# Patient Record
Sex: MALE | Race: OTHER | NOT HISPANIC OR LATINO | URBAN - METROPOLITAN AREA
[De-identification: names, ages, dates, MRNs, and addresses within clinical notes are randomized per-mention and may not be internally consistent; named-entity substitution may affect disease eponyms.]

---

## 2019-10-17 ENCOUNTER — OUTPATIENT (OUTPATIENT)
Dept: OUTPATIENT SERVICES | Facility: HOSPITAL | Age: 44
LOS: 1 days | End: 2019-10-17
Payer: COMMERCIAL

## 2019-10-17 PROCEDURE — 73080 X-RAY EXAM OF ELBOW: CPT

## 2019-10-17 PROCEDURE — 73080 X-RAY EXAM OF ELBOW: CPT | Mod: 26,LT

## 2019-10-17 PROCEDURE — 73110 X-RAY EXAM OF WRIST: CPT | Mod: 26,LT

## 2019-10-17 PROCEDURE — 73110 X-RAY EXAM OF WRIST: CPT

## 2021-06-08 ENCOUNTER — OFFICE (OUTPATIENT)
Dept: URBAN - METROPOLITAN AREA CLINIC 76 | Facility: CLINIC | Age: 46
Setting detail: OPHTHALMOLOGY
End: 2021-06-08
Payer: COMMERCIAL

## 2021-06-08 DIAGNOSIS — H11.153: ICD-10-CM

## 2021-06-08 DIAGNOSIS — E11.9: ICD-10-CM

## 2021-06-08 DIAGNOSIS — H52.4: ICD-10-CM

## 2021-06-08 PROCEDURE — 99203 OFFICE O/P NEW LOW 30 MIN: CPT | Performed by: OPHTHALMOLOGY

## 2021-06-08 PROCEDURE — 92015 DETERMINE REFRACTIVE STATE: CPT | Performed by: OPHTHALMOLOGY

## 2021-06-08 ASSESSMENT — REFRACTION_AUTOREFRACTION
OD_AXIS: 30
OD_SPHERE: +0.25
OS_CYLINDER: -0.25
OS_AXIS: 132
OD_CYLINDER: -0.75
OS_SPHERE: 0.00

## 2021-06-08 ASSESSMENT — REFRACTION_MANIFEST
OD_VA1: 20/20
OS_ADD: +1.75
OS_VA1: 20/20
OD_SPHERE: PLANO
OD_ADD: +1.75
OS_SPHERE: PLANO

## 2021-06-08 ASSESSMENT — CONFRONTATIONAL VISUAL FIELD TEST (CVF)
OD_FINDINGS: FULL
OS_FINDINGS: FULL

## 2021-06-08 ASSESSMENT — KERATOMETRY
OD_K1POWER_DIOPTERS: 43.00
OS_AXISANGLE_DEGREES: 90
OS_K2POWER_DIOPTERS: 43.50
OD_AXISANGLE_DEGREES: 83
OS_K1POWER_DIOPTERS: 43.50
OD_K2POWER_DIOPTERS: 43.75

## 2021-06-08 ASSESSMENT — TONOMETRY
OD_IOP_MMHG: 16
OS_IOP_MMHG: 15

## 2021-06-08 ASSESSMENT — AXIALLENGTH_DERIVED
OS_AL: 23.6407
OD_AL: 23.6869

## 2021-06-08 ASSESSMENT — SPHEQUIV_DERIVED
OD_SPHEQUIV: -0.125
OS_SPHEQUIV: -0.125

## 2021-06-08 ASSESSMENT — VISUAL ACUITY
OS_BCVA: 20/20
OD_BCVA: 20/20

## 2021-10-08 ENCOUNTER — OFFICE (OUTPATIENT)
Dept: URBAN - METROPOLITAN AREA CLINIC 76 | Facility: CLINIC | Age: 46
Setting detail: OPHTHALMOLOGY
End: 2021-10-08
Payer: COMMERCIAL

## 2021-10-08 DIAGNOSIS — H11.153: ICD-10-CM

## 2021-10-08 DIAGNOSIS — E11.9: ICD-10-CM

## 2021-10-08 DIAGNOSIS — H16.8: ICD-10-CM

## 2021-10-08 PROCEDURE — 99213 OFFICE O/P EST LOW 20 MIN: CPT | Performed by: OPHTHALMOLOGY

## 2021-10-08 ASSESSMENT — CONFRONTATIONAL VISUAL FIELD TEST (CVF)
OS_FINDINGS: FULL
OD_FINDINGS: FULL

## 2021-10-11 ASSESSMENT — REFRACTION_AUTOREFRACTION
OD_SPHERE: +0.25
OS_SPHERE: +0.25
OS_CYLINDER: -0.50
OD_CYLINDER: -0.50
OD_AXIS: 17
OS_AXIS: 115

## 2021-10-11 ASSESSMENT — KERATOMETRY
OS_AXISANGLE_DEGREES: 90
OS_K1POWER_DIOPTERS: 43.75
OD_K2POWER_DIOPTERS: 44.00
OD_AXISANGLE_DEGREES: 89
OD_K1POWER_DIOPTERS: 43.00
OS_K2POWER_DIOPTERS: 43.75

## 2021-10-11 ASSESSMENT — REFRACTION_MANIFEST
OD_SPHERE: PLANO
OD_ADD: +1.75
OS_ADD: +1.75
OS_VA1: 20/20
OS_SPHERE: PLANO
OD_VA1: 20/20

## 2021-10-11 ASSESSMENT — AXIALLENGTH_DERIVED
OD_AL: 23.5919
OS_AL: 23.5004

## 2021-10-11 ASSESSMENT — SPHEQUIV_DERIVED
OD_SPHEQUIV: 0
OS_SPHEQUIV: 0

## 2021-10-11 ASSESSMENT — VISUAL ACUITY
OD_BCVA: 20/20
OS_BCVA: 20/20

## 2021-10-14 ENCOUNTER — RX ONLY (RX ONLY)
Age: 46
End: 2021-10-14

## 2021-10-14 ENCOUNTER — OFFICE (OUTPATIENT)
Dept: URBAN - METROPOLITAN AREA CLINIC 76 | Facility: CLINIC | Age: 46
Setting detail: OPHTHALMOLOGY
End: 2021-10-14
Payer: COMMERCIAL

## 2021-10-14 DIAGNOSIS — E11.9: ICD-10-CM

## 2021-10-14 DIAGNOSIS — H16.8: ICD-10-CM

## 2021-10-14 DIAGNOSIS — H11.153: ICD-10-CM

## 2021-10-14 PROBLEM — H16.221 DRY EYE SYNDROME K SICCA; RIGHT EYE: Status: ACTIVE | Noted: 2021-10-14

## 2021-10-14 PROCEDURE — 92012 INTRM OPH EXAM EST PATIENT: CPT | Performed by: OPHTHALMOLOGY

## 2021-10-14 ASSESSMENT — CONFRONTATIONAL VISUAL FIELD TEST (CVF)
OS_FINDINGS: FULL
OD_FINDINGS: FULL

## 2021-10-14 ASSESSMENT — REFRACTION_MANIFEST
OS_SPHERE: PLANO
OD_SPHERE: PLANO
OD_ADD: +1.75
OS_ADD: +1.75
OD_VA1: 20/20
OS_VA1: 20/20

## 2021-10-14 ASSESSMENT — REFRACTION_AUTOREFRACTION
OD_AXIS: 17
OS_AXIS: 115
OS_CYLINDER: -0.50
OS_SPHERE: +0.25
OD_SPHERE: +0.25
OD_CYLINDER: -0.50

## 2021-10-14 ASSESSMENT — KERATOMETRY
OD_AXISANGLE_DEGREES: 89
OS_K1POWER_DIOPTERS: 43.75
OD_K1POWER_DIOPTERS: 43.00
OS_AXISANGLE_DEGREES: 90
OS_K2POWER_DIOPTERS: 43.75
OD_K2POWER_DIOPTERS: 44.00

## 2021-10-14 ASSESSMENT — VISUAL ACUITY
OS_BCVA: 20/20
OD_BCVA: 20/25

## 2021-10-14 ASSESSMENT — TONOMETRY
OD_IOP_MMHG: 16
OS_IOP_MMHG: 15

## 2021-10-14 ASSESSMENT — SUPERFICIAL PUNCTATE KERATITIS (SPK): OD_SPK: T

## 2021-10-14 ASSESSMENT — SPHEQUIV_DERIVED
OS_SPHEQUIV: 0
OD_SPHEQUIV: 0

## 2021-10-14 ASSESSMENT — AXIALLENGTH_DERIVED
OD_AL: 23.5919
OS_AL: 23.5004

## 2021-11-26 ENCOUNTER — INPATIENT (INPATIENT)
Facility: HOSPITAL | Age: 46
LOS: 1 days | Discharge: HOME | End: 2021-11-28
Attending: HOSPITALIST | Admitting: HOSPITALIST
Payer: COMMERCIAL

## 2021-11-26 VITALS
SYSTOLIC BLOOD PRESSURE: 113 MMHG | RESPIRATION RATE: 16 BRPM | OXYGEN SATURATION: 100 % | HEART RATE: 98 BPM | TEMPERATURE: 101 F | DIASTOLIC BLOOD PRESSURE: 75 MMHG

## 2021-11-26 DIAGNOSIS — R50.9 FEVER, UNSPECIFIED: ICD-10-CM

## 2021-11-26 DIAGNOSIS — L27.0 GENERALIZED SKIN ERUPTION DUE TO DRUGS AND MEDICAMENTS TAKEN INTERNALLY: ICD-10-CM

## 2021-11-26 DIAGNOSIS — Z29.9 ENCOUNTER FOR PROPHYLACTIC MEASURES, UNSPECIFIED: ICD-10-CM

## 2021-11-26 DIAGNOSIS — K12.30 ORAL MUCOSITIS (ULCERATIVE), UNSPECIFIED: ICD-10-CM

## 2021-11-26 DIAGNOSIS — L30.9 DERMATITIS, UNSPECIFIED: ICD-10-CM

## 2021-11-26 DIAGNOSIS — R21 RASH AND OTHER NONSPECIFIC SKIN ERUPTION: ICD-10-CM

## 2021-11-26 LAB
ALBUMIN SERPL ELPH-MCNC: 4 G/DL — SIGNIFICANT CHANGE UP (ref 3.3–5)
ALP SERPL-CCNC: 81 U/L — SIGNIFICANT CHANGE UP (ref 40–120)
ALT FLD-CCNC: 30 U/L — SIGNIFICANT CHANGE UP (ref 4–41)
ANION GAP SERPL CALC-SCNC: 10 MMOL/L — SIGNIFICANT CHANGE UP (ref 7–14)
APTT BLD: 41.6 SEC — HIGH (ref 27–36.3)
AST SERPL-CCNC: 33 U/L — SIGNIFICANT CHANGE UP (ref 4–40)
B PERT DNA SPEC QL NAA+PROBE: SIGNIFICANT CHANGE UP
B PERT+PARAPERT DNA PNL SPEC NAA+PROBE: SIGNIFICANT CHANGE UP
BASE EXCESS BLDV CALC-SCNC: 1.5 MMOL/L — SIGNIFICANT CHANGE UP (ref -2–3)
BASOPHILS # BLD AUTO: 0 K/UL — SIGNIFICANT CHANGE UP (ref 0–0.2)
BASOPHILS NFR BLD AUTO: 0 % — SIGNIFICANT CHANGE UP (ref 0–2)
BILIRUB SERPL-MCNC: 0.4 MG/DL — SIGNIFICANT CHANGE UP (ref 0.2–1.2)
BLOOD GAS VENOUS COMPREHENSIVE RESULT: SIGNIFICANT CHANGE UP
BORDETELLA PARAPERTUSSIS (RAPRVP): SIGNIFICANT CHANGE UP
BUN SERPL-MCNC: 12 MG/DL — SIGNIFICANT CHANGE UP (ref 7–23)
C PNEUM DNA SPEC QL NAA+PROBE: SIGNIFICANT CHANGE UP
CALCIUM SERPL-MCNC: 9 MG/DL — SIGNIFICANT CHANGE UP (ref 8.4–10.5)
CHLORIDE BLDV-SCNC: 106 MMOL/L — SIGNIFICANT CHANGE UP (ref 96–108)
CHLORIDE SERPL-SCNC: 104 MMOL/L — SIGNIFICANT CHANGE UP (ref 98–107)
CO2 BLDV-SCNC: 29.8 MMOL/L — HIGH (ref 22–26)
CO2 SERPL-SCNC: 25 MMOL/L — SIGNIFICANT CHANGE UP (ref 22–31)
CREAT SERPL-MCNC: 0.92 MG/DL — SIGNIFICANT CHANGE UP (ref 0.5–1.3)
CRP SERPL-MCNC: <4 MG/L — SIGNIFICANT CHANGE UP
EOSINOPHIL # BLD AUTO: 0 K/UL — SIGNIFICANT CHANGE UP (ref 0–0.5)
EOSINOPHIL NFR BLD AUTO: 0 % — SIGNIFICANT CHANGE UP (ref 0–6)
ERYTHROCYTE [SEDIMENTATION RATE] IN BLOOD: 16 MM/HR — HIGH (ref 1–15)
FLUAV SUBTYP SPEC NAA+PROBE: SIGNIFICANT CHANGE UP
FLUBV RNA SPEC QL NAA+PROBE: SIGNIFICANT CHANGE UP
GAS PNL BLDV: 135 MMOL/L — LOW (ref 136–145)
GLUCOSE BLDV-MCNC: 94 MG/DL — SIGNIFICANT CHANGE UP (ref 70–99)
GLUCOSE SERPL-MCNC: 91 MG/DL — SIGNIFICANT CHANGE UP (ref 70–99)
HADV DNA SPEC QL NAA+PROBE: SIGNIFICANT CHANGE UP
HCO3 BLDV-SCNC: 28 MMOL/L — SIGNIFICANT CHANGE UP (ref 22–29)
HCOV 229E RNA SPEC QL NAA+PROBE: SIGNIFICANT CHANGE UP
HCOV HKU1 RNA SPEC QL NAA+PROBE: SIGNIFICANT CHANGE UP
HCOV NL63 RNA SPEC QL NAA+PROBE: SIGNIFICANT CHANGE UP
HCOV OC43 RNA SPEC QL NAA+PROBE: SIGNIFICANT CHANGE UP
HCT VFR BLD CALC: 45.6 % — SIGNIFICANT CHANGE UP (ref 39–50)
HCT VFR BLDA CALC: 47 % — SIGNIFICANT CHANGE UP (ref 39–51)
HGB BLD CALC-MCNC: 15.7 G/DL — SIGNIFICANT CHANGE UP (ref 13–17)
HGB BLD-MCNC: 15.4 G/DL — SIGNIFICANT CHANGE UP (ref 13–17)
HIV 1+2 AB+HIV1 P24 AG SERPL QL IA: SIGNIFICANT CHANGE UP
HMPV RNA SPEC QL NAA+PROBE: SIGNIFICANT CHANGE UP
HPIV1 RNA SPEC QL NAA+PROBE: SIGNIFICANT CHANGE UP
HPIV2 RNA SPEC QL NAA+PROBE: SIGNIFICANT CHANGE UP
HPIV3 RNA SPEC QL NAA+PROBE: SIGNIFICANT CHANGE UP
HPIV4 RNA SPEC QL NAA+PROBE: SIGNIFICANT CHANGE UP
IANC: 3.73 K/UL — SIGNIFICANT CHANGE UP (ref 1.5–8.5)
INR BLD: 1.08 RATIO — SIGNIFICANT CHANGE UP (ref 0.88–1.16)
LACTATE BLDV-MCNC: 1.4 MMOL/L — SIGNIFICANT CHANGE UP (ref 0.5–2)
LYMPHOCYTES # BLD AUTO: 0.9 K/UL — LOW (ref 1–3.3)
LYMPHOCYTES # BLD AUTO: 14.7 % — SIGNIFICANT CHANGE UP (ref 13–44)
M PNEUMO DNA SPEC QL NAA+PROBE: SIGNIFICANT CHANGE UP
MANUAL SMEAR VERIFICATION: SIGNIFICANT CHANGE UP
MCHC RBC-ENTMCNC: 29 PG — SIGNIFICANT CHANGE UP (ref 27–34)
MCHC RBC-ENTMCNC: 33.8 GM/DL — SIGNIFICANT CHANGE UP (ref 32–36)
MCV RBC AUTO: 85.9 FL — SIGNIFICANT CHANGE UP (ref 80–100)
MONOCYTES # BLD AUTO: 0.45 K/UL — SIGNIFICANT CHANGE UP (ref 0–0.9)
MONOCYTES NFR BLD AUTO: 7.3 % — SIGNIFICANT CHANGE UP (ref 2–14)
NEUTROPHILS # BLD AUTO: 4.33 K/UL — SIGNIFICANT CHANGE UP (ref 1.8–7.4)
NEUTROPHILS NFR BLD AUTO: 66.1 % — SIGNIFICANT CHANGE UP (ref 43–77)
NEUTS BAND # BLD: 4.6 % — SIGNIFICANT CHANGE UP (ref 0–6)
PCO2 BLDV: 51 MMHG — SIGNIFICANT CHANGE UP (ref 42–55)
PH BLDV: 7.35 — SIGNIFICANT CHANGE UP (ref 7.32–7.43)
PLAT MORPH BLD: ABNORMAL
PLATELET # BLD AUTO: 200 K/UL — SIGNIFICANT CHANGE UP (ref 150–400)
PLATELET COUNT - ESTIMATE: NORMAL — SIGNIFICANT CHANGE UP
PO2 BLDV: 22 MMHG — SIGNIFICANT CHANGE UP
POTASSIUM BLDV-SCNC: 4.6 MMOL/L — SIGNIFICANT CHANGE UP (ref 3.5–5.1)
POTASSIUM SERPL-MCNC: 4.5 MMOL/L — SIGNIFICANT CHANGE UP (ref 3.5–5.3)
POTASSIUM SERPL-SCNC: 4.5 MMOL/L — SIGNIFICANT CHANGE UP (ref 3.5–5.3)
PROT SERPL-MCNC: 8.4 G/DL — HIGH (ref 6–8.3)
PROTHROM AB SERPL-ACNC: 12.4 SEC — SIGNIFICANT CHANGE UP (ref 10.6–13.6)
RAPID RVP RESULT: SIGNIFICANT CHANGE UP
RBC # BLD: 5.31 M/UL — SIGNIFICANT CHANGE UP (ref 4.2–5.8)
RBC # FLD: 13.1 % — SIGNIFICANT CHANGE UP (ref 10.3–14.5)
RBC BLD AUTO: NORMAL — SIGNIFICANT CHANGE UP
RSV RNA SPEC QL NAA+PROBE: SIGNIFICANT CHANGE UP
RV+EV RNA SPEC QL NAA+PROBE: SIGNIFICANT CHANGE UP
SAO2 % BLDV: 34.1 % — SIGNIFICANT CHANGE UP
SARS-COV-2 RNA SPEC QL NAA+PROBE: SIGNIFICANT CHANGE UP
SMUDGE CELLS # BLD: PRESENT — SIGNIFICANT CHANGE UP
SODIUM SERPL-SCNC: 139 MMOL/L — SIGNIFICANT CHANGE UP (ref 135–145)
VARIANT LYMPHS # BLD: 7.3 % — HIGH (ref 0–6)
WBC # BLD: 6.12 K/UL — SIGNIFICANT CHANGE UP (ref 3.8–10.5)
WBC # FLD AUTO: 6.12 K/UL — SIGNIFICANT CHANGE UP (ref 3.8–10.5)

## 2021-11-26 PROCEDURE — 71045 X-RAY EXAM CHEST 1 VIEW: CPT | Mod: 26

## 2021-11-26 PROCEDURE — 99285 EMERGENCY DEPT VISIT HI MDM: CPT

## 2021-11-26 PROCEDURE — 99223 1ST HOSP IP/OBS HIGH 75: CPT | Mod: GC

## 2021-11-26 RX ORDER — SODIUM CHLORIDE 9 MG/ML
1000 INJECTION INTRAMUSCULAR; INTRAVENOUS; SUBCUTANEOUS ONCE
Refills: 0 | Status: COMPLETED | OUTPATIENT
Start: 2021-11-26 | End: 2021-11-26

## 2021-11-26 RX ORDER — ACETAMINOPHEN 500 MG
650 TABLET ORAL EVERY 6 HOURS
Refills: 0 | Status: DISCONTINUED | OUTPATIENT
Start: 2021-11-26 | End: 2021-11-28

## 2021-11-26 RX ORDER — IBUPROFEN 200 MG
600 TABLET ORAL ONCE
Refills: 0 | Status: COMPLETED | OUTPATIENT
Start: 2021-11-26 | End: 2021-11-26

## 2021-11-26 RX ORDER — LIDOCAINE 4 G/100G
5 CREAM TOPICAL EVERY 6 HOURS
Refills: 0 | Status: DISCONTINUED | OUTPATIENT
Start: 2021-11-26 | End: 2021-11-28

## 2021-11-26 RX ORDER — ACETAMINOPHEN 500 MG
650 TABLET ORAL ONCE
Refills: 0 | Status: COMPLETED | OUTPATIENT
Start: 2021-11-26 | End: 2021-11-26

## 2021-11-26 RX ORDER — SODIUM CHLORIDE 9 MG/ML
1000 INJECTION INTRAMUSCULAR; INTRAVENOUS; SUBCUTANEOUS
Refills: 0 | Status: DISCONTINUED | OUTPATIENT
Start: 2021-11-26 | End: 2021-11-28

## 2021-11-26 RX ADMIN — SODIUM CHLORIDE 1000 MILLILITER(S): 9 INJECTION INTRAMUSCULAR; INTRAVENOUS; SUBCUTANEOUS at 14:51

## 2021-11-26 RX ADMIN — SODIUM CHLORIDE 75 MILLILITER(S): 9 INJECTION INTRAMUSCULAR; INTRAVENOUS; SUBCUTANEOUS at 22:42

## 2021-11-26 RX ADMIN — Medication 600 MILLIGRAM(S): at 15:15

## 2021-11-26 RX ADMIN — Medication 650 MILLIGRAM(S): at 15:15

## 2021-11-26 RX ADMIN — Medication 650 MILLIGRAM(S): at 14:52

## 2021-11-26 RX ADMIN — Medication 600 MILLIGRAM(S): at 14:52

## 2021-11-26 NOTE — ED ADULT NURSE NOTE - CHIEF COMPLAINT QUOTE
pt English speaking c/o rash to neck, upper chest, face, mouth. Pt seen by PCP and had negative workup done, was started on prednisone without any relief in symptoms. Also endorsing periods of SOB at home and N/V. Appears in NAD. Denies any significant PMH

## 2021-11-26 NOTE — ED PROVIDER NOTE - PROGRESS NOTE DETAILS
Attending MD Joseph.  Pt endorsed to Dr. Edward from medicine. Derm paged to see pt inpt.  Pt is hemodynamically stable and does not meet sepsis criteria.  Will hold abxs at this time given equal consideration being given to inflammatory/rheumatologic/infectious etiologies.  Pt placed on contact obs 2/2 lack of known exact etiology.  RVP non-actionable, do no suspect respiratory illness.

## 2021-11-26 NOTE — ED PROVIDER NOTE - ATTENDING CONTRIBUTION TO CARE
Attending MD Joseph.  Agree with above.  ([facility]  appreciated: Julio #[id number]) Pt is a 47 yo male with no sig pmhx except for multiple previous allergies (eggs, beef, seafood etc) who presents to ED with complaint of 'allergies' that began 1 mo ago with rash to back that then spread to chest and ears and face and lips.  Rash is erythematous with coalescence of blanching macules over back with discrete satellite papules and several areas of eschar over chest.  PT saw an allergist who rxed prednisone 20 mg that he began on 10/20 and he has completed but denies improvement.  Now for ~1 wk he's noted dry mouth, some difficulty swallowing, has developed cough, SOB, fevers, x 3 days.  Pt endorses travel to [place] 1 mo ago and travel to [place] 2 mos ago.  No noted insect or tic bites.  No hx of unprotected sex with multiple partners.  No involvement of palmar surfaces.  Rash began centrally over back.  Involvement of lips does not involve inner oral mucosa.  Lips and ears with some dry scaling/desquamation over inferior lip.  Denies sores in mouth, none visible.  Denies sores/involvement of genitals.  No pain with defecation.  Planned screening labs/CXR and RVP.  Pt is low-grade febrile on arrival to ED. He does not take any medications regularly but has taken Tylenol for the fever which reduces temp transiently.

## 2021-11-26 NOTE — H&P ADULT - PROBLEM SELECTOR PLAN 3
DVT ppx: Ambulatory, IMPROVE 0  DIet: Regular, Halal  DIspo: Pending clinical course DVT ppx: Ambulatory, IMPROVE 0  DIet: Mech soft due to oral lesions, Halal  DIspo: Pending clinical course

## 2021-11-26 NOTE — H&P ADULT - NSHPPHYSICALEXAM_GEN_ALL_CORE
VITALS:   T(C): 36.8 (11-26-21 @ 19:15), Max: 38.1 (11-26-21 @ 12:30)  HR: 88 (11-26-21 @ 19:15) (88 - 98)  BP: 115/75 (11-26-21 @ 19:15) (113/75 - 118/78)  RR: 17 (11-26-21 @ 19:15) (16 - 17)  SpO2: 100% (11-26-21 @ 19:15) (100% - 100%)    GENERAL: NAD, lying in bed comfortably  HEAD:  Atraumatic, Normocephalic  EYES: EOMI, PERRLA, conjunctiva and sclera clear  ENT: Moist mucous membranes  NECK: Supple, No JVD  CHEST/LUNG: Clear to auscultation bilaterally; No rales, rhonchi, wheezing, or rubs. Unlabored respirations  HEART: Regular rate and rhythm; No murmurs, rubs, or gallops  ABDOMEN: BSx4; Soft, nontender, nondistended  EXTREMITIES:  2+ Peripheral Pulses, brisk capillary refill. No clubbing, cyanosis, or edema  NERVOUS SYSTEM:  A&Ox3, no focal deficits   SKIN: No rashes or lesions VITALS:   T(C): 36.8 (11-26-21 @ 19:15), Max: 38.1 (11-26-21 @ 12:30)  HR: 88 (11-26-21 @ 19:15) (88 - 98)  BP: 115/75 (11-26-21 @ 19:15) (113/75 - 118/78)  RR: 17 (11-26-21 @ 19:15) (16 - 17)  SpO2: 100% (11-26-21 @ 19:15) (100% - 100%)    GENERAL: NAD, lying in bed comfortably  HEAD:  Atraumatic, Erythematous maculopapular rash involving bilateral pinnae  EYES: EOMI, PERRLA, conjunctiva and sclera clear  ENT: coalesced ulcers on posterior oropharynx, desquamanation of lips  NECK: Supple, No JVD  CHEST/LUNG: Clear to auscultation bilaterally; No rales, rhonchi, wheezing, or rubs. Unlabored respirations  HEART: Regular rate and rhythm; No murmurs, rubs, or gallops  ABDOMEN: BSx4; Soft, nontender, nondistended  EXTREMITIES:  2+ Peripheral Pulses, brisk capillary refill. No clubbing, cyanosis, or edema  NERVOUS SYSTEM:  A&Ox3, no focal deficits   SKIN: Rash present over back in rosa tree distribution Vital Signs Last 24 Hrs  T(C): 36.8 (26 Nov 2021 23:08), Max: 38.1 (26 Nov 2021 12:30)  T(F): 98.3 (26 Nov 2021 23:08), Max: 100.5 (26 Nov 2021 12:30)  HR: 58 (26 Nov 2021 23:08) (58 - 98)  BP: 103/66 (26 Nov 2021 23:08) (103/66 - 118/78)  BP(mean): --  RR: 18 (26 Nov 2021 23:08) (16 - 18)  SpO2: 98% (26 Nov 2021 23:08) (98% - 100%)    GENERAL: NAD, lying in bed comfortably  HEAD:  Atraumatic, Erythematous maculopapular rash involving bilateral pinnae  EYES: EOMI, PERRLA, conjunctiva and sclera clear  ENT: coalesced ulcers on posterior oropharynx, desquamation of lips with minimal bleeding   NECK: Supple, No JVD  CHEST/LUNG: Clear to auscultation bilaterally; No rales, rhonchi, wheezing, or rubs. Unlabored respirations  HEART: Regular rate and rhythm; No murmurs, rubs, or gallops  ABDOMEN: BSx4; Soft, nontender, nondistended  EXTREMITIES:  2+ Peripheral Pulses, brisk capillary refill. No clubbing, cyanosis, or edema  NERVOUS SYSTEM:  A&Ox3, no focal deficits   SKIN: Rash present over back in rosa tree distribution

## 2021-11-26 NOTE — H&P ADULT - HISTORY OF PRESENT ILLNESS
45yo Setswana speaking M with no significant PMHx presents to hospital with 4 day history of rash. Reports rash started on head, progressed caudally. Intermittent fevers at home, relieved with tylenol. Reports some pain over ears with denuded skin. Denies pruritus.        ED Course:  VS Tm 100.5, HR 98, /78 SpO2 100% on RA  Given Tylenol x1, ibuprofen x1, 1L NS 45yo Greek speaking M with no significant PMHx presents to hospital with 4 day history of rash. Reports rash started on head, progressed caudally. Intermittent fevers at home, relieved with tylenol. Reports some pain over ears with denuded skin. Denies pruritus. Reports global body weakness without arthralgias. No recent sick contacts. Traveled to [facility] approximately 3 months ago and to [place] approximately 1 month ago. No new medications other than PRN tylenol. Denies dysuria, cough, dysuria, arthralgias, myalgias, abdominal pain, vision changes and diarrhea.    ED Course:  VS Tm 100.5, HR 98, /78 SpO2 100% on RA  Given Tylenol x1, ibuprofen x1, 1L NS 47yo German speaking Male with no significant PMHx presents to hospital with 4 day history of persisting and worsening whole-body  rash. Reports rash started on the head, progressed caudally. Has experienced associated intermittent fevers at home, which relieved with Tylenol Reports some pain over ears with denuded skin. Reports also oral lesions including upper and lower lips. Denies associated pruritus. Reports global body weakness without arthralgias. No recent sick contacts. Traveled to [facility] approximately 3 months ago and to [place] approximately 1 month ago. No new medications other than PRN tylenol. Denies dysuria, cough, dysuria, arthralgias, myalgias, abdominal pain, vision changes and diarrhea.    ED Course:  VS Tm 100.5, HR 98, /78 SpO2 100% on RA  Given Tylenol x1, ibuprofen x1, 1L NS 47yo Yakut speaking Male with no significant PMHx presents to hospital with 4 day history of persisting and worsening whole-body  rash. Reports rash started on the head, progressed caudally. Has experienced associated intermittent fevers at home, which relieved with Tylenol Reports some pain over ears with denuded skin. Reports also painful oral lesions including upper and lower lips and roof of the mouth. Denies associated pruritus. Reports global body weakness without arthralgias. No recent sick contacts. Traveled to [facility] approximately 3 months ago and to [place] approximately 1 month ago. No new medications other than PRN Tylenol. Denies dysuria, cough, dysuria, arthralgias, myalgias, abdominal pain, vision changes and diarrhea.    ED Course:  VS Tm 100.5, HR 98, /78 SpO2 100% on RA  Given Tylenol x1, ibuprofen x1, 1L NS

## 2021-11-26 NOTE — H&P ADULT - NSHPLABSRESULTS_GEN_ALL_CORE
15.4   6.12  )-----------( 200      ( 26 Nov 2021 15:18 )             45.6   11-26    139  |  104  |  12  ----------------------------<  91  4.5   |  25  |  0.92    Ca    9.0      26 Nov 2021 15:18    TPro  8.4<H>  /  Alb  4.0  /  TBili  0.4  /  DBili  x   /  AST  33  /  ALT  30  /  AlkPhos  81  11-26    < from: Xray Chest 1 View AP/PA (11.26.21 @ 17:12) >    INTERPRETATION:  Clear lungs          ******PRELIMINARY REPORT******    ******PRELIMINARY REPORT******          OSMAN LIM MD; Resident Radiology    < end of copied text > 15.4   6.12  )-----------( 200      ( 26 Nov 2021 15:18 )             45.6   11-26    139  |  104  |  12  ----------------------------<  91  4.5   |  25  |  0.92    Ca    9.0      26 Nov 2021 15:18    TPro  8.4<H>  /  Alb  4.0  /  TBili  0.4  /  DBili  x   /  AST  33  /  ALT  30  /  AlkPhos  81  11-26    < from: Xray Chest 1 View AP/PA (11.26.21 @ 17:12) >    INTERPRETATION:  Clear lungs    Sedimentation Rate, Erythrocyte: 16 mm/h      ******PRELIMINARY REPORT******    ******PRELIMINARY REPORT******          OSMAN LIM MD; Resident Radiology    < end of copied text > 15.4   6.12  )-----------( 200      ( 26 Nov 2021 15:18 )             45.6   11-26    139  |  104  |  12  ----------------------------<  91  4.5   |  25  |  0.92    Ca    9.0      26 Nov 2021 15:18    TPro  8.4<H>  /  Alb  4.0  /  TBili  0.4  /  DBili  x   /  AST  33  /  ALT  30  /  AlkPhos  81  11-26        Sedimentation Rate, Erythrocyte: 16 mm/h  CRP <4    HIV nonreactive    CXR: Clear lungs  EKG: Pending EKG, 11/26, sinus bradycardia, 59bpm, qtc 413, isolated Tw inv in III, no acute ST changes - personally interpreted     CXR: clear lungs, no pleural effusions - personally interpreted     Personally reviewed the labs below:    15.4   6.12  )-----------( 200      ( 26 Nov 2021 15:18 )             45.6   11-26    139  |  104  |  12  ----------------------------<  91  4.5   |  25  |  0.92    Ca    9.0      26 Nov 2021 15:18    TPro  8.4<H>  /  Alb  4.0  /  TBili  0.4  /  DBili  x   /  AST  33  /  ALT  30  /  AlkPhos  81  11-26        Sedimentation Rate, Erythrocyte: 16 mm/h  CRP <4    HIV nonreactive

## 2021-11-26 NOTE — H&P ADULT - NSHPSOCIALHISTORY_GEN_ALL_CORE
Lives in [place]. Recently traveled to [place] approximately 1 month ago and [place] 3 months ago. Covid vaccinated, flu vaccine approximately 1 month ago. No pets in the home, no sick contacts. Takes only tylenol PRN. Sexually active with 1 partner, wife. Sexually active last 15 days ago. Lives in [place].  Recently traveled to [place] approximately 1 month ago and [place] 3 months ago.  Covid vaccinated, flu vaccine approximately 1 month ago. No pets in the home, no sick contacts.  Takes only tylenol PRN.  Sexually active with 1 partner, wife. Sexually active last 15 days ago.

## 2021-11-26 NOTE — H&P ADULT - PROBLEM SELECTOR PLAN 1
Patient presenting with cranio-caudal rash x4 days with mucosal involvement. No recent travel, drug or sick contact exposure. Intermittent fevers. DDx includes infectious vs inflammatory vs neoplastic. May need tissue diagnosis.  -Supportive care  -HIV negative  -IV Fluids  -Triamcinolone ointment to pruritic areas, if diffuse pruritus can use oral antihistamines  -Check syphilis screen, inflammatory markers  -Daily CBC with differential  -Appreciate derm input, discussed with them overnight, will see in AM Patient presenting with cranio-caudal rash x4 days with mucosal involvement. No recent travel, drug or sick contact exposure. Intermittent fevers. DDx includes infectious vs inflammatory vs neoplastic. May need tissue diagnosis.  -Supportive care  -HIV negative  -IV Fluids  -Triamcinolone ointment to pruritic areas, if diffuse pruritus can use oral antihistamines  -Check syphilis screen, lyme serology, inflammatory markers, autoantibodies  -Daily CBC with differential  -Daily CMP  -Appreciate derm input, discussed with them overnight, will see in AM Patient presenting with cranio-caudal rash x4 days with extensive mucosal involvement. No recent travel, drug or sick contact exposure. Intermittent fevers. DDx includes infectious vs inflammatory vs neoplastic.  Will need tissue biopsy for diagnosis.  -Supportive care  -HIV negative  -IV Fluids  -Triamcinolone ointment to pruritic areas, if diffuse pruritus can use oral antihistamines  -Check syphilis screen, lyme serology, inflammatory markers, autoantibodies  -Daily CBC with differential  -Daily CMP  -Formal dermatologic c/s: appreciate input, discussed overnight, will be formally evaluated in AM

## 2021-11-26 NOTE — H&P ADULT - ATTENDING COMMENTS
47yo Portuguese speaking M with no significant PMHx a/w whole-body rash, mucositis and intermittent fevers with gen. weakness;     The above assessment and plan were supplemented and modified by attending where indicated; 45yo Lao speaking M with no significant PMHx a/w severe dermatitis of unclear etiology with associated mucositis, intermittent fevers and gen. weakness;     The above assessment and plan were supplemented and modified by attending where indicated;

## 2021-11-26 NOTE — H&P ADULT - PROBLEM SELECTOR PLAN 2
DVT ppx: Ambulatory, IMPROVE 0  DIet: Regular, Halal  DIspo: Pending clinical course Mucositis on exam. Present on posterior oropharynx.  -Viscous lidocaine  -IV hydration Mucositis on exam. Present on posterior oropharynx.  -Viscous lidocaine  -Cepacol PRN  -IV hydration  -Diet: Mech soft due to oral lesions

## 2021-11-26 NOTE — H&P ADULT - NSHPREVIEWOFSYSTEMS_GEN_ALL_CORE
REVIEW OF SYSTEMS:    CONSTITUTIONAL: + weakness  EYES/ENT: No visual changes;  No vertigo or throat pain   NECK: No pain or stiffness  RESPIRATORY: No cough, wheezing, hemoptysis; No shortness of breath  CARDIOVASCULAR: No chest pain or palpitations  GASTROINTESTINAL: No abdominal or epigastric pain. No nausea, vomiting, or hematemesis; No diarrhea or constipation. No melena or hematochezia.  GENITOURINARY: No dysuria, frequency or hematuria  NEUROLOGICAL: No numbness or weakness  SKIN: No itching, rashes REVIEW OF SYSTEMS:    CONSTITUTIONAL: + weakness, +intermittent fevers  EYES/ENT: No visual changes;  No vertigo or throat pain   NECK: No pain or stiffness  RESPIRATORY: No cough, wheezing, hemoptysis; No shortness of breath  CARDIOVASCULAR: No chest pain or palpitations  GASTROINTESTINAL: No abdominal or epigastric pain. No nausea, vomiting, or hematemesis; No diarrhea or constipation. No melena or hematochezia.  GENITOURINARY: No dysuria, frequency or hematuria  NEUROLOGICAL: No numbness or weakness  SKIN: + Skin rash REVIEW OF SYSTEMS:    CONSTITUTIONAL: + weakness, +intermittent fevers  EYES/ENT: No visual changes;  No vertigo or throat pain   NECK: No pain or stiffness  RESPIRATORY: No cough, wheezing, hemoptysis; No shortness of breath  CARDIOVASCULAR: No chest pain or palpitations  GASTROINTESTINAL: No abdominal or epigastric pain. No nausea, vomiting, or hematemesis; No diarrhea or constipation. No melena or hematochezia.  GENITOURINARY: No dysuria, frequency or hematuria  NEUROLOGICAL: No numbness or weakness  SKIN: + Skin rash, oral lesions     Additional elements of ROS below

## 2021-11-26 NOTE — H&P ADULT - ASSESSMENT
45yo Divehi speaking M with no significant PMHx presents to hospital with 4 day history of rash, intermittent fevers and weakness. 47yo Kyrgyz speaking M with no significant PMHx a/w whole-body rash, mucositis and intermittent fevers with gen. weakness;  47yo Chinese speaking M with no significant PMHx a/w severe dermatitis of unclear etiology with associated mucositis, intermittent fevers and gen. weakness;

## 2021-11-27 ENCOUNTER — RESULT REVIEW (OUTPATIENT)
Age: 46
End: 2021-11-27

## 2021-11-27 LAB
ALBUMIN SERPL ELPH-MCNC: 3.1 G/DL — LOW (ref 3.3–5)
ALP SERPL-CCNC: 68 U/L — SIGNIFICANT CHANGE UP (ref 40–120)
ALT FLD-CCNC: 30 U/L — SIGNIFICANT CHANGE UP (ref 4–41)
ANION GAP SERPL CALC-SCNC: 12 MMOL/L — SIGNIFICANT CHANGE UP (ref 7–14)
APTT BLD: 39.4 SEC — HIGH (ref 27–36.3)
AST SERPL-CCNC: 39 U/L — SIGNIFICANT CHANGE UP (ref 4–40)
B BURGDOR C6 AB SER-ACNC: NEGATIVE — SIGNIFICANT CHANGE UP
B BURGDOR IGG+IGM SER-ACNC: 0.36 INDEX — SIGNIFICANT CHANGE UP (ref 0.01–0.89)
BASOPHILS # BLD AUTO: 0.02 K/UL — SIGNIFICANT CHANGE UP (ref 0–0.2)
BASOPHILS NFR BLD AUTO: 0.4 % — SIGNIFICANT CHANGE UP (ref 0–2)
BILIRUB SERPL-MCNC: 0.4 MG/DL — SIGNIFICANT CHANGE UP (ref 0.2–1.2)
BUN SERPL-MCNC: 13 MG/DL — SIGNIFICANT CHANGE UP (ref 7–23)
CALCIUM SERPL-MCNC: 8.2 MG/DL — LOW (ref 8.4–10.5)
CHLORIDE SERPL-SCNC: 108 MMOL/L — HIGH (ref 98–107)
CO2 SERPL-SCNC: 19 MMOL/L — LOW (ref 22–31)
COVID-19 NUCLEOCAPSID GAM AB INTERP: NEGATIVE — SIGNIFICANT CHANGE UP
COVID-19 NUCLEOCAPSID TOTAL GAM ANTIBODY RESULT: 0.09 INDEX — SIGNIFICANT CHANGE UP
COVID-19 SPIKE DOMAIN AB INTERP: POSITIVE
COVID-19 SPIKE DOMAIN ANTIBODY RESULT: >250 U/ML — HIGH
CREAT SERPL-MCNC: 0.82 MG/DL — SIGNIFICANT CHANGE UP (ref 0.5–1.3)
EBV EA AB SER IA-ACNC: 23.4 U/ML — HIGH
EBV EA AB TITR SER IF: POSITIVE
EBV EA IGG SER-ACNC: POSITIVE
EBV NA IGG SER IA-ACNC: 420 U/ML — HIGH
EBV PATRN SPEC IB-IMP: SIGNIFICANT CHANGE UP
EBV VCA IGG AVIDITY SER QL IA: POSITIVE
EBV VCA IGM SER IA-ACNC: <10 U/ML — SIGNIFICANT CHANGE UP
EBV VCA IGM SER IA-ACNC: >750 U/ML — HIGH
EBV VCA IGM TITR FLD: NEGATIVE — SIGNIFICANT CHANGE UP
EOSINOPHIL # BLD AUTO: 0.06 K/UL — SIGNIFICANT CHANGE UP (ref 0–0.5)
EOSINOPHIL NFR BLD AUTO: 1.1 % — SIGNIFICANT CHANGE UP (ref 0–6)
FERRITIN SERPL-MCNC: 802 NG/ML — HIGH (ref 30–400)
GLUCOSE SERPL-MCNC: 76 MG/DL — SIGNIFICANT CHANGE UP (ref 70–99)
HCT VFR BLD CALC: 42.9 % — SIGNIFICANT CHANGE UP (ref 39–50)
HGB BLD-MCNC: 14 G/DL — SIGNIFICANT CHANGE UP (ref 13–17)
IANC: 3.17 K/UL — SIGNIFICANT CHANGE UP (ref 1.5–8.5)
IMM GRANULOCYTES NFR BLD AUTO: 0.4 % — SIGNIFICANT CHANGE UP (ref 0–1.5)
INR BLD: 1.02 RATIO — SIGNIFICANT CHANGE UP (ref 0.88–1.16)
LYMPHOCYTES # BLD AUTO: 1.51 K/UL — SIGNIFICANT CHANGE UP (ref 1–3.3)
LYMPHOCYTES # BLD AUTO: 28.8 % — SIGNIFICANT CHANGE UP (ref 13–44)
MAGNESIUM SERPL-MCNC: 2.1 MG/DL — SIGNIFICANT CHANGE UP (ref 1.6–2.6)
MCHC RBC-ENTMCNC: 29.1 PG — SIGNIFICANT CHANGE UP (ref 27–34)
MCHC RBC-ENTMCNC: 32.6 GM/DL — SIGNIFICANT CHANGE UP (ref 32–36)
MCV RBC AUTO: 89.2 FL — SIGNIFICANT CHANGE UP (ref 80–100)
MEV IGG SER-ACNC: >300 AU/ML — SIGNIFICANT CHANGE UP
MEV IGG+IGM SER-IMP: POSITIVE — SIGNIFICANT CHANGE UP
MONOCYTES # BLD AUTO: 0.47 K/UL — SIGNIFICANT CHANGE UP (ref 0–0.9)
MONOCYTES NFR BLD AUTO: 9 % — SIGNIFICANT CHANGE UP (ref 2–14)
MUV AB SER-ACNC: NEGATIVE — SIGNIFICANT CHANGE UP
MUV IGG FLD-ACNC: <5 AU/ML — SIGNIFICANT CHANGE UP
NEUTROPHILS # BLD AUTO: 3.17 K/UL — SIGNIFICANT CHANGE UP (ref 1.8–7.4)
NEUTROPHILS NFR BLD AUTO: 60.3 % — SIGNIFICANT CHANGE UP (ref 43–77)
NRBC # BLD: 0 /100 WBCS — SIGNIFICANT CHANGE UP
NRBC # FLD: 0 K/UL — SIGNIFICANT CHANGE UP
PHOSPHATE SERPL-MCNC: 3.2 MG/DL — SIGNIFICANT CHANGE UP (ref 2.5–4.5)
PLATELET # BLD AUTO: 147 K/UL — LOW (ref 150–400)
POTASSIUM SERPL-MCNC: 4.2 MMOL/L — SIGNIFICANT CHANGE UP (ref 3.5–5.3)
POTASSIUM SERPL-SCNC: 4.2 MMOL/L — SIGNIFICANT CHANGE UP (ref 3.5–5.3)
PROT SERPL-MCNC: 6.6 G/DL — SIGNIFICANT CHANGE UP (ref 6–8.3)
PROTHROM AB SERPL-ACNC: 11.7 SEC — SIGNIFICANT CHANGE UP (ref 10.6–13.6)
RBC # BLD: 4.81 M/UL — SIGNIFICANT CHANGE UP (ref 4.2–5.8)
RBC # FLD: 13.1 % — SIGNIFICANT CHANGE UP (ref 10.3–14.5)
RHEUMATOID FACT SERPL-ACNC: 63 IU/ML — HIGH (ref 0–13)
RUBV IGG SER-ACNC: 23 INDEX — SIGNIFICANT CHANGE UP
RUBV IGG SER-IMP: POSITIVE — SIGNIFICANT CHANGE UP
SARS-COV-2 IGG+IGM SERPL QL IA: 0.09 INDEX — SIGNIFICANT CHANGE UP
SARS-COV-2 IGG+IGM SERPL QL IA: >250 U/ML — HIGH
SARS-COV-2 IGG+IGM SERPL QL IA: NEGATIVE — SIGNIFICANT CHANGE UP
SARS-COV-2 IGG+IGM SERPL QL IA: POSITIVE
SODIUM SERPL-SCNC: 139 MMOL/L — SIGNIFICANT CHANGE UP (ref 135–145)
T PALLIDUM AB TITR SER: NEGATIVE — SIGNIFICANT CHANGE UP
WBC # BLD: 5.25 K/UL — SIGNIFICANT CHANGE UP (ref 3.8–10.5)
WBC # FLD AUTO: 5.25 K/UL — SIGNIFICANT CHANGE UP (ref 3.8–10.5)

## 2021-11-27 PROCEDURE — 99233 SBSQ HOSP IP/OBS HIGH 50: CPT | Mod: GC

## 2021-11-27 PROCEDURE — 88342 IMHCHEM/IMCYTCHM 1ST ANTB: CPT | Mod: 26

## 2021-11-27 PROCEDURE — 11104 PUNCH BX SKIN SINGLE LESION: CPT

## 2021-11-27 PROCEDURE — 88312 SPECIAL STAINS GROUP 1: CPT | Mod: 26

## 2021-11-27 PROCEDURE — 99222 1ST HOSP IP/OBS MODERATE 55: CPT | Mod: 25

## 2021-11-27 PROCEDURE — 88305 TISSUE EXAM BY PATHOLOGIST: CPT | Mod: 26

## 2021-11-27 RX ORDER — BENZOCAINE AND MENTHOL 5; 1 G/100ML; G/100ML
1 LIQUID ORAL EVERY 6 HOURS
Refills: 0 | Status: DISCONTINUED | OUTPATIENT
Start: 2021-11-27 | End: 2021-11-27

## 2021-11-27 RX ORDER — LANOLIN ALCOHOL/MO/W.PET/CERES
3 CREAM (GRAM) TOPICAL AT BEDTIME
Refills: 0 | Status: DISCONTINUED | OUTPATIENT
Start: 2021-11-27 | End: 2021-11-28

## 2021-11-27 RX ORDER — BENZOCAINE AND MENTHOL 5; 1 G/100ML; G/100ML
1 LIQUID ORAL EVERY 6 HOURS
Refills: 0 | Status: DISCONTINUED | OUTPATIENT
Start: 2021-11-27 | End: 2021-11-28

## 2021-11-27 RX ADMIN — BENZOCAINE AND MENTHOL 1 LOZENGE: 5; 1 LIQUID ORAL at 03:17

## 2021-11-27 RX ADMIN — LIDOCAINE 5 MILLILITER(S): 4 CREAM TOPICAL at 03:29

## 2021-11-27 RX ADMIN — LIDOCAINE 5 MILLILITER(S): 4 CREAM TOPICAL at 10:26

## 2021-11-27 RX ADMIN — LIDOCAINE 5 MILLILITER(S): 4 CREAM TOPICAL at 15:34

## 2021-11-27 RX ADMIN — LIDOCAINE 5 MILLILITER(S): 4 CREAM TOPICAL at 22:29

## 2021-11-27 RX ADMIN — Medication 1 APPLICATION(S): at 17:25

## 2021-11-27 RX ADMIN — Medication 1 APPLICATION(S): at 05:38

## 2021-11-27 NOTE — PROGRESS NOTE ADULT - PROBLEM SELECTOR PLAN 1
Patient presenting with cranio-caudal rash x4 days with extensive mucosal involvement. No recent travel, drug or sick contact exposure. Intermittent fevers. DDx includes infectious vs inflammatory vs neoplastic.  Will need tissue biopsy for diagnosis.  -Supportive care  -HIV negative  -IV Fluids  -Triamcinolone ointment to pruritic areas, if diffuse pruritus can use oral antihistamines  -Check syphilis screen, lyme serology, inflammatory markers, autoantibodies  -Daily CBC with differential  -Daily CMP  -Formal dermatologic c/s: appreciate input, discussed overnight, will be formally evaluated in AM Patient presenting with cranio-caudal rash x4 days with extensive mucosal involvement. No recent travel, drug or sick contact exposure. Intermittent fevers. DDx includes infectious vs inflammatory vs neoplastic. Another possible Ddx can be pt's recent seafood intolerance.  Will need tissue biopsy for diagnosis.  -Supportive care  -HIV negative  -IV Fluids  -Triamcinolone ointment to pruritic areas, if diffuse pruritus can use oral antihistamines  -Check syphilis screen, lyme serology, inflammatory markers, autoantibodies  -Daily CBC with differential  -Daily CMP  -Formal dermatologic c/s: appreciate input, discussed overnight, will be formally evaluated in AM Patient presenting with cranio-caudal rash x4 days with extensive mucosal involvement. No recent travel, drug or sick contact exposure. Intermittent fevers. DDx includes infectious vs inflammatory vs neoplastic. Another possible Ddx can be pt's recent seafood intolerance.  Will need tissue biopsy for diagnosis.  -Supportive care  -HIV negative  -IV Fluids  -Triamcinolone ointment to pruritic areas, if diffuse pruritus can use oral antihistamines  -Check syphilis screen, lyme serology, inflammatory markers, autoantibodies  -Daily CBC with differential  -Daily CMP  - ANCA and TB quantiferon  -Formal dermatologic c/s: appreciate input, discussed overnight, will be formally evaluated in AM

## 2021-11-27 NOTE — PROGRESS NOTE ADULT - PROBLEM SELECTOR PLAN 2
Mucositis on exam. Present on posterior oropharynx.  -Viscous lidocaine  -Cepacol PRN  -IV hydration  -Diet: Mech soft due to oral lesions Mucositis on exam. Present on posterior oropharynx.  -Viscous lidocaine  -Cepacol PRN  -IV hydration  -Diet: Mech soft due to oral lesions  - speech and swallow eval

## 2021-11-27 NOTE — PROGRESS NOTE ADULT - SUBJECTIVE AND OBJECTIVE BOX
Reginald Noriega, PGY-1  Internal Medicine  Pager: [phone]    PROGRESS NOTE:     Patient is a 46y old  Male who presents with a chief complaint of Whole-body rash, mouth sores (26 Nov 2021 19:10)      SUBJECTIVE / OVERNIGHT EVENTS:    ADDITIONAL REVIEW OF SYSTEMS:    MEDICATIONS  (STANDING):  lidocaine 2% Viscous 5 milliLiter(s) Swish and Spit every 6 hours  sodium chloride 0.9%. 1000 milliLiter(s) (75 mL/Hr) IV Continuous <Continuous>  triamcinolone 0.1% Ointment 1 Application(s) Topical every 12 hours    MEDICATIONS  (PRN):  acetaminophen     Tablet .. 650 milliGRAM(s) Oral every 6 hours PRN Temp greater or equal to 38C (100.4F), Mild Pain (1 - 3)  benzocaine 15 mG/menthol 3.6 mG Lozenge 1 Lozenge Oral every 6 hours PRN Mouth Sores      CAPILLARY BLOOD GLUCOSE        I&O's Summary    26 Nov 2021 07:01  -  27 Nov 2021 07:00  --------------------------------------------------------  IN: 715 mL / OUT: 2 mL / NET: 713 mL        PHYSICAL EXAM:  Vital Signs Last 24 Hrs  T(C): 36.7 (27 Nov 2021 05:17), Max: 38.1 (26 Nov 2021 12:30)  T(F): 98.1 (27 Nov 2021 05:17), Max: 100.5 (26 Nov 2021 12:30)  HR: 63 (27 Nov 2021 05:17) (58 - 98)  BP: 105/74 (27 Nov 2021 05:17) (103/66 - 133/78)  BP(mean): --  RR: 18 (27 Nov 2021 05:17) (16 - 18)  SpO2: 99% (27 Nov 2021 05:17) (98% - 100%)    CONSTITUTIONAL: NAD, well-developed  HEENT: normal conjunctiva, PEERLA  RESPIRATORY: Normal respiratory effort; lungs are clear to auscultation bilaterally  CARDIOVASCULAR: Regular rate and rhythm, normal S1 and S2, no murmur/rub/gallop;   ABDOMEN: No tenderness to palpation at all four quadrants, +BS  MUSCULOSKELETAL no clubbing or cyanosis of digits; no joint swelling or tenderness to palpation  EXTREMITIES No lower extremity edema; Peripheral pulses are 2+ bilaterally  SKIN: well-perfused, no dry skin    LABS:                        15.4   6.12  )-----------( 200      ( 26 Nov 2021 15:18 )             45.6     11-26    139  |  104  |  12  ----------------------------<  91  4.5   |  25  |  0.92    Ca    9.0      26 Nov 2021 15:18    TPro  8.4<H>  /  Alb  4.0  /  TBili  0.4  /  DBili  x   /  AST  33  /  ALT  30  /  AlkPhos  81  11-26    PT/INR - ( 26 Nov 2021 15:18 )   PT: 12.4 sec;   INR: 1.08 ratio         PTT - ( 26 Nov 2021 15:18 )  PTT:41.6 sec  CARDIAC MARKERS ( 26 Nov 2021 15:18 )  x     / x     / 94 U/L / x     / x                RADIOLOGY & ADDITIONAL TESTS:  Results Reviewed:   Imaging Personally Reviewed:  Electrocardiogram Personally Reviewed:    COORDINATION OF CARE:  Care Discussed with Consultants/Other Providers [Y/N]:  Prior or Outpatient Records Reviewed [Y/N]:   Reginald Noriega, PGY-1  Internal Medicine  Pager: [phone]    PROGRESS NOTE:     Turkmen speaking pt. : [phone]    Patient is a 46y old  Male who presents with a chief complaint of Whole-body rash, mouth sores (26 Nov 2021 19:10)    SUBJECTIVE / OVERNIGHT EVENTS: Pt reported having rashes for a month, initially starting on the back, then started spreading both at the back and then in the front of the chest. Denied itchiness, bleeding of the rash. One wk ago there are mouth ulcers and sores, but this does not affect pt swallowing, as he only has burning sensation in the mouth when he takes pepper. Off note, pt started having rashes when he takes seafood starting one month ago. He had no such problems previously.    He follows up with PCP: Dr. Alicja Silva, and Allergy Dr. Dyer. He saw Dr. Dyer for the rashes and received Allegra 180 and prednisone 20 for 15d. However, rashes did not resolve. Pt currently denied any other symptoms.    ADDITIONAL REVIEW OF SYSTEMS:    MEDICATIONS  (STANDING):  lidocaine 2% Viscous 5 milliLiter(s) Swish and Spit every 6 hours  sodium chloride 0.9%. 1000 milliLiter(s) (75 mL/Hr) IV Continuous <Continuous>  triamcinolone 0.1% Ointment 1 Application(s) Topical every 12 hours    MEDICATIONS  (PRN):  acetaminophen     Tablet .. 650 milliGRAM(s) Oral every 6 hours PRN Temp greater or equal to 38C (100.4F), Mild Pain (1 - 3)  benzocaine 15 mG/menthol 3.6 mG Lozenge 1 Lozenge Oral every 6 hours PRN Mouth Sores      CAPILLARY BLOOD GLUCOSE        I&O's Summary    26 Nov 2021 07:01  -  27 Nov 2021 07:00  --------------------------------------------------------  IN: 715 mL / OUT: 2 mL / NET: 713 mL        PHYSICAL EXAM:  Vital Signs Last 24 Hrs  T(C): 36.7 (27 Nov 2021 05:17), Max: 38.1 (26 Nov 2021 12:30)  T(F): 98.1 (27 Nov 2021 05:17), Max: 100.5 (26 Nov 2021 12:30)  HR: 63 (27 Nov 2021 05:17) (58 - 98)  BP: 105/74 (27 Nov 2021 05:17) (103/66 - 133/78)  BP(mean): --  RR: 18 (27 Nov 2021 05:17) (16 - 18)  SpO2: 99% (27 Nov 2021 05:17) (98% - 100%)    CONSTITUTIONAL: NAD, well-developed  HEENT: poor oral hygiene, rashes that seem to have signs of previous bleeding on lips, sores at hard pallet; one small but palpable lymph node at submandibular lymph node  RESPIRATORY: Normal respiratory effort; lungs are clear to auscultation bilaterally  CARDIOVASCULAR: Regular rate and rhythm, normal S1 and S2, no murmur/rub/gallop;   ABDOMEN: No tenderness to palpation at all four quadrants, +BS  MUSCULOSKELETAL no clubbing or cyanosis of digits; no joint swelling or tenderness to palpation  EXTREMITIES No lower extremity edema; Peripheral pulses are 2+ bilaterally  SKIN: non-blanching rashes at the back, at the chest, no signs of bleeding, no itchiness.    LABS:                        15.4   6.12  )-----------( 200      ( 26 Nov 2021 15:18 )             45.6     11-26    139  |  104  |  12  ----------------------------<  91  4.5   |  25  |  0.92    Ca    9.0      26 Nov 2021 15:18    TPro  8.4<H>  /  Alb  4.0  /  TBili  0.4  /  DBili  x   /  AST  33  /  ALT  30  /  AlkPhos  81  11-26    PT/INR - ( 26 Nov 2021 15:18 )   PT: 12.4 sec;   INR: 1.08 ratio         PTT - ( 26 Nov 2021 15:18 )  PTT:41.6 sec  CARDIAC MARKERS ( 26 Nov 2021 15:18 )  x     / x     / 94 U/L / x     / x                RADIOLOGY & ADDITIONAL TESTS:  Results Reviewed:   Imaging Personally Reviewed:  Electrocardiogram Personally Reviewed:    COORDINATION OF CARE:  Care Discussed with Consultants/Other Providers [Y/N]:  Prior or Outpatient Records Reviewed [Y/N]:

## 2021-11-27 NOTE — PROGRESS NOTE ADULT - PROBLEM SELECTOR PLAN 3
DVT ppx: Ambulatory, IMPROVE 0  DIet: Mech soft due to oral lesions, Halal  DIspo: Pending clinical course

## 2021-11-27 NOTE — PROGRESS NOTE ADULT - ASSESSMENT
45yo Italian speaking M with no significant PMHx a/w severe dermatitis of unclear etiology with associated mucositis, intermittent fevers and gen. weakness;  45yo English speaking M with no significant PMHx a/w severe dermatitis of unclear etiology with associated mucositis and an episode of fever in the ED. Currently dermatology has been consulted for his rashes at chest, back, and mouth. Per H&P pt might need biopsy to identify the etiology of this rash.

## 2021-11-27 NOTE — PROGRESS NOTE ADULT - ATTENDING COMMENTS
seen and examined, care d/w HS    No medical history, rash x 1m, low grade temp in ED, reports no pain or itching, no prior episodes, involves trunk and head/ears/mouth, spares limbs. OP given steroids.  Unclear cause of present rash. HIV neg.  No new meds.  - ESR 14, CRP and CK normal  - HIV neg  - MÓNICA, RF, RPR pending  - check quantgold, check MÓNICA  - needs derm eval and likely bx to clarify etiology of rash seen and examined, care d/w HS    No medical history, rash x 1m, low grade temp in ED, reports no pain or itching, no prior episodes, involves trunk and head/ears/mouth, spares limbs, reports started on back. OP given steroids.  Unclear cause of present rash. HIV neg.  No new meds.  - ESR 14, CRP and CK normal  - HIV neg  - MÓNICA, RF, RPR pending  - check quantgold, check ANCA  - needs derm eval and likely bx to clarify etiology of rash

## 2021-11-28 ENCOUNTER — TRANSCRIPTION ENCOUNTER (OUTPATIENT)
Age: 46
End: 2021-11-28

## 2021-11-28 VITALS
HEART RATE: 82 BPM | OXYGEN SATURATION: 100 % | DIASTOLIC BLOOD PRESSURE: 65 MMHG | SYSTOLIC BLOOD PRESSURE: 109 MMHG | TEMPERATURE: 100 F | RESPIRATION RATE: 17 BRPM

## 2021-11-28 LAB
ALBUMIN SERPL ELPH-MCNC: 3.2 G/DL — LOW (ref 3.3–5)
ALP SERPL-CCNC: 74 U/L — SIGNIFICANT CHANGE UP (ref 40–120)
ALT FLD-CCNC: 34 U/L — SIGNIFICANT CHANGE UP (ref 4–41)
ANION GAP SERPL CALC-SCNC: 8 MMOL/L — SIGNIFICANT CHANGE UP (ref 7–14)
AST SERPL-CCNC: 36 U/L — SIGNIFICANT CHANGE UP (ref 4–40)
BILIRUB SERPL-MCNC: 0.4 MG/DL — SIGNIFICANT CHANGE UP (ref 0.2–1.2)
BUN SERPL-MCNC: 9 MG/DL — SIGNIFICANT CHANGE UP (ref 7–23)
CALCIUM SERPL-MCNC: 8.6 MG/DL — SIGNIFICANT CHANGE UP (ref 8.4–10.5)
CHLORIDE SERPL-SCNC: 104 MMOL/L — SIGNIFICANT CHANGE UP (ref 98–107)
CO2 SERPL-SCNC: 22 MMOL/L — SIGNIFICANT CHANGE UP (ref 22–31)
CREAT SERPL-MCNC: 0.81 MG/DL — SIGNIFICANT CHANGE UP (ref 0.5–1.3)
GLUCOSE SERPL-MCNC: 81 MG/DL — SIGNIFICANT CHANGE UP (ref 70–99)
HCT VFR BLD CALC: 42 % — SIGNIFICANT CHANGE UP (ref 39–50)
HGB BLD-MCNC: 14 G/DL — SIGNIFICANT CHANGE UP (ref 13–17)
HSV+VZV DNA SPEC QL NAA+PROBE: SIGNIFICANT CHANGE UP
MAGNESIUM SERPL-MCNC: 2 MG/DL — SIGNIFICANT CHANGE UP (ref 1.6–2.6)
MCHC RBC-ENTMCNC: 28.6 PG — SIGNIFICANT CHANGE UP (ref 27–34)
MCHC RBC-ENTMCNC: 33.3 GM/DL — SIGNIFICANT CHANGE UP (ref 32–36)
MCV RBC AUTO: 85.7 FL — SIGNIFICANT CHANGE UP (ref 80–100)
NRBC # BLD: 0 /100 WBCS — SIGNIFICANT CHANGE UP
NRBC # FLD: 0 K/UL — SIGNIFICANT CHANGE UP
PHOSPHATE SERPL-MCNC: 3.2 MG/DL — SIGNIFICANT CHANGE UP (ref 2.5–4.5)
PLATELET # BLD AUTO: 156 K/UL — SIGNIFICANT CHANGE UP (ref 150–400)
POTASSIUM SERPL-MCNC: 4.2 MMOL/L — SIGNIFICANT CHANGE UP (ref 3.5–5.3)
POTASSIUM SERPL-SCNC: 4.2 MMOL/L — SIGNIFICANT CHANGE UP (ref 3.5–5.3)
PROT SERPL-MCNC: 7 G/DL — SIGNIFICANT CHANGE UP (ref 6–8.3)
RBC # BLD: 4.9 M/UL — SIGNIFICANT CHANGE UP (ref 4.2–5.8)
RBC # FLD: 13.1 % — SIGNIFICANT CHANGE UP (ref 10.3–14.5)
SODIUM SERPL-SCNC: 134 MMOL/L — LOW (ref 135–145)
SPECIMEN SOURCE: SIGNIFICANT CHANGE UP
WBC # BLD: 6.4 K/UL — SIGNIFICANT CHANGE UP (ref 3.8–10.5)
WBC # FLD AUTO: 6.4 K/UL — SIGNIFICANT CHANGE UP (ref 3.8–10.5)

## 2021-11-28 PROCEDURE — 99239 HOSP IP/OBS DSCHRG MGMT >30: CPT | Mod: GC

## 2021-11-28 RX ORDER — VALACYCLOVIR 500 MG/1
1 TABLET, FILM COATED ORAL
Qty: 21 | Refills: 0
Start: 2021-11-28 | End: 2021-12-04

## 2021-11-28 RX ORDER — VALACYCLOVIR 500 MG/1
1000 TABLET, FILM COATED ORAL THREE TIMES A DAY
Refills: 0 | Status: DISCONTINUED | OUTPATIENT
Start: 2021-11-28 | End: 2021-11-28

## 2021-11-28 RX ORDER — LIDOCAINE 4 G/100G
5 CREAM TOPICAL
Qty: 280 | Refills: 0
Start: 2021-11-28 | End: 2021-12-11

## 2021-11-28 RX ADMIN — LIDOCAINE 5 MILLILITER(S): 4 CREAM TOPICAL at 16:06

## 2021-11-28 RX ADMIN — Medication 650 MILLIGRAM(S): at 16:07

## 2021-11-28 RX ADMIN — LIDOCAINE 5 MILLILITER(S): 4 CREAM TOPICAL at 02:42

## 2021-11-28 RX ADMIN — Medication 3 MILLIGRAM(S): at 00:06

## 2021-11-28 RX ADMIN — VALACYCLOVIR 1000 MILLIGRAM(S): 500 TABLET, FILM COATED ORAL at 16:07

## 2021-11-28 RX ADMIN — BENZOCAINE AND MENTHOL 1 LOZENGE: 5; 1 LIQUID ORAL at 16:07

## 2021-11-28 RX ADMIN — Medication 1 APPLICATION(S): at 06:58

## 2021-11-28 RX ADMIN — LIDOCAINE 5 MILLILITER(S): 4 CREAM TOPICAL at 11:05

## 2021-11-28 NOTE — DISCHARGE NOTE NURSING/CASE MANAGEMENT/SOCIAL WORK - NSDCFUADDAPPT_GEN_ALL_CORE_FT
Please schedule an appointment with dermatology (skin doctors) within 1 week. The office number is [phone]. The address is [address] [place] [address], [place] [address].

## 2021-11-28 NOTE — CONSULT NOTE ADULT - SUBJECTIVE AND OBJECTIVE BOX
HPI: 45yo Upper sorbian speaking Male with no significant PMHx presents to hospital with 4 day history of persisting and worsening non-pruritic non-painful rash. Reports rash started on the head, progressed caudally. Has experienced associated intermittent fevers at home, which relieved with Tylenol Reports some pain over ears with denuded skin. Reports also painful oral lesions including upper and lower lips and roof of the mouth. Denies associated pruritus. Reports global body weakness without arthralgias. No new medications other than PRN Tylenol. Denies dysuria, cough, dysuria, arthralgias, myalgias, abdominal pain, vision changes and diarrhea. Recently traveled to [place] approximately 1 month ago and [facility] 3 months ago. Covid vaccinated, flu vaccine approximately 1 month ago. No pets in the home, no sick contacts. Denies IVDU. Takes only tylenol PRN.      ED Course:  VS Tm 100.5, HR 98, /78 SpO2 100% on RA  Given Tylenol x1, ibuprofen x1, 1L NS (26 Nov 2021 19:10)      PAST MEDICAL & SURGICAL HISTORY:  No pertinent past medical history    No significant past surgical history        REVIEW OF SYSTEMS:  CONSTITUTIONAL: +Fever, No weakness or chills  EYES/ENT: No visual changes;  No vertigo or throat pain   NECK: No pain or stiffness  RESPIRATORY: No cough, wheezing, hemoptysis; No shortness of breath  CARDIOVASCULAR: No chest pain or palpitations  GASTROINTESTINAL: No abdominal or epigastric pain. No nausea, vomiting, or hematemesis; No diarrhea or constipation. No melena or hematochezia.  GENITOURINARY: No dysuria, frequency or hematuria  NEUROLOGICAL: No numbness or weakness  SKIN: See HPI    MEDICATIONS  (STANDING):  lidocaine 2% Viscous 5 milliLiter(s) Swish and Spit every 6 hours  melatonin 3 milliGRAM(s) Oral at bedtime  sodium chloride 0.9%. 1000 milliLiter(s) (75 mL/Hr) IV Continuous <Continuous>  triamcinolone 0.1% Ointment 1 Application(s) Topical every 12 hours    MEDICATIONS  (PRN):  acetaminophen     Tablet .. 650 milliGRAM(s) Oral every 6 hours PRN Temp greater or equal to 38C (100.4F), Mild Pain (1 - 3)  benzocaine 15 mG/menthol 3.6 mG Lozenge 1 Lozenge Oral every 6 hours PRN Mouth Sores      Allergies    No Known Allergies    Intolerances        SOCIAL HISTORY:   Lives in [place].  Sexually active with 1 partner, wife. Sexually active last 15 days ago.      FAMILY HISTORY:  No pertinent family history in first degree relatives        Vital Signs Last 24 Hrs  T(C): 37.5 (27 Nov 2021 21:32), Max: 37.6 (27 Nov 2021 14:55)  T(F): 99.5 (27 Nov 2021 21:32), Max: 99.7 (27 Nov 2021 14:55)  HR: 70 (27 Nov 2021 21:32) (61 - 70)  BP: 104/59 (27 Nov 2021 21:32) (104/59 - 133/78)  BP(mean): --  RR: 18 (27 Nov 2021 21:32) (17 - 18)  SpO2: 98% (27 Nov 2021 21:32) (98% - 100%)    PHYSICAL EXAM:  Constitutional: lying comfortably in bed  Eyes: no conjunctival injection  ENMT: defer to primary  Neck: defer to primary  Breasts: defer to primary  Back: defer to primary  Respiratory: defer to primary  Cardiovascular: defer to primary  Gastrointestinal: defer to primary  Genitourinary: defer to primary  Rectal: defer to primary  Extremities: defer to primary  Vascular: defer to primary  Neurological: defer to primary  Skin:   -some greasy scale on scalp  -thin erythematous papules and plaques on back without overlying scale  -desquamation of ears bilaterally, ears are erythematous and appear swollen (per patient, his ears swell every summer)  -erythematous patches and papules with overlying hemorrhagic crust in the middle of the chest  -ulcers seen on the hard palate  -erosions and hemorrhagic crust on lower vermilion  Lymph Nodes: no LAD  Musculoskeletal: defer to primary  Psychiatric: ANO x 3    LABS:                        14.0   5.25  )-----------( 147      ( 27 Nov 2021 07:45 )             42.9     11-27    139  |  108<H>  |  13  ----------------------------<  76  4.2   |  19<L>  |  0.82    Ca    8.2<L>      27 Nov 2021 07:45  Phos  3.2     11-27  Mg     2.10     11-27    TPro  6.6  /  Alb  3.1<L>  /  TBili  0.4  /  DBili  x   /  AST  39  /  ALT  30  /  AlkPhos  68  11-27    PT/INR - ( 27 Nov 2021 07:45 )   PT: 11.7 sec;   INR: 1.02 ratio         PTT - ( 27 Nov 2021 07:45 )  PTT:39.4 sec

## 2021-11-28 NOTE — DISCHARGE NOTE NURSING/CASE MANAGEMENT/SOCIAL WORK - PATIENT PORTAL LINK FT
You can access the FollowMyHealth Patient Portal offered by [facility] by registering at the following website: [email or web address]. By joining [employer]’s FollowMyHealth portal, you will also be able to view your health information using other applications (apps) compatible with our system.

## 2021-11-28 NOTE — DISCHARGE NOTE PROVIDER - NSDCMRMEDTOKEN_GEN_ALL_CORE_FT
clobetasol 0.05% topical ointment: 1 application topically 2 times a day  lidocaine 2% mucous membrane solution: 5 milliliter(s) mucous membrane every 6 hours, As Needed   valACYclovir 1 g oral tablet: 1 tab(s) orally 3 times a day

## 2021-11-28 NOTE — PROGRESS NOTE ADULT - SUBJECTIVE AND OBJECTIVE BOX
PROGRESS NOTE:     CONTACT INFO:  Ollie Frey MD  Internal Medicine PGY2  Pager: [phone]    Patient is a 46y old  Male who presents with a chief complaint of Whole-body rash, mouth sores (28 Nov 2021 00:20)      SUBJECTIVE / OVERNIGHT EVENTS:  No acute events overnight. Derm recs ordered - valacyclovir TID for 7 days and clobestasol ointment.    MEDICATIONS  (STANDING):  clobetasol 0.05% Ointment 1 Application(s) Topical two times a day  lidocaine 2% Viscous 5 milliLiter(s) Swish and Spit every 6 hours  melatonin 3 milliGRAM(s) Oral at bedtime  sodium chloride 0.9%. 1000 milliLiter(s) (75 mL/Hr) IV Continuous <Continuous>  triamcinolone 0.1% Ointment 1 Application(s) Topical every 12 hours  valACYclovir 1000 milliGRAM(s) Oral three times a day    MEDICATIONS  (PRN):  acetaminophen     Tablet .. 650 milliGRAM(s) Oral every 6 hours PRN Temp greater or equal to 38C (100.4F), Mild Pain (1 - 3)  benzocaine 15 mG/menthol 3.6 mG Lozenge 1 Lozenge Oral every 6 hours PRN Mouth Sores      CAPILLARY BLOOD GLUCOSE        I&O's Summary    27 Nov 2021 07:01  -  28 Nov 2021 07:00  --------------------------------------------------------  IN: 1100 mL / OUT: 150 mL / NET: 950 mL        PHYSICAL EXAM:  Vital Signs Last 24 Hrs  T(C): 37 (28 Nov 2021 07:04), Max: 37.6 (27 Nov 2021 14:55)  T(F): 98.6 (28 Nov 2021 07:04), Max: 99.7 (27 Nov 2021 14:55)  HR: 74 (28 Nov 2021 07:04) (62 - 74)  BP: 101/67 (28 Nov 2021 07:04) (101/67 - 132/72)  BP(mean): --  RR: 18 (28 Nov 2021 07:04) (17 - 19)  SpO2: 98% (28 Nov 2021 07:04) (98% - 100%)    CONSTITUTIONAL: NAD, well-developed  HEENT: poor oral hygiene, rashes that seem to have signs of previous bleeding on lips, sores at hard pallet; one small but palpable lymph node at submandibular lymph node  RESPIRATORY: Normal respiratory effort; lungs are clear to auscultation bilaterally  CARDIOVASCULAR: Regular rate and rhythm, normal S1 and S2, no murmur/rub/gallop;   ABDOMEN: No tenderness to palpation at all four quadrants, +BS  MUSCULOSKELETAL no clubbing or cyanosis of digits; no joint swelling or tenderness to palpation  EXTREMITIES No lower extremity edema; Peripheral pulses are 2+ bilaterally  SKIN: non-blanching rashes at the back, at the chest, no signs of bleeding, no itchiness.    LABS:                        14.0   6.40  )-----------( 156      ( 28 Nov 2021 07:31 )             42.0     11-28    134<L>  |  104  |  9   ----------------------------<  81  4.2   |  22  |  0.81    Ca    8.6      28 Nov 2021 07:31  Phos  3.2     11-28  Mg     2.00     11-28    TPro  7.0  /  Alb  3.2<L>  /  TBili  0.4  /  DBili  x   /  AST  36  /  ALT  34  /  AlkPhos  74  11-28    PT/INR - ( 27 Nov 2021 07:45 )   PT: 11.7 sec;   INR: 1.02 ratio         PTT - ( 27 Nov 2021 07:45 )  PTT:39.4 sec  CARDIAC MARKERS ( 26 Nov 2021 15:18 )  x     / x     / 94 U/L / x     / x                RADIOLOGY & ADDITIONAL TESTS:  Results Reviewed:   Imaging Personally Reviewed:  Electrocardiogram Personally Reviewed:    COORDINATION OF CARE:  Care Discussed with Consultants/Other Providers [Y/N]:  Prior or Outpatient Records Reviewed [Y/N]:   PROGRESS NOTE:     CONTACT INFO:  Ollie Frey MD  Internal Medicine PGY2  Pager: [phone]    Patient is a 46y old  Male who presents with a chief complaint of Whole-body rash, mouth sores (28 Nov 2021 00:20)      SUBJECTIVE / OVERNIGHT EVENTS:  No acute events overnight. Derm recs ordered - valacyclovir TID for 7 days and clobestasol ointment. Feels well this AM, asking to go home.     MEDICATIONS  (STANDING):  clobetasol 0.05% Ointment 1 Application(s) Topical two times a day  lidocaine 2% Viscous 5 milliLiter(s) Swish and Spit every 6 hours  melatonin 3 milliGRAM(s) Oral at bedtime  sodium chloride 0.9%. 1000 milliLiter(s) (75 mL/Hr) IV Continuous <Continuous>  triamcinolone 0.1% Ointment 1 Application(s) Topical every 12 hours  valACYclovir 1000 milliGRAM(s) Oral three times a day    MEDICATIONS  (PRN):  acetaminophen     Tablet .. 650 milliGRAM(s) Oral every 6 hours PRN Temp greater or equal to 38C (100.4F), Mild Pain (1 - 3)  benzocaine 15 mG/menthol 3.6 mG Lozenge 1 Lozenge Oral every 6 hours PRN Mouth Sores      CAPILLARY BLOOD GLUCOSE        I&O's Summary    27 Nov 2021 07:01  -  28 Nov 2021 07:00  --------------------------------------------------------  IN: 1100 mL / OUT: 150 mL / NET: 950 mL        PHYSICAL EXAM:  Vital Signs Last 24 Hrs  T(C): 37 (28 Nov 2021 07:04), Max: 37.6 (27 Nov 2021 14:55)  T(F): 98.6 (28 Nov 2021 07:04), Max: 99.7 (27 Nov 2021 14:55)  HR: 74 (28 Nov 2021 07:04) (62 - 74)  BP: 101/67 (28 Nov 2021 07:04) (101/67 - 132/72)  BP(mean): --  RR: 18 (28 Nov 2021 07:04) (17 - 19)  SpO2: 98% (28 Nov 2021 07:04) (98% - 100%)    CONSTITUTIONAL: NAD, well-developed  HEENT: poor oral hygiene, rashes that seem to have signs of previous bleeding on lips, sores at hard pallet; one small but palpable lymph node at submandibular lymph node  RESPIRATORY: Normal respiratory effort; lungs are clear to auscultation bilaterally  CARDIOVASCULAR: Regular rate and rhythm, normal S1 and S2, no murmur/rub/gallop;   ABDOMEN: No tenderness to palpation at all four quadrants, +BS  MUSCULOSKELETAL no clubbing or cyanosis of digits; no joint swelling or tenderness to palpation  EXTREMITIES No lower extremity edema; Peripheral pulses are 2+ bilaterally  SKIN: non-blanching rashes at the back, at the chest, no signs of bleeding, no itchiness.    LABS:                        14.0   6.40  )-----------( 156      ( 28 Nov 2021 07:31 )             42.0     11-28    134<L>  |  104  |  9   ----------------------------<  81  4.2   |  22  |  0.81    Ca    8.6      28 Nov 2021 07:31  Phos  3.2     11-28  Mg     2.00     11-28    TPro  7.0  /  Alb  3.2<L>  /  TBili  0.4  /  DBili  x   /  AST  36  /  ALT  34  /  AlkPhos  74  11-28    PT/INR - ( 27 Nov 2021 07:45 )   PT: 11.7 sec;   INR: 1.02 ratio         PTT - ( 27 Nov 2021 07:45 )  PTT:39.4 sec  CARDIAC MARKERS ( 26 Nov 2021 15:18 )  x     / x     / 94 U/L / x     / x

## 2021-11-28 NOTE — SWALLOW BEDSIDE ASSESSMENT ADULT - SWALLOW EVAL: DIAGNOSIS
1. Functional oral stage given thin liquids and puree texture marked by adequate bolus containment, bolus manipulation, and anterior-posterior transport. Adequate oral clearance noted. 2. Functional pharyngeal stage marked by observed initiation of the pharyngeal swallow trigger and hyolaryngeal excursion judged via laryngeal palpation. No overt s/sx of aspiration observed for all trials

## 2021-11-28 NOTE — PROGRESS NOTE ADULT - ATTENDING COMMENTS
seen and examined, care d/w HS    No medical history, rash x 1m, low grade temp in ED, reports no pain or itching, no prior episodes, involves trunk and head/ears/mouth, spares limbs, reports started on back. OP given steroids.  Unclear cause of present rash. HIV neg.  No new meds.  - ESR 14, CRP and CK normal  - HIV neg, RPR NR  - RF elevated, denies any joint pains  - quant-gold, MÓNICA, ANCA pending  - HSV/VZV of lesion pcr neg, on valtrex per derm  - s/p bx of back lesion  - given no fevers, tolerating diet, d/w derm cleared for dc can f/u as OP with them as pt requesting dc home    dispo home  dispo time 34 min

## 2021-11-28 NOTE — PROGRESS NOTE ADULT - ASSESSMENT
47yo Luxembourgish speaking M with no significant PMHx a/w severe dermatitis of unclear etiology with associated mucositis and an episode of fever in the ED. Currently dermatology has been consulted for his rashes at chest, back, and mouth. Per H&P pt might need biopsy to identify the etiology of this rash. 47yo Setswana speaking M with no significant PMHx a/w severe dermatitis of unclear etiology with associated mucositis and an episode of fever in the ED. Currently dermatology has been consulted for his rashes at chest, back, and mouth, s/p H+E biopsy and HSV/VZV culture. Pending discharge.

## 2021-11-28 NOTE — SWALLOW BEDSIDE ASSESSMENT ADULT - COMMENTS
As per Internal Medicine Note on 11/28/21: "45yo Armenian speaking M with no significant PMHx a/w severe dermatitis of unclear etiology with associated mucositis and an episode of fever in the ED. Currently dermatology has been consulted for his rashes at chest, back, and mouth. Per H&P pt might need biopsy to identify the etiology of this rash."     Patient received upright in bed, awake and alert. Armenian  utilized throughout the session, ID # [id number]. Patient reports difficulty swallowing solid textures due to sores on lips and in oral cavity. Intraoral observation revealed sores covering buccal surfaces, soft/hard palate extending to anterior faucial pillars. Patient declined further trials.

## 2021-11-28 NOTE — CONSULT NOTE ADULT - ASSESSMENT
ASSESSMENT&PLAN:  #Mucositis – favor HSV infection; differential includes aphthous ulceration vs eczema herpeticum vs less likely mucositis including mycoplasma induced rash and mucositis  At this time, we recommend:  - HSV1/2 and VZV swab of the lip  - Empirically start PO Valacyclovir for herpes (1000 mg three times daily x 7d)    #Dermatitis – likely inflammatory process, dd includes: pustular PsO, irritant contact dermatitis  - Biopsy H&E of back  - Start clobetasol proprionate 0.01% ointment BID to pruritic AA for up to two weeks at time then take a week off. Alternate through these cycles.    #Seborrheic dermatitis  - Start ketoconazole 2% shampoo to scalp 2-3x/week. Leave on scalp for 5 minutes and then rinse off. Once flaking has stopped, can switch to maintenance therapy once a week.    Discussed with primary team.  Discussed with attending, Dr. Dougie Lewis MD MPH  Resident Physician, PGY2  [facility]  Pager: [phone]  Office: [phone] ASSESSMENT&PLAN:  #Mucositis – favor HSV infection; differential includes aphthous ulceration vs eczema herpeticum vs less likely mucositis including mycoplasma induced rash and mucositis  At this time, we recommend:  - HSV1/2 and VZV swab of the lip  - Empirically start PO Valacyclovir for herpes (1000 mg three times daily x 7d)    #Dermatitis – likely inflammatory process, dd includes: pustular PsO, irritant contact dermatitis  - Biopsy H&E of back  - Start clobetasol proprionate 0.01% ointment BID to pruritic AA for up to two weeks at time then take a week off. Alternate through these cycles.    #Seborrheic dermatitis  - Start ketoconazole 2% shampoo to scalp 2-3x/week. Leave on scalp for 5 minutes and then rinse off. Once flaking has stopped, can switch to maintenance therapy once a week.    Discussed with primary team.  Discussed and images reviewed with Dermatology attending, Dr. Laurent Lewis MD MPH  Resident Physician, PGY2  [facility]  Pager: [phone]  Office: [phone] ASSESSMENT&PLAN:  #Mucositis – favor HSV infection; differential includes aphthous ulceration vs eczema herpeticum vs less likely mucositis including mycoplasma induced rash and mucositis  At this time, we recommend:  - HSV1/2 and VZV swab of the lip  - Empirically start PO Valacyclovir for herpes (1000 mg three times daily x 7d)    #Seborrheic dermatitis  - Start ketoconazole 2% shampoo to scalp 2-3x/week. Leave on scalp for 5 minutes and then rinse off. Once flaking has stopped, can switch to maintenance therapy once a week.    #Dermatitis – likely inflammatory process, dd includes: pustular PsO, irritant contact dermatitis  - Biopsy H&E of back (procedure as below)  - Start clobetasol proprionate 0.01% ointment BID to pruritic AA for up to two weeks at time then take a week off. Alternate through these cycles.    #Neoplasm of unknown signficance (dd as above)  PROCEDURE: Punch 4mm Biopsy  DIAGNOSIS: nub  LOCATION: upper back  METHOD: Punch    DESCRIPTION OF PROCEDURE: After informed consent was obtained, including a discussion of the risk for bleeding, infection, incomplete removal, scarring, and recurrence/persistence, the lesional area was prepped with alcohol prior to infiltration with 1% lidocaine with epinephrine, 1:100,000 x 2 ml. After waiting several minutes for epinephrine effect and that adequate anesthesia was achieved, the lesion was biopsied with a 4 mm punch. Hemostasis was obtained. There were no complications. The biopsy was placed in formalin and sent for routine histopathological evaluation. 4-0 chromic sutures were used. The wound was then dressed with sterile petrolatum and a sterile dressing. Wound care instructions were provided verbally.  _______________________________________________________________    Time-Out   Procedure: Punch biopsy  Site: upper back  Preprocedure verification conducted prior to Time Out? Yes  Time Out completed   Procedure Timeout Participants Self  Preprocedure verification conducted prior to Time Out? Yes  Consents Obtained Yes  Correct Patient (Use 2 Identifiers) Yes  Correct Procedure Yes  Correct Level Yes  Correct Site Yes  Site Marked? Yes  Correct Patient Position: Yes  Correct Equipment/Implants Available Yes  Imaging Studies Available: N/A    Procedure Prep/Precautions:   Appropriate Hand Hygiene Used: Yes  Procedure site prep indicated? Yes  Chlorhexidine Skin Prep Used and Allowed to Dry : Yes  Full Barrier in Place and Maintained Throughout Procedure Yes      Discussed with primary team.  Discussed and images reviewed with Dermatology attending, Dr. Laurent Lewis MD MPH  Resident Physician, PGY2  [facility] Dermat[facility]  Pager: [phone]  Office: [phone]

## 2021-11-28 NOTE — DISCHARGE NOTE PROVIDER - NSDCFUADDAPPT_GEN_ALL_CORE_FT
Please schedule an appointment with dermatology (skin doctors) within 1 week. The office number is [phone]. The address is [address] [place] [address], [place] [id number].

## 2021-11-28 NOTE — DISCHARGE NOTE PROVIDER - NSDCCPCAREPLAN_GEN_ALL_CORE_FT
PRINCIPAL DISCHARGE DIAGNOSIS  Diagnosis: Dermatitis  Assessment and Plan of Treatment: You came to the hospital with a skin rash. It is unclear exactly what is causing the rash. A biopsy was taken and will be sent for analysis. A swab was taken of the lip to look for herpes. You are being discharged with Valtrex, an antivrial medication. Please take all of the medication as prescribed. You are also being prescribed an ointment called clobetasol, please apply to the itchy areas as prescribed for 2 weeks. Please follow-up with dermatology for further management and to follow-up the biopsy results.      SECONDARY DISCHARGE DIAGNOSES  Diagnosis: Rash  Assessment and Plan of Treatment:

## 2021-11-28 NOTE — PROGRESS NOTE ADULT - PROBLEM SELECTOR PLAN 3
DVT ppx: Ambulatory, IMPROVE 0  DIet: Mech soft due to oral lesions, Halal  DIspo: Pending clinical course DVT ppx: Ambulatory, IMPROVE 0  DIet: pureed, Halal  DIspo: home

## 2021-11-28 NOTE — DISCHARGE NOTE PROVIDER - HOSPITAL COURSE
5yo Occitan speaking Male with no significant PMHx presents to hospital with 4 day history of persisting and worsening whole-body  rash. Reports rash started on the head, progressed caudally. Has experienced associated intermittent fevers at home, which relieved with Tylenol Reports some pain over ears with denuded skin. Reports also painful oral lesions including upper and lower lips and roof of the mouth. Denies associated pruritus. Reports global body weakness without arthralgias. No recent sick contacts. Traveled to [facility] approximately 3 months ago and to [place] approximately 1 month ago. No new medications other than PRN Tylenol. Denies dysuria, cough, dysuria, arthralgias, myalgias, abdominal pain, vision changes and diarrhea.    Hospital Course: Deramtology consulted, unsure of etiology but favored HSV infection, skin biopsy with H+E stain done and pending. HSV 1/2 and VZV swab of lip is pending. He was started on Valtrex empirically for a  7 day course and clobetasol ointment. He will follow-up with dermatology for the results and further management. 7yo Belarusian speaking Male with no significant PMHx presents to hospital with 4 day history of persisting and worsening whole-body  rash. Reports rash started on the head, progressed caudally. Has experienced associated intermittent fevers at home, which relieved with Tylenol Reports some pain over ears with denuded skin. Reports also painful oral lesions including upper and lower lips and roof of the mouth. Denies associated pruritus. Reports global body weakness without arthralgias. No recent sick contacts. Traveled to [facility] approximately 3 months ago and to [place] approximately 1 month ago. No new medications other than PRN Tylenol. Denies dysuria, cough, dysuria, arthralgias, myalgias, abdominal pain, vision changes and diarrhea.    Hospital Course: Deramtology consulted, unsure of etiology but favored HSV infection, skin biopsy with H+E stain done and pending. HSV 1/2 and VZV swab of lip is neg. He was started on Valtrex empirically for a  7 day course and clobetasol ointment. He will follow-up with dermatology for the results and further management.

## 2021-11-28 NOTE — DISCHARGE NOTE PROVIDER - CARE PROVIDER_API CALL
Laurent Constantino)  Dermatology  [address] [place] [address]  Phone: [phone]  Fax: [phone]  Follow Up Time: 1 week

## 2021-11-28 NOTE — PROGRESS NOTE ADULT - PROBLEM SELECTOR PLAN 2
Mucositis on exam. Present on posterior oropharynx.  -Viscous lidocaine  -Cepacol PRN  -IV hydration  -Diet: Mech soft due to oral lesions  - speech and swallow eval Mucositis on exam. Present on posterior oropharynx.  -Viscous lidocaine for pain control, sent to vivo  -Cepacol PRN  -Diet: pureed diet per S+S recs until patient able to tolerate further on his own

## 2021-11-28 NOTE — PROGRESS NOTE ADULT - PROBLEM SELECTOR PLAN 1
Patient presenting with cranio-caudal rash x4 days with extensive mucosal involvement. No recent travel, drug or sick contact exposure. Intermittent fevers. DDx includes infectious vs inflammatory vs neoplastic. Another possible Ddx can be pt's recent seafood intolerance.  Will need tissue biopsy for diagnosis.  -Supportive care  -HIV negative  -IV Fluids  -Valacyclovir 1g TID for 7 days (11/28-) to empirically cover for HSV  -Clobetasol ointment to pruritic areas for 2 weeks at a time  -Check syphilis screen, lyme serology, inflammatory markers, autoantibodies  -Daily CBC with differential  -Daily CMP  - ANCA and TB quantiferon Patient presenting with cranio-caudal rash x4 days with extensive mucosal involvement. No recent travel, drug or sick contact exposure. Intermittent fevers. DDx includes infectious vs inflammatory vs neoplastic. Another possible Ddx can be pt's recent seafood intolerance.  - s/p H+E skin biopsy  -Supportive care  -HIV negative  -IV Fluids  -Valacyclovir 1g TID for 7 days (11/28-) to empirically cover for HSV  -Clobetasol ointment to pruritic areas for 2 weeks at a time  -Check syphilis screen, lyme serology, inflammatory markers, autoantibodies  - ANCA and TB quantiferon  -F/u derm outpatient within 1 week for results and further management

## 2021-11-29 LAB
CCP IGG SERPL-ACNC: 15 UNITS — SIGNIFICANT CHANGE UP
RF+CCP IGG SER-IMP: NEGATIVE — SIGNIFICANT CHANGE UP

## 2021-11-30 LAB
GAMMA INTERFERON BACKGROUND BLD IA-ACNC: 2.5 IU/ML — SIGNIFICANT CHANGE UP
M TB IFN-G BLD-IMP: NEGATIVE — SIGNIFICANT CHANGE UP
M TB IFN-G CD4+ BCKGRND COR BLD-ACNC: 0.35 IU/ML — SIGNIFICANT CHANGE UP
M TB IFN-G CD4+CD8+ BCKGRND COR BLD-ACNC: 0.3 IU/ML — SIGNIFICANT CHANGE UP
QUANT TB PLUS MITOGEN MINUS NIL: 4.43 IU/ML — SIGNIFICANT CHANGE UP

## 2021-12-01 LAB
AUTO DIFF PNL BLD: ABNORMAL
C-ANCA SER-ACNC: NEGATIVE — SIGNIFICANT CHANGE UP
P-ANCA SER-ACNC: NEGATIVE — SIGNIFICANT CHANGE UP

## 2021-12-02 LAB
ANA PAT FLD IF-IMP: ABNORMAL
ANA PATTERN 2: ABNORMAL
ANA TITR SER: ABNORMAL
ANTI NUCLEAR FACTOR TITER 2: ABNORMAL

## 2021-12-06 ENCOUNTER — NON-APPOINTMENT (OUTPATIENT)
Age: 46
End: 2021-12-06

## 2021-12-06 PROBLEM — Z00.00 ENCOUNTER FOR PREVENTIVE HEALTH EXAMINATION: Status: ACTIVE | Noted: 2021-12-06

## 2021-12-07 ENCOUNTER — LABORATORY RESULT (OUTPATIENT)
Age: 46
End: 2021-12-07

## 2021-12-07 ENCOUNTER — APPOINTMENT (OUTPATIENT)
Dept: DERMATOLOGY | Facility: CLINIC | Age: 46
End: 2021-12-07
Payer: COMMERCIAL

## 2021-12-07 VITALS — HEIGHT: 66 IN | BODY MASS INDEX: 22.18 KG/M2 | WEIGHT: 138 LBS

## 2021-12-07 PROCEDURE — 99215 OFFICE O/P EST HI 40 MIN: CPT

## 2021-12-09 ENCOUNTER — NON-APPOINTMENT (OUTPATIENT)
Age: 46
End: 2021-12-09

## 2021-12-21 ENCOUNTER — APPOINTMENT (OUTPATIENT)
Dept: DERMATOLOGY | Facility: CLINIC | Age: 46
End: 2021-12-21
Payer: COMMERCIAL

## 2021-12-21 PROCEDURE — 99214 OFFICE O/P EST MOD 30 MIN: CPT

## 2022-01-12 ENCOUNTER — NON-APPOINTMENT (OUTPATIENT)
Age: 47
End: 2022-01-12

## 2022-01-14 ENCOUNTER — NON-APPOINTMENT (OUTPATIENT)
Age: 47
End: 2022-01-14

## 2022-01-15 ENCOUNTER — NON-APPOINTMENT (OUTPATIENT)
Age: 47
End: 2022-01-15

## 2022-01-18 ENCOUNTER — APPOINTMENT (OUTPATIENT)
Dept: DERMATOLOGY | Facility: CLINIC | Age: 47
End: 2022-01-18
Payer: COMMERCIAL

## 2022-01-18 PROCEDURE — 99214 OFFICE O/P EST MOD 30 MIN: CPT

## 2022-01-25 ENCOUNTER — LABORATORY RESULT (OUTPATIENT)
Age: 47
End: 2022-01-25

## 2022-01-25 ENCOUNTER — APPOINTMENT (OUTPATIENT)
Dept: NEPHROLOGY | Facility: CLINIC | Age: 47
End: 2022-01-25
Payer: COMMERCIAL

## 2022-01-25 ENCOUNTER — NON-APPOINTMENT (OUTPATIENT)
Age: 47
End: 2022-01-25

## 2022-01-25 VITALS
HEART RATE: 87 BPM | DIASTOLIC BLOOD PRESSURE: 89 MMHG | SYSTOLIC BLOOD PRESSURE: 145 MMHG | BODY MASS INDEX: 23.92 KG/M2 | HEIGHT: 66 IN | TEMPERATURE: 97.5 F | OXYGEN SATURATION: 97 % | WEIGHT: 148.81 LBS

## 2022-01-25 PROCEDURE — 99204 OFFICE O/P NEW MOD 45 MIN: CPT

## 2022-01-28 NOTE — PHYSICAL EXAM
[General Appearance - Alert] : alert [General Appearance - In No Acute Distress] : in no acute distress [General Appearance - Well Nourished] : well nourished [General Appearance - Well Developed] : well developed [General Appearance - Well-Appearing] : healthy appearing [Sclera] : the sclera and conjunctiva were normal [PERRL With Normal Accommodation] : pupils were equal in size, round, and reactive to light [Extraocular Movements] : extraocular movements were intact [Outer Ear] : the ears and nose were normal in appearance [Hearing Threshold Finger Rub Not [place]] : hearing was normal [Examination Of The Oral Cavity] : the lips and gums were normal [Neck Appearance] : the appearance of the neck was normal [Neck Cervical Mass (___cm)] : no neck mass was observed [Jugular Venous Distention Increased] : there was no jugular-venous distention [Respiration, Rhythm And Depth] : normal respiratory rhythm and effort [Exaggerated Use Of Accessory Muscles For Inspiration] : no accessory muscle use [Auscultation Breath Sounds / Voice Sounds] : lungs were clear to auscultation bilaterally [Heart Rate And Rhythm] : heart rate was normal and rhythm regular [Heart Sounds] : normal S1 and S2 [Heart Sounds Gallop] : no gallops [Murmurs] : no murmurs [Heart Sounds Pericardial Friction Rub] : no pericardial rub [Arterial Pulses Carotid] : carotid pulses were normal with no bruits [Edema] : there was no peripheral edema [Bowel Sounds] : normal bowel sounds [Abdomen Soft] : soft [Abdomen Tenderness] : non-tender [Abdomen Mass (___ Cm)] : no abdominal mass palpated [No CVA Tenderness] : no ~M costovertebral angle tenderness [No Spinal Tenderness] : no spinal tenderness [Abnormal Walk] : normal gait [Nail Clubbing] : no clubbing  or cyanosis of the fingernails [Musculoskeletal - Swelling] : no joint swelling seen [Motor Tone] : muscle strength and tone were normal [Skin Color & Pigmentation] : normal skin color and pigmentation [Skin Turgor] : normal skin turgor [] : no rash [Skin Lesions] : no skin lesions [Cranial Nerves] : cranial nerves 2-12 were intact [Deep Tendon Reflexes (DTR)] : deep tendon reflexes were 2+ and symmetric [Sensation] : the sensory exam was normal to light touch and pinprick [Motor Exam] : the motor exam was normal [No Focal Deficits] : no focal deficits [Oriented To Time, Place, And Person] : oriented to person, place, and time [Impaired Insight] : insight and judgment were intact [Affect] : the affect was normal [Mood] : the mood was normal

## 2022-01-28 NOTE — ASSESSMENT
[FreeTextEntry1] : 48 yo male with newly diagnosed SLE with cutaneous lupus, with hematuria and proteinuria \par \par hematuria and 0.8 gms proteinuria with normal creatinine- schedule kidney biopsy. i have discussed the risks/benefits

## 2022-01-28 NOTE — HISTORY OF PRESENT ILLNESS
[FreeTextEntry1] : referred for hematuria and proteinuria \par \par 48 yo male who presented to dermatology with a rash and work up revealed MÓNICA titer 1:1280 (speckled) and MÓNICA titer of 1:640 (homogenous.) and diagnosed with SLE and acute cutaneous lupus. \par \par urinalysis showed hematuria and proteinuria \par \par currently on prednisone for cutaneous lupus \par \par ros \par gu- no changes in urination, no blood in urine, no foamy urine \par cvs- no chest pain, no shortness of breath \par all other systems reviewed in detail and were negative except as above \par \par \par

## 2022-02-04 ENCOUNTER — OUTPATIENT (OUTPATIENT)
Dept: OUTPATIENT SERVICES | Facility: HOSPITAL | Age: 47
LOS: 1 days | End: 2022-02-04
Payer: COMMERCIAL

## 2022-02-04 DIAGNOSIS — Z11.52 ENCOUNTER FOR SCREENING FOR COVID-19: ICD-10-CM

## 2022-02-04 PROCEDURE — U0003: CPT

## 2022-02-04 PROCEDURE — U0005: CPT

## 2022-02-04 PROCEDURE — C9803: CPT

## 2022-02-05 LAB — SARS-COV-2 RNA SPEC QL NAA+PROBE: SIGNIFICANT CHANGE UP

## 2022-02-07 ENCOUNTER — RESULT REVIEW (OUTPATIENT)
Age: 47
End: 2022-02-07

## 2022-02-07 ENCOUNTER — APPOINTMENT (OUTPATIENT)
Dept: ULTRASOUND IMAGING | Facility: HOSPITAL | Age: 47
End: 2022-02-07

## 2022-02-07 ENCOUNTER — OUTPATIENT (OUTPATIENT)
Dept: OUTPATIENT SERVICES | Facility: HOSPITAL | Age: 47
LOS: 1 days | End: 2022-02-07
Payer: COMMERCIAL

## 2022-02-07 DIAGNOSIS — M32.14 GLOMERULAR DISEASE IN SYSTEMIC LUPUS ERYTHEMATOSUS: ICD-10-CM

## 2022-02-07 PROCEDURE — 88350 IMFLUOR EA ADDL 1ANTB STN PX: CPT

## 2022-02-07 PROCEDURE — 88313 SPECIAL STAINS GROUP 2: CPT

## 2022-02-07 PROCEDURE — 88346 IMFLUOR 1ST 1ANTB STAIN PX: CPT | Mod: 26

## 2022-02-07 PROCEDURE — 50200 RENAL BIOPSY PERQ: CPT | Mod: LT

## 2022-02-07 PROCEDURE — 88348 ELECTRON MICROSCOPY DX: CPT | Mod: 26

## 2022-02-07 PROCEDURE — 88350 IMFLUOR EA ADDL 1ANTB STN PX: CPT | Mod: 26

## 2022-02-07 PROCEDURE — 88348 ELECTRON MICROSCOPY DX: CPT

## 2022-02-07 PROCEDURE — 88305 TISSUE EXAM BY PATHOLOGIST: CPT

## 2022-02-07 PROCEDURE — 76942 ECHO GUIDE FOR BIOPSY: CPT | Mod: 26

## 2022-02-07 PROCEDURE — 76942 ECHO GUIDE FOR BIOPSY: CPT

## 2022-02-07 PROCEDURE — 88346 IMFLUOR 1ST 1ANTB STAIN PX: CPT

## 2022-02-07 PROCEDURE — 88313 SPECIAL STAINS GROUP 2: CPT | Mod: 26

## 2022-02-07 PROCEDURE — 50200 RENAL BIOPSY PERQ: CPT

## 2022-02-07 PROCEDURE — 88305 TISSUE EXAM BY PATHOLOGIST: CPT | Mod: 26

## 2022-02-09 ENCOUNTER — NON-APPOINTMENT (OUTPATIENT)
Age: 47
End: 2022-02-09

## 2022-02-10 ENCOUNTER — LABORATORY RESULT (OUTPATIENT)
Age: 47
End: 2022-02-10

## 2022-02-10 ENCOUNTER — APPOINTMENT (OUTPATIENT)
Dept: RHEUMATOLOGY | Facility: CLINIC | Age: 47
End: 2022-02-10
Payer: COMMERCIAL

## 2022-02-10 VITALS
OXYGEN SATURATION: 97 % | BODY MASS INDEX: 23.46 KG/M2 | HEART RATE: 90 BPM | SYSTOLIC BLOOD PRESSURE: 125 MMHG | DIASTOLIC BLOOD PRESSURE: 84 MMHG | TEMPERATURE: 97.4 F | WEIGHT: 146 LBS | HEIGHT: 66 IN

## 2022-02-10 LAB — SURGICAL PATHOLOGY STUDY: SIGNIFICANT CHANGE UP

## 2022-02-10 PROCEDURE — 99205 OFFICE O/P NEW HI 60 MIN: CPT

## 2022-02-14 LAB
25(OH)D3 SERPL-MCNC: 31.4 NG/ML
ALBUMIN SERPL ELPH-MCNC: 3.9 G/DL
ALP BLD-CCNC: 77 U/L
ALT SERPL-CCNC: 15 U/L
ANION GAP SERPL CALC-SCNC: 12 MMOL/L
APPEARANCE: CLEAR
AST SERPL-CCNC: 19 U/L
B2 GLYCOPROT1 AB SER QL: POSITIVE
BACTERIA: NEGATIVE
BILIRUB SERPL-MCNC: 0.2 MG/DL
BILIRUBIN URINE: NEGATIVE
BLOOD URINE: ABNORMAL
BUN SERPL-MCNC: 14 MG/DL
C3 SERPL-MCNC: 107 MG/DL
C4 SERPL-MCNC: 14 MG/DL
CALCIUM SERPL-MCNC: 9.8 MG/DL
CENTROMERE IGG SER-ACNC: <0.2 CD:130001892
CHLORIDE SERPL-SCNC: 105 MMOL/L
CK SERPL-CCNC: 44 U/L
CO2 SERPL-SCNC: 24 MMOL/L
COLOR: NORMAL
CONFIRM: 28.3 SEC
CREAT SERPL-MCNC: 1.2 MG/DL
CREAT SPEC-SCNC: 86 MG/DL
CREAT/PROT UR: 1.3 RATIO
DRVVT IMM 1:2 NP PPP: NORMAL
DRVVT SCREEN TO CONFIRM RATIO: 1.01 RATIO
ENA SCL70 IGG SER IA-ACNC: <0.2 AL
G6PD SER-CCNC: 13.2 U/G HGB
GLUCOSE QUALITATIVE U: NEGATIVE
GLUCOSE SERPL-MCNC: 86 MG/DL
HBV CORE IGG+IGM SER QL: NONREACTIVE
HBV SURFACE AG SER QL: NONREACTIVE
HCV AB SER QL: NONREACTIVE
HCV S/CO RATIO: 0.16 S/CO
HYALINE CASTS: 0 /LPF
KETONES URINE: NEGATIVE
LEUKOCYTE ESTERASE URINE: NEGATIVE
MICROSCOPIC-UA: NORMAL
NITRITE URINE: NEGATIVE
PH URINE: 6
POTASSIUM SERPL-SCNC: 4.2 MMOL/L
PROT SERPL-MCNC: 7.5 G/DL
PROT UR-MCNC: 113 MG/DL
PROTEIN URINE: ABNORMAL
RED BLOOD CELLS URINE: 180 /HPF
SCREEN DRVVT: 38.2 SEC
SILICA CLOTTING TIME INTERPRETATION: ABNORMAL
SILICA CLOTTING TIME S/C: 1.18 RATIO
SODIUM SERPL-SCNC: 141 MMOL/L
SPECIFIC GRAVITY URINE: 1.01
SQUAMOUS EPITHELIAL CELLS: 2 /HPF
UROBILINOGEN URINE: NORMAL
WHITE BLOOD CELLS URINE: 5 /HPF

## 2022-02-14 RX ORDER — PREDNISONE 20 MG/1
20 TABLET ORAL
Qty: 42 | Refills: 0 | Status: COMPLETED | COMMUNITY
Start: 2021-12-21 | End: 2022-02-14

## 2022-02-14 RX ORDER — PREDNISONE 20 MG/1
20 TABLET ORAL
Qty: 42 | Refills: 0 | Status: COMPLETED | COMMUNITY
Start: 2021-12-07 | End: 2022-02-14

## 2022-02-15 LAB — CARDIOLIPIN AB SER IA-ACNC: NEGATIVE

## 2022-02-23 ENCOUNTER — NON-APPOINTMENT (OUTPATIENT)
Age: 47
End: 2022-02-23

## 2022-02-24 ENCOUNTER — APPOINTMENT (OUTPATIENT)
Dept: RHEUMATOLOGY | Facility: CLINIC | Age: 47
End: 2022-02-24

## 2022-03-08 ENCOUNTER — APPOINTMENT (OUTPATIENT)
Dept: NEPHROLOGY | Facility: CLINIC | Age: 47
End: 2022-03-08
Payer: COMMERCIAL

## 2022-03-08 VITALS
SYSTOLIC BLOOD PRESSURE: 126 MMHG | BODY MASS INDEX: 23.56 KG/M2 | TEMPERATURE: 98.1 F | OXYGEN SATURATION: 96 % | WEIGHT: 146.6 LBS | DIASTOLIC BLOOD PRESSURE: 80 MMHG | HEIGHT: 66 IN | HEART RATE: 92 BPM

## 2022-03-08 PROCEDURE — 99215 OFFICE O/P EST HI 40 MIN: CPT

## 2022-03-11 NOTE — PHYSICAL EXAM
[General Appearance - Alert] : alert [General Appearance - In No Acute Distress] : in no acute distress [General Appearance - Well Nourished] : well nourished [General Appearance - Well Developed] : well developed [General Appearance - Well-Appearing] : healthy appearing [Sclera] : the sclera and conjunctiva were normal [PERRL With Normal Accommodation] : pupils were equal in size, round, and reactive to light [Extraocular Movements] : extraocular movements were intact [Outer Ear] : the ears and nose were normal in appearance [Hearing Threshold Finger Rub Not [place]] : hearing was normal [Examination Of The Oral Cavity] : the lips and gums were normal [Neck Appearance] : the appearance of the neck was normal [Neck Cervical Mass (___cm)] : no neck mass was observed [Jugular Venous Distention Increased] : there was no jugular-venous distention [Exaggerated Use Of Accessory Muscles For Inspiration] : no accessory muscle use [Respiration, Rhythm And Depth] : normal respiratory rhythm and effort [Auscultation Breath Sounds / Voice Sounds] : lungs were clear to auscultation bilaterally [Heart Rate And Rhythm] : heart rate was normal and rhythm regular [Heart Sounds] : normal S1 and S2 [Heart Sounds Gallop] : no gallops [Murmurs] : no murmurs [Heart Sounds Pericardial Friction Rub] : no pericardial rub [Arterial Pulses Carotid] : carotid pulses were normal with no bruits [Edema] : there was no peripheral edema [Bowel Sounds] : normal bowel sounds [Abdomen Soft] : soft [Abdomen Tenderness] : non-tender [Abdomen Mass (___ Cm)] : no abdominal mass palpated [No CVA Tenderness] : no ~M costovertebral angle tenderness [No Spinal Tenderness] : no spinal tenderness [Abnormal Walk] : normal gait [Nail Clubbing] : no clubbing  or cyanosis of the fingernails [Musculoskeletal - Swelling] : no joint swelling seen [Motor Tone] : muscle strength and tone were normal [Skin Color & Pigmentation] : normal skin color and pigmentation [Skin Turgor] : normal skin turgor [] : no rash [Skin Lesions] : no skin lesions [Cranial Nerves] : cranial nerves 2-12 were intact [Deep Tendon Reflexes (DTR)] : deep tendon reflexes were 2+ and symmetric [Sensation] : the sensory exam was normal to light touch and pinprick [Motor Exam] : the motor exam was normal [No Focal Deficits] : no focal deficits [Oriented To Time, Place, And Person] : oriented to person, place, and time [Impaired Insight] : insight and judgment were intact [Affect] : the affect was normal [Mood] : the mood was normal

## 2022-03-11 NOTE — HISTORY OF PRESENT ILLNESS
[FreeTextEntry1] : referred for hematuria and proteinuria \par \par 48 yo male who presented to dermatology with a rash and work up revealed MÓNICA titer 1:1280 (speckled) and MÓNICA titer of 1:640 (homogenous.) and diagnosed with SLE and acute cutaneous lupus. 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 \par \par today he states he is only taking cellcept 500 mg 2 pills a day and not taking plaquenil\par \par he does not have any symptoms and generally feels active \par \par ros \par gu- no changes in urination, no blood in urine \par cvs- no chest pain, no shortness of breath \par all other systems reviewed in detail and were negative except as above \par \par \par \par

## 2022-03-11 NOTE — ASSESSMENT
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 \par check serologies/ urinalysis and urine protein/creat ratio.

## 2022-03-15 ENCOUNTER — APPOINTMENT (OUTPATIENT)
Dept: DERMATOLOGY | Facility: CLINIC | Age: 47
End: 2022-03-15
Payer: COMMERCIAL

## 2022-03-15 PROCEDURE — 99214 OFFICE O/P EST MOD 30 MIN: CPT

## 2022-03-31 ENCOUNTER — APPOINTMENT (OUTPATIENT)
Dept: RHEUMATOLOGY | Facility: CLINIC | Age: 47
End: 2022-03-31
Payer: COMMERCIAL

## 2022-03-31 VITALS
WEIGHT: 146 LBS | DIASTOLIC BLOOD PRESSURE: 82 MMHG | HEIGHT: 66 IN | SYSTOLIC BLOOD PRESSURE: 131 MMHG | HEART RATE: 88 BPM | BODY MASS INDEX: 23.46 KG/M2 | TEMPERATURE: 98.3 F | OXYGEN SATURATION: 97 %

## 2022-03-31 PROCEDURE — 99214 OFFICE O/P EST MOD 30 MIN: CPT

## 2022-04-19 ENCOUNTER — NON-APPOINTMENT (OUTPATIENT)
Age: 47
End: 2022-04-19

## 2022-04-20 LAB
ALBUMIN SERPL ELPH-MCNC: 3.8 G/DL
ALP BLD-CCNC: 68 U/L
ALT SERPL-CCNC: 13 U/L
ANION GAP SERPL CALC-SCNC: 12 MMOL/L
APPEARANCE: CLEAR
AST SERPL-CCNC: 15 U/L
BACTERIA: NEGATIVE
BASOPHILS # BLD AUTO: 0.04 K/UL
BASOPHILS NFR BLD AUTO: 0.4 %
BILIRUB SERPL-MCNC: 0.2 MG/DL
BILIRUBIN URINE: NEGATIVE
BLOOD URINE: ABNORMAL
BUN SERPL-MCNC: 13 MG/DL
C3 SERPL-MCNC: 80 MG/DL
C4 SERPL-MCNC: 13 MG/DL
CALCIUM SERPL-MCNC: 9.1 MG/DL
CHLORIDE SERPL-SCNC: 105 MMOL/L
CO2 SERPL-SCNC: 24 MMOL/L
COLOR: NORMAL
CREAT SERPL-MCNC: 1.04 MG/DL
CREAT SPEC-SCNC: 40 MG/DL
CREAT/PROT UR: 3.3 RATIO
DSDNA AB SER-ACNC: 185 IU/ML
EGFR: 89 ML/MIN/1.73M2
EOSINOPHIL # BLD AUTO: 0.13 K/UL
EOSINOPHIL NFR BLD AUTO: 1.3 %
GLUCOSE QUALITATIVE U: NEGATIVE
GLUCOSE SERPL-MCNC: 94 MG/DL
HCT VFR BLD CALC: 38 %
HGB BLD-MCNC: 12.2 G/DL
HYALINE CASTS: 4 /LPF
IMM GRANULOCYTES NFR BLD AUTO: 0.5 %
KETONES URINE: NEGATIVE
LEUKOCYTE ESTERASE URINE: NEGATIVE
LYMPHOCYTES # BLD AUTO: 2.49 K/UL
LYMPHOCYTES NFR BLD AUTO: 25.1 %
MAN DIFF?: NORMAL
MCHC RBC-ENTMCNC: 28.9 PG
MCHC RBC-ENTMCNC: 32.1 GM/DL
MCV RBC AUTO: 90 FL
MICROSCOPIC-UA: NORMAL
MONOCYTES # BLD AUTO: 0.88 K/UL
MONOCYTES NFR BLD AUTO: 8.9 %
NEUTROPHILS # BLD AUTO: 6.35 K/UL
NEUTROPHILS NFR BLD AUTO: 63.8 %
NITRITE URINE: NEGATIVE
PH URINE: 6.5
PLATELET # BLD AUTO: 273 K/UL
POTASSIUM SERPL-SCNC: 4.4 MMOL/L
PROT SERPL-MCNC: 6.9 G/DL
PROT UR-MCNC: 130 MG/DL
PROTEIN URINE: ABNORMAL
RBC # BLD: 4.22 M/UL
RBC # FLD: 13.2 %
RED BLOOD CELLS URINE: 60 /HPF
SODIUM SERPL-SCNC: 141 MMOL/L
SPECIFIC GRAVITY URINE: 1.01
SQUAMOUS EPITHELIAL CELLS: 1 /HPF
UROBILINOGEN URINE: NORMAL
WBC # FLD AUTO: 9.94 K/UL
WHITE BLOOD CELLS URINE: 7 /HPF

## 2022-04-22 ENCOUNTER — NON-APPOINTMENT (OUTPATIENT)
Age: 47
End: 2022-04-22

## 2022-04-22 ENCOUNTER — APPOINTMENT (OUTPATIENT)
Dept: RHEUMATOLOGY | Facility: CLINIC | Age: 47
End: 2022-04-22

## 2022-07-12 ENCOUNTER — APPOINTMENT (OUTPATIENT)
Dept: DERMATOLOGY | Facility: CLINIC | Age: 47
End: 2022-07-12

## 2022-07-12 PROCEDURE — 99214 OFFICE O/P EST MOD 30 MIN: CPT

## 2022-07-12 RX ORDER — HYDROCORTISONE 25 MG/G
2.5 CREAM TOPICAL
Qty: 1 | Refills: 1 | Status: ACTIVE | COMMUNITY
Start: 2022-07-12 | End: 1900-01-01

## 2022-09-06 NOTE — CONSULT NOTE ADULT - NSCONSULTADDITIONALINFOA_GEN_ALL_CORE
Detail Level: Detailed
General Sunscreen Counseling: I recommended a broad spectrum sunscreen with a SPF of 30 or higher.  I explained that SPF 30 sunscreens block approximately 97 percent of the sun's harmful rays.  Sunscreens should be applied at least 15 minutes prior to expected sun exposure and then every 2 hours after that as long as sun exposure continues. If swimming or exercising sunscreen should be reapplied every 45 minutes to an hour after getting wet or sweating.  One ounce, or the equivalent of a shot glass full of sunscreen, is adequate to protect the skin not covered by a bathing suit. I also recommended a lip balm with a sunscreen as well. Sun protective clothing can be used in lieu of sunscreen but must be worn the entire time you are exposed to the sun's rays.
This patient was virtually staffed and clinical imaged reviewed. I agree with the history, exam, assessment and plan as documented above in the consult resident's note.    Reason MD Dougie  Dermatology, [facility]

## 2022-10-11 ENCOUNTER — APPOINTMENT (OUTPATIENT)
Dept: DERMATOLOGY | Facility: CLINIC | Age: 47
End: 2022-10-11

## 2022-10-11 DIAGNOSIS — L30.9 DERMATITIS, UNSPECIFIED: ICD-10-CM

## 2022-10-11 PROCEDURE — 99214 OFFICE O/P EST MOD 30 MIN: CPT

## 2022-10-11 RX ORDER — TACROLIMUS 1 MG/G
0.1 OINTMENT TOPICAL TWICE DAILY
Qty: 1 | Refills: 3 | Status: DISCONTINUED | COMMUNITY
Start: 2022-10-11 | End: 2022-10-11

## 2022-10-12 LAB
ALBUMIN SERPL ELPH-MCNC: 2.5 G/DL
ALP BLD-CCNC: 80 U/L
ALT SERPL-CCNC: 9 U/L
ANION GAP SERPL CALC-SCNC: 10 MMOL/L
APPEARANCE: CLEAR
AST SERPL-CCNC: 15 U/L
BACTERIA: NEGATIVE
BASOPHILS # BLD AUTO: 0.01 K/UL
BASOPHILS NFR BLD AUTO: 0.1 %
BILIRUB SERPL-MCNC: 0.2 MG/DL
BILIRUBIN URINE: NEGATIVE
BLOOD URINE: ABNORMAL
BUN SERPL-MCNC: 34 MG/DL
C3 SERPL-MCNC: 43 MG/DL
C4 SERPL-MCNC: 10 MG/DL
CALCIUM SERPL-MCNC: 8.8 MG/DL
CHLORIDE SERPL-SCNC: 108 MMOL/L
CO2 SERPL-SCNC: 22 MMOL/L
COLOR: NORMAL
CREAT SERPL-MCNC: 1.78 MG/DL
DSDNA AB SER-ACNC: 834 IU/ML
EGFR: 47 ML/MIN/1.73M2
EOSINOPHIL # BLD AUTO: 0.04 K/UL
EOSINOPHIL NFR BLD AUTO: 0.4 %
GLUCOSE QUALITATIVE U: NEGATIVE
GLUCOSE SERPL-MCNC: 95 MG/DL
HCT VFR BLD CALC: 38.3 %
HGB BLD-MCNC: 12.4 G/DL
HYALINE CASTS: 7 /LPF
IMM GRANULOCYTES NFR BLD AUTO: 0.3 %
KETONES URINE: NEGATIVE
LEUKOCYTE ESTERASE URINE: NEGATIVE
LYMPHOCYTES # BLD AUTO: 1.15 K/UL
LYMPHOCYTES NFR BLD AUTO: 12.7 %
MAN DIFF?: NORMAL
MCHC RBC-ENTMCNC: 29.1 PG
MCHC RBC-ENTMCNC: 32.4 GM/DL
MCV RBC AUTO: 89.9 FL
MICROSCOPIC-UA: NORMAL
MONOCYTES # BLD AUTO: 0.55 K/UL
MONOCYTES NFR BLD AUTO: 6.1 %
NEUTROPHILS # BLD AUTO: 7.27 K/UL
NEUTROPHILS NFR BLD AUTO: 80.4 %
NITRITE URINE: NEGATIVE
PH URINE: 6
PLATELET # BLD AUTO: 284 K/UL
POTASSIUM SERPL-SCNC: 5.1 MMOL/L
PROT SERPL-MCNC: 5.9 G/DL
PROTEIN URINE: ABNORMAL
RBC # BLD: 4.26 M/UL
RBC # FLD: 13.2 %
RED BLOOD CELLS URINE: 20 /HPF
SODIUM SERPL-SCNC: 139 MMOL/L
SPECIFIC GRAVITY URINE: 1.01
SQUAMOUS EPITHELIAL CELLS: 1 /HPF
UROBILINOGEN URINE: NORMAL
WBC # FLD AUTO: 9.05 K/UL
WHITE BLOOD CELLS URINE: 22 /HPF

## 2022-10-13 ENCOUNTER — INPATIENT (INPATIENT)
Facility: HOSPITAL | Age: 47
LOS: 8 days | Discharge: HOME | End: 2022-10-22
Attending: INTERNAL MEDICINE | Admitting: INTERNAL MEDICINE

## 2022-10-13 ENCOUNTER — NON-APPOINTMENT (OUTPATIENT)
Age: 47
End: 2022-10-13

## 2022-10-13 VITALS
OXYGEN SATURATION: 100 % | HEART RATE: 83 BPM | SYSTOLIC BLOOD PRESSURE: 125 MMHG | DIASTOLIC BLOOD PRESSURE: 77 MMHG | HEIGHT: 66 IN | TEMPERATURE: 98 F | RESPIRATION RATE: 16 BRPM

## 2022-10-13 DIAGNOSIS — D63.8 ANEMIA IN OTHER CHRONIC DISEASES CLASSIFIED ELSEWHERE: ICD-10-CM

## 2022-10-13 DIAGNOSIS — Z29.9 ENCOUNTER FOR PROPHYLACTIC MEASURES, UNSPECIFIED: ICD-10-CM

## 2022-10-13 DIAGNOSIS — R82.90 UNSPECIFIED ABNORMAL FINDINGS IN URINE: ICD-10-CM

## 2022-10-13 DIAGNOSIS — E87.5 HYPERKALEMIA: ICD-10-CM

## 2022-10-13 DIAGNOSIS — N17.9 ACUTE KIDNEY FAILURE, UNSPECIFIED: ICD-10-CM

## 2022-10-13 DIAGNOSIS — M32.9 SYSTEMIC LUPUS ERYTHEMATOSUS, UNSPECIFIED: ICD-10-CM

## 2022-10-13 LAB
ALBUMIN SERPL ELPH-MCNC: 2.4 G/DL — LOW (ref 3.3–5)
ALBUMIN SERPL ELPH-MCNC: 2.5 G/DL — LOW (ref 3.3–5)
ALBUMIN SERPL ELPH-MCNC: 2.9 G/DL — LOW (ref 3.3–5)
ALP SERPL-CCNC: 74 U/L — SIGNIFICANT CHANGE UP (ref 40–120)
ALP SERPL-CCNC: 75 U/L — SIGNIFICANT CHANGE UP (ref 40–120)
ALP SERPL-CCNC: 83 U/L — SIGNIFICANT CHANGE UP (ref 40–120)
ALT FLD-CCNC: 13 U/L — SIGNIFICANT CHANGE UP (ref 4–41)
ALT FLD-CCNC: 14 U/L — SIGNIFICANT CHANGE UP (ref 4–41)
ALT FLD-CCNC: 14 U/L — SIGNIFICANT CHANGE UP (ref 4–41)
ANION GAP SERPL CALC-SCNC: 7 MMOL/L — SIGNIFICANT CHANGE UP (ref 7–14)
ANION GAP SERPL CALC-SCNC: 8 MMOL/L — SIGNIFICANT CHANGE UP (ref 7–14)
ANION GAP SERPL CALC-SCNC: 8 MMOL/L — SIGNIFICANT CHANGE UP (ref 7–14)
APPEARANCE UR: CLEAR — SIGNIFICANT CHANGE UP
AST SERPL-CCNC: 13 U/L — SIGNIFICANT CHANGE UP (ref 4–40)
AST SERPL-CCNC: 15 U/L — SIGNIFICANT CHANGE UP (ref 4–40)
AST SERPL-CCNC: 16 U/L — SIGNIFICANT CHANGE UP (ref 4–40)
BACTERIA # UR AUTO: ABNORMAL
BASOPHILS # BLD AUTO: 0.03 K/UL — SIGNIFICANT CHANGE UP (ref 0–0.2)
BASOPHILS NFR BLD AUTO: 0.3 % — SIGNIFICANT CHANGE UP (ref 0–2)
BILIRUB SERPL-MCNC: <0.2 MG/DL — SIGNIFICANT CHANGE UP (ref 0.2–1.2)
BILIRUB UR-MCNC: NEGATIVE — SIGNIFICANT CHANGE UP
BLOOD GAS VENOUS COMPREHENSIVE RESULT: SIGNIFICANT CHANGE UP
BUN SERPL-MCNC: 37 MG/DL — HIGH (ref 7–23)
BUN SERPL-MCNC: 38 MG/DL — HIGH (ref 7–23)
BUN SERPL-MCNC: 38 MG/DL — HIGH (ref 7–23)
CALCIUM SERPL-MCNC: 8.7 MG/DL — SIGNIFICANT CHANGE UP (ref 8.4–10.5)
CALCIUM SERPL-MCNC: 8.9 MG/DL — SIGNIFICANT CHANGE UP (ref 8.4–10.5)
CALCIUM SERPL-MCNC: 9.2 MG/DL — SIGNIFICANT CHANGE UP (ref 8.4–10.5)
CHLORIDE SERPL-SCNC: 109 MMOL/L — HIGH (ref 98–107)
CHLORIDE SERPL-SCNC: 109 MMOL/L — HIGH (ref 98–107)
CHLORIDE SERPL-SCNC: 111 MMOL/L — HIGH (ref 98–107)
CO2 SERPL-SCNC: 20 MMOL/L — LOW (ref 22–31)
CO2 SERPL-SCNC: 20 MMOL/L — LOW (ref 22–31)
CO2 SERPL-SCNC: 21 MMOL/L — LOW (ref 22–31)
COLOR SPEC: SIGNIFICANT CHANGE UP
CREAT ?TM UR-MCNC: 77 MG/DL — SIGNIFICANT CHANGE UP
CREAT SERPL-MCNC: 1.65 MG/DL — HIGH (ref 0.5–1.3)
CREAT SERPL-MCNC: 1.66 MG/DL — HIGH (ref 0.5–1.3)
CREAT SERPL-MCNC: 1.73 MG/DL — HIGH (ref 0.5–1.3)
DIFF PNL FLD: ABNORMAL
EGFR: 48 ML/MIN/1.73M2 — LOW
EGFR: 51 ML/MIN/1.73M2 — LOW
EGFR: 51 ML/MIN/1.73M2 — LOW
EOSINOPHIL # BLD AUTO: 0.03 K/UL — SIGNIFICANT CHANGE UP (ref 0–0.5)
EOSINOPHIL NFR BLD AUTO: 0.3 % — SIGNIFICANT CHANGE UP (ref 0–6)
EPI CELLS # UR: 4 /HPF — SIGNIFICANT CHANGE UP (ref 0–5)
FLUAV AG NPH QL: SIGNIFICANT CHANGE UP
FLUBV AG NPH QL: SIGNIFICANT CHANGE UP
GLUCOSE SERPL-MCNC: 105 MG/DL — HIGH (ref 70–99)
GLUCOSE SERPL-MCNC: 91 MG/DL — SIGNIFICANT CHANGE UP (ref 70–99)
GLUCOSE SERPL-MCNC: 97 MG/DL — SIGNIFICANT CHANGE UP (ref 70–99)
GLUCOSE UR QL: NEGATIVE — SIGNIFICANT CHANGE UP
HCT VFR BLD CALC: 37.8 % — LOW (ref 39–50)
HGB BLD-MCNC: 12.6 G/DL — LOW (ref 13–17)
HIV 1+2 AB+HIV1 P24 AG SERPL QL IA: SIGNIFICANT CHANGE UP
HYALINE CASTS # UR AUTO: 14 /LPF — HIGH (ref 0–7)
IANC: 10.35 K/UL — HIGH (ref 1.8–7.4)
IMM GRANULOCYTES NFR BLD AUTO: 0.4 % — SIGNIFICANT CHANGE UP (ref 0–0.9)
KETONES UR-MCNC: NEGATIVE — SIGNIFICANT CHANGE UP
LEUKOCYTE ESTERASE UR-ACNC: ABNORMAL
LYMPHOCYTES # BLD AUTO: 1.06 K/UL — SIGNIFICANT CHANGE UP (ref 1–3.3)
LYMPHOCYTES # BLD AUTO: 8.9 % — LOW (ref 13–44)
MCHC RBC-ENTMCNC: 29.2 PG — SIGNIFICANT CHANGE UP (ref 27–34)
MCHC RBC-ENTMCNC: 33.3 GM/DL — SIGNIFICANT CHANGE UP (ref 32–36)
MCV RBC AUTO: 87.5 FL — SIGNIFICANT CHANGE UP (ref 80–100)
MONOCYTES # BLD AUTO: 0.35 K/UL — SIGNIFICANT CHANGE UP (ref 0–0.9)
MONOCYTES NFR BLD AUTO: 2.9 % — SIGNIFICANT CHANGE UP (ref 2–14)
NEUTROPHILS # BLD AUTO: 10.35 K/UL — HIGH (ref 1.8–7.4)
NEUTROPHILS NFR BLD AUTO: 87.2 % — HIGH (ref 43–77)
NITRITE UR-MCNC: NEGATIVE — SIGNIFICANT CHANGE UP
NRBC # BLD: 0 /100 WBCS — SIGNIFICANT CHANGE UP (ref 0–0)
NRBC # FLD: 0 K/UL — SIGNIFICANT CHANGE UP (ref 0–0)
PH UR: 6 — SIGNIFICANT CHANGE UP (ref 5–8)
PLATELET # BLD AUTO: 323 K/UL — SIGNIFICANT CHANGE UP (ref 150–400)
POTASSIUM SERPL-MCNC: 5.5 MMOL/L — HIGH (ref 3.5–5.3)
POTASSIUM SERPL-MCNC: 5.6 MMOL/L — HIGH (ref 3.5–5.3)
POTASSIUM SERPL-MCNC: 5.9 MMOL/L — HIGH (ref 3.5–5.3)
POTASSIUM SERPL-SCNC: 5.5 MMOL/L — HIGH (ref 3.5–5.3)
POTASSIUM SERPL-SCNC: 5.6 MMOL/L — HIGH (ref 3.5–5.3)
POTASSIUM SERPL-SCNC: 5.9 MMOL/L — HIGH (ref 3.5–5.3)
PROT ?TM UR-MCNC: 225 MG/DL — SIGNIFICANT CHANGE UP
PROT SERPL-MCNC: 6.4 G/DL — SIGNIFICANT CHANGE UP (ref 6–8.3)
PROT SERPL-MCNC: 6.9 G/DL — SIGNIFICANT CHANGE UP (ref 6–8.3)
PROT SERPL-MCNC: 7.3 G/DL — SIGNIFICANT CHANGE UP (ref 6–8.3)
PROT UR-MCNC: ABNORMAL
PROT/CREAT UR-RTO: 2.9 RATIO — HIGH (ref 0–0.2)
RBC # BLD: 4.32 M/UL — SIGNIFICANT CHANGE UP (ref 4.2–5.8)
RBC # FLD: 13.2 % — SIGNIFICANT CHANGE UP (ref 10.3–14.5)
RBC CASTS # UR COMP ASSIST: 32 /HPF — HIGH (ref 0–4)
RSV RNA NPH QL NAA+NON-PROBE: SIGNIFICANT CHANGE UP
SARS-COV-2 RNA SPEC QL NAA+PROBE: SIGNIFICANT CHANGE UP
SODIUM SERPL-SCNC: 137 MMOL/L — SIGNIFICANT CHANGE UP (ref 135–145)
SODIUM SERPL-SCNC: 138 MMOL/L — SIGNIFICANT CHANGE UP (ref 135–145)
SODIUM SERPL-SCNC: 138 MMOL/L — SIGNIFICANT CHANGE UP (ref 135–145)
SODIUM UR-SCNC: 36 MMOL/L — SIGNIFICANT CHANGE UP
SP GR SPEC: 1.02 — SIGNIFICANT CHANGE UP (ref 1.01–1.05)
UROBILINOGEN FLD QL: SIGNIFICANT CHANGE UP
UUN UR-MCNC: 555 MG/DL — SIGNIFICANT CHANGE UP
WBC # BLD: 11.87 K/UL — HIGH (ref 3.8–10.5)
WBC # FLD AUTO: 11.87 K/UL — HIGH (ref 3.8–10.5)
WBC UR QL: 34 /HPF — HIGH (ref 0–5)

## 2022-10-13 PROCEDURE — 99223 1ST HOSP IP/OBS HIGH 75: CPT | Mod: GC

## 2022-10-13 PROCEDURE — 99285 EMERGENCY DEPT VISIT HI MDM: CPT

## 2022-10-13 PROCEDURE — 71046 X-RAY EXAM CHEST 2 VIEWS: CPT | Mod: 26

## 2022-10-13 RX ORDER — MYCOPHENOLATE MOFETIL 250 MG/1
1000 CAPSULE ORAL
Refills: 0 | Status: DISCONTINUED | OUTPATIENT
Start: 2022-10-13 | End: 2022-10-18

## 2022-10-13 RX ORDER — SODIUM ZIRCONIUM CYCLOSILICATE 10 G/10G
5 POWDER, FOR SUSPENSION ORAL ONCE
Refills: 0 | Status: COMPLETED | OUTPATIENT
Start: 2022-10-13 | End: 2022-10-13

## 2022-10-13 RX ORDER — CALCIUM GLUCONATE 100 MG/ML
1 VIAL (ML) INTRAVENOUS ONCE
Refills: 0 | Status: COMPLETED | OUTPATIENT
Start: 2022-10-13 | End: 2022-10-13

## 2022-10-13 RX ORDER — HYDROXYCHLOROQUINE SULFATE 200 MG
200 TABLET ORAL
Refills: 0 | Status: DISCONTINUED | OUTPATIENT
Start: 2022-10-13 | End: 2022-10-22

## 2022-10-13 RX ORDER — DEXTROSE 50 % IN WATER 50 %
50 SYRINGE (ML) INTRAVENOUS ONCE
Refills: 0 | Status: COMPLETED | OUTPATIENT
Start: 2022-10-13 | End: 2022-10-13

## 2022-10-13 RX ORDER — ENOXAPARIN SODIUM 100 MG/ML
40 INJECTION SUBCUTANEOUS EVERY 24 HOURS
Refills: 0 | Status: DISCONTINUED | OUTPATIENT
Start: 2022-10-13 | End: 2022-10-16

## 2022-10-13 RX ORDER — INSULIN HUMAN 100 [IU]/ML
5 INJECTION, SOLUTION SUBCUTANEOUS ONCE
Refills: 0 | Status: COMPLETED | OUTPATIENT
Start: 2022-10-13 | End: 2022-10-13

## 2022-10-13 RX ADMIN — Medication 60 MILLIGRAM(S): at 15:08

## 2022-10-13 RX ADMIN — Medication 50 MILLILITER(S): at 17:13

## 2022-10-13 RX ADMIN — SODIUM ZIRCONIUM CYCLOSILICATE 5 GRAM(S): 10 POWDER, FOR SUSPENSION ORAL at 17:12

## 2022-10-13 RX ADMIN — INSULIN HUMAN 5 UNIT(S): 100 INJECTION, SOLUTION SUBCUTANEOUS at 17:13

## 2022-10-13 RX ADMIN — ENOXAPARIN SODIUM 40 MILLIGRAM(S): 100 INJECTION SUBCUTANEOUS at 22:50

## 2022-10-13 RX ADMIN — Medication 200 MILLIGRAM(S): at 22:51

## 2022-10-13 RX ADMIN — MYCOPHENOLATE MOFETIL 1000 MILLIGRAM(S): 250 CAPSULE ORAL at 22:55

## 2022-10-13 RX ADMIN — Medication 100 GRAM(S): at 17:44

## 2022-10-13 RX ADMIN — Medication 1 TABLET(S): at 22:51

## 2022-10-13 RX ADMIN — SODIUM ZIRCONIUM CYCLOSILICATE 5 GRAM(S): 10 POWDER, FOR SUSPENSION ORAL at 22:51

## 2022-10-13 NOTE — H&P ADULT - ASSESSMENT
47M, Setswana speaking, with SLE (dx Feb 2022, with Class 3 lupus nephritis and mucocutaneous symptoms) sent to ED by outpatient dermatology for fever and worsening rash, c/f SLE flare, found to have new LIZETH. 46 yo M, Maltese speaking, with SLE (dx Feb 2022, with Class 3 lupus nephritis and mucocutaneous symptoms) sent to ED by outpatient dermatology for fever and worsening rash, c/f SLE flare, found to have new LIZETH.

## 2022-10-13 NOTE — H&P ADULT - PROBLEM SELECTOR PLAN 1
Suspect pt experiencing SLE flare given has been feeling unwell x days and with cutaneous symptoms  Reports he has been taking Plaquenil 200mg bid, MMF 1g bid, prednisone 10mg qd  - c/w home Plaquenil and MMF, increase prednisone to 60mg qd  - check FLORIAN, SSA/SSB, CK per rheum recs Suspect pt experiencing SLE flare given has been feeling unwell x days and with cutaneous symptoms  C3 and C4 both low, dsDNA elevated  Reports he has been taking Plaquenil 200mg bid, MMF 1g bid, prednisone 10mg qd  - c/w home Plaquenil and MMF, increase prednisone to 60mg qd  - f/u ESR, CRP  - check FLORIAN, SSA/SSB, CK per rheum recs Suspect SLE flare given pt has been feeling unwell x days and with new cutaneous symptoms  C3 and C4 both low, dsDNA elevated  Reports he has been taking Plaquenil 200mg bid, MMF 1g bid, Prednisone 10mg qd, though compliance questionable, as pt was lost to follow up for months this year  - Rheumatology consult obtained, appreciate recs  - c/w home Plaquenil and MMF, increase Prednisone to 60mg qd as per Rheum  - f/u ESR and CRP  - check FLORIAN, SSA/SSB, and CK per Rheum recs

## 2022-10-13 NOTE — H&P ADULT - NSHPREVIEWOFSYSTEMS_GEN_ALL_CORE
REVIEW OF SYSTEMS:    CONSTITUTIONAL: No weakness, fevers or chills  EYES: No visual changes; no sclera icterics, no pain or drainage  ENT:  No vertigo or throat pain   NECK: No pain or stiffness  RESPIRATORY: No cough, wheezing, hemoptysis; No shortness of breath  CARDIOVASCULAR: No chest pain or palpitations  GASTROINTESTINAL: No abdominal or epigastric pain. No nausea, vomiting, or hematemesis; No diarrhea or constipation. No melena or hematochezia.  GENITOURINARY: No dysuria, frequency or hematuria  NEUROLOGICAL: No numbness or weakness  SKIN: No itching, rashes  Psych: No anxiety or depression REVIEW OF SYSTEMS:    CONSTITUTIONAL: +weakness, low grade fever  EYES: No visual changes; no sclera icterics, no pain or drainage  ENT:  No vertigo or throat pain   NECK: No pain or stiffness  RESPIRATORY: No cough, wheezing, hemoptysis; No shortness of breath  CARDIOVASCULAR: No chest pain or palpitations  GASTROINTESTINAL: No abdominal or epigastric pain. No nausea, vomiting, or hematemesis; No diarrhea or constipation. No melena or hematochezia.  GENITOURINARY: No dysuria, frequency or hematuria  NEUROLOGICAL: No numbness or weakness  SKIN: No itching, +rash on postauricular neck and back  Psych: No anxiety or depression Constitutional: +Generalized weakness and fevers (now resolved). No chills or weight loss.  Eyes: No visual changes, double vision, or eye pain  Ears, Nose, Mouth, Throat: No runny nose, sinus pain, ear pain, tinnitus, sore throat, dysphagia, or odynophagia  Cardiovascular: No chest pain, palpitations, or LE edema  Respiratory: No cough, wheezing, hemoptysis, or shortness of breath  Gastrointestinal: +Nausea and decreased appetite (now resolved). No abdominal pain, vomiting, diarrhea/constipation, hematemesis, melena, or BRBPR.  Genitourinary: No dysuria, frequency, urgency, or hematuria  Musculoskeletal: No joint pain, swelling, or decreased ROM  Skin: +Rash on malar cheeks, postauricular neck, and back  Neurologic: +Frontal headache (now resolved). No syncope, seizures, paresthesias, numbness, or limb weakness.  Psychiatric: No depression, anxiety, difficulty concentrating, anhedonia, or lack of energy  Endocrine: No heat/cold intolerance, mood swings, sweats, polydipsia, or polyuria  Hematologic/lymphatic: No purpura, petechia, or prolonged or excessive bleeding after dental extraction / injury  Allergic/Immunologic: No anaphylaxis or allergic response to materials, foods, animals    Positives and pertinent negatives noted and all other systems negative.

## 2022-10-13 NOTE — H&P ADULT - HISTORY OF PRESENT ILLNESS
47M with SLE (dx Feb 2022, with Class 3 lupus nephritis and mucocutaneous symptoms) sent to ED by outpatient dermatology for fever and worsening rash, c/f SLE flare. Saw Dermatology on 10/11, had scattered erythematous violaceous macules over back. Erythematous patches on the malar cheeks and postauricular neck. Was given prednisone 40mg qD x 2 days and topical steroid. Pt was lost to follow up with rheumatology. Had kidney bx with nephrology revealing class III lupus nephritis. Unclear whether has been taking medications for SLE and lupus nephritis, plan was to start and uptitrate MMF. Per son, pt developed rash 1 wk ago with low grade fever. Pt hasn't had any joint pain, SOB, CP, hematuria, hemoptysis, melena; occasionally has muscle pain in legs and shoulders.    ED course  VS - afebrile, HDS  Labs - WBC 12 (87% PMN), K 5.9 => 5.5, Cr 1.7 (baseline 1.0), VBG pH 7.26, UA few manda, small LE, neg nitrite, RBCs, WBCs, flu/covid neg  Imaging - CXR clear lungs       47M, Vietnamese speaking, with SLE (dx Feb 2022, with Class 3 lupus nephritis and mucocutaneous symptoms) sent to ED by outpatient dermatology for fever and worsening rash, c/f SLE flare. Pt reports on 10/9 he saw his physician because he developed a rash, low grade fever, frontal headache, nausea, decreased appetite, and weakness. The rash was located on malar cheeks, post auricular neck, and back His dermatologist prescribed 40mg prednisone x2d and he reports significant improvement in rash. Reports symptoms improved dramatically with prednisone, and currently without any complaints. Pt hasn't had any joint pain, SOB, CP, hematuria, frequency, urgency, melena.    ED course  VS - afebrile, HDS  Labs - WBC 12 (87% PMN), K 5.9 => 5.5, Cr 1.7 (baseline 1.0), VBG pH 7.26, UA few manda, small LE, neg nitrite, RBCs, WBCs, flu/covid neg  Imaging - CXR clear lungs  Interventions - 1g calcium gluconate, 5U insulin/50ml D50, 5g lokelma, 60mg prednisone 48 yo M, Thai speaking, with SLE (dx Feb 2022, with Class 3 lupus nephritis and mucocutaneous symptoms) sent to ED by outpatient dermatology for fever and worsening rash, c/f SLE flare. Pt reports on 10/9 he saw his physician because he developed a rash, low grade fever, frontal headache, nausea, decreased appetite, and weakness. The rash was located on malar cheeks, post auricular neck, and back His dermatologist prescribed 40mg prednisone x2d and he reports significant improvement in rash. Reports symptoms improved dramatically with prednisone, and currently without any complaints. Pt hasn't had any joint pain, SOB, CP, hematuria, frequency, urgency, melena.    ED course  VS - afebrile, HDS  Labs - WBC 12 (87% PMN), K 5.9 => 5.5, Cr 1.7 (baseline 1.0), VBG pH 7.26, UA few manda, small LE, neg nitrite, RBCs, WBCs, flu/covid neg  Imaging - CXR clear lungs  Interventions - 1g calcium gluconate, 5U insulin/50ml D50, 5g lokelma, 60mg prednisone 48 yo M, Nepali speaking, with SLE (dx Feb 2022, with Class 3 lupus nephritis and mucocutaneous symptoms) sent to ED by outpatient dermatology for fever and worsening rash, c/f SLE flare. Pt reports on Tuesday, 10/11, he saw his dermatologist because he developed a rash, low-grade fever, frontal headache, nausea, decreased appetite, and weakness. The rash was located on malar cheeks, postauricular neck, and back, nonpruritic. His dermatologist prescribed Prednisone 40 mg daily x2d and he reports significant improvement in rash. Reports symptoms improved dramatically with Prednisone, and currently without any complaints. Pt hasn't had any joint pain, SOB, CP, abdominal pain, diarrhea, constipation, melena, BRBPR, hematuria, frequency, urgency, or syncope.  ED course  VS - afebrile, HDS  Labs - WBC 12 (87% PMN), K 5.9 => 5.5, Cr 1.7 (baseline 1.0), VBG pH 7.26, UA few manda, small LE, neg nitrite, RBCs, WBCs, flu/covid neg  Imaging - CXR clear lungs  Interventions - 1g calcium gluconate, 5U insulin/50ml D50, 5g lokelma, 60 mg prednisone

## 2022-10-13 NOTE — CONSULT NOTE ADULT - ASSESSMENT
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 Pt received prednisone 40mg qD x2, and prednisone 60mg x1 in the ED. Rash appears to have improved,     # SLE with Class 3 lupus nephritis and mucocutaneous symptoms   - home medications: Cellcept 500mg BID and HCQ 200mg BID    - per chart review, lupus serologies from 10/12/22 still showed high lupus activity, dsDNA 834, and low C3, C4 complement  - presented to Dermatology for low grade fever and rash x 1 week    - Pt received prednisone 40mg qD x2, and prednisone 60mg x1 in the ED. Rash appears to have improved  - recommend continue prednisone 60mg qD for now     # LIZETH    - baseline Cr 0.8, currently 1.6-1.7 with hyperkalemia, concern for worsening lupus nephritis  - check urine P/Cr, FLORIAN, SSA/SSB, CK   - workup for LIZETH including       Plan to be discussed with attending    Adi Nina, PGY-5  Rheumatology Fellow   Pager: [phone], also available on TEAMS   please enter a full 10 digit number for call back   During weekends, please page on call fellow         47yM with SLE (dx Feb 2022, with Class 3 lupus nephritis and mucocutaneous symptoms) sent to ED by outpt dermatology for fever and worsening rash, concern for SLE flare. Pt received prednisone 40mg qD x2, and prednisone 60mg x1 in the ED. Rash appears to have improved,     # SLE with Class 3 lupus nephritis and mucocutaneous symptoms   - home medications: Cellcept 500mg BID and HCQ 200mg BID    - per chart review, lupus serologies from 10/12/22 still showed high lupus activity, dsDNA 834, and low C3, C4 complement  - presented to Dermatology for low grade fever and rash x 1 week    - Pt received prednisone 40mg qD x2, and prednisone 60mg x1 in the ED. Rash appears to have improved  - recommend continue prednisone 60mg qD for now     # LIZETH    - baseline Cr 0.8, currently 1.6-1.7 with hyperkalemia, concern for worsening lupus nephritis  - pt reports compliance with Cellcept 500mg BID, but dosage was never titrated up to 3g daily   - check urine P/Cr, FLORIAN, SSA/SSB, CK   - recommend renal US to r/o obstructive etiology      Plan to be discussed with attending    Adi Nina, PGY-5  Rheumatology Fellow   Pager: [phone], also available on TEAMS   please enter a full 10 digit number for call back   During weekends, please page on call fellow     47yM with SLE (dx Feb 2022, with Class 3 lupus nephritis and mucocutaneous symptoms) sent to ED by outpt dermatology for fever and worsening rash, concern for SLE flare. Pt received prednisone 40mg qD x2, and prednisone 60mg x1 in the ED. Rash appears to have improved,     # SLE with Class 3 lupus nephritis and mucocutaneous symptoms   - home medications: Cellcept 500mg BID and HCQ 200mg BID,  but questionable compliance   - per chart review, lupus serologies from 10/12/22 still showed high lupus activity, dsDNA 834, and low C3, C4 complement  - presented to Dermatology for low grade fever and rash x 1 week    - Pt received prednisone 40mg qD x2, and prednisone 60mg x1 in the ED. Rash appears to have improved  - recommend continue prednisone 60mg qD for now     # LIZETH    - baseline Cr 0.8, currently 1.6-1.7 with hyperkalemia, concern for worsening lupus nephritis  - was prescribed Cellcept 500mg BID, but dosage was never titrated up to 3g daily, also last prescription was march 2022    - check urine P/Cr, FLORIAN, SSA/SSB, CK   - recommend renal US to r/o obstructive etiology      Plan to be discussed with attending    Adi Nina, PGY-5  Rheumatology Fellow   Pager: [phone], also available on TEAMS   please enter a full 10 digit number for call back   During weekends, please page on call fellow     47yM with SLE (dx Feb 2022, with Class 3 lupus nephritis and mucocutaneous symptoms) sent to ED by outpt dermatology for fever and worsening rash, concern for SLE flare. Pt received prednisone 40mg qD x2, and prednisone 60mg x1 in the ED. Rash appears to have improved,     # SLE with Class 3 lupus nephritis and mucocutaneous symptoms   - home medications: Cellcept 1000mg BID and HCQ 200mg BID,  but questionable compliance   - per chart review, lupus serologies from 10/12/22 still showed high lupus activity, dsDNA 834, and low C3, C4 complement  - presented to Dermatology for low grade fever and rash x 1 week    - Pt received prednisone 40mg qD x2, and prednisone 60mg x1 in the ED. Rash appears to have improved  - recommend continue prednisone 60mg qD for now   - continue home medications     # LIZETH    - baseline Cr 0.8, currently 1.6-1.7 with hyperkalemia, concern for worsening lupus nephritis  - was prescribed Cellcept 500mg BID, but dosage was never titrated up to 3g daily, also last prescription was march 2022    - check urine P/Cr, FLORIAN, SSA/SSB, CK   - recommend renal US to r/o obstructive etiology      Plan to be discussed with attending    Adi Nina, PGY-5  Rheumatology Fellow   Pager: [phone], also available on TEAMS   please enter a full 10 digit number for call back   During weekends, please page on call fellow     47yM with SLE (dx Feb 2022, with Class 3 lupus nephritis and mucocutaneous symptoms) sent to ED by outpt dermatology for fever and worsening rash, concern for SLE flare. Pt received prednisone 40mg qD x2, and prednisone 60mg x1 in the ED. Rash appears to have improved,     # SLE with Class 3 lupus nephritis and mucocutaneous symptoms   - home medications: Cellcept 1000mg BID and HCQ 200mg BID,  but questionable compliance   - per chart review, lupus serologies from 10/12/22 still showed high lupus activity, dsDNA 834, and low C3, C4 complement  - presented to Dermatology for low grade fever and rash x 1 week    - Pt received prednisone 40mg qD x2, and prednisone 60mg x1 in the ED. Rash appears to have improved  - recommend continue prednisone 60mg qD for now   - continue home medications     # LIZEHT    - baseline Cr 0.8, currently 1.6-1.7 with hyperkalemia, concern for worsening lupus nephritis  - was prescribed Cellcept 2g daily, but dosage was never titrated up to 3g daily, also last prescription was march 2022    - check urine P/Cr, FLORIAN, SSA/SSB, CK   - recommend renal US to r/o obstructive etiology      Plan to be discussed with attending    Adi Nina, PGY-5  Rheumatology Fellow   Pager: [phone], also available on TEAMS   please enter a full 10 digit number for call back   During weekends, please page on call fellow     47yM with SLE (dx Feb 2022, with Class 3 lupus nephritis and mucocutaneous symptoms) sent to ED by outpt dermatology for fever and worsening rash, concern for SLE flare. Pt received prednisone 40mg qD x2, and prednisone 60mg x1 in the ED. Rash appears to have improved,     # SLE with Class 3 lupus nephritis and mucocutaneous symptoms   - home medications: Cellcept 1000mg BID and HCQ 200mg BID,  but questionable compliance   - per chart review, lupus serologies from 10/12/22 still showed high lupus activity, dsDNA 834, and low C3, C4 complement  - presented to Dermatology for low grade fever and rash x 1 week    - Pt received prednisone 40mg qD x2, and prednisone 60mg x1 in the ED. Rash appears to have improved  - recommend continue prednisone 60mg qD for now   - continue home medications   - repeat Urine P/Cr 2.9 (was 0.8 in Feb 2022), never been pulsed    # LIZETH    - baseline Cr 0.8, currently 1.6-1.7 with hyperkalemia, concern for worsening lupus nephritis  - was prescribed Cellcept 2g daily, but dosage was never titrated up to 3g daily, also last prescription was march 2022    - check urine P/Cr, FLORIAN, SSA/SSB, CK   - recommend renal US to r/o obstructive etiology      ****INCOMPLETE NOTE     Can Maico, PGY-5  Rheumatology Fellow   Pager: [phone], also available on TEAMS   please enter a full 10 digit number for call back   During weekends, please page on call fellow     47yM with SLE (dx Feb 2022, with Class 3 lupus nephritis and mucocutaneous symptoms) sent to ED by outpt dermatology for fever and worsening rash, concern for SLE flare. Pt received prednisone 40mg qD x2, and prednisone 60mg x1 in the ED. Rash appears to have improved,     # SLE with Class 3 lupus nephritis and mucocutaneous symptoms   - home medications: Cellcept 1000mg BID and HCQ 200mg BID,  but questionable compliance, also last prescription was march 2022  - per chart review, lupus serologies from 10/12/22 still showed high lupus activity, dsDNA 834, and low C3, C4 complement  - presented to Dermatology for low grade fever and rash x 1 week    - Pt received prednisone 40mg qD x2, and prednisone 60mg x1 in the ED. Rash appears to have improved  - repeat Urine P/Cr 2.9 (was 0.8 in Feb 2022), never been pulsed  - for induction therapy, would favor pulse steroid Solu-medrol 1g x 3 days, and titrating up MMF to 3g   - hep panel negative in Feb 2022    - check quant gold      # LIZETH    - baseline Cr 0.8, currently 1.6-1.7 with hyperkalemia, concern for worsening lupus nephritis   - recommend renal US to r/o obstructive etiology      ****INCOMPLETE NOTE     Can Maico, PGY-5  Rheumatology Fellow   Pager: [phone], also available on TEAMS   please enter a full 10 digit number for call back   During weekends, please page on call fellow     47yM with SLE (dx Feb 2022, with Class 3 lupus nephritis and mucocutaneous symptoms) sent to ED by outpt dermatology for fever and worsening rash, concern for SLE flare. Pt received prednisone 40mg qD x2, and prednisone 60mg x1 in the ED. Rash appears to have improved.     Feb 2022 serology:    MÓNICA 1:1280 (speckled) and MÓNICA titer 2 1:640 (homogenous.)   Myomarker panel 3: weakly positive U2 RNP, weakly positive Anti-Pm/Scl, strongly positive  U1 RNP, strongly positive SSA/Ro 52, CK wnl   C3/C4 LOW, DsDNA > 1000  Sm, RNP, SSA/SSB> 8  UA: Large blood  Prt/Crea 0.8  APS labs negative      # SLE with Class 3 lupus nephritis and mucocutaneous symptoms, now presenting with LIZETH    - home medications: Cellcept 1000mg BID and HCQ 200mg BID,  but questionable compliance, last prescription was march 2022  - per chart review, lupus serologies from 10/12/22 still showed high lupus activity, dsDNA 834, and low C3, C4 complement  - presented to Dermatology for low grade fever and rash x 1 week    - Pt received prednisone 40mg qD x2, and prednisone 60mg x1 in the ED. Rash appears to have improved    # LIZETH   - baseline Cr 0.8, currently 1.6-1.7 with hyperkalemia, concern for worsening lupus nephritis  - repeat Urine P/Cr 2.9 (was 0.8 in Feb 2022), never been pulsed  - renal US negative for hydronephrosis      Plan:    - will start pulse steroid Solu-medrol 500mg x 3 days (10/14- 10/16) for induction therapy, pt can then transition to 60mg   - pt tolerated Cellcept 2g daily well, will plan to titrate up 3g daily by next week    - hep panel negative in Feb 2022, check quant gold    - start Bactrim for PCP prophylaxis, protonix for GI prophylaxis, vitamin D for bone health   - currently not favoring repeat kidney bx    - discussed with pt in detail that he needs routine f/u visit in clinic      Plan discussed with attending Dr. Lauri Nina, PGY-5  Rheumatology Fellow   Pager: [phone], also available on TEAMS   please enter a full 10 digit number for call back   During weekends, please page on call fellow     47yM with SLE (dx Feb 2022, with Class 3 lupus nephritis and mucocutaneous symptoms) sent to ED by outpt dermatology for fever and worsening rash, concern for SLE flare. Pt received prednisone 40mg qD x2, and prednisone 60mg x1 in the ED. Rash appears to have improved.     Feb 2022 serology:    MÓNICA 1:1280 (speckled) and MÓNICA titer 2 1:640 (homogenous.)   Myomarker panel 3: weakly positive U2 RNP, weakly positive Anti-Pm/Scl, strongly positive  U1 RNP, strongly positive SSA/Ro 52, CK wnl   C3/C4 LOW, DsDNA > 1000  Sm, RNP, SSA/SSB> 8  UA: Large blood  Prt/Crea 0.8  APS labs negative      # SLE with Class 3 lupus nephritis and mucocutaneous symptoms, now presenting with LIZETH    - home medications: Cellcept 1000mg BID and HCQ 200mg BID,  but questionable compliance, last prescription was march 2022  - per chart review, lupus serologies from 10/12/22 still showed high lupus activity, dsDNA 834, and low C3, C4 complement  - presented to Dermatology for low grade fever and rash x 1 week    - Pt received prednisone 40mg qD x2, and prednisone 60mg x1 in the ED. Rash appears to have improved    # LIZETH   - baseline Cr 0.8, currently 1.6-1.7 with hyperkalemia, concern for worsening lupus nephritis  - repeat Urine P/Cr 2.9 (was 0.8 in Feb 2022), never been pulsed  - renal US negative for hydronephrosis      Plan:    - will start pulse steroid Solu-medrol 500mg x 3 days (10/14- 10/16) for induction therapy, pt can then transition to 60mg   - pt tolerated Cellcept 2g daily well, will plan to titrate up 3g daily by next week    - hep panel negative in Feb 2022, check quant gold    - start Bactrim for PCP prophylaxis, protonix for GI prophylaxis, vitamin D for bone health   - discussed with pt in detail that he needs routine f/u visit in clinic    - discussed with Nephrology Dr. Helm, would favor kidney bx now to further assess chronicity and activity index      Plan discussed with attending Dr. Lauri Nina, PGY-5  Rheumatology Fellow   Pager: [phone], also available on TEAMS   please enter a full 10 digit number for call back   During weekends, please page on call fellow

## 2022-10-13 NOTE — CONSULT NOTE ADULT - SUBJECTIVE AND OBJECTIVE BOX
LIZ HOLLIDAYPK  [id number]    HISTORY OF PRESENT ILLNESS:    Allscript review:         PAST MEDICAL & SURGICAL HISTORY:  Lupus nephritis      No significant past surgical history          Review of Systems:  Gen:  No fevers/chills, weight loss  HEENT: No blurry vision, no difficulty swallowing, no oral or nasal ulcers  CVS: No chest pain/palpitations  Resp: No SOB/wheezing  GI: No N/V/C/D/abdominal pain  MSK:  Skin: No new rashes  Neuro: No headaches    MEDICATIONS  (STANDING):    MEDICATIONS  (PRN):      Allergies    No Known Allergies    Intolerances        PERTINENT MEDICATION HISTORY:    SOCIAL HISTORY:  OCCUPATION:  TRAVEL HISTORY:    FAMILY HISTORY:  No pertinent family history in first degree relatives        Vital Signs Last 24 Hrs  T(C): 36.9 (13 Oct 2022 11:26), Max: 36.9 (13 Oct 2022 11:26)  T(F): 98.5 (13 Oct 2022 11:26), Max: 98.5 (13 Oct 2022 11:26)  HR: 83 (13 Oct 2022 11:26) (83 - 83)  BP: 125/77 (13 Oct 2022 11:26) (125/77 - 125/77)  BP(mean): --  RR: 16 (13 Oct 2022 11:26) (16 - 16)  SpO2: 100% (13 Oct 2022 11:26) (100% - 100%)    Parameters below as of 13 Oct 2022 11:26  Patient On (Oxygen Delivery Method): room air        Physical Exam:  General: No apparent distress  HEENT: EOMI, MMM  CVS: +S1/S2, RRR, no murmurs/rubs/gallops  Resp: CTA b/l. No crackles/wheezing  GI: Soft, NT/ND +BS  MSK:  Neuro: AAOx3  Skin: no visible rashes    LABS:   outpt labs from 10/12: dsDNA, low C3, C4                  12.6   11.87 )-----------( 323      ( 13 Oct 2022 13:52 )             37.8     10-13    138  |  109<H>  |  38<H>  ----------------------------<  105<H>  5.9<H>   |  21<L>  |  1.73<H>    Ca    8.9      13 Oct 2022 15:14    TPro  7.3  /  Alb  2.9<L>  /  TBili  <0.2  /  DBili  x   /  AST  16  /  ALT  14  /  AlkPhos  83  10-13    Urinalysis Basic - ( 13 Oct 2022 14:29 )  Color: Light Yellow / Appearance: Clear / S.016 / pH: x  Gluc: x / Ketone: Negative  / Bili: Negative / Urobili: <2 mg/dL   Blood: x / Protein: 300 mg/dL / Nitrite: Negative   Leuk Esterase: Small / RBC: 32 /HPF / WBC 34 /HPF   Sq Epi: x / Non Sq Epi: 4 /HPF / Bacteria: Few      RADIOLOGY & ADDITIONAL STUDIES:      Pathology from 2022:  Kidney, needle core biopsy   Lupus nephritis, focal proliferative type, ISN/RPS class III   - Segmental endocapillary hypercellularity in about 12% glomeruli   - A single small cellular crescent is noted (about 3% of glomeruli)   - Mild acute interstitial nephritis   - NIH activity score 4/24   Summary of chronic changes:   - No global glomerulosclerosis   - Tubular atrophy and interstitialfibrosis (less than 5% of cortex)   - No significant arterial and arteriolar sclerosis   - NIH chronicity score 0/12    LIZ HOUSER  [id number]    HISTORY OF PRESENT ILLNESS:  47yM with SLE (dx 2022, with Class 3 lupus nephritis and cutaneous symptoms) sent to ED by outpt dermatology for fever and worsening rash, concern for SLE flare.    Rheum history   pt was seen by Dr. Nilton malcolm 1x in 2022, then lost to follow up   Was evaluated by nephrology, underwent renal biopsy in 2022: Focal proliferative class III, segmental endocapillary hypercellularity in 12%, a single small cellular crescents, mild acute interstitial nephritis, NIH activity score 4/24, no chronicity     2022 serology:    MÓNICA 1:1280 (speckled) and MÓNICA titer 2 1:640 (homogenous.)   Myomarker panel 3: Anti-Pm/Scl , U2 and U1 RNP, anti-SSA  C3/C4 LOW, DsDNA > 1000  Sm, RNP, SSA/SSB> 8  UA: Large blood  Prt/Crea 0.8    Oct 12, 2022  dsDNA 834. C3 C4 low     pt was Started on hydroxychloroquine 200 mg twice a day, on prednisone 60mg qD  with plan to taper down prednisone  was also started on Cellcept 500mg BID, with plan to titrate up to 3g daily    PAST MEDICAL & SURGICAL HISTORY:  Lupus nephritis      No significant past surgical history    Review of Systems:  Gen:  No fevers/chills, weight loss  HEENT: No blurry vision, no difficulty swallowing, no oral or nasal ulcers  CVS: No chest pain/palpitations  Resp: No SOB/wheezing  GI: No N/V/C/D/abdominal pain  MSK:  Skin: No new rashes  Neuro: No headaches    MEDICATIONS  (STANDING):    MEDICATIONS  (PRN):  Allergies  No Known Allergies  Intolerances        PERTINENT MEDICATION HISTORY:    SOCIAL HISTORY:  OCCUPATION:  TRAVEL HISTORY:    FAMILY HISTORY:  No pertinent family history in first degree relatives        Vital Signs Last 24 Hrs  T(C): 36.9 (13 Oct 2022 11:26), Max: 36.9 (13 Oct 2022 11:26)  T(F): 98.5 (13 Oct 2022 11:26), Max: 98.5 (13 Oct 2022 11:26)  HR: 83 (13 Oct 2022 11:26) (83 - 83)  BP: 125/77 (13 Oct 2022 11:26) (125/77 - 125/77)  BP(mean): --  RR: 16 (13 Oct 2022 11:26) (16 - 16)  SpO2: 100% (13 Oct 2022 11:26) (100% - 100%)    Parameters below as of 13 Oct 2022 11:26  Patient On (Oxygen Delivery Method): room air        Physical Exam:  General: No apparent distress  HEENT: EOMI, MMM  CVS: +S1/S2, RRR, no murmurs/rubs/gallops  Resp: CTA b/l. No crackles/wheezing  GI: Soft, NT/ND +BS  MSK:  Neuro: AAOx3  Skin: no visible rashes    LABS:   outpt labs from 10/12: dsDNA, low C3, C4                  12.6   11.87 )-----------( 323      ( 13 Oct 2022 13:52 )             37.8     10    138  |  109<H>  |  38<H>  ----------------------------<  105<H>  5.9<H>   |  21<L>  |  1.73<H>    Ca    8.9      13 Oct 2022 15:14    TPro  7.3  /  Alb  2.9<L>  /  TBili  <0.2  /  DBili  x   /  AST  16  /  ALT  14  /  AlkPhos  83  10-13    Urinalysis Basic - ( 13 Oct 2022 14:29 )  Color: Light Yellow / Appearance: Clear / S.016 / pH: x  Gluc: x / Ketone: Negative  / Bili: Negative / Urobili: <2 mg/dL   Blood: x / Protein: 300 mg/dL / Nitrite: Negative   Leuk Esterase: Small / RBC: 32 /HPF / WBC 34 /HPF   Sq Epi: x / Non Sq Epi: 4 /HPF / Bacteria: Few      RADIOLOGY & ADDITIONAL STUDIES:  Pathology from 2022:  Kidney, needle core biopsy   Lupus nephritis, focal proliferative type, ISN/RPS class III   - Segmental endocapillary hypercellularity in about 12% glomeruli   - A single small cellular crescent is noted (about 3% of glomeruli)   - Mild acute interstitial nephritis   - NIH activity score    Summary of chronic changes:   - No global glomerulosclerosis   - Tubular atrophy and interstitialfibrosis (less than 5% of cortex)   - No significant arterial and arteriolar sclerosis   - NIH chronicity score 012    LIZ HOUSER  [id number]    HISTORY OF PRESENT ILLNESS:  47yM with SLE (dx 2022, with Class 3 lupus nephritis and mucocutaneous symptoms) sent to ED by outpt dermatology for fever and worsening rash, concern for SLE flare. Pt saw Dermatology on 10/11, per Allscript review, had scattered erythematous violaceous macules over back. Erythematous patches on the malar cheeks and postauricular neck. was given prednisone 40mg qD x 2 days and topical steroid.      Rheum history   pt was seen by Dr. Nilton malcolm 1x in 2022, then lost to follow up   Was evaluated by nephrology, underwent renal biopsy in 2022: Focal proliferative class III, segmental endocapillary hypercellularity in 12%, a single small cellular crescents, mild acute interstitial nephritis, NIH activity score 4/24, no chronicity     2022 serology:    MÓNICA 1:1280 (speckled) and MÓNICA titer 2 1:640 (homogenous.)   Myomarker panel 3: Anti-Pm/Scl , U2 and U1 RNP, anti-SSA  C3/C4 LOW, DsDNA > 1000  Sm, RNP, SSA/SSB> 8  UA: Large blood  Prt/Crea 0.8    Oct 12, 2022  dsDNA 834. C3 C4 low     pt was Started on hydroxychloroquine 200 mg twice a day, on prednisone 60mg qD  with plan to taper down prednisone  was also started on Cellcept 500mg BID, with plan to titrate up to 3g daily    collateral information obtained from the son   pt works overnight, unable to make doctor's appointment during the days since he sleeps through the day.   pt's PCP has been refilling MMF 500mg BID and HCQ 200mg BID and prednisone 10mg qD   pt had no rash in the summer, but developed rash 1 week ago with low grade fever   pt denies joint pain, SOB, CP, hematuria, hemoptysis, melena   muscle pain occasionally in his legs and shoulders   pt's son reports medication compliance, does not believe pt's is confused      PAST MEDICAL & SURGICAL HISTORY:  Lupus nephritis  No significant past surgical history    Review of Systems:  Gen:  + fever at home, no weight loss   HEENT: No blurry vision, no difficulty swallowing, no oral or nasal ulcers  CVS: No chest pain/palpitations  Resp: No SOB/wheezing  GI: No N/V/C/D/abdominal pain  MSK: see HPI   Skin: see HPI   Neuro: No headaches    MEDICATIONS  (STANDING):    MEDICATIONS  (PRN):  Allergies  No Known Allergies  Intolerances        PERTINENT MEDICATION HISTORY:    SOCIAL HISTORY: denies toxic habit     FAMILY HISTORY:  No pertinent family history in first degree relatives    Vital Signs Last 24 Hrs  T(C): 36.9 (13 Oct 2022 11:26), Max: 36.9 (13 Oct 2022 11:26)  T(F): 98.5 (13 Oct 2022 11:26), Max: 98.5 (13 Oct 2022 11:26)  HR: 83 (13 Oct 2022 11:26) (83 - 83)  BP: 125/77 (13 Oct 2022 11:26) (125/77 - 125/77)  BP(mean): --  RR: 16 (13 Oct 2022 11:26) (16 - 16)  SpO2: 100% (13 Oct 2022 11:26) (100% - 100%)    Parameters below as of 13 Oct 2022 11:26  Patient On (Oxygen Delivery Method): room air    Physical Exam:  General: No apparent distress  HEENT: EOMI, MMM  CVS: +S1/S2, RRR, no murmurs/rubs/gallops  Resp: CTA b/l. No crackles/wheezing  GI: Soft, NT/ND +BS  MSK: no synovitis, joints NTP    Neuro: AAOx3  Skin: faint erythematous patches on the upper back, and b/l temporal area on the face      LABS:   outpt labs from 10/12: dsDNA, low C3, C4                  12.6   11.87 )-----------( 323      ( 13 Oct 2022 13:52 )             37.8     10-13    138  |  109<H>  |  38<H>  ----------------------------<  105<H>  5.9<H>   |  21<L>  |  1.73<H>    Ca    8.9      13 Oct 2022 15:14    TPro  7.3  /  Alb  2.9<L>  /  TBili  <0.2  /  DBili  x   /  AST  16  /  ALT  14  /  AlkPhos  83  10-13    Urinalysis Basic - ( 13 Oct 2022 14:29 )  Color: Light Yellow / Appearance: Clear / S.016 / pH: x  Gluc: x / Ketone: Negative  / Bili: Negative / Urobili: <2 mg/dL   Blood: x / Protein: 300 mg/dL / Nitrite: Negative   Leuk Esterase: Small / RBC: 32 /HPF / WBC 34 /HPF   Sq Epi: x / Non Sq Epi: 4 /HPF / Bacteria: Few      RADIOLOGY & ADDITIONAL STUDIES:  Pathology from 2022:  Kidney, needle core biopsy   Lupus nephritis, focal proliferative type, ISN/RPS class III   - Segmental endocapillary hypercellularity in about 12% glomeruli   - A single small cellular crescent is noted (about 3% of glomeruli)   - Mild acute interstitial nephritis   - NIH activity score 4/24   Summary of chronic changes:   - No global glomerulosclerosis   - Tubular atrophy and interstitialfibrosis (less than 5% of cortex)   - No significant arterial and arteriolar sclerosis   - NIH chronicity score 0/12    LIZ HOUSER  [id number]    HISTORY OF PRESENT ILLNESS:  47yM with SLE (dx 2022, with Class 3 lupus nephritis and mucocutaneous symptoms) sent to ED by outpt dermatology for fever and worsening rash, concern for SLE flare. Pt saw Dermatology on 10/11, per Allscript review, had scattered erythematous violaceous macules over back. Erythematous patches on the malar cheeks and postauricular neck. was given prednisone 40mg qD x 2 days and topical steroid.      Rheum history   pt was seen by Dr. Nilton malcolm 1x in 2022, then lost to follow up   Was evaluated by nephrology, underwent renal biopsy in 2022: Focal proliferative class III, segmental endocapillary hypercellularity in 12%, a single small cellular crescents, mild acute interstitial nephritis, NIH activity score 4/24, no chronicity     2022 serology:    MÓNICA 1:1280 (speckled) and MÓNICA titer 2 1:640 (homogenous.)   Myomarker panel 3:   weakly positive U2 RNP   weakly positive Anti-Pm/Scl  strongly positive  U1 RNP   strongly positive SSA/Ro 52   C3/C4 LOW, DsDNA > 1000  Sm, RNP, SSA/SSB> 8  UA: Large blood  Prt/Crea 0.8    Oct 12, 2022  dsDNA 834. C3 C4 low     pt was Started on hydroxychloroquine 200 mg twice a day, on prednisone 60mg qD  with plan to taper down prednisone  was also started on Cellcept 500mg BID, with plan to titrate up to 3g daily    collateral information obtained from the son   pt works overnight, unable to make doctor's appointment during the days since he sleeps through the day.   pt's PCP has been refilling MMF 500mg BID and HCQ 200mg BID and prednisone 10mg qD   pt had no rash in the summer, but developed rash 1 week ago with low grade fever   pt denies joint pain, SOB, CP, hematuria, hemoptysis, melena   muscle pain occasionally in his legs and shoulders   pt's son reports medication compliance, does not believe pt's is confused      PAST MEDICAL & SURGICAL HISTORY:  Lupus nephritis  No significant past surgical history    Review of Systems:  Gen:  + fever at home, no weight loss   HEENT: No blurry vision, no difficulty swallowing, no oral or nasal ulcers  CVS: No chest pain/palpitations  Resp: No SOB/wheezing  GI: No N/V/C/D/abdominal pain  MSK: see HPI   Skin: see HPI   Neuro: No headaches    MEDICATIONS  (STANDING):    MEDICATIONS  (PRN):  Allergies  No Known Allergies  Intolerances        PERTINENT MEDICATION HISTORY:    SOCIAL HISTORY: denies toxic habit     FAMILY HISTORY:  No pertinent family history in first degree relatives    Vital Signs Last 24 Hrs  T(C): 36.9 (13 Oct 2022 11:26), Max: 36.9 (13 Oct 2022 11:26)  T(F): 98.5 (13 Oct 2022 11:26), Max: 98.5 (13 Oct 2022 11:26)  HR: 83 (13 Oct 2022 11:26) (83 - 83)  BP: 125/77 (13 Oct 2022 11:26) (125/77 - 125/77)  BP(mean): --  RR: 16 (13 Oct 2022 11:26) (16 - 16)  SpO2: 100% (13 Oct 2022 11:26) (100% - 100%)    Parameters below as of 13 Oct 2022 11:26  Patient On (Oxygen Delivery Method): room air    Physical Exam:  General: No apparent distress  HEENT: EOMI, MMM  CVS: +S1/S2, RRR, no murmurs/rubs/gallops  Resp: CTA b/l. No crackles/wheezing  GI: Soft, NT/ND +BS  MSK: no synovitis, joints NTP    Neuro: AAOx3  Skin: faint erythematous patches on the upper back, and b/l temporal area on the face      LABS:   outpt labs from 10/12: dsDNA, low C3, C4                  12.6   11.87 )-----------( 323      ( 13 Oct 2022 13:52 )             37.8     10-13    138  |  109<H>  |  38<H>  ----------------------------<  105<H>  5.9<H>   |  21<L>  |  1.73<H>    Ca    8.9      13 Oct 2022 15:14    TPro  7.3  /  Alb  2.9<L>  /  TBili  <0.2  /  DBili  x   /  AST  16  /  ALT  14  /  AlkPhos  83  10-13    Urinalysis Basic - ( 13 Oct 2022 14:29 )  Color: Light Yellow / Appearance: Clear / S.016 / pH: x  Gluc: x / Ketone: Negative  / Bili: Negative / Urobili: <2 mg/dL   Blood: x / Protein: 300 mg/dL / Nitrite: Negative   Leuk Esterase: Small / RBC: 32 /HPF / WBC 34 /HPF   Sq Epi: x / Non Sq Epi: 4 /HPF / Bacteria: Few      RADIOLOGY & ADDITIONAL STUDIES:  Pathology from 2022:  Kidney, needle core biopsy   Lupus nephritis, focal proliferative type, ISN/RPS class III   - Segmental endocapillary hypercellularity in about 12% glomeruli   - A single small cellular crescent is noted (about 3% of glomeruli)   - Mild acute interstitial nephritis   - NIH activity score 424   Summary of chronic changes:   - No global glomerulosclerosis   - Tubular atrophy and interstitialfibrosis (less than 5% of cortex)   - No significant arterial and arteriolar sclerosis   - NIH chronicity score 0/12    LIZ HOUSER  [id number]    HISTORY OF PRESENT ILLNESS:  47yM with SLE (dx 2022, with Class 3 lupus nephritis and mucocutaneous symptoms) sent to ED by outpt dermatology for fever and worsening rash, concern for SLE flare. Pt saw Dermatology on 10/11, per Allscript review, had scattered erythematous violaceous macules over back. Erythematous patches on the malar cheeks and postauricular neck. was given prednisone 40mg qD x 2 days and topical steroid.      Rheum history   pt was seen by Dr. Nilton malcolm 1x in 2022, then lost to follow up   Was evaluated by nephrology, underwent renal biopsy in 2022: Focal proliferative class III, segmental endocapillary hypercellularity in 12%, a single small cellular crescents, mild acute interstitial nephritis, NIH activity score 4/24, no chronicity     2022 serology:    MÓNICA 1:1280 (speckled) and MÓNICA titer 2 1:640 (homogenous.)   Myomarker panel 3:   weakly positive U2 RNP   weakly positive Anti-Pm/Scl  strongly positive  U1 RNP   strongly positive SSA/Ro 52   C3/C4 LOW, DsDNA > 1000  Sm, RNP, SSA/SSB> 8  UA: Large blood  Prt/Crea 0.8    Oct 12, 2022  dsDNA 834. C3 C4 low     pt was Started on hydroxychloroquine 200 mg twice a day, on prednisone 60mg qD  with plan to taper down prednisone  was also started on Cellcept 500mg BID, with plan to titrate up to 3g daily    collateral information obtained from the son   pt works overnight, unable to make doctor's appointment during the days since he sleeps through the day.   per pt, nephrology has been refilling MMF 500mg BID and HCQ 200mg BID. However, per chart review, last prescription was from 2022   pt had no rash in the summer, but developed rash 1 week ago with low grade fever   pt denies joint pain, SOB, CP, hematuria, hemoptysis, melena   muscle pain occasionally in his legs and shoulders     PAST MEDICAL & SURGICAL HISTORY:  Lupus nephritis  No significant past surgical history    Review of Systems:  Gen:  + fever at home, no weight loss   HEENT: No blurry vision, no difficulty swallowing, no oral or nasal ulcers  CVS: No chest pain/palpitations  Resp: No SOB/wheezing  GI: No N/V/C/D/abdominal pain  MSK: see HPI   Skin: see HPI   Neuro: No headaches    MEDICATIONS  (STANDING):    MEDICATIONS  (PRN):  Allergies  No Known Allergies  Intolerances        PERTINENT MEDICATION HISTORY:    SOCIAL HISTORY: denies toxic habit     FAMILY HISTORY:  No pertinent family history in first degree relatives    Vital Signs Last 24 Hrs  T(C): 36.9 (13 Oct 2022 11:26), Max: 36.9 (13 Oct 2022 11:26)  T(F): 98.5 (13 Oct 2022 11:26), Max: 98.5 (13 Oct 2022 11:26)  HR: 83 (13 Oct 2022 11:26) (83 - 83)  BP: 125/77 (13 Oct 2022 11:26) (125/77 - 125/77)  BP(mean): --  RR: 16 (13 Oct 2022 11:26) (16 - 16)  SpO2: 100% (13 Oct 2022 11:26) (100% - 100%)    Parameters below as of 13 Oct 2022 11:26  Patient On (Oxygen Delivery Method): room air    Physical Exam:  General: No apparent distress  HEENT: EOMI, MMM  CVS: +S1/S2, RRR, no murmurs/rubs/gallops  Resp: CTA b/l. No crackles/wheezing  GI: Soft, NT/ND +BS  MSK: no synovitis, joints NTP    Neuro: AAOx3  Skin: faint erythematous patches on the upper back, and b/l temporal area on the face      LABS:   outpt labs from 10/12: dsDNA, low C3, C4                  12.6   11.87 )-----------( 323      ( 13 Oct 2022 13:52 )             37.8     10-13    138  |  109<H>  |  38<H>  ----------------------------<  105<H>  5.9<H>   |  21<L>  |  1.73<H>    Ca    8.9      13 Oct 2022 15:14    TPro  7.3  /  Alb  2.9<L>  /  TBili  <0.2  /  DBili  x   /  AST  16  /  ALT  14  /  AlkPhos  83  10-13    Urinalysis Basic - ( 13 Oct 2022 14:29 )  Color: Light Yellow / Appearance: Clear / S.016 / pH: x  Gluc: x / Ketone: Negative  / Bili: Negative / Urobili: <2 mg/dL   Blood: x / Protein: 300 mg/dL / Nitrite: Negative   Leuk Esterase: Small / RBC: 32 /HPF / WBC 34 /HPF   Sq Epi: x / Non Sq Epi: 4 /HPF / Bacteria: Few      RADIOLOGY & ADDITIONAL STUDIES:  Pathology from 2022:  Kidney, needle core biopsy   Lupus nephritis, focal proliferative type, ISN/RPS class III   - Segmental endocapillary hypercellularity in about 12% glomeruli   - A single small cellular crescent is noted (about 3% of glomeruli)   - Mild acute interstitial nephritis   - NIH activity score 4/24   Summary of chronic changes:   - No global glomerulosclerosis   - Tubular atrophy and interstitialfibrosis (less than 5% of cortex)   - No significant arterial and arteriolar sclerosis   - NIH chronicity score 0/12    LIZ HOUSER  [id number]    HISTORY OF PRESENT ILLNESS:  47yM with SLE (dx 2022, with Class 3 lupus nephritis and mucocutaneous symptoms) sent to ED by outpt dermatology for fever and worsening rash, concern for SLE flare. Pt saw Dermatology on 10/11, per Allscript review, had scattered erythematous violaceous macules over back. Erythematous patches on the malar cheeks and postauricular neck. was given prednisone 40mg qD x 2 days and topical steroid.      Rheum history   pt was seen by Dr. Nilton malcolm 1x in 2022, then lost to follow up   Was evaluated by nephrology, underwent renal biopsy in 2022: Focal proliferative class III, segmental endocapillary hypercellularity in 12%, a single small cellular crescents, mild acute interstitial nephritis, NIH activity score 4/24, no chronicity     2022 serology:    MÓNICA 1:1280 (speckled) and MÓNICA titer 2 1:640 (homogenous.)   Myomarker panel 3:   weakly positive U2 RNP   weakly positive Anti-Pm/Scl  strongly positive  U1 RNP   strongly positive SSA/Ro 52   C3/C4 LOW, DsDNA > 1000  Sm, RNP, SSA/SSB> 8  UA: Large blood  Prt/Crea 0.8    Oct 12, 2022  dsDNA 834. C3 C4 low     pt was Started on hydroxychloroquine 200 mg twice a day, on prednisone 60mg qD  with plan to taper down prednisone  was also started on Cellcept 1000mg BID, with plan to titrate up to 3g daily    collateral information obtained from the son   pt works overnight, unable to make doctor's appointment during the days since he sleeps through the day.   per pt, nephrology has been refilling MMF 500mg BID and HCQ 200mg BID. However, per chart review, last prescription was from 2022   pt had no rash in the summer, but developed rash 1 week ago with low grade fever   pt denies joint pain, SOB, CP, hematuria, hemoptysis, melena   muscle pain occasionally in his legs and shoulders   no UTI symptoms     PAST MEDICAL & SURGICAL HISTORY:  Lupus nephritis  No significant past surgical history    Review of Systems:  Gen:  + fever at home, no weight loss   HEENT: No blurry vision, no difficulty swallowing, no oral or nasal ulcers  CVS: No chest pain/palpitations  Resp: No SOB/wheezing  GI: No N/V/C/D/abdominal pain  MSK: see HPI   Skin: see HPI   Neuro: No headaches    MEDICATIONS  (STANDING):    MEDICATIONS  (PRN):  Allergies  No Known Allergies  Intolerances        PERTINENT MEDICATION HISTORY:    SOCIAL HISTORY: denies toxic habit     FAMILY HISTORY:  No pertinent family history in first degree relatives    Vital Signs Last 24 Hrs  T(C): 36.9 (13 Oct 2022 11:26), Max: 36.9 (13 Oct 2022 11:26)  T(F): 98.5 (13 Oct 2022 11:26), Max: 98.5 (13 Oct 2022 11:26)  HR: 83 (13 Oct 2022 11:26) (83 - 83)  BP: 125/77 (13 Oct 2022 11:26) (125/77 - 125/77)  BP(mean): --  RR: 16 (13 Oct 2022 11:26) (16 - 16)  SpO2: 100% (13 Oct 2022 11:) (100% - 100%)    Parameters below as of 13 Oct 2022 11:26  Patient On (Oxygen Delivery Method): room air    Physical Exam:  General: No apparent distress  HEENT: EOMI, MMM  CVS: +S1/S2, RRR, no murmurs/rubs/gallops  Resp: CTA b/l. No crackles/wheezing  GI: Soft, NT/ND +BS  MSK: no synovitis, joints NTP    Neuro: AAOx3  Skin: faint erythematous patches on the upper back, and b/l temporal area on the face      LABS:   outpt labs from 10/12: dsDNA, low C3, C4                  12.6   11.87 )-----------( 323      ( 13 Oct 2022 13:52 )             37.8     10-13    138  |  109<H>  |  38<H>  ----------------------------<  105<H>  5.9<H>   |  21<L>  |  1.73<H>    Ca    8.9      13 Oct 2022 15:14    TPro  7.3  /  Alb  2.9<L>  /  TBili  <0.2  /  DBili  x   /  AST  16  /  ALT  14  /  AlkPhos  83  10-13    Urinalysis Basic - ( 13 Oct 2022 14:29 )  Color: Light Yellow / Appearance: Clear / S.016 / pH: x  Gluc: x / Ketone: Negative  / Bili: Negative / Urobili: <2 mg/dL   Blood: x / Protein: 300 mg/dL / Nitrite: Negative   Leuk Esterase: Small / RBC: 32 /HPF / WBC 34 /HPF   Sq Epi: x / Non Sq Epi: 4 /HPF / Bacteria: Few      RADIOLOGY & ADDITIONAL STUDIES:  Pathology from 2022:  Kidney, needle core biopsy   Lupus nephritis, focal proliferative type, ISN/RPS class III   - Segmental endocapillary hypercellularity in about 12% glomeruli   - A single small cellular crescent is noted (about 3% of glomeruli)   - Mild acute interstitial nephritis   - NIH activity score 4/24   Summary of chronic changes:   - No global glomerulosclerosis   - Tubular atrophy and interstitialfibrosis (less than 5% of cortex)   - No significant arterial and arteriolar sclerosis   - NIH chronicity score 0/12    LIZ HOUSER  [id number]    [facility]  ID [id number]    HISTORY OF PRESENT ILLNESS  47yM with SLE (dx 2022, with Class 3 lupus nephritis and mucocutaneous symptoms) sent to ED by outpt dermatology for fever and worsening rash, concern for SLE flare. Pt saw Dermatology on 10/11, per Allscript review, had scattered erythematous violaceous macules over back. Erythematous patches on the malar cheeks and postauricular neck. was given prednisone 40mg qD x 2 days and topical steroid.      Rheum history   pt was seen by Dr. Nilton malcolm 1x in 2022, then lost to follow up   Was evaluated by nephrology, underwent renal biopsy in 2022: Focal proliferative class III, segmental endocapillary hypercellularity in 12%, a single small cellular crescents, mild acute interstitial nephritis, NIH activity score 4/24, no chronicity     2022 serology:    MÓNICA 1:1280 (speckled) and MÓNICA titer 2 1:640 (homogenous.)   Myomarker panel 3: weakly positive U2 RNP, weakly positive Anti-Pm/Scl, strongly positive  U1 RNP, strongly positive SSA/Ro 52, CK wnl   C3/C4 LOW, DsDNA > 1000  Sm, RNP, SSA/SSB> 8  UA: Large blood  Prt/Crea 0.8  APS labs negative      Oct 12, 2022  dsDNA 834. C3 C4 low     pt was Started on hydroxychloroquine 200 mg twice a day, on prednisone 60mg qD  with plan to taper down prednisone  was also started on Cellcept 1000mg BID, with plan to titrate up to 3g daily    collateral information obtained from the son   pt works overnight, unable to make doctor's appointment during the days since he sleeps through the day.   per pt, nephrology has been refilling MMF 500mg BID and HCQ 200mg BID. However, per chart review, last prescription was from 2022   pt had no rash in the summer, but developed rash 1 week ago with low grade fever   pt denies joint pain, SOB, CP, hematuria, hemoptysis, melena   muscle pain occasionally in his legs and shoulders   no UTI symptoms     PAST MEDICAL & SURGICAL HISTORY:  Lupus nephritis  No significant past surgical history    Review of Systems:  Gen:  + fever at home, no weight loss   HEENT: No blurry vision, no difficulty swallowing, no oral or nasal ulcers  CVS: No chest pain/palpitations  Resp: No SOB/wheezing  GI: No N/V/C/D/abdominal pain  MSK: see HPI   Skin: see HPI   Neuro: No headaches    MEDICATIONS  (STANDING):    MEDICATIONS  (PRN):  Allergies  No Known Allergies  Intolerances    PERTINENT MEDICATION HISTORY:  SOCIAL HISTORY: denies toxic habit     FAMILY HISTORY:  No pertinent family history in first degree relatives    Vital Signs Last 24 Hrs  T(C): 36.9 (13 Oct 2022 11:26), Max: 36.9 (13 Oct 2022 11:26)  T(F): 98.5 (13 Oct 2022 11:26), Max: 98.5 (13 Oct 2022 11:26)  HR: 83 (13 Oct 2022 11:26) (83 - 83)  BP: 125/77 (13 Oct 2022 11:26) (125/77 - 125/77)  BP(mean): --  RR: 16 (13 Oct 2022 11:26) (16 - 16)  SpO2: 100% (13 Oct 2022 11:26) (100% - 100%)    Parameters below as of 13 Oct 2022 11:26  Patient On (Oxygen Delivery Method): room air    Physical Exam:  General: No apparent distress  HEENT: EOMI, MMM  CVS: +S1/S2, RRR, no murmurs/rubs/gallops  Resp: CTA b/l. No crackles/wheezing  GI: Soft, NT/ND +BS  MSK: no synovitis, joints NTP    Neuro: AAOx3  Skin: faint erythematous patches on the upper back, and b/l temporal area on the face      LABS:   outpt labs from 10/12: dsDNA, low C3, C4                  12.6   11.87 )-----------( 323      ( 13 Oct 2022 13:52 )             37.8     10-13    138  |  109<H>  |  38<H>  ----------------------------<  105<H>  5.9<H>   |  21<L>  |  1.73<H>    Ca    8.9      13 Oct 2022 15:14    TPro  7.3  /  Alb  2.9<L>  /  TBili  <0.2  /  DBili  x   /  AST  16  /  ALT  14  /  AlkPhos  83  10-13    Urinalysis Basic - ( 13 Oct 2022 14:29 )  Color: Light Yellow / Appearance: Clear / S.016 / pH: x  Gluc: x / Ketone: Negative  / Bili: Negative / Urobili: <2 mg/dL   Blood: x / Protein: 300 mg/dL / Nitrite: Negative   Leuk Esterase: Small / RBC: 32 /HPF / WBC 34 /HPF   Sq Epi: x / Non Sq Epi: 4 /HPF / Bacteria: Few      RADIOLOGY & ADDITIONAL STUDIES:  Pathology from 2022:  Kidney, needle core biopsy   Lupus nephritis, focal proliferative type, ISN/RPS class III   - Segmental endocapillary hypercellularity in about 12% glomeruli   - A single small cellular crescent is noted (about 3% of glomeruli)   - Mild acute interstitial nephritis   - NIH activity score 4/24   Summary of chronic changes:   - No global glomerulosclerosis   - Tubular atrophy and interstitialfibrosis (less than 5% of cortex)   - No significant arterial and arteriolar sclerosis   - NIH chronicity score 0/12

## 2022-10-13 NOTE — H&P ADULT - PROBLEM SELECTOR PLAN 3
Likely 2/2 LIZETH  s/p insulin/D50, calcium gluconate, and lokelma in ED w/improvement  EKG is NSR, no peaked T waves  - redose lokelma as needed, avoid loop diuretic given LIZETH  - if K doesn't trend down, consider transfer to telemetry Hyperkalemic to serum K 5.9 (not hemolyzed) on admission. EKG personally reviewed and with no concerning acute changes.  Likely 2/2 LIZETH  S/p insulin/D50, calcium gluconate, and lokelma in ED w/improvement  - monitor serum K  - check serial EKG if hyperkalemia persists  - temporizing measures and re-dose lokelma as needed  - if serum K doesn't trend down, transfer to telemetry

## 2022-10-13 NOTE — ED ADULT TRIAGE NOTE - CHIEF COMPLAINT QUOTE
c/o rash to face and back, denies pain/itchiness. Denies tongue swelling, no drooling. hx of lupus on prednisone

## 2022-10-13 NOTE — H&P ADULT - NSICDXFAMHXNEG_GEN_ALL
denies family history Denies any family history of rheumatologic conditions/cancer/diabetes/heart disease/hypertension/stroke

## 2022-10-13 NOTE — H&P ADULT - PROBLEM SELECTOR PLAN 2
Prior bx revealed class III lupus nephritis  sCr 1.0 in March, now 1.7-1.8  Most likely 2/2 progression of lupus nephritis, with perhaps some contribution of poor PO intake i/s/o not feeling well the past few days  - will order renal US to r/o obstruction  - f/u urine lytes  - c/w SLE meds as above Prior bx revealed class III lupus nephritis  sCr 1.0 in March, now 1.7-1.8  Most likely 2/2 progression of lupus nephritis, with perhaps some contribution of poor PO intake i/s/o not feeling well the past few days  - will order renal US to r/o obstruction  - f/u urine lytes  - c/w SLE meds as above  - renal c/s in AM, as may require bx Prior bx revealed class 3 lupus nephritis  SCr 1.0 in March, now 1.7-1.8  Likely 2/2 progression of lupus nephritis, with perhaps some contribution of poor PO intake i/s/o not feeling well the past few days  - will order US kidney/bladder to r/o obstruction  - f/u UA and urine lytes  - check spot urine protein to Cr ratio  - c/w SLE meds as above  - Renal consult to be called in AM, as may require repeat bx to evaluate for progression of lupus nephritis

## 2022-10-13 NOTE — H&P ADULT - ATTENDING COMMENTS
48 yo M, Mongolian speaking, with SLE (dx Feb 2022, with Class 3 lupus nephritis and mucocutaneous symptoms) sent to ED by outpatient dermatology for low-grade fevers and new rash, c/f SLE flare, found to have LIZETH. Rheumatology following, appreciate recs. Will c/w Prednisone 60 mg daily, also Plaquenil and MMF at home doses. Renal consult to be called given LIZETH c/b hyperkalemia (now resolved) and concern for progression of lupus nephritis. Urine studies pending. Pt with positive UA, will treat for presumed UTI given low-grade fevers, mild leukocytosis, and immunocompromised/immunosuppressed status.

## 2022-10-13 NOTE — ED ADULT NURSE NOTE - OBJECTIVE STATEMENT
Pt is a 47 year old Male, A&Ox4, ambulatory with a hx of Lupus. Pt presents to the ED with c/o bilateral facial and mouth rash x 1 week. Pt endorses rash, nausea, and fever feels similar to his symptoms a year ago when he was first diagnosed with Lupus. Pt reports being compliant with his medications for lupus. Respirations are even and unlabored, chest rise equal bilaterally. Pt denies chest pain, SOB, cough, chills or any other symptoms. No other pertinent medical hx. Translation iPad used at bedside, [email or web address] [id number]. Pt primarily English speaking. Pt appears comfortable, VS as noted see flowsheet. 20g IVL placed in RAC. Labs drawn and sent. Safety implemented, will continue to monitor.

## 2022-10-13 NOTE — ED PROVIDER NOTE - CLINICAL SUMMARY MEDICAL DECISION MAKING FREE TEXT BOX
pt with h/o lupus nephritis, h/o rash, subjective fevers, possible lupus flare, consider infection- check labs, will call Derm and Rheumatology

## 2022-10-13 NOTE — H&P ADULT - PROBLEM SELECTOR PLAN 7
DVT ppx: Lovenox SQ, as pt with elevated risk of VTE given SLE  Diet: Regular halal  Dispo: home  Code status: FULL

## 2022-10-13 NOTE — H&P ADULT - PROBLEM SELECTOR PLAN 6
DVT ppx: Lovenox  Diet: regular halal  Dispo: home  Code status: FULL Hgb mildly decreased to 12.6, suspect AoCD i/s/o lupus and now possible CKD from lupus nephritis. No S/S of active bleed.  - monitor H/H  - check iron studies, folate, vitamin B12

## 2022-10-13 NOTE — H&P ADULT - PROBLEM SELECTOR PLAN 4
Hb mildly decreased to 12.6, suspect AoCD i/s/o lupus and now possible CKD from lupus nephritis  - monitor  - iron studies to AM labs Few bacteria and leuk esterase on UA, along with blood and WBCs  Would treat as has had low grade fevers and is immunocompromised  - start Bactrim DS bid x7d Few bacteria and leuk esterase on UA, along with blood and WBCs  Would treat as has had low grade fevers and is immunocompromised  - start Bactrim DS bid x7d  - f/u UCx Mild, with WBC 11.87. Most likely in setting of SLE flare, though can also be in setting of UTI.  - trend CBC daily  - treat for presumed UTI as below

## 2022-10-13 NOTE — H&P ADULT - NSHPOUTPATIENTPROVIDERS_GEN_ALL_CORE
PCP - Dr. Alicja Silva  Rheum - Dr. Mirella Le   Nephro - Dr. Hayde Shultz  Derm - Dr. Antonia Yanez

## 2022-10-13 NOTE — H&P ADULT - NSHPLABSRESULTS_GEN_ALL_CORE
LABS:                        12.6   11.87 )-----------( 323      ( 13 Oct 2022 13:52 )             37.8     10-    138  |  111<H>  |  37<H>  ----------------------------<  91  5.5<H>   |  20<L>  |  1.65<H>    Ca    9.2      13 Oct 2022 18:13    TPro  6.4  /  Alb  2.4<L>  /  TBili  <0.2  /  DBili  x   /  AST  13  /  ALT  13  /  AlkPhos  74  10-13          Urinalysis Basic - ( 13 Oct 2022 14:29 )    Color: Light Yellow / Appearance: Clear / S.016 / pH: x  Gluc: x / Ketone: Negative  / Bili: Negative / Urobili: <2 mg/dL   Blood: x / Protein: 300 mg/dL / Nitrite: Negative   Leuk Esterase: Small / RBC: 32 /HPF / WBC 34 /HPF   Sq Epi: x / Non Sq Epi: 4 /HPF / Bacteria: Few EKG personally reviewed.  No acute changes consistent with hyperkalemia.     Labs personally reviewed.                        12.6   11.87 )-----------( 323      ( 13 Oct 2022 13:52 )             37.8     10    138  |  111<H>  |  37<H>  ----------------------------<  91  5.5<H>   |  20<L>  |  1.65<H>    Ca    9.2      13 Oct 2022 18:13    TPro  6.4  /  Alb  2.4<L>  /  TBili  <0.2  /  DBili  x   /  AST  13  /  ALT  13  /  AlkPhos  74  10    Urinalysis Basic - ( 13 Oct 2022 14:29 )  Color: Light Yellow / Appearance: Clear / S.016 / pH: x  Gluc: x / Ketone: Negative  / Bili: Negative / Urobili: <2 mg/dL   Blood: x / Protein: 300 mg/dL / Nitrite: Negative   Leuk Esterase: Small / RBC: 32 /HPF / WBC 34 /HPF   Sq Epi: x / Non Sq Epi: 4 /HPF / Bacteria: Few    Imaging personally reviewed.  ACC: [id number] EXAM:  XR CHEST PA LAT 2V                        PROCEDURE DATE:  10/13/2022    INTERPRETATION:  CLINICAL INFORMATION: Subjective fevers.  TECHNIQUE: PA and lateral radiographs of the chest.  COMPARISON: Chest x-ray 2021.    FINDINGS:  LUNGS: The lungs are clear.  PLEURA: No pleural effusion or pneumothorax.  HEART AND MEDIASTINUM: Heart size is within normal limits.  SKELETON: Unremarkable skeletal structures.    IMPRESSION:  Clear lungs.

## 2022-10-13 NOTE — H&P ADULT - NSHPPHYSICALEXAM_GEN_ALL_CORE
LOS:     VITALS:   T(C): 36.9 (10-13-22 @ 11:26), Max: 36.9 (10-13-22 @ 11:26)  HR: 83 (10-13-22 @ 11:26) (83 - 83)  BP: 125/77 (10-13-22 @ 11:26) (125/77 - 125/77)  RR: 16 (10-13-22 @ 11:26) (16 - 16)  SpO2: 100% (10-13-22 @ 11:26) (100% - 100%)    GENERAL: NAD, lying in bed comfortably  HEAD:  Atraumatic, Normocephalic  EYES: EOMI, PERRLA, conjunctiva and sclera clear  ENT: Moist mucous membranes  NECK: Supple, No JVD  CHEST/LUNG: Clear to auscultation bilaterally; No rales, rhonchi, wheezing, or rubs. Unlabored respirations  HEART: Regular rate and rhythm; No murmurs, rubs, or gallops  ABDOMEN: BSx4; Soft, nontender, nondistended  EXTREMITIES:  2+ Peripheral Pulses, brisk capillary refill. No clubbing, cyanosis, or edema  NERVOUS SYSTEM:  A&Ox3, no focal deficits   SKIN: No rashes or lesions LOS:     VITALS:   T(C): 36.9 (10-13-22 @ 11:26), Max: 36.9 (10-13-22 @ 11:26)  HR: 83 (10-13-22 @ 11:26) (83 - 83)  BP: 125/77 (10-13-22 @ 11:26) (125/77 - 125/77)  RR: 16 (10-13-22 @ 11:26) (16 - 16)  SpO2: 100% (10-13-22 @ 11:26) (100% - 100%)    GENERAL: NAD, lying in bed comfortably  HEAD:  Atraumatic, Normocephalic  EYES: EOMI, PERRLA, conjunctiva and sclera clear  ENT: MMM  NECK: Supple, No JVD  CHEST/LUNG: Clear to auscultation bilaterally; No rales, rhonchi, wheezing, or rubs. Unlabored respirations  HEART: Regular rate and rhythm; No murmurs, rubs, or gallops  ABDOMEN: BSx4; Soft, nontender, nondistended  EXTREMITIES:  2+ Peripheral Pulses, brisk capillary refill. No clubbing, cyanosis, or edema  NERVOUS SYSTEM:  A&Ox3, no focal deficits   SKIN: +small macules on post auricular neck, and back VITAL SIGNS:   T(C): 36.9 (10-13-22 @ 11:26), Max: 36.9 (10-13-22 @ 11:26)  HR: 83 (10-13-22 @ 11:26) (83 - 83)  BP: 125/77 (10-13-22 @ 11:26) (125/77 - 125/77)  RR: 16 (10-13-22 @ 11:26) (16 - 16)  SpO2: 100% (10-13-22 @ 11:26) (100% - 100%)    Vital Signs Last 24 Hrs  T(C): 36.4 (14 Oct 2022 05:10), Max: 36.9 (13 Oct 2022 11:26)  T(F): 97.5 (14 Oct 2022 05:10), Max: 98.5 (13 Oct 2022 11:26)  HR: 68 (14 Oct 2022 05:10) (62 - 83)  BP: 131/79 (14 Oct 2022 05:10) (125/77 - 137/91)  BP(mean): --  RR: 17 (14 Oct 2022 05:10) (16 - 17)  SpO2: 99% (14 Oct 2022 05:10) (99% - 100%)    PHYSICAL EXAM:  General: Awake and alert.  No acute distress.  Head: Normocephalic, atraumatic.    Eyes: PERRL.  EOMI.  No scleral icterus.  No conjunctival pallor.  Mouth: Moist MM.  No oropharyngeal exudates.    Neck: Supple.  Full range of motion.  No JVD.  No LAD.  No thyromegaly.  Trachea midline.    Heart: RRR.  Normal S1 and S2.  No murmurs, rubs, or gallops.  No LE edema b/l.   Lungs: Nonlabored breathing.  Good inspiratory effort.  CTAB.  No wheezes, crackles, or rhonchi.    Abdomen: BS+, soft, nontender with no rebound or guarding, nondistended.  No hepatomegaly.   Skin: Warm and dry.  No visible rash on cheeks.  Nonblanching erythematous macules and patches on neck and back.   Extremities: No cyanosis.  2+ peripheral pulses b/l.  Musculoskeletal: No joint deformities.  No spinal or paraspinal tenderness.  Neuro: A&Ox3.  CN II-XII intact.  5/5 motor strength in UE and LE b/l.  Tactile sensation intact in UE and LE b/l.  No focal deficits.

## 2022-10-13 NOTE — ED PROVIDER NOTE - OBJECTIVE STATEMENT
Pt c/o rash via ipad video , also with subjective fever but saying not greater than 100.  Pt supposed to be on prednisone- unclear if taking it, is followed by dermatology and rheumatology- per rheum non adherent to followup.  Pt had similar rash in past, and reportedly told unclear what it was. No cough, n/v/d, belly pain. Rash to upper chest, sides of face and upper back neck- no itching.

## 2022-10-13 NOTE — ED PROVIDER NOTE - SKIN RASH DESCRIPTION
chest upper with maculopapular slightly raised, blanchable, to side of face very faint, and barely visible back of neck

## 2022-10-13 NOTE — ED PROVIDER NOTE - PROGRESS NOTE DETAILS
CHRIS:  Patient signed out to me by Dr. Tim at 15:00 after disposition to cdu for lupus flare.  I was informed by CDU KIARA oliver CHRIS:  Patient signed out to me by Dr. Tim at 15:00 after disposition to cdu for lupus flare.  I was informed by CDU KIARA Ojeda that repeat bmp specimen showed non-hemolyzed potassium of 5.9.  EKG performed and no concerning changes c/w hyperkalemia.  Rheum saw patient in ED, had no immediate recommendations.  Patient administered insulin, lokelma, calcium gluconate.  Will admit to medicine for further care. CHRIS:  Patient signed out to me by Dr. Tim at 15:00 after disposition to cdu for lupus flare.  I was informed by CDU KIARA Ojeda that repeat bmp specimen showed non-hemolyzed potassium of 5.9.  EKG performed and no concerning changes c/w hyperkalemia.  Rheum saw patient in ED, had no immediate recommendations.  Patient administered insulin, lokelma, calcium gluconate.  Will admit to medicine for further care.  Rheumatology and dermatology aware of pt and will see

## 2022-10-13 NOTE — H&P ADULT - NSHPSOCIALHISTORY_GEN_ALL_CORE
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 Doesn't drink alcohol or smoke. 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 Doesn't drink alcohol or smoke cigarettes. Denies any history of illicit drug use.

## 2022-10-13 NOTE — H&P ADULT - PROBLEM SELECTOR PLAN 5
DVT ppx: Lovenox  Diet: regular halal  Dispo: home  Code status: FULL Hb mildly decreased to 12.6, suspect AoCD i/s/o lupus and now possible CKD from lupus nephritis  - monitor  - iron studies to AM labs UA with small leuk esterase, 34 WBC, few bacteria  Would treat for presumed UTI, as pt has had low-grade fevers, has mild leukocytosis, and is immunocompromised and immunosuppressed  - start IV ceftriaxone 1 g q24hrs x3 days  - f/u UCx

## 2022-10-14 ENCOUNTER — APPOINTMENT (OUTPATIENT)
Dept: RHEUMATOLOGY | Facility: CLINIC | Age: 47
End: 2022-10-14

## 2022-10-14 ENCOUNTER — TRANSCRIPTION ENCOUNTER (OUTPATIENT)
Age: 47
End: 2022-10-14

## 2022-10-14 DIAGNOSIS — N19 UNSPECIFIED KIDNEY FAILURE: ICD-10-CM

## 2022-10-14 DIAGNOSIS — M32.14 GLOMERULAR DISEASE IN SYSTEMIC LUPUS ERYTHEMATOSUS: ICD-10-CM

## 2022-10-14 DIAGNOSIS — N17.9 ACUTE KIDNEY FAILURE, UNSPECIFIED: ICD-10-CM

## 2022-10-14 DIAGNOSIS — D72.829 ELEVATED WHITE BLOOD CELL COUNT, UNSPECIFIED: ICD-10-CM

## 2022-10-14 LAB
A1C WITH ESTIMATED AVERAGE GLUCOSE RESULT: 5.7 % — HIGH (ref 4–5.6)
ANION GAP SERPL CALC-SCNC: 7 MMOL/L — SIGNIFICANT CHANGE UP (ref 7–14)
ANION GAP SERPL CALC-SCNC: 9 MMOL/L — SIGNIFICANT CHANGE UP (ref 7–14)
APTT BLD: 35.3 SEC — SIGNIFICANT CHANGE UP (ref 27–36.3)
BASOPHILS # BLD AUTO: 0.01 K/UL — SIGNIFICANT CHANGE UP (ref 0–0.2)
BASOPHILS NFR BLD AUTO: 0.1 % — SIGNIFICANT CHANGE UP (ref 0–2)
BUN SERPL-MCNC: 38 MG/DL — HIGH (ref 7–23)
BUN SERPL-MCNC: 38 MG/DL — HIGH (ref 7–23)
CALCIUM SERPL-MCNC: 8.9 MG/DL — SIGNIFICANT CHANGE UP (ref 8.4–10.5)
CALCIUM SERPL-MCNC: 9.3 MG/DL — SIGNIFICANT CHANGE UP (ref 8.4–10.5)
CHLORIDE SERPL-SCNC: 107 MMOL/L — SIGNIFICANT CHANGE UP (ref 98–107)
CHLORIDE SERPL-SCNC: 108 MMOL/L — HIGH (ref 98–107)
CK SERPL-CCNC: 22 U/L — LOW (ref 30–200)
CO2 SERPL-SCNC: 20 MMOL/L — LOW (ref 22–31)
CO2 SERPL-SCNC: 23 MMOL/L — SIGNIFICANT CHANGE UP (ref 22–31)
CREAT ?TM UR-MCNC: 98 MG/DL — SIGNIFICANT CHANGE UP
CREAT SERPL-MCNC: 1.63 MG/DL — HIGH (ref 0.5–1.3)
CREAT SERPL-MCNC: 1.67 MG/DL — HIGH (ref 0.5–1.3)
CRP SERPL-MCNC: 4.1 MG/L — SIGNIFICANT CHANGE UP
CULTURE RESULTS: SIGNIFICANT CHANGE UP
EGFR: 50 ML/MIN/1.73M2 — LOW
EGFR: 52 ML/MIN/1.73M2 — LOW
EOSINOPHIL # BLD AUTO: 0 K/UL — SIGNIFICANT CHANGE UP (ref 0–0.5)
EOSINOPHIL NFR BLD AUTO: 0 % — SIGNIFICANT CHANGE UP (ref 0–6)
ERYTHROCYTE [SEDIMENTATION RATE] IN BLOOD: 66 MM/HR — HIGH (ref 1–15)
ESTIMATED AVERAGE GLUCOSE: 117 — SIGNIFICANT CHANGE UP
GLUCOSE SERPL-MCNC: 78 MG/DL — SIGNIFICANT CHANGE UP (ref 70–99)
GLUCOSE SERPL-MCNC: 82 MG/DL — SIGNIFICANT CHANGE UP (ref 70–99)
HCT VFR BLD CALC: 35.5 % — LOW (ref 39–50)
HGB BLD-MCNC: 11.9 G/DL — LOW (ref 13–17)
IANC: 8.69 K/UL — HIGH (ref 1.8–7.4)
IMM GRANULOCYTES NFR BLD AUTO: 0.5 % — SIGNIFICANT CHANGE UP (ref 0–0.9)
INR BLD: 0.91 RATIO — SIGNIFICANT CHANGE UP (ref 0.88–1.16)
IRON SATN MFR SERPL: 55 % — HIGH (ref 14–50)
IRON SATN MFR SERPL: 99 UG/DL — SIGNIFICANT CHANGE UP (ref 45–165)
LYMPHOCYTES # BLD AUTO: 1.25 K/UL — SIGNIFICANT CHANGE UP (ref 1–3.3)
LYMPHOCYTES # BLD AUTO: 11.7 % — LOW (ref 13–44)
MAGNESIUM SERPL-MCNC: 2.1 MG/DL — SIGNIFICANT CHANGE UP (ref 1.6–2.6)
MAGNESIUM SERPL-MCNC: 2.2 MG/DL — SIGNIFICANT CHANGE UP (ref 1.6–2.6)
MCHC RBC-ENTMCNC: 29 PG — SIGNIFICANT CHANGE UP (ref 27–34)
MCHC RBC-ENTMCNC: 33.5 GM/DL — SIGNIFICANT CHANGE UP (ref 32–36)
MCV RBC AUTO: 86.4 FL — SIGNIFICANT CHANGE UP (ref 80–100)
MONOCYTES # BLD AUTO: 0.69 K/UL — SIGNIFICANT CHANGE UP (ref 0–0.9)
MONOCYTES NFR BLD AUTO: 6.5 % — SIGNIFICANT CHANGE UP (ref 2–14)
NEUTROPHILS # BLD AUTO: 8.69 K/UL — HIGH (ref 1.8–7.4)
NEUTROPHILS NFR BLD AUTO: 81.2 % — HIGH (ref 43–77)
NRBC # BLD: 0 /100 WBCS — SIGNIFICANT CHANGE UP (ref 0–0)
NRBC # FLD: 0 K/UL — SIGNIFICANT CHANGE UP (ref 0–0)
PHOSPHATE SERPL-MCNC: 4.6 MG/DL — HIGH (ref 2.5–4.5)
PHOSPHATE SERPL-MCNC: 4.7 MG/DL — HIGH (ref 2.5–4.5)
PLATELET # BLD AUTO: 251 K/UL — SIGNIFICANT CHANGE UP (ref 150–400)
POTASSIUM SERPL-MCNC: 4.1 MMOL/L — SIGNIFICANT CHANGE UP (ref 3.5–5.3)
POTASSIUM SERPL-MCNC: 4.9 MMOL/L — SIGNIFICANT CHANGE UP (ref 3.5–5.3)
POTASSIUM SERPL-SCNC: 4.1 MMOL/L — SIGNIFICANT CHANGE UP (ref 3.5–5.3)
POTASSIUM SERPL-SCNC: 4.9 MMOL/L — SIGNIFICANT CHANGE UP (ref 3.5–5.3)
PROT ?TM UR-MCNC: 207 MG/DL — SIGNIFICANT CHANGE UP
PROT/CREAT UR-RTO: SIGNIFICANT CHANGE UP RATIO (ref 0–0.2)
PROTHROM AB SERPL-ACNC: 10.5 SEC — SIGNIFICANT CHANGE UP (ref 10.5–13.4)
RBC # BLD: 4.11 M/UL — LOW (ref 4.2–5.8)
RBC # FLD: 13 % — SIGNIFICANT CHANGE UP (ref 10.3–14.5)
SODIUM SERPL-SCNC: 135 MMOL/L — SIGNIFICANT CHANGE UP (ref 135–145)
SODIUM SERPL-SCNC: 139 MMOL/L — SIGNIFICANT CHANGE UP (ref 135–145)
SPECIMEN SOURCE: SIGNIFICANT CHANGE UP
TIBC SERPL-MCNC: 180 UG/DL — LOW (ref 220–430)
UIBC SERPL-MCNC: 81 UG/DL — LOW (ref 110–370)
WBC # BLD: 10.69 K/UL — HIGH (ref 3.8–10.5)
WBC # FLD AUTO: 10.69 K/UL — HIGH (ref 3.8–10.5)

## 2022-10-14 PROCEDURE — 99222 1ST HOSP IP/OBS MODERATE 55: CPT

## 2022-10-14 PROCEDURE — 76770 US EXAM ABDO BACK WALL COMP: CPT | Mod: 26

## 2022-10-14 PROCEDURE — 99233 SBSQ HOSP IP/OBS HIGH 50: CPT | Mod: GC

## 2022-10-14 RX ORDER — CEFTRIAXONE 500 MG/1
INJECTION, POWDER, FOR SOLUTION INTRAMUSCULAR; INTRAVENOUS
Refills: 0 | Status: DISCONTINUED | OUTPATIENT
Start: 2022-10-14 | End: 2022-10-14

## 2022-10-14 RX ORDER — CEFTRIAXONE 500 MG/1
1000 INJECTION, POWDER, FOR SOLUTION INTRAMUSCULAR; INTRAVENOUS ONCE
Refills: 0 | Status: COMPLETED | OUTPATIENT
Start: 2022-10-14 | End: 2022-10-14

## 2022-10-14 RX ORDER — CHOLECALCIFEROL (VITAMIN D3) 125 MCG
1000 CAPSULE ORAL DAILY
Refills: 0 | Status: DISCONTINUED | OUTPATIENT
Start: 2022-10-14 | End: 2022-10-22

## 2022-10-14 RX ORDER — PANTOPRAZOLE SODIUM 20 MG/1
40 TABLET, DELAYED RELEASE ORAL
Refills: 0 | Status: DISCONTINUED | OUTPATIENT
Start: 2022-10-14 | End: 2022-10-22

## 2022-10-14 RX ADMIN — Medication 108 MILLIGRAM(S): at 18:44

## 2022-10-14 RX ADMIN — MYCOPHENOLATE MOFETIL 1000 MILLIGRAM(S): 250 CAPSULE ORAL at 21:55

## 2022-10-14 RX ADMIN — Medication 1000 UNIT(S): at 18:44

## 2022-10-14 RX ADMIN — CEFTRIAXONE 100 MILLIGRAM(S): 500 INJECTION, POWDER, FOR SOLUTION INTRAMUSCULAR; INTRAVENOUS at 09:24

## 2022-10-14 RX ADMIN — ENOXAPARIN SODIUM 40 MILLIGRAM(S): 100 INJECTION SUBCUTANEOUS at 21:56

## 2022-10-14 RX ADMIN — Medication 200 MILLIGRAM(S): at 21:56

## 2022-10-14 RX ADMIN — MYCOPHENOLATE MOFETIL 1000 MILLIGRAM(S): 250 CAPSULE ORAL at 09:24

## 2022-10-14 RX ADMIN — Medication 1 APPLICATION(S): at 05:37

## 2022-10-14 RX ADMIN — Medication 200 MILLIGRAM(S): at 09:24

## 2022-10-14 RX ADMIN — Medication 1 APPLICATION(S): at 18:45

## 2022-10-14 NOTE — PROGRESS NOTE ADULT - PROBLEM SELECTOR PLAN 2
Prior bx revealed class 3 lupus nephritis  SCr 1.0 in March, now 1.7-1.8  Likely 2/2 progression of lupus nephritis, with perhaps some contribution of poor PO intake i/s/o not feeling well the past few days  -  kidney/bladder negative for hydro  - urine lytes: FeNa 0/6%: prerenal  - UPC 2.9  - c/w SLE meds as above  - Renal consult

## 2022-10-14 NOTE — CONSULT NOTE ADULT - PROBLEM SELECTOR RECOMMENDATION 9
Pt with LIZETH in setting of class 3 LN. Pt was diagnosed with SLE in Feb 2022 during his previous admission at [facility]. Pt underwent kidney biopsy during that admission on 2/17/22 which showed focal proliferative type, class 3 LN. A single small cellular crescent, mild acute interstitial nephritis, NIH activity score 4/24 with no chronicity was also noted on kidney biopsy. Pt initiated on treatment of SLE by Rheumatology. Pt was seen for initial evaluation of LN by OP nephrologist Dr. Shultz. Pt was found to be non compliant with his medication at that time. Upon review of labs, Scr was 1.04 on 3/31/22 done on OP labs. UA done on 3/3/22 showed hematuria and proteinuria. Last spot urine spot TP/Cr ratio on 3/31/22 elevated at 3.3. . Scr on admission (10/13/22) elevated at 1.66. Urine spot TP/Cr elevated at 2.9 on 10/13/22. US kidneys done on 10/14/22 showed no hydronephrosis. Albumin low at 2.4 today. DsDNA titre were significantly elevated at 834 and complement C3 and C4 levels were low on labs done on 10/11/22. Rheumatology consult note from 10/13/22. Pt on SLE treatment as per Rheumatology. Last SCr elevated/stable at 1.67 on labs done today. Avoid nephrotoxins. Dose meds as per egfr. Pt. with kidney failure and proteinuria in setting of class 3 LN. Pt was diagnosed with SLE and LN during previous hospital stay at [place] in Feb 2022. Kidney biopsy performed on 2/17/22 which showed focal proliferative type, class 3 LN. Pt. initiated on immunosuppressives for SLE/LN by rheumatology team. Scr was 1.04 on 3/31/22 done on OP labs. UA done on 3/31/22 showed hematuria and proteinuria. Spot urine spot TP/CR ratio on 3/31/22 elevated at 3.3. Pt. with history of noncompliance to his medications/immunosuppressive therapy. Scr on current admission (10/13/22) elevated at 1.66. Scr elevated/stable at 1.67 on labs done today. Spot urine elevated/stable at 2.9 on 10/13/22. US kidneys done on 10/14/22 showed no hydronephrosis. DsDNA titer were significantly elevated at 834 and complement C3 and C4 levels were low on labs done on 10/11/22. Rheumatology consult note from 10/13/22 reviewed. Recommend IR consult for kidney biopsy. Immunosuppressive therapy for SLE/LN as per rheumatology team. Pt. advised to be compliant to his medications. Monitor labs and urine output. Avoid any potential nephrotoxins. Dose medications as per eGFR.

## 2022-10-14 NOTE — PROGRESS NOTE ADULT - PROBLEM SELECTOR PLAN 1
Suspect SLE flare given pt has been feeling unwell x days and with new cutaneous symptoms  C3 and C4 both low, dsDNA elevated  Reports he has been taking Plaquenil 200mg bid, MMF 1g bid, Prednisone 10mg qd, though compliance questionable, as pt was lost to follow up for months this year  - Rheumatology consult obtained, appreciate recs  - c/w home Plaquenil and MMF, increase Prednisone to 60mg qd as per Rheum  - f/u ESR and CRP  - check FLORIAN, SSA/SSB, and CK per Rheum recs

## 2022-10-14 NOTE — DISCHARGE NOTE PROVIDER - NSDCCPCAREPLAN_GEN_ALL_CORE_FT
PRINCIPAL DISCHARGE DIAGNOSIS  Diagnosis: Exacerbation of systemic lupus  Assessment and Plan of Treatment: You came into the hospital because you were having fevers and had a rash likely due to a flare of your underlying lupus. While you were in the hospital you were seen by our rheumatology and nephrology teams and were put on high dose steroids to treat your lupus flare. When you leave the hospital, please follow up with rheumatology, nephrology and your PCP for further management. If you experience worsening rash, fevers or decreased urine output at home please return to the ED for treatment.      SECONDARY DISCHARGE DIAGNOSES  Diagnosis: Acute kidney injury (nontraumatic)  Assessment and Plan of Treatment: While you were in the hospital we found that your kidney function was abnormal, likely due to your underlying lupus. You should follow up with our nephrology team upon discharge for further management.     PRINCIPAL DISCHARGE DIAGNOSIS  Diagnosis: Exacerbation of systemic lupus  Assessment and Plan of Treatment: You came into the hospital because you were having fevers and had a rash likely due to a flare of your underlying lupus. While you were in the hospital you were seen by our rheumatology and nephrology teams and were put on high dose steroids to treat your lupus flare. You were also continued on your home lupus medications. You were started on a new medication called obinuntuzumab. When you leave the hospital, please follow up with rheumatology, nephrology and your PCP for further management. If you experience worsening rash, fevers or decreased urine output at home please return to the ED for treatment.      SECONDARY DISCHARGE DIAGNOSES  Diagnosis: Acute kidney injury (nontraumatic)  Assessment and Plan of Treatment: While you were in the hospital we found that your kidney function was abnormal, likely due to your underlying lupus. You had a kidney biposy which showed worsening of your lupus nephritis. You should follow up with our nephrology team upon discharge for further management. You should return to the ED if you have any changes in urination frequency, pain with urination, fevers, or confusion.     PRINCIPAL DISCHARGE DIAGNOSIS  Diagnosis: Exacerbation of systemic lupus  Assessment and Plan of Treatment: You came into the hospital because you were having fevers and had a rash likely due to a flare of your underlying lupus. While you were in the hospital you were seen by our rheumatology and nephrology teams and were put on high dose steroids to treat your lupus flare. You were also continued on your home lupus medications. You were started on a new medication called obinuntuzumab. When you leave the hospital, please follow up with rheumatology, nephrology and your PCP for further management. If you experience worsening rash, fevers or decreased urine output at home please return to the ED for treatment.      SECONDARY DISCHARGE DIAGNOSES  Diagnosis: Acute kidney injury (nontraumatic)  Assessment and Plan of Treatment: While you were in the hospital we found that your kidney function was abnormal, likely due to your underlying lupus. You had a kidney biposy which showed worsening of your lupus nephritis. You were given an antibody infusion and you will need to follow up with your rheumatology doctor closely for further treatments.   You should follow up with our nephrology team upon discharge for further management. You should return to the ED if you have any changes in urination frequency, pain with urination, fevers, or confusion.

## 2022-10-14 NOTE — CONSULT NOTE ADULT - ATTENDING COMMENTS
Pt. with kidney failure and proteinuria in setting of lupus nephritis. Assessment and plan for kidney failure and lupus nephritis as outlined above. Rheumatology consult note seen. Recommend IR consult for kidney biopsy. Immunosuppressive therapy for SLE and LN as per rheumatology team. Monitor labs and urine output. Avoid any potential nephrotoxins. Dose medications as per eGFR.
Patient seen and examined at bedside. Worsening LN in the setting of non-compliance and loss to follow up. SoluMedrol 500 mg IV x 3 days. Restart CellCept. Consider additive therapy to address non-compliance

## 2022-10-14 NOTE — PROGRESS NOTE ADULT - PROBLEM SELECTOR PLAN 3
Hyperkalemic to serum K 5.9 (not hemolyzed) on admission. No EKG changes.  Likely 2/2 LIZETH  S/p insulin/D50, calcium gluconate, and lokelma in ED w/improvement  - K improved to 4.9 this AM  - temporizing measures and re-dose lokelma as needed  - repeat BMP this PM

## 2022-10-14 NOTE — CONSULT NOTE ADULT - SUBJECTIVE AND OBJECTIVE BOX
[facility] DIVISION OF KIDNEY DISEASES AND HYPERTENSION -- [phone]  -- INITIAL CONSULT NOTE  --------------------------------------------------------------------------------  HPI:  47-year-old male with PMH of SLE and LN class 3 referred to ER from dermatologist for worsening rash and fever. ER labs showed Scr mildly elevated Scr of 1.66 on admission (10/13/22). Pt currently admitted for cutaneous SLE flare. Nephrology consulted for LIZETH.    Pt seen and examined today. Pt initially presented with hematuria, non-nephrotic range (1.5) proteinuria and elevated MÓNICA titers in feb 2022. Pt underwent kidney biopsy in 2/7/22 which showed focal proliferative type, class 3 LN. A single small cellular crescent, mild acute interstitial nephritis, NIH activity score 4/24 with no chronicity was also noted on kidney biopsy. Pt was seen for initial evaluation of LN by OP nephrologist Dr. Shultz in March 2022. Upon review of labs, Scr was 1.04 on 3/31/22 done on OP labs. UA done on 3/31/22 showed hematuria and proteinuria. Last spot urine spot TP/Cr ratio elevated at 3.3. Pt on SLE treatment as per Rheumatology. Pt also has hx of noncompliance of medications. Scr on admission (10/13/22) elevated at 1.66. Serum K was mildly elevated at 5.7. Pt received medical management for hyperkalemia. UA on 10/13 with proteinuria and hematuria. Urine spot TP/Cr elevated at 2.9 on 10/13/22. US kidneys done on 10/14/22 showed no hydronephrosis. Albumin low at 2.4 today. DsDNA titre were significantly elevated at 834 and complement C3 and C4 levels were low on labs done on 10/11/22. Rheumatology was consulted and pt was initiated on high dose steroids.    Pt reports feeling better. Pt denies fever, chills, nausea, vomiting, CP and diarrhea during our rounds today. Pt says he had WATSON, nausea, mild fever on presentation which has now resolved. Scr elevated/stable at 1.63 today.     PAST HISTORY  --------------------------------------------------------------------------------  PAST MEDICAL & SURGICAL HISTORY:  Lupus nephritis      Sx Hx: Kidney Bx 02/200    FAMILY HISTORY:  No pertinent family history in first degree relatives    PAST SOCIAL HISTORY:    ALLERGIES & MEDICATIONS  --------------------------------------------------------------------------------  Allergies    No Known Allergies  Intolerances    Standing Inpatient Medications  enoxaparin Injectable 40 milliGRAM(s) SubCutaneous every 24 hours  hydroxychloroquine 200 milliGRAM(s) Oral two times a day  mycophenolate mofetil 1000 milliGRAM(s) Oral two times a day  predniSONE   Tablet 60 milliGRAM(s) Oral daily  triamcinolone 0.1% Cream 1 Application(s) Topical every 12 hours    PRN Inpatient Medications      REVIEW OF SYSTEMS  --------------------------------------------------------------------------------  Gen: No fevers/chills, weakness+  Head/Eyes/Ears: HA (on admission)  Respiratory: No dyspnea, cough  CV: No chest pain  GI: No abdominal pain, diarrhea  : see HPI  MSK: No edema  Skin: rash+  Heme: No easy bruising or bleeding    All other systems were reviewed and are negative, except as noted.    VITALS/PHYSICAL EXAM  --------------------------------------------------------------------------------  T(C): 36.3 (10-14-22 @ 13:04), Max: 36.7 (10-13-22 @ 20:47)  HR: 71 (10-14-22 @ 13:04) (62 - 71)  BP: 114/68 (10-14-22 @ 13:04) (114/68 - 137/91)  RR: 18 (10-14-22 @ 13:04) (16 - 18)  SpO2: 100% (10-14-22 @ 13:04) (99% - 100%)  Wt(kg): --  Height (cm): 165.1 (10-13-22 @ 21:13)  Weight (kg): 63.7 (10-13-22 @ 21:13)  BMI (kg/m2): 23.4 (10-13-22 @ 21:13)  BSA (m2): 1.7 (10-13-22 @ 21:13)      Physical Exam:  	Gen: young male, sitting, NAD  	HEENT: Anicteric  	Pulm: CTA B/L  	CV: S1S2+  	Abd: Soft, +BS   	Ext: No LE edema B/L  	Neuro: Awake       	Skin: Warm and dry, erythematous rash on face and on upper chest      LABS/STUDIES  --------------------------------------------------------------------------------              11.9   10.69 >-----------<  251      [10-14-22 @ 05:31]              35.5     139  |  107  |  38  ----------------------------<  78      [10-14-22 @ 12:25]  4.1   |  23  |  1.67        Ca     9.3     [10-14-22 @ 12:25]      Mg     2.20     [10-14-22 @ 12:25]      Phos  4.6     [10-14-22 @ 12:25    Creatinine Trend:  SCr 1.67 [10-14 @ 12:25]  SCr 1.63 [10-14 @ 05:31]  SCr 1.65 [10-13 @ 18:13]  SCr 1.73 [10-13 @ 15:14]  SCr 1.66 [10-13 @ 13:52]    Urinalysis - [10-13-22 @ 14:29]      Color Light Yellow / Appearance Clear / SG 1.016 / pH 6.0      Gluc Negative / Ketone Negative  / Bili Negative / Urobili <2 mg/dL       Blood Large / Protein 300 mg/dL / Leuk Est Small / Nitrite Negative      RBC 32 / WBC 34 / Hyaline 14 / Gran  / Sq Epi  / Non Sq Epi 4 / Bacteria Few    Urine Creatinine 98      [10-14-22 @ 09:08]  Urine Protein 207      [10-14-22 @ 09:08]  Urine Sodium 36      [10-13-22 @ 21:50]  Urine Urea Nitrogen 555.0      [10-13-22 @ 21:50]    HIV Nonreact      [10-13-22 @ 15:14]    MÓNICA: titer 1:640, pattern Homogeneous      [11-27-21 @ 14:24]  Rheumatoid Factor 63      [11-27-21 @ 16:43]  ANCA: cANCA Negative, pANCA Negative, atypical ANCA Indeterminate Method interference due to MÓNICA Fluorescence      [11-28-21 @ 11:28]  Syphilis Screen (Treponema Pallidum Ab) Negative      [11-27-21 @ 14:24]   [facility] DIVISION OF KIDNEY DISEASES AND HYPERTENSION -- [phone]  -- INITIAL CONSULT NOTE  --------------------------------------------------------------------------------  HPI:  47-year-old male with PMH of SLE and LN class 3 referred to ER from dermatologist for worsening rash and fever. ER labs showed Scr mildly elevated Scr of 1.66 on admission (10/13/22). Pt currently admitted for cutaneous SLE flare. Nephrology consulted for LIZETH.    Pt. seen and examined today. Pt. initially presented with hematuria, non-nephrotic range proteinuria and elevated MÓNICA titers in Feb 2022. Pt underwent kidney biopsy in 2/7/22 which showed focal proliferative type, class 3 LN. A single small cellular crescent, mild acute interstitial nephritis, NIH activity score 4/24 with no chronicity was also noted on kidney biopsy. Pt. was seen for initial evaluation of LN by nephrologist Dr. Shultz as OP in March 2022. Upon review of labs, Scr was 1.04 on 3/31/22 done on OP labs. UA done on 3/31/22 showed hematuria and proteinuria. Spot urine TP/CR ratio was elevated at 3.3 on 3/31/22. Pt. on SLE treatment as per Rheumatology. Pt. also has history of noncompliance to his medications/immunosuppressive therapy. Scr on admission (10/13/22) elevated at 1.66. Serum potassium was mildly elevated at 5.7. Pt received medical management for hyperkalemia. UA on 10/13 with proteinuria and hematuria. Urine spot TP/CR elevated at 2.9 on 10/13/22. US kidneys done on 10/14/22 showed no hydronephrosis. Serum albumin low at 2.4 today. DsDNA titre were significantly elevated at 834 and complement C3 and C4 levels were low on labs done on 10/11/22. Rheumatology was consulted and pt. was initiated on high dose steroids.    Pt. reports feeling better. Pt. denies fever, chills, nausea, vomiting, CP and diarrhea during our rounds today. Pt says he had WATSON, nausea, mild fever on presentation which has now resolved. Scr elevated/stable at 1.63 today.     PAST HISTORY  --------------------------------------------------------------------------------  PAST MEDICAL & SURGICAL HISTORY:  Lupus nephritis    Sx Hx: Kidney Bx 02/22    FAMILY HISTORY:  No pertinent family history in first degree relatives    PAST SOCIAL HISTORY:    ALLERGIES & MEDICATIONS  --------------------------------------------------------------------------------  Allergies    No Known Allergies  Intolerances    Standing Inpatient Medications  enoxaparin Injectable 40 milliGRAM(s) SubCutaneous every 24 hours  hydroxychloroquine 200 milliGRAM(s) Oral two times a day  mycophenolate mofetil 1000 milliGRAM(s) Oral two times a day  predniSONE   Tablet 60 milliGRAM(s) Oral daily  triamcinolone 0.1% Cream 1 Application(s) Topical every 12 hours    PRN Inpatient Medications    REVIEW OF SYSTEMS  --------------------------------------------------------------------------------  Gen: No fevers/chills, weakness+  Head/Eyes/Ears: HA (on admission)  Respiratory: No dyspnea, cough  CV: No chest pain  GI: No abdominal pain, diarrhea  : see HPI  MSK: No edema  Skin: Facial and Upper chest mid region rash+  Heme: No easy bruising or bleeding    All other systems were reviewed and are negative, except as noted.    VITALS/PHYSICAL EXAM  --------------------------------------------------------------------------------  T(C): 36.3 (10-14-22 @ 13:04), Max: 36.7 (10-13-22 @ 20:47)  HR: 71 (10-14-22 @ 13:04) (62 - 71)  BP: 114/68 (10-14-22 @ 13:04) (114/68 - 137/91)  RR: 18 (10-14-22 @ 13:04) (16 - 18)  SpO2: 100% (10-14-22 @ 13:04) (99% - 100%)  Wt(kg): --  Height (cm): 165.1 (10-13-22 @ 21:13)  Weight (kg): 63.7 (10-13-22 @ 21:13)  BMI (kg/m2): 23.4 (10-13-22 @ 21:13)  BSA (m2): 1.7 (10-13-22 @ 21:13)    Physical Exam:  	Gen: young male, sitting, NAD  	HEENT: Anicteric  	Pulm: CTA B/L  	CV: S1S2+  	Abd: Soft, +BS   	Ext: No LE edema B/L  	Neuro: Awake, alert       	Skin: Erythematous rash on upper face/cheek  and upper mid chest regions    LABS/STUDIES  --------------------------------------------------------------------------------              11.9   10.69 >-----------<  251      [10-14-22 @ 05:31]              35.5     139  |  107  |  38  ----------------------------<  78      [10-14-22 @ 12:25]  4.1   |  23  |  1.67        Ca     9.3     [10-14-22 @ 12:25]      Mg     2.20     [10-14-22 @ 12:25]      Phos  4.6     [10-14-22 @ 12:25    Creatinine Trend:  SCr 1.67 [10-14 @ 12:25]  SCr 1.63 [10-14 @ 05:31]  SCr 1.65 [10-13 @ 18:13]  SCr 1.73 [10-13 @ 15:14]  SCr 1.66 [10-13 @ 13:52]    Urinalysis - [10-13-22 @ 14:29]      Color Light Yellow / Appearance Clear / SG 1.016 / pH 6.0      Gluc Negative / Ketone Negative  / Bili Negative / Urobili <2 mg/dL       Blood Large / Protein 300 mg/dL / Leuk Est Small / Nitrite Negative      RBC 32 / WBC 34 / Hyaline 14 / Gran  / Sq Epi  / Non Sq Epi 4 / Bacteria Few    Urine Creatinine 98      [10-14-22 @ 09:08]  Urine Protein 207      [10-14-22 @ 09:08]  Urine Sodium 36      [10-13-22 @ 21:50]  Urine Urea Nitrogen 555.0      [10-13-22 @ 21:50]    HIV Nonreact      [10-13-22 @ 15:14]

## 2022-10-14 NOTE — PROGRESS NOTE ADULT - ASSESSMENT
46 yo M, Upper sorbian speaking, with SLE (dx Feb 2022, with Class 3 lupus nephritis and mucocutaneous symptoms) sent to ED by outpatient dermatology for fever and worsening rash, c/f SLE flare, found to have new LIZETH.

## 2022-10-14 NOTE — DISCHARGE NOTE PROVIDER - ATTENDING DISCHARGE PHYSICAL EXAMINATION:
VITAL SIGNS:  T(C): 36.2 (10-22-22 @ 06:48), Max: 36.3 (10-21-22 @ 22:00)  T(F): 97.2 (10-22-22 @ 06:48), Max: 97.4 (10-21-22 @ 22:00)  HR: 61 (10-22-22 @ 06:48) (61 - 67)  BP: 147/84 (10-22-22 @ 06:48) (123/70 - 147/84)  BP(mean): --  RR: 18 (10-22-22 @ 06:48) (17 - 18)  SpO2: 100% (10-22-22 @ 06:48) (100% - 100%)  Wt(kg): --    PHYSICAL EXAM:  Constitutional: WDWN resting comfortably in bed; NAD  Head: NC/AT  Neck: supple; no JVD  Respiratory: CTA B/L; no W/R/R  Cardiac: +S1/S2; RRR; no M/R/G  Gastrointestinal: soft, NT/ND; no rebound or guarding; +BSx4  Extremities: WWP, no clubbing or cyanosis; no peripheral edema  Musculoskeletal: NROM x4; no joint swelling, tenderness or erythema  Vascular: 2+ radial, DP/PT pulses B/L  Neurologic: AAOx3; CNII-XII grossly intact; no focal deficits

## 2022-10-14 NOTE — DISCHARGE NOTE PROVIDER - NSDCFUADDAPPT_GEN_ALL_CORE_FT
Please follow up with your rheumatologist within 1 week of discharge. Please follow up with your rheumatologist, nephrologist, and PCP within 1 week of discharge.

## 2022-10-14 NOTE — PROGRESS NOTE ADULT - SUBJECTIVE AND OBJECTIVE BOX
PROGRESS NOTE:     Patient is a 47y old  Male who presents with a chief complaint of SLE flare (14 Oct 2022 08:10)      SUBJECTIVE / OVERNIGHT EVENTS: NAEO. Pt appears well this morning. denies abdominal pain, n/v, headache, SOB, CP. Eating well, urinating normally. denies palpitations. Notes that rash is resolved.     ADDITIONAL REVIEW OF SYSTEMS: 10 point ROS negative except per HPI    MEDICATIONS  (STANDING):  cefTRIAXone   IVPB      enoxaparin Injectable 40 milliGRAM(s) SubCutaneous every 24 hours  hydroxychloroquine 200 milliGRAM(s) Oral two times a day  mycophenolate mofetil 1000 milliGRAM(s) Oral two times a day  predniSONE   Tablet 60 milliGRAM(s) Oral daily  triamcinolone 0.1% Cream 1 Application(s) Topical every 12 hours    MEDICATIONS  (PRN):      CAPILLARY BLOOD GLUCOSE      POCT Blood Glucose.: 109 mg/dL (13 Oct 2022 17:06)    I&O's Summary      PHYSICAL EXAM:  Vital Signs Last 24 Hrs  T(C): 36.4 (14 Oct 2022 05:10), Max: 36.7 (13 Oct 2022 20:47)  T(F): 97.5 (14 Oct 2022 05:10), Max: 98 (13 Oct 2022 20:47)  HR: 68 (14 Oct 2022 05:10) (62 - 68)  BP: 131/79 (14 Oct 2022 05:10) (126/83 - 137/91)  BP(mean): --  RR: 17 (14 Oct 2022 05:10) (16 - 17)  SpO2: 99% (14 Oct 2022 05:10) (99% - 100%)    Parameters below as of 14 Oct 2022 05:10  Patient On (Oxygen Delivery Method): room air        CONSTITUTIONAL: NAD, well-developed, A&Ox3 to person, place, time.  RESPIRATORY: Normal respiratory effort; lungs are clear to auscultation bilaterally  CARDIOVASCULAR: Regular rate and rhythm, normal S1 and S2, no murmur/rub/gallop; No lower extremity edema; Peripheral pulses are 2+ bilaterally  ABDOMEN: Nontender to palpation, no rebound/guarding;  NEURO: CN 2-12 grossly intact, moves all limbs spontaneously  SKIN: slight erythematous rash noted on superior chest, no malar rash      LABS:                        11.9   10.69 )-----------( 251      ( 14 Oct 2022 05:31 )             35.5     10-14    135  |  108<H>  |  38<H>  ----------------------------<  82  4.9   |  20<L>  |  1.63<H>    Ca    8.9      14 Oct 2022 05:31  Phos  4.7     10-14  Mg     2.10     10-14    TPro  6.4  /  Alb  2.4<L>  /  TBili  <0.2  /  DBili  x   /  AST  13  /  ALT  13  /  AlkPhos  74  10-13    PT/INR - ( 14 Oct 2022 05:31 )   PT: 10.5 sec;   INR: 0.91 ratio         PTT - ( 14 Oct 2022 05:31 )  PTT:35.3 sec  CARDIAC MARKERS ( 14 Oct 2022 05:31 )  x     / x     / 22 U/L / x     / x          Urinalysis Basic - ( 13 Oct 2022 14:29 )    Color: Light Yellow / Appearance: Clear / S.016 / pH: x  Gluc: x / Ketone: Negative  / Bili: Negative / Urobili: <2 mg/dL   Blood: x / Protein: 300 mg/dL / Nitrite: Negative   Leuk Esterase: Small / RBC: 32 /HPF / WBC 34 /HPF   Sq Epi: x / Non Sq Epi: 4 /HPF / Bacteria: Few

## 2022-10-14 NOTE — DISCHARGE NOTE PROVIDER - CARE PROVIDERS DIRECT ADDRESSES
[email or web address]

## 2022-10-14 NOTE — DISCHARGE NOTE PROVIDER - NSDCMRMEDTOKEN_GEN_ALL_CORE_FT
mycophenolate mofetil: 1000 milligram(s) orally 2 times a day  Plaquenil 200 mg oral tablet: 1 tab(s) orally 2 times a day  predniSONE 10 mg oral tablet: 1 tab(s) orally once a day  triamcinolone 0.1% topical cream: Apply topically to affected area 3 times a day   Bactrim  mg-160 mg oral tablet: 1 tab(s) orally once a day   mycophenolate mofetil 500 mg oral tablet: 3 tab(s) orally 2 times a day   Plaquenil 200 mg oral tablet: 1 tab(s) orally 2 times a day  predniSONE 20 mg oral tablet: 3 tab(s) orally once a day  triamcinolone 0.1% topical cream: Apply topically to affected area 3 times a day  Vitamin D3 25 mcg (1000 intl units) oral tablet: 1 tab(s) orally once a day    Bactrim  mg-160 mg oral tablet: 1 tab(s) orally once a day   CellCept 500 mg oral tablet: 2 tab(s) orally 2 times a day  Plaquenil 200 mg oral tablet: 1 tab(s) orally 2 times a day  predniSONE 20 mg oral tablet: 3 tab(s) orally once a day  triamcinolone 0.1% topical cream: Apply topically to affected area 3 times a day  Vitamin D3 25 mcg (1000 intl units) oral tablet: 1 tab(s) orally once a day

## 2022-10-14 NOTE — DISCHARGE NOTE PROVIDER - CARE PROVIDER_API CALL
Manuel Helm)  Nephrology  [address] [place] [address], [address] [place] [address]  Phone: [phone]  Fax: [phone]  Follow Up Time:     Elena Carreon; MPH)  Internal Medicine; Rheumatology  [address] [place] [address], [address]  [place] [address]  Phone: [phone]  Fax: [phone]  Follow Up Time: 1 week

## 2022-10-14 NOTE — PROGRESS NOTE ADULT - PROBLEM SELECTOR PLAN 6
Hgb mildly decreased to 12.6, suspect AoCD i/s/o lupus and now possible CKD from lupus nephritis. No S/S of active bleed.  - monitor H/H  - check iron studies, folate, vitamin B12

## 2022-10-14 NOTE — PROGRESS NOTE ADULT - PROBLEM SELECTOR PLAN 5
UA with small leuk esterase, 34 WBC, few bacteria  Would treat for presumed UTI, as pt has had low-grade fevers, has mild leukocytosis, and is immunocompromised and immunosuppressed  - start IV ceftriaxone 1 g q24hrs x3 days  - f/u UCx

## 2022-10-14 NOTE — PROGRESS NOTE ADULT - PROBLEM SELECTOR PLAN 4
Mild, with WBC 11.87. Most likely in setting of SLE flare, though can also be in setting of UTI.  - trend CBC daily  - treat for presumed UTI as below

## 2022-10-14 NOTE — DISCHARGE NOTE PROVIDER - HOSPITAL COURSE
48 yo M, Luxembourgish speaking, with SLE (dx Feb 2022, with Class 3 lupus nephritis and mucocutaneous symptoms) sent to ED by outpatient dermatology for fever and worsening rash, c/f SLE flare.     #SLE exacerbation    #Hyperkalemia    #LIZETH on Lupus nephritis 48 yo M, Mohawk speaking, with SLE (dx Feb 2022, with Class 3 lupus nephritis and mucocutaneous symptoms) sent to ED by outpatient dermatology for fever and worsening rash, c/f SLE flare.     Patient admitted to internal medicine for further management. For his lupus flare, patient was seen by rheumatology and was started on steroids. Patient likely was not taking his lupus medications as prescribed. Patient was also found to have an LIZETH likely secondary to progression of lupus nephritis. Nephrology saw him and recommended _____. On admission patient also had hyperkalemia which resolved after lokelma.     Patient now stable for discharge home with close follow up. 48 yo M, Maltese speaking, with SLE (dx Feb 2022, with Class 3 lupus nephritis and mucocutaneous symptoms) sent to ED by outpatient dermatology for fever and worsening rash, c/f SLE flare.     Patient admitted to internal medicine for further management. For his lupus flare, patient was seen by rheumatology and was started on steroids. Patient likely was not taking his lupus medications as prescribed. Patient was also found to have an LIZETH likely secondary to progression of lupus nephritis. Nephrology saw him and recommended a kidney biopsy. On admission patient also had hyperkalemia which resolved after lokelma.     Patient now stable for discharge home with close follow up. 48 yo M, Estonian speaking, with SLE (dx Feb 2022, with Class 3 lupus nephritis and mucocutaneous symptoms) sent to ED by outpatient dermatology for fever and worsening rash, c/f SLE flare.     Patient admitted to internal medicine for further management. For his lupus flare, patient was seen by rheumatology and was started on pulse steroids. Patient likely was not taking his lupus medications as prescribed. Pt received pulse steroids x 3days and then transitioned to prednisone 60mg. He was also continued on his home doses of plaquenil and MMF. Rheum recommended _______ for steroid taper.  Patient was also found to have an LIZETH likely secondary to progression of lupus nephritis. Nephrology saw him and recommended a kidney biopsy. Kidney biopsy was done by IR on 10/17/2022. On admission patient also had hyperkalemia to 5.9 which resolved after lokelma.     Patient now stable for discharge home with close follow up. 48 yo M, Faroese speaking, with SLE (dx Feb 2022, with Class 3 lupus nephritis and mucocutaneous symptoms) sent to ED by outpatient dermatology for fever and worsening rash, c/f SLE flare.     Patient admitted to internal medicine for further management. For his lupus flare, patient was seen by rheumatology and was started on pulse steroids. Patient likely was not taking his lupus medications as prescribed. Pt received pulse steroids x 3days and then transitioned to prednisone 60mg. He was also continued on his home doses of plaquenil and MMF. Rheum recommended _______ for steroid taper.  Patient was also found to have an LIZETH likely secondary to progression of lupus nephritis. Nephrology saw him and recommended a kidney biopsy. Kidney biopsy was done by IR on 10/17/2022. On admission patient also had hyperkalemia to 5.9 which resolved after lokelma. Potassium level at discharge was ______.    Patient now stable for discharge home with close follow up. 48 yo M, Setswana speaking, with SLE (dx Feb 2022, with Class 3 lupus nephritis and mucocutaneous symptoms) sent to ED by outpatient dermatology for fever and worsening rash, c/f SLE flare.     Patient admitted to internal medicine for further management. For his lupus flare, patient was seen by rheumatology and was started on pulse steroids. Patient likely was not taking his lupus medications as prescribed. Pt received pulse steroids x 3days and then transitioned to prednisone 60mg. He was also continued on his home doses of plaquenil and MMF. Rheum recommended _______ for steroid taper.  Patient was also found to have an LIZETH likely secondary to progression of lupus nephritis. Nephrology saw him and recommended a kidney biopsy. Kidney biopsy was done by IR on 10/17/2022. On admission patient also had hyperkalemia to 5.9 which resolved after lokelma.     Patient now stable for discharge home with close follow up. 48 yo M, Danish speaking, with SLE (dx Feb 2022, with Class 3 lupus nephritis and mucocutaneous symptoms) sent to ED by outpatient dermatology for fever and worsening rash, c/f SLE flare.     Patient admitted to internal medicine for further management. For his lupus flare, patient was seen by rheumatology and was started on pulse steroids. Patient likely was not taking his lupus medications as prescribed. On admission patient also had hyperkalemia to 5.9 which resolved after lokelma.  Pt received pulse steroids x 3days and then transitioned to prednisone 60mg. He was also continued on his home doses of plaquenil and MMF. Pt started on Bactrim for PCP prophylaxis. Rheum recommended _______ for steroid taper.  Patient was also found to have an LIZETH likely secondary to progression of lupus nephritis. Nephrology saw him and recommended a kidney biopsy. Kidney biopsy was done by IR on 10/17/2022. 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 Rheumatology discussed treatment options with patient and son and family decided to pursue treatment with ___________.       Patient now stable for discharge home with close rheumatology follow up. 46 yo M, Tamazight speaking, with SLE (dx Feb 2022, with Class 3 lupus nephritis and mucocutaneous symptoms) sent to ED by outpatient dermatology for fever and worsening rash, c/f SLE flare.     Patient admitted to internal medicine for further management. For his lupus flare, patient was seen by rheumatology and was started on pulse steroids. Patient likely was not taking his lupus medications as prescribed. On admission patient also had hyperkalemia to 5.9 which resolved after lokelma.  Pt received pulse steroids x 3days and then transitioned to prednisone 60mg. He was also continued on his home doses of plaquenil and MMF. Pt started on Bactrim for PCP prophylaxis. Rheum recommended _______ for steroid taper.  Patient was also found to have an LIZETH likely secondary to progression of lupus nephritis. Nephrology saw him and recommended a kidney biopsy. Kidney biopsy was done by IR on 10/17/2022. 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 Rheumatology discussed treatment options with patient and son and family decided to pursue treatment with ___________.       Patient now stable for discharge home with close rheumatology follow up. 46 yo M, Swedish speaking, with SLE (dx Feb 2022, with Class 3 lupus nephritis and mucocutaneous symptoms) sent to ED by outpatient dermatology for fever and worsening rash, c/f SLE flare.     Patient admitted to internal medicine for further management. For his lupus flare, patient was seen by rheumatology and was started on pulse steroids. Patient likely was not taking his lupus medications as prescribed. On admission patient also had hyperkalemia to 5.9 which resolved after lokelma.  Pt received pulse steroids x 3days (10/14 - 10/16) and then transitioned to prednisone 60mg. He was also continued on his home doses of plaquenil and MMF. Pt started on Bactrim for PCP prophylaxis. Rheum recommended _______ for steroid taper.  Patient was also found to have an LIZETH likely secondary to progression of lupus nephritis. Nephrology saw him and recommended a kidney biopsy. Kidney biopsy was done by IR on 10/17/2022. 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 Rheumatology discussed treatment options with patient and son and family decided to pursue treatment with obinutuzumab. Pt received infusion of obinutuzumab on 10/21 with premeds: solumedrol 100mg, acetaminophen, and zyrtec.      Patient now stable for discharge home with close rheumatology follow up. 46 yo M, English speaking, with SLE (dx Feb 2022, with Class 3 lupus nephritis and mucocutaneous symptoms) sent to ED by outpatient dermatology for fever and worsening rash, c/f SLE flare.     Patient admitted to internal medicine for further management. For his lupus flare, patient was seen by rheumatology and was started on pulse steroids. Patient likely was not taking his lupus medications as prescribed. On admission patient also had hyperkalemia to 5.9 which resolved after lokelma.  Pt received pulse steroids x 3days (10/14 - 10/16) and then transitioned to prednisone 60mg. He was also continued on his home doses of plaquenil and MMF. Pt started on Bactrim for PCP prophylaxis. Rheum recommended continuing with 60mg prednisone daily.  Patient was also found to have an LIZETH likely secondary to progression of lupus nephritis. Nephrology saw him and recommended a kidney biopsy. Kidney biopsy was done by IR on 10/17/2022. 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 Rheumatology discussed treatment options with patient and son and family decided to pursue treatment with obinutuzumab. Pt received infusion of obinutuzumab on 10/21 with premeds: solumedrol 100mg, acetaminophen, and zyrtec.      Patient now stable for discharge home with close rheumatology follow up.

## 2022-10-14 NOTE — DISCHARGE NOTE PROVIDER - NSDCCPTREATMENT_GEN_ALL_CORE_FT
PRINCIPAL PROCEDURE  Procedure: Kidney biopsy  Findings and Treatment: You underwent a biopsy of your kidney in which they used a needle to take a small sample of tissue.       PRINCIPAL PROCEDURE  Procedure: Kidney biopsy  Findings and Treatment: You underwent a biopsy of your kidney in which they used a needle to take a small sample of tissue. the biopsy showed the following:  Lupus nephritis, ISN/RPS class IV, with a diffuse proliferative and   crescentic pattern of glomerular injury   - Active (cellular and fibrocellular) crescents are present in 12 of   20 glomeruli (60% of viable glomeruli)

## 2022-10-15 LAB
ANION GAP SERPL CALC-SCNC: 11 MMOL/L — SIGNIFICANT CHANGE UP (ref 7–14)
ANION GAP SERPL CALC-SCNC: 9 MMOL/L — SIGNIFICANT CHANGE UP (ref 7–14)
ANTI-RIBONUCLEAR PROTEIN: 1.8 AI — HIGH
BASOPHILS # BLD AUTO: 0.01 K/UL — SIGNIFICANT CHANGE UP (ref 0–0.2)
BASOPHILS NFR BLD AUTO: 0.1 % — SIGNIFICANT CHANGE UP (ref 0–2)
BUN SERPL-MCNC: 39 MG/DL — HIGH (ref 7–23)
BUN SERPL-MCNC: 39 MG/DL — HIGH (ref 7–23)
CALCIUM SERPL-MCNC: 8.6 MG/DL — SIGNIFICANT CHANGE UP (ref 8.4–10.5)
CALCIUM SERPL-MCNC: 9 MG/DL — SIGNIFICANT CHANGE UP (ref 8.4–10.5)
CHLORIDE SERPL-SCNC: 108 MMOL/L — HIGH (ref 98–107)
CHLORIDE SERPL-SCNC: 109 MMOL/L — HIGH (ref 98–107)
CO2 SERPL-SCNC: 15 MMOL/L — LOW (ref 22–31)
CO2 SERPL-SCNC: 21 MMOL/L — LOW (ref 22–31)
CREAT SERPL-MCNC: 1.72 MG/DL — HIGH (ref 0.5–1.3)
CREAT SERPL-MCNC: 1.75 MG/DL — HIGH (ref 0.5–1.3)
DSDNA AB FLD-ACNC: 4.4 AI — HIGH
EGFR: 48 ML/MIN/1.73M2 — LOW
EGFR: 49 ML/MIN/1.73M2 — LOW
ENA SM AB FLD QL: 0.3 AI — SIGNIFICANT CHANGE UP
ENA SS-A AB FLD IA-ACNC: >8 AI — HIGH
EOSINOPHIL # BLD AUTO: 0 K/UL — SIGNIFICANT CHANGE UP (ref 0–0.5)
EOSINOPHIL NFR BLD AUTO: 0 % — SIGNIFICANT CHANGE UP (ref 0–6)
GLUCOSE SERPL-MCNC: 123 MG/DL — HIGH (ref 70–99)
GLUCOSE SERPL-MCNC: 163 MG/DL — HIGH (ref 70–99)
HAV IGM SER-ACNC: SIGNIFICANT CHANGE UP
HBV CORE IGM SER-ACNC: SIGNIFICANT CHANGE UP
HBV SURFACE AG SER-ACNC: SIGNIFICANT CHANGE UP
HCT VFR BLD CALC: 38.2 % — LOW (ref 39–50)
HCV AB S/CO SERPL IA: 0.14 S/CO — SIGNIFICANT CHANGE UP (ref 0–0.99)
HCV AB SERPL-IMP: SIGNIFICANT CHANGE UP
HGB BLD-MCNC: 12.6 G/DL — LOW (ref 13–17)
IANC: 8.17 K/UL — HIGH (ref 1.8–7.4)
IMM GRANULOCYTES NFR BLD AUTO: 0.3 % — SIGNIFICANT CHANGE UP (ref 0–0.9)
LYMPHOCYTES # BLD AUTO: 1.26 K/UL — SIGNIFICANT CHANGE UP (ref 1–3.3)
LYMPHOCYTES # BLD AUTO: 13.2 % — SIGNIFICANT CHANGE UP (ref 13–44)
MAGNESIUM SERPL-MCNC: 2 MG/DL — SIGNIFICANT CHANGE UP (ref 1.6–2.6)
MAGNESIUM SERPL-MCNC: 2.2 MG/DL — SIGNIFICANT CHANGE UP (ref 1.6–2.6)
MCHC RBC-ENTMCNC: 29 PG — SIGNIFICANT CHANGE UP (ref 27–34)
MCHC RBC-ENTMCNC: 33 GM/DL — SIGNIFICANT CHANGE UP (ref 32–36)
MCV RBC AUTO: 87.8 FL — SIGNIFICANT CHANGE UP (ref 80–100)
MONOCYTES # BLD AUTO: 0.1 K/UL — SIGNIFICANT CHANGE UP (ref 0–0.9)
MONOCYTES NFR BLD AUTO: 1 % — LOW (ref 2–14)
NEUTROPHILS # BLD AUTO: 8.17 K/UL — HIGH (ref 1.8–7.4)
NEUTROPHILS NFR BLD AUTO: 85.4 % — HIGH (ref 43–77)
NRBC # BLD: 0 /100 WBCS — SIGNIFICANT CHANGE UP (ref 0–0)
NRBC # FLD: 0 K/UL — SIGNIFICANT CHANGE UP (ref 0–0)
PHOSPHATE SERPL-MCNC: 4.1 MG/DL — SIGNIFICANT CHANGE UP (ref 2.5–4.5)
PHOSPHATE SERPL-MCNC: 4.3 MG/DL — SIGNIFICANT CHANGE UP (ref 2.5–4.5)
PLATELET # BLD AUTO: 268 K/UL — SIGNIFICANT CHANGE UP (ref 150–400)
POTASSIUM SERPL-MCNC: 5 MMOL/L — SIGNIFICANT CHANGE UP (ref 3.5–5.3)
POTASSIUM SERPL-MCNC: 5.4 MMOL/L — HIGH (ref 3.5–5.3)
POTASSIUM SERPL-SCNC: 5 MMOL/L — SIGNIFICANT CHANGE UP (ref 3.5–5.3)
POTASSIUM SERPL-SCNC: 5.4 MMOL/L — HIGH (ref 3.5–5.3)
RBC # BLD: 4.35 M/UL — SIGNIFICANT CHANGE UP (ref 4.2–5.8)
RBC # FLD: 13 % — SIGNIFICANT CHANGE UP (ref 10.3–14.5)
SODIUM SERPL-SCNC: 135 MMOL/L — SIGNIFICANT CHANGE UP (ref 135–145)
SODIUM SERPL-SCNC: 138 MMOL/L — SIGNIFICANT CHANGE UP (ref 135–145)
WBC # BLD: 9.57 K/UL — SIGNIFICANT CHANGE UP (ref 3.8–10.5)
WBC # FLD AUTO: 9.57 K/UL — SIGNIFICANT CHANGE UP (ref 3.8–10.5)

## 2022-10-15 PROCEDURE — 99232 SBSQ HOSP IP/OBS MODERATE 35: CPT | Mod: GC

## 2022-10-15 PROCEDURE — 93010 ELECTROCARDIOGRAM REPORT: CPT

## 2022-10-15 RX ORDER — SODIUM ZIRCONIUM CYCLOSILICATE 10 G/10G
5 POWDER, FOR SUSPENSION ORAL ONCE
Refills: 0 | Status: COMPLETED | OUTPATIENT
Start: 2022-10-15 | End: 2022-10-15

## 2022-10-15 RX ADMIN — SODIUM ZIRCONIUM CYCLOSILICATE 5 GRAM(S): 10 POWDER, FOR SUSPENSION ORAL at 16:00

## 2022-10-15 RX ADMIN — Medication 108 MILLIGRAM(S): at 05:39

## 2022-10-15 RX ADMIN — MYCOPHENOLATE MOFETIL 1000 MILLIGRAM(S): 250 CAPSULE ORAL at 21:57

## 2022-10-15 RX ADMIN — Medication 1 APPLICATION(S): at 17:10

## 2022-10-15 RX ADMIN — Medication 200 MILLIGRAM(S): at 21:56

## 2022-10-15 RX ADMIN — Medication 200 MILLIGRAM(S): at 10:18

## 2022-10-15 RX ADMIN — Medication 1000 UNIT(S): at 10:17

## 2022-10-15 RX ADMIN — PANTOPRAZOLE SODIUM 40 MILLIGRAM(S): 20 TABLET, DELAYED RELEASE ORAL at 06:31

## 2022-10-15 RX ADMIN — ENOXAPARIN SODIUM 40 MILLIGRAM(S): 100 INJECTION SUBCUTANEOUS at 21:57

## 2022-10-15 RX ADMIN — MYCOPHENOLATE MOFETIL 1000 MILLIGRAM(S): 250 CAPSULE ORAL at 10:17

## 2022-10-15 RX ADMIN — Medication 1 APPLICATION(S): at 05:39

## 2022-10-15 NOTE — PROGRESS NOTE ADULT - SUBJECTIVE AND OBJECTIVE BOX
PROGRESS NOTE:     Patient is a 47y old  Male who presents with a chief complaint of SLE flare (14 Oct 2022 15:50)      SUBJECTIVE / OVERNIGHT EVENTS:    ADDITIONAL REVIEW OF SYSTEMS:    MEDICATIONS  (STANDING):  cholecalciferol 1000 Unit(s) Oral daily  enoxaparin Injectable 40 milliGRAM(s) SubCutaneous every 24 hours  hydroxychloroquine 200 milliGRAM(s) Oral two times a day  methylPREDNISolone sodium succinate IVPB 500 milliGRAM(s) IV Intermittent daily  mycophenolate mofetil 1000 milliGRAM(s) Oral two times a day  pantoprazole    Tablet 40 milliGRAM(s) Oral before breakfast  triamcinolone 0.1% Cream 1 Application(s) Topical every 12 hours  trimethoprim  160 mG/sulfamethoxazole 800 mG 1 Tablet(s) Oral <User Schedule>    MEDICATIONS  (PRN):      CAPILLARY BLOOD GLUCOSE        I&O's Summary      PHYSICAL EXAM:  Vital Signs Last 24 Hrs  T(C): 36.4 (15 Oct 2022 05:55), Max: 36.5 (14 Oct 2022 21:54)  T(F): 97.5 (15 Oct 2022 05:55), Max: 97.7 (14 Oct 2022 21:54)  HR: 70 (15 Oct 2022 05:55) (70 - 73)  BP: 140/80 (15 Oct 2022 05:55) (114/68 - 140/80)  BP(mean): --  RR: 18 (15 Oct 2022 05:55) (18 - 18)  SpO2: 100% (15 Oct 2022 05:55) (100% - 100%)    Parameters below as of 15 Oct 2022 05:55  Patient On (Oxygen Delivery Method): room air        CONSTITUTIONAL: NAD, well-developed  RESPIRATORY: Normal respiratory effort; lungs are clear to auscultation bilaterally  CARDIOVASCULAR: Regular rate and rhythm, normal S1 and S2, no murmur/rub/gallop; No lower extremity edema; Peripheral pulses are 2+ bilaterally  ABDOMEN: Nontender to palpation, normoactive bowel sounds, no rebound/guarding  MUSCULOSKELETAL: no clubbing or cyanosis of digits; no joint swelling or tenderness to palpation  PSYCH: A+O to person, place, and time; affect appropriate    LABS:                        11.9   10.69 )-----------( 251      ( 14 Oct 2022 05:31 )             35.5     10-14    139  |  107  |  38<H>  ----------------------------<  78  4.1   |  23  |  1.67<H>    Ca    9.3      14 Oct 2022 12:25  Phos  4.6     10-14  Mg     2.20     10-14    TPro  6.4  /  Alb  2.4<L>  /  TBili  <0.2  /  DBili  x   /  AST  13  /  ALT  13  /  AlkPhos  74  10-13    PT/INR - ( 14 Oct 2022 05:31 )   PT: 10.5 sec;   INR: 0.91 ratio         PTT - ( 14 Oct 2022 05:31 )  PTT:35.3 sec  CARDIAC MARKERS ( 14 Oct 2022 05:31 )  x     / x     / 22 U/L / x     / x          Urinalysis Basic - ( 13 Oct 2022 14:29 )    Color: Light Yellow / Appearance: Clear / S.016 / pH: x  Gluc: x / Ketone: Negative  / Bili: Negative / Urobili: <2 mg/dL   Blood: x / Protein: 300 mg/dL / Nitrite: Negative   Leuk Esterase: Small / RBC: 32 /HPF / WBC 34 /HPF   Sq Epi: x / Non Sq Epi: 4 /HPF / Bacteria: Few        Culture - Blood (collected 13 Oct 2022 15:25)  Source: .Blood Blood-Peripheral  Preliminary Report (14 Oct 2022 19:01):    No growth to date.    Culture - Urine (collected 13 Oct 2022 15:10)  Source: Clean Catch Clean Catch (Midstream)  Final Report (14 Oct 2022 16:03):    <10,000 CFU/mL Normal Urogenital Felecia    Culture - Blood (collected 13 Oct 2022 15:10)  Source: .Blood Blood-Peripheral  Preliminary Report (14 Oct 2022 19:01):    No growth to date.        RADIOLOGY & ADDITIONAL TESTS:  Results Reviewed:   Imaging Personally Reviewed:  Electrocardiogram Personally Reviewed:    COORDINATION OF CARE:  Care Discussed with Consultants/Other Providers [Y/N]:  Prior or Outpatient Records Reviewed [Y/N]:   PROGRESS NOTE:     Patient is a 47y old  Male who presents with a chief complaint of SLE flare (14 Oct 2022 15:50)      SUBJECTIVE / OVERNIGHT EVENTS: No acute events overnight. This AM patient with hyperkalemia, lokelma given. EKG and repeat BMP pending. Patient seen and examined at bedside this AM. Patient feels well with no complaints.     ADDITIONAL REVIEW OF SYSTEMS: Negative     MEDICATIONS  (STANDING):  cholecalciferol 1000 Unit(s) Oral daily  enoxaparin Injectable 40 milliGRAM(s) SubCutaneous every 24 hours  hydroxychloroquine 200 milliGRAM(s) Oral two times a day  methylPREDNISolone sodium succinate IVPB 500 milliGRAM(s) IV Intermittent daily  mycophenolate mofetil 1000 milliGRAM(s) Oral two times a day  pantoprazole    Tablet 40 milliGRAM(s) Oral before breakfast  triamcinolone 0.1% Cream 1 Application(s) Topical every 12 hours  trimethoprim  160 mG/sulfamethoxazole 800 mG 1 Tablet(s) Oral <User Schedule>    MEDICATIONS  (PRN):      CAPILLARY BLOOD GLUCOSE        I&O's Summary      PHYSICAL EXAM:  Vital Signs Last 24 Hrs  T(C): 36.4 (15 Oct 2022 05:55), Max: 36.5 (14 Oct 2022 21:54)  T(F): 97.5 (15 Oct 2022 05:55), Max: 97.7 (14 Oct 2022 21:54)  HR: 70 (15 Oct 2022 05:55) (70 - 73)  BP: 140/80 (15 Oct 2022 05:55) (114/68 - 140/80)  BP(mean): --  RR: 18 (15 Oct 2022 05:55) (18 - 18)  SpO2: 100% (15 Oct 2022 05:55) (100% - 100%)    Parameters below as of 15 Oct 2022 05:55  Patient On (Oxygen Delivery Method): room air    CONSTITUTIONAL: NAD, well-developed, A&Ox3 to person, place, time.  RESPIRATORY: Normal respiratory effort; lungs are clear to auscultation bilaterally  CARDIOVASCULAR: Regular rate and rhythm, normal S1 and S2, no murmur/rub/gallop; No lower extremity edema; Peripheral pulses are 2+ bilaterally  ABDOMEN: Nontender to palpation, no rebound/guarding;  NEURO: CN 2-12 grossly intact, moves all limbs spontaneously  SKIN: slight erythematous rash noted on superior chest, no malar rash    LABS:                        11.9   10.69 )-----------( 251      ( 14 Oct 2022 05:31 )             35.5     10-14    139  |  107  |  38<H>  ----------------------------<  78  4.1   |  23  |  1.67<H>    Ca    9.3      14 Oct 2022 12:25  Phos  4.6     10-14  Mg     2.20     10-14    TPro  6.4  /  Alb  2.4<L>  /  TBili  <0.2  /  DBili  x   /  AST  13  /  ALT  13  /  AlkPhos  74  10-13    PT/INR - ( 14 Oct 2022 05:31 )   PT: 10.5 sec;   INR: 0.91 ratio         PTT - ( 14 Oct 2022 05:31 )  PTT:35.3 sec  CARDIAC MARKERS ( 14 Oct 2022 05:31 )  x     / x     / 22 U/L / x     / x          Urinalysis Basic - ( 13 Oct 2022 14:29 )    Color: Light Yellow / Appearance: Clear / S.016 / pH: x  Gluc: x / Ketone: Negative  / Bili: Negative / Urobili: <2 mg/dL   Blood: x / Protein: 300 mg/dL / Nitrite: Negative   Leuk Esterase: Small / RBC: 32 /HPF / WBC 34 /HPF   Sq Epi: x / Non Sq Epi: 4 /HPF / Bacteria: Few        Culture - Blood (collected 13 Oct 2022 15:25)  Source: .Blood Blood-Peripheral  Preliminary Report (14 Oct 2022 19:01):    No growth to date.    Culture - Urine (collected 13 Oct 2022 15:10)  Source: Clean Catch Clean Catch (Midstream)  Final Report (14 Oct 2022 16:03):    <10,000 CFU/mL Normal Urogenital Felecia    Culture - Blood (collected 13 Oct 2022 15:10)  Source: .Blood Blood-Peripheral  Preliminary Report (14 Oct 2022 19:01):    No growth to date.        RADIOLOGY & ADDITIONAL TESTS:  Results Reviewed:   Imaging Personally Reviewed:  Electrocardiogram Personally Reviewed:    COORDINATION OF CARE:  Care Discussed with Consultants/Other Providers [Y/N]:  Prior or Outpatient Records Reviewed [Y/N]:

## 2022-10-15 NOTE — PROGRESS NOTE ADULT - PROBLEM SELECTOR PLAN 3
Hyperkalemic to serum K 5.9 (not hemolyzed) on admission. No EKG changes.  Likely 2/2 LIZETH  S/p insulin/D50, calcium gluconate, and lokelma in ED w/improvement  - K improved to 4.9 this AM  - temporizing measures and re-dose lokelma as needed  - repeat BMP this PM Hyperkalemic to serum K 5.9 (not hemolyzed) on admission. No EKG changes.  Likely 2/2 LIZETH  S/p insulin/D50, calcium gluconate, and lokelma in ED w/improvement  - hyperkalemic again this AM, lokelma given   - repeat EKG and BMP pending

## 2022-10-15 NOTE — PROGRESS NOTE ADULT - PROBLEM SELECTOR PLAN 4
Mild, with WBC 11.87. Most likely in setting of SLE flare, though can also be in setting of UTI.  - trend CBC daily  - treat for presumed UTI as below Mild, with WBC 11.87. Most likely in setting of SLE flare, though can also be in setting of UTI.  - trend CBC daily  - now resolved

## 2022-10-15 NOTE — PROGRESS NOTE ADULT - PROBLEM SELECTOR PLAN 2
Prior bx revealed class 3 lupus nephritis  SCr 1.0 in March, now 1.7-1.8  Likely 2/2 progression of lupus nephritis, with perhaps some contribution of poor PO intake i/s/o not feeling well the past few days  -  kidney/bladder negative for hydro  - urine lytes: FeNa 0/6%: prerenal  - UPC 2.9  - c/w SLE meds as above  - Renal consult Prior bx revealed class 3 lupus nephritis  SCr 1.0 in March, now 1.75  Likely 2/2 progression of lupus nephritis, with perhaps some contribution of poor PO intake i/s/o not feeling well the past few days  -  kidney/bladder negative for hydro  - urine lytes: FeNa 0/6%: prerenal  - UPC 2.9  - c/w SLE meds as above  - Renal recommending kidney biopsy, IR consulted awaiting scheduling

## 2022-10-15 NOTE — PROGRESS NOTE ADULT - PROBLEM SELECTOR PLAN 1
Suspect SLE flare given pt has been feeling unwell x days and with new cutaneous symptoms  C3 and C4 both low, dsDNA elevated  Reports he has been taking Plaquenil 200mg bid, MMF 1g bid, Prednisone 10mg qd, though compliance questionable, as pt was lost to follow up for months this year  - Rheumatology consult obtained, appreciate recs  - c/w home Plaquenil and MMF, increase Prednisone to 60mg qd as per Rheum  - f/u ESR and CRP  - check FLORIAN, SSA/SSB, and CK per Rheum recs Suspect SLE flare given pt has been feeling unwell x days and with new cutaneous symptoms  C3 and C4 both low, dsDNA elevated  Reports he has been taking Plaquenil 200mg bid, MMF 1g bid, Prednisone 10mg qd, though compliance questionable, as pt was lost to follow up for months this year  - Rheumatology consult obtained, now on pulse dose steroids   - c/w home Plaquenil and MMF

## 2022-10-15 NOTE — PROGRESS NOTE ADULT - PROBLEM SELECTOR PLAN 6
Hgb mildly decreased to 12.6, suspect AoCD i/s/o lupus and now possible CKD from lupus nephritis. No S/S of active bleed.  - monitor H/H  - check iron studies, folate, vitamin B12 Hgb mildly decreased to 12.6, suspect AoCD i/s/o lupus and now possible CKD from lupus nephritis. No S/S of active bleed.  - monitor H/H

## 2022-10-15 NOTE — PROGRESS NOTE ADULT - PROBLEM SELECTOR PLAN 5
UA with small leuk esterase, 34 WBC, few bacteria  Would treat for presumed UTI, as pt has had low-grade fevers, has mild leukocytosis, and is immunocompromised and immunosuppressed  - start IV ceftriaxone 1 g q24hrs x3 days  - f/u UCx UA with small leuk esterase, 34 WBC, few bacteria  - holding off treatment for now as patient asymptomatic

## 2022-10-15 NOTE — PROGRESS NOTE ADULT - ASSESSMENT
48 yo M, Latvian speaking, with SLE (dx Feb 2022, with Class 3 lupus nephritis and mucocutaneous symptoms) sent to ED by outpatient dermatology for fever and worsening rash, c/f SLE flare, found to have new LIZETH.

## 2022-10-16 LAB
ANION GAP SERPL CALC-SCNC: 7 MMOL/L — SIGNIFICANT CHANGE UP (ref 7–14)
ANISOCYTOSIS BLD QL: SIGNIFICANT CHANGE UP
BASOPHILS # BLD AUTO: 0 K/UL — SIGNIFICANT CHANGE UP (ref 0–0.2)
BASOPHILS NFR BLD AUTO: 0 % — SIGNIFICANT CHANGE UP (ref 0–2)
BUN SERPL-MCNC: 42 MG/DL — HIGH (ref 7–23)
CALCIUM SERPL-MCNC: 9 MG/DL — SIGNIFICANT CHANGE UP (ref 8.4–10.5)
CHLORIDE SERPL-SCNC: 108 MMOL/L — HIGH (ref 98–107)
CO2 SERPL-SCNC: 19 MMOL/L — LOW (ref 22–31)
CREAT SERPL-MCNC: 1.5 MG/DL — HIGH (ref 0.5–1.3)
DACRYOCYTES BLD QL SMEAR: SLIGHT — SIGNIFICANT CHANGE UP
EGFR: 57 ML/MIN/1.73M2 — LOW
EOSINOPHIL # BLD AUTO: 0 K/UL — SIGNIFICANT CHANGE UP (ref 0–0.5)
EOSINOPHIL NFR BLD AUTO: 0 % — SIGNIFICANT CHANGE UP (ref 0–6)
FERRITIN SERPL-MCNC: 829 NG/ML — HIGH (ref 30–400)
GIANT PLATELETS BLD QL SMEAR: PRESENT — SIGNIFICANT CHANGE UP
GLUCOSE SERPL-MCNC: 93 MG/DL — SIGNIFICANT CHANGE UP (ref 70–99)
HCT VFR BLD CALC: 38.6 % — LOW (ref 39–50)
HGB BLD-MCNC: 12.5 G/DL — LOW (ref 13–17)
IANC: 18.04 K/UL — HIGH (ref 1.8–7.4)
LYMPHOCYTES # BLD AUTO: 1.06 K/UL — SIGNIFICANT CHANGE UP (ref 1–3.3)
LYMPHOCYTES # BLD AUTO: 5.2 % — LOW (ref 13–44)
MACROCYTES BLD QL: SIGNIFICANT CHANGE UP
MAGNESIUM SERPL-MCNC: 2.1 MG/DL — SIGNIFICANT CHANGE UP (ref 1.6–2.6)
MANUAL SMEAR VERIFICATION: SIGNIFICANT CHANGE UP
MCHC RBC-ENTMCNC: 28.1 PG — SIGNIFICANT CHANGE UP (ref 27–34)
MCHC RBC-ENTMCNC: 32.4 GM/DL — SIGNIFICANT CHANGE UP (ref 32–36)
MCV RBC AUTO: 86.7 FL — SIGNIFICANT CHANGE UP (ref 80–100)
MONOCYTES # BLD AUTO: 0.72 K/UL — SIGNIFICANT CHANGE UP (ref 0–0.9)
MONOCYTES NFR BLD AUTO: 3.5 % — SIGNIFICANT CHANGE UP (ref 2–14)
NEUTROPHILS # BLD AUTO: 18.69 K/UL — HIGH (ref 1.8–7.4)
NEUTROPHILS NFR BLD AUTO: 91.3 % — HIGH (ref 43–77)
PHOSPHATE SERPL-MCNC: 4.2 MG/DL — SIGNIFICANT CHANGE UP (ref 2.5–4.5)
PLAT MORPH BLD: ABNORMAL
PLATELET # BLD AUTO: 261 K/UL — SIGNIFICANT CHANGE UP (ref 150–400)
PLATELET COUNT - ESTIMATE: NORMAL — SIGNIFICANT CHANGE UP
POTASSIUM SERPL-MCNC: 4.6 MMOL/L — SIGNIFICANT CHANGE UP (ref 3.5–5.3)
POTASSIUM SERPL-SCNC: 4.6 MMOL/L — SIGNIFICANT CHANGE UP (ref 3.5–5.3)
RBC # BLD: 4.45 M/UL — SIGNIFICANT CHANGE UP (ref 4.2–5.8)
RBC # FLD: 13.1 % — SIGNIFICANT CHANGE UP (ref 10.3–14.5)
RBC BLD AUTO: ABNORMAL
SARS-COV-2 RNA SPEC QL NAA+PROBE: SIGNIFICANT CHANGE UP
SODIUM SERPL-SCNC: 134 MMOL/L — LOW (ref 135–145)
WBC # BLD: 20.47 K/UL — HIGH (ref 3.8–10.5)
WBC # FLD AUTO: 20.47 K/UL — HIGH (ref 3.8–10.5)

## 2022-10-16 PROCEDURE — 99232 SBSQ HOSP IP/OBS MODERATE 35: CPT | Mod: GC

## 2022-10-16 RX ADMIN — Medication 200 MILLIGRAM(S): at 10:45

## 2022-10-16 RX ADMIN — Medication 200 MILLIGRAM(S): at 21:30

## 2022-10-16 RX ADMIN — Medication 108 MILLIGRAM(S): at 05:44

## 2022-10-16 RX ADMIN — MYCOPHENOLATE MOFETIL 1000 MILLIGRAM(S): 250 CAPSULE ORAL at 21:29

## 2022-10-16 RX ADMIN — Medication 1 APPLICATION(S): at 18:16

## 2022-10-16 RX ADMIN — Medication 1000 UNIT(S): at 10:45

## 2022-10-16 RX ADMIN — MYCOPHENOLATE MOFETIL 1000 MILLIGRAM(S): 250 CAPSULE ORAL at 10:45

## 2022-10-16 RX ADMIN — Medication 1 APPLICATION(S): at 05:45

## 2022-10-16 RX ADMIN — PANTOPRAZOLE SODIUM 40 MILLIGRAM(S): 20 TABLET, DELAYED RELEASE ORAL at 06:15

## 2022-10-16 NOTE — PROGRESS NOTE ADULT - PROBLEM SELECTOR PLAN 6
Hgb mildly decreased to 12.6, suspect AoCD i/s/o lupus and now possible CKD from lupus nephritis. No S/S of active bleed.  - monitor H/H

## 2022-10-16 NOTE — PROGRESS NOTE ADULT - ASSESSMENT
48 yo M, Syriac speaking, with SLE (dx Feb 2022, with Class 3 lupus nephritis and mucocutaneous symptoms) sent to ED by outpatient dermatology for fever and worsening rash, c/f SLE flare, found to have new LIZETH.

## 2022-10-16 NOTE — PROGRESS NOTE ADULT - PROBLEM SELECTOR PLAN 5
UA with small leuk esterase, 34 WBC, few bacteria  - holding off treatment for now as patient asymptomatic

## 2022-10-16 NOTE — PROGRESS NOTE ADULT - SUBJECTIVE AND OBJECTIVE BOX
***************************************************************  Lucien Medley, PGY1  Internal Medicine   TEAMS Preferred  [facility] Pager [phone]  [facility] Pager [phone]  ***************************************************************    LIZ HOUSER  47y  MRN: [id number]  10-13-22 (3d)    Patient is a 47y old  Male who presents with a chief complaint of SLE flare (15 Oct 2022 07:42)      SUBJECTIVE / OVERNIGHT EVENTS:   No acute overnight events. Pt seen and examined at bedside. Denies fevers, chills, CP, SOB, Abdominal pain, N/V, Constipation, Diarrhea. Last BM     12 Point ROS negative with the exception of the above    MEDICATIONS  (STANDING):  cholecalciferol 1000 Unit(s) Oral daily  enoxaparin Injectable 40 milliGRAM(s) SubCutaneous every 24 hours  hydroxychloroquine 200 milliGRAM(s) Oral two times a day  methylPREDNISolone sodium succinate IVPB 500 milliGRAM(s) IV Intermittent daily  mycophenolate mofetil 1000 milliGRAM(s) Oral two times a day  pantoprazole    Tablet 40 milliGRAM(s) Oral before breakfast  triamcinolone 0.1% Cream 1 Application(s) Topical every 12 hours  trimethoprim  160 mG/sulfamethoxazole 800 mG 1 Tablet(s) Oral <User Schedule>    MEDICATIONS  (PRN):      OBJECTIVE:  Vital Signs Last 24 Hrs  T(C): 36.6 (16 Oct 2022 05:31), Max: 36.7 (15 Oct 2022 17:05)  T(F): 97.9 (16 Oct 2022 05:31), Max: 98.1 (15 Oct 2022 17:05)  HR: 65 (16 Oct 2022 05:31) (62 - 78)  BP: 121/73 (16 Oct 2022 05:31) (108/61 - 121/73)  BP(mean): --  RR: 17 (16 Oct 2022 05:31) (17 - 18)  SpO2: 100% (16 Oct 2022 05:31) (100% - 100%)    Parameters below as of 16 Oct 2022 05:31  Patient On (Oxygen Delivery Method): room air        I&O's Summary    15 Oct 2022 07:01  -  16 Oct 2022 06:40  --------------------------------------------------------  IN: 1300 mL / OUT: 0 mL / NET: 1300 mL        PHYSICAL EXAM:  GENERAL: Laying comfortably, NAD  HEENT: NCAT, PERRLA, EOMI, no scleral icterus, no LAD  NECK: No JVD  LUNG: CTABL; No wheezes, crackles, or rhonchi  HEART: RRR; normal S1/S2; No murmurs, rubs, or gallops  ABDOMEN: +BS, soft, nontender, nondistended, no HSM; No rebound, guarding, or rigidity  EXTREMITIES:  No LE edema b/l, 2+ Peripheral Pulses, No clubbing or cyanosis  NEUROLOGY: AOx3, non-focal, strength 5/5 in all extremities, sensation intact  PSYCH: calm and cooperative  SKIN: No rashes or lesions    LABS:                        12.6   9.57  )-----------( 268      ( 15 Oct 2022 06:20 )             38.2     Auto Eosinophil # 0.00  / Auto Eosinophil % 0.0   / Auto Neutrophil # 8.17  / Auto Neutrophil % 85.4  / BANDS % x        10-15    135  |  109<H>  |  39<H>  ----------------------------<  163<H>  5.0   |  15<L>  |  1.72<H>  10-15    138  |  108<H>  |  39<H>  ----------------------------<  123<H>  5.4<H>   |  21<L>  |  1.75<H>  10-14    139  |  107  |  38<H>  ----------------------------<  78  4.1   |  23  |  1.67<H>    Ca    8.6      15 Oct 2022 10:29  Ca    9.0      15 Oct 2022 06:20  Ca    9.3      14 Oct 2022 12:25  Phos  4.3     10-15  Mg     2.00     10-15    TPro  6.4  /  Alb  2.4<L>  /  TBili  <0.2  /  DBili  x   /  AST  13  /  ALT  13  /  AlkPhos  74  10-13  TPro  7.3  /  Alb  2.9<L>  /  TBili  <0.2  /  DBili  x   /  AST  16  /  ALT  14  /  AlkPhos  83  10-13  TPro  6.9  /  Alb  2.5<L>  /  TBili  <0.2  /  DBili  x   /  AST  15  /  ALT  14  /  AlkPhos  75  10-13                CAPILLARY BLOOD GLUCOSE            RADIOLOGY & ADDITIONAL TESTS:     ***************************************************************  Lucien Medley, PGY1  Internal Medicine   TEAMS Preferred  [facility] Pager [phone]  [facility] Pager [phone]  ***************************************************************    LIZ HOUSER  47y  MRN: [id number]  10-13-22 (3d)    Patient is a 47y old  Male who presents with a chief complaint of SLE flare (15 Oct 2022 07:42)      SUBJECTIVE / OVERNIGHT EVENTS:   Anca  - [id number], Bret  No acute overnight events. Pt seen and examined at bedside. Denies fevers, chills, CP, SOB, Abdominal pain, N/V, Constipation, Diarrhea. Feels well overall. Continues to report a non-pruritic, non-painful rash on his chest and cheeks.     12 Point ROS negative with the exception of the above    MEDICATIONS  (STANDING):  cholecalciferol 1000 Unit(s) Oral daily  enoxaparin Injectable 40 milliGRAM(s) SubCutaneous every 24 hours  hydroxychloroquine 200 milliGRAM(s) Oral two times a day  methylPREDNISolone sodium succinate IVPB 500 milliGRAM(s) IV Intermittent daily  mycophenolate mofetil 1000 milliGRAM(s) Oral two times a day  pantoprazole    Tablet 40 milliGRAM(s) Oral before breakfast  triamcinolone 0.1% Cream 1 Application(s) Topical every 12 hours  trimethoprim  160 mG/sulfamethoxazole 800 mG 1 Tablet(s) Oral <User Schedule>    MEDICATIONS  (PRN):      OBJECTIVE:  Vital Signs Last 24 Hrs  T(C): 36.6 (16 Oct 2022 05:31), Max: 36.7 (15 Oct 2022 17:05)  T(F): 97.9 (16 Oct 2022 05:31), Max: 98.1 (15 Oct 2022 17:05)  HR: 65 (16 Oct 2022 05:31) (62 - 78)  BP: 121/73 (16 Oct 2022 05:31) (108/61 - 121/73)  BP(mean): --  RR: 17 (16 Oct 2022 05:31) (17 - 18)  SpO2: 100% (16 Oct 2022 05:31) (100% - 100%)    Parameters below as of 16 Oct 2022 05:31  Patient On (Oxygen Delivery Method): room air        I&O's Summary    15 Oct 2022 07:01  -  16 Oct 2022 06:40  --------------------------------------------------------  IN: 1300 mL / OUT: 0 mL / NET: 1300 mL        PHYSICAL EXAM:  GENERAL: Laying comfortably, NAD  HEENT: NCAT, PERRLA, EOMI, no scleral icterus, no LAD  LUNG: CTABL; No wheezes, crackles, or rhonchi  HEART: RRR; normal S1/S2; No murmurs, rubs, or gallops  ABDOMEN: +BS, soft, nontender, nondistended  EXTREMITIES:  No LE edema b/l, 2+ Peripheral Pulses, No clubbing or cyanosis  NEUROLOGY: AOx3, non-focal, moves all limbs spontaneously, sensation intact  PSYCH: calm and cooperative  SKIN: +rash on bilateral cheeks and mid chest    LABS:                          12.5   20.47 )-----------( 261      ( 16 Oct 2022 08:00 )             38.6                       12.6   9.57  )-----------( 268      ( 15 Oct 2022 06:20 )             38.2     Auto Eosinophil # 0.00  / Auto Eosinophil % 0.0   / Auto Neutrophil # 8.17  / Auto Neutrophil % 85.4  / BANDS % x      10-16    134<L>  |  108<H>  |  42<H>  ----------------------------<  93  4.6   |  19<L>  |  1.50<H>    Ca    9.0      16 Oct 2022 08:00  Phos  4.2     10-16  Mg     2.10     10-16      10-15    135  |  109<H>  |  39<H>  ----------------------------<  163<H>  5.0   |  15<L>  |  1.72<H>  10-15    138  |  108<H>  |  39<H>  ----------------------------<  123<H>  5.4<H>   |  21<L>  |  1.75<H>  10-14    139  |  107  |  38<H>  ----------------------------<  78  4.1   |  23  |  1.67<H>    Ca    8.6      15 Oct 2022 10:29  Ca    9.0      15 Oct 2022 06:20  Ca    9.3      14 Oct 2022 12:25  Phos  4.3     10-15  Mg     2.00     10-15    TPro  6.4  /  Alb  2.4<L>  /  TBili  <0.2  /  DBili  x   /  AST  13  /  ALT  13  /  AlkPhos  74  10-13  TPro  7.3  /  Alb  2.9<L>  /  TBili  <0.2  /  DBili  x   /  AST  16  /  ALT  14  /  AlkPhos  83  10-13  TPro  6.9  /  Alb  2.5<L>  /  TBili  <0.2  /  DBili  x   /  AST  15  /  ALT  14  /  AlkPhos  75  10-13

## 2022-10-16 NOTE — PROGRESS NOTE ADULT - PROBLEM SELECTOR PLAN 1
Suspect SLE flare given pt has been feeling unwell x days and with new cutaneous symptoms  C3 and C4 both low, dsDNA elevated  Reports he has been taking Plaquenil 200mg bid, MMF 1g bid, Prednisone 10mg qd, though compliance questionable, as pt was lost to follow up for months this year  - Rheumatology consult obtained, now on pulse dose steroids   - c/w home Plaquenil and MMF Suspect SLE flare given pt has been feeling unwell x days and with new cutaneous symptoms  C3 and C4 both low, dsDNA elevated  Reports he has been taking Plaquenil 200mg bid, MMF 1g bid, Prednisone 10mg qd, though compliance questionable, as pt was lost to follow up for months this year  - Rheumatology consult obtained, now on pulse dose steroids   - c/w home Plaquenil MMF

## 2022-10-16 NOTE — CONSULT NOTE ADULT - SUBJECTIVE AND OBJECTIVE BOX
Vascular & Interventional Radiology Brief Consult Note    Evaluate for Procedure: Renal biopsyu    HPI: 47y Male with renal failure and proteinuria in the setting of lupus nephritis. Nephrology requesting renal biopsy.    Allergies:   Medications (Abx/Cardiac/Anticoagulation/Blood Products)    enoxaparin Injectable: 40 milliGRAM(s) SubCutaneous (10-15 @ 21:57)  hydroxychloroquine: 200 milliGRAM(s) Oral (10-15 @ 21:56)    Data:    T(C): 36.6  HR: 65  BP: 121/73  RR: 17  SpO2: 100%    -WBC 20.47 / HgB 12.5 / Hct 38.6 / Plt 261  -Na 134 / Cl 108 / BUN 42 / Glucose 93  -K 4.6 / CO2 19 / Cr 1.50  -ALT -- / Alk Phos -- / T.Bili --  -INR0.91    Imaging: Reviewed    Assessment: 47y Male with renal failure and proteinuria in the setting of lupus nephritis. Nephrology requesting renal biopsy.    Plan:  - Can tentatively perform renal biopsy on Monday 10/17 provided patient no medical or scheduling conflicts arise.  - Please place order for IR Procedure, approving attending Dr. Mendoza  - NPO past midnight  - hold therapeutic and prophylactic anticoagulants  - maintain active type and screen x 2  - maintain active covid screening  - Please draw AM labs - CBC/INR/BMP    Valentin Linares MD  Interventional Radiology PGY6    -EMERGENT: [facility] IR Pager: [phone].  [facility] IR Pager: [phone]  -Nonemergent consults:  [facility] order "Consult- Interventional Radiology"  -Available on [employer] TEAMS for questions

## 2022-10-16 NOTE — PROGRESS NOTE ADULT - PROBLEM SELECTOR PLAN 4
Mild, with WBC 11.87. Most likely in setting of SLE flare, though can also be in setting of UTI.  - trend CBC daily  - now resolved

## 2022-10-16 NOTE — PROGRESS NOTE ADULT - PROBLEM SELECTOR PLAN 2
Prior bx revealed class 3 lupus nephritis  SCr 1.0 in March, now 1.75  Likely 2/2 progression of lupus nephritis, with perhaps some contribution of poor PO intake i/s/o not feeling well the past few days  -  kidney/bladder negative for hydro  - urine lytes: FeNa 0/6%: prerenal  - UPC 2.9  - c/w SLE meds as above  - Renal recommending kidney biopsy, IR consulted awaiting scheduling Prior bx revealed class 3 lupus nephritis  SCr 1.0 in March, now 1.75  Likely 2/2 progression of lupus nephritis, with perhaps some contribution of poor PO intake i/s/o not feeling well the past few days  - US kidney/bladder negative for hydro  - urine lytes: FeNa 0/6%: prerenal  - UPC 2.9  - c/w SLE meds as above  - Renal recommending kidney biopsy  - plan for possible IR renal biopsy 10/17 - NPO after MN, am labs, T&S, holding lovenox

## 2022-10-16 NOTE — PROGRESS NOTE ADULT - PROBLEM SELECTOR PLAN 7
DVT ppx: Lovenox SQ, as pt with elevated risk of VTE given SLE  Diet: Regular halal  Dispo: home  Code status: FULL DVT ppx: lovenox held tonight in anticipation of renal biopsy tomorrow, will restart after biopsy given elevated risk iso SLE  Diet: Regular halal  Dispo: home  Code status: FULL

## 2022-10-16 NOTE — PROGRESS NOTE ADULT - PROBLEM SELECTOR PLAN 3
Hyperkalemic to serum K 5.9 (not hemolyzed) on admission. No EKG changes.  Likely 2/2 LIZETH  S/p insulin/D50, calcium gluconate, and lokelma in ED w/improvement  - hyperkalemic again this AM, lokelma given   - repeat EKG and BMP pending Hyperkalemic to serum K 5.9 (not hemolyzed) on admission. No EKG changes.  Likely 2/2 LIZETH  S/p insulin/D50, calcium gluconate, and lokelma in ED w/improvement  - hyperkalemic again 10/15, lokelma given   - K+ 4.6 on 10/16  - trend BMP

## 2022-10-17 ENCOUNTER — RESULT REVIEW (OUTPATIENT)
Age: 47
End: 2022-10-17

## 2022-10-17 LAB
ANION GAP SERPL CALC-SCNC: 7 MMOL/L — SIGNIFICANT CHANGE UP (ref 7–14)
APTT BLD: 27.9 SEC — SIGNIFICANT CHANGE UP (ref 27–36.3)
BASOPHILS # BLD AUTO: 0.03 K/UL — SIGNIFICANT CHANGE UP (ref 0–0.2)
BASOPHILS NFR BLD AUTO: 0.2 % — SIGNIFICANT CHANGE UP (ref 0–2)
BLD GP AB SCN SERPL QL: NEGATIVE — SIGNIFICANT CHANGE UP
BUN SERPL-MCNC: 42 MG/DL — HIGH (ref 7–23)
CALCIUM SERPL-MCNC: 9.3 MG/DL — SIGNIFICANT CHANGE UP (ref 8.4–10.5)
CHLORIDE SERPL-SCNC: 108 MMOL/L — HIGH (ref 98–107)
CO2 SERPL-SCNC: 21 MMOL/L — LOW (ref 22–31)
CREAT SERPL-MCNC: 1.42 MG/DL — HIGH (ref 0.5–1.3)
EGFR: 61 ML/MIN/1.73M2 — SIGNIFICANT CHANGE UP
EOSINOPHIL # BLD AUTO: 0 K/UL — SIGNIFICANT CHANGE UP (ref 0–0.5)
EOSINOPHIL NFR BLD AUTO: 0 % — SIGNIFICANT CHANGE UP (ref 0–6)
GAMMA INTERFERON BACKGROUND BLD IA-ACNC: 0.02 IU/ML — SIGNIFICANT CHANGE UP
GLUCOSE SERPL-MCNC: 91 MG/DL — SIGNIFICANT CHANGE UP (ref 70–99)
GRAM STN FLD: SIGNIFICANT CHANGE UP
HCT VFR BLD CALC: 39.2 % — SIGNIFICANT CHANGE UP (ref 39–50)
HGB BLD-MCNC: 12.9 G/DL — LOW (ref 13–17)
IANC: 15.54 K/UL — HIGH (ref 1.8–7.4)
IMM GRANULOCYTES NFR BLD AUTO: 0.8 % — SIGNIFICANT CHANGE UP (ref 0–0.9)
INR BLD: <0.9 RATIO — SIGNIFICANT CHANGE UP (ref 0.88–1.16)
LYMPHOCYTES # BLD AUTO: 1.55 K/UL — SIGNIFICANT CHANGE UP (ref 1–3.3)
LYMPHOCYTES # BLD AUTO: 8.3 % — LOW (ref 13–44)
M TB IFN-G BLD-IMP: ABNORMAL
M TB IFN-G CD4+ BCKGRND COR BLD-ACNC: 0 IU/ML — SIGNIFICANT CHANGE UP
M TB IFN-G CD4+CD8+ BCKGRND COR BLD-ACNC: 0 IU/ML — SIGNIFICANT CHANGE UP
MAGNESIUM SERPL-MCNC: 1.9 MG/DL — SIGNIFICANT CHANGE UP (ref 1.6–2.6)
MCHC RBC-ENTMCNC: 28.3 PG — SIGNIFICANT CHANGE UP (ref 27–34)
MCHC RBC-ENTMCNC: 32.9 GM/DL — SIGNIFICANT CHANGE UP (ref 32–36)
MCV RBC AUTO: 86 FL — SIGNIFICANT CHANGE UP (ref 80–100)
MONOCYTES # BLD AUTO: 1.35 K/UL — HIGH (ref 0–0.9)
MONOCYTES NFR BLD AUTO: 7.3 % — SIGNIFICANT CHANGE UP (ref 2–14)
NEUTROPHILS # BLD AUTO: 15.54 K/UL — HIGH (ref 1.8–7.4)
NEUTROPHILS NFR BLD AUTO: 83.4 % — HIGH (ref 43–77)
NRBC # BLD: 0 /100 WBCS — SIGNIFICANT CHANGE UP (ref 0–0)
NRBC # FLD: 0 K/UL — SIGNIFICANT CHANGE UP (ref 0–0)
PHOSPHATE SERPL-MCNC: 3.6 MG/DL — SIGNIFICANT CHANGE UP (ref 2.5–4.5)
PLATELET # BLD AUTO: 324 K/UL — SIGNIFICANT CHANGE UP (ref 150–400)
POTASSIUM SERPL-MCNC: 4.7 MMOL/L — SIGNIFICANT CHANGE UP (ref 3.5–5.3)
POTASSIUM SERPL-SCNC: 4.7 MMOL/L — SIGNIFICANT CHANGE UP (ref 3.5–5.3)
PROTHROM AB SERPL-ACNC: 10.2 SEC — LOW (ref 10.5–13.4)
QUANT TB PLUS MITOGEN MINUS NIL: 0.19 IU/ML — SIGNIFICANT CHANGE UP
RBC # BLD: 4.56 M/UL — SIGNIFICANT CHANGE UP (ref 4.2–5.8)
RBC # FLD: 12.9 % — SIGNIFICANT CHANGE UP (ref 10.3–14.5)
RH IG SCN BLD-IMP: POSITIVE — SIGNIFICANT CHANGE UP
SODIUM SERPL-SCNC: 136 MMOL/L — SIGNIFICANT CHANGE UP (ref 135–145)
WBC # BLD: 18.61 K/UL — HIGH (ref 3.8–10.5)
WBC # FLD AUTO: 18.61 K/UL — HIGH (ref 3.8–10.5)

## 2022-10-17 PROCEDURE — 88350 IMFLUOR EA ADDL 1ANTB STN PX: CPT | Mod: 26

## 2022-10-17 PROCEDURE — 77012 CT SCAN FOR NEEDLE BIOPSY: CPT | Mod: 26

## 2022-10-17 PROCEDURE — 99232 SBSQ HOSP IP/OBS MODERATE 35: CPT | Mod: GC

## 2022-10-17 PROCEDURE — 88348 ELECTRON MICROSCOPY DX: CPT | Mod: 26

## 2022-10-17 PROCEDURE — 88305 TISSUE EXAM BY PATHOLOGIST: CPT | Mod: 26

## 2022-10-17 PROCEDURE — 88346 IMFLUOR 1ST 1ANTB STAIN PX: CPT | Mod: 26

## 2022-10-17 PROCEDURE — 50200 RENAL BIOPSY PERQ: CPT | Mod: LT

## 2022-10-17 PROCEDURE — 88313 SPECIAL STAINS GROUP 2: CPT | Mod: 26

## 2022-10-17 PROCEDURE — 99233 SBSQ HOSP IP/OBS HIGH 50: CPT | Mod: GC

## 2022-10-17 RX ADMIN — Medication 108 MILLIGRAM(S): at 06:00

## 2022-10-17 RX ADMIN — Medication 60 MILLIGRAM(S): at 06:00

## 2022-10-17 RX ADMIN — Medication 1000 UNIT(S): at 12:32

## 2022-10-17 RX ADMIN — MYCOPHENOLATE MOFETIL 1000 MILLIGRAM(S): 250 CAPSULE ORAL at 10:18

## 2022-10-17 RX ADMIN — MYCOPHENOLATE MOFETIL 1000 MILLIGRAM(S): 250 CAPSULE ORAL at 22:01

## 2022-10-17 RX ADMIN — Medication 200 MILLIGRAM(S): at 22:02

## 2022-10-17 RX ADMIN — PANTOPRAZOLE SODIUM 40 MILLIGRAM(S): 20 TABLET, DELAYED RELEASE ORAL at 06:00

## 2022-10-17 RX ADMIN — Medication 1 APPLICATION(S): at 06:01

## 2022-10-17 RX ADMIN — Medication 1 TABLET(S): at 07:33

## 2022-10-17 RX ADMIN — Medication 1 APPLICATION(S): at 17:12

## 2022-10-17 RX ADMIN — Medication 200 MILLIGRAM(S): at 10:19

## 2022-10-17 NOTE — PROGRESS NOTE ADULT - PROBLEM SELECTOR PLAN 1
Suspect SLE flare given pt has been feeling unwell x days and with new cutaneous symptoms  C3 and C4 both low, dsDNA elevated  Reports he has been taking Plaquenil 200mg bid, MMF 1g bid, Prednisone 10mg qd, though compliance questionable, as pt was lost to follow up for months this year  - Rheumatology consult obtained, pulse steroids (10/14 - 10/16)   - transition to Prednisone 60mg today   - f/u rheum recs for steroid taper   - on bactrim for pcp prophylaxis   - c/w home Plaquenil and cellcept

## 2022-10-17 NOTE — PRE PROCEDURE NOTE - PRE PROCEDURE EVALUATION
PRE-INTERVENTIONAL RADIOLOGY PROCEDURE NOTE  Patient Age: 47y    Patient Gender: Male    Procedure:  renal bx    Diagnosis/Indication: Lupus nephritis    Interventional Radiology Attending Physician: Dr. Mendoza    Ordering Attending Physician: Dr. Finley    Pertinent Medical History: SLE, LIZETH    Pertinent labs:                      12.9   18.61 )-----------( 324      ( 17 Oct 2022 05:22 )             39.2       10-17    136  |  108<H>  |  42<H>  ----------------------------<  91  4.7   |  21<L>  |  1.42<H>    Ca    9.3      17 Oct 2022 05:22  Phos  3.6     10-17  Mg     1.90     10-17    PT/INR - ( 17 Oct 2022 05:22 )   PT: 10.2 sec;   INR: <0.90 ratio     PTT - ( 17 Oct 2022 05:22 )  PTT:27.9 sec    Patient and Family Aware ? Yes

## 2022-10-17 NOTE — PROGRESS NOTE ADULT - PROBLEM SELECTOR PLAN 3
Hyperkalemic to serum K 5.9 (not hemolyzed) on admission. No EKG changes.  Likely 2/2 LIZETH  S/p insulin/D50, calcium gluconate, and lokelma in ED w/improvement  - hyperkalemic again 10/15, lokelma given   - K+ 4.6 on 10/16, K+ stable 4.7 on 10/17  - trend BMP

## 2022-10-17 NOTE — PROGRESS NOTE ADULT - SUBJECTIVE AND OBJECTIVE BOX
LIZ MIK  [id number]    INTERVAL HPI/OVERNIGHT EVENTS:        PMHx/PSHx/FamHx/SocHx reviewed and no significant changes    REVIEW OF SYSTEMS   - reviewed with patient and  negative other than as above or previously documented.     MEDICATIONS  (STANDING):  cholecalciferol 1000 Unit(s) Oral daily  hydroxychloroquine 200 milliGRAM(s) Oral two times a day  mycophenolate mofetil 1000 milliGRAM(s) Oral two times a day  pantoprazole    Tablet 40 milliGRAM(s) Oral before breakfast  predniSONE   Tablet 60 milliGRAM(s) Oral daily  triamcinolone 0.1% Cream 1 Application(s) Topical every 12 hours  trimethoprim  160 mG/sulfamethoxazole 800 mG 1 Tablet(s) Oral <User Schedule>    MEDICATIONS  (PRN):      Allergies    No Known Allergies    Intolerances          Vital Signs Last 24 Hrs  T(C): 36.8 (17 Oct 2022 12:29), Max: 36.9 (16 Oct 2022 13:00)  T(F): 98.2 (17 Oct 2022 12:29), Max: 98.5 (16 Oct 2022 13:00)  HR: 69 (17 Oct 2022 12:29) (60 - 69)  BP: 113/78 (17 Oct 2022 12:29) (113/78 - 145/84)  BP(mean): --  RR: 18 (17 Oct 2022 12:29) (17 - 18)  SpO2: 99% (17 Oct 2022 12:29) (99% - 100%)    Parameters below as of 17 Oct 2022 12:29  Patient On (Oxygen Delivery Method): room air        Physical Exam:  General: NAD  HEENT: EOMI, MMM  Cardio: +S1/S2, RRR  Resp: CTA b/l  GI: +BS, soft, NT/ND  MSK:  Neuro: AAOx3  Psych: wnl    LABS:                        12.9   18.61 )-----------( 324      ( 17 Oct 2022 05:22 )             39.2     10-17    136  |  108<H>  |  42<H>  ----------------------------<  91  4.7   |  21<L>  |  1.42<H>    Ca    9.3      17 Oct 2022 05:22  Phos  3.6     10-17  Mg     1.90     10-17      PT/INR - ( 17 Oct 2022 05:22 )   PT: 10.2 sec;   INR: <0.90 ratio         PTT - ( 17 Oct 2022 05:22 )  PTT:27.9 sec        RADIOLOGY & ADDITIONAL TESTS:       LIZ HOUSER  [id number]    [facility]  ID: [id number]    INTERVAL HPI/OVERNIGHT EVENTS: pt was seen s/p renal bx, cutaneous rash on face and upper back now resolved. NO joint complains. Denies fever, chills, SOB       PMHx/PSHx/FamHx/SocHx reviewed and no significant changes    REVIEW OF SYSTEMS   - reviewed with patient and  negative other than as above or previously documented.     MEDICATIONS  (STANDING):  cholecalciferol 1000 Unit(s) Oral daily  hydroxychloroquine 200 milliGRAM(s) Oral two times a day  mycophenolate mofetil 1000 milliGRAM(s) Oral two times a day  pantoprazole    Tablet 40 milliGRAM(s) Oral before breakfast  predniSONE   Tablet 60 milliGRAM(s) Oral daily  triamcinolone 0.1% Cream 1 Application(s) Topical every 12 hours  trimethoprim  160 mG/sulfamethoxazole 800 mG 1 Tablet(s) Oral <User Schedule>    MEDICATIONS  (PRN):  Allergies  No Known Allergies  Intolerances    Vital Signs Last 24 Hrs  T(C): 36.8 (17 Oct 2022 12:29), Max: 36.9 (16 Oct 2022 13:00)  T(F): 98.2 (17 Oct 2022 12:29), Max: 98.5 (16 Oct 2022 13:00)  HR: 69 (17 Oct 2022 12:29) (60 - 69)  BP: 113/78 (17 Oct 2022 12:29) (113/78 - 145/84)  BP(mean): --  RR: 18 (17 Oct 2022 12:29) (17 - 18)  SpO2: 99% (17 Oct 2022 12:29) (99% - 100%)    Parameters below as of 17 Oct 2022 12:29  Patient On (Oxygen Delivery Method): room air    Physical Exam:  General: NAD  HEENT: EOMI, MMM  Cardio: +S1/S2, RRR  Resp: CTA b/l  GI: +BS, soft, NT/ND  MSK: no synovitis, joints NTP    Neuro: AAOx3  Ext: no pitting edema    Skin: faint erythematous rash around temporal artery area     LABS:                        12.9   18.61 )-----------( 324      ( 17 Oct 2022 05:22 )             39.2     10-17    136  |  108<H>  |  42<H>  ----------------------------<  91  4.7   |  21<L>  |  1.42<H>    Ca    9.3      17 Oct 2022 05:22  Phos  3.6     10-17  Mg     1.90     10-17      PT/INR - ( 17 Oct 2022 05:22 )   PT: 10.2 sec;   INR: <0.90 ratio    PTT - ( 17 Oct 2022 05:22 )  PTT:27.9 sec    RADIOLOGY & ADDITIONAL TESTS:

## 2022-10-17 NOTE — PROGRESS NOTE ADULT - ASSESSMENT
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 Pt received prednisone 40mg qD x2, and prednisone 60mg x1 in the ED. Rash appears to have improved.     Feb 2022 serology:    MÓNICA 1:1280 (speckled) and MÓNICA titer 2 1:640 (homogenous.)   Myomarker panel 3: weakly positive U2 RNP, weakly positive Anti-Pm/Scl, strongly positive  U1 RNP, strongly positive SSA/Ro 52, CK wnl   C3/C4 LOW, DsDNA > 1000  Sm, RNP, SSA/SSB> 8  UA: Large blood  Prt/Crea 0.8  APS labs negative      # SLE with Class 3 lupus nephritis and mucocutaneous symptoms, now presenting with LIZETH    - home medications: Cellcept 1000mg BID and HCQ 200mg BID,  but questionable compliance, last prescription was march 2022  - per chart review, lupus serologies from 10/12/22 still showed high lupus activity, dsDNA 834, and low C3, C4 complement  - presented to Dermatology for low grade fever and rash x 1 week    - Pt received prednisone 40mg qD x2, and prednisone 60mg x1 in the ED. Rash appears to have improved    # LIZETH   - baseline Cr 0.8, currently 1.6-1.7 with hyperkalemia, concern for worsening lupus nephritis  - repeat Urine P/Cr 2.9 (was 0.8 in Feb 2022), never been pulsed  - renal US negative for hydronephrosis    - s/p renal bx on 10/17      Plan:    - S/p Solu-medrol 500mg x 3 days (10/14- 10/16), continue prednisone 60mg (10/17- )    - pt tolerated Cellcept 2g daily well, plan to increase Cellcept 3g daily starting tomorrow   - hep panel negative in Feb 2022  - quant- gold indeterminate (however checked after outpt prednisone 40mg was started), CXR clear   - start Bactrim for PCP prophylaxis, protonix for GI prophylaxis, vitamin D for bone health   - discussed with pt in detail that he needs routine f/u visit in clinic    - will get need to obtain a prelim renal bx before pt is discharged, if bx significantly worse, consider Cytoxan vs Obinutuzumab     Plan discussed with attending Dr. Tani Nina, PGY-5  Rheumatology Fellow   Pager: [phone], also available on TEAMS   please enter a full 10 digit number for call back   During weekends, please page on call fellow

## 2022-10-17 NOTE — PROGRESS NOTE ADULT - ASSESSMENT
48 yo M, Turkish speaking, with SLE (dx Feb 2022, with Class 3 lupus nephritis and mucocutaneous symptoms) sent to ED by outpatient dermatology for fever and worsening rash, c/f SLE flare, found to have new LIZETH. LIZETH currently improving on steroids, pending IR kidney biopsy.

## 2022-10-17 NOTE — PROGRESS NOTE ADULT - PROBLEM SELECTOR PLAN 2
Prior bx revealed class 3 lupus nephritis  SCr 1.0 in March, now 1.42  Likely 2/2 progression of lupus nephritis  - US kidney/bladder negative for hydro  - urine lytes: FeNa 0/6%: prerenal  - UPC 2.9  - c/w SLE meds as above  - Renal recommending kidney biopsy  - plan for  IR renal biopsy 10/17 - holding lovenox, restart after biopsy Prior bx revealed class 3 lupus nephritis  SCr 1.0 in March, now 1.42  Likely 2/2 progression of lupus nephritis  - US kidney/bladder negative for hydro  - urine lytes: FeNa 0/6%: prerenal  - UPC 2.9  - c/w SLE meds as above  - Renal recommending kidney biopsy, f/u biopsy and renal recs  - plan for IR renal biopsy 10/17 - holding lovenox, restart after biopsy

## 2022-10-17 NOTE — PROGRESS NOTE ADULT - SUBJECTIVE AND OBJECTIVE BOX
PROGRESS NOTE:     Patient is a 47y old  Male who presents with a chief complaint of SLE flare (16 Oct 2022 09:39)      SUBJECTIVE / OVERNIGHT EVENTS:    ADDITIONAL REVIEW OF SYSTEMS: 10 point ROS negative except per HPI    MEDICATIONS  (STANDING):  cholecalciferol 1000 Unit(s) Oral daily  hydroxychloroquine 200 milliGRAM(s) Oral two times a day  mycophenolate mofetil 1000 milliGRAM(s) Oral two times a day  pantoprazole    Tablet 40 milliGRAM(s) Oral before breakfast  predniSONE   Tablet 60 milliGRAM(s) Oral daily  triamcinolone 0.1% Cream 1 Application(s) Topical every 12 hours  trimethoprim  160 mG/sulfamethoxazole 800 mG 1 Tablet(s) Oral <User Schedule>    MEDICATIONS  (PRN):      CAPILLARY BLOOD GLUCOSE        I&O's Summary      PHYSICAL EXAM:  Vital Signs Last 24 Hrs  T(C): 36.8 (17 Oct 2022 05:01), Max: 36.9 (16 Oct 2022 13:00)  T(F): 98.2 (17 Oct 2022 05:01), Max: 98.5 (16 Oct 2022 13:00)  HR: 60 (17 Oct 2022 05:01) (60 - 67)  BP: 117/66 (17 Oct 2022 05:01) (117/66 - 145/84)  BP(mean): --  RR: 17 (17 Oct 2022 05:01) (17 - 18)  SpO2: 100% (17 Oct 2022 05:01) (100% - 100%)    Parameters below as of 17 Oct 2022 05:01  Patient On (Oxygen Delivery Method): room air        CONSTITUTIONAL: NAD, well-developed, A&Ox3 to person, place, time.  RESPIRATORY: Normal respiratory effort; lungs are clear to auscultation bilaterally  CARDIOVASCULAR: Regular rate and rhythm, normal S1 and S2, no murmur/rub/gallop; No lower extremity edema; Peripheral pulses are 2+ bilaterally  ABDOMEN: Nontender to palpation, no rebound/guarding; No hepatosplenomegaly  MUSCLOSKELETAL: no clubbing or cyanosis of digits; no joint swelling or tenderness to palpation  NEURO: CN 2-12 grossly intact, moves all limbs spontaneously      LABS:                        12.9   18.61 )-----------( 324      ( 17 Oct 2022 05:22 )             39.2     10-17    136  |  108<H>  |  42<H>  ----------------------------<  91  4.7   |  21<L>  |  1.42<H>    Ca    9.3      17 Oct 2022 05:22  Phos  3.6     10-17  Mg     1.90     10-17      PT/INR - ( 17 Oct 2022 05:22 )   PT: 10.2 sec;   INR: <0.90 ratio         PTT - ( 17 Oct 2022 05:22 )  PTT:27.9 sec            RADIOLOGY & ADDITIONAL TESTS:  Results Reviewed:   Imaging Personally Reviewed:  Electrocardiogram Personally Reviewed:    COORDINATION OF CARE:  Care Discussed with Consultants/Other Providers [Y/N]:  Prior or Outpatient Records Reviewed [Y/N]:   PROGRESS NOTE:     Patient is a 47y old  Male who presents with a chief complaint of SLE flare (16 Oct 2022 09:39)    SUBJECTIVE / OVERNIGHT EVENTS: Pt NPO overnight for IR biopsy today. Pt feels well this morning with no complaints. Denies SOB, CP. denies n/v. He is eating well, last BM yesterday. He is urinating normally with no pain.     ADDITIONAL REVIEW OF SYSTEMS: 10 point ROS negative except per HPI    MEDICATIONS  (STANDING):  cholecalciferol 1000 Unit(s) Oral daily  hydroxychloroquine 200 milliGRAM(s) Oral two times a day  mycophenolate mofetil 1000 milliGRAM(s) Oral two times a day  pantoprazole    Tablet 40 milliGRAM(s) Oral before breakfast  predniSONE   Tablet 60 milliGRAM(s) Oral daily  triamcinolone 0.1% Cream 1 Application(s) Topical every 12 hours  trimethoprim  160 mG/sulfamethoxazole 800 mG 1 Tablet(s) Oral <User Schedule>      PHYSICAL EXAM:  Vital Signs Last 24 Hrs  T(C): 36.8 (17 Oct 2022 05:01), Max: 36.9 (16 Oct 2022 13:00)  T(F): 98.2 (17 Oct 2022 05:01), Max: 98.5 (16 Oct 2022 13:00)  HR: 60 (17 Oct 2022 05:01) (60 - 67)  BP: 117/66 (17 Oct 2022 05:01) (117/66 - 145/84)  BP(mean): --  RR: 17 (17 Oct 2022 05:01) (17 - 18)  SpO2: 100% (17 Oct 2022 05:01) (100% - 100%)    Parameters below as of 17 Oct 2022 05:01  Patient On (Oxygen Delivery Method): room air    PHYSICAL EXAM:  CONSTITUTIONAL: NAD, well-developed, A&Ox3 to person, place, time.  RESPIRATORY: Normal respiratory effort; lungs are clear to auscultation bilaterally  CARDIOVASCULAR: Regular rate and rhythm, normal S1 and S2, no murmur/rub/gallop; No lower extremity edema;   ABDOMEN: Nontender to palpation, no rebound/guarding;   NEURO: CN 2-12 grossly intact, moves all limbs spontaneously  SKIN: erythematous rash on chest      LABS:                        12.9   18.61 )-----------( 324      ( 17 Oct 2022 05:22 )             39.2     10-17    136  |  108<H>  |  42<H>  ----------------------------<  91  4.7   |  21<L>  |  1.42<H>    Ca    9.3      17 Oct 2022 05:22  Phos  3.6     10-17  Mg     1.90     10-17      PT/INR - ( 17 Oct 2022 05:22 )   PT: 10.2 sec;   INR: <0.90 ratio         PTT - ( 17 Oct 2022 05:22 )  PTT:27.9 sec

## 2022-10-17 NOTE — PROGRESS NOTE ADULT - PROBLEM SELECTOR PLAN 7
DVT ppx: lovenox held tonight in anticipation of renal biopsy tomorrow, will restart after biopsy given elevated risk iso SLE  Diet: Regular halal  Dispo: home  Code status: FULL

## 2022-10-18 LAB
-  CORYNEBACTERIUM SPECIES: SIGNIFICANT CHANGE UP
ANION GAP SERPL CALC-SCNC: 10 MMOL/L — SIGNIFICANT CHANGE UP (ref 7–14)
BASOPHILS # BLD AUTO: 0.02 K/UL — SIGNIFICANT CHANGE UP (ref 0–0.2)
BASOPHILS NFR BLD AUTO: 0.1 % — SIGNIFICANT CHANGE UP (ref 0–2)
BUN SERPL-MCNC: 54 MG/DL — HIGH (ref 7–23)
CALCIUM SERPL-MCNC: 9 MG/DL — SIGNIFICANT CHANGE UP (ref 8.4–10.5)
CHLORIDE SERPL-SCNC: 103 MMOL/L — SIGNIFICANT CHANGE UP (ref 98–107)
CO2 SERPL-SCNC: 18 MMOL/L — LOW (ref 22–31)
CREAT SERPL-MCNC: 1.73 MG/DL — HIGH (ref 0.5–1.3)
CULTURE RESULTS: SIGNIFICANT CHANGE UP
EGFR: 48 ML/MIN/1.73M2 — LOW
EOSINOPHIL # BLD AUTO: 0 K/UL — SIGNIFICANT CHANGE UP (ref 0–0.5)
EOSINOPHIL NFR BLD AUTO: 0 % — SIGNIFICANT CHANGE UP (ref 0–6)
GLUCOSE SERPL-MCNC: 113 MG/DL — HIGH (ref 70–99)
HCT VFR BLD CALC: 37 % — LOW (ref 39–50)
HGB BLD-MCNC: 12.4 G/DL — LOW (ref 13–17)
IANC: 12.22 K/UL — HIGH (ref 1.8–7.4)
IMM GRANULOCYTES NFR BLD AUTO: 1.1 % — HIGH (ref 0–0.9)
LYMPHOCYTES # BLD AUTO: 1.27 K/UL — SIGNIFICANT CHANGE UP (ref 1–3.3)
LYMPHOCYTES # BLD AUTO: 8.6 % — LOW (ref 13–44)
MAGNESIUM SERPL-MCNC: 2.1 MG/DL — SIGNIFICANT CHANGE UP (ref 1.6–2.6)
MCHC RBC-ENTMCNC: 28.8 PG — SIGNIFICANT CHANGE UP (ref 27–34)
MCHC RBC-ENTMCNC: 33.5 GM/DL — SIGNIFICANT CHANGE UP (ref 32–36)
MCV RBC AUTO: 86 FL — SIGNIFICANT CHANGE UP (ref 80–100)
METHOD TYPE: SIGNIFICANT CHANGE UP
MONOCYTES # BLD AUTO: 1.09 K/UL — HIGH (ref 0–0.9)
MONOCYTES NFR BLD AUTO: 7.4 % — SIGNIFICANT CHANGE UP (ref 2–14)
NEUTROPHILS # BLD AUTO: 12.22 K/UL — HIGH (ref 1.8–7.4)
NEUTROPHILS NFR BLD AUTO: 82.8 % — HIGH (ref 43–77)
NRBC # BLD: 0 /100 WBCS — SIGNIFICANT CHANGE UP (ref 0–0)
NRBC # FLD: 0 K/UL — SIGNIFICANT CHANGE UP (ref 0–0)
PHOSPHATE SERPL-MCNC: 3.7 MG/DL — SIGNIFICANT CHANGE UP (ref 2.5–4.5)
PLATELET # BLD AUTO: 301 K/UL — SIGNIFICANT CHANGE UP (ref 150–400)
POTASSIUM SERPL-MCNC: 4.6 MMOL/L — SIGNIFICANT CHANGE UP (ref 3.5–5.3)
POTASSIUM SERPL-SCNC: 4.6 MMOL/L — SIGNIFICANT CHANGE UP (ref 3.5–5.3)
RBC # BLD: 4.3 M/UL — SIGNIFICANT CHANGE UP (ref 4.2–5.8)
RBC # FLD: 13.1 % — SIGNIFICANT CHANGE UP (ref 10.3–14.5)
SODIUM SERPL-SCNC: 131 MMOL/L — LOW (ref 135–145)
SPECIMEN SOURCE: SIGNIFICANT CHANGE UP
WBC # BLD: 14.76 K/UL — HIGH (ref 3.8–10.5)
WBC # FLD AUTO: 14.76 K/UL — HIGH (ref 3.8–10.5)

## 2022-10-18 PROCEDURE — 99232 SBSQ HOSP IP/OBS MODERATE 35: CPT | Mod: GC

## 2022-10-18 PROCEDURE — 99233 SBSQ HOSP IP/OBS HIGH 50: CPT | Mod: GC

## 2022-10-18 RX ORDER — HEPARIN SODIUM 5000 [USP'U]/ML
5000 INJECTION INTRAVENOUS; SUBCUTANEOUS EVERY 8 HOURS
Refills: 0 | Status: DISCONTINUED | OUTPATIENT
Start: 2022-10-18 | End: 2022-10-22

## 2022-10-18 RX ORDER — MYCOPHENOLATE MOFETIL 250 MG/1
1000 CAPSULE ORAL THREE TIMES A DAY
Refills: 0 | Status: DISCONTINUED | OUTPATIENT
Start: 2022-10-18 | End: 2022-10-18

## 2022-10-18 RX ORDER — ENOXAPARIN SODIUM 100 MG/ML
40 INJECTION SUBCUTANEOUS EVERY 24 HOURS
Refills: 0 | Status: DISCONTINUED | OUTPATIENT
Start: 2022-10-18 | End: 2022-10-18

## 2022-10-18 RX ORDER — MYCOPHENOLATE MOFETIL 250 MG/1
1500 CAPSULE ORAL
Refills: 0 | Status: DISCONTINUED | OUTPATIENT
Start: 2022-10-18 | End: 2022-10-21

## 2022-10-18 RX ADMIN — Medication 200 MILLIGRAM(S): at 22:09

## 2022-10-18 RX ADMIN — HEPARIN SODIUM 5000 UNIT(S): 5000 INJECTION INTRAVENOUS; SUBCUTANEOUS at 22:09

## 2022-10-18 RX ADMIN — Medication 1 APPLICATION(S): at 18:04

## 2022-10-18 RX ADMIN — PANTOPRAZOLE SODIUM 40 MILLIGRAM(S): 20 TABLET, DELAYED RELEASE ORAL at 06:14

## 2022-10-18 RX ADMIN — Medication 1 APPLICATION(S): at 06:14

## 2022-10-18 RX ADMIN — Medication 1000 UNIT(S): at 10:58

## 2022-10-18 RX ADMIN — Medication 200 MILLIGRAM(S): at 10:59

## 2022-10-18 RX ADMIN — MYCOPHENOLATE MOFETIL 1500 MILLIGRAM(S): 250 CAPSULE ORAL at 18:04

## 2022-10-18 RX ADMIN — Medication 60 MILLIGRAM(S): at 06:14

## 2022-10-18 NOTE — PROGRESS NOTE ADULT - PROBLEM SELECTOR PLAN 7
DVT ppx: lovenox held tonight in anticipation of renal biopsy tomorrow, will restart after biopsy given elevated risk iso SLE  Diet: Regular halal  Dispo: home  Code status: FULL DVT ppx: lovenox   Diet: Regular halal  Dispo: home  Code status: FULL

## 2022-10-18 NOTE — PROGRESS NOTE ADULT - PROBLEM SELECTOR PLAN 1
Pt. with kidney failure and proteinuria in setting of class 3 LN. Pt was diagnosed with SLE and LN during previous hospital stay at [facility] in Feb 2022. Kidney biopsy performed on 2/17/22 which showed focal proliferative type, class 3 LN. Pt. initiated on immunosuppressives for SLE/LN by rheumatology team. Scr was 1.04 on 3/31/22 done on OP labs. UA done on 3/31/22 showed hematuria and proteinuria. Spot urine spot TP/CR ratio on 3/31/22 elevated at 3.3. Pt. with history of noncompliance to his medications/immunosuppressive therapy. Scr on current admission (10/13/22) elevated at 1.66. Spot urine elevated/stable at 2.9 on 10/13/22. US kidneys done on 10/14/22 showed no hydronephrosis. DsDNA titer were significantly elevated at 834 and complement C3 and C4 levels were low on labs done on 10/11/22. Pt underwent kidney biopsy on 10/17/22, awaiting results. Scr increased to 1.73 on labs done today. Rheumatology note from 10/17/22 reviewed. Immunosuppressive therapy for SLE/LN as per rheumatology team. Pt with mild LE swelling on exam today. Fluid restriction. Pt. advised to be compliant to his medications. Monitor labs and urine output. Avoid any potential nephrotoxins. Dose medications as per eGFR. Pt. with kidney failure and proteinuria in setting of class 3 LN. Pt was diagnosed with SLE and LN during previous hospital stay at [facility] in Feb 2022. Kidney biopsy performed on 2/17/22 which showed focal proliferative type, class 3 LN. Pt. initiated on immunosuppressives for SLE/LN by rheumatology team. Scr was 1.04 on 3/31/22 done on OP labs. UA done on 3/31/22 showed hematuria and proteinuria. Spot urine spot TP/CR ratio on 3/31/22 elevated at 3.3. Pt. with history of noncompliance to his medications/immunosuppressive therapy. Scr on current admission (10/13/22) elevated at 1.66. Spot urine elevated/stable at 2.9 on 10/13/22. US kidneys done on 10/14/22 showed no hydronephrosis. DsDNA titer were significantly elevated at 834 and complement C3 and C4 levels were low on labs done on 10/11/22. Pt underwent kidney biopsy on 10/17/22, awaiting results. Scr increased to 1.73 on labs done today. Rheumatology note from 10/17/22 reviewed. Immunosuppressive therapy for SLE/LN as per rheumatology team. Pt with mild LE swelling on exam today. Low salt diet. Fluid restriction. Pt. advised to be compliant to his medications. Monitor labs and urine output. Avoid any potential nephrotoxins. Dose medications as per eGFR.

## 2022-10-18 NOTE — PROGRESS NOTE ADULT - SUBJECTIVE AND OBJECTIVE BOX
PROGRESS NOTE:     Patient is a 47y old  Male who presents with a chief complaint of SLE flare (17 Oct 2022 12:59)      SUBJECTIVE / OVERNIGHT EVENTS:    ADDITIONAL REVIEW OF SYSTEMS: 10 point ROS negative except per HPI    MEDICATIONS  (STANDING):  cholecalciferol 1000 Unit(s) Oral daily  hydroxychloroquine 200 milliGRAM(s) Oral two times a day  mycophenolate mofetil 1000 milliGRAM(s) Oral two times a day  pantoprazole    Tablet 40 milliGRAM(s) Oral before breakfast  predniSONE   Tablet 60 milliGRAM(s) Oral daily  triamcinolone 0.1% Cream 1 Application(s) Topical every 12 hours  trimethoprim  160 mG/sulfamethoxazole 800 mG 1 Tablet(s) Oral <User Schedule>    MEDICATIONS  (PRN):      CAPILLARY BLOOD GLUCOSE        I&O's Summary      PHYSICAL EXAM:  Vital Signs Last 24 Hrs  T(C): 36.9 (18 Oct 2022 05:06), Max: 36.9 (18 Oct 2022 05:06)  T(F): 98.4 (18 Oct 2022 05:06), Max: 98.4 (18 Oct 2022 05:06)  HR: 63 (18 Oct 2022 05:06) (63 - 71)  BP: 120/67 (18 Oct 2022 05:06) (113/78 - 134/80)  BP(mean): --  RR: 18 (18 Oct 2022 05:06) (18 - 18)  SpO2: 100% (18 Oct 2022 05:06) (99% - 100%)    Parameters below as of 18 Oct 2022 05:06  Patient On (Oxygen Delivery Method): room air        CONSTITUTIONAL: NAD, well-developed, A&Ox3 to person, place, time.  RESPIRATORY: Normal respiratory effort; lungs are clear to auscultation bilaterally  CARDIOVASCULAR: Regular rate and rhythm, normal S1 and S2, no murmur/rub/gallop; No lower extremity edema; Peripheral pulses are 2+ bilaterally  ABDOMEN: Nontender to palpation, no rebound/guarding; No hepatosplenomegaly  MUSCLOSKELETAL: no clubbing or cyanosis of digits; no joint swelling or tenderness to palpation  NEURO: CN 2-12 grossly intact, moves all limbs spontaneously      LABS:                        12.9   18.61 )-----------( 324      ( 17 Oct 2022 05:22 )             39.2     10-17    136  |  108<H>  |  42<H>  ----------------------------<  91  4.7   |  21<L>  |  1.42<H>    Ca    9.3      17 Oct 2022 05:22  Phos  3.6     10-17  Mg     1.90     10-17      PT/INR - ( 17 Oct 2022 05:22 )   PT: 10.2 sec;   INR: <0.90 ratio         PTT - ( 17 Oct 2022 05:22 )  PTT:27.9 sec            RADIOLOGY & ADDITIONAL TESTS:  Results Reviewed:   Imaging Personally Reviewed:  Electrocardiogram Personally Reviewed:    COORDINATION OF CARE:  Care Discussed with Consultants/Other Providers [Y/N]:  Prior or Outpatient Records Reviewed [Y/N]:   PROGRESS NOTE:     Patient is a 47y old  Male who presents with a chief complaint of SLE flare (17 Oct 2022 12:59)      SUBJECTIVE / OVERNIGHT EVENTS: Overnight, blood culture resulted with corynebacteria. This morning patient feels well. Denies CP/SOB. Denies abdominal pain, n/v. Denies back pain. Biopsy site is clean and dry with no signs of hematoma.     ADDITIONAL REVIEW OF SYSTEMS: 10 point ROS negative except per HPI    MEDICATIONS  (STANDING):  cholecalciferol 1000 Unit(s) Oral daily  hydroxychloroquine 200 milliGRAM(s) Oral two times a day  mycophenolate mofetil 1000 milliGRAM(s) Oral two times a day  pantoprazole    Tablet 40 milliGRAM(s) Oral before breakfast  predniSONE   Tablet 60 milliGRAM(s) Oral daily  triamcinolone 0.1% Cream 1 Application(s) Topical every 12 hours  trimethoprim  160 mG/sulfamethoxazole 800 mG 1 Tablet(s) Oral <User Schedule>        I&O's Summary      PHYSICAL EXAM:  Vital Signs Last 24 Hrs  T(C): 36.9 (18 Oct 2022 05:06), Max: 36.9 (18 Oct 2022 05:06)  T(F): 98.4 (18 Oct 2022 05:06), Max: 98.4 (18 Oct 2022 05:06)  HR: 63 (18 Oct 2022 05:06) (63 - 71)  BP: 120/67 (18 Oct 2022 05:06) (113/78 - 134/80)  BP(mean): --  RR: 18 (18 Oct 2022 05:06) (18 - 18)  SpO2: 100% (18 Oct 2022 05:06) (99% - 100%)    Parameters below as of 18 Oct 2022 05:06  Patient On (Oxygen Delivery Method): room air        CONSTITUTIONAL: NAD, well-developed, A&Ox3 to person, place, time.  RESPIRATORY: Normal respiratory effort; lungs are clear to auscultation bilaterally  CARDIOVASCULAR: Regular rate and rhythm, normal S1 and S2, no murmur/rub/gallop; No lower extremity edema; Peripheral pulses are 2+ bilaterally  ABDOMEN: Nontender to palpation, no rebound/guarding; No hepatosplenomegaly  MUSCLOSKELETAL: no clubbing or cyanosis of digits; no joint swelling or tenderness to palpation  NEURO: CN 2-12 grossly intact, moves all limbs spontaneously      LABS:                        12.9   18.61 )-----------( 324      ( 17 Oct 2022 05:22 )             39.2     10-17    136  |  108<H>  |  42<H>  ----------------------------<  91  4.7   |  21<L>  |  1.42<H>    Ca    9.3      17 Oct 2022 05:22  Phos  3.6     10-17  Mg     1.90     10-17      PT/INR - ( 17 Oct 2022 05:22 )   PT: 10.2 sec;   INR: <0.90 ratio         PTT - ( 17 Oct 2022 05:22 )  PTT:27.9 sec            RADIOLOGY & ADDITIONAL TESTS:  Results Reviewed:   Imaging Personally Reviewed:  Electrocardiogram Personally Reviewed:    COORDINATION OF CARE:  Care Discussed with Consultants/Other Providers [Y/N]:  Prior or Outpatient Records Reviewed [Y/N]:   PROGRESS NOTE:     Patient is a 47y old  Male who presents with a chief complaint of SLE flare (17 Oct 2022 12:59)      SUBJECTIVE / OVERNIGHT EVENTS: Overnight, blood culture resulted with corynebacteria. This morning patient feels well. Denies CP/SOB. Denies abdominal pain, n/v. Denies back pain. Biopsy site is clean and dry with no signs of hematoma.     ADDITIONAL REVIEW OF SYSTEMS: 10 point ROS negative except per HPI    MEDICATIONS  (STANDING):  cholecalciferol 1000 Unit(s) Oral daily  hydroxychloroquine 200 milliGRAM(s) Oral two times a day  mycophenolate mofetil 1000 milliGRAM(s) Oral two times a day  pantoprazole    Tablet 40 milliGRAM(s) Oral before breakfast  predniSONE   Tablet 60 milliGRAM(s) Oral daily  triamcinolone 0.1% Cream 1 Application(s) Topical every 12 hours  trimethoprim  160 mG/sulfamethoxazole 800 mG 1 Tablet(s) Oral <User Schedule>    PHYSICAL EXAM:  Vital Signs Last 24 Hrs  T(C): 36.9 (18 Oct 2022 05:06), Max: 36.9 (18 Oct 2022 05:06)  T(F): 98.4 (18 Oct 2022 05:06), Max: 98.4 (18 Oct 2022 05:06)  HR: 63 (18 Oct 2022 05:06) (63 - 71)  BP: 120/67 (18 Oct 2022 05:06) (113/78 - 134/80)  BP(mean): --  RR: 18 (18 Oct 2022 05:06) (18 - 18)  SpO2: 100% (18 Oct 2022 05:06) (99% - 100%)    Parameters below as of 18 Oct 2022 05:06  Patient On (Oxygen Delivery Method): room air      PHYSICAL EXAM:  CONSTITUTIONAL: NAD, well-developed, A&Ox3 to person, place, time.  RESPIRATORY: Normal respiratory effort; lungs are clear to auscultation bilaterally  CARDIOVASCULAR: Regular rate and rhythm, normal S1 and S2, no murmur/rub/gallop; No lower extremity edema;   ABDOMEN: Nontender to palpation, no rebound/guarding;   NEURO: CN 2-12 grossly intact, moves all limbs spontaneously  SKIN: erythematous rash on chest    LABS:                        12.9   18.61 )-----------( 324      ( 17 Oct 2022 05:22 )             39.2     10-17    136  |  108<H>  |  42<H>  ----------------------------<  91  4.7   |  21<L>  |  1.42<H>    Ca    9.3      17 Oct 2022 05:22  Phos  3.6     10-17  Mg     1.90     10-17      PT/INR - ( 17 Oct 2022 05:22 )   PT: 10.2 sec;   INR: <0.90 ratio         PTT - ( 17 Oct 2022 05:22 )  PTT:27.9 sec           PROGRESS NOTE:     Patient is a 47y old  Male who presents with a chief complaint of SLE flare (17 Oct 2022 12:59)      SUBJECTIVE / OVERNIGHT EVENTS: Overnight, blood culture resulted with corynebacteria, most likely contaminant. This morning patient feels well. Denies CP/SOB. Denies abdominal pain, n/v. Denies back pain. Biopsy site is clean and dry with no signs of hematoma.     ADDITIONAL REVIEW OF SYSTEMS: 10 point ROS negative except per HPI    MEDICATIONS  (STANDING):  cholecalciferol 1000 Unit(s) Oral daily  hydroxychloroquine 200 milliGRAM(s) Oral two times a day  mycophenolate mofetil 1000 milliGRAM(s) Oral two times a day  pantoprazole    Tablet 40 milliGRAM(s) Oral before breakfast  predniSONE   Tablet 60 milliGRAM(s) Oral daily  triamcinolone 0.1% Cream 1 Application(s) Topical every 12 hours  trimethoprim  160 mG/sulfamethoxazole 800 mG 1 Tablet(s) Oral <User Schedule>    PHYSICAL EXAM:  Vital Signs Last 24 Hrs  T(C): 36.9 (18 Oct 2022 05:06), Max: 36.9 (18 Oct 2022 05:06)  T(F): 98.4 (18 Oct 2022 05:06), Max: 98.4 (18 Oct 2022 05:06)  HR: 63 (18 Oct 2022 05:06) (63 - 71)  BP: 120/67 (18 Oct 2022 05:06) (113/78 - 134/80)  BP(mean): --  RR: 18 (18 Oct 2022 05:06) (18 - 18)  SpO2: 100% (18 Oct 2022 05:06) (99% - 100%)    Parameters below as of 18 Oct 2022 05:06  Patient On (Oxygen Delivery Method): room air      PHYSICAL EXAM:  CONSTITUTIONAL: NAD, well-developed, A&Ox3 to person, place, time.  RESPIRATORY: Normal respiratory effort; lungs are clear to auscultation bilaterally  CARDIOVASCULAR: Regular rate and rhythm, normal S1 and S2, no murmur/rub/gallop; No lower extremity edema;   ABDOMEN: Nontender to palpation, no rebound/guarding;   NEURO: CN 2-12 grossly intact, moves all limbs spontaneously  SKIN: erythematous rash on chest    LABS:                        12.9   18.61 )-----------( 324      ( 17 Oct 2022 05:22 )             39.2     10-17    136  |  108<H>  |  42<H>  ----------------------------<  91  4.7   |  21<L>  |  1.42<H>    Ca    9.3      17 Oct 2022 05:22  Phos  3.6     10-17  Mg     1.90     10-17      PT/INR - ( 17 Oct 2022 05:22 )   PT: 10.2 sec;   INR: <0.90 ratio         PTT - ( 17 Oct 2022 05:22 )  PTT:27.9 sec

## 2022-10-18 NOTE — PROGRESS NOTE ADULT - SUBJECTIVE AND OBJECTIVE BOX
[facility] DIVISION OF KIDNEY DISEASES AND HYPERTENSION   FOLLOW UP NOTE    --------------------------------------------------------------------------------  HPI:  47-year-old male with PMH of SLE and LN class 3 referred to ER from dermatologist for worsening rash and fever. ER labs showed Scr mildly elevated Scr of 1.66 on admission (10/13/22). Pt currently admitted for cutaneous SLE flare. Pt being seen for LIZETH.    Pt. initially presented with hematuria, non-nephrotic range proteinuria and elevated MÓNICA titers in Feb 2022. Pt underwent kidney biopsy in 2/7/22 which showed focal proliferative type, class 3 LN. A single small cellular crescent, mild acute interstitial nephritis, NIH activity score 4/24 with no chronicity was also noted on kidney biopsy. Pt. was seen for initial evaluation of LN by nephrologist Dr. Shultz as OP in March 2022. Upon review of labs, Scr was 1.04 on 3/31/22 done on OP labs. UA done on 3/31/22 showed hematuria and proteinuria. Spot urine TP/CR ratio was elevated at 3.3 on 3/31/22. Pt. on SLE treatment as per Rheumatology. Pt. also has history of noncompliance to his medications/immunosuppressive therapy. Scr on admission (10/13/22) elevated at 1.66. Serum potassium was mildly elevated at 5.7. Pt received medical management for hyperkalemia. UA on 10/13 with proteinuria and hematuria. Urine spot TP/CR elevated at 2.9 on 10/13/22. US kidneys done on 10/14/22 showed no hydronephrosis. Serum albumin low at 2.4 on 10/13/22 . DsDNA titre were significantly elevated at 834 and complement C3 and C4 levels were low on labs done on 10/11/22. Rheumatology was consulted and pt. received high dose steroids for 3 days (10/14-10/16) and started on immunosuppressive treatment. Pt underwent kidney biopsy on 10/17/22.    Pt. seen and examined today. Pt. reports feeling better. Pt. denies fever, chills, nausea, vomiting, CP and diarrhea during our rounds today. Scr increased to 1.73 today.       PAST HISTORY  --------------------------------------------------------------------------------  No significant changes to PMH, PSH, FHx, SHx, unless otherwise noted    ALLERGIES & MEDICATIONS  --------------------------------------------------------------------------------  Allergies  No Known Allergies    Intolerances    Standing Inpatient Medications  cholecalciferol 1000 Unit(s) Oral daily  enoxaparin Injectable 40 milliGRAM(s) SubCutaneous every 24 hours  hydroxychloroquine 200 milliGRAM(s) Oral two times a day  mycophenolate mofetil 1000 milliGRAM(s) Oral three times a day  pantoprazole    Tablet 40 milliGRAM(s) Oral before breakfast  predniSONE   Tablet 60 milliGRAM(s) Oral daily  triamcinolone 0.1% Cream 1 Application(s) Topical every 12 hours  trimethoprim  160 mG/sulfamethoxazole 800 mG 1 Tablet(s) Oral <User Schedule>    PRN Inpatient Medications    REVIEW OF SYSTEMS  --------------------------------------------------------------------------------  Gen: No fevers/chills,   Head/Eyes/Ears: HA (on admission)  Respiratory: No dyspnea, cough  CV: No chest pain  GI: No abdominal pain, diarrhea  : see HPI  MSK: mild LE edema  Skin: Facial and Upper chest mid region rash+ (improved)  Heme: No easy bruising or bleeding    All other systems were reviewed and are negative, except as noted.    VITALS/PHYSICAL EXAM  --------------------------------------------------------------------------------  T(C): 36.9 (10-18-22 @ 05:06), Max: 36.9 (10-18-22 @ 05:06)  HR: 63 (10-18-22 @ 05:06) (63 - 71)  BP: 120/67 (10-18-22 @ 05:06) (113/78 - 134/80)  RR: 18 (10-18-22 @ 05:06) (18 - 18)  SpO2: 100% (10-18-22 @ 05:06) (99% - 100%)  Wt(kg): --    Physical Exam:  	Gen: young male, sitting, NAD  	HEENT: Anicteric  	Pulm: CTA B/L  	CV: S1S2+  	Abd: Soft, +BS, left lower flank dressing seen (bx site)   	Ext: mild B/L LE edema B/L  	Neuro: Awake, alert       	Skin: Erythematous rash on upper face/cheek  and upper mid chest regions (resolving)    LABS/STUDIES  --------------------------------------------------------------------------------              12.4   14.76 >-----------<  301      [10-18-22 @ 05:45]              37.0     131  |  103  |  54  ----------------------------<  113      [10-18-22 @ 05:45]  4.6   |  18  |  1.73        Ca     9.0     [10-18-22 @ 05:45]      Mg     2.10     [10-18-22 @ 05:45]      Phos  3.7     [10-18-22 @ 05:45]    Creatinine Trend:  SCr 1.73 [10-18 @ 05:45]  SCr 1.42 [10-17 @ 05:22]  SCr 1.50 [10-16 @ 08:00]  SCr 1.72 [10-15 @ 10:29]  SCr 1.75 [10-15 @ 06:20]    Urinalysis - [10-13-22 @ 14:29]      Color Light Yellow / Appearance Clear / SG 1.016 / pH 6.0      Gluc Negative / Ketone Negative  / Bili Negative / Urobili <2 mg/dL       Blood Large / Protein 300 mg/dL / Leuk Est Small / Nitrite Negative      RBC 32 / WBC 34 / Hyaline 14 / Gran  / Sq Epi  / Non Sq Epi 4 / Bacteria Few    Urine Creatinine 98      [10-14-22 @ 09:08]  Urine Protein 207      [10-14-22 @ 09:08]  Urine Sodium 36      [10-13-22 @ 21:50]  Urine Urea Nitrogen 555.0      [10-13-22 @ 21:50]    HBsAg Nonreact      [10-15-22 @ 06:20]  HCV 0.14, Nonreact      [10-15-22 @ 06:20]  HIV Nonreact      [10-13-22 @ 15:14] [facility] DIVISION OF KIDNEY DISEASES AND HYPERTENSION   FOLLOW UP NOTE    --------------------------------------------------------------------------------  HPI:  47-year-old male with PMH of SLE and LN class 3 referred to ER from dermatologist for worsening rash and fever. ER labs showed Scr mildly elevated Scr of 1.66 on admission (10/13/22). Pt currently admitted for cutaneous SLE flare. Pt being seen for LIZETH.    Pt. initially presented with hematuria, non-nephrotic range proteinuria and elevated MÓNICA titers in Feb 2022. Pt underwent kidney biopsy in 2/7/22 which showed focal proliferative type, class 3 LN. A single small cellular crescent, mild acute interstitial nephritis, NIH activity score 4/24 with no chronicity was also noted on kidney biopsy. Pt. was seen for initial evaluation of LN by nephrologist Dr. Shultz as OP in March 2022. Upon review of labs, Scr was 1.04 on 3/31/22 done on OP labs. UA done on 3/31/22 showed hematuria and proteinuria. Spot urine TP/CR ratio was elevated at 3.3 on 3/31/22. Pt. on SLE treatment as per Rheumatology. Pt. also has history of noncompliance to his medications/immunosuppressive therapy. Scr on admission (10/13/22) elevated at 1.66. Serum potassium was mildly elevated at 5.7. Pt received medical management for hyperkalemia. UA on 10/13 with proteinuria and hematuria. Urine spot TP/CR elevated at 2.9 on 10/13/22. US kidneys done on 10/14/22 showed no hydronephrosis. Serum albumin low at 2.4 on 10/13/22 . DsDNA titer were significantly elevated at 834 and complement C3 and C4 levels were low on labs done on 10/11/22. Rheumatology was consulted and pt. received high dose steroids for 3 days (10/14-10/16) and started on immunosuppressive treatment. Pt underwent kidney biopsy on 10/17/22.    Pt. seen and examined today. Pt. reports feeling better. Pt. denies fever, chills, nausea, vomiting, CP and diarrhea during our rounds today. Scr increased to 1.73 today.     PAST HISTORY  --------------------------------------------------------------------------------  No significant changes to PMH, PSH, FHx, SHx, unless otherwise noted    ALLERGIES & MEDICATIONS  --------------------------------------------------------------------------------  Allergies  No Known Allergies    Intolerances    Standing Inpatient Medications  cholecalciferol 1000 Unit(s) Oral daily  enoxaparin Injectable 40 milliGRAM(s) SubCutaneous every 24 hours  hydroxychloroquine 200 milliGRAM(s) Oral two times a day  mycophenolate mofetil 1000 milliGRAM(s) Oral three times a day  pantoprazole    Tablet 40 milliGRAM(s) Oral before breakfast  predniSONE   Tablet 60 milliGRAM(s) Oral daily  triamcinolone 0.1% Cream 1 Application(s) Topical every 12 hours  trimethoprim  160 mG/sulfamethoxazole 800 mG 1 Tablet(s) Oral <User Schedule>    PRN Inpatient Medications    REVIEW OF SYSTEMS  --------------------------------------------------------------------------------  Gen: No fevers/chills,   Head/Eyes/Ears: HA (on admission)  Respiratory: No dyspnea, cough  CV: No chest pain  GI: No abdominal pain, diarrhea  : see HPI  MSK: mild LE edema  Skin: Improved facial and upper chest mid region rash+  Heme: No easy bruising or bleeding    All other systems were reviewed and are negative, except as noted.    VITALS/PHYSICAL EXAM  --------------------------------------------------------------------------------  T(C): 36.9 (10-18-22 @ 05:06), Max: 36.9 (10-18-22 @ 05:06)  HR: 63 (10-18-22 @ 05:06) (63 - 71)  BP: 120/67 (10-18-22 @ 05:06) (113/78 - 134/80)  RR: 18 (10-18-22 @ 05:06) (18 - 18)  SpO2: 100% (10-18-22 @ 05:06) (99% - 100%)  Wt(kg): --    Physical Exam:  	Gen: young male, sitting, NAD  	HEENT: Anicteric  	Pulm: CTA B/L  	CV: S1S2+  	Abd: Soft, +BS, left lower flank dressing seen   	Ext: mild B/L LE pitting edema+  	Neuro: Awake, alert       	Skin: Erythematous rash on upper face/cheek  and upper mid chest regions (resolving)    LABS/STUDIES  --------------------------------------------------------------------------------              12.4   14.76 >-----------<  301      [10-18-22 @ 05:45]              37.0     131  |  103  |  54  ----------------------------<  113      [10-18-22 @ 05:45]  4.6   |  18  |  1.73        Ca     9.0     [10-18-22 @ 05:45]      Mg     2.10     [10-18-22 @ 05:45]      Phos  3.7     [10-18-22 @ 05:45]    Creatinine Trend:  SCr 1.73 [10-18 @ 05:45]  SCr 1.42 [10-17 @ 05:22]  SCr 1.50 [10-16 @ 08:00]  SCr 1.72 [10-15 @ 10:29]  SCr 1.75 [10-15 @ 06:20]    Urinalysis - [10-13-22 @ 14:29]      Color Light Yellow / Appearance Clear / SG 1.016 / pH 6.0      Gluc Negative / Ketone Negative  / Bili Negative / Urobili <2 mg/dL       Blood Large / Protein 300 mg/dL / Leuk Est Small / Nitrite Negative      RBC 32 / WBC 34 / Hyaline 14 / Gran  / Sq Epi  / Non Sq Epi 4 / Bacteria Few    Urine Creatinine 98      [10-14-22 @ 09:08]  Urine Protein 207      [10-14-22 @ 09:08]  Urine Sodium 36      [10-13-22 @ 21:50]  Urine Urea Nitrogen 555.0      [10-13-22 @ 21:50]    HBsAg Nonreact      [10-15-22 @ 06:20]  HCV 0.14, Nonreact      [10-15-22 @ 06:20]  HIV Nonreact      [10-13-22 @ 15:14]

## 2022-10-18 NOTE — PROGRESS NOTE ADULT - PROBLEM SELECTOR PLAN 4
Mild, with WBC 18.61. most likely in setting of steroid use  - trend CBC daily Mild, with WBC 14.76. most likely in setting of steroid use  - trend CBC daily

## 2022-10-18 NOTE — PROGRESS NOTE ADULT - PROBLEM SELECTOR PLAN 3
Hyperkalemic to serum K 5.9 (not hemolyzed) on admission. No EKG changes.  Likely 2/2 LIZETH  S/p insulin/D50, calcium gluconate, and lokelma in ED w/improvement  - hyperkalemic again 10/15, lokelma given   - K+ 4.6 on 10/16, K+ stable 4.7 on 10/17  - trend BMP Hyperkalemic to serum K 5.9 (not hemolyzed) on admission. No EKG changes.  Likely 2/2 LIZETH  S/p insulin/D50, calcium gluconate, and lokelma in ED w/improvement  - hyperkalemic again 10/15, lokelma given, K+ 4.6 on 10/16,   - K+ stable 10/18  - trend BMP

## 2022-10-18 NOTE — PROGRESS NOTE ADULT - PROBLEM SELECTOR PLAN 1
Suspect SLE flare given pt has been feeling unwell x days and with new cutaneous symptoms  C3 and C4 both low, dsDNA elevated  Reports he has been taking Plaquenil 200mg bid, MMF 1g bid, Prednisone 10mg qd, though compliance questionable, as pt was lost to follow up for months this year  - Rheumatology consult obtained, pulse steroids (10/14 - 10/16)   - Prednisone 60mg  - f/u rheum recs for steroid taper   - on bactrim for pcp prophylaxis   - c/w home Plaquenil   - increase cellcept to 3g today

## 2022-10-18 NOTE — PROGRESS NOTE ADULT - PROBLEM SELECTOR PLAN 2
Prior bx revealed class 3 lupus nephritis  SCr 1.0 in March, now 1.42  Likely 2/2 progression of lupus nephritis  - US kidney/bladder negative for hydro  - urine lytes: FeNa 0/6%: prerenal  - UPC 2.9  - c/w SLE meds as above  - f/u biopsy and renal recs Prior bx revealed class 3 lupus nephritis  SCr 1.0 in March, now 1.7  Likely 2/2 progression of lupus nephritis  - US kidney/bladder negative for hydro  - urine lytes: FeNa 0/6%: prerenal  - UPC 2.9  - c/w SLE meds as above  - f/u biopsy and renal recs

## 2022-10-18 NOTE — PROGRESS NOTE ADULT - SUBJECTIVE AND OBJECTIVE BOX
LIZ [id number]  [id number]    INTERVAL HPI/OVERNIGHT EVENTS:        PMHx/PSHx/FamHx/SocHx reviewed and no significant changes    REVIEW OF SYSTEMS   - reviewed with patient and  negative other than as above or previously documented.     MEDICATIONS  (STANDING):  cholecalciferol 1000 Unit(s) Oral daily  enoxaparin Injectable 40 milliGRAM(s) SubCutaneous every 24 hours  hydroxychloroquine 200 milliGRAM(s) Oral two times a day  mycophenolate mofetil 1000 milliGRAM(s) Oral three times a day  pantoprazole    Tablet 40 milliGRAM(s) Oral before breakfast  predniSONE   Tablet 60 milliGRAM(s) Oral daily  triamcinolone 0.1% Cream 1 Application(s) Topical every 12 hours  trimethoprim  160 mG/sulfamethoxazole 800 mG 1 Tablet(s) Oral <User Schedule>    MEDICATIONS  (PRN):      Allergies    No Known Allergies    Intolerances          Vital Signs Last 24 Hrs  T(C): 36.9 (18 Oct 2022 05:06), Max: 36.9 (18 Oct 2022 05:06)  T(F): 98.4 (18 Oct 2022 05:06), Max: 98.4 (18 Oct 2022 05:06)  HR: 63 (18 Oct 2022 05:06) (63 - 71)  BP: 120/67 (18 Oct 2022 05:06) (113/78 - 134/80)  BP(mean): --  RR: 18 (18 Oct 2022 05:06) (18 - 18)  SpO2: 100% (18 Oct 2022 05:06) (100% - 100%)    Parameters below as of 18 Oct 2022 05:06  Patient On (Oxygen Delivery Method): room air        Physical Exam:  General: NAD  HEENT: EOMI, MMM  Cardio: +S1/S2, RRR  Resp: CTA b/l  GI: +BS, soft, NT/ND  MSK:  Neuro: AAOx3  Psych: wnl    LABS:                        12.4   14.76 )-----------( 301      ( 18 Oct 2022 05:45 )             37.0     10-18    131<L>  |  103  |  54<H>  ----------------------------<  113<H>  4.6   |  18<L>  |  1.73<H>    Ca    9.0      18 Oct 2022 05:45  Phos  3.7     10-18  Mg     2.10     10-18      PT/INR - ( 17 Oct 2022 05:22 )   PT: 10.2 sec;   INR: <0.90 ratio         PTT - ( 17 Oct 2022 05:22 )  PTT:27.9 sec        RADIOLOGY & ADDITIONAL TESTS:

## 2022-10-18 NOTE — PROGRESS NOTE ADULT - PROBLEM SELECTOR PLAN 6
Hgb mildly decreased to 12.6, suspect AoCD i/s/o lupus and now possible CKD from lupus nephritis. No S/S of active bleed.  - monitor H/H Hgb mildly decreased to 12.4, suspect AoCD i/s/o lupus and now possible CKD from lupus nephritis. No S/S of active bleed.  - monitor H/H

## 2022-10-18 NOTE — PROGRESS NOTE ADULT - ASSESSMENT
46 yo M, Georgian speaking, with SLE (dx Feb 2022, with Class 3 lupus nephritis and mucocutaneous symptoms) sent to ED by outpatient dermatology for fever and worsening rash, c/f SLE flare, found to have new LIZETH. LIZETH currently improving on steroids, pending results IR kidney biopsy.

## 2022-10-18 NOTE — PROGRESS NOTE ADULT - ASSESSMENT
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 Pt received prednisone 40mg qD x2, and prednisone 60mg x1 in the ED. Rash appears to have improved.     Feb 2022 serology:    MÓNICA 1:1280 (speckled) and MÓNICA titer 2 1:640 (homogenous.)   Myomarker panel 3: weakly positive U2 RNP, weakly positive Anti-Pm/Scl, strongly positive  U1 RNP, strongly positive SSA/Ro 52, CK wnl   C3/C4 LOW, DsDNA > 1000  Sm, RNP, SSA/SSB> 8  UA: Large blood  Prt/Crea 0.8  APS labs negative      # SLE with Class 3 lupus nephritis and mucocutaneous symptoms, now presenting with LIZETH    - home medications: Cellcept 1000mg BID and HCQ 200mg BID,  but questionable compliance, last prescription was march 2022  - per chart review, lupus serologies from 10/12/22 still showed high lupus activity, dsDNA 834, and low C3, C4 complement  - presented to Dermatology for low grade fever and rash x 1 week    - Pt received prednisone 40mg qD x2, and prednisone 60mg x1 in the ED. Rash appears to have improved    # LIZETH   - baseline Cr 0.8, currently 1.6-1.7 with hyperkalemia, concern for worsening lupus nephritis  - repeat Urine P/Cr 2.9 (was 0.8 in Feb 2022), never been pulsed  - renal US negative for hydronephrosis    - s/p renal bx on 10/17      Plan:    - S/p Solu-medrol 500mg x 3 days (10/14- 10/16), continue prednisone 60mg (10/17- )    - pt tolerated Cellcept 2g daily well, plan to increase Cellcept 3g daily starting tomorrow   - hep panel negative in Feb 2022  - quant- gold indeterminate (however checked after outpt prednisone 40mg was started), CXR clear   - start Bactrim for PCP prophylaxis, protonix for GI prophylaxis, vitamin D for bone health   - discussed with pt in detail that he needs routine f/u visit in clinic    - prelim kidney bx showed lupus nephritis class 4 with crescentic and diffuse proliferative patterns of injury  -        Plan discussed with attending Dr. Tani Nina, PGY-5  Rheumatology Fellow   Pager: [phone], also available on TEAMS   please enter a full 10 digit number for call back   During weekends, please page on call fellow

## 2022-10-19 LAB
ANION GAP SERPL CALC-SCNC: 9 MMOL/L — SIGNIFICANT CHANGE UP (ref 7–14)
BASOPHILS # BLD AUTO: 0.02 K/UL — SIGNIFICANT CHANGE UP (ref 0–0.2)
BASOPHILS NFR BLD AUTO: 0.2 % — SIGNIFICANT CHANGE UP (ref 0–2)
BUN SERPL-MCNC: 49 MG/DL — HIGH (ref 7–23)
CALCIUM SERPL-MCNC: 8.6 MG/DL — SIGNIFICANT CHANGE UP (ref 8.4–10.5)
CHLORIDE SERPL-SCNC: 111 MMOL/L — HIGH (ref 98–107)
CO2 SERPL-SCNC: 18 MMOL/L — LOW (ref 22–31)
CREAT SERPL-MCNC: 1.39 MG/DL — HIGH (ref 0.5–1.3)
EGFR: 63 ML/MIN/1.73M2 — SIGNIFICANT CHANGE UP
EOSINOPHIL # BLD AUTO: 0.02 K/UL — SIGNIFICANT CHANGE UP (ref 0–0.5)
EOSINOPHIL NFR BLD AUTO: 0.2 % — SIGNIFICANT CHANGE UP (ref 0–6)
GLUCOSE SERPL-MCNC: 84 MG/DL — SIGNIFICANT CHANGE UP (ref 70–99)
HCT VFR BLD CALC: 35.4 % — LOW (ref 39–50)
HGB BLD-MCNC: 11.8 G/DL — LOW (ref 13–17)
IANC: 8.64 K/UL — HIGH (ref 1.8–7.4)
IMM GRANULOCYTES NFR BLD AUTO: 1 % — HIGH (ref 0–0.9)
LYMPHOCYTES # BLD AUTO: 2.64 K/UL — SIGNIFICANT CHANGE UP (ref 1–3.3)
LYMPHOCYTES # BLD AUTO: 20.1 % — SIGNIFICANT CHANGE UP (ref 13–44)
MAGNESIUM SERPL-MCNC: 1.9 MG/DL — SIGNIFICANT CHANGE UP (ref 1.6–2.6)
MCHC RBC-ENTMCNC: 28.2 PG — SIGNIFICANT CHANGE UP (ref 27–34)
MCHC RBC-ENTMCNC: 33.3 GM/DL — SIGNIFICANT CHANGE UP (ref 32–36)
MCV RBC AUTO: 84.7 FL — SIGNIFICANT CHANGE UP (ref 80–100)
MONOCYTES # BLD AUTO: 1.68 K/UL — HIGH (ref 0–0.9)
MONOCYTES NFR BLD AUTO: 12.8 % — SIGNIFICANT CHANGE UP (ref 2–14)
NEUTROPHILS # BLD AUTO: 8.64 K/UL — HIGH (ref 1.8–7.4)
NEUTROPHILS NFR BLD AUTO: 65.7 % — SIGNIFICANT CHANGE UP (ref 43–77)
NRBC # BLD: 0 /100 WBCS — SIGNIFICANT CHANGE UP (ref 0–0)
NRBC # FLD: 0 K/UL — SIGNIFICANT CHANGE UP (ref 0–0)
PHOSPHATE SERPL-MCNC: 3.3 MG/DL — SIGNIFICANT CHANGE UP (ref 2.5–4.5)
PLATELET # BLD AUTO: 291 K/UL — SIGNIFICANT CHANGE UP (ref 150–400)
POTASSIUM SERPL-MCNC: 4.6 MMOL/L — SIGNIFICANT CHANGE UP (ref 3.5–5.3)
POTASSIUM SERPL-SCNC: 4.6 MMOL/L — SIGNIFICANT CHANGE UP (ref 3.5–5.3)
RBC # BLD: 4.18 M/UL — LOW (ref 4.2–5.8)
RBC # FLD: 13 % — SIGNIFICANT CHANGE UP (ref 10.3–14.5)
SODIUM SERPL-SCNC: 138 MMOL/L — SIGNIFICANT CHANGE UP (ref 135–145)
WBC # BLD: 13.13 K/UL — HIGH (ref 3.8–10.5)
WBC # FLD AUTO: 13.13 K/UL — HIGH (ref 3.8–10.5)

## 2022-10-19 PROCEDURE — 99232 SBSQ HOSP IP/OBS MODERATE 35: CPT | Mod: GC

## 2022-10-19 RX ORDER — CHOLECALCIFEROL (VITAMIN D3) 125 MCG
1 CAPSULE ORAL
Qty: 30 | Refills: 0
Start: 2022-10-19 | End: 2022-11-17

## 2022-10-19 RX ORDER — CHOLECALCIFEROL (VITAMIN D3) 125 MCG
1000 CAPSULE ORAL
Qty: 30 | Refills: 0
Start: 2022-10-19

## 2022-10-19 RX ORDER — MYCOPHENOLATE MOFETIL 250 MG/1
1000 CAPSULE ORAL
Qty: 0 | Refills: 0 | DISCHARGE

## 2022-10-19 RX ORDER — MYCOPHENOLATE MOFETIL 250 MG/1
3 CAPSULE ORAL
Qty: 180 | Refills: 0
Start: 2022-10-19 | End: 2022-11-17

## 2022-10-19 RX ADMIN — MYCOPHENOLATE MOFETIL 1500 MILLIGRAM(S): 250 CAPSULE ORAL at 17:04

## 2022-10-19 RX ADMIN — Medication 200 MILLIGRAM(S): at 09:17

## 2022-10-19 RX ADMIN — HEPARIN SODIUM 5000 UNIT(S): 5000 INJECTION INTRAVENOUS; SUBCUTANEOUS at 22:23

## 2022-10-19 RX ADMIN — Medication 1 TABLET(S): at 08:17

## 2022-10-19 RX ADMIN — PANTOPRAZOLE SODIUM 40 MILLIGRAM(S): 20 TABLET, DELAYED RELEASE ORAL at 06:12

## 2022-10-19 RX ADMIN — MYCOPHENOLATE MOFETIL 1500 MILLIGRAM(S): 250 CAPSULE ORAL at 06:12

## 2022-10-19 RX ADMIN — Medication 200 MILLIGRAM(S): at 22:23

## 2022-10-19 RX ADMIN — HEPARIN SODIUM 5000 UNIT(S): 5000 INJECTION INTRAVENOUS; SUBCUTANEOUS at 06:11

## 2022-10-19 RX ADMIN — Medication 60 MILLIGRAM(S): at 06:12

## 2022-10-19 RX ADMIN — Medication 1000 UNIT(S): at 11:17

## 2022-10-19 RX ADMIN — HEPARIN SODIUM 5000 UNIT(S): 5000 INJECTION INTRAVENOUS; SUBCUTANEOUS at 13:18

## 2022-10-19 RX ADMIN — Medication 1 APPLICATION(S): at 06:13

## 2022-10-19 RX ADMIN — Medication 1 APPLICATION(S): at 17:04

## 2022-10-19 NOTE — PROGRESS NOTE ADULT - SUBJECTIVE AND OBJECTIVE BOX
[facility] DIVISION OF KIDNEY DISEASES AND HYPERTENSION   FOLLOW UP NOTE    --------------------------------------------------------------------------------  HPI:  47-year-old male with PMH of SLE and LN class 3 referred to ER from dermatologist for worsening rash and fever. ER labs showed Scr mildly elevated Scr of 1.66 on admission (10/13/22). Pt currently admitted for cutaneous SLE flare. Pt being seen for LIZETH.    Pt. initially presented with hematuria, non-nephrotic range proteinuria and elevated MÓNICA titers in Feb 2022. Pt underwent kidney biopsy in 2/7/22 which showed focal proliferative type, class 3 LN. A single small cellular crescent, mild acute interstitial nephritis, NIH activity score 4/24 with no chronicity was also noted on kidney biopsy. Pt. was seen for initial evaluation of LN by nephrologist Dr. Shultz as OP in March 2022. Upon review of labs, Scr was 1.04 on 3/31/22 done on OP labs. UA done on 3/31/22 showed hematuria and proteinuria. Spot urine TP/CR ratio was elevated at 3.3 on 3/31/22. Pt. on SLE treatment as per Rheumatology. Pt. also has history of noncompliance to his medications/immunosuppressive therapy. Scr on admission (10/13/22) elevated at 1.66. Serum potassium was mildly elevated at 5.7. Pt received medical management for hyperkalemia. UA on 10/13 with proteinuria and hematuria. Urine spot TP/CR elevated at 2.9 on 10/13/22. US kidneys done on 10/14/22 showed no hydronephrosis. Serum albumin low at 2.4 on 10/13/22 . DsDNA titer were significantly elevated at 834 and complement C3 and C4 levels were low on labs done on 10/11/22. Rheumatology was consulted and pt. received high dose steroids for 3 days (10/14-10/16) and started on immunosuppressive treatment. Pt underwent kidney biopsy on 10/17/22 which showed class IV LN with crescentic and diffuse proliferative patterns of injury. Active crescents with mild AIN and focal ATI was also noted o biopsy.     Pt. seen and examined today. Pt. reports feeling better. Pt. denies fever, chills, nausea, vomiting, CP and diarrhea during our rounds today. Scr decreased to 1.39 today.     PAST HISTORY  --------------------------------------------------------------------------------  No significant changes to PMH, PSH, FHx, SHx, unless otherwise noted    ALLERGIES & MEDICATIONS  --------------------------------------------------------------------------------  Allergies  No Known Allergies    Intolerances    Standing Inpatient Medications  cholecalciferol 1000 Unit(s) Oral daily  heparin   Injectable 5000 Unit(s) SubCutaneous every 8 hours  hydroxychloroquine 200 milliGRAM(s) Oral two times a day  mycophenolate mofetil 1500 milliGRAM(s) Oral two times a day  pantoprazole    Tablet 40 milliGRAM(s) Oral before breakfast  predniSONE   Tablet 60 milliGRAM(s) Oral daily  triamcinolone 0.1% Cream 1 Application(s) Topical every 12 hours  trimethoprim  160 mG/sulfamethoxazole 800 mG 1 Tablet(s) Oral <User Schedule>    PRN Inpatient Medications    REVIEW OF SYSTEMS  --------------------------------------------------------------------------------  Gen: No fevers/chills,   Head/Eyes/Ears: HA (on admission)  Respiratory: No dyspnea, cough  CV: No chest pain  GI: No abdominal pain, diarrhea  : see HPI  MSK: mild LE edema  Skin: Improved facial and upper chest mid region rash+  Heme: No easy bruising or bleeding    All other systems were reviewed and are negative, except as noted.    VITALS/PHYSICAL EXAM  --------------------------------------------------------------------------------  T(C): 36.7 (10-19-22 @ 14:22), Max: 36.7 (10-19-22 @ 14:22)  HR: 70 (10-19-22 @ 14:22) (62 - 70)  BP: 105/62 (10-19-22 @ 14:22) (105/62 - 131/83)  RR: 17 (10-19-22 @ 14:22) (17 - 18)  SpO2: 99% (10-19-22 @ 14:22) (99% - 100%)  Wt(kg): --      Physical Exam:  	Gen: young male, sitting, NAD  	HEENT: Anicteric  	Pulm: CTA B/L  	CV: S1S2+  	Abd: Soft, +BS,  	Ext: mild B/L LE pitting edema+  	Neuro: Awake, alert       	Skin: Erythematous rash on upper face/cheek  and upper mid chest regions (resolving)    LABS/STUDIES  --------------------------------------------------------------------------------              11.8   13.13 >-----------<  291      [10-19-22 @ 06:00]              35.4     138  |  111  |  49  ----------------------------<  84      [10-19-22 @ 06:00]  4.6   |  18  |  1.39        Ca     8.6     [10-19-22 @ 06:00]      Mg     1.90     [10-19-22 @ 06:00]      Phos  3.3     [10-19-22 @ 06:00]    Creatinine Trend:  SCr 1.39 [10-19 @ 06:00]  SCr 1.73 [10-18 @ 05:45]  SCr 1.42 [10-17 @ 05:22]  SCr 1.50 [10-16 @ 08:00]  SCr 1.72 [10-15 @ 10:29]    Urinalysis - [10-13-22 @ 14:29]      Color Light Yellow / Appearance Clear / SG 1.016 / pH 6.0      Gluc Negative / Ketone Negative  / Bili Negative / Urobili <2 mg/dL       Blood Large / Protein 300 mg/dL / Leuk Est Small / Nitrite Negative      RBC 32 / WBC 34 / Hyaline 14 / Gran  / Sq Epi  / Non Sq Epi 4 / Bacteria Few    Urine Creatinine 98      [10-14-22 @ 09:08]  Urine Protein 207      [10-14-22 @ 09:08]  Urine Sodium 36      [10-13-22 @ 21:50]  Urine Urea Nitrogen 555.0      [10-13-22 @ 21:50]    HBsAg Nonreact      [10-15-22 @ 06:20]  HCV 0.14, Nonreact      [10-15-22 @ 06:20] [facility] DIVISION OF KIDNEY DISEASES AND HYPERTENSION   FOLLOW UP NOTE    --------------------------------------------------------------------------------  HPI:  47-year-old male with PMH of SLE and LN class 3 referred to ER from dermatologist for worsening rash and fever. ER labs showed Scr mildly elevated Scr of 1.66 on admission (10/13/22). Pt currently admitted for cutaneous SLE flare. Pt being seen for LIZETH.    Pt. initially presented with hematuria, non-nephrotic range proteinuria and elevated MÓNICA titers in Feb 2022. Pt underwent kidney biopsy in 2/7/22 which showed focal proliferative type, class 3 LN. A single small cellular crescent, mild acute interstitial nephritis, NIH activity score 4/24 with no chronicity was also noted on kidney biopsy. Pt. was seen for initial evaluation of LN by nephrologist Dr. Shultz as OP in March 2022. Upon review of labs, Scr was 1.04 on 3/31/22 done on OP labs. UA done on 3/31/22 showed hematuria and proteinuria. Spot urine TP/CR ratio was elevated at 3.3 on 3/31/22. Pt. on SLE treatment as per Rheumatology. Pt. also has history of noncompliance to his medications/immunosuppressive therapy. Scr on admission (10/13/22) elevated at 1.66. Serum potassium was mildly elevated at 5.7. Pt received medical management for hyperkalemia. UA on 10/13 with proteinuria and hematuria. Urine spot TP/CR elevated at 2.9 on 10/13/22. US kidneys done on 10/14/22 showed no hydronephrosis. Serum albumin low at 2.4 on 10/13/22 . DsDNA titer were significantly elevated at 834 and complement C3 and C4 levels were low on labs done on 10/11/22. Rheumatology was consulted and pt. received high dose steroids for 3 days (10/14-10/16) and started on immunosuppressive treatment. Pt underwent kidney biopsy on 10/17/22 which showed class IV LN with crescentic and diffuse proliferative patterns of injury. Active crescents with mild AIN and focal ATI was also noted on biopsy.     Pt. seen and examined today. Pt. reports feeling better. Pt. denies fever, chills, nausea, vomiting, CP and diarrhea during our rounds today. Scr decreased to 1.39 today.     PAST HISTORY  --------------------------------------------------------------------------------  No significant changes to PMH, PSH, FHx, SHx, unless otherwise noted    ALLERGIES & MEDICATIONS  --------------------------------------------------------------------------------  Allergies  No Known Allergies    Intolerances    Standing Inpatient Medications  cholecalciferol 1000 Unit(s) Oral daily  heparin   Injectable 5000 Unit(s) SubCutaneous every 8 hours  hydroxychloroquine 200 milliGRAM(s) Oral two times a day  mycophenolate mofetil 1500 milliGRAM(s) Oral two times a day  pantoprazole    Tablet 40 milliGRAM(s) Oral before breakfast  predniSONE   Tablet 60 milliGRAM(s) Oral daily  triamcinolone 0.1% Cream 1 Application(s) Topical every 12 hours  trimethoprim  160 mG/sulfamethoxazole 800 mG 1 Tablet(s) Oral <User Schedule>    PRN Inpatient Medications    REVIEW OF SYSTEMS  --------------------------------------------------------------------------------  Gen: No fevers/chills,   Head/Eyes/Ears: HA (on admission)  Respiratory: No dyspnea, cough  CV: No chest pain  GI: No abdominal pain, diarrhea  : see HPI  MSK: mild LE edema  Skin: Improved facial and upper chest mid region rash+  Heme: No easy bruising or bleeding    All other systems were reviewed and are negative, except as noted.    VITALS/PHYSICAL EXAM  --------------------------------------------------------------------------------  T(C): 36.7 (10-19-22 @ 14:22), Max: 36.7 (10-19-22 @ 14:22)  HR: 70 (10-19-22 @ 14:22) (62 - 70)  BP: 105/62 (10-19-22 @ 14:22) (105/62 - 131/83)  RR: 17 (10-19-22 @ 14:22) (17 - 18)  SpO2: 99% (10-19-22 @ 14:22) (99% - 100%)  Wt(kg): --    Physical Exam:  	Gen: sitting in bed, NAD  	HEENT: Anicteric  	Pulm: CTA B/L  	CV: S1S2+  	Abd: Soft, +BS,  	Ext: mild B/L LE pitting edema+  	Neuro: Awake, alert       	Skin: Rash (resolving) on upper face/cheek  and upper mid chest region    LABS/STUDIES  --------------------------------------------------------------------------------              11.8   13.13 >-----------<  291      [10-19-22 @ 06:00]              35.4     138  |  111  |  49  ----------------------------<  84      [10-19-22 @ 06:00]  4.6   |  18  |  1.39        Ca     8.6     [10-19-22 @ 06:00]      Mg     1.90     [10-19-22 @ 06:00]      Phos  3.3     [10-19-22 @ 06:00]    Creatinine Trend:  SCr 1.39 [10-19 @ 06:00]  SCr 1.73 [10-18 @ 05:45]  SCr 1.42 [10-17 @ 05:22]  SCr 1.50 [10-16 @ 08:00]  SCr 1.72 [10-15 @ 10:29]    Urinalysis - [10-13-22 @ 14:29]      Color Light Yellow / Appearance Clear / SG 1.016 / pH 6.0      Gluc Negative / Ketone Negative  / Bili Negative / Urobili <2 mg/dL       Blood Large / Protein 300 mg/dL / Leuk Est Small / Nitrite Negative      RBC 32 / WBC 34 / Hyaline 14 / Gran  / Sq Epi  / Non Sq Epi 4 / Bacteria Few    Urine Creatinine 98      [10-14-22 @ 09:08]  Urine Protein 207      [10-14-22 @ 09:08]  Urine Sodium 36      [10-13-22 @ 21:50]  Urine Urea Nitrogen 555.0      [10-13-22 @ 21:50]    HBsAg Nonreact      [10-15-22 @ 06:20]  HCV 0.14, Nonreact      [10-15-22 @ 06:20]

## 2022-10-19 NOTE — PROGRESS NOTE ADULT - PROBLEM SELECTOR PLAN 1
Suspect SLE flare given pt has been feeling unwell x days and with new cutaneous symptoms  C3 and C4 both low, dsDNA elevated  Reports he has been taking Plaquenil 200mg bid, MMF 1g bid, Prednisone 10mg qd, though compliance questionable, as pt was lost to follow up for months this year  - Rheumatology consult obtained, pulse steroids (10/14 - 10/16)   - Prednisone 60mg  - f/u rheum recs for steroid taper   - on bactrim for pcp prophylaxis   - c/w home Plaquenil   - c/w cellcept 3g

## 2022-10-19 NOTE — PROGRESS NOTE ADULT - ASSESSMENT
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 Pt received prednisone 40mg qD x2, and prednisone 60mg x1 in the ED. Rash appears to have improved.     Feb 2022 serology:    MÓNICA 1:1280 (speckled) and MÓNICA titer 2 1:640 (homogenous.)   Myomarker panel 3: weakly positive U2 RNP, weakly positive Anti-Pm/Scl, strongly positive  U1 RNP, strongly positive SSA/Ro 52, CK wnl   C3/C4 LOW, DsDNA > 1000  Sm, RNP, SSA/SSB> 8  UA: Large blood  Prt/Crea 0.8  APS labs negative      # SLE with Class 3 LN and mucocutaneous symptoms in Feb 2022, now presenting with Class 4 LN     - home medications: Cellcept 1000mg BID and HCQ 200mg BID,  but questionable compliance, last prescription was march 2022  - lpus serologies from 10/12/22 still showed high lupus activity, dsDNA 834, and low C3, C4 complement  - presented to Dermatology for low grade fever and rash x 1 week    - Pt received prednisone 40mg qD x2, and prednisone 60mg x1 in the ED. Rash appears to have improved  - baseline Cr 0.8, currently 1.6-1.7 with hyperkalemia, concern for worsening lupus nephritis  - repeat Urine P/Cr 2.9 (was 0.8 in Feb 2022)   - renal US negative for hydronephrosis    - s/p renal bx on 10/17      Plan:    - S/p Solu-medrol 500mg x 3 days (10/14- 10/16), continue prednisone 60mg (10/17- )    - continue Cellcept 3g daily, Bactrim, Protonix and vitamin D   - questionable compliance vs Cellcept failure   - hep panel negative in Feb 2022  - quant- gold indeterminate (however checked after outpt prednisone 40mg was started), CXR clear   - discussed with pt and pt's son Varinder over the phone - pt needs routine f/u visit in clinic, and treatment options such as Obinutuzumab vs Cytoxan  - plan for meeting tomorrow with the son at 11AM to further discuss  - pt and family preference is Obinutuzumab (felt pt is unable to come to infusion center q2 weeks for 3 months)         Plan discussed with attending Dr. Tani Nina, PGY-5  Rheumatology Fellow   Pager: [phone], also available on TEAMS   please enter a full 10 digit number for call back   During weekends, please page on call fellow

## 2022-10-19 NOTE — PROGRESS NOTE ADULT - PROBLEM SELECTOR PLAN 2
Prior bx revealed class 3 lupus nephritis  SCr 1.0 in March, now 1.7  Likely 2/2 progression of lupus nephritis  - US kidney/bladder negative for hydro  - urine lytes: FeNa 0/6%: prerenal  - UPC 2.9  - c/w SLE meds as above  - f/u biopsy and renal recs

## 2022-10-19 NOTE — PROGRESS NOTE ADULT - ASSESSMENT
48 yo M, German speaking, with SLE (dx Feb 2022, with Class 3 lupus nephritis and mucocutaneous symptoms) sent to ED by outpatient dermatology for fever and worsening rash, c/f SLE flare, found to have new LIZETH. LIZETH currently improving on steroids s/p IR bx of R kidney on 10/17 pending prelim bx read 48 yo M, Khmer speaking, with SLE (dx Feb 2022, with Class 3 lupus nephritis and mucocutaneous symptoms) sent to ED by outpatient dermatology for fever and worsening rash, c/f SLE flare, found to have new LIZETH. LIZETH currently improving on steroids s/p IR bx of L kidney on 10/17 pending prelim bx read

## 2022-10-19 NOTE — PROGRESS NOTE ADULT - PROBLEM SELECTOR PLAN 5
Hgb mildly decreased to 12.4, suspect AoCD i/s/o lupus and now possible CKD from lupus nephritis. No S/S of active bleed.  - monitor H/H

## 2022-10-19 NOTE — PROGRESS NOTE ADULT - PROBLEM SELECTOR PLAN 1
Pt. with kidney failure and proteinuria in setting of class 3 LN. Pt was diagnosed with SLE and LN during previous hospital stay at [facility] in Feb 2022. Kidney biopsy performed on 2/17/22 which showed focal proliferative type, class 3 LN. Pt. initiated on immunosuppressives for SLE/LN by rheumatology team. Scr was 1.04 on 3/31/22 done on OP labs. UA done on 3/31/22 showed hematuria and proteinuria. Spot urine spot TP/CR ratio on 3/31/22 elevated at 3.3. Pt. with history of noncompliance to his medications/immunosuppressive therapy. Scr on current admission (10/13/22) elevated at 1.66. Spot urine elevated/stable at 2.9 on 10/13/22. US kidneys done on 10/14/22 showed no hydronephrosis. DsDNA titer were significantly elevated at 834 and complement C3 and C4 levels were low on labs done on 10/11/22. Pt underwent kidney biopsy on 10/17/22, which showed class IV LN with crescentic and diffuse proliferative patterns of injury. Active crescents with mild AIN and focal ATI was also noted on biopsy, awaiting final results. Rheumatology note from 10/17/22 reviewed. Scr decreased to 1.739 on labs done today.  Immunosuppressive therapy for SLE/LN as per rheumatology team. Low salt diet. Fluid restriction. Pt. advised to be compliant to his medications. Monitor labs and urine output. Avoid any potential nephrotoxins. Dose medications as per eGFR. Pt. with LIZETH and proteinuria secondary to crescentic/Class IV lupus nephritis. Pt was diagnosed with SLE and LN during previous hospital stay at [facility] in Feb 2022. Kidney biopsy performed on 2/17/22 which showed focal proliferative type, class 3 LN. Pt. initiated on immunosuppressives for SLE/LN by rheumatology team. Scr was 1.04 on 3/31/22 done on OP labs. UA done on 3/31/22 showed hematuria and proteinuria. Spot urine spot TP/CR ratio on 3/31/22 elevated at 3.3. Pt. with history of noncompliance to his medications/immunosuppressive therapy. Scr on current admission (10/13/22) elevated at 1.66. Spot urine elevated/stable at 2.9 on 10/13/22. US kidneys done on 10/14/22 showed no hydronephrosis. DsDNA titer were significantly elevated at 834 and complement C3 and C4 levels were low on labs done on 10/11/22. Pt underwent kidney biopsy on 10/17/22. Pt. found to have crescentic/class IV diffuse proliferative LN on kidney biopsy (prelim results). Rheumatology note from 10/17/22 reviewed. Scr decreased to 1.39 on labs done today. Immunosuppressive therapy for SLE/LN as per rheumatology team. Low salt diet. Fluid restriction. Pt. advised to be compliant to his medications. Monitor labs and urine output. Avoid any potential nephrotoxins. Dose medications as per eGFR.

## 2022-10-19 NOTE — PROGRESS NOTE ADULT - SUBJECTIVE AND OBJECTIVE BOX
Terell Carson, PGY2  Pager [phone] [facility] or [phone] [facility]    Patient is a 47y old  Male who presents with a chief complaint of SLE flare (18 Oct 2022 11:32)      SUBJECTIVE/INTERVAL EVENTS: Patient seen and examined at bedside.  Patient was seen and examined at bedside this morning. Denies any nausea/vomiting/diarrhea, headache, shortness of breath, abdominal pain or chest pain/palpitations. Patient responding appropriately to questions and able to make needs known. Vital signs/imaging/telemetry events reviewed.      MEDICATIONS  (STANDING):  cholecalciferol 1000 Unit(s) Oral daily  heparin   Injectable 5000 Unit(s) SubCutaneous every 8 hours  hydroxychloroquine 200 milliGRAM(s) Oral two times a day  mycophenolate mofetil 1500 milliGRAM(s) Oral two times a day  pantoprazole    Tablet 40 milliGRAM(s) Oral before breakfast  predniSONE   Tablet 60 milliGRAM(s) Oral daily  triamcinolone 0.1% Cream 1 Application(s) Topical every 12 hours  trimethoprim  160 mG/sulfamethoxazole 800 mG 1 Tablet(s) Oral <User Schedule>    MEDICATIONS  (PRN):      VITAL SIGNS:  T(F): 97.6 (10-18-22 @ 22:30), Max: 97.9 (10-18-22 @ 14:30)  HR: 62 (10-18-22 @ 22:30) (62 - 66)  BP: 131/83 (10-18-22 @ 22:30) (105/66 - 131/83)  RR: 18 (10-18-22 @ 22:30) (18 - 18)  SpO2: 100% (10-18-22 @ 22:30) (99% - 100%)    I&O's Summary    Daily     Daily     PHYSICAL EXAM:  CONSTITUTIONAL: NAD, well-developed, A&Ox3 to person, place, time.  RESPIRATORY: Normal respiratory effort; lungs are clear to auscultation bilaterally  CARDIOVASCULAR: Regular rate and rhythm, normal S1 and S2, no murmur/rub/gallop; No lower extremity edema;   ABDOMEN: Nontender to palpation, no rebound/guarding;   NEURO: CN 2-12 grossly intact, moves all limbs spontaneously  SKIN: erythematous rash on chest  LABS:                        11.8   13.13 )-----------( 291      ( 19 Oct 2022 06:00 )             35.4     Hgb Trend: 11.8<--, 12.4<--, 12.9<--, 12.5<--, 12.6<--  10-18    131<L>  |  103  |  54<H>  ----------------------------<  113<H>  4.6   |  18<L>  |  1.73<H>    Ca    9.0      18 Oct 2022 05:45  Phos  3.7     10-18  Mg     2.10     10-18      Creatinine Trend: 1.73<--, 1.42<--, 1.50<--, 1.72<--, 1.75<--, 1.67<--              CAPILLARY BLOOD GLUCOSE          RADIOLOGY & ADDITIONAL TESTS: Reviewed    Imaging Personally Reviewed:    Consultant(s) Notes Reviewed:      Care Discussed with Consultants/Other Providers:   Terell Carson, PGY2  Pager [phone] [facility] or [phone] [facility]    Patient is a 47y old  Male who presents with a chief complaint of SLE flare (18 Oct 2022 11:32)      SUBJECTIVE/INTERVAL EVENTS: Patient seen and examined at bedside.  Patient was seen and examined at bedside this morning. Denies any nausea/vomiting/diarrhea, headache, shortness of breath, abdominal pain or chest pain/palpitations. Patient responding appropriately to questions and able to make needs known. Vital signs/imaging/telemetry events reviewed.    No pain on the L side of back.      MEDICATIONS  (STANDING):  cholecalciferol 1000 Unit(s) Oral daily  heparin   Injectable 5000 Unit(s) SubCutaneous every 8 hours  hydroxychloroquine 200 milliGRAM(s) Oral two times a day  mycophenolate mofetil 1500 milliGRAM(s) Oral two times a day  pantoprazole    Tablet 40 milliGRAM(s) Oral before breakfast  predniSONE   Tablet 60 milliGRAM(s) Oral daily  triamcinolone 0.1% Cream 1 Application(s) Topical every 12 hours  trimethoprim  160 mG/sulfamethoxazole 800 mG 1 Tablet(s) Oral <User Schedule>    MEDICATIONS  (PRN):      VITAL SIGNS:  T(F): 97.6 (10-18-22 @ 22:30), Max: 97.9 (10-18-22 @ 14:30)  HR: 62 (10-18-22 @ 22:30) (62 - 66)  BP: 131/83 (10-18-22 @ 22:30) (105/66 - 131/83)  RR: 18 (10-18-22 @ 22:30) (18 - 18)  SpO2: 100% (10-18-22 @ 22:30) (99% - 100%)    I&O's Summary    Daily     Daily     PHYSICAL EXAM:  CONSTITUTIONAL: NAD, well-developed, A&Ox3 to person, place, time.  RESPIRATORY: Normal respiratory effort; lungs are clear to auscultation bilaterally  CARDIOVASCULAR: Regular rate and rhythm, normal S1 and S2, no murmur/rub/gallop; No lower extremity edema;   ABDOMEN: Nontender to palpation, no rebound/guarding; Left back bx site no erythema, pain  NEURO: CN 2-12 grossly intact, moves all limbs spontaneously  SKIN: erythematous rash on chest  LABS:                        11.8   13.13 )-----------( 291      ( 19 Oct 2022 06:00 )             35.4     Hgb Trend: 11.8<--, 12.4<--, 12.9<--, 12.5<--, 12.6<--  10-18    131<L>  |  103  |  54<H>  ----------------------------<  113<H>  4.6   |  18<L>  |  1.73<H>    Ca    9.0      18 Oct 2022 05:45  Phos  3.7     10-18  Mg     2.10     10-18      Creatinine Trend: 1.73<--, 1.42<--, 1.50<--, 1.72<--, 1.75<--, 1.67<--              CAPILLARY BLOOD GLUCOSE          RADIOLOGY & ADDITIONAL TESTS: Reviewed    Imaging Personally Reviewed:    Consultant(s) Notes Reviewed:      Care Discussed with Consultants/Other Providers:

## 2022-10-19 NOTE — PROGRESS NOTE ADULT - ASSESSMENT
Pt. with kidney failure and proteinuria in setting of lupus nephritis. Pt. with LIZETH secondary to crescentic/Class IV lupus nephritis.

## 2022-10-19 NOTE — PROGRESS NOTE ADULT - SUBJECTIVE AND OBJECTIVE BOX
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        PMHx/PSHx/FamHx/SocHx reviewed and no significant changes    REVIEW OF SYSTEMS   - reviewed with patient and  negative other than as above or previously documented.     MEDICATIONS  (STANDING):  cholecalciferol 1000 Unit(s) Oral daily  heparin   Injectable 5000 Unit(s) SubCutaneous every 8 hours  hydroxychloroquine 200 milliGRAM(s) Oral two times a day  mycophenolate mofetil 1500 milliGRAM(s) Oral two times a day  pantoprazole    Tablet 40 milliGRAM(s) Oral before breakfast  predniSONE   Tablet 60 milliGRAM(s) Oral daily  triamcinolone 0.1% Cream 1 Application(s) Topical every 12 hours  trimethoprim  160 mG/sulfamethoxazole 800 mG 1 Tablet(s) Oral <User Schedule>    MEDICATIONS  (PRN):      Allergies    No Known Allergies    Intolerances          Vital Signs Last 24 Hrs  T(C): 36.4 (19 Oct 2022 06:00), Max: 36.6 (18 Oct 2022 14:30)  T(F): 97.6 (19 Oct 2022 06:00), Max: 97.9 (18 Oct 2022 14:30)  HR: 70 (19 Oct 2022 06:00) (62 - 70)  BP: 128/82 (19 Oct 2022 06:00) (105/66 - 131/83)  BP(mean): --  RR: 18 (19 Oct 2022 06:00) (18 - 18)  SpO2: 100% (19 Oct 2022 06:00) (99% - 100%)    Parameters below as of 19 Oct 2022 06:00  Patient On (Oxygen Delivery Method): room air        Physical Exam:  General: NAD  HEENT: EOMI, MMM  Cardio: +S1/S2, RRR  Resp: CTA b/l  GI: +BS, soft, NT/ND  MSK:  Neuro: AAOx3  Psych: wnl    LABS:                        11.8   13.13 )-----------( 291      ( 19 Oct 2022 06:00 )             35.4     10-19    138  |  111<H>  |  49<H>  ----------------------------<  84  4.6   |  18<L>  |  1.39<H>    Ca    8.6      19 Oct 2022 06:00  Phos  3.3     10-19  Mg     1.90     10-19              RADIOLOGY & ADDITIONAL TESTS:       LIZ HOUSER  [id number]    INTERVAL HPI/OVERNIGHT EVENTS: 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        PMHx/PSHx/FamHx/SocHx reviewed and no significant changes    REVIEW OF SYSTEMS   - reviewed with patient and  negative other than as above or previously documented.     MEDICATIONS  (STANDING):  cholecalciferol 1000 Unit(s) Oral daily  heparin   Injectable 5000 Unit(s) SubCutaneous every 8 hours  hydroxychloroquine 200 milliGRAM(s) Oral two times a day  mycophenolate mofetil 1500 milliGRAM(s) Oral two times a day  pantoprazole    Tablet 40 milliGRAM(s) Oral before breakfast  predniSONE   Tablet 60 milliGRAM(s) Oral daily  triamcinolone 0.1% Cream 1 Application(s) Topical every 12 hours  trimethoprim  160 mG/sulfamethoxazole 800 mG 1 Tablet(s) Oral <User Schedule>    MEDICATIONS  (PRN):  Allergies  No Known Allergies  Intolerances      Vital Signs Last 24 Hrs  T(C): 36.4 (19 Oct 2022 06:00), Max: 36.6 (18 Oct 2022 14:30)  T(F): 97.6 (19 Oct 2022 06:00), Max: 97.9 (18 Oct 2022 14:30)  HR: 70 (19 Oct 2022 06:00) (62 - 70)  BP: 128/82 (19 Oct 2022 06:00) (105/66 - 131/83)  BP(mean): --  RR: 18 (19 Oct 2022 06:00) (18 - 18)  SpO2: 100% (19 Oct 2022 06:00) (99% - 100%)    Parameters below as of 19 Oct 2022 06:00  Patient On (Oxygen Delivery Method): room air      Physical Exam:  General: NAD  HEENT: EOMI, MMM  Cardio: +S1/S2, RRR  Resp: CTA b/l  GI: +BS, soft, NT/ND  MSK: no synovitis, joints NTP    Neuro: AAOx3  Ext: no pitting edema    Skin: faint erythematous rash around temporal artery area     LABS:                        11.8   13.13 )-----------( 291      ( 19 Oct 2022 06:00 )             35.4     10-19    138  |  111<H>  |  49<H>  ----------------------------<  84  4.6   |  18<L>  |  1.39<H>    Ca    8.6      19 Oct 2022 06:00  Phos  3.3     10-19  Mg     1.90     10-19      RADIOLOGY & ADDITIONAL TESTS:

## 2022-10-20 LAB
ANION GAP SERPL CALC-SCNC: 8 MMOL/L — SIGNIFICANT CHANGE UP (ref 7–14)
BASOPHILS # BLD AUTO: 0.02 K/UL — SIGNIFICANT CHANGE UP (ref 0–0.2)
BASOPHILS NFR BLD AUTO: 0.2 % — SIGNIFICANT CHANGE UP (ref 0–2)
BUN SERPL-MCNC: 46 MG/DL — HIGH (ref 7–23)
CALCIUM SERPL-MCNC: 8.8 MG/DL — SIGNIFICANT CHANGE UP (ref 8.4–10.5)
CHLORIDE SERPL-SCNC: 105 MMOL/L — SIGNIFICANT CHANGE UP (ref 98–107)
CO2 SERPL-SCNC: 21 MMOL/L — LOW (ref 22–31)
CREAT SERPL-MCNC: 1.48 MG/DL — HIGH (ref 0.5–1.3)
CULTURE RESULTS: SIGNIFICANT CHANGE UP
EGFR: 58 ML/MIN/1.73M2 — LOW
EOSINOPHIL # BLD AUTO: 0 K/UL — SIGNIFICANT CHANGE UP (ref 0–0.5)
EOSINOPHIL NFR BLD AUTO: 0 % — SIGNIFICANT CHANGE UP (ref 0–6)
GLUCOSE SERPL-MCNC: 123 MG/DL — HIGH (ref 70–99)
HCT VFR BLD CALC: 36.7 % — LOW (ref 39–50)
HGB BLD-MCNC: 12.3 G/DL — LOW (ref 13–17)
IANC: 11.04 K/UL — HIGH (ref 1.8–7.4)
IMM GRANULOCYTES NFR BLD AUTO: 1.4 % — HIGH (ref 0–0.9)
LYMPHOCYTES # BLD AUTO: 0.98 K/UL — LOW (ref 1–3.3)
LYMPHOCYTES # BLD AUTO: 7.8 % — LOW (ref 13–44)
MAGNESIUM SERPL-MCNC: 1.9 MG/DL — SIGNIFICANT CHANGE UP (ref 1.6–2.6)
MCHC RBC-ENTMCNC: 28.8 PG — SIGNIFICANT CHANGE UP (ref 27–34)
MCHC RBC-ENTMCNC: 33.5 GM/DL — SIGNIFICANT CHANGE UP (ref 32–36)
MCV RBC AUTO: 85.9 FL — SIGNIFICANT CHANGE UP (ref 80–100)
MONOCYTES # BLD AUTO: 0.3 K/UL — SIGNIFICANT CHANGE UP (ref 0–0.9)
MONOCYTES NFR BLD AUTO: 2.4 % — SIGNIFICANT CHANGE UP (ref 2–14)
NEUTROPHILS # BLD AUTO: 11.04 K/UL — HIGH (ref 1.8–7.4)
NEUTROPHILS NFR BLD AUTO: 88.2 % — HIGH (ref 43–77)
NRBC # BLD: 0 /100 WBCS — SIGNIFICANT CHANGE UP (ref 0–0)
NRBC # FLD: 0 K/UL — SIGNIFICANT CHANGE UP (ref 0–0)
ORGANISM # SPEC MICROSCOPIC CNT: SIGNIFICANT CHANGE UP
ORGANISM # SPEC MICROSCOPIC CNT: SIGNIFICANT CHANGE UP
PHOSPHATE SERPL-MCNC: 2.4 MG/DL — LOW (ref 2.5–4.5)
PLATELET # BLD AUTO: 307 K/UL — SIGNIFICANT CHANGE UP (ref 150–400)
POTASSIUM SERPL-MCNC: 4.7 MMOL/L — SIGNIFICANT CHANGE UP (ref 3.5–5.3)
POTASSIUM SERPL-SCNC: 4.7 MMOL/L — SIGNIFICANT CHANGE UP (ref 3.5–5.3)
RBC # BLD: 4.27 M/UL — SIGNIFICANT CHANGE UP (ref 4.2–5.8)
RBC # FLD: 13 % — SIGNIFICANT CHANGE UP (ref 10.3–14.5)
SODIUM SERPL-SCNC: 134 MMOL/L — LOW (ref 135–145)
SPECIMEN SOURCE: SIGNIFICANT CHANGE UP
SURGICAL PATHOLOGY STUDY: SIGNIFICANT CHANGE UP
WBC # BLD: 12.52 K/UL — HIGH (ref 3.8–10.5)
WBC # FLD AUTO: 12.52 K/UL — HIGH (ref 3.8–10.5)

## 2022-10-20 PROCEDURE — 99232 SBSQ HOSP IP/OBS MODERATE 35: CPT | Mod: GC

## 2022-10-20 RX ORDER — SODIUM,POTASSIUM PHOSPHATES 278-250MG
1 POWDER IN PACKET (EA) ORAL ONCE
Refills: 0 | Status: COMPLETED | OUTPATIENT
Start: 2022-10-20 | End: 2022-10-20

## 2022-10-20 RX ADMIN — Medication 200 MILLIGRAM(S): at 21:32

## 2022-10-20 RX ADMIN — Medication 60 MILLIGRAM(S): at 05:47

## 2022-10-20 RX ADMIN — HEPARIN SODIUM 5000 UNIT(S): 5000 INJECTION INTRAVENOUS; SUBCUTANEOUS at 21:34

## 2022-10-20 RX ADMIN — Medication 1000 UNIT(S): at 11:09

## 2022-10-20 RX ADMIN — MYCOPHENOLATE MOFETIL 1500 MILLIGRAM(S): 250 CAPSULE ORAL at 05:47

## 2022-10-20 RX ADMIN — PANTOPRAZOLE SODIUM 40 MILLIGRAM(S): 20 TABLET, DELAYED RELEASE ORAL at 07:36

## 2022-10-20 RX ADMIN — MYCOPHENOLATE MOFETIL 1500 MILLIGRAM(S): 250 CAPSULE ORAL at 16:27

## 2022-10-20 RX ADMIN — Medication 200 MILLIGRAM(S): at 11:09

## 2022-10-20 RX ADMIN — Medication 1 APPLICATION(S): at 16:27

## 2022-10-20 RX ADMIN — HEPARIN SODIUM 5000 UNIT(S): 5000 INJECTION INTRAVENOUS; SUBCUTANEOUS at 05:47

## 2022-10-20 RX ADMIN — Medication 1 PACKET(S): at 11:09

## 2022-10-20 RX ADMIN — Medication 1 APPLICATION(S): at 05:48

## 2022-10-20 RX ADMIN — HEPARIN SODIUM 5000 UNIT(S): 5000 INJECTION INTRAVENOUS; SUBCUTANEOUS at 14:32

## 2022-10-20 NOTE — DIETITIAN INITIAL EVALUATION ADULT - ADD RECOMMEND
Recommend continue regular Halal diet, which remains appropriate at this time. RDN remains available to discuss alternative menu items PRN. Obtain weekly weights.

## 2022-10-20 NOTE — PROGRESS NOTE ADULT - ASSESSMENT
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 Pt received prednisone 40mg qD x2, and prednisone 60mg x1 in the ED. Rash appears to have improved.     Feb 2022 serology:    MÓNICA 1:1280 (speckled) and MÓNICA titer 2 1:640 (homogenous.)   Myomarker panel 3: weakly positive U2 RNP, weakly positive Anti-Pm/Scl, strongly positive  U1 RNP, strongly positive SSA/Ro 52, CK wnl   C3/C4 LOW, DsDNA > 1000  Sm, RNP, SSA/SSB> 8  UA: Large blood  Prt/Crea 0.8  APS labs negative      # SLE with Class 3 LN and mucocutaneous symptoms in Feb 2022, now presenting with Class 4 LN     - home medications: Cellcept 1000mg BID and HCQ 200mg BID,  but questionable compliance, last prescription was march 2022  - lpus serologies from 10/12/22 still showed high lupus activity, dsDNA 834, and low C3, C4 complement  - presented to Dermatology for low grade fever and rash x 1 week    - Pt received prednisone 40mg qD x2, and prednisone 60mg x1 in the ED. Rash appears to have improved  - baseline Cr 0.8, currently 1.6-1.7 with hyperkalemia, concern for worsening lupus nephritis  - repeat Urine P/Cr 2.9 (was 0.8 in Feb 2022)   - renal US negative for hydronephrosis    - s/p renal bx on 10/17      Plan:    - S/p Solu-medrol 500mg x 3 days (10/14- 10/16), continue prednisone 60mg (10/17- )    - continue Cellcept 3g daily, Bactrim, Protonix and vitamin D   - questionable compliance vs Cellcept failure   - hep panel negative in Feb 2022  - quant- gold indeterminate (however checked after outpt prednisone 40mg was started), CXR clear. Quant gold negative Feb 2022   - had family meeting this morning, with pt's wife, son (Varinder), and nephew (Catiaisaias who is a RN), ultimately decided to add Obinutuzumab to his current regimen   - plan for first dose to be given tomorrow 10/21. Medication and pre-meds are ordered. Consent is placed in the chart   - Given Obinutuzumab is a strong B-cell depleting agent with known lower immune response to vaccine,  also consented pt for Evusheld (s/p pfizer x 2, last booster Jan 2022)     Plan discussed with attending Dr. Tani Nina, PGY-5  Rheumatology Fellow   Pager: [phone], also available on TEAMS   please enter a full 10 digit number for call back   During weekends, please page on call fellow

## 2022-10-20 NOTE — DIETITIAN INITIAL EVALUATION ADULT - OTHER INFO
Per chart, pt is 47 year old Albanian speaking male PMH SLE (dx 2/2022, with Class 3 lupus nephritis and mucocutaneous symptoms) presenting for fever, worsening rash found to have LIZETH. S/p IR bx of left kidney (10/17), prelim bx read crescentic LN, class IV. Rheumatology and Nephrology on board.    Attempted to visit pt twice, sleeping on both occasions; comprehensive chart review completed. NKFA. Pt lives at home with family. Noted with decreased appetite x2 days PTA in the setting of acute illness with nausea, fatigue. Pt continues on Halal diet, noted with good PO intake per flowsheet documentation. No noted GI distress, no bowel regimen ordered at this time. Pt continues on steroids for lupus exacerbation.

## 2022-10-20 NOTE — PROGRESS NOTE ADULT - SUBJECTIVE AND OBJECTIVE BOX
LIZ HOUSER  [id number]    INTERVAL HPI/OVERNIGHT EVENTS:        PMHx/PSHx/FamHx/SocHx reviewed and no significant changes    REVIEW OF SYSTEMS   - reviewed with patient and  negative other than as above or previously documented.     MEDICATIONS  (STANDING):  cholecalciferol 1000 Unit(s) Oral daily  heparin   Injectable 5000 Unit(s) SubCutaneous every 8 hours  hydroxychloroquine 200 milliGRAM(s) Oral two times a day  mycophenolate mofetil 1500 milliGRAM(s) Oral two times a day  pantoprazole    Tablet 40 milliGRAM(s) Oral before breakfast  predniSONE   Tablet 60 milliGRAM(s) Oral daily  triamcinolone 0.1% Cream 1 Application(s) Topical every 12 hours  trimethoprim  160 mG/sulfamethoxazole 800 mG 1 Tablet(s) Oral <User Schedule>    MEDICATIONS  (PRN):      Allergies    No Known Allergies    Intolerances          Vital Signs Last 24 Hrs  T(C): 36.2 (20 Oct 2022 11:05), Max: 36.7 (19 Oct 2022 14:22)  T(F): 97.1 (20 Oct 2022 11:05), Max: 98.1 (19 Oct 2022 14:22)  HR: 86 (20 Oct 2022 11:05) (65 - 88)  BP: 129/75 (20 Oct 2022 11:05) (105/62 - 131/80)  BP(mean): --  RR: 18 (20 Oct 2022 11:05) (17 - 18)  SpO2: 100% (20 Oct 2022 11:05) (99% - 100%)    Parameters below as of 20 Oct 2022 11:05  Patient On (Oxygen Delivery Method): room air        Physical Exam:  General: NAD  HEENT: EOMI, MMM  Cardio: +S1/S2, RRR  Resp: CTA b/l  GI: +BS, soft, NT/ND  MSK:  Neuro: AAOx3  Psych: wnl    LABS:                        12.3   12.52 )-----------( 307      ( 20 Oct 2022 10:00 )             36.7     10-20    134<L>  |  105  |  46<H>  ----------------------------<  123<H>  4.7   |  21<L>  |  1.48<H>    Ca    8.8      20 Oct 2022 10:00  Phos  2.4     10-20  Mg     1.90     10-20              RADIOLOGY & ADDITIONAL TESTS:       LIZ MIK  [id number]    INTERVAL HPI/ OVERNIGHT EVENTS: pt feel well, rash continues to improve     PMHx/PSHx/FamHx/SocHx reviewed and no significant changes    REVIEW OF SYSTEMS   - reviewed with patient and  negative other than as above or previously documented.     MEDICATIONS  (STANDING):  cholecalciferol 1000 Unit(s) Oral daily  heparin   Injectable 5000 Unit(s) SubCutaneous every 8 hours  hydroxychloroquine 200 milliGRAM(s) Oral two times a day  mycophenolate mofetil 1500 milliGRAM(s) Oral two times a day  pantoprazole    Tablet 40 milliGRAM(s) Oral before breakfast  predniSONE   Tablet 60 milliGRAM(s) Oral daily  triamcinolone 0.1% Cream 1 Application(s) Topical every 12 hours  trimethoprim  160 mG/sulfamethoxazole 800 mG 1 Tablet(s) Oral <User Schedule>    MEDICATIONS  (PRN):      Allergies  No Known Allergies  Intolerances      Vital Signs Last 24 Hrs  T(C): 36.2 (20 Oct 2022 11:05), Max: 36.7 (19 Oct 2022 14:22)  T(F): 97.1 (20 Oct 2022 11:05), Max: 98.1 (19 Oct 2022 14:22)  HR: 86 (20 Oct 2022 11:05) (65 - 88)  BP: 129/75 (20 Oct 2022 11:05) (105/62 - 131/80)  BP(mean): --  RR: 18 (20 Oct 2022 11:05) (17 - 18)  SpO2: 100% (20 Oct 2022 11:05) (99% - 100%)    Parameters below as of 20 Oct 2022 11:05  Patient On (Oxygen Delivery Method): room air    Physical Exam:  General: NAD  HEENT: EOMI, MMM  Cardio: +S1/S2, RRR  Resp: CTA b/l  GI: +BS, soft, NT/ND  MSK: no synovitis, joints NTP    Neuro: AAOx3  Ext: no pitting edema    Skin: faint erythematous rash around temporal artery area       LABS:                        12.3   12.52 )-----------( 307      ( 20 Oct 2022 10:00 )             36.7     10-20    134<L>  |  105  |  46<H>  ----------------------------<  123<H>  4.7   |  21<L>  |  1.48<H>    Ca    8.8      20 Oct 2022 10:00  Phos  2.4     10-20  Mg     1.90     10-20      RADIOLOGY & ADDITIONAL TESTS:       JUANYFIONAJASBIR HOUSER  [id number]    INTERVAL HPI/ OVERNIGHT EVENTS: pt feel well, rash continues to improve   spoke with patient with his son on the phone to assist with translation  Patient offered by deferred formal      PMHx/PSHx/FamHx/SocHx reviewed and no significant changes    REVIEW OF SYSTEMS   - reviewed with patient and  negative other than as above or previously documented.     MEDICATIONS  (STANDING):  cholecalciferol 1000 Unit(s) Oral daily  heparin   Injectable 5000 Unit(s) SubCutaneous every 8 hours  hydroxychloroquine 200 milliGRAM(s) Oral two times a day  mycophenolate mofetil 1500 milliGRAM(s) Oral two times a day  pantoprazole    Tablet 40 milliGRAM(s) Oral before breakfast  predniSONE   Tablet 60 milliGRAM(s) Oral daily  triamcinolone 0.1% Cream 1 Application(s) Topical every 12 hours  trimethoprim  160 mG/sulfamethoxazole 800 mG 1 Tablet(s) Oral <User Schedule>    MEDICATIONS  (PRN):      Allergies  No Known Allergies  Intolerances      Vital Signs Last 24 Hrs  T(C): 36.2 (20 Oct 2022 11:05), Max: 36.7 (19 Oct 2022 14:22)  T(F): 97.1 (20 Oct 2022 11:05), Max: 98.1 (19 Oct 2022 14:22)  HR: 86 (20 Oct 2022 11:05) (65 - 88)  BP: 129/75 (20 Oct 2022 11:05) (105/62 - 131/80)  RR: 18 (20 Oct 2022 11:05) (17 - 18)  SpO2: 100% (20 Oct 2022 11:05) (99% - 100%)    Parameters below as of 20 Oct 2022 11:05  Patient On (Oxygen Delivery Method): room air    Physical Exam:  General: NAD  HEENT: EOMI, MMM  Cardio: +S1/S2, RRR  Resp: CTA b/l  GI: +BS, soft, NT/ND  MSK: no synovitis, joints NTP    Neuro: AAOx3  Ext: no pitting edema    Skin: faint erythematous rash around temporal artery area       LABS:                        12.3   12.52 )-----------( 307      ( 20 Oct 2022 10:00 )             36.7     10-20    134<L>  |  105  |  46<H>  ----------------------------<  123<H>  4.7   |  21<L>  |  1.48<H>    Ca    8.8      20 Oct 2022 10:00  Phos  2.4     10-20  Mg     1.90     10-20      RADIOLOGY & ADDITIONAL TESTS:

## 2022-10-20 NOTE — PROGRESS NOTE ADULT - SUBJECTIVE AND OBJECTIVE BOX
PROGRESS NOTE:     Patient is a 47y old  Male who presents with a chief complaint of SLE flare (19 Oct 2022 15:34)      SUBJECTIVE / OVERNIGHT EVENTS:    ADDITIONAL REVIEW OF SYSTEMS: 10 point ROS negative except per HPI    MEDICATIONS  (STANDING):  cholecalciferol 1000 Unit(s) Oral daily  heparin   Injectable 5000 Unit(s) SubCutaneous every 8 hours  hydroxychloroquine 200 milliGRAM(s) Oral two times a day  mycophenolate mofetil 1500 milliGRAM(s) Oral two times a day  pantoprazole    Tablet 40 milliGRAM(s) Oral before breakfast  predniSONE   Tablet 60 milliGRAM(s) Oral daily  triamcinolone 0.1% Cream 1 Application(s) Topical every 12 hours  trimethoprim  160 mG/sulfamethoxazole 800 mG 1 Tablet(s) Oral <User Schedule>    MEDICATIONS  (PRN):      CAPILLARY BLOOD GLUCOSE        I&O's Summary      PHYSICAL EXAM:  Vital Signs Last 24 Hrs  T(C): 36.7 (20 Oct 2022 05:30), Max: 36.7 (19 Oct 2022 14:22)  T(F): 98.1 (20 Oct 2022 05:30), Max: 98.1 (19 Oct 2022 14:22)  HR: 65 (20 Oct 2022 05:30) (65 - 88)  BP: 131/80 (20 Oct 2022 05:30) (105/62 - 131/80)  BP(mean): --  RR: 18 (20 Oct 2022 05:30) (17 - 18)  SpO2: 100% (20 Oct 2022 05:30) (99% - 100%)    Parameters below as of 20 Oct 2022 05:30  Patient On (Oxygen Delivery Method): room air        CONSTITUTIONAL: NAD, well-developed, A&Ox3 to person, place, time.  RESPIRATORY: Normal respiratory effort; lungs are clear to auscultation bilaterally  CARDIOVASCULAR: Regular rate and rhythm, normal S1 and S2, no murmur/rub/gallop; No lower extremity edema; Peripheral pulses are 2+ bilaterally  ABDOMEN: Nontender to palpation, no rebound/guarding; No hepatosplenomegaly  MUSCLOSKELETAL: no clubbing or cyanosis of digits; no joint swelling or tenderness to palpation  NEURO: CN 2-12 grossly intact, moves all limbs spontaneously      LABS:                        11.8   13.13 )-----------( 291      ( 19 Oct 2022 06:00 )             35.4     10-19    138  |  111<H>  |  49<H>  ----------------------------<  84  4.6   |  18<L>  |  1.39<H>    Ca    8.6      19 Oct 2022 06:00  Phos  3.3     10-19  Mg     1.90     10-19       PROGRESS NOTE:     Patient is a 47y old  Male who presents with a chief complaint of SLE flare (19 Oct 2022 15:34)      SUBJECTIVE / OVERNIGHT EVENTS: NAEO. Pt reports feeling well this morning. Denies CP, SOB, n/v, abd pain, diarrhea, dysuria. Pt is eating well, sleeping well, and having normal BMs. Denies rash, denies L back pain.     ADDITIONAL REVIEW OF SYSTEMS: 10 point ROS negative except per HPI    MEDICATIONS  (STANDING):  cholecalciferol 1000 Unit(s) Oral daily  heparin   Injectable 5000 Unit(s) SubCutaneous every 8 hours  hydroxychloroquine 200 milliGRAM(s) Oral two times a day  mycophenolate mofetil 1500 milliGRAM(s) Oral two times a day  pantoprazole    Tablet 40 milliGRAM(s) Oral before breakfast  predniSONE   Tablet 60 milliGRAM(s) Oral daily  triamcinolone 0.1% Cream 1 Application(s) Topical every 12 hours  trimethoprim  160 mG/sulfamethoxazole 800 mG 1 Tablet(s) Oral <User Schedule>        PHYSICAL EXAM:  Vital Signs Last 24 Hrs  T(C): 36.7 (20 Oct 2022 05:30), Max: 36.7 (19 Oct 2022 14:22)  T(F): 98.1 (20 Oct 2022 05:30), Max: 98.1 (19 Oct 2022 14:22)  HR: 65 (20 Oct 2022 05:30) (65 - 88)  BP: 131/80 (20 Oct 2022 05:30) (105/62 - 131/80)  BP(mean): --  RR: 18 (20 Oct 2022 05:30) (17 - 18)  SpO2: 100% (20 Oct 2022 05:30) (99% - 100%)    Parameters below as of 20 Oct 2022 05:30  Patient On (Oxygen Delivery Method): room air        CONSTITUTIONAL: NAD, well-developed, A&Ox3 to person, place, time.  RESPIRATORY: Normal respiratory effort; lungs are clear to auscultation bilaterally  CARDIOVASCULAR: Regular rate and rhythm, normal S1 and S2, no murmur/rub/gallop; No lower extremity edema;  ABDOMEN: Nontender to palpation, no rebound/guarding; No hepatosplenomegaly  NEURO: CN 2-12 grossly intact, moves all limbs spontaneously      LABS:                        11.8   13.13 )-----------( 291      ( 19 Oct 2022 06:00 )             35.4     10-19    138  |  111<H>  |  49<H>  ----------------------------<  84  4.6   |  18<L>  |  1.39<H>    Ca    8.6      19 Oct 2022 06:00  Phos  3.3     10-19  Mg     1.90     10-19

## 2022-10-20 NOTE — DIETITIAN INITIAL EVALUATION ADULT - OTHER CALCULATIONS
Weight history, per HIE: (10/13 dosing) 140.4 lbs, (3/31) 146 lbs, (1/25) 148 lbs.   Ideal Body Weight: 136 lbs / 61.8 kg +/-10%

## 2022-10-20 NOTE — PROGRESS NOTE ADULT - ASSESSMENT
46 yo M, Vietnamese speaking, with SLE (dx Feb 2022, with Class 3 lupus nephritis and mucocutaneous symptoms) sent to ED by outpatient dermatology for fever and worsening rash, c/f SLE flare, found to have new LIZETH. LIZETH currently improving on steroids s/p IR bx of L kidney on 10/17 pending prelim bx read 46 yo M, Bulgarian speaking, with SLE (dx Feb 2022, with Class 3 lupus nephritis and mucocutaneous symptoms) sent to ED by outpatient dermatology for fever and worsening rash, c/f SLE flare, found to have new LIZETH. LIZETH currently improving on steroids s/p IR bx of L kidney on 10/17, prelim bx read crescentic LN, class IV

## 2022-10-21 ENCOUNTER — NON-APPOINTMENT (OUTPATIENT)
Age: 47
End: 2022-10-21

## 2022-10-21 LAB
ANION GAP SERPL CALC-SCNC: 8 MMOL/L — SIGNIFICANT CHANGE UP (ref 7–14)
BASOPHILS # BLD AUTO: 0.05 K/UL — SIGNIFICANT CHANGE UP (ref 0–0.2)
BASOPHILS NFR BLD AUTO: 0.4 % — SIGNIFICANT CHANGE UP (ref 0–2)
BUN SERPL-MCNC: 51 MG/DL — HIGH (ref 7–23)
CALCIUM SERPL-MCNC: 8.6 MG/DL — SIGNIFICANT CHANGE UP (ref 8.4–10.5)
CHLORIDE SERPL-SCNC: 111 MMOL/L — HIGH (ref 98–107)
CO2 SERPL-SCNC: 19 MMOL/L — LOW (ref 22–31)
CREAT SERPL-MCNC: 1.48 MG/DL — HIGH (ref 0.5–1.3)
EGFR: 58 ML/MIN/1.73M2 — LOW
EOSINOPHIL # BLD AUTO: 0.08 K/UL — SIGNIFICANT CHANGE UP (ref 0–0.5)
EOSINOPHIL NFR BLD AUTO: 0.7 % — SIGNIFICANT CHANGE UP (ref 0–6)
GLUCOSE SERPL-MCNC: 115 MG/DL — HIGH (ref 70–99)
HCT VFR BLD CALC: 34.5 % — LOW (ref 39–50)
HGB BLD-MCNC: 11.6 G/DL — LOW (ref 13–17)
IANC: 7.26 K/UL — SIGNIFICANT CHANGE UP (ref 1.8–7.4)
IMM GRANULOCYTES NFR BLD AUTO: 2 % — HIGH (ref 0–0.9)
LYMPHOCYTES # BLD AUTO: 2.87 K/UL — SIGNIFICANT CHANGE UP (ref 1–3.3)
LYMPHOCYTES # BLD AUTO: 24.3 % — SIGNIFICANT CHANGE UP (ref 13–44)
MAGNESIUM SERPL-MCNC: 1.9 MG/DL — SIGNIFICANT CHANGE UP (ref 1.6–2.6)
MCHC RBC-ENTMCNC: 29.2 PG — SIGNIFICANT CHANGE UP (ref 27–34)
MCHC RBC-ENTMCNC: 33.6 GM/DL — SIGNIFICANT CHANGE UP (ref 32–36)
MCV RBC AUTO: 86.9 FL — SIGNIFICANT CHANGE UP (ref 80–100)
MONOCYTES # BLD AUTO: 1.33 K/UL — HIGH (ref 0–0.9)
MONOCYTES NFR BLD AUTO: 11.2 % — SIGNIFICANT CHANGE UP (ref 2–14)
NEUTROPHILS # BLD AUTO: 7.26 K/UL — SIGNIFICANT CHANGE UP (ref 1.8–7.4)
NEUTROPHILS NFR BLD AUTO: 61.4 % — SIGNIFICANT CHANGE UP (ref 43–77)
NRBC # BLD: 0 /100 WBCS — SIGNIFICANT CHANGE UP (ref 0–0)
NRBC # FLD: 0 K/UL — SIGNIFICANT CHANGE UP (ref 0–0)
PHOSPHATE SERPL-MCNC: 3.2 MG/DL — SIGNIFICANT CHANGE UP (ref 2.5–4.5)
PLATELET # BLD AUTO: 316 K/UL — SIGNIFICANT CHANGE UP (ref 150–400)
POTASSIUM SERPL-MCNC: 4.5 MMOL/L — SIGNIFICANT CHANGE UP (ref 3.5–5.3)
POTASSIUM SERPL-SCNC: 4.5 MMOL/L — SIGNIFICANT CHANGE UP (ref 3.5–5.3)
RBC # BLD: 3.97 M/UL — LOW (ref 4.2–5.8)
RBC # FLD: 13.2 % — SIGNIFICANT CHANGE UP (ref 10.3–14.5)
SODIUM SERPL-SCNC: 138 MMOL/L — SIGNIFICANT CHANGE UP (ref 135–145)
WBC # BLD: 11.83 K/UL — HIGH (ref 3.8–10.5)
WBC # FLD AUTO: 11.83 K/UL — HIGH (ref 3.8–10.5)

## 2022-10-21 PROCEDURE — 99232 SBSQ HOSP IP/OBS MODERATE 35: CPT | Mod: GC

## 2022-10-21 RX ORDER — DIPHENHYDRAMINE HCL 50 MG
50 CAPSULE ORAL ONCE
Refills: 0 | Status: DISCONTINUED | OUTPATIENT
Start: 2022-10-21 | End: 2022-10-22

## 2022-10-21 RX ORDER — MYCOPHENOLATE MOFETIL 250 MG/1
1000 CAPSULE ORAL
Refills: 0 | Status: DISCONTINUED | OUTPATIENT
Start: 2022-10-21 | End: 2022-10-22

## 2022-10-21 RX ORDER — ACETAMINOPHEN 500 MG
650 TABLET ORAL ONCE
Refills: 0 | Status: COMPLETED | OUTPATIENT
Start: 2022-10-21 | End: 2022-10-21

## 2022-10-21 RX ORDER — FAMOTIDINE 10 MG/ML
20 INJECTION INTRAVENOUS DAILY
Refills: 0 | Status: DISCONTINUED | OUTPATIENT
Start: 2022-10-21 | End: 2022-10-22

## 2022-10-21 RX ORDER — OBINUTUZUMAB 1000 MG/40ML
1000 INJECTION, SOLUTION, CONCENTRATE INTRAVENOUS ONCE
Refills: 0 | Status: DISCONTINUED | OUTPATIENT
Start: 2022-10-21 | End: 2022-10-21

## 2022-10-21 RX ORDER — MYCOPHENOLATE MOFETIL 250 MG/1
2 CAPSULE ORAL
Qty: 0 | Refills: 0 | DISCHARGE
Start: 2022-10-21

## 2022-10-21 RX ORDER — CETIRIZINE HYDROCHLORIDE 10 MG/1
10 TABLET ORAL ONCE
Refills: 0 | Status: COMPLETED | OUTPATIENT
Start: 2022-10-21 | End: 2022-10-21

## 2022-10-21 RX ORDER — OBINUTUZUMAB 1000 MG/40ML
1000 INJECTION, SOLUTION, CONCENTRATE INTRAVENOUS ONCE
Refills: 0 | Status: COMPLETED | OUTPATIENT
Start: 2022-10-21 | End: 2022-10-21

## 2022-10-21 RX ADMIN — MYCOPHENOLATE MOFETIL 1500 MILLIGRAM(S): 250 CAPSULE ORAL at 06:28

## 2022-10-21 RX ADMIN — OBINUTUZUMAB 1000 MILLIGRAM(S): 1000 INJECTION, SOLUTION, CONCENTRATE INTRAVENOUS at 13:12

## 2022-10-21 RX ADMIN — Medication 200 MILLIGRAM(S): at 21:22

## 2022-10-21 RX ADMIN — HEPARIN SODIUM 5000 UNIT(S): 5000 INJECTION INTRAVENOUS; SUBCUTANEOUS at 13:10

## 2022-10-21 RX ADMIN — Medication 1000 UNIT(S): at 13:10

## 2022-10-21 RX ADMIN — HEPARIN SODIUM 5000 UNIT(S): 5000 INJECTION INTRAVENOUS; SUBCUTANEOUS at 21:22

## 2022-10-21 RX ADMIN — CETIRIZINE HYDROCHLORIDE 10 MILLIGRAM(S): 10 TABLET ORAL at 11:39

## 2022-10-21 RX ADMIN — HEPARIN SODIUM 5000 UNIT(S): 5000 INJECTION INTRAVENOUS; SUBCUTANEOUS at 06:27

## 2022-10-21 RX ADMIN — Medication 650 MILLIGRAM(S): at 11:39

## 2022-10-21 RX ADMIN — MYCOPHENOLATE MOFETIL 1000 MILLIGRAM(S): 250 CAPSULE ORAL at 17:43

## 2022-10-21 RX ADMIN — Medication 100 MILLIGRAM(S): at 11:40

## 2022-10-21 RX ADMIN — Medication 1 APPLICATION(S): at 06:30

## 2022-10-21 RX ADMIN — Medication 60 MILLIGRAM(S): at 06:27

## 2022-10-21 RX ADMIN — Medication 200 MILLIGRAM(S): at 13:10

## 2022-10-21 RX ADMIN — PANTOPRAZOLE SODIUM 40 MILLIGRAM(S): 20 TABLET, DELAYED RELEASE ORAL at 06:28

## 2022-10-21 RX ADMIN — Medication 1 APPLICATION(S): at 17:44

## 2022-10-21 RX ADMIN — Medication 1 TABLET(S): at 07:07

## 2022-10-21 NOTE — PROGRESS NOTE ADULT - PROBLEM SELECTOR PLAN 3
Mild, with WBC 14.76. most likely in setting of steroid use  - trend CBC daily Mild. most likely in setting of steroid use  - trend CBC daily

## 2022-10-21 NOTE — PROGRESS NOTE ADULT - ASSESSMENT
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    Feb 2022 serology:    MÓNICA 1:1280 (speckled) and MÓNICA titer 2 1:640 (homogenous.)   Myomarker panel 3: weakly positive U2 RNP, weakly positive Anti-Pm/Scl, strongly positive  U1 RNP, strongly positive SSA/Ro 52, CK wnl   C3/C4 LOW, DsDNA > 1000  Sm, RNP, SSA/SSB> 8  UA: Large blood  Prt/Crea 0.8  APS labs negative      # SLE with Class 3 LN and mucocutaneous symptoms in Feb 2022, now presenting with Class 4 LN     - home medications: Cellcept 1000mg BID and HCQ 200mg BID,  but questionable compliance, last prescription was march 2022  - lpus serologies from 10/12/22 still showed high lupus activity, dsDNA 834, and low C3, C4 complement  - presented to Dermatology for low grade fever and rash x 1 week    - Pt received prednisone 40mg qD x2, and prednisone 60mg x1 in the ED. Rash appears to have improved  - baseline Cr 0.8, currently 1.6-1.7 with hyperkalemia, concern for worsening lupus nephritis  - repeat Urine P/Cr 2.9 (was 0.8 in Feb 2022)   - renal US negative for hydronephrosis    - s/p renal bx on 10/17  with worsening disease     Plan:    - S/p Solu-medrol 500mg x 3 days (10/14- 10/16), continue prednisone 60mg (10/17- current)    - Gayzva started today ([facility] protocol) and next dose in 2 weeks as outpatient   - lower cellcept from 1500mg bid to 1000 mg BID now that getting concomitant Gayzva  - continue  Bactrim, Protonix and vitamin D  - questionable compliance vs Cellcept failure   - hep panel negative in Feb 2022  - quant- gold indeterminate (however checked after outpt prednisone 40mg was started), CXR clear. Quant gold negative Feb 2022   - Moshe pending   - upon d/c will follow up with Dr. Nilton Freire MD  [facility], Division of Rheumatology   [phone]

## 2022-10-21 NOTE — PROGRESS NOTE ADULT - PROBLEM SELECTOR PLAN 5
Hgb mildly decreased to 12.4, suspect AoCD i/s/o lupus and now possible CKD from lupus nephritis. No S/S of active bleed.  - monitor H/H Hgb mildly decreased, suspect AoCD i/s/o lupus and now possible CKD from lupus nephritis. No S/S of active bleed.  - monitor H/H

## 2022-10-21 NOTE — PROGRESS NOTE ADULT - PROBLEM SELECTOR PLAN 6
DVT ppx: hep subq  Diet: Regular halal  Dispo: home  Code status: FULL DVT ppx: hep subq  Diet: Regular halal  Dispo: home pending immunotherapy tolerability   Code status: FULL

## 2022-10-21 NOTE — PROGRESS NOTE ADULT - ATTENDING COMMENTS
agree with above   gazyva tommorrow     Discussed with patient that he will need to follow up with rheumatology closely as an outpatient upon discharge both for office visits and infusion    Simi Freire MD  [phone]
agree with above  follow up blood cultures  follow up biopsy result.     Simi Freire MD
Simi Freire MD  [phone]
Pt. with kidney failure and proteinuria in setting of lupus nephritis. Assessment and plan for kidney failure and lupus nephritis as outlined above. Pt. underwent kidney biopsy by IR on 10/17/22. Rheumatology note from 10/17/22 reviewed. Immunosuppressive therapy for SLE and LN as per rheumatology team. Monitor labs and urine output. Avoid any potential nephrotoxins. Dose medications as per eGFR.
Pt. with LIZETH secondary to crescentic/Class IV lupus nephritis. Assessment and plan for LIZETH/lupus nephritis as outlined above. Rheumatology note from 10/17/22 reviewed. Immunosuppressive therapy for SLE and LN as per rheumatology team. Monitor labs and urine output. Avoid any potential nephrotoxins. Dose medications as per eGFR.
48 yo M, Macedonian speaking, with SLE (dx Feb 2022, with Class 3 lupus nephritis and mucocutaneous symptoms) sent to ED by outpatient dermatology for fever and worsening rash, c/f SLE flare. Also found to have new LIZETH likely d/t Lupus nephritis     #LIZETH  suspect Lupus nephritis . cr stable  -s/p kidney biopsy by IR 10/17. f/u final path  -Awaiting Rheum rec for additional treatement,  Cytoxan vs Obinutuzumab     #SLE flare  -s/p pulse steroid x 3 days. c/w prednisone with bactrim for pcp ppx, Cellcept (dose increased) and Plaquenil as per rheum    #positive blood cx  -blood cx 10/13 grew corynebacterium, likely contaminant. repeat cx pending  -hold abx for now
48 yo M, Nepali speaking, with SLE (dx Feb 2022, with Class 3 lupus nephritis and mucocutaneous symptoms) sent to ED by outpatient dermatology for fever and worsening rash, c/f SLE flare. Also found to have new LIZETH likely d/t Lupus nephritis     #LIZETH  suspect Lupus nephritis . cr stable  -s/p kidney biopsy by IR 10/17. f/u path  -Awaiting Rheum rec for additional treatement,  Cytoxan vs Obinutuzumab     #SLE flare  -s/p pulse steroid x 3 days. c/w prednisone with bactrim for pcp ppx, Cellcept (dose increased) and Plaquenil as per rheum    #positive blood cx  -blood cx 10/13 grew corynebacterium, likely contaminant. repeat cx pending  -hold abx for now
46 yo M, Telugu speaking, with SLE (dx Feb 2022, with Class 3 lupus nephritis and mucocutaneous symptoms) sent to ED by outpatient dermatology for low-grade fevers and new rash, c/f SLE flare, found to have LIZETH. Rheumatology following, appreciate recs. Will c/w Prednisone 60 mg daily, also Plaquenil and MMF at home doses. Renal consult to be called given LIZETH c/b hyperkalemia (now resolved) and concern for progression of lupus nephritis. Urine studies pending. Pt with positive UA, will treat for presumed UTI given low-grade fevers, mild leukocytosis, and immunocompromised/immunosuppressed status. f/u UCx
46 yo M, Kyrgyz speaking, with SLE (dx Feb 2022, with Class 3 lupus nephritis and mucocutaneous symptoms) sent to ED by outpatient dermatology for fever and worsening rash, c/f SLE flare. Also found to have new LIZETH likely d/t Lupus nephritis     #LIZETH  suspect Lupus nephritis . cr stable  -plan for kidney biopsy by IR today  -f/u nephrology     #SLE flare  -s/p pulse steroid x 3 days. c/w prednisone with bactrim for pcp ppx, Cellcept and Plaquenil as per rheum
48 yo M, Kyrgyz speaking, with SLE (dx Feb 2022, with Class 3 lupus nephritis and mucocutaneous symptoms) sent to ED by outpatient dermatology for fever and worsening rash, c/f SLE flare, found to have new LIZETH, LES nephritis , started on steroids with slight improvement , now pending Kidney biopsy.  Monitor labs   Correct electrolytes PRN   F/w nephro recs
46 yo M, Frisian speaking, with SLE (dx Feb 2022, with Class 3 lupus nephritis and mucocutaneous symptoms) sent to ED by outpatient dermatology for fever and worsening rash, c/f SLE flare, found to have new LIZETH.  plan for kidney biopsy   Started on steroids , suspicion Lupus nephritis   Today , LIZETH and Hyperkalemia , correct electrolytes , avoid nephrotoxics  nephrology follow up
46 yo M, Kinyarwanda speaking, with SLE (dx Feb 2022, with Class 3 lupus nephritis and mucocutaneous symptoms) sent to ED by outpatient dermatology for fever and worsening rash, c/f SLE flare. Also found to have new LIZETH likely d/t Lupus nephritis     #LIZETH  suspect Lupus nephritis . cr stable  -s/p kidney biopsy by IR 10/17. f/u path  -as per Rheum, if bx significantly worse, will consider Cytoxan vs Obinutuzumab     #SLE flare  -s/p pulse steroid x 3 days. c/w prednisone with bactrim for pcp ppx, Cellcept (dose increased) and Plaquenil as per rheum    #positive blood cx  -blood cx 10/13 grew corynebacterium, likely contaminant. will repeat blood cx  -hold abx for now
48 yo M, Slovenian speaking, with SLE (dx Feb 2022, with Class 3 lupus nephritis and mucocutaneous symptoms) sent to ED by outpatient dermatology for fever and worsening rash, c/f SLE flare. Also found to have new LIZETH likely d/t Lupus nephritis     #LIZETH  2/2 Lupus nephritis . cr stable  -s/p kidney biopsy by IR 10/17. path c/w progressive lupus nephritis  -started Obinutuzumab as per rheum, first dose today    #SLE flare  -s/p pulse steroid x 3 days. c/w prednisone with bactrim for pcp ppx, Cellcept (dose increased) and Plaquenil as per rheum    #positive blood cx  -blood cx 10/13 grew corynebacterium, likely contaminant. repeat cx NGTD  -hold abx for now    anticipate dc home tomorrow if remains stable

## 2022-10-21 NOTE — PROGRESS NOTE ADULT - PROBLEM SELECTOR PLAN 1
Suspect SLE flare given pt has been feeling unwell x days and with new cutaneous symptoms  C3 and C4 both low, dsDNA elevated  Reports he has been taking Plaquenil 200mg bid, MMF 1g bid, Prednisone 10mg qd, though compliance questionable, as pt was lost to follow up for months this year  - Rheumatology consult obtained, pulse steroids (10/14 - 10/16)   - Prednisone 60mg  - f/u rheum recs for steroid taper   - on bactrim for pcp prophylaxis   - c/w home Plaquenil   - c/w cellcept 3g Suspect SLE flare given pt has been feeling unwell x days and with new cutaneous symptoms  C3 and C4 both low, dsDNA elevated  Reports he has been taking Plaquenil 200mg bid, MMF 1g bid, Prednisone 10mg qd, though compliance questionable, as pt was lost to follow up for months this year  - Rheumatology consult obtained, pulse steroids (10/14 - 10/16)   - c/w Prednisone 60mg  - f/u rheum recs for steroid taper   - on bactrim for pcp prophylaxis   - c/w home Plaquenil   - c/w cellcept 3g Suspect SLE flare given pt has been feeling unwell x days and with new cutaneous symptoms  C3 and C4 both low, dsDNA elevated  Reports he has been taking Plaquenil 200mg bid, MMF 1g bid, Prednisone 10mg qd, though compliance questionable, as pt was lost to follow up for months this year  - Rheumatology consult obtained, pulse steroids (10/14 - 10/16)   - c/w Prednisone 60mg  - f/u rheum recs for steroid taper   - on bactrim for pcp prophylaxis   - c/w home Plaquenil   - c/w cellcept 3g  - to start obinutuzumab today - consent in chart  - considering start Evusheld - preexposure prophylaxis for COVID - however not currently carried in pharmacy

## 2022-10-21 NOTE — PROGRESS NOTE ADULT - SUBJECTIVE AND OBJECTIVE BOX
LIZ HOUSER  [id number]  :  Shania # [id number]  INTERVAL HPI/ OVERNIGHT EVENTS: pt feel well, rash continues to improve   spoke with patient via video tranlator    Feels good and no complaints.   currently getting gazyva and no questions.     PMHx/PSHx/FamHx/SocHx reviewed and no significant changes  Chart reviewed and interim events noted.     REVIEW OF SYSTEMS   - reviewed with patient and  negative other than as above or previously documented.     MEDICATIONS  (STANDING):  cholecalciferol 1000 Unit(s) Oral daily  heparin   Injectable 5000 Unit(s) SubCutaneous every 8 hours  hydroxychloroquine 200 milliGRAM(s) Oral two times a day  mycophenolate mofetil 1500 milliGRAM(s) Oral two times a day  pantoprazole    Tablet 40 milliGRAM(s) Oral before breakfast  predniSONE   Tablet 60 milliGRAM(s) Oral daily  triamcinolone 0.1% Cream 1 Application(s) Topical every 12 hours  trimethoprim  160 mG/sulfamethoxazole 800 mG 1 Tablet(s) OralTIW    Allergies  No Known Allergies  Intolerances    Vital Signs Last 24 Hrs  T(C): 36.6 (21 Oct 2022 13:15), Max: 37 (20 Oct 2022 16:15)  T(F): 97.9 (21 Oct 2022 13:15), Max: 98.6 (20 Oct 2022 16:15)  HR: 72 (21 Oct 2022 13:15) (72 - 80)  BP: 108/64 (21 Oct 2022 13:15) (100/67 - 135/66)  RR: 16 (21 Oct 2022 13:15) (16 - 18)  SpO2: 100% (21 Oct 2022 13:15) (100% - 100%)    Parameters below as of 21 Oct 2022 13:15  Patient On (Oxygen Delivery Method): room air        Physical Exam:  General: NAD  HEENT: EOMI, MMM  Vasc:  No LE edema  MSK: no synovitis, joints NTP    Neuro: AAOx3  Ext: no pitting edema    Skin: faint erythematous rash around temporal artery area and scarring on anterior chest       LABS:                                         11.6   11.83 )-----------( 316      ( 21 Oct 2022 06:50 )             34.5     10-21    138  |  111<H>  |  51<H>  ----------------------------<  115<H>  4.5   |  19<L>  |  1.48<H>    Ca    8.6      21 Oct 2022 06:50  Phos  3.2     10-21  Mg     1.90     10-21      `Surgical Pathology Report:   ACCESSION No: [id number]   Patient: LIZ HOUSER   Accession: [id number]   Collected Date/Time: 10/17/2022 14:28 EDT   Received Date/Time: 10/17/2022 16:48 EDT   Surgical Pathology Report - Auth (Verified)   Specimen(s) Submitted   Left kidney core biopsy   Final Diagnosis   Kidney, needle core biopsy   Lupus nephritis, ISN/RPS class IV, with a diffuse proliferative and   crescentic pattern of glomerular injury   - Active (cellular and fibrocellular) crescents are present in 12 of   20 glomeruli (60% of   viable glomeruli)   - There are confluent subendothelial and mesangial, and isolated   subepithelial deposits   in the glomeruli with immunoreactivity for IgG (4+), IgA (2+), IgM   (trace), C3 (2+),   C1q (3+), kappa LC (3+) and lambda LC (3+)   - Activity index: 15/24   - Chronicity index: 3/12   Acute interstitial nephritis, mild, and acute tubular injury with focal   tubular necrosis   - One calcium oxalate crystal profile presents in the lumen of an   atrophied tubule,   hyperoxaluric state should be ruled out   Summary of chronic changes:   - No global glomerulosclerosis   - Focal healed crescent (5% of glomeruli)   - Focal tubular atrophy and interstitial fibrosis (15-20% of the   cortex)   - Mild arterial and arteriolar sclerosis               RADIOLOGY & ADDITIONAL TESTS:    < from: Xray Chest 1 View AP/PA (11.26.21 @ 17:12) >    EXAM:  XR CHEST AP OR PA 1V        PROCEDURE DATE:  Nov 26 2021         INTERPRETATION:  EXAMINATION: XR CHEST    CLINICAL INDICATION: Fever    TECHNIQUE: Single frontal, portable view of the chest was obtained.    COMPARISON: None    FINDINGS:  The heart is normal in size.  The lungs are clear.  There is no pneumothorax or pleural effusion.    IMPRESSION:  Clear lungs.    < end of copied text >

## 2022-10-21 NOTE — PROGRESS NOTE ADULT - ASSESSMENT
46 yo M, Azeri speaking, with SLE (dx Feb 2022, with Class 3 lupus nephritis and mucocutaneous symptoms) sent to ED by outpatient dermatology for fever and worsening rash, c/f SLE flare, found to have new LIZETH. LIZETH currently improving on steroids s/p IR bx of L kidney on 10/17, prelim bx read crescentic LN, class IV 48 yo M, Latvian speaking, with SLE (dx Feb 2022, with Class 3 lupus nephritis and mucocutaneous symptoms) sent to ED by outpatient dermatology for fever and worsening rash, c/f SLE flare, found to have new LIZETH. LIZETH currently improving on steroids s/p IR bx of L kidney on 10/17, pathology showing crescentic LN, class IV.  48 yo M, Amharic speaking, with SLE (dx Feb 2022, with Class 3 lupus nephritis and mucocutaneous symptoms) sent to ED by outpatient dermatology for fever and worsening rash, c/f SLE flare, found to have new LIZETH. LIZETH currently improving on steroids s/p IR bx of L kidney on 10/17, pathology showing crescentic LN, class IV. To start obinutuzimab today.

## 2022-10-21 NOTE — PHARMACOTHERAPY INTERVENTION NOTE - COMMENTS
Spoke to Nephrology Fellow to confirm Obinutuzimab indication Lupus Nephritis and to adjust Obinutuzumab titration rate.

## 2022-10-21 NOTE — PROGRESS NOTE ADULT - SUBJECTIVE AND OBJECTIVE BOX
PROGRESS NOTE:     Patient is a 47y old  Male who presents with a chief complaint of SLE flare (20 Oct 2022 11:55)      SUBJECTIVE / OVERNIGHT EVENTS:    ADDITIONAL REVIEW OF SYSTEMS: 10 point ROS negative except per HPI    MEDICATIONS  (STANDING):  cholecalciferol 1000 Unit(s) Oral daily  heparin   Injectable 5000 Unit(s) SubCutaneous every 8 hours  hydroxychloroquine 200 milliGRAM(s) Oral two times a day  mycophenolate mofetil 1500 milliGRAM(s) Oral two times a day  pantoprazole    Tablet 40 milliGRAM(s) Oral before breakfast  predniSONE   Tablet 60 milliGRAM(s) Oral daily  triamcinolone 0.1% Cream 1 Application(s) Topical every 12 hours  trimethoprim  160 mG/sulfamethoxazole 800 mG 1 Tablet(s) Oral <User Schedule>    MEDICATIONS  (PRN):      CAPILLARY BLOOD GLUCOSE        I&O's Summary    20 Oct 2022 07:01  -  21 Oct 2022 07:00  --------------------------------------------------------  IN: 1300 mL / OUT: 0 mL / NET: 1300 mL        PHYSICAL EXAM:  Vital Signs Last 24 Hrs  T(C): 36.8 (21 Oct 2022 06:25), Max: 37 (20 Oct 2022 16:15)  T(F): 98.3 (21 Oct 2022 06:25), Max: 98.6 (20 Oct 2022 16:15)  HR: 80 (21 Oct 2022 06:25) (78 - 86)  BP: 100/67 (21 Oct 2022 06:25) (100/67 - 135/66)  BP(mean): --  RR: 16 (21 Oct 2022 06:25) (16 - 18)  SpO2: 100% (21 Oct 2022 06:25) (100% - 100%)    Parameters below as of 21 Oct 2022 06:25  Patient On (Oxygen Delivery Method): room air        CONSTITUTIONAL: NAD, well-developed, A&Ox3 to person, place, time.  RESPIRATORY: Normal respiratory effort; lungs are clear to auscultation bilaterally  CARDIOVASCULAR: Regular rate and rhythm, normal S1 and S2, no murmur/rub/gallop; No lower extremity edema; Peripheral pulses are 2+ bilaterally  ABDOMEN: Nontender to palpation, no rebound/guarding; No hepatosplenomegaly  MUSCLOSKELETAL: no clubbing or cyanosis of digits; no joint swelling or tenderness to palpation  NEURO: CN 2-12 grossly intact, moves all limbs spontaneously      LABS:                        12.3   12.52 )-----------( 307      ( 20 Oct 2022 10:00 )             36.7     10-20    134<L>  |  105  |  46<H>  ----------------------------<  123<H>  4.7   |  21<L>  |  1.48<H>    Ca    8.8      20 Oct 2022 10:00  Phos  2.4     10-20  Mg     1.90     10-20                Culture - Blood (collected 19 Oct 2022 14:55)  Source: .Blood Blood-Peripheral  Preliminary Report (20 Oct 2022 20:01):    No growth to date.    Culture - Blood (collected 19 Oct 2022 07:25)  Source: .Blood Blood-Peripheral  Preliminary Report (20 Oct 2022 13:02):    No growth to date.        RADIOLOGY & ADDITIONAL TESTS:  Results Reviewed:   Imaging Personally Reviewed:  Electrocardiogram Personally Reviewed:    COORDINATION OF CARE:  Care Discussed with Consultants/Other Providers [Y/N]:  Prior or Outpatient Records Reviewed [Y/N]:   PROGRESS NOTE:     Patient is a 47y old  Male who presents with a chief complaint of SLE flare (20 Oct 2022 11:55)      SUBJECTIVE / OVERNIGHT EVENTS: NAEO. Pt feels well this morning. Denies SOB, CP. Denies abdominal pain, nausea, vomiting. Eating well, urinating normally, and stooling normally. No complaints at this time.     ADDITIONAL REVIEW OF SYSTEMS: 10 point ROS negative except per HPI    MEDICATIONS  (STANDING):  cholecalciferol 1000 Unit(s) Oral daily  heparin   Injectable 5000 Unit(s) SubCutaneous every 8 hours  hydroxychloroquine 200 milliGRAM(s) Oral two times a day  mycophenolate mofetil 1500 milliGRAM(s) Oral two times a day  pantoprazole    Tablet 40 milliGRAM(s) Oral before breakfast  predniSONE   Tablet 60 milliGRAM(s) Oral daily  triamcinolone 0.1% Cream 1 Application(s) Topical every 12 hours  trimethoprim  160 mG/sulfamethoxazole 800 mG 1 Tablet(s) Oral <User Schedule>    MEDICATIONS  (PRN):      CAPILLARY BLOOD GLUCOSE        I&O's Summary    20 Oct 2022 07:01  -  21 Oct 2022 07:00  --------------------------------------------------------  IN: 1300 mL / OUT: 0 mL / NET: 1300 mL        PHYSICAL EXAM:  Vital Signs Last 24 Hrs  T(C): 36.8 (21 Oct 2022 06:25), Max: 37 (20 Oct 2022 16:15)  T(F): 98.3 (21 Oct 2022 06:25), Max: 98.6 (20 Oct 2022 16:15)  HR: 80 (21 Oct 2022 06:25) (78 - 86)  BP: 100/67 (21 Oct 2022 06:25) (100/67 - 135/66)  BP(mean): --  RR: 16 (21 Oct 2022 06:25) (16 - 18)  SpO2: 100% (21 Oct 2022 06:25) (100% - 100%)    Parameters below as of 21 Oct 2022 06:25  Patient On (Oxygen Delivery Method): room air      CONSTITUTIONAL: NAD, well-developed, A&Ox3 to person, place, time.  RESPIRATORY: Normal respiratory effort; lungs are clear to auscultation bilaterally  CARDIOVASCULAR: Regular rate and rhythm, normal S1 and S2, no murmur/rub/gallop; No lower extremity edema; Peripheral pulses are 2+ bilaterally  ABDOMEN: Nontender to palpation, no rebound/guarding;  SKIN: erythematous patch on anterior chest   NEURO: CN 2-12 grossly intact, moves all limbs spontaneously      LABS:                        12.3   12.52 )-----------( 307      ( 20 Oct 2022 10:00 )             36.7     10-20    134<L>  |  105  |  46<H>  ----------------------------<  123<H>  4.7   |  21<L>  |  1.48<H>    Ca    8.8      20 Oct 2022 10:00  Phos  2.4     10-20  Mg     1.90     10-20        Culture - Blood (collected 19 Oct 2022 14:55)  Source: .Blood Blood-Peripheral  Preliminary Report (20 Oct 2022 20:01):    No growth to date.    Culture - Blood (collected 19 Oct 2022 07:25)  Source: .Blood Blood-Peripheral  Preliminary Report (20 Oct 2022 13:02):    No growth to date.    SURPICAL PATHOLOGY: KIDNEY BIOPSY 10/17  Final Diagnosis   Kidney, needle core biopsy   Lupus nephritis, ISN/RPS class IV, with a diffuse proliferative and crescentic pattern of glomerular injury   - Active (cellular and fibrocellular) crescents are present in 12 of 20 glomeruli (60% of viable glomeruli)   - There are confluent subendothelial and mesangial, and isolated   subepithelial deposits in the glomeruli with immunoreactivity for IgG (4+), IgA (2+), IgM (trace), C3 (2+), C1q (3+), kappa LC (3+) and lambda LC (3+)   - Activity index: 15/24   - Chronicity index: 3/12   Acute interstitial nephritis, mild, and acute tubular injury with focal tubular necrosis   - One calcium oxalate crystal profile presents in the lumen of an atrophied tubule, hyperoxaluric state should be ruled out

## 2022-10-21 NOTE — PROGRESS NOTE ADULT - ATTENDING SUPERVISION STATEMENT
Fellow
,
Resident
Loss

## 2022-10-21 NOTE — PROGRESS NOTE ADULT - PROBLEM SELECTOR PLAN 2
Prior bx revealed class 3 lupus nephritis  SCr 1.0 in March, now 1.7  Likely 2/2 progression of lupus nephritis  - US kidney/bladder negative for hydro  - urine lytes: FeNa 0/6%: prerenal  - UPC 2.9  - c/w SLE meds as above  - f/u biopsy and renal recs Prior bx revealed class 3 lupus nephritis, current biopsy 10/17 showed class IV LN  - Cr currently   - c/w SLE meds as above  - f/u biopsy and renal recs Prior bx revealed class 3 lupus nephritis, current biopsy 10/17 showed class IV LN  - Cr currently 1.48  - c/w SLE meds as above  - f/u biopsy and renal recs

## 2022-10-22 ENCOUNTER — TRANSCRIPTION ENCOUNTER (OUTPATIENT)
Age: 47
End: 2022-10-22

## 2022-10-22 VITALS
TEMPERATURE: 97 F | DIASTOLIC BLOOD PRESSURE: 84 MMHG | HEART RATE: 61 BPM | OXYGEN SATURATION: 100 % | SYSTOLIC BLOOD PRESSURE: 147 MMHG | RESPIRATION RATE: 18 BRPM

## 2022-10-22 LAB
ANION GAP SERPL CALC-SCNC: 8 MMOL/L — SIGNIFICANT CHANGE UP (ref 7–14)
BASOPHILS # BLD AUTO: 0.03 K/UL — SIGNIFICANT CHANGE UP (ref 0–0.2)
BASOPHILS NFR BLD AUTO: 0.2 % — SIGNIFICANT CHANGE UP (ref 0–2)
BUN SERPL-MCNC: 47 MG/DL — HIGH (ref 7–23)
CALCIUM SERPL-MCNC: 8.6 MG/DL — SIGNIFICANT CHANGE UP (ref 8.4–10.5)
CHLORIDE SERPL-SCNC: 108 MMOL/L — HIGH (ref 98–107)
CO2 SERPL-SCNC: 19 MMOL/L — LOW (ref 22–31)
CREAT SERPL-MCNC: 1.55 MG/DL — HIGH (ref 0.5–1.3)
EGFR: 55 ML/MIN/1.73M2 — LOW
EOSINOPHIL # BLD AUTO: 0 K/UL — SIGNIFICANT CHANGE UP (ref 0–0.5)
EOSINOPHIL NFR BLD AUTO: 0 % — SIGNIFICANT CHANGE UP (ref 0–6)
GLUCOSE SERPL-MCNC: 105 MG/DL — HIGH (ref 70–99)
HCT VFR BLD CALC: 32 % — LOW (ref 39–50)
HGB BLD-MCNC: 10.7 G/DL — LOW (ref 13–17)
IANC: 13.24 K/UL — HIGH (ref 1.8–7.4)
IMM GRANULOCYTES NFR BLD AUTO: 0.9 % — SIGNIFICANT CHANGE UP (ref 0–0.9)
LYMPHOCYTES # BLD AUTO: 0.98 K/UL — LOW (ref 1–3.3)
LYMPHOCYTES # BLD AUTO: 6.1 % — LOW (ref 13–44)
MAGNESIUM SERPL-MCNC: 1.8 MG/DL — SIGNIFICANT CHANGE UP (ref 1.6–2.6)
MCHC RBC-ENTMCNC: 28.6 PG — SIGNIFICANT CHANGE UP (ref 27–34)
MCHC RBC-ENTMCNC: 33.4 GM/DL — SIGNIFICANT CHANGE UP (ref 32–36)
MCV RBC AUTO: 85.6 FL — SIGNIFICANT CHANGE UP (ref 80–100)
MONOCYTES # BLD AUTO: 1.66 K/UL — HIGH (ref 0–0.9)
MONOCYTES NFR BLD AUTO: 10.3 % — SIGNIFICANT CHANGE UP (ref 2–14)
NEUTROPHILS # BLD AUTO: 13.24 K/UL — HIGH (ref 1.8–7.4)
NEUTROPHILS NFR BLD AUTO: 82.5 % — HIGH (ref 43–77)
NRBC # BLD: 0 /100 WBCS — SIGNIFICANT CHANGE UP (ref 0–0)
NRBC # FLD: 0 K/UL — SIGNIFICANT CHANGE UP (ref 0–0)
PHOSPHATE SERPL-MCNC: 3.5 MG/DL — SIGNIFICANT CHANGE UP (ref 2.5–4.5)
PLATELET # BLD AUTO: 286 K/UL — SIGNIFICANT CHANGE UP (ref 150–400)
POTASSIUM SERPL-MCNC: 4.7 MMOL/L — SIGNIFICANT CHANGE UP (ref 3.5–5.3)
POTASSIUM SERPL-SCNC: 4.7 MMOL/L — SIGNIFICANT CHANGE UP (ref 3.5–5.3)
RBC # BLD: 3.74 M/UL — LOW (ref 4.2–5.8)
RBC # FLD: 13.2 % — SIGNIFICANT CHANGE UP (ref 10.3–14.5)
SODIUM SERPL-SCNC: 135 MMOL/L — SIGNIFICANT CHANGE UP (ref 135–145)
WBC # BLD: 16.05 K/UL — HIGH (ref 3.8–10.5)
WBC # FLD AUTO: 16.05 K/UL — HIGH (ref 3.8–10.5)

## 2022-10-22 PROCEDURE — 99239 HOSP IP/OBS DSCHRG MGMT >30: CPT | Mod: GC

## 2022-10-22 RX ORDER — HYDROXYCHLOROQUINE SULFATE 200 MG
1 TABLET ORAL
Qty: 0 | Refills: 0 | DISCHARGE

## 2022-10-22 RX ORDER — HYDROXYCHLOROQUINE SULFATE 200 MG
1 TABLET ORAL
Qty: 0 | Refills: 0 | DISCHARGE
Start: 2022-10-22 | End: 2022-11-04

## 2022-10-22 RX ORDER — HYDROXYCHLOROQUINE SULFATE 200 MG
1 TABLET ORAL
Qty: 28 | Refills: 0
Start: 2022-10-22 | End: 2022-11-04

## 2022-10-22 RX ORDER — HYDROXYCHLOROQUINE SULFATE 200 MG
3 TABLET ORAL
Qty: 0 | Refills: 0 | DISCHARGE
Start: 2022-10-22 | End: 2022-11-04

## 2022-10-22 RX ADMIN — HEPARIN SODIUM 5000 UNIT(S): 5000 INJECTION INTRAVENOUS; SUBCUTANEOUS at 06:51

## 2022-10-22 RX ADMIN — PANTOPRAZOLE SODIUM 40 MILLIGRAM(S): 20 TABLET, DELAYED RELEASE ORAL at 06:51

## 2022-10-22 RX ADMIN — Medication 1 APPLICATION(S): at 06:52

## 2022-10-22 RX ADMIN — Medication 60 MILLIGRAM(S): at 06:50

## 2022-10-22 RX ADMIN — MYCOPHENOLATE MOFETIL 1000 MILLIGRAM(S): 250 CAPSULE ORAL at 06:51

## 2022-10-22 RX ADMIN — Medication 1000 UNIT(S): at 11:04

## 2022-10-22 RX ADMIN — Medication 200 MILLIGRAM(S): at 11:04

## 2022-10-22 NOTE — PROGRESS NOTE ADULT - PROVIDER SPECIALTY LIST ADULT
Internal Medicine
Internal Medicine
Rheumatology
Rheumatology
Internal Medicine
Rheumatology
Internal Medicine
Nephrology
Nephrology
Internal Medicine

## 2022-10-22 NOTE — PROGRESS NOTE ADULT - PROBLEM SELECTOR PROBLEM 5
Abnormal urinalysis
Anemia of chronic disease
Abnormal urinalysis
Anemia of chronic disease
Anemia of chronic disease
Abnormal urinalysis
Anemia of chronic disease
Abnormal urinalysis
Abnormal urinalysis

## 2022-10-22 NOTE — PROGRESS NOTE ADULT - SUBJECTIVE AND OBJECTIVE BOX
Terell Carson, PGY2  Pager [phone] [facility] or [phone] [facility]    Patient is a 47y old  Male who presents with a chief complaint of SLE flare (21 Oct 2022 13:43)      SUBJECTIVE/INTERVAL EVENTS: Patient seen and examined at bedside.  Patient was seen and examined at bedside this morning. Denies any nausea/vomiting/diarrhea, headache, shortness of breath, abdominal pain or chest pain/palpitations. Patient responding appropriately to questions and able to make needs known. Vital signs/imaging/telemetry events reviewed.    Feeling well today, stated after infusion patient was very hungry but otherwise experienced no other symptoms.    MEDICATIONS  (STANDING):  cholecalciferol 1000 Unit(s) Oral daily  heparin   Injectable 5000 Unit(s) SubCutaneous every 8 hours  hydroxychloroquine 200 milliGRAM(s) Oral two times a day  mycophenolate mofetil 1000 milliGRAM(s) Oral two times a day  pantoprazole    Tablet 40 milliGRAM(s) Oral before breakfast  predniSONE   Tablet 60 milliGRAM(s) Oral daily  triamcinolone 0.1% Cream 1 Application(s) Topical every 12 hours  trimethoprim  160 mG/sulfamethoxazole 800 mG 1 Tablet(s) Oral <User Schedule>    MEDICATIONS  (PRN):  diphenhydrAMINE Injectable 50 milliGRAM(s) IV Push once PRN REACTION  famotidine    Tablet 20 milliGRAM(s) Oral daily PRN REACTION      VITAL SIGNS:  T(F): 97.2 (10-22-22 @ 06:48), Max: 98 (10-21-22 @ 13:45)  HR: 61 (10-22-22 @ 06:48) (61 - 83)  BP: 147/84 (10-22-22 @ 06:48) (107/63 - 147/84)  RR: 18 (10-22-22 @ 06:48) (16 - 18)  SpO2: 100% (10-22-22 @ 06:48) (100% - 100%)    I&O's Summary    Daily     Daily     PHYSICAL EXAM:  CONSTITUTIONAL: NAD, well-developed, A&Ox3 to person, place, time.  RESPIRATORY: Normal respiratory effort; lungs are clear to auscultation bilaterally  CARDIOVASCULAR: Regular rate and rhythm, normal S1 and S2, no murmur/rub/gallop; No lower extremity edema; Peripheral pulses are 2+ bilaterally  ABDOMEN: Nontender to palpation, no rebound/guarding;  SKIN: erythematous patch on anterior chest   NEURO: CN 2-12 grossly intact, moves all limbs spontaneously    LABS:                        10.7   16.05 )-----------( 286      ( 22 Oct 2022 06:09 )             32.0     Hgb Trend: 10.7<--, 11.6<--, 12.3<--, 11.8<--, 12.4<--  10-22    135  |  108<H>  |  47<H>  ----------------------------<  105<H>  4.7   |  19<L>  |  1.55<H>    Ca    8.6      22 Oct 2022 06:09  Phos  3.5     10-22  Mg     1.80     10-22      Creatinine Trend: 1.55<--, 1.48<--, 1.48<--, 1.39<--, 1.73<--, 1.42<--              CAPILLARY BLOOD GLUCOSE          RADIOLOGY & ADDITIONAL TESTS: Reviewed    Imaging Personally Reviewed:    Consultant(s) Notes Reviewed:      Care Discussed with Consultants/Other Providers:

## 2022-10-22 NOTE — PROGRESS NOTE ADULT - REASON FOR ADMISSION
SLE flare

## 2022-10-22 NOTE — DISCHARGE NOTE NURSING/CASE MANAGEMENT/SOCIAL WORK - PATIENT PORTAL LINK FT
You can access the FollowMyHealth Patient Portal offered by [facility] by registering at the following website: [email or web address]. By joining [employer]’s FollowMyHealth portal, you will also be able to view your health information using other applications (apps) compatible with our system.

## 2022-10-22 NOTE — PROGRESS NOTE ADULT - PROBLEM SELECTOR PLAN 1
Prior bx revealed class 3 lupus nephritis, current biopsy 10/17 showed class IV LN  - Cr currently 1.48  - c/w SLE meds as above  - f/u biopsy and renal recs  - New bx Lupus nephritis, ISN/RPS class IV, with a diffuse proliferative and   crescentic pattern of glomerular injury   - s/p obinutuzumab 10/21 and tolerated well

## 2022-10-22 NOTE — PROGRESS NOTE ADULT - PROBLEM SELECTOR PROBLEM 3
Leukocytosis
Acute hyperkalemia
Acute hyperkalemia
Leukocytosis
Acute hyperkalemia

## 2022-10-22 NOTE — PROGRESS NOTE ADULT - PROBLEM SELECTOR PLAN 3
Mild. most likely in setting of steroid use  - trend CBC daily  - 1x bcx growing corynebacterium but repeat bcx 2x neg suggesting contaminant

## 2022-10-22 NOTE — PROGRESS NOTE ADULT - PROBLEM SELECTOR PLAN 5
Hgb mildly decreased, suspect AoCD i/s/o lupus and now possible CKD from lupus nephritis. No S/S of active bleed.  - monitor H/H

## 2022-10-22 NOTE — PROGRESS NOTE ADULT - PROBLEM SELECTOR PROBLEM 6
Need for prophylactic measure
Anemia of chronic disease
Need for prophylactic measure
Need for prophylactic measure
Anemia of chronic disease
Need for prophylactic measure
Anemia of chronic disease

## 2022-10-22 NOTE — PROGRESS NOTE ADULT - PROBLEM SELECTOR PROBLEM 4
Abnormal urinalysis
Abnormal urinalysis
Leukocytosis
Abnormal urinalysis
Abnormal urinalysis
Leukocytosis
Leukocytosis

## 2022-10-22 NOTE — DISCHARGE NOTE NURSING/CASE MANAGEMENT/SOCIAL WORK - NSDCPEFALRISK_GEN_ALL_CORE
For information on Fall & Injury Prevention, visit: [email or web address] OR  [email or web address] OR  https://www.cdc.gov/steadi/patient.html

## 2022-10-22 NOTE — PROGRESS NOTE ADULT - ASSESSMENT
48 yo M, Polish speaking, with SLE (dx Feb 2022, with Class 3 lupus nephritis and mucocutaneous symptoms) sent to ED by outpatient dermatology for fever and worsening rash, c/f SLE flare, found to have new LIZETH. LIZETH currently improving on steroids s/p IR bx of L kidney on 10/17, pathology showing crescentic LN, class IV. S/p obinutuzimab on 10/22.

## 2022-10-22 NOTE — PROGRESS NOTE ADULT - PROBLEM SELECTOR PLAN 2
Suspect SLE flare given pt has been feeling unwell x days and with new cutaneous symptoms  C3 and C4 both low, dsDNA elevated  Reports he has been taking Plaquenil 200mg bid, MMF 1g bid, Prednisone 10mg qd, though compliance questionable, as pt was lost to follow up for months this year  - Rheumatology consult obtained, pulse steroids (10/14 - 10/16)   - c/w Prednisone 60mg  - f/u rheum recs for steroid taper   - on bactrim for pcp prophylaxis   - c/w home Plaquenil   - c/w cellcept 2g  - considering start Evusheld - preexposure prophylaxis for COVID - however not currently carried in pharmacy; patient will perform outpatient

## 2022-10-22 NOTE — PROGRESS NOTE ADULT - PROBLEM SELECTOR PLAN 6
DVT ppx: hep subq  Diet: Regular halal  Dispo: home pending immunotherapy tolerability   Code status: FULL

## 2022-10-24 PROBLEM — M32.14 GLOMERULAR DISEASE IN SYSTEMIC LUPUS ERYTHEMATOSUS: Chronic | Status: ACTIVE | Noted: 2022-10-13

## 2022-10-24 LAB
CULTURE RESULTS: SIGNIFICANT CHANGE UP
CULTURE RESULTS: SIGNIFICANT CHANGE UP
SPECIMEN SOURCE: SIGNIFICANT CHANGE UP
SPECIMEN SOURCE: SIGNIFICANT CHANGE UP

## 2022-10-31 ENCOUNTER — INPATIENT (INPATIENT)
Facility: HOSPITAL | Age: 47
LOS: 0 days | Discharge: HOME | End: 2022-11-01
Attending: STUDENT IN AN ORGANIZED HEALTH CARE EDUCATION/TRAINING PROGRAM | Admitting: STUDENT IN AN ORGANIZED HEALTH CARE EDUCATION/TRAINING PROGRAM

## 2022-10-31 VITALS
SYSTOLIC BLOOD PRESSURE: 137 MMHG | TEMPERATURE: 98 F | OXYGEN SATURATION: 100 % | HEIGHT: 65 IN | DIASTOLIC BLOOD PRESSURE: 84 MMHG | HEART RATE: 81 BPM | RESPIRATION RATE: 16 BRPM

## 2022-10-31 DIAGNOSIS — Z29.9 ENCOUNTER FOR PROPHYLACTIC MEASURES, UNSPECIFIED: ICD-10-CM

## 2022-10-31 DIAGNOSIS — R60.9 EDEMA, UNSPECIFIED: ICD-10-CM

## 2022-10-31 DIAGNOSIS — M32.14 GLOMERULAR DISEASE IN SYSTEMIC LUPUS ERYTHEMATOSUS: ICD-10-CM

## 2022-10-31 DIAGNOSIS — M32.9 SYSTEMIC LUPUS ERYTHEMATOSUS, UNSPECIFIED: ICD-10-CM

## 2022-10-31 DIAGNOSIS — D63.8 ANEMIA IN OTHER CHRONIC DISEASES CLASSIFIED ELSEWHERE: ICD-10-CM

## 2022-10-31 DIAGNOSIS — R60.0 LOCALIZED EDEMA: ICD-10-CM

## 2022-10-31 DIAGNOSIS — D72.829 ELEVATED WHITE BLOOD CELL COUNT, UNSPECIFIED: ICD-10-CM

## 2022-10-31 LAB
ALBUMIN SERPL ELPH-MCNC: 2.7 G/DL — LOW (ref 3.3–5)
ALP SERPL-CCNC: 48 U/L — SIGNIFICANT CHANGE UP (ref 40–120)
ALT FLD-CCNC: 15 U/L — SIGNIFICANT CHANGE UP (ref 4–41)
ANION GAP SERPL CALC-SCNC: 7 MMOL/L — SIGNIFICANT CHANGE UP (ref 7–14)
APPEARANCE UR: ABNORMAL
AST SERPL-CCNC: 15 U/L — SIGNIFICANT CHANGE UP (ref 4–40)
BACTERIA # UR AUTO: NEGATIVE — SIGNIFICANT CHANGE UP
BASOPHILS # BLD AUTO: 0.01 K/UL — SIGNIFICANT CHANGE UP (ref 0–0.2)
BASOPHILS NFR BLD AUTO: 0.1 % — SIGNIFICANT CHANGE UP (ref 0–2)
BILIRUB SERPL-MCNC: 0.2 MG/DL — SIGNIFICANT CHANGE UP (ref 0.2–1.2)
BILIRUB UR-MCNC: NEGATIVE — SIGNIFICANT CHANGE UP
BUN SERPL-MCNC: 39 MG/DL — HIGH (ref 7–23)
C3 SERPL-MCNC: 67 MG/DL — LOW (ref 90–180)
C4 SERPL-MCNC: 16 MG/DL — SIGNIFICANT CHANGE UP (ref 10–40)
CALCIUM SERPL-MCNC: 8.8 MG/DL — SIGNIFICANT CHANGE UP (ref 8.4–10.5)
CHLORIDE SERPL-SCNC: 108 MMOL/L — HIGH (ref 98–107)
CO2 SERPL-SCNC: 21 MMOL/L — LOW (ref 22–31)
COLOR SPEC: SIGNIFICANT CHANGE UP
CREAT SERPL-MCNC: 1.53 MG/DL — HIGH (ref 0.5–1.3)
DIFF PNL FLD: ABNORMAL
EGFR: 56 ML/MIN/1.73M2 — LOW
EOSINOPHIL # BLD AUTO: 0.07 K/UL — SIGNIFICANT CHANGE UP (ref 0–0.5)
EOSINOPHIL NFR BLD AUTO: 0.4 % — SIGNIFICANT CHANGE UP (ref 0–6)
EPI CELLS # UR: 3 /HPF — SIGNIFICANT CHANGE UP (ref 0–5)
GLUCOSE SERPL-MCNC: 83 MG/DL — SIGNIFICANT CHANGE UP (ref 70–99)
GLUCOSE UR QL: NEGATIVE — SIGNIFICANT CHANGE UP
HCT VFR BLD CALC: 32 % — LOW (ref 39–50)
HGB BLD-MCNC: 10.5 G/DL — LOW (ref 13–17)
HYALINE CASTS # UR AUTO: 13 /LPF — HIGH (ref 0–7)
IANC: 14.87 K/UL — HIGH (ref 1.8–7.4)
IMM GRANULOCYTES NFR BLD AUTO: 0.7 % — SIGNIFICANT CHANGE UP (ref 0–0.9)
KETONES UR-MCNC: NEGATIVE — SIGNIFICANT CHANGE UP
LEUKOCYTE ESTERASE UR-ACNC: NEGATIVE — SIGNIFICANT CHANGE UP
LYMPHOCYTES # BLD AUTO: 0.89 K/UL — LOW (ref 1–3.3)
LYMPHOCYTES # BLD AUTO: 5.3 % — LOW (ref 13–44)
MAGNESIUM SERPL-MCNC: 1.8 MG/DL — SIGNIFICANT CHANGE UP (ref 1.6–2.6)
MCHC RBC-ENTMCNC: 28.6 PG — SIGNIFICANT CHANGE UP (ref 27–34)
MCHC RBC-ENTMCNC: 32.8 GM/DL — SIGNIFICANT CHANGE UP (ref 32–36)
MCV RBC AUTO: 87.2 FL — SIGNIFICANT CHANGE UP (ref 80–100)
MONOCYTES # BLD AUTO: 0.72 K/UL — SIGNIFICANT CHANGE UP (ref 0–0.9)
MONOCYTES NFR BLD AUTO: 4.3 % — SIGNIFICANT CHANGE UP (ref 2–14)
NEUTROPHILS # BLD AUTO: 14.87 K/UL — HIGH (ref 1.8–7.4)
NEUTROPHILS NFR BLD AUTO: 89.2 % — HIGH (ref 43–77)
NITRITE UR-MCNC: NEGATIVE — SIGNIFICANT CHANGE UP
NRBC # BLD: 0 /100 WBCS — SIGNIFICANT CHANGE UP (ref 0–0)
NRBC # FLD: 0 K/UL — SIGNIFICANT CHANGE UP (ref 0–0)
PH UR: 6.5 — SIGNIFICANT CHANGE UP (ref 5–8)
PLATELET # BLD AUTO: 295 K/UL — SIGNIFICANT CHANGE UP (ref 150–400)
POTASSIUM SERPL-MCNC: 4.7 MMOL/L — SIGNIFICANT CHANGE UP (ref 3.5–5.3)
POTASSIUM SERPL-SCNC: 4.7 MMOL/L — SIGNIFICANT CHANGE UP (ref 3.5–5.3)
PROT SERPL-MCNC: 5.3 G/DL — LOW (ref 6–8.3)
PROT UR-MCNC: ABNORMAL
RBC # BLD: 3.67 M/UL — LOW (ref 4.2–5.8)
RBC # FLD: 14 % — SIGNIFICANT CHANGE UP (ref 10.3–14.5)
RBC CASTS # UR COMP ASSIST: 116 /HPF — HIGH (ref 0–4)
SARS-COV-2 RNA SPEC QL NAA+PROBE: SIGNIFICANT CHANGE UP
SODIUM SERPL-SCNC: 136 MMOL/L — SIGNIFICANT CHANGE UP (ref 135–145)
SP GR SPEC: 1.01 — SIGNIFICANT CHANGE UP (ref 1.01–1.05)
UROBILINOGEN FLD QL: SIGNIFICANT CHANGE UP
WBC # BLD: 16.68 K/UL — HIGH (ref 3.8–10.5)
WBC # FLD AUTO: 16.68 K/UL — HIGH (ref 3.8–10.5)
WBC UR QL: 25 /HPF — HIGH (ref 0–5)

## 2022-10-31 PROCEDURE — 99222 1ST HOSP IP/OBS MODERATE 55: CPT

## 2022-10-31 PROCEDURE — 71045 X-RAY EXAM CHEST 1 VIEW: CPT | Mod: 26

## 2022-10-31 PROCEDURE — 99285 EMERGENCY DEPT VISIT HI MDM: CPT

## 2022-10-31 PROCEDURE — 93970 EXTREMITY STUDY: CPT | Mod: 26

## 2022-10-31 PROCEDURE — 99223 1ST HOSP IP/OBS HIGH 75: CPT

## 2022-10-31 PROCEDURE — 93306 TTE W/DOPPLER COMPLETE: CPT | Mod: 26

## 2022-10-31 RX ORDER — HYDROXYCHLOROQUINE SULFATE 200 MG
200 TABLET ORAL
Refills: 0 | Status: DISCONTINUED | OUTPATIENT
Start: 2022-10-31 | End: 2022-11-01

## 2022-10-31 RX ORDER — HEPARIN SODIUM 5000 [USP'U]/ML
5000 INJECTION INTRAVENOUS; SUBCUTANEOUS EVERY 8 HOURS
Refills: 0 | Status: DISCONTINUED | OUTPATIENT
Start: 2022-10-31 | End: 2022-11-01

## 2022-10-31 RX ORDER — MYCOPHENOLATE MOFETIL 250 MG/1
1000 CAPSULE ORAL
Refills: 0 | Status: DISCONTINUED | OUTPATIENT
Start: 2022-10-31 | End: 2022-11-01

## 2022-10-31 RX ORDER — MYCOPHENOLATE MOFETIL 250 MG/1
500 CAPSULE ORAL
Refills: 0 | Status: DISCONTINUED | OUTPATIENT
Start: 2022-10-31 | End: 2022-10-31

## 2022-10-31 RX ORDER — HYDROXYCHLOROQUINE SULFATE 200 MG
200 TABLET ORAL
Refills: 0 | Status: DISCONTINUED | OUTPATIENT
Start: 2022-10-31 | End: 2022-10-31

## 2022-10-31 RX ORDER — CHOLECALCIFEROL (VITAMIN D3) 125 MCG
1000 CAPSULE ORAL DAILY
Refills: 0 | Status: DISCONTINUED | OUTPATIENT
Start: 2022-10-31 | End: 2022-11-01

## 2022-10-31 RX ADMIN — MYCOPHENOLATE MOFETIL 1000 MILLIGRAM(S): 250 CAPSULE ORAL at 20:01

## 2022-10-31 RX ADMIN — Medication 200 MILLIGRAM(S): at 19:03

## 2022-10-31 RX ADMIN — HEPARIN SODIUM 5000 UNIT(S): 5000 INJECTION INTRAVENOUS; SUBCUTANEOUS at 22:55

## 2022-10-31 NOTE — H&P ADULT - PROBLEM SELECTOR PLAN 4
- WBC 16.68 on admission  - most likely iso steroid use  - trend daily CBC  - Ucx, bcx? - WBC 16.68 on admission  - most likely iso steroid use  - trend daily CBC - WBC 16.68 on admission  - most likely iso steroid use  - UA, BCx  - UA neg  - trend daily CBC

## 2022-10-31 NOTE — H&P ADULT - PROBLEM SELECTOR PLAN 5
- most likely d/t mildly decreased, suspect AoCD iso lupus and CKD  - No signs of active bleeding currently  - Cont monitor Hgb

## 2022-10-31 NOTE — ED PROVIDER NOTE - CLINICAL SUMMARY MEDICAL DECISION MAKING FREE TEXT BOX
47 Yr male PMHx SLE with lupus nephritis h/o non-compliance to meds recently admitted to [facility] with SLE flare (new onset LIZETH and Muco-cutaneous symptoms), during hospital stay patient was treated with steroids, Obinutuzumab, Kindey Bx revealed cresentric GN. Patient now presents with BL LE swelling and facial puffiness since last night. Denies fever, cough, SOB, chest pain, abdominal pain, urinary complaints. VS wnl. PE no oral/skin lesions. Chest CTA. BL below knee pitting edema. Will evaluate labs for SLE flare, LIZETH, shall discuss treatment modification/early F/U with Primary rheumatologist.

## 2022-10-31 NOTE — PATIENT PROFILE ADULT - FALL HARM RISK - UNIVERSAL INTERVENTIONS
Bed in lowest position, wheels locked, appropriate side rails in place/Call bell, personal items and telephone in reach/Instruct patient to call for assistance before getting out of bed or chair/Non-slip footwear when patient is out of bed/[place] to call system/Physically safe environment - no spills, clutter or unnecessary equipment/Purposeful Proactive Rounding/Room/bathroom lighting operational, light cord in reach

## 2022-10-31 NOTE — CONSULT NOTE ADULT - SUBJECTIVE AND OBJECTIVE BOX
LIZ HOUSER  [id number]    HPI: 48 yo M PMH Dive[place] speaking, SLE (dx 2022, with Class 3 LN - now Class 4), SLE flare w/ LIZETH and mucocutaneous symptoms (10/2022, started on obinutuzumab 10/21/2022) presenting w/ b/l leg and facial swelling. : [id number]. Pt says he experienced facial and b/l leg swelling yesterday. Has been compliant w/ home medications. Says he does not have any symptoms of his previous SLE flare. Says he thinks it may be the prednisone causing his swelling.     ROS: Denies fever, chills, cough, chest pain, sob, alopecia, nasal/oral ulcers, sicca symptoms, rash, joint pain, Raynaud's, frothy urine    SLE history:    pt was seen by Dr. Nilton malcolm 1x in 2022, then lost to follow up   Was evaluated by nephrology, underwent renal biopsy in 2022: Focal proliferative class III, segmental endocapillary hypercellularity in 12%, a single small cellular crescents, mild acute interstitial nephritis, NIH activity score 4/24, no chronicity     2022 serology:    MÓNICA 1:1280 (speckled) and MÓNICA titer 2 1:640 (homogenous.)   Myomarker panel 3: weakly positive U2 RNP, weakly positive Anti-Pm/Scl, strongly positive  U1 RNP, strongly positive SSA/Ro 52, CK wnl   C3/C4 LOW, DsDNA > 1000  Sm, RNP, SSA/SSB> 8  UA: Large blood  Prt/Crea 0.8  APS labs negative      -pt was Started on hydroxychloroquine 200 mg twice a day, on prednisone 60mg qD  with plan to taper down prednisone  was also started on Cellcept 1000mg BID, with plan to titrate up to 3g daily. However w/ questionable compliance as was not picking up prescriptions and not following up w/ rheum    10/2022 presented w/ SLE flare w/ fever and rash for 1 week  - lupus serologies from 10/12/22 still showed high lupus activity, dsDNA 834, and low C3, C4 complement  - Pt received prednisone 40mg qD x2, and prednisone 60mg x1 in the ED. Rash appears to have improved  - baseline Cr 0.8, currently 1.6-1.7 with hyperkalemia, concern for worsening lupus nephritis  - repeat Urine P/Cr 2.9 (was 0.8 in 2022)   - renal US negative for hydronephrosis    - s/p renal bx on 10/17  with worsening disease - Class 4 LN - compliance vs Cellcept failure  - S/p Solu-medrol 500mg x 3 days (10/14- 10/16), continue prednisone 60mg (10/17- current)    - Gayzva started - first dose 10/21/22 ([facility] protocol) - next dose was due in 2 weeks as outpatient   - cellcept lowered from 1500mg bid to 1000 mg BID now that getting concomitant Gayzva  - hep panel negative in 2022  - Quant gold negative 2022          MEDICATIONS  (STANDING):  cholecalciferol 1000 Unit(s) Oral daily  heparin   Injectable 5000 Unit(s) SubCutaneous every 8 hours  hydroxychloroquine 200 milliGRAM(s) Oral two times a day  mycophenolate mofetil 1000 milliGRAM(s) Oral two times a day  predniSONE   Tablet 50 milliGRAM(s) Oral daily  triamcinolone 0.1% Cream 1 Application(s) Topical three times a day  trimethoprim  160 mG/sulfamethoxazole 800 mG 1 Tablet(s) Oral daily    MEDICATIONS  (PRN):      Allergies    No Known Allergies    Intolerances      PERTINENT MEDICATION HISTORY:    MEDICATIONS: cellcept 1000 mg BID, prednisone 60mg qd, Plaquenil 200mg BID      SOCIAL HISTORY: Lives w/ sons and wife, no tobacco or alcohol use.       Vital Signs Last 24 Hrs  T(C): 36.7 (31 Oct 2022 15:30), Max: 36.9 (31 Oct 2022 07:48)  T(F): 98.1 (31 Oct 2022 15:30), Max: 98.4 (31 Oct 2022 07:48)  HR: 75 (31 Oct 2022 15:30) (75 - 92)  BP: 139/91 (31 Oct 2022 15:30) (137/84 - 151/101)  BP(mean): --  RR: 18 (31 Oct 2022 15:30) (16 - 19)  SpO2: 100% (31 Oct 2022 15:30) (99% - 100%)    Parameters below as of 31 Oct 2022 15:30  Patient On (Oxygen Delivery Method): room air        Physical Exam:  General: Breathing comfortably on room air, no distress, puffy face  HEENT: EOMI, MMM, no oral ulcers  CVS: +S1/S2, RRR  Resp: CTA b/l. No crackles/wheezing  GI: Soft, NT/ND +BS  MSK: 5/5 muscle strength in arms, thighs, legs. No synovitis  Skin: no visible rashes. 1+pitting leg edema b/l, non-tender to palpation    LABS:                        10.5   16.68 )-----------( 295      ( 31 Oct 2022 09:45 )             32.0     10-31    136  |  108<H>  |  39<H>  ----------------------------<  83  4.7   |  21<L>  |  1.53<H>    Ca    8.8      31 Oct 2022 09:45  Mg     1.80     10-31    TPro  5.3<L>  /  Alb  2.7<L>  /  TBili  0.2  /  DBili  x   /  AST  15  /  ALT  15  /  AlkPhos  48  10-31      Urinalysis Basic - ( 31 Oct 2022 13:00 )    Color: Light Yellow / Appearance: Slightly Turbid / S.015 / pH: x  Gluc: x / Ketone: Negative  / Bili: Negative / Urobili: <2 mg/dL   Blood: x / Protein: 300 mg/dL / Nitrite: Negative   Leuk Esterase: Negative / RBC: 116 /HPF / WBC 25 /HPF   Sq Epi: x / Non Sq Epi: 3 /HPF / Bacteria: Negative        RADIOLOGY & ADDITIONAL STUDIES: Reviewed    < from: US Duplex Venous Lower Ext Complete, Bilateral (10.31.22 @ 12:44) >    ACC: [id number] EXAM:  US DPLX LWR EXT VEINS COMPL BI                          PROCEDURE DATE:  10/31/2022          INTERPRETATION:  CLINICAL INFORMATION: Lower extremity swelling.    COMPARISON: None available.    TECHNIQUE: Duplex sonography of theBILATERAL LOWER extremity veins with   color and spectral Doppler, with and without compression.    FINDINGS:    RIGHT:  Normal compressibility of the RIGHT common femoral, femoral and popliteal   veins.  Doppler examination shows normal spontaneous and phasic flow.  No RIGHT calf vein thrombosis is detected.    LEFT:  Normal compressibility of the LEFT common femoral, femoral and popliteal   veins.  Doppler examination shows normal spontaneous and phasic flow.  No LEFT calf vein thrombosis is detected.    IMPRESSION:  No evidence of deep venous thrombosis in either lower extremity.    --- End of Report ---            TOMMIE REZA MD; Attending Radiologist  This document has been electronically signed. Oct 31 2022  2:34PM    < end of copied text >       LIZ HOUSER  [id number]    HPI: 48 yo M PMH [place] speaking, SLE (dx 2022, with Class 3 LN - now Class 4), SLE flare w/ LIZETH and mucocutaneous symptoms (10/2022, started on obinutuzumab 10/21/2022) presenting w/ b/l leg and facial swelling. : [id number]. Pt says he experienced facial and b/l leg swelling yesterday. Has been compliant w/ home medications. Says he does not have any symptoms of his previous SLE flare. Says he thinks it may be the prednisone causing his swelling.     ROS: Denies fever, chills, cough, chest pain, sob, alopecia, nasal/oral ulcers, sicca symptoms, rash, joint pain, Raynaud's, frothy urine    SLE history:    pt was seen by Dr. Nilton malcolm 1x in 2022, then lost to follow up   Was evaluated by nephrology, underwent renal biopsy in 2022: Focal proliferative class III, segmental endocapillary hypercellularity in 12%, a single small cellular crescents, mild acute interstitial nephritis, NIH activity score 4/24, no chronicity     2022 serology:    MÓNICA 1:1280 (speckled) and MÓNICA titer 2 1:640 (homogenous.)   Myomarker panel 3: weakly positive U2 RNP, weakly positive Anti-Pm/Scl, strongly positive  U1 RNP, strongly positive SSA/Ro 52, CK wnl   C3/C4 LOW, DsDNA > 1000  Sm, RNP, SSA/SSB> 8  UA: Large blood  Prt/Crea 0.8  APS labs negative      -pt was Started on hydroxychloroquine 200 mg twice a day, on prednisone 60mg qD  with plan to taper down prednisone  was also started on Cellcept 1000mg BID, with plan to titrate up to 3g daily. However w/ questionable compliance as was not picking up prescriptions and not following up w/ rheum    10/2022 presented w/ SLE flare w/ fever and rash for 1 week  - lupus serologies from 10/12/22 still showed high lupus activity, dsDNA 834, and low C3, C4 complement  - Pt received prednisone 40mg qD x2, and prednisone 60mg x1 in the ED. Rash appears to have improved  - baseline Cr 0.8, currently 1.6-1.7 with hyperkalemia, concern for worsening lupus nephritis  - repeat Urine P/Cr 2.9 (was 0.8 in 2022)   - renal US negative for hydronephrosis    - s/p renal bx on 10/17  with worsening disease - Class 4 LN - compliance vs Cellcept failure  - S/p Solu-medrol 500mg x 3 days (10/14- 10/16), continue prednisone 60mg (10/17- current)    - Gayzva started - first dose 10/21/22 ([facility] protocol) - next dose was due in 2 weeks as outpatient   - cellcept lowered from 1500mg bid to 1000 mg BID now that getting concomitant Gayzva  - hep panel negative in 2022  - Quant gold negative 2022          MEDICATIONS  (STANDING):  cholecalciferol 1000 Unit(s) Oral daily  heparin   Injectable 5000 Unit(s) SubCutaneous every 8 hours  hydroxychloroquine 200 milliGRAM(s) Oral two times a day  mycophenolate mofetil 1000 milliGRAM(s) Oral two times a day  predniSONE   Tablet 50 milliGRAM(s) Oral daily  triamcinolone 0.1% Cream 1 Application(s) Topical three times a day  trimethoprim  160 mG/sulfamethoxazole 800 mG 1 Tablet(s) Oral daily          Allergies    No Known Allergies    Intolerances          MEDICATIONS: cellcept 1000 mg BID, prednisone 60mg qd, Plaquenil 200mg BID      SOCIAL HISTORY: Lives w/ sons and wife, no tobacco or alcohol use.       Vital Signs Last 24 Hrs  T(C): 36.7 (31 Oct 2022 15:30), Max: 36.9 (31 Oct 2022 07:48)  T(F): 98.1 (31 Oct 2022 15:30), Max: 98.4 (31 Oct 2022 07:48)  HR: 75 (31 Oct 2022 15:30) (75 - 92)  BP: 139/91 (31 Oct 2022 15:30) (137/84 - 151/101)  BP(mean): --  RR: 18 (31 Oct 2022 15:30) (16 - 19)  SpO2: 100% (31 Oct 2022 15:30) (99% - 100%)    Parameters below as of 31 Oct 2022 15:30  Patient On (Oxygen Delivery Method): room air        Physical Exam:  General: Breathing comfortably on room air, no distress, puffy face  HEENT: EOMI, MMM, no oral ulcers  CVS: +S1/S2, RRR  Resp: CTA b/l. No crackles/wheezing  GI: Soft, NT/ND +BS  MSK: 5/5 muscle strength in arms, thighs, legs. No synovitis  Skin: no visible rashes. 1+pitting leg edema b/l, non-tender to palpation    LABS:                        10.5   16.68 )-----------( 295      ( 31 Oct 2022 09:45 )             32.0     10-31    136  |  108<H>  |  39<H>  ----------------------------<  83  4.7   |  21<L>  |  1.53<H>    Ca    8.8      31 Oct 2022 09:45  Mg     1.80     10-31    TPro  5.3<L>  /  Alb  2.7<L>  /  TBili  0.2  /  DBili  x   /  AST  15  /  ALT  15  /  AlkPhos  48  10-31      Urinalysis Basic - ( 31 Oct 2022 13:00 )    Color: Light Yellow / Appearance: Slightly Turbid / S.015 / pH: x  Gluc: x / Ketone: Negative  / Bili: Negative / Urobili: <2 mg/dL   Blood: x / Protein: 300 mg/dL / Nitrite: Negative   Leuk Esterase: Negative / RBC: 116 /HPF / WBC 25 /HPF   Sq Epi: x / Non Sq Epi: 3 /HPF / Bacteria: Negative        RADIOLOGY & ADDITIONAL STUDIES: Reviewed    < from: US Duplex Venous Lower Ext Complete, Bilateral (10.31.22 @ 12:44) >    ACC: [id number] EXAM:  US DPLX LWR EXT VEINS COMPL BI                          PROCEDURE DATE:  10/31/2022          INTERPRETATION:  CLINICAL INFORMATION: Lower extremity swelling.    COMPARISON: None available.    TECHNIQUE: Duplex sonography of theBILATERAL LOWER extremity veins with   color and spectral Doppler, with and without compression.    FINDINGS:    RIGHT:  Normal compressibility of the RIGHT common femoral, femoral and popliteal   veins.  Doppler examination shows normal spontaneous and phasic flow.  No RIGHT calf vein thrombosis is detected.    LEFT:  Normal compressibility of the LEFT common femoral, femoral and popliteal   veins.  Doppler examination shows normal spontaneous and phasic flow.  No LEFT calf vein thrombosis is detected.    IMPRESSION:  No evidence of deep venous thrombosis in either lower extremity.    --- End of Report ---            TOMMIE REZA MD; Attending Radiologist  This document has been electronically signed. Oct 31 2022  2:34PM    < end of copied text >

## 2022-10-31 NOTE — ED ADULT TRIAGE NOTE - CHIEF COMPLAINT QUOTE
Pt arrives to ED c/o bilateral lower leg swelling and facial swelling.  Pt reports he has Lupus.  Pt had recent flare and seen on 10/13.  Pt denies pain. Pt arrives to ED c/o bilateral lower leg swelling and facial swelling.  Pt reports he has Lupus.  Pt had recent flare and admitted on 10/13 at [facility].  Pt denies pain. Pt arrives to ED c/o bilateral lower leg swelling and facial swelling.  Pt reports he has Lupus.  Pt had recent flare and admitted on 10/13 at [facility].  Pt denies pain.  No difficulty breathing or swallowing.

## 2022-10-31 NOTE — ED PROVIDER NOTE - PHYSICAL EXAMINATION
PHYSICAL EXAM:    GENERAL: Lying in bed comfortably  HEAD:  Normocephalic. Slight facial puffiness to lower half of face.   EYES: EOMI, PERRLA, conjunctiva and sclera clear  ENT: No erythema/pallor/petechiae/lesions.   NECK: Supple, No JVD  LUNG: CTA b/l; no r/r/w  HEART: RRR, +S1/S2; No m/r/g  ABDOMEN: soft, NT/ND; BS audible   EXTREMITIES:  BL below knee pitting non-tender edema. Distal NV status intact.    NERVOUS SYSTEM:  AAOx3, speech clear. No sensory/motor deficits   MSK: FROM all 4 extremities, full and equal strength  SKIN: No new rashes or lesions

## 2022-10-31 NOTE — CONSULT NOTE ADULT - ASSESSMENT
-c/w home meds Cellcept 1g BID, Plaquenil 200mg BID  -decrease prednisone from 60mg qd to 50mg qd  -plan for second dose of obinutuzumab 11/1/22  -obtain dopplers of b/l legs to r/o dvt 48 yo M PMH Green[place]ic speaking, SLE (dx Feb 2022, with Class 3 LN - now Class 4), SLE flare w/ LIZETH and mucocutaneous symptoms (10/2022, started on obinutuzumab 10/21/2022) presenting w/ b/l leg and facial swelling.    #b/l leg and facial swelling  =may be 2/2 high dose prednisone vs worsening kidney disease  =dopplers negative for dvt (10/31/22), creatinine at baseline    #SLE w/ class 4 Lupus Nephritis:  =Does not have current SLE flare  =Says he has been compliant w/ home Cellcept 1000mg BID, HCQ 200mg BID, prednisone 60mg qd (Since 10/17/22)  =s/p renal bx on 10/17 with worsening LN from class 3 (2/2022) to class 4. May be 2/2 non-compliance vs cellcept failure. Pt, and his HCP son's, do not answer phone calls outpatient, and so his case is very difficult to manage outpatient  =Received first dose of obinutuzumab 10/21/22   =10/11/2022 serology showed high lupus activity: dsDNA 834, and low C3, C4   =10/31/22: C3 low, C4 wnl.     Plan:  -c/w home meds Cellcept 1g BID, Plaquenil 200mg BID  -decrease prednisone from 60mg qd to 50mg qd  -c/w home bactrim and PPI  -plan for second dose of obinutuzumab 11/1/22  -repeat DS-DNA, obtain UA w/ protein/creatinine ratio     Discussed with Attending Dr. Nilton Shepherd DO  Rheumatology Fellow  Pager: [phone]  Available on TEAMS 46 yo M PMH Chinese speaking, SLE (dx Feb 2022, with Class 3 LN - now Class 4), SLE flare w/ LIZETH and mucocutaneous symptoms (10/2022, started on obinutuzumab 10/21/2022) presenting w/ b/l leg and facial swelling.    #b/l leg and facial swelling  =may be 2/2 high dose prednisone vs worsening kidney disease  =dopplers negative for dvt (10/31/22), creatinine at baseline    #SLE w/ class 4 Lupus Nephritis:  =Does not have current SLE flare  =Says he has been compliant w/ home Cellcept 1000mg BID, HCQ 200mg BID, prednisone 60mg qd (Since 10/17/22)  =s/p renal bx on 10/17 with worsening LN from class 3 (2/2022) to class 4. May be 2/2 non-compliance vs cellcept failure. Pt, and his HCP son's, do not answer phone calls outpatient, and so his case is very difficult to manage outpatient  =Received first dose of obinutuzumab 10/21/22   =10/11/2022 serology showed high lupus activity: dsDNA 834, and low C3, C4   =10/31/22: C3 low, C4 wnl.     Plan:  -c/w home meds Cellcept 1g BID, Plaquenil 200mg BID  -decrease prednisone from 60mg qd to 50mg qd  -c/w home bactrim and PPI  -plan for second dose of obinutuzumab 11/1/22  -repeat DS-DNA, obtain UA w/ protein/creatinine ratio   -obtain TTE    Discussed with Attending Dr. Nilton Shepherd DO  Rheumatology Fellow  Pager: [phone]  Available on TEAMS

## 2022-10-31 NOTE — H&P ADULT - NSHPPHYSICALEXAM_GEN_ALL_CORE
VITALS:   T(C): 36.7 (10-31-22 @ 12:02), Max: 36.9 (10-31-22 @ 07:48)  HR: 92 (10-31-22 @ 12:02) (81 - 92)  BP: 151/101 (10-31-22 @ 12:02) (137/84 - 151/101)  RR: 19 (10-31-22 @ 12:02) (16 - 19)  SpO2: 99% (10-31-22 @ 12:02) (99% - 100%)    PHYSICAL EXAM:     GENERAL: NAD, lying in bed comfortably  HEAD:  Atraumatic, Normocephalic  EYES: EOMI, PERRLA, conjunctiva and sclera clear  ENT: Moist mucous membranes  NECK: Supple, No JVD  CHEST/LUNG: Clear to auscultation bilaterally; No rales, rhonchi, wheezing, or rubs. Unlabored respirations  HEART: Regular rate and rhythm; No murmurs, rubs, or gallops  ABDOMEN: normal bowel sounds; Soft, nontender, nondistended  EXTREMITIES:  2+ Peripheral Pulses, brisk capillary refill. No clubbing, cyanosis, or edema  Neurological:  A&Ox3, no focal deficits   SKIN: No rashes or lesions  PSYCH: normal affect and mood VITALS:   T(C): 36.7 (10-31-22 @ 12:02), Max: 36.9 (10-31-22 @ 07:48)  HR: 92 (10-31-22 @ 12:02) (81 - 92)  BP: 151/101 (10-31-22 @ 12:02) (137/84 - 151/101)  RR: 19 (10-31-22 @ 12:02) (16 - 19)  SpO2: 99% (10-31-22 @ 12:02) (99% - 100%)    PHYSICAL EXAM:     GENERAL: NAD, lying in bed comfortably  HEAD:  Atraumatic, Normocephalic. + Facial edema  EYES: EOMI, PERRLA, conjunctiva and sclera clear  ENT: Moist mucous membranes  NECK: Supple, No JVD  CHEST/LUNG: Clear to auscultation bilaterally; No rales, rhonchi, wheezing, or rubs. Unlabored respirations  HEART: Regular rate and rhythm; No murmurs, rubs, or gallops  ABDOMEN: normal bowel sounds; Soft, nontender, nondistended  EXTREMITIES:  2+ Peripheral Pulses, brisk capillary refill. B/L LE Pitting Edema. present. No clubbing or cyanosis.  Neurological:  A&Ox3, no focal deficits   SKIN: No rashes or lesions. Skin dryness in b/l UE and hands.  PSYCH: normal affect and mood VITALS:   T(C): 36.7 (10-31-22 @ 12:02), Max: 36.9 (10-31-22 @ 07:48)  HR: 92 (10-31-22 @ 12:02) (81 - 92)  BP: 151/101 (10-31-22 @ 12:02) (137/84 - 151/101)  RR: 19 (10-31-22 @ 12:02) (16 - 19)  SpO2: 99% (10-31-22 @ 12:02) (99% - 100%)    PHYSICAL EXAM:     GENERAL: NAD, lying in bed comfortably  HEAD:  Atraumatic, Normocephalic. + Facial edema  EYES: EOMI, PERRLA, conjunctiva and sclera clear  ENT: Moist mucous membranes  NECK: Supple, No JVD  CHEST/LUNG: Clear to auscultation bilaterally; No rales, rhonchi, wheezing, or rubs. Unlabored respirations  HEART: Regular rate and rhythm; No murmurs, rubs, or gallops  ABDOMEN: normal bowel sounds; Soft, nontender, nondistended  EXTREMITIES:  2+ Peripheral Pulses, brisk capillary refill. 2+ B/L LE Pitting Edema to knees. present. No clubbing or cyanosis.  Neurological:  A&Ox3, no focal deficits   SKIN: No rashes or lesions. Skin dryness in b/l UE and hands.  PSYCH: normal affect and mood

## 2022-10-31 NOTE — H&P ADULT - NSHPREVIEWOFSYSTEMS_GEN_ALL_CORE
REVIEW OF SYSTEMS:    Constitutional: No fever, chills, night sweats, or fatigue  	Eyes:  No eye pain, visual disturbances, or discharge  	ENMT:  No neck pain  	Cardiac:  No chest pain, palpitations, no leg swelling  	Respiratory:  No cough, SOB  	GI:  No nausea, vomiting, diarrhea, abdominal pain.  	:  no dysuria, hematuria, or incontinence  	MS:  No back pain.  	Neuro:  No headache or lightheadedness, dizziness   	Skin:  No skin rash  Except as documented in the HPI,  all other systems are negative. REVIEW OF SYSTEMS:    Constitutional: No fever, chills, night sweats, or fatigue  	Eyes:  No eye pain, visual disturbances, or discharge  	ENMT:  No neck pain  	Cardiac:  No chest pain, palpitations, no leg swelling  	Respiratory:  No cough, SOB  	GI:  No nausea, vomiting, diarrhea, abdominal pain.  	:  no dysuria, hematuria, or incontinence  	MS:  No back pain.  	Neuro:  No headache or lightheadedness, dizziness   	Skin:  +Benigno LE edema REVIEW OF SYSTEMS:    Constitutional: No fever, chills, night sweats, or fatigue  	Eyes:  No eye pain, visual disturbances, or discharge  	ENMT:  No neck pain  	Cardiac:  No chest pain, palpitations, no leg swelling  	Respiratory:  No cough, SOB  	GI:  No nausea, vomiting, diarrhea, abdominal pain.  	:  no dysuria, hematuria, or incontinence  	MS:  No back pain.  	Neuro:  No headache or lightheadedness, dizziness   	Skin:  +Benigno LE edema. No facial rash REVIEW OF SYSTEMS:    Constitutional: No fever, chills, night sweats, or fatigue  	Eyes:  No eye pain, visual disturbances, or discharge  	ENMT:  No neck pain  	Cardiac:  No chest pain, palpitations, no leg swelling  	Respiratory:  No cough, SOB  	GI:  No nausea, vomiting, diarrhea, abdominal pain.  	:  no dysuria, hematuria, or incontinence  	MS:  No back pain.  	Neuro:  No headache or lightheadedness, dizziness   	Skin:  +Benigno LE edema, facial edema

## 2022-10-31 NOTE — ED ADULT NURSE NOTE - OBJECTIVE STATEMENT
Patient received sitting in chair. Respirations even and unlabored. Presents to ER c/o facial swelling and BLLE swelling starting yesterday. Airway open patetn and unobstructed. No signs of respiratory distress noted. oxygen saturation WNL. Denies CP or SOB. Comfort and safety maintained. Will continue to monitor Travis LUNA

## 2022-10-31 NOTE — ED ADULT NURSE NOTE - CHIEF COMPLAINT QUOTE
Pt arrives to ED c/o bilateral lower leg swelling and facial swelling.  Pt reports he has Lupus.  Pt had recent flare and admitted on 10/13 at [facility].  Pt denies pain.  No difficulty breathing or swallowing.

## 2022-10-31 NOTE — H&P ADULT - NSHPLABSRESULTS_GEN_ALL_CORE
< from: Xray Chest 1 View AP/PA (10.31.22 @ 09:29) >    IMPRESSION:  Clear lungs.    < end of copied text > LABS:                         10.5   16.68 )-----------( 295      ( 31 Oct 2022 09:45 )             32.0     10-31    136  |  108<H>  |  39<H>  ----------------------------<  83  4.7   |  21<L>  |  1.53<H>    Ca    8.8      31 Oct 2022 09:45  Mg     1.80     10-31    TPro  5.3<L>  /  Alb  2.7<L>  /  TBili  0.2  /  DBili  x   /  AST  15  /  ALT  15  /  AlkPhos  48  10-31    Urinalysis Basic - ( 31 Oct 2022 13:00 )    Color: Light Yellow / Appearance: Slightly Turbid / S.015 / pH: x  Gluc: x / Ketone: Negative  / Bili: Negative / Urobili: <2 mg/dL   Blood: x / Protein: 300 mg/dL / Nitrite: Negative   Leuk Esterase: Negative / RBC: 116 /HPF / WBC 25 /HPF   Sq Epi: x / Non Sq Epi: 3 /HPF / Bacteria: Negative    RADIOLOGY, EKG & ADDITIONAL TESTS: Reviewed.     < from: Xray Chest 1 View AP/PA (10.31.22 @ 09:29) >    IMPRESSION:  Clear lungs.    < end of copied text >

## 2022-10-31 NOTE — H&P ADULT - PROBLEM SELECTOR PLAN 2
- Most likely d/t SLE flare given pt noncompliant with medications  - reports similar sx previously   - C3 low, C4 normal, dsDNA _  - Reports taking PLQ, MMF, Prednisone  - F/u rheum recs  - Bactrim for PCP prophylaxis  - C/w home PLQ, cellcept   - Michealeld started? - preexposure ppx for COVID - Most likely d/t SLE flare   - reports similar sx previously   - C3 low (67), C4 normal  - Reports taking PLQ, MMF, Prednisone  - F/u rheum recs  - Bactrim for PCP prophylaxis  - SHASHANK ordered to r/o concern for lupus cardiomyopathy - Most likely d/t SLE flare   - reports similar sx previously   - C3 low, C4 normal  - Reports taking PLQ, MMF, Prednisone  - F/u rheum recs  - Bactrim for PCP prophylaxis  - SHASHANK ordered to r/o concern for lupus cardiomyopathy - Most likely d/t SLE flare   - reports similar sx previously   - C3 low, C4 normal  - Reports taking PLQ, MMF, Prednisone  - F/u rheum recs  - Bactrim for PCP prophylaxis  - TTE ordered to r/o concern for lupus cardiomyopathy

## 2022-10-31 NOTE — H&P ADULT - ASSESSMENT
47 Yr male PMHx SLE with lupus nephritis h/o non-compliance to meds recently admitted to [facility] with SLE flare (new onset LIZETH and Muco-cutaneous symptoms), during hospital stay patient was treated with steroids, Obinutuzumab, Kindey Bx revealed cresentric GN.   48 yo Polish speaking male with PMHx SLE (Class III lupus nephritis) h/o non-compliance to meds recently admitted to [facility] (10/22/22) with SLE flare (new onset LIZETH and Muco-cutaneous symptoms), s/p Obinutuzumab (10/22) now presenting with BL LE swelling to the knees and facial edema since last night (10/30).

## 2022-10-31 NOTE — ED PROVIDER NOTE - OBJECTIVE STATEMENT
47 Yr male PMHx SLE with lupus nephritis h/o non-compliance to meds recently admitted to [facility] with SLE flare (new onset LIZETH and Muco-cutaneous symptoms), during hospital stay patient was treated with steroids, Obinutuzumab, Kindey Bx revealed cresentric GN.   Patient now presents with BL LE swelling and facial puffiness since last night. Per details, reports similar symptoms in Nov 2021 which were d/t Prednisone and that was reduced to 10 mg QD following which there was improvement. Denies fever, cough, SOB, chest pain, abdominal pain, urinary complaints.   Patient reports is regularly using routine meds which include Pred 20 mg TIC,  mg BID, MMF 1 gm BID. Is due for F/U with Nephro Manuel Todd and Rheum Elena Go in first week of November.

## 2022-10-31 NOTE — H&P ADULT - PROBLEM SELECTOR PLAN 3
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

## 2022-10-31 NOTE — ED PROVIDER NOTE - HIV OFFER
Opt out Pt received no PRNs in last 24 hr. Pt compliant with all standing medications. Nursing reports that pt is still internally preoccupied but mumbling rather than yelling, thus improved from prior week.    c/o: "I'm good, how are you?"    Pt says she feels well and slept well. Pt is still in bed and feels tired due to up titration of clozapine; she reports that she is drinking fluids and stands up slowly to avoid feelings of lightheadedness. Pt denies AH currently and states that there is "only one voice left" that speaks to her at times. This voice does not command her to do things and does not impair her ability to concentrate on tasks.    Pt's mother was contacted today and updated regarding the pt's course. Pt's mother's cell phone number is [phone].

## 2022-10-31 NOTE — H&P ADULT - ATTENDING COMMENTS
Briefly, this is a 46 y/o M with history of SLE with associated Class III lupus nephritis, with recent admission to [facility] on 10/22 for Lupus flare as well as LIZETH now returning to the hospital due to new onset b/l LE and facial edema. Patient currently admitted for further workup and management.    #SLE with Lupus Nephritis  - Patient with history of SLE and recently diagosed Lupus nephritis (renal biopsy in Oct 2022)  - noted with history of poor med compliance and poor outpatient follow up  - currently with low C3 levels, but normal C4 levels, no evidence of rash at this time  - rheumatology consulted, recs appreciated  - plan for obintuzumab infusion by rheumatology on 11/1  - per rheum will continue home plaquenil and cellcept  - prednisone dose also to be decreased per rheum    #LE edema  - patient reporting new onset LE edema and facial swelling  - per patient, he believes this may be 2/2 to high prednisone dosing (reports that he has similar swelling in the past which improved with reducing steroid dose)  - will reduce prednisone from 60mg qd to 50mg qd per rheum  - patient may also have component of renal dysfunction contributing to LE swelling (however Cr is stable) vs possible cardiac involvement  - check TTE  - will also consult nephrology for assistance    Rest of plan as documented above by the resident.  Case and plan discussed with HS1 and with patient.

## 2022-10-31 NOTE — ED PROVIDER NOTE - NS ED ROS FT
CONSTITUTIONAL: No weakness, fevers, chills  EYES/ENT: No visual changes;  No vertigo or throat pain   NECK: No pain or stiffness  RESPIRATORY: No cough, shortness of breath  CARDIOVASCULAR: No chest pain or palpitations  GASTROINTESTINAL: No abdominal or epigastric pain. No nausea, vomiting, or hematemesis; No diarrhea or constipation.   GENITOURINARY: No dysuria, frequency or hematuria  NEUROLOGICAL: No numbness or weakness  SKIN: c/o BL LE swelling    All other review of systems is negative unless indicated above.

## 2022-10-31 NOTE — H&P ADULT - HISTORY OF PRESENT ILLNESS
47 Yr male PMHx SLE with lupus nephritis h/o non-compliance to meds recently admitted to [facility] with SLE flare (new onset LIZETH and Muco-cutaneous symptoms), during hospital stay patient was treated with steroids, Obinutuzumab, Kindey Bx revealed cresentric GN.     47 Yr male PMHx SLE with lupus nephritis h/o non-compliance to meds recently admitted to [facility] with SLE flare (new onset LIZETH and Muco-cutaneous symptoms), during hospital stay patient was treated with steroids, Obinutuzumab, Kindey Bx revealed cresentric GN.   Patient now presents with BL LE swelling and facial puffiness since last night. Per details, reports similar symptoms in Nov 2021 which were d/t Prednisone and that was reduced to 10 mg QD following which there was improvement. Denies fever, cough, SOB, chest pain, abdominal pain, urinary complaints.   Patient reports is regularly using routine meds which include Pred 20 mg TIC,  mg BID, MMF 1 gm BID. Is due for F/U with Nephro Manuel Todd and Rheum Elena Go in first week of November.    48 yo Begali speaking male with PMHx SLE (Class III lupus nephritis) h/o non-compliance to meds recently admitted to [facility] (10/22/22) with SLE flare (new onset LIZETH and Muco-cutaneous symptoms), s/p Obinutuzumab (10/22). Previous hospitalization kidney Bx revealed crescenteric GN.   Patient now presents with BL LE swelling and facial puffiness since last night (10/30). Per details, reports similar symptoms in Nov 2021 which were d/t Prednisone and that was reduced to 10 mg QD following which there was improvement. Denies fever, cough, SOB, chest pain, abdominal pain, urinary complaints.   Patient reports is regularly using routine meds which include Pred 20 mg TIC,  mg BID, MMF 1 gm BID. Is due for F/U with Nephro Manuel Todd and Rheum Elena Go in first week of November.    46 yo Begali speaking male with PMHx SLE (Class III lupus nephritis) h/o non-compliance to meds recently admitted to [facility] (10/22/22) with SLE flare (new onset LIZETH and Muco-cutaneous symptoms), s/p Obinutuzumab (10/22). Previous hospitalization kidney Bx revealed crescenteric GN.   Patient now presents with BL LE swelling and facial puffiness since last night (10/30). The patient reports that the swelling has increased since last night. Per details, reports similar symptoms in Nov 2021 which were d/t Prednisone and that was reduced to 10 mg QD following which there was improvement. Denies fever, cough, SOB, chest pain, abdominal pain, urinary complaints, UE swelling.   Patient reports is regularly using routine meds which include Pred 20 mg TIC,  mg BID, MMF 1 gm BID. Is due for F/U with Nephro Manuel Todd and Rheum Eelna Go in first week of November. He is also due for his second Obinutuzumab dose on 11/04/2022.    46 yo Begali speaking male with PMHx SLE (Class III lupus nephritis) h/o non-compliance to meds recently admitted to [facility] (10/22/22) with SLE flare (new onset LIZETH and Muco-cutaneous symptoms), s/p Obinutuzumab (10/22). 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 Pt now presents with BL LE swelling to the knees and reports facial edema since last night (10/30). Pt reports that the swelling has increased since last night. Pt reports similar symptoms in Nov 2021 which were d/t Prednisone dose that was reduced to 10 mg QD, leading to improvement in sx at that time. Denies fever, cough, SOB, chest pain, abdominal pain, urinary complaints, UE swelling, or oral ulcers. Patient reports he is compliant with medications that he was discharged on previously: Pred 20 mg QD,  mg BID, MMF 1 gm BID, Bactrim -160 mg QD. Pt due for F/U with Nephro Manuel Todd and Rheum Elena Ahumada in first week of November. He is also due for his second Obinutuzumab dose on 11/04/2022.    48 yo Khmer speaking male with PMHx SLE (Class III lupus nephritis) h/o non-compliance to meds recently admitted to [facility] (10/22/22) with SLE flare (new onset LIZETH and Muco-cutaneous symptoms), s/p Obinutuzumab (10/22). 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 Pt now presents with BL LE swelling to the knees and reports facial edema since last night (10/30). Pt reports that the swelling has increased since last night. Pt reports similar symptoms in Nov 2021 which were d/t Prednisone dose that was reduced to 10 mg QD, leading to improvement in sx at that time. Denies fever, cough, SOB, chest pain, abdominal pain, urinary complaints, UE swelling, or oral ulcers. Patient reports he is compliant with medications that he was discharged on previously: Pred 20 mg QD,  mg BID, MMF 1 gm BID, Bactrim -160 mg QD. Pt due for F/U with Nephro Manuel Todd and Rheum Elena Ahumada in first week of November. He is also due for his second Obinutuzumab dose on 11/04/2022.

## 2022-10-31 NOTE — H&P ADULT - PROBLEM SELECTOR PLAN 1
- iso lupus flare  - - iso lupus flare  - Pt reports similar ep in Nov 2021 in sx that was resolved with decreasing Prednisone dose  - F/u rheum recs

## 2022-11-01 ENCOUNTER — NON-APPOINTMENT (OUTPATIENT)
Age: 47
End: 2022-11-01

## 2022-11-01 ENCOUNTER — TRANSCRIPTION ENCOUNTER (OUTPATIENT)
Age: 47
End: 2022-11-01

## 2022-11-01 VITALS
DIASTOLIC BLOOD PRESSURE: 64 MMHG | RESPIRATION RATE: 17 BRPM | OXYGEN SATURATION: 100 % | SYSTOLIC BLOOD PRESSURE: 114 MMHG | TEMPERATURE: 98 F | HEART RATE: 73 BPM

## 2022-11-01 DIAGNOSIS — R60.0 LOCALIZED EDEMA: ICD-10-CM

## 2022-11-01 DIAGNOSIS — M32.9 SYSTEMIC LUPUS ERYTHEMATOSUS, UNSPECIFIED: ICD-10-CM

## 2022-11-01 LAB
ALBUMIN SERPL ELPH-MCNC: 2.6 G/DL — LOW (ref 3.3–5)
ALP SERPL-CCNC: 48 U/L — SIGNIFICANT CHANGE UP (ref 40–120)
ALT FLD-CCNC: 14 U/L — SIGNIFICANT CHANGE UP (ref 4–41)
ANION GAP SERPL CALC-SCNC: 11 MMOL/L — SIGNIFICANT CHANGE UP (ref 7–14)
APPEARANCE UR: CLEAR — SIGNIFICANT CHANGE UP
AST SERPL-CCNC: 11 U/L — SIGNIFICANT CHANGE UP (ref 4–40)
BACTERIA # UR AUTO: NEGATIVE — SIGNIFICANT CHANGE UP
BASOPHILS # BLD AUTO: 0.01 K/UL — SIGNIFICANT CHANGE UP (ref 0–0.2)
BASOPHILS NFR BLD AUTO: 0.1 % — SIGNIFICANT CHANGE UP (ref 0–2)
BILIRUB SERPL-MCNC: 0.2 MG/DL — SIGNIFICANT CHANGE UP (ref 0.2–1.2)
BILIRUB UR-MCNC: NEGATIVE — SIGNIFICANT CHANGE UP
BUN SERPL-MCNC: 32 MG/DL — HIGH (ref 7–23)
CALCIUM SERPL-MCNC: 8.8 MG/DL — SIGNIFICANT CHANGE UP (ref 8.4–10.5)
CHLORIDE SERPL-SCNC: 105 MMOL/L — SIGNIFICANT CHANGE UP (ref 98–107)
CO2 SERPL-SCNC: 23 MMOL/L — SIGNIFICANT CHANGE UP (ref 22–31)
COLOR SPEC: SIGNIFICANT CHANGE UP
CREAT ?TM UR-MCNC: 64 MG/DL — SIGNIFICANT CHANGE UP
CREAT SERPL-MCNC: 1.5 MG/DL — HIGH (ref 0.5–1.3)
DIFF PNL FLD: ABNORMAL
DSDNA AB SER-ACNC: 57 IU/ML — HIGH
EGFR: 57 ML/MIN/1.73M2 — LOW
EOSINOPHIL # BLD AUTO: 0.07 K/UL — SIGNIFICANT CHANGE UP (ref 0–0.5)
EOSINOPHIL NFR BLD AUTO: 0.7 % — SIGNIFICANT CHANGE UP (ref 0–6)
EPI CELLS # UR: 1 /HPF — SIGNIFICANT CHANGE UP (ref 0–5)
GLUCOSE SERPL-MCNC: 80 MG/DL — SIGNIFICANT CHANGE UP (ref 70–99)
GLUCOSE UR QL: NEGATIVE — SIGNIFICANT CHANGE UP
HCT VFR BLD CALC: 33.8 % — LOW (ref 39–50)
HGB BLD-MCNC: 11.1 G/DL — LOW (ref 13–17)
HYALINE CASTS # UR AUTO: 7 /LPF — SIGNIFICANT CHANGE UP (ref 0–7)
IANC: 7.14 K/UL — SIGNIFICANT CHANGE UP (ref 1.8–7.4)
IMM GRANULOCYTES NFR BLD AUTO: 0.5 % — SIGNIFICANT CHANGE UP (ref 0–0.9)
KETONES UR-MCNC: NEGATIVE — SIGNIFICANT CHANGE UP
LEUKOCYTE ESTERASE UR-ACNC: ABNORMAL
LYMPHOCYTES # BLD AUTO: 1.7 K/UL — SIGNIFICANT CHANGE UP (ref 1–3.3)
LYMPHOCYTES # BLD AUTO: 17.2 % — SIGNIFICANT CHANGE UP (ref 13–44)
MAGNESIUM SERPL-MCNC: 1.9 MG/DL — SIGNIFICANT CHANGE UP (ref 1.6–2.6)
MCHC RBC-ENTMCNC: 28.7 PG — SIGNIFICANT CHANGE UP (ref 27–34)
MCHC RBC-ENTMCNC: 32.8 GM/DL — SIGNIFICANT CHANGE UP (ref 32–36)
MCV RBC AUTO: 87.3 FL — SIGNIFICANT CHANGE UP (ref 80–100)
MONOCYTES # BLD AUTO: 0.93 K/UL — HIGH (ref 0–0.9)
MONOCYTES NFR BLD AUTO: 9.4 % — SIGNIFICANT CHANGE UP (ref 2–14)
MRSA PCR RESULT.: SIGNIFICANT CHANGE UP
NEUTROPHILS # BLD AUTO: 7.14 K/UL — SIGNIFICANT CHANGE UP (ref 1.8–7.4)
NEUTROPHILS NFR BLD AUTO: 72.1 % — SIGNIFICANT CHANGE UP (ref 43–77)
NITRITE UR-MCNC: NEGATIVE — SIGNIFICANT CHANGE UP
NRBC # BLD: 0 /100 WBCS — SIGNIFICANT CHANGE UP (ref 0–0)
NRBC # FLD: 0 K/UL — SIGNIFICANT CHANGE UP (ref 0–0)
PH UR: 6 — SIGNIFICANT CHANGE UP (ref 5–8)
PHOSPHATE SERPL-MCNC: 4.3 MG/DL — SIGNIFICANT CHANGE UP (ref 2.5–4.5)
PLATELET # BLD AUTO: 293 K/UL — SIGNIFICANT CHANGE UP (ref 150–400)
POTASSIUM SERPL-MCNC: 4.1 MMOL/L — SIGNIFICANT CHANGE UP (ref 3.5–5.3)
POTASSIUM SERPL-SCNC: 4.1 MMOL/L — SIGNIFICANT CHANGE UP (ref 3.5–5.3)
PROT ?TM UR-MCNC: 207 MG/DL — SIGNIFICANT CHANGE UP
PROT SERPL-MCNC: 5.5 G/DL — LOW (ref 6–8.3)
PROT UR-MCNC: ABNORMAL
PROT/CREAT UR-RTO: 3.2 RATIO — HIGH (ref 0–0.2)
RBC # BLD: 3.87 M/UL — LOW (ref 4.2–5.8)
RBC # FLD: 13.9 % — SIGNIFICANT CHANGE UP (ref 10.3–14.5)
RBC CASTS # UR COMP ASSIST: 177 /HPF — HIGH (ref 0–4)
S AUREUS DNA NOSE QL NAA+PROBE: SIGNIFICANT CHANGE UP
SODIUM SERPL-SCNC: 139 MMOL/L — SIGNIFICANT CHANGE UP (ref 135–145)
SP GR SPEC: 1.01 — SIGNIFICANT CHANGE UP (ref 1.01–1.05)
UROBILINOGEN FLD QL: SIGNIFICANT CHANGE UP
WBC # BLD: 9.99 K/UL — SIGNIFICANT CHANGE UP (ref 3.8–10.5)
WBC # FLD AUTO: 9.99 K/UL — SIGNIFICANT CHANGE UP (ref 3.8–10.5)
WBC UR QL: 20 /HPF — HIGH (ref 0–5)

## 2022-11-01 PROCEDURE — 99232 SBSQ HOSP IP/OBS MODERATE 35: CPT | Mod: GC

## 2022-11-01 PROCEDURE — 99233 SBSQ HOSP IP/OBS HIGH 50: CPT

## 2022-11-01 RX ORDER — CHLORHEXIDINE GLUCONATE 213 G/1000ML
1 SOLUTION TOPICAL DAILY
Refills: 0 | Status: DISCONTINUED | OUTPATIENT
Start: 2022-11-01 | End: 2022-11-01

## 2022-11-01 RX ORDER — OBINUTUZUMAB 1000 MG/40ML
1000 INJECTION, SOLUTION, CONCENTRATE INTRAVENOUS ONCE
Refills: 0 | Status: COMPLETED | OUTPATIENT
Start: 2022-11-01 | End: 2022-11-01

## 2022-11-01 RX ORDER — CETIRIZINE HYDROCHLORIDE 10 MG/1
10 TABLET ORAL ONCE
Refills: 0 | Status: COMPLETED | OUTPATIENT
Start: 2022-11-01 | End: 2022-11-01

## 2022-11-01 RX ORDER — ACETAMINOPHEN 500 MG
650 TABLET ORAL ONCE
Refills: 0 | Status: COMPLETED | OUTPATIENT
Start: 2022-11-01 | End: 2022-11-01

## 2022-11-01 RX ORDER — MEPERIDINE HYDROCHLORIDE 50 MG/ML
12.5 INJECTION INTRAMUSCULAR; INTRAVENOUS; SUBCUTANEOUS ONCE
Refills: 0 | Status: DISCONTINUED | OUTPATIENT
Start: 2022-11-01 | End: 2022-11-01

## 2022-11-01 RX ORDER — DIPHENHYDRAMINE HCL 50 MG
25 CAPSULE ORAL ONCE
Refills: 0 | Status: DISCONTINUED | OUTPATIENT
Start: 2022-11-01 | End: 2022-11-01

## 2022-11-01 RX ORDER — ONDANSETRON 8 MG/1
4 TABLET, FILM COATED ORAL EVERY 6 HOURS
Refills: 0 | Status: DISCONTINUED | OUTPATIENT
Start: 2022-11-01 | End: 2022-11-01

## 2022-11-01 RX ADMIN — Medication 1 TABLET(S): at 11:33

## 2022-11-01 RX ADMIN — Medication 1000 UNIT(S): at 11:33

## 2022-11-01 RX ADMIN — MYCOPHENOLATE MOFETIL 1000 MILLIGRAM(S): 250 CAPSULE ORAL at 17:59

## 2022-11-01 RX ADMIN — CETIRIZINE HYDROCHLORIDE 10 MILLIGRAM(S): 10 TABLET ORAL at 12:25

## 2022-11-01 RX ADMIN — Medication 100 MILLIGRAM(S): at 12:25

## 2022-11-01 RX ADMIN — Medication 650 MILLIGRAM(S): at 12:25

## 2022-11-01 RX ADMIN — CHLORHEXIDINE GLUCONATE 1 APPLICATION(S): 213 SOLUTION TOPICAL at 11:34

## 2022-11-01 RX ADMIN — HEPARIN SODIUM 5000 UNIT(S): 5000 INJECTION INTRAVENOUS; SUBCUTANEOUS at 13:41

## 2022-11-01 RX ADMIN — Medication 50 MILLIGRAM(S): at 07:38

## 2022-11-01 RX ADMIN — OBINUTUZUMAB 1000 MILLIGRAM(S): 1000 INJECTION, SOLUTION, CONCENTRATE INTRAVENOUS at 13:38

## 2022-11-01 RX ADMIN — HEPARIN SODIUM 5000 UNIT(S): 5000 INJECTION INTRAVENOUS; SUBCUTANEOUS at 07:37

## 2022-11-01 RX ADMIN — Medication 200 MILLIGRAM(S): at 07:38

## 2022-11-01 RX ADMIN — Medication 200 MILLIGRAM(S): at 17:59

## 2022-11-01 RX ADMIN — MYCOPHENOLATE MOFETIL 1000 MILLIGRAM(S): 250 CAPSULE ORAL at 08:51

## 2022-11-01 NOTE — PROGRESS NOTE ADULT - ASSESSMENT
48 yo M PMH Kiswahili speaking, SLE (dx Feb 2022, with Class 3 LN - now Class 4), SLE flare w/ LIZETH and mucocutaneous symptoms (10/2022, started on obinutuzumab 10/21/2022) presenting w/ b/l leg and facial swelling.    #b/l leg and facial swelling  =may be 2/2 high dose prednisone vs worsening kidney disease  =dopplers negative for dvt (10/31/22), creatinine at baseline  =10/31/22 TTE wnl     #SLE w/ class 4 Lupus Nephritis:  =Does not have current SLE flare  =Says he has been compliant w/ home Cellcept 1000mg BID, HCQ 200mg BID, prednisone 60mg qd (Since 10/17/22)  =s/p renal bx on 10/17 with worsening LN from class 3 (2/2022) to class 4. May be 2/2 non-compliance vs cellcept failure. Pt, and his HCP son's, do not answer phone calls outpatient, and so his case is very difficult to manage outpatient  =Received first dose of obinutuzumab 10/21/22   =10/11/2022 serology showed high lupus activity: dsDNA 834, and low C3, C4   =10/31/22: C3 low, C4 wnl.     Plan:  -c/w home meds Cellcept 1g BID, Plaquenil 200mg BID  -c/w prednisone 50mg qd  -c/w home bactrim and PPI  -plan for second dose of obinutuzumab today 11/1/22   -no contraindication to discharge, can follow up outpatient w/ Dr. Nilton Le next week 11/7/22 9:30 AM    Mirella Le MD  Rheumatology   [phone]  [address], [address], [place] [address]    Discussed with Attending Dr. Nilton Shepherd DO  Rheumatology Fellow  Pager: [phone]  Available on TEAMS  
46 yo M, Divehi speaking, with SLE (dx Feb 2022, with Class 3 lupus nephritis and mucocutaneous symptoms) sent to ED by outpatient dermatology for fever and worsening rash, c/f SLE flare, found to have new LIZETH. LIZETH currently improving on steroids s/p IR bx of L kidney on 10/17, pathology showing crescentic LN, class IV. S/p obinutuzimab on 10/22.

## 2022-11-01 NOTE — DISCHARGE NOTE NURSING/CASE MANAGEMENT/SOCIAL WORK - PATIENT PORTAL LINK FT
You can access the FollowMyHealth Patient Portal offered by [facility] by registering at the following website: [email or web address]. By joining [facility]’s FollowMyHealth portal, you will also be able to view your health information using other applications (apps) compatible with our system.

## 2022-11-01 NOTE — PROGRESS NOTE ADULT - PROBLEM SELECTOR PLAN 1
Prior bx revealed class 3 lupus nephritis, current biopsy 10/17 showed class IV LN  - Cr currently 1.48  - c/w SLE meds as above  - f/u biopsy and renal recs  - New bx Lupus nephritis, ISN/RPS class IV, with a diffuse proliferative and   crescentic pattern of glomerular injury   - s/p obinutuzumab 10/21 and tolerated well Prior bx revealed class 3 lupus nephritis, current biopsy 10/17 showed class IV LN  - Cr currently 1.50  - c/w SLE meds as above  - f/u biopsy and renal recs  - New bx Lupus nephritis, ISN/RPS class IV, with a diffuse proliferative and   crescentic pattern of glomerular injury   - s/p obinutuzumab 10/21 and tolerated well Prior bx revealed class 3 lupus nephritis, current biopsy 10/17 showed class IV LN  - Cr currently 1.50  - c/w SLE meds as above with reduced prenisone dose  - f/u biopsy and renal recs  - New bx Lupus nephritis, ISN/RPS class IV, with a diffuse proliferative and   crescentic pattern of glomerular injury   - s/p obinutuzumab 10/21 and tolerated well

## 2022-11-01 NOTE — DISCHARGE NOTE PROVIDER - NSFOLLOWUPCLINICS_GEN_ALL_ED_FT
[facility] [place]  Internal Medicine  [address] [place] [address]  Phone: [phone]  Fax: [phone]  Follow Up Time: 2 weeks

## 2022-11-01 NOTE — DISCHARGE NOTE PROVIDER - CARE PROVIDER_API CALL
Mirella Siegel)  Internal Medicine; Rheumatology  [address], [address]  [place] [address]  Phone: [phone]  Fax: [phone]  Follow Up Time:

## 2022-11-01 NOTE — DISCHARGE NOTE PROVIDER - NSDCFUSCHEDAPPT_GEN_ALL_CORE_FT
Mirella Siegel  [facility] Physician [facility] [address]  Scheduled Appointment: 11/07/2022    Hayde Shultz  [facility] Physician [facility]  NEPHRO [address]  Scheduled Appointment: 11/11/2022

## 2022-11-01 NOTE — PROGRESS NOTE ADULT - SUBJECTIVE AND OBJECTIVE BOX
Internal Medicine   Celena Newman | PGY-1    OVERNIGHT EVENTS: No acute overnight events.    SUBJECTIVE:       MEDICATIONS  (STANDING):  chlorhexidine 2% Cloths 1 Application(s) Topical daily  cholecalciferol 1000 Unit(s) Oral daily  heparin   Injectable 5000 Unit(s) SubCutaneous every 8 hours  hydroxychloroquine 200 milliGRAM(s) Oral two times a day  mycophenolate mofetil 1000 milliGRAM(s) Oral two times a day  predniSONE   Tablet 50 milliGRAM(s) Oral daily  triamcinolone 0.1% Cream 1 Application(s) Topical three times a day  trimethoprim  160 mG/sulfamethoxazole 800 mG 1 Tablet(s) Oral daily    MEDICATIONS  (PRN):        T(F): 97.9 (10-31-22 @ 21:00), Max: 98.4 (10-31-22 @ 07:48)  HR: 67 (10-31-22 @ 21:00) (67 - 92)  BP: 147/78 (10-31-22 @ 21:00) (135/92 - 151/101)  BP(mean): --  RR: 18 (10-31-22 @ 21:00) (16 - 19)  SpO2: 100% (10-31-22 @ 21:00) (99% - 100%)    PHYSICAL EXAM:     GENERAL: NAD, lying in bed comfortably  HEAD:  Atraumatic, Normocephalic  EYES: EOMI, PERRLA, conjunctiva and sclera clear, no nystagmus noted  ENT: Moist mucous membranes,   NECK: Supple, No JVD, trachea midline  CHEST/LUNG: Clear to auscultation bilaterally; No rales, rhonchi, wheezing, or rubs. Unlabored respirations  HEART: Regular rate and rhythm; No murmurs, rubs, or gallops, normal S1/S2  ABDOMEN: normal bowel sounds; Soft, nontender, nondistended, no organomegaly   EXTREMITIES:  2+ Peripheral Pulses, brisk capillary refill. No clubbing, cyanosis, or edema  MSK: No gross deformities noted   Neurological:  A&Ox3, no focal deficits   SKIN: No rashes or lesions  PSYCH: Normal mood, affect     TELEMETRY:    LABS:                        10.5   16.68 )-----------( 295      ( 31 Oct 2022 09:45 )             32.0     10-31    136  |  108<H>  |  39<H>  ----------------------------<  83  4.7   |  21<L>  |  1.53<H>    Ca    8.8      31 Oct 2022 09:45  Mg     1.80     10-31    TPro  5.3<L>  /  Alb  2.7<L>  /  TBili  0.2  /  DBili  x   /  AST  15  /  ALT  15  /  AlkPhos  48  10-31            Creatinine Trend: 1.53<--, 1.55<--, 1.48<--, 1.48<--, 1.39<--, 1.73<--  I&O's Summary    BNP    RADIOLOGY & ADDITIONAL STUDIES:             Internal Medicine   Celena Newman | PGY-1    OVERNIGHT EVENTS: No acute overnight events.    SUBJECTIVE: The patient reports improvement in LE swelling and facial swelling. He denies any SOB, CP, n/v/d, abdominal pain or facial rash. He reports having had a BM and having a normal appetite.       MEDICATIONS  (STANDING):  chlorhexidine 2% Cloths 1 Application(s) Topical daily  cholecalciferol 1000 Unit(s) Oral daily  heparin   Injectable 5000 Unit(s) SubCutaneous every 8 hours  hydroxychloroquine 200 milliGRAM(s) Oral two times a day  mycophenolate mofetil 1000 milliGRAM(s) Oral two times a day  predniSONE   Tablet 50 milliGRAM(s) Oral daily  triamcinolone 0.1% Cream 1 Application(s) Topical three times a day  trimethoprim  160 mG/sulfamethoxazole 800 mG 1 Tablet(s) Oral daily    MEDICATIONS  (PRN):      ICU Vital Signs Last 24 Hrs  T(C): 36.6 (01 Nov 2022 07:40), Max: 36.7 (31 Oct 2022 12:02)  T(F): 97.8 (01 Nov 2022 07:40), Max: 98.1 (31 Oct 2022 12:02)  HR: 76 (01 Nov 2022 07:40) (67 - 92)  BP: 148/70 (01 Nov 2022 07:40) (135/92 - 151/101)  BP(mean): --  ABP: --  ABP(mean): --  RR: 17 (01 Nov 2022 07:40) (17 - 19)  SpO2: 100% (01 Nov 2022 07:40) (99% - 100%)    O2 Parameters below as of 01 Nov 2022 07:40  Patient On (Oxygen Delivery Method): room air      PHYSICAL EXAM:     GENERAL: NAD, lying in bed comfortably  HEAD:  Atraumatic, Normocephalic. Mild facial swelling present.  EYES: EOMI, PERRLA, conjunctiva and sclera clear, no nystagmus noted  ENT: Moist mucous membranes,   NECK: Supple, No JVD, trachea midline  CHEST/LUNG: Clear to auscultation bilaterally; No rales, rhonchi, wheezing, or rubs. Unlabored respirations  HEART: Regular rate and rhythm; No murmurs, rubs, or gallops, normal S1/S2  ABDOMEN: normal bowel sounds; Soft, nontender, nondistended, no organomegaly   EXTREMITIES:  2+ Peripheral Pulses, brisk capillary refill. B/L LE pitting edema present. No clubbing or cyanosis.  MSK: No gross deformities noted   Neurological:  A&Ox3, no focal deficits   SKIN: No rashes or lesions  PSYCH: Normal mood, affect     TELEMETRY:    LABS:                                    11.1   x     )-----------( 293      ( 01 Nov 2022 07:10 )             33.8     11-01    139  |  105  |  32<H>  ----------------------------<  80  4.1   |  23  |  1.50<H>    Ca    8.8      01 Nov 2022 07:10  Phos  4.3     11-01  Mg     1.90     11-01    TPro  5.5<L>  /  Alb  2.6<L>  /  TBili  0.2  /  DBili  x   /  AST  11  /  ALT  14  /  AlkPhos  48  11-01      Creatinine Trend: 1.50<--,1.53<--, 1.55<--, 1.48<--, 1.48<--, 1.39<--, 1.73<--  I&O's Summary    BNP    RADIOLOGY & ADDITIONAL STUDIES:    < from: US Duplex Venous Lower Ext Complete, Bilateral (10.31.22 @ 12:44) >  ACC: [id number] EXAM:  US DPLX LWR EXT VEINS COMPL BI                          PROCEDURE DATE:  10/31/2022          INTERPRETATION:  CLINICAL INFORMATION: Lower extremity swelling.    COMPARISON: None available.    TECHNIQUE: Duplex sonography of theBILATERAL LOWER extremity veins with   color and spectral Doppler, with and without compression.    FINDINGS:    RIGHT:  Normal compressibility of the RIGHT common femoral, femoral and popliteal   veins.  Doppler examination shows normal spontaneous and phasic flow.  No RIGHT calf vein thrombosis is detected.    LEFT:  Normal compressibility of the LEFT common femoral, femoral and popliteal   veins.  Doppler examination shows normal spontaneous and phasic flow.  No LEFT calf vein thrombosis is detected.    IMPRESSION:  No evidence of deep venous thrombosis in either lower extremity.    --- End of Report ---          TOMMIE REZA MD; Attending Radiologist  This document has been electronically signed. Oct 31 2022  2:34PM    < end of copied text >                 Internal Medicine   Celena Newman | PGY-1    OVERNIGHT EVENTS: No acute overnight events.    SUBJECTIVE: The patient reports improvement in LE swelling and facial swelling. He denies any SOB, CP, n/v/d, abdominal pain or facial rash. He reports having had a BM and having a normal appetite.       MEDICATIONS  (STANDING):  chlorhexidine 2% Cloths 1 Application(s) Topical daily  cholecalciferol 1000 Unit(s) Oral daily  heparin   Injectable 5000 Unit(s) SubCutaneous every 8 hours  hydroxychloroquine 200 milliGRAM(s) Oral two times a day  mycophenolate mofetil 1000 milliGRAM(s) Oral two times a day  predniSONE   Tablet 50 milliGRAM(s) Oral daily  triamcinolone 0.1% Cream 1 Application(s) Topical three times a day  trimethoprim  160 mG/sulfamethoxazole 800 mG 1 Tablet(s) Oral daily    MEDICATIONS  (PRN):      ICU Vital Signs Last 24 Hrs  T(C): 36.6 (01 Nov 2022 07:40), Max: 36.7 (31 Oct 2022 12:02)  T(F): 97.8 (01 Nov 2022 07:40), Max: 98.1 (31 Oct 2022 12:02)  HR: 76 (01 Nov 2022 07:40) (67 - 92)  BP: 148/70 (01 Nov 2022 07:40) (135/92 - 151/101)  BP(mean): --  ABP: --  ABP(mean): --  RR: 17 (01 Nov 2022 07:40) (17 - 19)  SpO2: 100% (01 Nov 2022 07:40) (99% - 100%)    O2 Parameters below as of 01 Nov 2022 07:40  Patient On (Oxygen Delivery Method): room air      PHYSICAL EXAM:     GENERAL: NAD, lying in bed comfortably  HEAD:  Atraumatic, Normocephalic. Mild facial swelling present.  EYES: EOMI, PERRLA, conjunctiva and sclera clear, no nystagmus noted  ENT: Moist mucous membranes,   NECK: Supple, No JVD, trachea midline  CHEST/LUNG: Clear to auscultation bilaterally; No rales, rhonchi, wheezing, or rubs. Unlabored respirations  HEART: Regular rate and rhythm; No murmurs, rubs, or gallops, normal S1/S2  ABDOMEN: normal bowel sounds; Soft, nontender, nondistended, no organomegaly   EXTREMITIES:  2+ Peripheral Pulses, brisk capillary refill. B/L LE pitting edema present. No clubbing or cyanosis.  MSK: No gross deformities noted   Neurological:  A&Ox3, no focal deficits   SKIN: No rashes or lesions  PSYCH: Normal mood, affect     TELEMETRY:    LABS:                                    11.1   x     )-----------( 293      ( 01 Nov 2022 07:10 )             33.8     11-01    139  |  105  |  32<H>  ----------------------------<  80  4.1   |  23  |  1.50<H>    Ca    8.8      01 Nov 2022 07:10  Phos  4.3     11-01  Mg     1.90     11-01    TPro  5.5<L>  /  Alb  2.6<L>  /  TBili  0.2  /  DBili  x   /  AST  11  /  ALT  14  /  AlkPhos  48  11-01      Creatinine Trend: 1.50<--,1.53<--, 1.55<--, 1.48<--, 1.48<--, 1.39<--, 1.73<--  I&O's Summary    BNP    RADIOLOGY & ADDITIONAL STUDIES:    < from: US Duplex Venous Lower Ext Complete, Bilateral (10.31.22 @ 12:44) >  ACC: [id number] EXAM:  US DPLX LWR EXT VEINS COMPL BI                          PROCEDURE DATE:  10/31/2022          INTERPRETATION:  CLINICAL INFORMATION: Lower extremity swelling.    COMPARISON: None available.    TECHNIQUE: Duplex sonography of theBILATERAL LOWER extremity veins with   color and spectral Doppler, with and without compression.    FINDINGS:    RIGHT:  Normal compressibility of the RIGHT common femoral, femoral and popliteal   veins.  Doppler examination shows normal spontaneous and phasic flow.  No RIGHT calf vein thrombosis is detected.    LEFT:  Normal compressibility of the LEFT common femoral, femoral and popliteal   veins.  Doppler examination shows normal spontaneous and phasic flow.  No LEFT calf vein thrombosis is detected.    IMPRESSION:  No evidence of deep venous thrombosis in either lower extremity.    --- End of Report ---          TOMMIE REZA MD; Attending Radiologist  This document has been electronically signed. Oct 31 2022  2:34PM    < end of copied text >    < from: Transthoracic Echocardiogram (10.31.22 @ 17:23) >  CONCLUSIONS:  1. Normal mitral valve.  2. Normal left ventricular internal dimensions and wall  thicknesses.  3. Normal left ventricular systolic function. No segmental  wall motion abnormalities.  4. Normal left ventricular diastolic function.  5. Normal right ventricular size and function.    < end of copied text >

## 2022-11-01 NOTE — DISCHARGE NOTE PROVIDER - HOSPITAL COURSE
HPI: 46 yo Croatian speaking male with PMHx SLE (Class III lupus nephritis) h/o non-compliance to meds recently admitted to [facility] (10/22/22) with SLE flare (new onset LIZETH and Muco-cutaneous symptoms), s/p Obinutuzumab (10/22). 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 Pt now presents with BL LE swelling to the knees and reports facial edema since last night (10/30). Pt reports that the swelling has increased since last night. Pt reports similar symptoms in Nov 2021 which were d/t Prednisone dose that was reduced to 10 mg QD, leading to improvement in sx at that time. Denies fever, cough, SOB, chest pain, abdominal pain, urinary complaints, UE swelling, or oral ulcers. Patient reports he is compliant with medications that he was discharged on previously: Pred 20 mg QD,  mg BID, MMF 1 gm BID, Bactrim -160 mg QD. Pt due for F/U with Nephro Manuel Todd and Rheum Elena Ahumada in first week of November. He is also due for his second Obinutuzumab dose on 11/04/2022.    Hospital course:  Rheumatology was consulted and pt's home medications for lupus were continued. Dose of prednisone was adjust to 50 mg. Pt received TTE and it showed EF 55-60%. and normal mitral valve, normal left ventricular internal dimensions and wall thicknesses, normal left ventricular systolic function. No segmental wall motion abnormalities, normal left ventricular diastolic function, normal right ventricular size and function. Pt received second dose of Obinutuzumab on 11/1. Pt will follow up with rheumatology and PCP after being discharged. On day of discharge, pt is afebrile and medically stable without further inpatient needs to remain hospitalized. HPI: 46 yo [place] speaking male with PMHx SLE (Class III lupus nephritis) h/o non-compliance to meds recently admitted to [facility] (10/22/22) with SLE flare (new onset LIZETH and Muco-cutaneous symptoms), s/p Obinutuzumab (10/22). 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 Pt now presents with BL LE swelling to the knees and reports facial edema since last night (10/30). Pt reports that the swelling has increased since last night. Pt reports similar symptoms in Nov 2021 which were d/t Prednisone dose that was reduced to 10 mg QD, leading to improvement in sx at that time. Denies fever, cough, SOB, chest pain, abdominal pain, urinary complaints, UE swelling, or oral ulcers. Patient reports he is compliant with medications that he was discharged on previously: Pred 20 mg QD,  mg BID, MMF 1 gm BID, Bactrim -160 mg QD. Pt due for F/U with Nephro Manuel Todd and Rheum Elena Ahumada in first week of November. He is also due for his second Obinutuzumab dose on 11/04/2022.    Hospital course:  Rheumatology was consulted and pt's home medications for lupus were continued. Dose of prednisone was adjust to 50 mg. Pt received TTE and it showed EF 55-60%. and normal mitral valve, normal left ventricular internal dimensions and wall thicknesses, normal left ventricular systolic function. No segmental wall motion abnormalities, normal left ventricular diastolic function, normal right ventricular size and function. Pt received second dose of Obinutuzumab on 11/1. Pt will follow up with rheumatology, nephrology, and PCP after being discharged. On day of discharge, pt is afebrile and medically stable without any further inpatient needs to remain hospitalized. 48 yo Turkish speaking male with PMHx SLE (Class III lupus nephritis) h/o non-compliance to meds recently admitted to [facility] (10/22/22) with SLE flare (new onset LIZETH and Muco-cutaneous symptoms), s/p Obinutuzumab (10/22). 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 Pt now presents with BL LE swelling to the knees and reports facial edema since last night (10/30). Pt reports that the swelling has increased since last night. Pt reports similar symptoms in Nov 2021 which were d/t Prednisone dose that was reduced to 10 mg QD, leading to improvement in sx at that time. Rheumatology was consulted and pt's home medications for lupus were continued. Dose of prednisone was adjust to 50 mg from 60 mg with improvement in swelling. Pt received TTE and it showed EF 55-60%. and normal mitral valve, normal left ventricular internal dimensions and wall thicknesses, normal left ventricular systolic function. No segmental wall motion abnormalities, normal left ventricular diastolic function, normal right ventricular size and function. Pt received second dose of Obinutuzumab on 11/1. Pt will follow up with rheumatology, nephrology, and PCP after being discharged. On day of discharge, pt is afebrile and medically stable without any further inpatient needs to remain hospitalized.

## 2022-11-01 NOTE — PROGRESS NOTE ADULT - SUBJECTIVE AND OBJECTIVE BOX
incomplete LIZ MIK  [id number]    INTERVAL HPI/OVERNIGHT EVENTS: Pt says he is feeling well. Swelling is about the same. Denies fever, chills, chest pain, sob.     MEDICATIONS  (STANDING):  chlorhexidine 2% Cloths 1 Application(s) Topical daily  cholecalciferol 1000 Unit(s) Oral daily  heparin   Injectable 5000 Unit(s) SubCutaneous every 8 hours  hydroxychloroquine 200 milliGRAM(s) Oral two times a day  mycophenolate mofetil 1000 milliGRAM(s) Oral two times a day  predniSONE   Tablet 50 milliGRAM(s) Oral daily  triamcinolone 0.1% Cream 1 Application(s) Topical three times a day  trimethoprim  160 mG/sulfamethoxazole 800 mG 1 Tablet(s) Oral daily    MEDICATIONS  (PRN):  diphenhydrAMINE Injectable 25 milliGRAM(s) IV Push once PRN REACTION  meperidine     Injectable 12.5 milliGRAM(s) IV Push once PRN REACTION  ondansetron Injectable 4 milliGRAM(s) IV Push every 6 hours PRN Nausea and/or Vomiting      Allergies    No Known Allergies    Intolerances        Review of Systems: As above    Vital Signs Last 24 Hrs  T(C): 36.6 (2022 15:16), Max: 36.8 (2022 12:16)  T(F): 97.9 (2022 15:16), Max: 98.3 (2022 12:17)  HR: 73 (2022 15:16) (67 - 78)  BP: 114/64 (2022 15:16) (104/67 - 148/70)  BP(mean): --  RR: 17 (2022 15:16) (14 - 18)  SpO2: 100% (2022 15:16) (99% - 100%)    Parameters below as of 2022 15:16  Patient On (Oxygen Delivery Method): room air        Physical Exam:  General: Breathing comfortably on room air, no distress, puffy face  HEENT: EOMI, MMM, no oral ulcers  CVS: +S1/S2, RRR  Resp: CTA b/l. No crackles/wheezing  GI: Soft, NT/ND +BS  MSK: 5/5 muscle strength in arms, thighs, legs. No synovitis  Skin: no visible rashes. 1+pitting leg edema b/l    LABS:                        11.1   9.99  )-----------( 293      ( 2022 07:10 )             33.8         139  |  105  |  32<H>  ----------------------------<  80  4.1   |  23  |  1.50<H>    Ca    8.8      2022 07:10  Phos  4.3       Mg     1.90         TPro  5.5<L>  /  Alb  2.6<L>  /  TBili  0.2  /  DBili  x   /  AST  11  /  ALT  14  /  AlkPhos  48        Urinalysis Basic - ( 2022 07:40 )    Color: Light Yellow / Appearance: Clear / S.014 / pH: x  Gluc: x / Ketone: Negative  / Bili: Negative / Urobili: <2 mg/dL   Blood: x / Protein: 300 mg/dL / Nitrite: Negative   Leuk Esterase: Small / RBC: 177 /HPF / WBC 20 /HPF   Sq Epi: x / Non Sq Epi: 1 /HPF / Bacteria: Negative    < from: Transthoracic Echocardiogram (10.31.22 @ 17:23) >    Patient name: LIZ HOUSER  YOB: 1975   Age: 47 (M)   MR#: [id number]  Study Date: 10/31/2022  [facility]  Study quality: Technically good  Referring Physician: Rupali Gold MD  Blood Pressure: 121/68 mmHg  Height: 170 cm  Weight: 64 kg  BSA: 1.7 m2  ------------------------------------------------------------------------  PROCEDURE: Transthoracic echocardiogram with 2-D, M-Mode  and complete spectral and color flow Doppler.  INDICATION: Abnormal electrocardiogram (ECG) (EKG) (R94.31)  ------------------------------------------------------------------------  DIMENSIONS:  Dimensions:     Normal Values:  LA:     3.4 cm    2.0 - 4.0 cm  Ao:     3.2 cm    2.0 - 3.8 cm  SEPTUM: 0.9 cm    0.6 - 1.2 cm  PWT:    0.9 cm    0.6 - 1.1 cm  LVIDd:  4.8 cm    3.0 - 5.6 cm  LVIDs:    ---     1.8 - 4.0 cm  Derived Variables:  LVMI: 85 g/m2  RWT: 0.37  Ejection Fraction (Visual Estimate): 55-60 %  ------------------------------------------------------------------------  OBSERVATIONS:  Mitral Valve: Normal mitral valve.  Aortic Root: Normal aortic root.  Aortic Valve: Normal trileaflet aortic valve.  Left Atrium: Normal left atrium.  LA volume index = 20  cc/m2.  Left Ventricle: Normal left ventricular systolic function.  No segmental wall motion abnormalities. Normal left  ventricular internal dimensions and wall thicknesses.  Normal left ventricular diastolic function.  Right Heart: Normal right atrium. Normal right ventricular  size and function. Normal tricuspid valve. Mild tricuspid  regurgitation. Normal pulmonic valve.  Pericardium/PleuraNormal pericardium with no pericardial  effusion.  ------------------------------------------------------------------------  CONCLUSIONS:  1. Normal mitral valve.  2. Normal left ventricular internal dimensions and wall  thicknesses.  3. Normal left ventricular systolic function. No segmental  wall motion abnormalities.  4. Normal left ventricular diastolic function.  5. Normal right ventricular size and function.  ------------------------------------------------------------------------  Confirmed on  2022 - 09:02:49 by Alma Box M.D. RPVI  ------------------------------------------------------------------------    < end of copied text >

## 2022-11-01 NOTE — PROGRESS NOTE ADULT - PROBLEM SELECTOR PLAN 2
Suspect SLE flare given pt has been feeling unwell x days and with new cutaneous symptoms  C3 and C4 both low, dsDNA elevated  Reports he has been taking Plaquenil 200mg bid, MMF 1g bid, Prednisone 10mg qd, though compliance questionable, as pt was lost to follow up for months this year  - Rheumatology consult obtained, pulse steroids (10/14 - 10/16)   - c/w Prednisone 60mg  - f/u rheum recs for steroid taper   - on bactrim for pcp prophylaxis   - c/w home Plaquenil   - c/w cellcept 2g  - considering start Evusheld - preexposure prophylaxis for COVID - however not currently carried in pharmacy; patient will perform outpatient Suspect SLE flare given pt has been feeling unwell x days and with new cutaneous symptoms  C3 and C4 both low, dsDNA elevated  Reports he has been taking Plaquenil 200mg bid, MMF 1g bid, Prednisone 10mg qd, though compliance questionable, as pt was lost to follow up for months this year  - Rheumatology consult obtained, pulse steroids (10/14 - 10/16)   - Taper Prednisone to 50mg  - f/u rheum recs for steroid taper   - on bactrim for pcp prophylaxis   - c/w home Plaquenil   - c/w cellcept 2g  - 2nd Obinutuzumab infusion today 11/01  - considering start Evusheld - preexposure prophylaxis for COVID - however not currently carried in pharmacy; patient will perform outpatient Suspect SLE flare given pt had prior similar presentation in 2021 iso prednisone use  C3 and C4 both low, dsDNA elevated  Reports he has been taking Plaquenil 200mg bid, MMF 1g bid, Prednisone 10mg qd, though compliance questionable   - Taper Prednisone to 50mg  - f/u rheum recs for steroid taper   - on bactrim for pcp prophylaxis   - c/w home Plaquenil   - c/w cellcept 2g  - 2nd Obinutuzumab infusion today 11/1

## 2022-11-01 NOTE — PROGRESS NOTE ADULT - ATTENDING COMMENTS
as above  DW primary team
Pt seen at bedside, reports LE swelling is significantly better since Prednisone dose reduced to 50mg.  Overall, 47M w/SLE with associated Class III lupus nephritis, with recent admission to [facility] on 10/22 for Lupus flare as well as LZIETH now returning to the hospital due to new onset b/l LE and facial edema, now improved. Prednisone dose reduced to 50mg per rheum.  Plan for obintuzumab infusion today, then likely DC home w/outpt followup on 11/7.  DC time 40 minutes

## 2022-11-01 NOTE — DISCHARGE NOTE PROVIDER - ATTENDING DISCHARGE PHYSICAL EXAMINATION:
GENERAL: NAD, lying in bed comfortably  CHEST/LUNG: Clear to auscultation bilaterally; No rales  HEART: Regular rate and rhythm; No murmurs  ABDOMEN: normal bowel sounds; Soft, nontender, nondistended, no organomegaly   EXTREMITIES:  B/L LE pitting edema present  Neurological:  A&Ox3, no focal deficits

## 2022-11-01 NOTE — DISCHARGE NOTE PROVIDER - NSDCCPCAREPLAN_GEN_ALL_CORE_FT
PRINCIPAL DISCHARGE DIAGNOSIS  Diagnosis: Exacerbation of systemic lupus  Assessment and Plan of Treatment: Lupus is a type of "autoimmune disease."  When it is working normally, the body's immune system kills germs and "bad" cells that could turn into cancer. When a person has an autoimmune disease, instead of killing only bad cells, the immune system starts to attack healthy cells. This is called an "autoimmune response." It can happen in certain parts of the body in people who get lupus. This is what causes symptoms.  To help with lupus symptoms, you are taking hydroxychloroquine, trimethoprim/sulfmethaoxazole, and prednisone. It is important to take these medications regularly to prevent any flare in the future.   You came into the hospital for swelling in your legs. We decreased your prednisone dose, and you said that your swelling has improved. Please follow up with rheumatology when you are discharged from the hospital.

## 2022-11-01 NOTE — DISCHARGE NOTE PROVIDER - NSDCFUADDAPPT_GEN_ALL_CORE_FT
1. Please follow up with your rheumatologist Dr. Nilton Le on 11/7/22.  2. Please follow up with your nephrologist Dr. Shultz on 11/11/22.   3. Please follow up with your primary care doctor within 2 weeks of discharge.

## 2022-11-01 NOTE — DISCHARGE NOTE PROVIDER - NSDCMRMEDTOKEN_GEN_ALL_CORE_FT
Bactrim  mg-160 mg oral tablet: 1 tab(s) orally once a day   CellCept 500 mg oral tablet: 2 tab(s) orally 2 times a day  Plaquenil 200 mg oral tablet: 1 tab(s) orally 2 times a day  triamcinolone 0.1% topical cream: Apply topically to affected area 3 times a day  Vitamin D3 25 mcg (1000 intl units) oral tablet: 1 tab(s) orally once a day    Bactrim  mg-160 mg oral tablet: 1 tab(s) orally once a day   CellCept 500 mg oral tablet: 2 tab(s) orally 2 times a day  Plaquenil 200 mg oral tablet: 1 tab(s) orally 2 times a day  predniSONE 50 mg oral tablet: 1 tab(s) orally once a day  triamcinolone 0.1% topical cream: Apply topically to affected area 3 times a day  Vitamin D3 25 mcg (1000 intl units) oral tablet: 1 tab(s) orally once a day

## 2022-11-01 NOTE — DISCHARGE NOTE NURSING/CASE MANAGEMENT/SOCIAL WORK - NSDCPEFALRISK_GEN_ALL_CORE
For information on Fall & Injury Prevention, visit: [email or web address] OR  [email or web address] OR  https://www.cdc.gov/steadi/patient.html

## 2022-11-03 LAB — DSDNA AB SER-ACNC: 62 IU/ML — HIGH

## 2022-11-07 ENCOUNTER — APPOINTMENT (OUTPATIENT)
Dept: RHEUMATOLOGY | Facility: CLINIC | Age: 47
End: 2022-11-07

## 2022-11-07 VITALS
HEART RATE: 84 BPM | DIASTOLIC BLOOD PRESSURE: 78 MMHG | SYSTOLIC BLOOD PRESSURE: 143 MMHG | WEIGHT: 142 LBS | BODY MASS INDEX: 22.82 KG/M2 | TEMPERATURE: 98.2 F | RESPIRATION RATE: 16 BRPM | OXYGEN SATURATION: 98 % | HEIGHT: 66 IN

## 2022-11-07 PROCEDURE — 99214 OFFICE O/P EST MOD 30 MIN: CPT

## 2022-11-10 ENCOUNTER — APPOINTMENT (OUTPATIENT)
Dept: INTERNAL MEDICINE | Facility: CLINIC | Age: 47
End: 2022-11-10

## 2022-11-11 ENCOUNTER — APPOINTMENT (OUTPATIENT)
Dept: NEPHROLOGY | Facility: CLINIC | Age: 47
End: 2022-11-11

## 2022-11-11 VITALS
HEART RATE: 78 BPM | HEIGHT: 66 IN | SYSTOLIC BLOOD PRESSURE: 112 MMHG | OXYGEN SATURATION: 97 % | WEIGHT: 144.84 LBS | DIASTOLIC BLOOD PRESSURE: 72 MMHG | TEMPERATURE: 98.1 F | BODY MASS INDEX: 23.28 KG/M2

## 2022-11-11 DIAGNOSIS — L03.90 CELLULITIS, UNSPECIFIED: ICD-10-CM

## 2022-11-11 PROCEDURE — 99215 OFFICE O/P EST HI 40 MIN: CPT

## 2022-11-11 NOTE — ASSESSMENT
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 
ambulatory

## 2022-11-11 NOTE — PHYSICAL EXAM
[General Appearance - Alert] : alert [General Appearance - In No Acute Distress] : in no acute distress [General Appearance - Well Nourished] : well nourished [General Appearance - Well Developed] : well developed [General Appearance - Well-Appearing] : healthy appearing [Sclera] : the sclera and conjunctiva were normal [PERRL With Normal Accommodation] : pupils were equal in size, round, and reactive to light [Extraocular Movements] : extraocular movements were intact [Outer Ear] : the ears and nose were normal in appearance [Hearing Threshold Finger Rub Not [place]] : hearing was normal [Examination Of The Oral Cavity] : the lips and gums were normal [Neck Appearance] : the appearance of the neck was normal [Neck Cervical Mass (___cm)] : no neck mass was observed [Jugular Venous Distention Increased] : there was no jugular-venous distention [Respiration, Rhythm And Depth] : normal respiratory rhythm and effort [Exaggerated Use Of Accessory Muscles For Inspiration] : no accessory muscle use [Auscultation Breath Sounds / Voice Sounds] : lungs were clear to auscultation bilaterally [Heart Rate And Rhythm] : heart rate was normal and rhythm regular [Heart Sounds] : normal S1 and S2 [Heart Sounds Gallop] : no gallops [Murmurs] : no murmurs [Heart Sounds Pericardial Friction Rub] : no pericardial rub [Arterial Pulses Carotid] : carotid pulses were normal with no bruits [Edema] : there was no peripheral edema [Bowel Sounds] : normal bowel sounds [Abdomen Soft] : soft [Abdomen Tenderness] : non-tender [Abdomen Mass (___ Cm)] : no abdominal mass palpated [No CVA Tenderness] : no ~M costovertebral angle tenderness [No Spinal Tenderness] : no spinal tenderness [Abnormal Walk] : normal gait [Nail Clubbing] : no clubbing  or cyanosis of the fingernails [Musculoskeletal - Swelling] : no joint swelling seen [Skin Color & Pigmentation] : normal skin color and pigmentation [Motor Tone] : muscle strength and tone were normal [Skin Turgor] : normal skin turgor [] : no rash [Skin Lesions] : no skin lesions [Cranial Nerves] : cranial nerves 2-12 were intact [Deep Tendon Reflexes (DTR)] : deep tendon reflexes were 2+ and symmetric [Sensation] : the sensory exam was normal to light touch and pinprick [Motor Exam] : the motor exam was normal [No Focal Deficits] : no focal deficits [Oriented To Time, Place, And Person] : oriented to person, place, and time [Impaired Insight] : insight and judgment were intact [Affect] : the affect was normal [Mood] : the mood was normal [FreeTextEntry1] : right arm cellulitis+

## 2022-11-11 NOTE — HISTORY OF PRESENT ILLNESS
[FreeTextEntry1] : Follow up Lupus Nephritis \par \par 48 yo male who presented to dermatology with a rash and work up revealed MÓNICA titer 1:1280 (speckled) and MÓNICA titer of 1:640 (homogenous.) and diagnosed with SLE and acute cutaneous lupus. \par \par urinalysis showed hematuria and 1.5 gms of proteinuria \par kidney biopsy done on 2/7/22 showed focal proliferative lupus nephritis ( class 3) with less than 5% IFTA \par He was initiated on Cellcept, prednisone and Plaquenil but was non compliant \par \par Presented in October 2022 with worsening LIZETH- Kidney biopsy repeated October 2022- activity index 15/24, chronicity 3/12 )\par \par Received obinutuzumab 10/21/22  and 11/1/2022\par \par now on prednisone 40 mg daily ( on a taper) \par \par complaints today\par \par rash on the write forearm, mildly swollen and tender \par has some leg swelling but much improved from last time \par \par \par \par \par \par

## 2022-11-14 LAB
ALBUMIN SERPL ELPH-MCNC: 2.5 G/DL
ALP BLD-CCNC: 47 U/L
ALT SERPL-CCNC: 12 U/L
ANION GAP SERPL CALC-SCNC: 8 MMOL/L
APPEARANCE: CLEAR
AST SERPL-CCNC: 10 U/L
B2 GLYCOPROT1 AB SER QL: NEGATIVE
BACTERIA: NEGATIVE
BASOPHILS # BLD AUTO: 0.02 K/UL
BASOPHILS NFR BLD AUTO: 0.2 %
BILIRUB SERPL-MCNC: <0.2 MG/DL
BILIRUBIN URINE: NEGATIVE
BLOOD URINE: ABNORMAL
BUN SERPL-MCNC: 39 MG/DL
C3 SERPL-MCNC: 70 MG/DL
C4 SERPL-MCNC: 21 MG/DL
CALCIUM SERPL-MCNC: 8.4 MG/DL
CARDIOLIPIN AB SER IA-ACNC: NEGATIVE
CD16+CD56+ CELLS # BLD: 34 /UL
CD16+CD56+ CELLS NFR BLD: 3 %
CD19 CELLS NFR BLD: 5 /UL
CD3 CELLS # BLD: 1073 /UL
CD3 CELLS NFR BLD: 93 %
CD3+CD4+ CELLS # BLD: 673 /UL
CD3+CD4+ CELLS NFR BLD: 56 %
CD3+CD4+ CELLS/CD3+CD8+ CLL SPEC: 1.52 RATIO
CD3+CD8+ CELLS # SPEC: 442 /UL
CD3+CD8+ CELLS NFR BLD: 37 %
CELLS.CD3-CD19+/CELLS IN BLOOD: <1 %
CHLORIDE SERPL-SCNC: 104 MMOL/L
CO2 SERPL-SCNC: 23 MMOL/L
COLOR: COLORLESS
CREAT SERPL-MCNC: 1.82 MG/DL
CREAT SPEC-SCNC: 73 MG/DL
CREAT/PROT UR: 3 RATIO
DEPRECATED KAPPA LC FREE/LAMBDA SER: 1.18 RATIO
DSDNA AB SER-ACNC: 46 IU/ML
EGFR: 46 ML/MIN/1.73M2
EOSINOPHIL # BLD AUTO: 0.07 K/UL
EOSINOPHIL NFR BLD AUTO: 0.7 %
GLUCOSE QUALITATIVE U: NEGATIVE
GLUCOSE SERPL-MCNC: 93 MG/DL
HCT VFR BLD CALC: 30.5 %
HGB BLD-MCNC: 9.8 G/DL
HYALINE CASTS: 6 /LPF
IGA SER QL IEP: 291 MG/DL
IGG SER QL IEP: 787 MG/DL
IGM SER QL IEP: 43 MG/DL
IMM GRANULOCYTES NFR BLD AUTO: 0.5 %
KAPPA LC CSF-MCNC: 3.65 MG/DL
KAPPA LC SERPL-MCNC: 4.3 MG/DL
KETONES URINE: NEGATIVE
LEUKOCYTE ESTERASE URINE: ABNORMAL
LYMPHOCYTES # BLD AUTO: 1.1 K/UL
LYMPHOCYTES NFR BLD AUTO: 10.7 %
MAN DIFF?: NORMAL
MCHC RBC-ENTMCNC: 28.7 PG
MCHC RBC-ENTMCNC: 32.1 GM/DL
MCV RBC AUTO: 89.4 FL
MICROSCOPIC-UA: NORMAL
MONOCYTES # BLD AUTO: 0.88 K/UL
MONOCYTES NFR BLD AUTO: 8.6 %
NEUTROPHILS # BLD AUTO: 8.16 K/UL
NEUTROPHILS NFR BLD AUTO: 79.3 %
NITRITE URINE: NEGATIVE
PH URINE: 6
PLATELET # BLD AUTO: 283 K/UL
POTASSIUM SERPL-SCNC: 4.1 MMOL/L
PROT SERPL-MCNC: 4.6 G/DL
PROT UR-MCNC: 221 MG/DL
PROTEIN URINE: ABNORMAL
RBC # BLD: 3.41 M/UL
RBC # FLD: 13.6 %
RED BLOOD CELLS URINE: 50 /HPF
SODIUM SERPL-SCNC: 136 MMOL/L
SPECIFIC GRAVITY URINE: 1.01
SQUAMOUS EPITHELIAL CELLS: 1 /HPF
URINE COMMENTS: NORMAL
UROBILINOGEN URINE: NORMAL
WBC # FLD AUTO: 10.28 K/UL
WHITE BLOOD CELLS URINE: 12 /HPF

## 2022-12-09 ENCOUNTER — APPOINTMENT (OUTPATIENT)
Dept: RHEUMATOLOGY | Facility: CLINIC | Age: 47
End: 2022-12-09

## 2022-12-09 VITALS
SYSTOLIC BLOOD PRESSURE: 133 MMHG | OXYGEN SATURATION: 98 % | HEIGHT: 66 IN | BODY MASS INDEX: 22.98 KG/M2 | WEIGHT: 143 LBS | HEART RATE: 89 BPM | DIASTOLIC BLOOD PRESSURE: 75 MMHG | RESPIRATION RATE: 16 BRPM | TEMPERATURE: 97.1 F

## 2022-12-09 PROCEDURE — 99214 OFFICE O/P EST MOD 30 MIN: CPT

## 2022-12-09 NOTE — ASSESSMENT
[FreeTextEntry1] : #SLE (diagnosis February 2022, lupus nephritis and cutaneous disease) +MÓNICA, LowC3/C4, DsDNA >1000, +Sm/RNP/SSA/SSB, +LA\par On hydroxychloroquine 400 mg daily\par #Acute cutaneous lupus, s.p skin biopsy with interface dermatitis- resolved \par #Lupus nephritis, class IV (repeat biopsy October 2022, activity index 15/24, chronicity 3/12 )\par -On MMF 2 g daily (issues with compliance/follow up)\par -Received obinutuzumab 10/21 and 11/1/22\par CD19<1 as of November 2022\par #-MCTD (Raynaud's, +PM/Scl and RNP)\par #High risk medication use\par #Vaccination status:\par COVID-19 x2 , booster 1/22/22, flu received 2022\par \par \par Plan:\par -Obtain monitoring blood work today\par -Obtain disease activity markers today\par -Check urine studies\par -Continue with prednisone 30 mg daily until review of results\par -Continue with Bactrim and vitamin D3\par -Repeat quant TB and hepatitis panel in February 2023\par - to schedule eye exam-referral provided today\par -Needs baseline PFTs and TTE-referral provided today\par - PCV 20 at next visit\par \par RTO in 3 to 4 weeks\par Above discussed with patient and son at length, all questions answered\par

## 2022-12-09 NOTE — HISTORY OF PRESENT ILLNESS
[FreeTextEntry1] : # Today's visit\par doing well overall\par no new rashes, no joint swelling\par No SOB or cough...\par

## 2022-12-09 NOTE — PHYSICAL EXAM
[General Appearance - Alert] : alert [General Appearance - In No Acute Distress] : in no acute distress [Auscultation Breath Sounds / Voice Sounds] : lungs were clear to auscultation bilaterally [Heart Sounds] : normal S1 and S2 [Impaired Insight] : insight and judgment were intact

## 2022-12-13 ENCOUNTER — APPOINTMENT (OUTPATIENT)
Dept: RHEUMATOLOGY | Facility: CLINIC | Age: 47
End: 2022-12-13

## 2022-12-14 ENCOUNTER — NON-APPOINTMENT (OUTPATIENT)
Age: 47
End: 2022-12-14

## 2022-12-19 ENCOUNTER — NON-APPOINTMENT (OUTPATIENT)
Age: 47
End: 2022-12-19

## 2022-12-23 LAB
ALBUMIN SERPL ELPH-MCNC: 2.6 G/DL
ALP BLD-CCNC: 54 U/L
ALT SERPL-CCNC: 18 U/L
ANION GAP SERPL CALC-SCNC: 10 MMOL/L
APPEARANCE: CLEAR
AST SERPL-CCNC: 13 U/L
BACTERIA: NEGATIVE
BASOPHILS # BLD AUTO: 0.02 K/UL
BASOPHILS NFR BLD AUTO: 0.3 %
BILIRUB SERPL-MCNC: <0.2 MG/DL
BILIRUBIN URINE: NEGATIVE
BLOOD URINE: ABNORMAL
BUN SERPL-MCNC: 43 MG/DL
C3 SERPL-MCNC: 90 MG/DL
C4 SERPL-MCNC: 30 MG/DL
CALCIUM SERPL-MCNC: 8.9 MG/DL
CHLORIDE SERPL-SCNC: 106 MMOL/L
CO2 SERPL-SCNC: 23 MMOL/L
COLOR: NORMAL
CONFIRM: 28.9 SEC
CREAT SERPL-MCNC: 1.73 MG/DL
CREAT SPEC-SCNC: 85 MG/DL
CREAT/PROT UR: 3.5 RATIO
DRVVT IMM 1:2 NP PPP: NORMAL
DRVVT SCREEN TO CONFIRM RATIO: 0.94 RATIO
DSDNA AB SER-ACNC: 30 IU/ML
EGFR: 48 ML/MIN/1.73M2
EOSINOPHIL # BLD AUTO: 0.06 K/UL
EOSINOPHIL NFR BLD AUTO: 0.9 %
GLUCOSE QUALITATIVE U: NEGATIVE
GLUCOSE SERPL-MCNC: 104 MG/DL
GRANULAR CASTS: 1 /LPF
HCT VFR BLD CALC: 32.2 %
HGB BLD-MCNC: 10.5 G/DL
HYALINE CASTS: 5 /LPF
IMM GRANULOCYTES NFR BLD AUTO: 0.4 %
KETONES URINE: NEGATIVE
LEUKOCYTE ESTERASE URINE: NEGATIVE
LYMPHOCYTES # BLD AUTO: 0.98 K/UL
LYMPHOCYTES NFR BLD AUTO: 14.5 %
MAN DIFF?: NORMAL
MCHC RBC-ENTMCNC: 29.4 PG
MCHC RBC-ENTMCNC: 32.6 GM/DL
MCV RBC AUTO: 90.2 FL
MICROSCOPIC-UA: NORMAL
MONOCYTES # BLD AUTO: 1.01 K/UL
MONOCYTES NFR BLD AUTO: 15 %
NEUTROPHILS # BLD AUTO: 4.64 K/UL
NEUTROPHILS NFR BLD AUTO: 68.9 %
NITRITE URINE: NEGATIVE
PH URINE: 6
PLATELET # BLD AUTO: 299 K/UL
POTASSIUM SERPL-SCNC: 4.5 MMOL/L
PROT SERPL-MCNC: 5 G/DL
PROT UR-MCNC: 294 MG/DL
PROTEIN URINE: ABNORMAL
RBC # BLD: 3.57 M/UL
RBC # FLD: 13.2 %
RED BLOOD CELLS URINE: 153 /HPF
SCREEN DRVVT: 32.5 SEC
SILICA CLOTTING TIME INTERPRETATION: NORMAL
SILICA CLOTTING TIME S/C: 0.98 RATIO
SODIUM SERPL-SCNC: 139 MMOL/L
SPECIFIC GRAVITY URINE: 1.02
SQUAMOUS EPITHELIAL CELLS: 1 /HPF
URINE COMMENTS: NORMAL
UROBILINOGEN URINE: NORMAL
WBC # FLD AUTO: 6.74 K/UL
WHITE BLOOD CELLS URINE: 16 /HPF

## 2023-01-03 ENCOUNTER — NON-APPOINTMENT (OUTPATIENT)
Age: 48
End: 2023-01-03

## 2023-01-04 ENCOUNTER — NON-APPOINTMENT (OUTPATIENT)
Age: 48
End: 2023-01-04

## 2023-01-06 ENCOUNTER — APPOINTMENT (OUTPATIENT)
Dept: RHEUMATOLOGY | Facility: CLINIC | Age: 48
End: 2023-01-06
Payer: COMMERCIAL

## 2023-01-06 VITALS
HEIGHT: 65 IN | OXYGEN SATURATION: 99 % | HEART RATE: 78 BPM | TEMPERATURE: 97.1 F | DIASTOLIC BLOOD PRESSURE: 79 MMHG | SYSTOLIC BLOOD PRESSURE: 128 MMHG | BODY MASS INDEX: 23.82 KG/M2 | WEIGHT: 143 LBS

## 2023-01-06 PROCEDURE — 99214 OFFICE O/P EST MOD 30 MIN: CPT

## 2023-01-06 NOTE — HISTORY OF PRESENT ILLNESS
[FreeTextEntry1] : #At today's visit:\par -prednisone 20 mg for the past 2 weeks\par -has not repeated blood work as requested\par -He reports feeling well overall, no new complaints\par

## 2023-01-06 NOTE — PHYSICAL EXAM
[General Appearance - Alert] : alert [General Appearance - In No Acute Distress] : in no acute distress [Sclera] : the sclera and conjunctiva were normal [Auscultation Breath Sounds / Voice Sounds] : lungs were clear to auscultation bilaterally [Musculoskeletal - Swelling] : no joint swelling seen [FreeTextEntry1] : Trace lower extremity pitting edema [Impaired Insight] : insight and judgment were intact

## 2023-01-06 NOTE — ASSESSMENT
[FreeTextEntry1] : #SLE (diagnosis February 2022, lupus nephritis and cutaneous disease) +MÓNICA, Low C3/C4, DsDNA >1000, +Sm/RNP/SSA/SSB, +LA\par On hydroxychloroquine 400 mg daily\par \par #Acute cutaneous lupus, s.p skin biopsy with interface dermatitis- resolved \par \par #Lupus nephritis, class IV (repeat biopsy October 2022, activity index 15/24, chronicity 3/12 )\par -On MMF 2 g daily (issues with compliance/follow up)\par -Received obinutuzumab 10/21 and 11/1/22\par CD19<1 as of November 2022\par \par #-MCTD (Raynaud's, +PM/Scl and RNP)\par TTE completed December 2022\par \par #High risk medication use\par #Vaccination status:\par COVID-19 x2 , booster 1/22/22, flu received 2022\par \par \par Plan:\par -Obtain monitoring blood work today\par -Obtain disease activity markers today\par -Check urine studies\par -After review of blood work, will advised to lower prednisone to 15 mg daily \par -Continue with Bactrim and vitamin D3\par -Repeat quant TB and hepatitis panel in February 2023\par -Completed eye exam-advised to fax report\par -Needs baseline PFTs\par -Needs PCV 20\par \par \par RTO in 1 month

## 2023-01-12 ENCOUNTER — NON-APPOINTMENT (OUTPATIENT)
Age: 48
End: 2023-01-12

## 2023-01-12 LAB
ALBUMIN SERPL ELPH-MCNC: 2.6 G/DL
ALP BLD-CCNC: 57 U/L
ALT SERPL-CCNC: 13 U/L
ANION GAP SERPL CALC-SCNC: 10 MMOL/L
APPEARANCE: ABNORMAL
AST SERPL-CCNC: 12 U/L
BACTERIA: NEGATIVE
BASOPHILS # BLD AUTO: 0.04 K/UL
BASOPHILS NFR BLD AUTO: 0.8 %
BILIRUB SERPL-MCNC: <0.2 MG/DL
BILIRUBIN URINE: NEGATIVE
BLOOD URINE: ABNORMAL
BUN SERPL-MCNC: 42 MG/DL
C3 SERPL-MCNC: 93 MG/DL
C4 SERPL-MCNC: 31 MG/DL
CALCIUM SERPL-MCNC: 8.6 MG/DL
CHLORIDE SERPL-SCNC: 108 MMOL/L
CO2 SERPL-SCNC: 22 MMOL/L
COLOR: COLORLESS
CREAT SERPL-MCNC: 1.81 MG/DL
CREAT SPEC-SCNC: 79 MG/DL
CREAT/PROT UR: 3.8 RATIO
DSDNA AB SER-ACNC: 26 IU/ML
EGFR: 46 ML/MIN/1.73M2
EOSINOPHIL # BLD AUTO: 0.08 K/UL
EOSINOPHIL NFR BLD AUTO: 1.5 %
GLUCOSE QUALITATIVE U: NEGATIVE
GLUCOSE SERPL-MCNC: 87 MG/DL
HCT VFR BLD CALC: 31.7 %
HGB BLD-MCNC: 10.1 G/DL
HYALINE CASTS: 10 /LPF
IMM GRANULOCYTES NFR BLD AUTO: 0.4 %
KETONES URINE: NEGATIVE
LEUKOCYTE ESTERASE URINE: NEGATIVE
LYMPHOCYTES # BLD AUTO: 0.99 K/UL
LYMPHOCYTES NFR BLD AUTO: 19.1 %
MAN DIFF?: NORMAL
MCHC RBC-ENTMCNC: 28.9 PG
MCHC RBC-ENTMCNC: 31.9 GM/DL
MCV RBC AUTO: 90.8 FL
MICROSCOPIC-UA: NORMAL
MONOCYTES # BLD AUTO: 1.21 K/UL
MONOCYTES NFR BLD AUTO: 23.4 %
NEUTROPHILS # BLD AUTO: 2.83 K/UL
NEUTROPHILS NFR BLD AUTO: 54.8 %
NITRITE URINE: NEGATIVE
PH URINE: 6
PLATELET # BLD AUTO: 308 K/UL
POTASSIUM SERPL-SCNC: 4.7 MMOL/L
PROT SERPL-MCNC: 4.8 G/DL
PROT UR-MCNC: 304 MG/DL
PROTEIN URINE: ABNORMAL
RBC # BLD: 3.49 M/UL
RBC # FLD: 12.9 %
RED BLOOD CELLS URINE: 108 /HPF
SODIUM SERPL-SCNC: 140 MMOL/L
SPECIFIC GRAVITY URINE: 1.01
SQUAMOUS EPITHELIAL CELLS: 0 /HPF
UROBILINOGEN URINE: NORMAL
WBC # FLD AUTO: 5.17 K/UL
WHITE BLOOD CELLS URINE: 22 /HPF

## 2023-02-03 ENCOUNTER — APPOINTMENT (OUTPATIENT)
Dept: NEPHROLOGY | Facility: CLINIC | Age: 48
End: 2023-02-03
Payer: COMMERCIAL

## 2023-02-03 ENCOUNTER — APPOINTMENT (OUTPATIENT)
Dept: RHEUMATOLOGY | Facility: CLINIC | Age: 48
End: 2023-02-03
Payer: COMMERCIAL

## 2023-02-03 VITALS
OXYGEN SATURATION: 99 % | TEMPERATURE: 98.2 F | WEIGHT: 139.99 LBS | DIASTOLIC BLOOD PRESSURE: 75 MMHG | BODY MASS INDEX: 23.32 KG/M2 | HEIGHT: 65 IN | SYSTOLIC BLOOD PRESSURE: 131 MMHG | HEART RATE: 89 BPM

## 2023-02-03 VITALS
OXYGEN SATURATION: 98 % | HEIGHT: 65 IN | HEART RATE: 90 BPM | TEMPERATURE: 97.2 F | WEIGHT: 139 LBS | BODY MASS INDEX: 23.16 KG/M2 | DIASTOLIC BLOOD PRESSURE: 81 MMHG | SYSTOLIC BLOOD PRESSURE: 130 MMHG

## 2023-02-03 PROCEDURE — 99215 OFFICE O/P EST HI 40 MIN: CPT

## 2023-02-03 PROCEDURE — 99214 OFFICE O/P EST MOD 30 MIN: CPT | Mod: 25

## 2023-02-03 PROCEDURE — 90732 PPSV23 VACC 2 YRS+ SUBQ/IM: CPT

## 2023-02-03 PROCEDURE — G0009: CPT

## 2023-02-03 RX ORDER — ACETAMINOPHEN 325 MG/1
325 TABLET, FILM COATED ORAL
Qty: 2 | Refills: 0 | Status: DISCONTINUED | COMMUNITY
Start: 2022-10-21 | End: 2023-02-03

## 2023-02-03 RX ORDER — PREDNISONE 10 MG/1
10 TABLET ORAL
Qty: 120 | Refills: 3 | Status: DISCONTINUED | COMMUNITY
Start: 2022-01-18 | End: 2023-02-03

## 2023-02-03 RX ORDER — PREDNISONE 20 MG/1
20 TABLET ORAL
Qty: 4 | Refills: 0 | Status: DISCONTINUED | COMMUNITY
Start: 2022-10-12 | End: 2023-02-03

## 2023-02-03 NOTE — ASSESSMENT
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 Rome\par - if creatinine stable- will increase Lisinopril. \par \par \par \par

## 2023-02-03 NOTE — PHYSICAL EXAM
[General Appearance - Alert] : alert [General Appearance - In No Acute Distress] : in no acute distress [Musculoskeletal - Swelling] : no joint swelling seen [] : no rash [Impaired Insight] : insight and judgment were intact

## 2023-02-03 NOTE — HISTORY OF PRESENT ILLNESS
[FreeTextEntry1] : Follow up Lupus Nephritis \par \par 49 yo male who presented to dermatology with a rash and work up revealed MÓNICA titer 1:1280 (speckled) and MÓNICA titer of 1:640 (homogenous.) and diagnosed with SLE and acute cutaneous lupus. \par \par urinalysis showed hematuria and 1.5 gms of proteinuria \par kidney biopsy done on 2/7/22 showed focal proliferative lupus nephritis ( class 3) with less than 5% IFTA \par He was initiated on Cellcept, prednisone and Plaquenil but was non compliant \par \par Presented in October 2022 with worsening LIZETH- Kidney biopsy repeated October 2022- Lupus Nephritis Class 4- activity index 15/24, chronicity 3/12 ). Received obinutuzumab 10/21/22  and 11/1/2022\par \par now on prednisone 20 mg daily (on a taper) \par \par Last labs on 1/6 showed worsening proteinuria/ creatinine stable \par \par ROS \par  - No changes in urination, no blood in urine, no foamy urine \par CVS- no chest pain, no shortness of breath \par all other systems reviewed in detail and were negative except as above\par \par \par \par \par \par

## 2023-02-03 NOTE — PHYSICAL EXAM
[General Appearance - Alert] : alert [General Appearance - In No Acute Distress] : in no acute distress [General Appearance - Well Nourished] : well nourished [General Appearance - Well Developed] : well developed [General Appearance - Well-Appearing] : healthy appearing [Sclera] : the sclera and conjunctiva were normal [PERRL With Normal Accommodation] : pupils were equal in size, round, and reactive to light [Extraocular Movements] : extraocular movements were intact [Outer Ear] : the ears and nose were normal in appearance [Hearing Threshold Finger Rub Not [place]] : hearing was normal [Examination Of The Oral Cavity] : the lips and gums were normal [Neck Appearance] : the appearance of the neck was normal [Neck Cervical Mass (___cm)] : no neck mass was observed [Jugular Venous Distention Increased] : there was no jugular-venous distention [Respiration, Rhythm And Depth] : normal respiratory rhythm and effort [Exaggerated Use Of Accessory Muscles For Inspiration] : no accessory muscle use [Auscultation Breath Sounds / Voice Sounds] : lungs were clear to auscultation bilaterally [Heart Rate And Rhythm] : heart rate was normal and rhythm regular [Heart Sounds] : normal S1 and S2 [Heart Sounds Gallop] : no gallops [Murmurs] : no murmurs [Heart Sounds Pericardial Friction Rub] : no pericardial rub [Arterial Pulses Carotid] : carotid pulses were normal with no bruits [Edema] : there was no peripheral edema [Bowel Sounds] : normal bowel sounds [Abdomen Soft] : soft [Abdomen Tenderness] : non-tender [Abdomen Mass (___ Cm)] : no abdominal mass palpated [No CVA Tenderness] : no ~M costovertebral angle tenderness [No Spinal Tenderness] : no spinal tenderness [Abnormal Walk] : normal gait [Nail Clubbing] : no clubbing  or cyanosis of the fingernails [Musculoskeletal - Swelling] : no joint swelling seen [Motor Tone] : muscle strength and tone were normal [Skin Color & Pigmentation] : normal skin color and pigmentation [Skin Turgor] : normal skin turgor [] : no rash [Skin Lesions] : no skin lesions [Cranial Nerves] : cranial nerves 2-12 were intact [Deep Tendon Reflexes (DTR)] : deep tendon reflexes were 2+ and symmetric [Sensation] : the sensory exam was normal to light touch and pinprick [Motor Exam] : the motor exam was normal [No Focal Deficits] : no focal deficits [Oriented To Time, Place, And Person] : oriented to person, place, and time [Impaired Insight] : insight and judgment were intact [Affect] : the affect was normal [Mood] : the mood was normal [FreeTextEntry1] : right arm cellulitis+

## 2023-02-03 NOTE — HISTORY OF PRESENT ILLNESS
[FreeTextEntry1] : #at today's visit\par reports feeling well overall, no new symptoms\par did not lower prednisone as advised- remains on 20  mg daily\par increased MMF to 3 g daily\par

## 2023-02-03 NOTE — ASSESSMENT
[FreeTextEntry1] : #SLE (diagnosis February 2022, lupus nephritis and cutaneous disease) +MÓNICA, Low C3/C4, DsDNA >1000, +Sm/RNP/SSA/SSB, +LA\par On hydroxychloroquine 400 mg daily\par \par #Acute cutaneous lupus, s.p skin biopsy with interface dermatitis- resolved \par \par #Lupus nephritis, class IV (repeat biopsy October 2022, activity index 15/24, chronicity 3/12 )\par -On MMF 2 g daily (issues with compliance/follow up)- increased to 3 g daily as of 1/12/23\par -Received obinutuzumab 10/21 and 11/1/22\par CD19<1 as of November 2022\par \par #-MCTD (Raynaud's, +PM/Scl and RNP)\par TTE completed December 2022\par \par #High risk medication use\par #Vaccination status:\par COVID-19 x2 , booster 1/22/22, flu received 2022\par PCV 20 given today 2/3/23\par \par \par Plan:\par -Obtain monitoring blood work today\par -Obtain disease activity markers today\par -Check urine studies\par - lower prednisone to 15 mg daily for 2 weeks then 10 mg daily \par -Continue with Bactrim and vitamin D3\par -Repeat quant TB and hepatitis panel today \par -Completed eye exam-advised to fax report\par -Needs baseline PFTs- referral provided today \par - PCV 20 given today \par \par \par RTO in 1 month. \par \par

## 2023-02-08 LAB
ALBUMIN SERPL ELPH-MCNC: 3 G/DL
ALP BLD-CCNC: 50 U/L
ALT SERPL-CCNC: 10 U/L
ANION GAP SERPL CALC-SCNC: 11 MMOL/L
APPEARANCE: ABNORMAL
AST SERPL-CCNC: 15 U/L
BACTERIA: NEGATIVE
BASOPHILS # BLD AUTO: 0.04 K/UL
BASOPHILS NFR BLD AUTO: 0.6 %
BILIRUB SERPL-MCNC: <0.2 MG/DL
BILIRUBIN URINE: NEGATIVE
BLOOD URINE: ABNORMAL
BUN SERPL-MCNC: 32 MG/DL
C3 SERPL-MCNC: 95 MG/DL
C4 SERPL-MCNC: 36 MG/DL
CALCIUM SERPL-MCNC: 8.6 MG/DL
CD16+CD56+ CELLS # BLD: 28 CELLS/UL
CD16+CD56+ CELLS NFR BLD: 3 %
CD19 CELLS NFR BLD: 1 CELLS/UL
CD3 CELLS # BLD: 943 CELLS/UL
CD3 CELLS NFR BLD: 93 %
CD3+CD4+ CELLS # BLD: 547 CELLS/UL
CD3+CD4+ CELLS NFR BLD: 52 %
CD3+CD4+ CELLS/CD3+CD8+ CLL SPEC: 1.36 RATIO
CD3+CD8+ CELLS # SPEC: 404 CELLS/UL
CD3+CD8+ CELLS NFR BLD: 38 %
CELLS.CD3-CD19+/CELLS IN BLOOD: <1 %
CHLORIDE SERPL-SCNC: 104 MMOL/L
CO2 SERPL-SCNC: 21 MMOL/L
COLOR: COLORLESS
CREAT SERPL-MCNC: 1.48 MG/DL
CREAT SPEC-SCNC: 96 MG/DL
CREAT/PROT UR: 3.7 RATIO
DEPRECATED KAPPA LC FREE/LAMBDA SER: 1.27 RATIO
DSDNA AB SER-ACNC: 27 IU/ML
EGFR: 58 ML/MIN/1.73M2
EOSINOPHIL # BLD AUTO: 0.06 K/UL
EOSINOPHIL NFR BLD AUTO: 1 %
GLUCOSE QUALITATIVE U: NEGATIVE
GLUCOSE SERPL-MCNC: 88 MG/DL
HBV CORE IGG+IGM SER QL: NONREACTIVE
HBV SURFACE AG SER QL: NONREACTIVE
HCT VFR BLD CALC: 33.6 %
HCV AB SER QL: NONREACTIVE
HCV S/CO RATIO: 0.05 S/CO
HGB BLD-MCNC: 10.2 G/DL
HYALINE CASTS: 0 /LPF
IGA SER QL IEP: 275 MG/DL
IGG SER QL IEP: 756 MG/DL
IGM SER QL IEP: 34 MG/DL
IMM GRANULOCYTES NFR BLD AUTO: 0.6 %
KAPPA LC CSF-MCNC: 3.71 MG/DL
KAPPA LC SERPL-MCNC: 4.72 MG/DL
KETONES URINE: NEGATIVE
LEUKOCYTE ESTERASE URINE: NEGATIVE
LYMPHOCYTES # BLD AUTO: 1.12 K/UL
LYMPHOCYTES NFR BLD AUTO: 18.1 %
M TB IFN-G BLD-IMP: NEGATIVE
MAN DIFF?: NORMAL
MCHC RBC-ENTMCNC: 27.3 PG
MCHC RBC-ENTMCNC: 30.4 GM/DL
MCV RBC AUTO: 89.8 FL
MICROSCOPIC-UA: NORMAL
MONOCYTES # BLD AUTO: 0.86 K/UL
MONOCYTES NFR BLD AUTO: 13.9 %
NEUTROPHILS # BLD AUTO: 4.06 K/UL
NEUTROPHILS NFR BLD AUTO: 65.8 %
NITRITE URINE: NEGATIVE
PH URINE: 6
PLATELET # BLD AUTO: 319 K/UL
POTASSIUM SERPL-SCNC: 4.1 MMOL/L
PROT SERPL-MCNC: 5.5 G/DL
PROT UR-MCNC: 358 MG/DL
PROTEIN URINE: ABNORMAL
QUANTIFERON TB PLUS MITOGEN MINUS NIL: 0.64 IU/ML
QUANTIFERON TB PLUS NIL: 0.05 IU/ML
QUANTIFERON TB PLUS TB1 MINUS NIL: -0.02 IU/ML
QUANTIFERON TB PLUS TB2 MINUS NIL: -0.02 IU/ML
RBC # BLD: 3.74 M/UL
RBC # FLD: 12.2 %
RED BLOOD CELLS URINE: 88 /HPF
SODIUM SERPL-SCNC: 136 MMOL/L
SPECIFIC GRAVITY URINE: 1.02
SQUAMOUS EPITHELIAL CELLS: 0 /HPF
UROBILINOGEN URINE: NORMAL
WBC # FLD AUTO: 6.18 K/UL
WHITE BLOOD CELLS URINE: 3 /HPF

## 2023-02-20 NOTE — ED ADULT TRIAGE NOTE - CHIEF COMPLAINT QUOTE
pt Arabic speaking c/o rash to neck, upper chest, face, mouth. Pt seen by PCP and had negative workup done, was started on prednisone without any relief in symptoms. Also endorsing periods of SOB at home and N/V. Appears in NAD. Denies any significant PMH

## 2023-03-03 ENCOUNTER — APPOINTMENT (OUTPATIENT)
Dept: RHEUMATOLOGY | Facility: CLINIC | Age: 48
End: 2023-03-03
Payer: COMMERCIAL

## 2023-03-03 VITALS
HEIGHT: 65 IN | WEIGHT: 144 LBS | RESPIRATION RATE: 16 BRPM | SYSTOLIC BLOOD PRESSURE: 130 MMHG | BODY MASS INDEX: 23.99 KG/M2 | TEMPERATURE: 97.6 F | HEART RATE: 92 BPM | OXYGEN SATURATION: 98 % | DIASTOLIC BLOOD PRESSURE: 82 MMHG

## 2023-03-03 PROCEDURE — 99214 OFFICE O/P EST MOD 30 MIN: CPT

## 2023-03-03 NOTE — HISTORY OF PRESENT ILLNESS
[FreeTextEntry1] : #at today's visit\par reports feeling well overall, no new symptoms\par On prednisone 10 mg daily since last visit\par On MMF 3 g daily\par

## 2023-03-03 NOTE — ASSESSMENT
[FreeTextEntry1] : #SLE (diagnosis February 2022, lupus nephritis and cutaneous disease) +MÓNICA, Low C3/C4, DsDNA >1000, +Sm/RNP/SSA/SSB, +LA\par On hydroxychloroquine 400 mg daily\par \par #Acute cutaneous lupus, s.p skin biopsy with interface dermatitis- resolved \par \par #Lupus nephritis, class IV (repeat biopsy October 2022, activity index 15/24, chronicity 3/12 )\par -On MMF 2 g daily (issues with compliance/follow up)- increased to 3 g daily as of 1/12/23\par -Received obinutuzumab 10/21 and 11/1/22\par CD19<1 as of November 2022\par \par #-MCTD (Raynaud's, +PM/Scl and RNP)\par TTE completed December 2022\par \par #High risk medication use\par quant TB and hepatitis panel - February 2023\par \par #Vaccination status:\par COVID-19 x2 , booster 1/22/22, flu received 2022\par PCV 20 given today 2/3/23\par \par \par Plan:\par -Obtain monitoring blood work today\par -Obtain disease activity markers today\par -Check urine studies\par -Continue with prednisone 10 mg daily until review of blood/urine studies\par -Continue with MMF 3 g daily\par -Continue with hydroxychloroquine 200 mg daily\par most recent eye exam December 2022 reviewed today, patient cleared to continue hydroxychloroquine\par Advised to follow-up as recommended\par -Continue with Bactrim as long as B-cell depleted\par Check levels today\par -Advised to reach out to nephrologist office to discuss increasing lisinopril to 10 mg daily\par -Needs baseline PFTs- advised patient to schedule\par \par \par RTO in 1 month. \par \par

## 2023-03-09 ENCOUNTER — NON-APPOINTMENT (OUTPATIENT)
Age: 48
End: 2023-03-09

## 2023-03-10 LAB
ALBUMIN SERPL ELPH-MCNC: 3 G/DL
ALP BLD-CCNC: 50 U/L
ALT SERPL-CCNC: 10 U/L
ANION GAP SERPL CALC-SCNC: 11 MMOL/L
APPEARANCE: CLEAR
AST SERPL-CCNC: 17 U/L
BACTERIA: ABNORMAL
BASOPHILS # BLD AUTO: 0.04 K/UL
BASOPHILS NFR BLD AUTO: 0.7 %
BILIRUB SERPL-MCNC: <0.2 MG/DL
BILIRUBIN URINE: NEGATIVE
BLOOD URINE: ABNORMAL
BUN SERPL-MCNC: 27 MG/DL
C3 SERPL-MCNC: 100 MG/DL
C4 SERPL-MCNC: 34 MG/DL
CALCIUM SERPL-MCNC: 8.6 MG/DL
CD16+CD56+ CELLS # BLD: 42 CELLS/UL
CD16+CD56+ CELLS NFR BLD: 4 %
CD19 CELLS NFR BLD: 3 CELLS/UL
CD3 CELLS # BLD: 874 CELLS/UL
CD3 CELLS NFR BLD: 93 %
CD3+CD4+ CELLS # BLD: 498 CELLS/UL
CD3+CD4+ CELLS NFR BLD: 53 %
CD3+CD4+ CELLS/CD3+CD8+ CLL SPEC: 1.38 RATIO
CD3+CD8+ CELLS # SPEC: 360 CELLS/UL
CD3+CD8+ CELLS NFR BLD: 38 %
CELLS.CD3-CD19+/CELLS IN BLOOD: <1 %
CHLORIDE SERPL-SCNC: 110 MMOL/L
CO2 SERPL-SCNC: 20 MMOL/L
COLOR: COLORLESS
CREAT SERPL-MCNC: 1.38 MG/DL
CREAT SPEC-SCNC: 109 MG/DL
CREAT/PROT UR: 3.5 RATIO
DEPRECATED KAPPA LC FREE/LAMBDA SER: 1.41 RATIO
DSDNA AB SER-ACNC: 21 IU/ML
EGFR: 63 ML/MIN/1.73M2
EOSINOPHIL # BLD AUTO: 0.04 K/UL
EOSINOPHIL NFR BLD AUTO: 0.7 %
GLUCOSE QUALITATIVE U: NEGATIVE
GLUCOSE SERPL-MCNC: 93 MG/DL
HCT VFR BLD CALC: 32 %
HGB BLD-MCNC: 9.8 G/DL
HYALINE CASTS: 3 /LPF
IGA SER QL IEP: 240 MG/DL
IGG SER QL IEP: 719 MG/DL
IGM SER QL IEP: 26 MG/DL
IMM GRANULOCYTES NFR BLD AUTO: 1.9 %
KAPPA LC CSF-MCNC: 3.74 MG/DL
KAPPA LC SERPL-MCNC: 5.26 MG/DL
KETONES URINE: NEGATIVE
LEUKOCYTE ESTERASE URINE: ABNORMAL
LYMPHOCYTES # BLD AUTO: 0.97 K/UL
LYMPHOCYTES NFR BLD AUTO: 17 %
MAN DIFF?: NORMAL
MCHC RBC-ENTMCNC: 27.3 PG
MCHC RBC-ENTMCNC: 30.6 GM/DL
MCV RBC AUTO: 89.1 FL
MICROSCOPIC-UA: NORMAL
MONOCYTES # BLD AUTO: 0.66 K/UL
MONOCYTES NFR BLD AUTO: 11.6 %
NEUTROPHILS # BLD AUTO: 3.87 K/UL
NEUTROPHILS NFR BLD AUTO: 68.1 %
NITRITE URINE: NEGATIVE
PH URINE: 6
PLATELET # BLD AUTO: 311 K/UL
POTASSIUM SERPL-SCNC: 4.3 MMOL/L
PROT SERPL-MCNC: 5.1 G/DL
PROT UR-MCNC: 385 MG/DL
PROTEIN URINE: ABNORMAL
RBC # BLD: 3.59 M/UL
RBC # FLD: 12.9 %
RED BLOOD CELLS URINE: 85 /HPF
SODIUM SERPL-SCNC: 141 MMOL/L
SPECIFIC GRAVITY URINE: 1.02
SQUAMOUS EPITHELIAL CELLS: 0 /HPF
UROBILINOGEN URINE: NORMAL
WBC # FLD AUTO: 5.69 K/UL
WHITE BLOOD CELLS URINE: 15 /HPF

## 2023-04-07 ENCOUNTER — LABORATORY RESULT (OUTPATIENT)
Age: 48
End: 2023-04-07

## 2023-04-07 ENCOUNTER — APPOINTMENT (OUTPATIENT)
Dept: NEPHROLOGY | Facility: CLINIC | Age: 48
End: 2023-04-07
Payer: COMMERCIAL

## 2023-04-07 VITALS
SYSTOLIC BLOOD PRESSURE: 147 MMHG | TEMPERATURE: 97.1 F | HEART RATE: 74 BPM | DIASTOLIC BLOOD PRESSURE: 83 MMHG | WEIGHT: 135.58 LBS | OXYGEN SATURATION: 98 % | HEIGHT: 65 IN | BODY MASS INDEX: 22.59 KG/M2

## 2023-04-07 PROCEDURE — 99215 OFFICE O/P EST HI 40 MIN: CPT

## 2023-04-15 ENCOUNTER — APPOINTMENT (OUTPATIENT)
Dept: RHEUMATOLOGY | Facility: CLINIC | Age: 48
End: 2023-04-15
Payer: COMMERCIAL

## 2023-04-15 VITALS
HEIGHT: 66 IN | TEMPERATURE: 97.7 F | DIASTOLIC BLOOD PRESSURE: 78 MMHG | BODY MASS INDEX: 22.82 KG/M2 | RESPIRATION RATE: 16 BRPM | SYSTOLIC BLOOD PRESSURE: 132 MMHG | WEIGHT: 142 LBS | HEART RATE: 100 BPM | OXYGEN SATURATION: 98 %

## 2023-04-15 PROCEDURE — 99215 OFFICE O/P EST HI 40 MIN: CPT

## 2023-04-15 NOTE — PHYSICAL EXAM
[General Appearance - Alert] : alert [General Appearance - In No Acute Distress] : in no acute distress [Musculoskeletal - Swelling] : no joint swelling seen [] : no rash [FreeTextEntry1] : Trace lower extremity edema [Impaired Insight] : insight and judgment were intact

## 2023-04-15 NOTE — HISTORY OF PRESENT ILLNESS
[FreeTextEntry1] : #at today's visit\par reports feeling well overall, denies any rashes, chest pain or shortness of breath, no oral or nasal ulcers, no joint pain or joint swelling\par He endorses occasional nausea and vomiting in the afternoon after taking his second dose of MMF\par \par Current Meds:\par  prednisone 5 mg daily since last visit\par  MMF 3 g daily\par Hydroxychloroquine 200 mg daily\par Lisinopril 10 mg daily\par Started recently on Lasix 20 mg daily by renal\par Bactrim for PCP prophylaxis\par

## 2023-04-15 NOTE — ASSESSMENT
[FreeTextEntry1] : #SLE (diagnosis February 2022, lupus nephritis and cutaneous disease) +MÓNICA, Low C3/C4, DsDNA >1000, +Sm/RNP/SSA/SSB, +LA\par On hydroxychloroquine 400 mg daily\par \par #Acute cutaneous lupus, s.p skin biopsy with interface dermatitis- resolved \par \par #Lupus nephritis, class IV (repeat biopsy October 2022, activity index 15/24, chronicity 3/12 )\par -On MMF 2 g daily (issues with compliance/follow up)- increased to 3 g daily as of 1/12/23\par -Received obinutuzumab 10/21 and 11/1/22\par CD19<1 as of April 2023\par \par #-MCTD (Raynaud's, +PM/Scl and RNP)\par TTE completed December 2022\par \par #High risk medication use\par quant TB and hepatitis panel - February 2023\par \par #Vaccination status:\par COVID-19 x2 , booster 1/22/22, flu received 2022\par PCV 20 given  2/3/23\par \par \par Plan:\par -Recent blood work and urine studies done with nephrology reviewed with patient and son today, worsening kidney function and hematuria/proteinuria\par He would be technically due for his next obinutuzumab in May but he remains B cell depleted\par And overall he has not responded well to obinutuzumab\par I reached out to nephrology to discuss next steps\par -Repeat renal panel and urine studies again today\par -Continue with prednisone 5 mg daily for now\par -Continue with MMF 3 g daily\par -Continue with hydroxychloroquine 200 mg daily\par most recent eye exam December 2022  patient cleared to continue hydroxychloroquine\par Advised to follow-up as recommended\par -Continue with Bactrim as long as B-cell depleted\par -Needs baseline PFTs- advised patient to schedule\par \par \par RTO in 1 month. \par \par

## 2023-04-19 LAB
ALBUMIN MFR SERPL ELPH: 47.5 %
ALBUMIN SERPL ELPH-MCNC: 2.8 G/DL
ALBUMIN SERPL ELPH-MCNC: 3.5 G/DL
ALBUMIN SERPL-MCNC: 3.2 G/DL
ALBUMIN/GLOB SERPL: 0.9 RATIO
ALP BLD-CCNC: 63 U/L
ALP BLD-CCNC: 76 U/L
ALPHA1 GLOB MFR SERPL ELPH: 4.7 %
ALPHA1 GLOB SERPL ELPH-MCNC: 0.3 G/DL
ALPHA2 GLOB MFR SERPL ELPH: 10.5 %
ALPHA2 GLOB SERPL ELPH-MCNC: 0.7 G/DL
ALT SERPL-CCNC: 11 U/L
ALT SERPL-CCNC: 7 U/L
ANION GAP SERPL CALC-SCNC: 11 MMOL/L
ANION GAP SERPL CALC-SCNC: 11 MMOL/L
ANION GAP SERPL CALC-SCNC: 12 MMOL/L
APPEARANCE: ABNORMAL
APPEARANCE: ABNORMAL
APPEARANCE: CLEAR
APTT BLD: 33.4 SEC
AST SERPL-CCNC: 11 U/L
AST SERPL-CCNC: 17 U/L
B-GLOBULIN MFR SERPL ELPH: 12.3 %
B-GLOBULIN SERPL ELPH-MCNC: 0.8 G/DL
BACTERIA: NEGATIVE
BASOPHILS # BLD AUTO: 0.02 K/UL
BASOPHILS # BLD AUTO: 0.03 K/UL
BASOPHILS # BLD AUTO: 0.04 K/UL
BASOPHILS NFR BLD AUTO: 0.2 %
BASOPHILS NFR BLD AUTO: 0.4 %
BASOPHILS NFR BLD AUTO: 0.6 %
BILIRUB SERPL-MCNC: 0.2 MG/DL
BILIRUB SERPL-MCNC: <0.2 MG/DL
BILIRUBIN URINE: NEGATIVE
BLOOD URINE: ABNORMAL
BUN SERPL-MCNC: 13 MG/DL
BUN SERPL-MCNC: 14 MG/DL
BUN SERPL-MCNC: 39 MG/DL
C3 SERPL-MCNC: 113 MG/DL
C3 SERPL-MCNC: 83 MG/DL
C4 SERPL-MCNC: 10 MG/DL
C4 SERPL-MCNC: 15 MG/DL
CALCIUM SERPL-MCNC: 8.8 MG/DL
CALCIUM SERPL-MCNC: 9 MG/DL
CALCIUM SERPL-MCNC: 9.3 MG/DL
CAST: 8.32 /LPF
CHLORIDE SERPL-SCNC: 106 MMOL/L
CHLORIDE SERPL-SCNC: 106 MMOL/L
CHLORIDE SERPL-SCNC: 109 MMOL/L
CO2 SERPL-SCNC: 18 MMOL/L
CO2 SERPL-SCNC: 22 MMOL/L
CO2 SERPL-SCNC: 23 MMOL/L
COLOR: NORMAL
COLOR: YELLOW
COLOR: YELLOW
CREAT SERPL-MCNC: 1.14 MG/DL
CREAT SERPL-MCNC: 1.15 MG/DL
CREAT SERPL-MCNC: 1.79 MG/DL
CREAT SPEC-SCNC: 232 MG/DL
CREAT SPEC-SCNC: 62 MG/DL
CREAT SPEC-SCNC: 82 MG/DL
CREAT/PROT UR: 0.8 RATIO
CREAT/PROT UR: 1.5 RATIO
CREAT/PROT UR: 5.9 RATIO
DEPRECATED KAPPA LC FREE/LAMBDA SER: 1.16 RATIO
DEPRECATED KAPPA LC FREE/LAMBDA SER: 1.4 RATIO
DSDNA AB SER-ACNC: 181 IU/ML
DSDNA AB SER-ACNC: 347 IU/ML
EGFR: 46 ML/MIN/1.73M2
EGFR: 80 ML/MIN/1.73M2
EOSINOPHIL # BLD AUTO: 0.07 K/UL
EOSINOPHIL # BLD AUTO: 0.12 K/UL
EOSINOPHIL # BLD AUTO: 0.13 K/UL
EOSINOPHIL NFR BLD AUTO: 1.3 %
EOSINOPHIL NFR BLD AUTO: 1.4 %
EOSINOPHIL NFR BLD AUTO: 1.4 %
EPITHELIAL CELLS: 0.5 /HPF
GAMMA GLOB FLD ELPH-MCNC: 1.7 G/DL
GAMMA GLOB MFR SERPL ELPH: 25 %
GBM AB TITR SER IF: 4
GLUCOSE QUALITATIVE U: NEGATIVE
GLUCOSE SERPL-MCNC: 105 MG/DL
GLUCOSE SERPL-MCNC: 116 MG/DL
GLUCOSE SERPL-MCNC: 75 MG/DL
HCT VFR BLD CALC: 30.6 %
HCT VFR BLD CALC: 40.4 %
HCT VFR BLD CALC: 42 %
HGB BLD-MCNC: 12.9 G/DL
HGB BLD-MCNC: 13.5 G/DL
HGB BLD-MCNC: 9 G/DL
HYALINE CASTS: 0 /LPF
HYALINE CASTS: 43 /LPF
IGA SER QL IEP: 261 MG/DL
IGG SER QL IEP: 785 MG/DL
IGM SER QL IEP: 32 MG/DL
IMM GRANULOCYTES NFR BLD AUTO: 0.4 %
IMM GRANULOCYTES NFR BLD AUTO: 0.5 %
IMM GRANULOCYTES NFR BLD AUTO: 0.6 %
INR PPP: 0.92 RATIO
INTERPRETATION SERPL IEP-IMP: NORMAL
KAPPA LC CSF-MCNC: 4.43 MG/DL
KAPPA LC CSF-MCNC: 5.73 MG/DL
KAPPA LC SERPL-MCNC: 6.21 MG/DL
KAPPA LC SERPL-MCNC: 6.64 MG/DL
KETONES URINE: NEGATIVE
LEUKOCYTE ESTERASE URINE: ABNORMAL
LEUKOCYTE ESTERASE URINE: ABNORMAL
LEUKOCYTE ESTERASE URINE: NEGATIVE
LYMPHOCYTES # BLD AUTO: 0.99 K/UL
LYMPHOCYTES # BLD AUTO: 2.69 K/UL
LYMPHOCYTES # BLD AUTO: 2.73 K/UL
LYMPHOCYTES NFR BLD AUTO: 19 %
LYMPHOCYTES NFR BLD AUTO: 29.3 %
LYMPHOCYTES NFR BLD AUTO: 32.6 %
MAN DIFF?: NORMAL
MCHC RBC-ENTMCNC: 26.2 PG
MCHC RBC-ENTMCNC: 29 PG
MCHC RBC-ENTMCNC: 29 PG
MCHC RBC-ENTMCNC: 29.4 GM/DL
MCHC RBC-ENTMCNC: 31.9 GM/DL
MCHC RBC-ENTMCNC: 32.1 GM/DL
MCV RBC AUTO: 89.2 FL
MCV RBC AUTO: 90.1 FL
MCV RBC AUTO: 90.8 FL
MICROSCOPIC-UA: NORMAL
MONOCYTES # BLD AUTO: 0.79 K/UL
MONOCYTES # BLD AUTO: 0.82 K/UL
MONOCYTES # BLD AUTO: 1.04 K/UL
MONOCYTES NFR BLD AUTO: 11.3 %
MONOCYTES NFR BLD AUTO: 15.1 %
MONOCYTES NFR BLD AUTO: 9.8 %
NEUTROPHILS # BLD AUTO: 3.31 K/UL
NEUTROPHILS # BLD AUTO: 4.65 K/UL
NEUTROPHILS # BLD AUTO: 5.24 K/UL
NEUTROPHILS NFR BLD AUTO: 55.6 %
NEUTROPHILS NFR BLD AUTO: 57.1 %
NEUTROPHILS NFR BLD AUTO: 63.4 %
NITRITE URINE: NEGATIVE
PH URINE: 5.5
PH URINE: 6.5
PH URINE: 7
PLATELET # BLD AUTO: 301 K/UL
PLATELET # BLD AUTO: 311 K/UL
PLATELET # BLD AUTO: 322 K/UL
POTASSIUM SERPL-SCNC: 3.9 MMOL/L
POTASSIUM SERPL-SCNC: 4 MMOL/L
POTASSIUM SERPL-SCNC: 4.8 MMOL/L
PROT SERPL-MCNC: 4.9 G/DL
PROT SERPL-MCNC: 6.7 G/DL
PROT SERPL-MCNC: 6.7 G/DL
PROT SERPL-MCNC: 7 G/DL
PROT UR-MCNC: 348 MG/DL
PROT UR-MCNC: 482 MG/DL
PROT UR-MCNC: 50 MG/DL
PROTEIN URINE: 300
PROTEIN URINE: ABNORMAL
PROTEIN URINE: ABNORMAL
PT BLD: 10.9 SEC
RBC # BLD: 3.43 M/UL
RBC # BLD: 4.45 M/UL
RBC # BLD: 4.66 M/UL
RBC # FLD: 13.2 %
RBC # FLD: 13.2 %
RBC # FLD: 14.3 %
RED BLOOD CELLS URINE: 239 /HPF
RED BLOOD CELLS URINE: 339 /HPF
RED BLOOD CELLS URINE: 36 /HPF
SODIUM SERPL-SCNC: 138 MMOL/L
SODIUM SERPL-SCNC: 140 MMOL/L
SODIUM SERPL-SCNC: 140 MMOL/L
SPECIFIC GRAVITY URINE: 1.01
SPECIFIC GRAVITY URINE: 1.02
SPECIFIC GRAVITY URINE: 1.02
SQUAMOUS EPITHELIAL CELLS: 1 /HPF
SQUAMOUS EPITHELIAL CELLS: 13 /HPF
UROBILINOGEN URINE: 0.2
UROBILINOGEN URINE: NORMAL
UROBILINOGEN URINE: NORMAL
WBC # FLD AUTO: 5.22 K/UL
WBC # FLD AUTO: 8.37 K/UL
WBC # FLD AUTO: 9.19 K/UL
WBC CASTS: PRESENT
WHITE BLOOD CELLS URINE: 10 /HPF
WHITE BLOOD CELLS URINE: 23 /HPF
WHITE BLOOD CELLS URINE: 72 /HPF

## 2023-04-19 NOTE — HISTORY OF PRESENT ILLNESS
[FreeTextEntry1] : Follow up Lupus Nephritis \par \par 49 yo male who presented to dermatology with a rash and work up revealed MÓNICA titer 1:1280 (speckled) and MÓNICA titer of 1:640 (homogenous.) and diagnosed with SLE and acute cutaneous lupus. urinalysis showed hematuria and 1.5 gms of proteinuria. 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 He was initiated on Cellcept, prednisone and Plaquenil but was non compliant \par \par Presented in October 2022 to [facility] with worsening LIZETH- Kidney biopsy repeated October 2022- Lupus Nephritis Class 4- activity index 15/24, chronicity 3/12 ). Received obinutuzumab 10/21/22  and 11/1/2022 and restarted on Cellcept - now at 1500 bid. his creatinine did not really improve and stayed at 1.7-1.8 mg/dl. \par \par he states he is compliant with his medications. he does have facial swelling and leg swelling \par \par current meds\par mmf 3 gm\par pred 5 \par lisinopril 10 \par hcq \par \par \par \par \par \par \par \par \par \par \par \par

## 2023-04-19 NOTE — PHYSICAL EXAM
[General Appearance - Alert] : alert [General Appearance - In No Acute Distress] : in no acute distress [General Appearance - Well Nourished] : well nourished [General Appearance - Well Developed] : well developed [General Appearance - Well-Appearing] : healthy appearing [Sclera] : the sclera and conjunctiva were normal [PERRL With Normal Accommodation] : pupils were equal in size, round, and reactive to light [Extraocular Movements] : extraocular movements were intact [Outer Ear] : the ears and nose were normal in appearance [Hearing Threshold Finger Rub Not [place]] : hearing was normal [Examination Of The Oral Cavity] : the lips and gums were normal [Neck Appearance] : the appearance of the neck was normal [Neck Cervical Mass (___cm)] : no neck mass was observed [Jugular Venous Distention Increased] : there was no jugular-venous distention [Respiration, Rhythm And Depth] : normal respiratory rhythm and effort [Exaggerated Use Of Accessory Muscles For Inspiration] : no accessory muscle use [Auscultation Breath Sounds / Voice Sounds] : lungs were clear to auscultation bilaterally [Heart Rate And Rhythm] : heart rate was normal and rhythm regular [Heart Sounds] : normal S1 and S2 [Heart Sounds Gallop] : no gallops [Murmurs] : no murmurs [Heart Sounds Pericardial Friction Rub] : no pericardial rub [Arterial Pulses Carotid] : carotid pulses were normal with no bruits [Bowel Sounds] : normal bowel sounds [Abdomen Soft] : soft [Abdomen Tenderness] : non-tender [Abdomen Mass (___ Cm)] : no abdominal mass palpated [No CVA Tenderness] : no ~M costovertebral angle tenderness [No Spinal Tenderness] : no spinal tenderness [Abnormal Walk] : normal gait [Nail Clubbing] : no clubbing  or cyanosis of the fingernails [Musculoskeletal - Swelling] : no joint swelling seen [Motor Tone] : muscle strength and tone were normal [Skin Color & Pigmentation] : normal skin color and pigmentation [Skin Turgor] : normal skin turgor [] : no rash [Skin Lesions] : no skin lesions [FreeTextEntry1] : right arm cellulitis+ [Cranial Nerves] : cranial nerves 2-12 were intact [Deep Tendon Reflexes (DTR)] : deep tendon reflexes were 2+ and symmetric [Sensation] : the sensory exam was normal to light touch and pinprick [Motor Exam] : the motor exam was normal [No Focal Deficits] : no focal deficits [Oriented To Time, Place, And Person] : oriented to person, place, and time [Impaired Insight] : insight and judgment were intact [Affect] : the affect was normal [Mood] : the mood was normal

## 2023-04-19 NOTE — ASSESSMENT
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 Enrolling in a clinical trial would be another. will discuss with Rheum\par \par \par \par \par

## 2023-04-21 ENCOUNTER — NON-APPOINTMENT (OUTPATIENT)
Age: 48
End: 2023-04-21

## 2023-04-26 LAB
ALBUMIN SERPL ELPH-MCNC: 2.7 G/DL
ANION GAP SERPL CALC-SCNC: 11 MMOL/L
APPEARANCE: ABNORMAL
BACTERIA: NEGATIVE
BILIRUBIN URINE: NEGATIVE
BLOOD URINE: ABNORMAL
BUN SERPL-MCNC: 29 MG/DL
CALCIUM SERPL-MCNC: 8.7 MG/DL
CHLORIDE SERPL-SCNC: 110 MMOL/L
CO2 SERPL-SCNC: 18 MMOL/L
COLOR: NORMAL
CREAT SERPL-MCNC: 1.84 MG/DL
CREAT SPEC-SCNC: 107 MG/DL
CREAT/PROT UR: 4.7 RATIO
EGFR: 45 ML/MIN/1.73M2
GLUCOSE QUALITATIVE U: NEGATIVE
GLUCOSE SERPL-MCNC: 85 MG/DL
GRANULAR CASTS: 2 /LPF
HYALINE CASTS: 7 /LPF
KETONES URINE: NEGATIVE
LEUKOCYTE ESTERASE URINE: ABNORMAL
MICROSCOPIC-UA: NORMAL
NITRITE URINE: NEGATIVE
PH URINE: 6
PHOSPHATE SERPL-MCNC: 4.5 MG/DL
POTASSIUM SERPL-SCNC: 5.1 MMOL/L
PROT UR-MCNC: 496 MG/DL
PROTEIN URINE: ABNORMAL
RED BLOOD CELLS URINE: 94 /HPF
SODIUM SERPL-SCNC: 139 MMOL/L
SPECIFIC GRAVITY URINE: 1.01
SQUAMOUS EPITHELIAL CELLS: 1 /HPF
URINE COMMENTS: NORMAL
UROBILINOGEN URINE: NORMAL
WHITE BLOOD CELLS URINE: 15 /HPF

## 2023-04-27 ENCOUNTER — NON-APPOINTMENT (OUTPATIENT)
Age: 48
End: 2023-04-27

## 2023-04-27 ENCOUNTER — INPATIENT (INPATIENT)
Facility: HOSPITAL | Age: 48
LOS: 10 days | Discharge: HOME | End: 2023-05-08
Attending: INTERNAL MEDICINE | Admitting: INTERNAL MEDICINE
Payer: COMMERCIAL

## 2023-04-27 VITALS
SYSTOLIC BLOOD PRESSURE: 131 MMHG | HEART RATE: 63 BPM | RESPIRATION RATE: 18 BRPM | DIASTOLIC BLOOD PRESSURE: 72 MMHG | TEMPERATURE: 99 F | OXYGEN SATURATION: 100 %

## 2023-04-27 PROCEDURE — 99285 EMERGENCY DEPT VISIT HI MDM: CPT

## 2023-04-27 NOTE — ED PROVIDER NOTE - CLINICAL SUMMARY MEDICAL DECISION MAKING FREE TEXT BOX
Iveth Beltran, ED Attendin-year-old male with past medical history of lupus presenting for nausea vomiting weight loss.  On exam, no reproducible abdominal tenderness to palpation.  Patient has no CVA tenderness bilaterally.  Symptoms possibly secondary to Poly medication, but given patient has not had imaging of the abdomen in some time will obtain CT abdomen pelvis.  patient was sent in by his outpatient providers for biopsy, will obtain labs including electrolytes, will obtain imaging of the abdomen pelvis.  reassess to dispo.  Patient will need admission.

## 2023-04-27 NOTE — ED PROVIDER NOTE - OBJECTIVE STATEMENT
48-year-old male with past medical history of lupus, currently on multiple immuno-modulators also on prednisone every other day presenting with chief complaint of significant weight loss over the last month.  Almost 20 pounds.  Also endorsing nausea and vomiting.  Denies any abdominal pain.  Patient states that his nephrologist and his rheumatologist have been concerned with his worsening renal function outpatient, would like a biopsy done, was sent in for admission.  Patient denies any new complaints.  No urinary frequency.

## 2023-04-27 NOTE — ED ADULT NURSE NOTE - CHIEF COMPLAINT QUOTE
Pt c/o nausea, vomiting, decrease appetite, generalized weakness x1 month. Reports weight loss from 142 to 129lbs in a month. PMH lupus

## 2023-04-27 NOTE — ED ADULT NURSE NOTE - OBJECTIVE STATEMENT
alert oriented no acute distress c/o feeling nauseous has no pain abd soft no vomiting at present no CP dizziness or SOB skin warm color good

## 2023-04-27 NOTE — ED PROVIDER NOTE - CARE PLAN
Principal Discharge DX:	Nausea & vomiting   1 Principal Discharge DX:	Nausea & vomiting  Secondary Diagnosis:	LIZETH (acute kidney injury)

## 2023-04-27 NOTE — ED ADULT TRIAGE NOTE - CHIEF COMPLAINT QUOTE
Pt c/o nausea, vomiting, decrease appetite, generalized weakness today x1 month. Reports weight loss from 142 to 129lbs in a month. PMH lupus Pt c/o nausea, vomiting, decrease appetite, generalized weakness x1 month. Reports weight loss from 142 to 129lbs in a month. PMH lupus

## 2023-04-28 DIAGNOSIS — R11.2 NAUSEA WITH VOMITING, UNSPECIFIED: ICD-10-CM

## 2023-04-28 DIAGNOSIS — R06.09 OTHER FORMS OF DYSPNEA: ICD-10-CM

## 2023-04-28 DIAGNOSIS — D64.9 ANEMIA, UNSPECIFIED: ICD-10-CM

## 2023-04-28 DIAGNOSIS — N17.9 ACUTE KIDNEY FAILURE, UNSPECIFIED: ICD-10-CM

## 2023-04-28 DIAGNOSIS — M32.14 GLOMERULAR DISEASE IN SYSTEMIC LUPUS ERYTHEMATOSUS: ICD-10-CM

## 2023-04-28 DIAGNOSIS — E87.5 HYPERKALEMIA: ICD-10-CM

## 2023-04-28 DIAGNOSIS — M32.9 SYSTEMIC LUPUS ERYTHEMATOSUS, UNSPECIFIED: ICD-10-CM

## 2023-04-28 DIAGNOSIS — Z29.9 ENCOUNTER FOR PROPHYLACTIC MEASURES, UNSPECIFIED: ICD-10-CM

## 2023-04-28 LAB
ALBUMIN SERPL ELPH-MCNC: 2.2 G/DL — LOW (ref 3.3–5)
ALBUMIN SERPL ELPH-MCNC: 2.6 G/DL — LOW (ref 3.3–5)
ALP SERPL-CCNC: 56 U/L — SIGNIFICANT CHANGE UP (ref 40–120)
ALP SERPL-CCNC: 62 U/L — SIGNIFICANT CHANGE UP (ref 40–120)
ALT FLD-CCNC: 6 U/L — SIGNIFICANT CHANGE UP (ref 4–41)
ALT FLD-CCNC: 6 U/L — SIGNIFICANT CHANGE UP (ref 4–41)
ANION GAP SERPL CALC-SCNC: 10 MMOL/L — SIGNIFICANT CHANGE UP (ref 7–14)
ANION GAP SERPL CALC-SCNC: 9 MMOL/L — SIGNIFICANT CHANGE UP (ref 7–14)
APPEARANCE UR: CLEAR — SIGNIFICANT CHANGE UP
AST SERPL-CCNC: 12 U/L — SIGNIFICANT CHANGE UP (ref 4–40)
AST SERPL-CCNC: 15 U/L — SIGNIFICANT CHANGE UP (ref 4–40)
BACTERIA # UR AUTO: NEGATIVE — SIGNIFICANT CHANGE UP
BASE EXCESS BLDV CALC-SCNC: -11.1 MMOL/L — LOW (ref -2–3)
BASE EXCESS BLDV CALC-SCNC: -8.8 MMOL/L — LOW (ref -2–3)
BASOPHILS # BLD AUTO: 0.03 K/UL — SIGNIFICANT CHANGE UP (ref 0–0.2)
BASOPHILS # BLD AUTO: 0.04 K/UL — SIGNIFICANT CHANGE UP (ref 0–0.2)
BASOPHILS NFR BLD AUTO: 0.5 % — SIGNIFICANT CHANGE UP (ref 0–2)
BASOPHILS NFR BLD AUTO: 0.8 % — SIGNIFICANT CHANGE UP (ref 0–2)
BILIRUB SERPL-MCNC: <0.2 MG/DL — SIGNIFICANT CHANGE UP (ref 0.2–1.2)
BILIRUB SERPL-MCNC: <0.2 MG/DL — SIGNIFICANT CHANGE UP (ref 0.2–1.2)
BILIRUB UR-MCNC: NEGATIVE — SIGNIFICANT CHANGE UP
BLD GP AB SCN SERPL QL: NEGATIVE — SIGNIFICANT CHANGE UP
BLOOD GAS VENOUS COMPREHENSIVE RESULT: SIGNIFICANT CHANGE UP
BUN SERPL-MCNC: 38 MG/DL — HIGH (ref 7–23)
BUN SERPL-MCNC: 43 MG/DL — HIGH (ref 7–23)
C3 SERPL-MCNC: 83 MG/DL — LOW (ref 90–180)
C4 SERPL-MCNC: 26 MG/DL — SIGNIFICANT CHANGE UP (ref 10–40)
CA-I SERPL-SCNC: 1.35 MMOL/L — HIGH (ref 1.15–1.33)
CALCIUM SERPL-MCNC: 8.2 MG/DL — LOW (ref 8.4–10.5)
CALCIUM SERPL-MCNC: 8.3 MG/DL — LOW (ref 8.4–10.5)
CHLORIDE BLDV-SCNC: 108 MMOL/L — SIGNIFICANT CHANGE UP (ref 96–108)
CHLORIDE BLDV-SCNC: 111 MMOL/L — HIGH (ref 96–108)
CHLORIDE SERPL-SCNC: 107 MMOL/L — SIGNIFICANT CHANGE UP (ref 98–107)
CHLORIDE SERPL-SCNC: 113 MMOL/L — HIGH (ref 98–107)
CO2 BLDV-SCNC: 16.5 MMOL/L — LOW (ref 22–26)
CO2 BLDV-SCNC: 19.3 MMOL/L — LOW (ref 22–26)
CO2 SERPL-SCNC: 15 MMOL/L — LOW (ref 22–31)
CO2 SERPL-SCNC: 17 MMOL/L — LOW (ref 22–31)
COLOR SPEC: SIGNIFICANT CHANGE UP
CREAT ?TM UR-MCNC: 80 MG/DL — SIGNIFICANT CHANGE UP
CREAT SERPL-MCNC: 1.95 MG/DL — HIGH (ref 0.5–1.3)
CREAT SERPL-MCNC: 2.55 MG/DL — HIGH (ref 0.5–1.3)
DIFF PNL FLD: ABNORMAL
EGFR: 30 ML/MIN/1.73M2 — LOW
EGFR: 42 ML/MIN/1.73M2 — LOW
EOSINOPHIL # BLD AUTO: 0.03 K/UL — SIGNIFICANT CHANGE UP (ref 0–0.5)
EOSINOPHIL # BLD AUTO: 0.06 K/UL — SIGNIFICANT CHANGE UP (ref 0–0.5)
EOSINOPHIL NFR BLD AUTO: 0.5 % — SIGNIFICANT CHANGE UP (ref 0–6)
EOSINOPHIL NFR BLD AUTO: 1.2 % — SIGNIFICANT CHANGE UP (ref 0–6)
EPI CELLS # UR: 1 /HPF — SIGNIFICANT CHANGE UP (ref 0–5)
FERRITIN SERPL-MCNC: 537 NG/ML — HIGH (ref 30–400)
FOLATE SERPL-MCNC: 5.6 NG/ML — SIGNIFICANT CHANGE UP (ref 3.1–17.5)
GAS PNL BLDV: 131 MMOL/L — LOW (ref 136–145)
GAS PNL BLDV: 136 MMOL/L — SIGNIFICANT CHANGE UP (ref 136–145)
GAS PNL BLDV: SIGNIFICANT CHANGE UP
GLUCOSE BLDV-MCNC: 83 MG/DL — SIGNIFICANT CHANGE UP (ref 70–99)
GLUCOSE BLDV-MCNC: 89 MG/DL — SIGNIFICANT CHANGE UP (ref 70–99)
GLUCOSE SERPL-MCNC: 87 MG/DL — SIGNIFICANT CHANGE UP (ref 70–99)
GLUCOSE SERPL-MCNC: 91 MG/DL — SIGNIFICANT CHANGE UP (ref 70–99)
GLUCOSE UR QL: NEGATIVE — SIGNIFICANT CHANGE UP
HCO3 BLDV-SCNC: 15 MMOL/L — LOW (ref 22–29)
HCO3 BLDV-SCNC: 18 MMOL/L — LOW (ref 22–29)
HCT VFR BLD CALC: 25.5 % — LOW (ref 39–50)
HCT VFR BLD CALC: 26.2 % — LOW (ref 39–50)
HCT VFR BLDA CALC: 24 % — LOW (ref 39–51)
HCT VFR BLDA CALC: 25 % — LOW (ref 39–51)
HGB BLD CALC-MCNC: 7.9 G/DL — LOW (ref 12.6–17.4)
HGB BLD CALC-MCNC: 8.3 G/DL — LOW (ref 12.6–17.4)
HGB BLD-MCNC: 7.9 G/DL — LOW (ref 13–17)
HGB BLD-MCNC: 8.1 G/DL — LOW (ref 13–17)
HYALINE CASTS # UR AUTO: 2 /LPF — SIGNIFICANT CHANGE UP (ref 0–7)
IANC: 3.35 K/UL — SIGNIFICANT CHANGE UP (ref 1.8–7.4)
IANC: 4.82 K/UL — SIGNIFICANT CHANGE UP (ref 1.8–7.4)
IMM GRANULOCYTES NFR BLD AUTO: 0.6 % — SIGNIFICANT CHANGE UP (ref 0–0.9)
IMM GRANULOCYTES NFR BLD AUTO: 1.1 % — HIGH (ref 0–0.9)
IRON SATN MFR SERPL: 45 % — SIGNIFICANT CHANGE UP (ref 14–50)
IRON SATN MFR SERPL: 78 UG/DL — SIGNIFICANT CHANGE UP (ref 45–165)
KETONES UR-MCNC: NEGATIVE — SIGNIFICANT CHANGE UP
LACTATE BLDV-MCNC: 0.5 MMOL/L — SIGNIFICANT CHANGE UP (ref 0.5–2)
LACTATE BLDV-MCNC: 0.5 MMOL/L — SIGNIFICANT CHANGE UP (ref 0.5–2)
LEUKOCYTE ESTERASE UR-ACNC: NEGATIVE — SIGNIFICANT CHANGE UP
LIDOCAIN IGE QN: 41 U/L — SIGNIFICANT CHANGE UP (ref 7–60)
LYMPHOCYTES # BLD AUTO: 0.81 K/UL — LOW (ref 1–3.3)
LYMPHOCYTES # BLD AUTO: 0.86 K/UL — LOW (ref 1–3.3)
LYMPHOCYTES # BLD AUTO: 12.4 % — LOW (ref 13–44)
LYMPHOCYTES # BLD AUTO: 16.9 % — SIGNIFICANT CHANGE UP (ref 13–44)
MAGNESIUM SERPL-MCNC: 1.9 MG/DL — SIGNIFICANT CHANGE UP (ref 1.6–2.6)
MAGNESIUM SERPL-MCNC: 2.2 MG/DL — SIGNIFICANT CHANGE UP (ref 1.6–2.6)
MCHC RBC-ENTMCNC: 26 PG — LOW (ref 27–34)
MCHC RBC-ENTMCNC: 26.3 PG — LOW (ref 27–34)
MCHC RBC-ENTMCNC: 30.9 GM/DL — LOW (ref 32–36)
MCHC RBC-ENTMCNC: 31 GM/DL — LOW (ref 32–36)
MCV RBC AUTO: 84.2 FL — SIGNIFICANT CHANGE UP (ref 80–100)
MCV RBC AUTO: 85 FL — SIGNIFICANT CHANGE UP (ref 80–100)
MONOCYTES # BLD AUTO: 0.75 K/UL — SIGNIFICANT CHANGE UP (ref 0–0.9)
MONOCYTES # BLD AUTO: 0.75 K/UL — SIGNIFICANT CHANGE UP (ref 0–0.9)
MONOCYTES NFR BLD AUTO: 11.5 % — SIGNIFICANT CHANGE UP (ref 2–14)
MONOCYTES NFR BLD AUTO: 14.7 % — HIGH (ref 2–14)
NEUTROPHILS # BLD AUTO: 3.35 K/UL — SIGNIFICANT CHANGE UP (ref 1.8–7.4)
NEUTROPHILS # BLD AUTO: 4.82 K/UL — SIGNIFICANT CHANGE UP (ref 1.8–7.4)
NEUTROPHILS NFR BLD AUTO: 65.8 % — SIGNIFICANT CHANGE UP (ref 43–77)
NEUTROPHILS NFR BLD AUTO: 74 % — SIGNIFICANT CHANGE UP (ref 43–77)
NITRITE UR-MCNC: NEGATIVE — SIGNIFICANT CHANGE UP
NRBC # BLD: 0 /100 WBCS — SIGNIFICANT CHANGE UP (ref 0–0)
NRBC # BLD: 0 /100 WBCS — SIGNIFICANT CHANGE UP (ref 0–0)
NRBC # FLD: 0 K/UL — SIGNIFICANT CHANGE UP (ref 0–0)
NRBC # FLD: 0 K/UL — SIGNIFICANT CHANGE UP (ref 0–0)
NT-PROBNP SERPL-SCNC: 142 PG/ML — SIGNIFICANT CHANGE UP
OSMOLALITY UR: 258 MOSM/KG — SIGNIFICANT CHANGE UP (ref 50–1200)
PCO2 BLDV: 36 MMHG — LOW (ref 42–55)
PCO2 BLDV: 42 MMHG — SIGNIFICANT CHANGE UP (ref 42–55)
PH BLDV: 7.24 — LOW (ref 7.32–7.43)
PH BLDV: 7.24 — LOW (ref 7.32–7.43)
PH UR: 6 — SIGNIFICANT CHANGE UP (ref 5–8)
PHOSPHATE SERPL-MCNC: 4.6 MG/DL — HIGH (ref 2.5–4.5)
PHOSPHATE SERPL-MCNC: 5 MG/DL — HIGH (ref 2.5–4.5)
PLATELET # BLD AUTO: 267 K/UL — SIGNIFICANT CHANGE UP (ref 150–400)
PLATELET # BLD AUTO: 283 K/UL — SIGNIFICANT CHANGE UP (ref 150–400)
PO2 BLDV: 26 MMHG — SIGNIFICANT CHANGE UP (ref 25–45)
PO2 BLDV: 49 MMHG — HIGH (ref 25–45)
POTASSIUM BLDV-SCNC: 4.8 MMOL/L — SIGNIFICANT CHANGE UP (ref 3.5–5.1)
POTASSIUM BLDV-SCNC: 5.6 MMOL/L — HIGH (ref 3.5–5.1)
POTASSIUM SERPL-MCNC: 4.8 MMOL/L — SIGNIFICANT CHANGE UP (ref 3.5–5.3)
POTASSIUM SERPL-MCNC: 5.5 MMOL/L — HIGH (ref 3.5–5.3)
POTASSIUM SERPL-SCNC: 4.8 MMOL/L — SIGNIFICANT CHANGE UP (ref 3.5–5.3)
POTASSIUM SERPL-SCNC: 5.5 MMOL/L — HIGH (ref 3.5–5.3)
PROT ?TM UR-MCNC: 244 MG/DL — SIGNIFICANT CHANGE UP
PROT SERPL-MCNC: 4.6 G/DL — LOW (ref 6–8.3)
PROT SERPL-MCNC: 5.1 G/DL — LOW (ref 6–8.3)
PROT UR-MCNC: ABNORMAL
PROT/CREAT UR-RTO: 3 RATIO — HIGH (ref 0–0.2)
RBC # BLD: 3 M/UL — LOW (ref 4.2–5.8)
RBC # BLD: 3.11 M/UL — LOW (ref 4.2–5.8)
RBC # FLD: 13.3 % — SIGNIFICANT CHANGE UP (ref 10.3–14.5)
RBC # FLD: 13.4 % — SIGNIFICANT CHANGE UP (ref 10.3–14.5)
RBC CASTS # UR COMP ASSIST: 77 /HPF — HIGH (ref 0–4)
RH IG SCN BLD-IMP: POSITIVE — SIGNIFICANT CHANGE UP
SAO2 % BLDV: 35.7 % — LOW (ref 67–88)
SAO2 % BLDV: 78 % — SIGNIFICANT CHANGE UP (ref 67–88)
SODIUM SERPL-SCNC: 134 MMOL/L — LOW (ref 135–145)
SODIUM SERPL-SCNC: 137 MMOL/L — SIGNIFICANT CHANGE UP (ref 135–145)
SODIUM UR-SCNC: 30 MMOL/L — SIGNIFICANT CHANGE UP
SP GR SPEC: 1.01 — SIGNIFICANT CHANGE UP (ref 1.01–1.05)
T4 FREE SERPL-MCNC: 1.1 NG/DL — SIGNIFICANT CHANGE UP (ref 0.9–1.8)
T4 FREE+ TSH PNL SERPL: 4.29 UIU/ML — HIGH (ref 0.27–4.2)
TIBC SERPL-MCNC: 172 UG/DL — LOW (ref 220–430)
TROPONIN T, HIGH SENSITIVITY RESULT: 8 NG/L — SIGNIFICANT CHANGE UP
UIBC SERPL-MCNC: 94 UG/DL — LOW (ref 110–370)
UROBILINOGEN FLD QL: SIGNIFICANT CHANGE UP
VIT B12 SERPL-MCNC: 304 PG/ML — SIGNIFICANT CHANGE UP (ref 200–900)
WBC # BLD: 5.09 K/UL — SIGNIFICANT CHANGE UP (ref 3.8–10.5)
WBC # BLD: 6.51 K/UL — SIGNIFICANT CHANGE UP (ref 3.8–10.5)
WBC # FLD AUTO: 5.09 K/UL — SIGNIFICANT CHANGE UP (ref 3.8–10.5)
WBC # FLD AUTO: 6.51 K/UL — SIGNIFICANT CHANGE UP (ref 3.8–10.5)
WBC UR QL: 8 /HPF — HIGH (ref 0–5)

## 2023-04-28 PROCEDURE — 99223 1ST HOSP IP/OBS HIGH 75: CPT | Mod: GC

## 2023-04-28 PROCEDURE — 76770 US EXAM ABDO BACK WALL COMP: CPT | Mod: 26

## 2023-04-28 PROCEDURE — 74176 CT ABD & PELVIS W/O CONTRAST: CPT | Mod: 26,MD

## 2023-04-28 PROCEDURE — 93010 ELECTROCARDIOGRAM REPORT: CPT

## 2023-04-28 PROCEDURE — 93306 TTE W/DOPPLER COMPLETE: CPT | Mod: 26

## 2023-04-28 RX ORDER — PANTOPRAZOLE SODIUM 20 MG/1
40 TABLET, DELAYED RELEASE ORAL ONCE
Refills: 0 | Status: COMPLETED | OUTPATIENT
Start: 2023-04-28 | End: 2023-04-28

## 2023-04-28 RX ORDER — SODIUM ZIRCONIUM CYCLOSILICATE 10 G/10G
5 POWDER, FOR SUSPENSION ORAL ONCE
Refills: 0 | Status: COMPLETED | OUTPATIENT
Start: 2023-04-28 | End: 2023-04-28

## 2023-04-28 RX ORDER — CHOLECALCIFEROL (VITAMIN D3) 125 MCG
1000 CAPSULE ORAL DAILY
Refills: 0 | Status: DISCONTINUED | OUTPATIENT
Start: 2023-04-28 | End: 2023-05-04

## 2023-04-28 RX ORDER — MYCOPHENOLIC ACID 180 MG/1
720 TABLET, DELAYED RELEASE ORAL
Refills: 0 | Status: DISCONTINUED | OUTPATIENT
Start: 2023-04-28 | End: 2023-05-03

## 2023-04-28 RX ORDER — HEPARIN SODIUM 5000 [USP'U]/ML
5000 INJECTION INTRAVENOUS; SUBCUTANEOUS EVERY 8 HOURS
Refills: 0 | Status: COMPLETED | OUTPATIENT
Start: 2023-04-28 | End: 2023-05-01

## 2023-04-28 RX ORDER — ALBUMIN HUMAN 25 %
250 VIAL (ML) INTRAVENOUS EVERY 6 HOURS
Refills: 0 | Status: DISCONTINUED | OUTPATIENT
Start: 2023-04-28 | End: 2023-04-28

## 2023-04-28 RX ORDER — SODIUM CHLORIDE 9 MG/ML
1000 INJECTION INTRAMUSCULAR; INTRAVENOUS; SUBCUTANEOUS ONCE
Refills: 0 | Status: COMPLETED | OUTPATIENT
Start: 2023-04-28 | End: 2023-04-28

## 2023-04-28 RX ORDER — HYDROXYCHLOROQUINE SULFATE 200 MG
1 TABLET ORAL
Refills: 0 | DISCHARGE

## 2023-04-28 RX ORDER — PANTOPRAZOLE SODIUM 20 MG/1
40 TABLET, DELAYED RELEASE ORAL
Refills: 0 | Status: DISCONTINUED | OUTPATIENT
Start: 2023-04-28 | End: 2023-05-08

## 2023-04-28 RX ORDER — HYDROXYCHLOROQUINE SULFATE 200 MG
200 TABLET ORAL DAILY
Refills: 0 | Status: DISCONTINUED | OUTPATIENT
Start: 2023-04-28 | End: 2023-05-04

## 2023-04-28 RX ORDER — MYCOPHENOLATE MOFETIL 250 MG/1
1500 CAPSULE ORAL
Refills: 0 | Status: DISCONTINUED | OUTPATIENT
Start: 2023-04-28 | End: 2023-04-28

## 2023-04-28 RX ADMIN — HEPARIN SODIUM 5000 UNIT(S): 5000 INJECTION INTRAVENOUS; SUBCUTANEOUS at 22:15

## 2023-04-28 RX ADMIN — SODIUM CHLORIDE 1000 MILLILITER(S): 9 INJECTION INTRAMUSCULAR; INTRAVENOUS; SUBCUTANEOUS at 01:27

## 2023-04-28 RX ADMIN — Medication 1000 UNIT(S): at 13:02

## 2023-04-28 RX ADMIN — PANTOPRAZOLE SODIUM 40 MILLIGRAM(S): 20 TABLET, DELAYED RELEASE ORAL at 17:35

## 2023-04-28 RX ADMIN — MYCOPHENOLATE MOFETIL 1500 MILLIGRAM(S): 250 CAPSULE ORAL at 17:35

## 2023-04-28 RX ADMIN — HEPARIN SODIUM 5000 UNIT(S): 5000 INJECTION INTRAVENOUS; SUBCUTANEOUS at 14:23

## 2023-04-28 RX ADMIN — Medication 200 MILLIGRAM(S): at 13:02

## 2023-04-28 RX ADMIN — SODIUM ZIRCONIUM CYCLOSILICATE 5 GRAM(S): 10 POWDER, FOR SUSPENSION ORAL at 03:07

## 2023-04-28 NOTE — CONSULT NOTE ADULT - SUBJECTIVE AND OBJECTIVE BOX
***consult has been received. note is in progress and incomplete without attending attestation***    Celso Fermin MD, PGY-4  Rheumatology Fellow  Reachable on TEAMS    LIZ SILVA  [id number]    HISTORY OF PRESENT ILLNESS:    Mr. Silva is a 48M with h/o SLE c/b LN 3/4 who presents with 1-1.5months of recurrent nausea/vomiting and associated weight loss. He says it is primarily occurs about an hour an hour and a half after taking his evening dose of mycophenolate.  He expresses some confusion because he takes this medication twice a day and has no symptoms in the morning.  He says that he takes the medication with food as well.  He was told by his outpatient provider that he may need another biopsy, but he does not know of what.     In the background he has noticed that he has had some progressive dyspnea on exertion particularly as his job where he works as a .  He says that he does not have any orthopnea or bendopnea, but he says that his appetite is decreased.  He denies any chest pain associated with the shortness of breath.  He also denies any fever/chills, but he has noted some "feeling cold" intermittently.    He says he has been taking all of his medications as prescribed, including following his instructed taper of prednisone.      In the ED,  VS: HR 63 RR 18 /72 Temp 99.0 O2 100% RA  PEx: Unremarkable, no abd pain  Labs: CBC Hgb 8.1 Hct 26.2 Plt 283 WBC 6.51 | BMP Na 134 K 5.5 Cl 107 CO2  17 BUN 43 Cr 2.55 Gluc 91 Ca 8.2 | Prot 5.1 Alb 2.6 TBili < 0.2 AST 12 ALT 6 ALP 62 Mg 2.2 Phos 5.0 | VBG 7.24/42/26/18 | UPCR 3.0 UOsm 258 Shalonda 30 UCr 80 FeNa 0.7% | UA with Lrage blood & RBCs  Imaging: CT A/P unremarkable  ECG:   Micro:  Interventions: 1L NS, Lokelma 5mg  Impression: Nausea/Vomiting, possibly polypharmacy    On the floor, He denies any complaints specifically this morning.  Review of systems as above and below.   (2023 06:51)    SLE history:    pt followed by Dr. Nilton malcolm  Was evaluated by nephrology, underwent renal biopsy in 2022: Focal proliferative class III, segmental endocapillary hypercellularity in 12%, a single small cellular crescents, mild acute interstitial nephritis, NIH activity score 4/24, no chronicity   Home regimen: prednisone 5 mg daily  MMF 3 g daily hydroxychloroquine  Obinutuzumab 10/21/22 ([facility] protocol) second dose 11/1/22    3/3/23 labs  dsDNA: 21  C3: 100  C4: 34  UPCR: 3.2023 -> 5.9 April 7 -> 4.7 April 4.7  Cr: 1.34 -> 1.84    2022 serology:    MÓNICA 1:1280 (speckled) and MÓNICA titer 2 1:640 (homogenous.)   Myomarker panel 3: weakly positive U2 RNP, weakly positive Anti-Pm/Scl, strongly positive  U1 RNP, strongly positive SSA/Ro 52, CK wnl   C3/C4 LOW, DsDNA > 1000  Sm, RNP, SSA/SSB> 8  UA: Large blood  Prt/Crea 0.8  APS labs negative      -pt was Started on hydroxychloroquine 200 mg twice a day, on prednisone 60mg qD  with plan to taper down prednisone  was also started on Cellcept 1000mg BID, with plan to titrate up to 3g daily. However w/ questionable compliance as was not picking up prescriptions and not following up w/ rheum  10/2022 presented w/ SLE flare w/ fever and rash for 1 week  - lupus serologies from 10/12/22 still showed high lupus activity, dsDNA 834, and low C3, C4 complement  - Pt received prednisone 40mg qD x2, and prednisone 60mg x1 in the ED. Rash appears to have improved  - baseline Cr 0.8, currently 1.6-1.7 with hyperkalemia, concern for worsening lupus nephritis  - repeat Urine P/Cr 2.9 (was 0.8 in 2022)   - renal US negative for hydronephrosis    - s/p renal bx on 10/17  with worsening disease - Class 4 LN - compliance vs Cellcept failure  - S/p Solu-medrol 500mg x 3 days (10/14- 10/16), continue prednisone 60mg (10/17- current)    - Gayzva started - first dose 10/21/22 ([facility] protocol) second dose 22  - hep panel negative in 2022  - Quant gold negative 2022        Rheum ROS    Denies fevers/chills/weight loss/night sweats/alopecia/sinus disease/asthma history/oral ulcers/dysphagia/vision changes/Raynauds/VTE/miscarraiges/sicca symptoms/urinary changes/edema/SOB/joint pain/swelling/jaw claudication/vision changes/rash/photosensitivity/morning stiffness    Endorses fevers/chills/weight loss/night sweats/alopecia/sinus disease/asthma history/oral ulcers/dysphagia/vision changes/Raynauds/VTE/miscarraiges/sicca symptoms/urinary changes/edema/SOB/joint pain/swelling/jaw claudication/vision changes/rash/photosensitivity/morning stiffness      MEDICATIONS  (STANDING):  cholecalciferol 1000 Unit(s) Oral daily  heparin   Injectable 5000 Unit(s) SubCutaneous every 8 hours  hydroxychloroquine 200 milliGRAM(s) Oral daily  mycophenolate mofetil 1500 milliGRAM(s) Oral two times a day  predniSONE   Tablet 5 milliGRAM(s) Oral daily  trimethoprim   80 mG/sulfamethoxazole 400 mG 1 Tablet(s) Oral <User Schedule>    MEDICATIONS  (PRN):      Allergies    No Known Allergies    Intolerances        PERTINENT MEDICATION HISTORY:    SOCIAL HISTORY:  OCCUPATION:  TRAVEL HISTORY:    FAMILY HISTORY:      Vital Signs Last 24 Hrs  T(C): 36.7 (2023 05:45), Max: 37.2 (2023 21:39)  T(F): 98.1 (2023 05:45), Max: 98.9 (2023 21:39)  HR: 73 (2023 05:45) (63 - 79)  BP: 111/60 (2023 05:45) (111/60 - 131/72)  BP(mean): --  RR: 16 (2023 05:45) (15 - 18)  SpO2: 100% (2023 05:45) (100% - 100%)    Parameters below as of 2023 05:45  Patient On (Oxygen Delivery Method): room air        Physical Exam:  General: No apparent distress  HEENT: EOMI, MMM  CVS: +S1/S2, RRR, no murmurs/rubs/gallops  Resp: CTA b/l. No crackles/wheezing  GI: Soft, NT/ND +BS  MSK: no swelling/warmth/erythema of the joints of the UE/LE  Neuro: AAOx3  Skin: no visible rashes    LABS:                        7.9    5.09  )-----------( 267      ( 2023 09:08 )             25.5     04-28    137  |  113<H>  |  38<H>  ----------------------------<  87  4.8   |  15<L>  |  1.95<H>    Ca    8.3<L>      2023 09:08  Phos  4.6     -  Mg     1.90         TPro  4.6<L>  /  Alb  2.2<L>  /  TBili  <0.2  /  DBili  x   /  AST  15  /  ALT  6   /  AlkPhos  56        Urinalysis Basic - ( 2023 01:24 )    Color: Light Yellow / Appearance: Clear / S.010 / pH: x  Gluc: x / Ketone: Negative  / Bili: Negative / Urobili: <2 mg/dL   Blood: x / Protein: 300 mg/dL / Nitrite: Negative   Leuk Esterase: Negative / RBC: 77 /HPF / WBC 8 /HPF   Sq Epi: x / Non Sq Epi: x / Bacteria: Negative    Surgical Pathology Report:   ACCESSION No:  [id number]  Patient:   LIZ SILVA      Accession:                             [id number]    Collected Date/Time:                   10/17/2022 14:28 EDT  Received Date/Time:                    10/17/2022 16:48 EDT    Surgical Pathology Report - Auth (Verified)    Specimen(s) Submitted  Left kidney core biopsy    Final Diagnosis    Kidney, needle core biopsy    Lupus nephritis, ISN/RPS class IV, with a diffuse proliferative and    crescentic pattern of glomerular injury    - Active (cellular and fibrocellular) crescents are present in 12 of  20 glomeruli (60% of  viable glomeruli)  - There are confluent subendothelial and mesangial, and isolated  subepithelial deposits  in the glomeruli with immunoreactivity for    IgG (4+), IgA (2+), IgM  (trace), C3 (2+),  C1q (3+), kappa LC (3+) and lambda LC (3+)  - Activity index: 15  - Chronicity index: 3/12    Acute interstitial nephritis, mild, and acute tubular injury with focal  tubular necrosis  - One calcium oxalate crystal profile presents in the lumen of an  atrophied tubule,  hyperoxaluric state should be ruled out    Summary of chronic changes:  - No global glomerulosclerosis  - Focal healed crescent (5% of glomeruli)  - Focal tubular atrophy and interstitial fibrosis (15-20% of the  cortex)  - Mild arterial and arteriolar sclerosis    Comment:  The preliminary findings were communicated via email to Dr. Bang on 10/18/2022 at 12:51PM.    Verified by: Katlin Locke MD  (Electronic Signature)  Reported on: 10/20/22 21:26 EDT, [facility], [address] [place] [id number]  _________________________________________________________________    Microscopic Description    1. Light Microscopy:    Sections of formalin-fixed, paraffin embedded tissue were evaluated using  H&E, PAS, JMS, and trichrome stains. An H&E-stained frozen section taken  from the tissue allocated for immunofluorescence microscopy and semi-  thin toluidine blue-stained epoxy sections of the tissue processed for  electron microscopy were also evaluated using light microscopy.    The sample submitted for light microscopy consists of renal cortex and  medulla, with up to 16 glomeruli. The entire biopsy contains 25 glomeruli  (LM-16; IF-4; EM-5). Glomeruli with global sclerosis are not seen. The  glomeruli are enlarged, hypercellular, and have lobular appearance.  There are 12 glomeruli with active cellular crescents and focal fibrinoid  necrosis of the tuft. 1 glomerulus contains fibrocellular crescents.  1 glomerulus is involved by fibrous crescent (healed crescent).  The glomeruli reveal occasional distinctive double contours of the  basement membranes. Few "wire-loops  " are identified along glomerular  capillaries.  Many capillary loops contain circulating mononuclear and  polymorphonuclear inflammatory cells. The mesangium is mildly expanded  by extracellular matrix and increased numbers of mesangial cells. Non-  atrophic tubules are enlarged and show mild distension. Tubular epithelial  cells reveal focal microvesicular degeneration. Many tubules contain  protein reabsorption granules in the epithelial cells. Several tubules  contain granular casts and red blood cell casts. One calcium oxalate  crystal profile presents in an atrophied and dilated tubule. Obvious  signs of a viral cytopathic effect are not seen in the sample. The  interstitium contains mild inflammatory infiltrates. The infiltrates are    composed of mononuclear cells. Approximately 15-20% of the renal cortex  shows tubular atrophy and interstitial fibrosis.    Activity Index: 15/24  Necrosis/karyorrhexis (A.I. 1x2=2)  Kirkland formation (A.I. 2x2=6)  Wire loops/microthrombi (A.I.=1)  Hypercellularity of the tuft (A.I.=3)  Neutrophil infiltration (A.I.=1)  Interstitial inflammation (A.I.=2)    Chronicity Index: 3/12  Global glomerulosclerosis (C.I.=0)  Fibrous crescents(C.I.=1).  Tubular atrophy (C.I.=1)  Interstitial fibrosis (C.I.=1)    2. Immunofluorescence Microscopy:    The sections of the sample submitted for immunofluorescence studies were  incubated with antibodies specific for the heavy chains of IgG, IgA, and  IgM, for kappa and lambda light chains, fibrin, albumin, and complement  components C3 and C1q. The intensity of immunofluorescence reactivity is  expressed on a scale 1  -4+.    The sample contains 4 glomeruli. Glomeruli with global sclerosis are  not seen. There is fine granular reactivity for IgG (4+), IgA (2+),  IgM (trace), C3 (2+), C1q (3+), kappa LC (3+) and lambdaLC (3+) both  along the capillaries and in the mesangium. Fibrin deposits are present  segmentally in 3 glomeruli, representing inflammation, segmental  fibrinoid necrosis and crescents. Isolated glomerular epithelial cells  contain protein reabsorption cytoplasmic granules reactive for albumin,  IgG, and C3 in 1 glomerulus. Tubular basement membranes show focal fine  granular deposition of IgG (2+), C1q (1+), kappa light chain (2+) and  lambda light chain (trace). Tubules contain several intraluminal casts  reactive for polyclonal IgA. The interstitium reveals scattered fibrin  deposits. The vessels exhibit deposition of IgG, kappa light chain,  lambda light chain and C1q. There is no difference in reactivity between  kappa and lambda light chains in the glomeruli, tubular casts or in the  background of the tissue.    3. Electron Microscopy:    The sample submitted for electron microscopy examination contains 5 viable  glomeruli; 2 glomeruli are examined ultrastructurally. The glomerular  visceral epithelial cells reveal mild segmental effacement of their foot  processes. The glomerular basement membranes are of normal thickness  and texture. There are confluent subendothelial and mesangial, and rare  and isolated subepithelial electron dense deposits in the glomeruli.  Tubular basement membranes also reveal focal electron dense deposits.  The deposits are finely granular with no definitive substructures. The  endothelial cells show focal loss of fenestrations. Tubuloreticular  inclusions are seen within the cytoplasm of glomerular endothelial cells.  Few capillary lumens are occluded by the presence of inflammatory cells.  The mesangium reveals increased cellularity and an increased amount of  matrix. Many electron dense deposits are present in the mesangium.      Clinical Information  47-year-old man with SLE and history of non-compliance referred to  ER from dermatologist for worsening rash and fever. Scr 1.66, UA  showed proteinuria and hematuria. UPCR 2.9, alb 2.4. DsDNA titer  were significantly elevated at 834 and complement C3 and C4 levels  were low. s/p steroid pulse for 3 days (10/14-10/16) and started on  immunosuppressive treatment.  Previous kidney biopsy (2022, Accession# [id number]) showed class 3  LN. A single small cellular crescent, mild acute interstitial nephritis,  NIH activity score 4/24 with no chronicity was also noted on kidney  biopsy.    Surgical Pathology Report:   ACCESSION No:  [id number]  Patient:   LIZ SILVA      Accession:                             [id number]    Collected Date/Time:                   2021 16:00 EST  Received Date/Time:                    2021 09:55 EST    Addendum Report - Auth (Verified)    Addendum  Immunostain  for syphilis (technical part done at [facility]) is negative.    Verified by: Sachin Gatica MD  (Electronic Signature)  Reported on: 21 17:56 EST, Dermatopathology,  [address], [address], [place] [address]  Phone: [phone]   Fax: [phone]  _________________________________________________________________    Surgical Pathology Report - Auth (Verified)  Specimen(s) Submitted  1-Right upper back 4 mm punch biopsy    Final Diagnosis    Right upper back, punch biopsy  - Interface dermatitis with increased dermal mucin    Note:  The differential diagnosis includes a connective tissue disease,  viral  exanthem and a drug eruption. A few   perivascular plasma cells  are seen.  Immunostain  for syphilis is pending.  Clinical correlation  and serology tests are recommended.   PAS stain fails to reveal fungal  elements.  Verified by: Sachin Gatica MD  (Electronic Signature)  Reported on: 21 11:39 EST, Dermatopathology,  [address], [address], [place] [address]  Phone: [phone]   Fax: [phone]  _________________________________________________________________      Clinical Information  Differential diagnosis infectious (HSV/VZV ) versus inflammatory  (psoriasis )    Gross Description  Received:  In formalin labeled  "right upper back"  one skin punch biopsy  Diameter:  0.3 cm   Depth:  0.7 cm  Epidermal Surface:  Tan and unremarkable  Submitted:  Entirely in one cassette: 1A    Calderon Rasheed 2021 11:33 AM    Disclaimer  In addition to other data that may appear on the specimen containers, all  labels have been inspected to confirm the presence of the patient's name  and date of birth.    Histological Processing Performed at [facility],  Department of Pathology, [address] [place] [address]. (21 @ 16:00)      Rheumatology Work Up    C3 Complement, Serum: 83 mg/dL *L* [90 - 180] (23 @ 09:08)    C4 Complement, Serum: 26 mg/dL [10 - 40] (23 @ 09:08)    Protein/Creatinine Ratio Calculation: 3.0 Ratio *H* [0.0 - 0.2] (23 @ 02:42)    Protein/Creatinine Ratio Calculation: 3.2 Ratio *H* [0.0 - 0.2] (22 @ 07:40)      RADIOLOGY & ADDITIONAL STUDIES:  < from: CT Abdomen and Pelvis No Cont (23 @ 01:49) >  ACC: [id number] EXAM:  CT ABDOMEN AND PELVIS   ORDERED BY: JOSE A RAZA     PROCEDURE DATE:  2023          INTERPRETATION:  CLINICAL INFORMATION: Vomiting and 20 pound weight loss    COMPARISON: None.    CONTRAST/COMPLICATIONS:  IV Contrast: NONE  Oral Contrast: NONE  Complications: None reported at time of study completion    PROCEDURE:  CT of the Abdomen and Pelvis was performed.  Sagittal and coronal reformats were performed.    FINDINGS:  LOWER CHEST: Within normal limits.    LIVER: Within normal limits.  BILE DUCTS: Normal caliber.  GALLBLADDER: Cholecystectomy.  SPLEEN: Within normal limits.  PANCREAS: Within normal limits.  ADRENALS: Within normal limits.  KIDNEYS/URETERS: Within normal limits.    BLADDER: Within normal limits.  REPRODUCTIVE ORGANS: Prostate within normal limits.    BOWEL: No bowel obstruction. Appendix is normal.  PERITONEUM: No ascites.  VESSELS: Within normal limits.  RETROPERITONEUM/LYMPH NODES: No lymphadenopathy.  ABDOMINAL WALL: Within normal limits.  BONES: Within normal limits.    IMPRESSION:  No acute abdominal pathology.    < end of copied text >    < from: US Kidney and Bladder (23 @ 08:44) >    ACC: [id number] EXAM:  US KIDNEYS AND BLADDER   ORDERED BY: DARWIN LAWRENCE     PROCEDURE DATE:  2023          INTERPRETATION:  CLINICAL INFORMATION: Acute kidney injury    COMPARISON: CT dated 2023    TECHNIQUE: Sonography of the kidneys andbladder.    FINDINGS:  Right kidney: 9.9 cm. No renal mass, hydronephrosis or calculi.    Left kidney: 11.0 cm. No renal mass, hydronephrosis or calculi.    Urinary bladder: Within normal limits.    IMPRESSION:  No hydronephrosis.      < end of copied text >     Celso Fermin MD, PGY-4  Rheumatology Fellow  Reachable on TEAMS    LIZ SILVA  [id number]    HISTORY OF PRESENT ILLNESS:    Mr. Silva is a 48M with h/o SLE c/b LN 3/4 who presents with 1-1.5months of recurrent nausea/vomiting and associated weight loss. He says it is primarily occurs about an hour an hour and a half after taking his evening dose of mycophenolate.  He expresses some confusion because he takes this medication twice a day and has no symptoms in the morning.  He says that he takes the medication with food as well.  He was told by his outpatient provider that he may need another biopsy, but he does not know of what.     In the background he has noticed that he has had some progressive dyspnea on exertion particularly as his job where he works as a .  He says that he does not have any orthopnea or bendopnea, but he says that his appetite is decreased.  He denies any chest pain associated with the shortness of breath.  He also denies any fever/chills, but he has noted some "feeling cold" intermittently.    He says he has been taking all of his medications as prescribed, including following his instructed taper of prednisone.    In the ED,  VS: HR 63 RR 18 /72 Temp 99.0 O2 100% RA  PEx: Unremarkable, no abd pain  Labs: CBC Hgb 8.1 Hct 26.2 Plt 283 WBC 6.51 | BMP Na 134 K 5.5 Cl 107 CO2  17 BUN 43 Cr 2.55 Gluc 91 Ca 8.2 | Prot 5.1 Alb 2.6 TBili < 0.2 AST 12 ALT 6 ALP 62 Mg 2.2 Phos 5.0 | VBG 7.24/42/26/18 | UPCR 3.0 UOsm 258 Shalonda 30 UCr 80 FeNa 0.7% | UA with Lrage blood & RBCs  Imaging: CT A/P unremarkable  ECG:   Micro:  Interventions: 1L NS, Lokelma 5mg  Impression: Nausea/Vomiting, possibly polypharmacy    On the floor, He denies any complaints specifically this morning.  Review of systems as above and below.   (2023 06:51)    Rheumatology consulted for history of SLE/LN. Gathered with assistance from Wolof  Kiersten ID: [id number]. Patient has been having ANGUIANO and fatigue for the past month. Also associated with nausea/vomitting for the past month as well. Patient had his CellCept increased in January, then started experiencing GI symptoms 2 months after medication increase. Emesis has progressed from occasional to daily occurance 1 hour after dinner. Patient has been having Cellcept with meals. Associated with 12 lb weight loss.  Denies abdominal pain. Denies joint pain/rash/oral ulcers/chest pain      SLE history:    pt followed by Dr. Nilton malcolm  Was evaluated by nephrology, underwent renal biopsy in 2022: Focal proliferative class III, segmental endocapillary hypercellularity in 12%, a single small cellular crescents, mild acute interstitial nephritis, NIH activity score , no chronicity   then second renal biopsy Oct 2022 showed Class IV LN with crescentic disease Activity: 15/24 Chronicity: 3/12   Home regimen: prednisone 5 mg daily  MMF 3 g daily hydroxychloroquine  Obinutuzumab 10/21/22 ([facility] protocol) second dose 11/1/22    3/3/23 labs  dsDNA: 21  C3: 100  C4: 34  UPCR: 3.2023 -> 5.9 April 7 -> 4.7 April 4.7  Cr: 1.34 -> 1.84    2022 serology:    MÓNICA 1:1280 (speckled) and MÓNICA titer 2 1:640 (homogenous.)   Myomarker panel 3: weakly positive U2 RNP, weakly positive Anti-Pm/Scl, strongly positive  U1 RNP, strongly positive SSA/Ro 52, CK wnl   C3/C4 LOW, DsDNA > 1000  Sm, RNP, SSA/SSB> 8  UA: Large blood  Prt/Crea 0.8  APS labs negative    -pt was Started on hydroxychloroquine 200 mg twice a day, on prednisone 60mg qD  with plan to taper down prednisone  was also started on Cellcept 1000mg BID, with plan to titrate up to 3g daily. However w/ questionable compliance as was not picking up prescriptions and not following up w/ rheum  10/2022 presented w/ SLE flare w/ fever and rash for 1 week  - lupus serologies from 10/12/22 still showed high lupus activity, dsDNA 834, and low C3, C4 complement  - Pt received prednisone 40mg qD x2, and prednisone 60mg x1 in the ED. Rash appears to have improved  - baseline Cr 0.8, currently 1.6-1.7 with hyperkalemia, concern for worsening lupus nephritis  - repeat Urine P/Cr 2.9 (was 0.8 in 2022)   - s/p second renal bx on 10/17/22  with worsening disease - Class 4 LN - compliance vs Cellcept failure  - S/p Solu-medrol 500mg x 3 days (10/14- 10/16) with prednisone taper   - Maryannva started - first dose 10/21/22 ([facility] protocol) second dose 22  - hep panel negative in 2022  - Quant gold negative 2022        MEDICATIONS  (STANDING):  cholecalciferol 1000 Unit(s) Oral daily  heparin   Injectable 5000 Unit(s) SubCutaneous every 8 hours  hydroxychloroquine 200 milliGRAM(s) Oral daily  mycophenolate mofetil 1500 milliGRAM(s) Oral two times a day  predniSONE   Tablet 5 milliGRAM(s) Oral daily  trimethoprim   80 mG/sulfamethoxazole 400 mG 1 Tablet(s) Oral <User Schedule>    MEDICATIONS  (PRN):      Allergies    No Known Allergies    Intolerances        Vital Signs Last 24 Hrs  T(C): 36.7 (2023 05:45), Max: 37.2 (2023 21:39)  T(F): 98.1 (2023 05:45), Max: 98.9 (2023 21:39)  HR: 73 (2023 05:45) (63 - 79)  BP: 111/60 (2023 05:45) (111/60 - 131/72)  BP(mean): --  RR: 16 (2023 05:45) (15 - 18)  SpO2: 100% (2023 05:45) (100% - 100%)    Parameters below as of 2023 05:45  Patient On (Oxygen Delivery Method): room air      Physical Exam:  General: No apparent distress  HEENT: EOMI, MMM  CVS: +S1/S2, RRR, no murmurs/rubs/gallops  Resp: CTA b/l. No crackles/wheezing  GI: Soft, NT/ND +BS  MSK: no swelling/warmth/erythema of the joints of the UE/LE  Neuro: AAOx3  Skin: no visible rashes    LABS:                        7.9    5.09  )-----------( 267      ( 2023 09:08 )             25.5     04-    137  |  113<H>  |  38<H>  ----------------------------<  87  4.8   |  15<L>  |  1.95<H>    Ca    8.3<L>      2023 09:08  Phos  4.6       Mg     1.90         TPro  4.6<L>  /  Alb  2.2<L>  /  TBili  <0.2  /  DBili  x   /  AST  15  /  ALT  6   /  AlkPhos  56        Urinalysis Basic - ( 2023 01:24 )    Color: Light Yellow / Appearance: Clear / S.010 / pH: x  Gluc: x / Ketone: Negative  / Bili: Negative / Urobili: <2 mg/dL   Blood: x / Protein: 300 mg/dL / Nitrite: Negative   Leuk Esterase: Negative / RBC: 77 /HPF / WBC 8 /HPF   Sq Epi: x / Non Sq Epi: x / Bacteria: Negative    Surgical Pathology Report:   ACCESSION No:  10 [id number]  Patient:   LIZ SILVA      Accession:                             [id number]    Collected Date/Time:                   10/17/2022 14:28 EDT  Received Date/Time:                    10/17/2022 16:48 EDT    Surgical Pathology Report - Auth (Verified)    Specimen(s) Submitted  Left kidney core biopsy    Final Diagnosis    Kidney, needle core biopsy    Lupus nephritis, ISN/RPS class IV, with a diffuse proliferative and    crescentic pattern of glomerular injury    - Active (cellular and fibrocellular) crescents are present in 12 of  20 glomeruli (60% of  viable glomeruli)  - There are confluent subendothelial and mesangial, and isolated  subepithelial deposits  in the glomeruli with immunoreactivity for    IgG (4+), IgA (2+), IgM  (trace), C3 (2+),  C1q (3+), kappa LC (3+) and lambda LC (3+)  - Activity index: 15/24  - Chronicity index: 3/12    Acute interstitial nephritis, mild, and acute tubular injury with focal  tubular necrosis  - One calcium oxalate crystal profile presents in the lumen of an  atrophied tubule,  hyperoxaluric state should be ruled out    Summary of chronic changes:  - No global glomerulosclerosis  - Focal healed crescent (5% of glomeruli)  - Focal tubular atrophy and interstitial fibrosis (15-20% of the  cortex)  - Mild arterial and arteriolar sclerosis    Comment:  The preliminary findings were communicated via email to Dr. Bang on 10/18/2022 at 12:51PM.    Verified by: Katlin Locke MD  (Electronic Signature)  Reported on: 10/20/22 21:26 EDT, [facility], [address] [place] [id number]  _________________________________________________________________    Microscopic Description    1. Light Microscopy:    Sections of formalin-fixed, paraffin embedded tissue were evaluated using  H&E, PAS, JMS, and trichrome stains. An H&E-stained frozen section taken  from the tissue allocated for immunofluorescence microscopy and semi-  thin toluidine blue-stained epoxy sections of the tissue processed for  electron microscopy were also evaluated using light microscopy.    The sample submitted for light microscopy consists of renal cortex and  medulla, with up to 16 glomeruli. The entire biopsy contains 25 glomeruli  (LM-16; IF-4; EM-5). Glomeruli with global sclerosis are not seen. The  glomeruli are enlarged, hypercellular, and have lobular appearance.  There are 12 glomeruli with active cellular crescents and focal fibrinoid  necrosis of the tuft. 1 glomerulus contains fibrocellular crescents.  1 glomerulus is involved by fibrous crescent (healed crescent).  The glomeruli reveal occasional distinctive double contours of the  basement membranes. Few "wire-loops  " are identified along glomerular  capillaries.  Many capillary loops contain circulating mononuclear and  polymorphonuclear inflammatory cells. The mesangium is mildly expanded  by extracellular matrix and increased numbers of mesangial cells. Non-  atrophic tubules are enlarged and show mild distension. Tubular epithelial  cells reveal focal microvesicular degeneration. Many tubules contain  protein reabsorption granules in the epithelial cells. Several tubules  contain granular casts and red blood cell casts. One calcium oxalate  crystal profile presents in an atrophied and dilated tubule. Obvious  signs of a viral cytopathic effect are not seen in the sample. The  interstitium contains mild inflammatory infiltrates. The infiltrates are    composed of mononuclear cells. Approximately 15-20% of the renal cortex  shows tubular atrophy and interstitial fibrosis.    Activity Index: 15/24  Necrosis/karyorrhexis (A.I. 1x2=2)  Pillager formation (A.I. 2x2=6)  Wire loops/microthrombi (A.I.=1)  Hypercellularity of the tuft (A.I.=3)  Neutrophil infiltration (A.I.=1)  Interstitial inflammation (A.I.=2)    Chronicity Index: 3/12  Global glomerulosclerosis (C.I.=0)  Fibrous crescents(C.I.=1).  Tubular atrophy (C.I.=1)  Interstitial fibrosis (C.I.=1)    2. Immunofluorescence Microscopy:    The sections of the sample submitted for immunofluorescence studies were  incubated with antibodies specific for the heavy chains of IgG, IgA, and  IgM, for kappa and lambda light chains, fibrin, albumin, and complement  components C3 and C1q. The intensity of immunofluorescence reactivity is  expressed on a scale 1  -4+.    The sample contains 4 glomeruli. Glomeruli with global sclerosis are  not seen. There is fine granular reactivity for IgG (4+), IgA (2+),  IgM (trace), C3 (2+), C1q (3+), kappa LC (3+) and lambdaLC (3+) both  along the capillaries and in the mesangium. Fibrin deposits are present  segmentally in 3 glomeruli, representing inflammation, segmental  fibrinoid necrosis and crescents. Isolated glomerular epithelial cells  contain protein reabsorption cytoplasmic granules reactive for albumin,  IgG, and C3 in 1 glomerulus. Tubular basement membranes show focal fine  granular deposition of IgG (2+), C1q (1+), kappa light chain (2+) and  lambda light chain (trace). Tubules contain several intraluminal casts  reactive for polyclonal IgA. The interstitium reveals scattered fibrin  deposits. The vessels exhibit deposition of IgG, kappa light chain,  lambda light chain and C1q. There is no difference in reactivity between  kappa and lambda light chains in the glomeruli, tubular casts or in the  background of the tissue.    3. Electron Microscopy:    The sample submitted for electron microscopy examination contains 5 viable  glomeruli; 2 glomeruli are examined ultrastructurally. The glomerular  visceral epithelial cells reveal mild segmental effacement of their foot  processes. The glomerular basement membranes are of normal thickness  and texture. There are confluent subendothelial and mesangial, and rare  and isolated subepithelial electron dense deposits in the glomeruli.  Tubular basement membranes also reveal focal electron dense deposits.  The deposits are finely granular with no definitive substructures. The  endothelial cells show focal loss of fenestrations. Tubuloreticular  inclusions are seen within the cytoplasm of glomerular endothelial cells.  Few capillary lumens are occluded by the presence of inflammatory cells.  The mesangium reveals increased cellularity and an increased amount of  matrix. Many electron dense deposits are present in the mesangium.      Clinical Information  47-year-old man with SLE and history of non-compliance referred to  ER from dermatologist for worsening rash and fever. Scr 1.66, UA  showed proteinuria and hematuria. UPCR 2.9, alb 2.4. DsDNA titer  were significantly elevated at 834 and complement C3 and C4 levels  were low. s/p steroid pulse for 3 days (10/14-10/16) and started on  immunosuppressive treatment.  Previous kidney biopsy (2022, Accession# [id number]) showed class 3  LN. A single small cellular crescent, mild acute interstitial nephritis,  NIH activity score 4/24 with no chronicity was also noted on kidney  biopsy.    Surgical Pathology Report:   ACCESSION No:  [id number]  Patient:   LIZ SILVA      Accession:                             [id number]    Collected Date/Time:                   2021 16:00 EST  Received Date/Time:                    2021 09:55 EST    Addendum Report - Auth (Verified)    Addendum  Immunostain  for syphilis (technical part done at [facility]) is negative.    Verified by: Sachin Gatica MD  (Electronic Signature)  Reported on: 21 17:56 EST, D[facility],  [address], [address], [place] [address]  Phone: [phone]   Fax: [phone]  _________________________________________________________________    Surgical Pathology Report - Auth (Verified)  Specimen(s) Submitted  1-Right upper back 4 mm punch biopsy    Final Diagnosis    Right upper back, punch biopsy  - Interface dermatitis with increased dermal mucin    Note:  The differential diagnosis includes a connective tissue disease,  viral  exanthem and a drug eruption. A few   perivascular plasma cells  are seen.  Immunostain  for syphilis is pending.  Clinical correlation  and serology tests are recommended.   PAS stain fails to reveal fungal  elements.  Verified by: Sachin Gatica MD  (Electronic Signature)  Reported on: 21 11:39 EST, D[facility],  [address], [address], [place] [address]  Phone: [phone]   Fax: [phone]  _________________________________________________________________      Clinical Information  Differential diagnosis infectious (HSV/VZV ) versus inflammatory  (psoriasis )    Gross Description  Received:  In formalin labeled  "right upper back"  one skin punch biopsy  Diameter:  0.3 cm   Depth:  0.7 cm  Epidermal Surface:  Tan and unremarkable  Submitted:  Entirely in one cassette: ANDREA Rasheed 2021 11:33 AM    Disclaimer  In addition to other data that may appear on the specimen containers, all  labels have been inspected to confirm the presence of the patient's name  and date of birth.    Histological Processing Performed at [facility],  Department of Pathology, [address] [place] [address]. (21 @ 16:00)      Rheumatology Work Up    C3 Complement, Serum: 83 mg/dL *L* [90 - 180] (23 @ 09:08)    C4 Complement, Serum: 26 mg/dL [10 - 40] (23 @ 09:08)    Protein/Creatinine Ratio Calculation: 3.0 Ratio *H* [0.0 - 0.2] (23 @ 02:42)    Protein/Creatinine Ratio Calculation: 3.2 Ratio *H* [0.0 - 0.2] (22 @ 07:40)      RADIOLOGY & ADDITIONAL STUDIES:  < from: CT Abdomen and Pelvis No Cont (23 @ 01:49) >  ACC: [id number] EXAM:  CT ABDOMEN AND PELVIS   ORDERED BY: JOSE A RAZA     PROCEDURE DATE:  2023          INTERPRETATION:  CLINICAL INFORMATION: Vomiting and 20 pound weight loss    COMPARISON: None.    CONTRAST/COMPLICATIONS:  IV Contrast: NONE  Oral Contrast: NONE  Complications: None reported at time of study completion    PROCEDURE:  CT of the Abdomen and Pelvis was performed.  Sagittal and coronal reformats were performed.    FINDINGS:  LOWER CHEST: Within normal limits.    LIVER: Within normal limits.  BILE DUCTS: Normal caliber.  GALLBLADDER: Cholecystectomy.  SPLEEN: Within normal limits.  PANCREAS: Within normal limits.  ADRENALS: Within normal limits.  KIDNEYS/URETERS: Within normal limits.    BLADDER: Within normal limits.  REPRODUCTIVE ORGANS: Prostate within normal limits.    BOWEL: No bowel obstruction. Appendix is normal.  PERITONEUM: No ascites.  VESSELS: Within normal limits.  RETROPERITONEUM/LYMPH NODES: No lymphadenopathy.  ABDOMINAL WALL: Within normal limits.  BONES: Within normal limits.    IMPRESSION:  No acute abdominal pathology.    < end of copied text >    < from: US Kidney and Bladder (23 @ 08:44) >    ACC: [id number] EXAM:  US KIDNEYS AND BLADDER   ORDERED BY: DARWIN LAWRENCE     PROCEDURE DATE:  2023          INTERPRETATION:  CLINICAL INFORMATION: Acute kidney injury    COMPARISON: CT dated 2023    TECHNIQUE: Sonography of the kidneys andbladder.    FINDINGS:  Right kidney: 9.9 cm. No renal mass, hydronephrosis or calculi.    Left kidney: 11.0 cm. No renal mass, hydronephrosis or calculi.    Urinary bladder: Within normal limits.    IMPRESSION:  No hydronephrosis.      < end of copied text >

## 2023-04-28 NOTE — H&P ADULT - NSHPLABSRESULTS_GEN_ALL_CORE
LABORATORY DATA                        8.1    6.51  )-----------( 283      ( 2023 23:59 )             26.2         134<L>  |  107  |  43<H>  ----------------------------<  91  5.5<H>   |  17<L>  |  2.55<H>    Ca    8.2<L>      2023 23:59  Phos  5.0       Mg     2.20         TPro  5.1<L>  /  Alb  2.6<L>  /  TBili  <0.2  /  DBili  x   /  AST  12  /  ALT  6   /  AlkPhos  62            Urinalysis Basic - ( 2023 01:24 )    Color: Light Yellow / Appearance: Clear / S.010 / pH: x  Gluc: x / Ketone: Negative  / Bili: Negative / Urobili: <2 mg/dL   Blood: x / Protein: 300 mg/dL / Nitrite: Negative   Leuk Esterase: Negative / RBC: 77 /HPF / WBC 8 /HPF   Sq Epi: x / Non Sq Epi: x / Bacteria: Negative        pH, Venous: 7.24 (23 @ 23:43)  pCO2, Venous: 42 mmHg (23 @ 23:43)  pO2, Venous: 26 mmHg (23 @ 23:43)  HCO3, Venous: 18 mmol/L (23 @ 23:43)    Blood Gas Venous - Lactate: 0.5 mmol/L (23 @ 23:43)      CAPILLARY BLOOD GLUCOSE      POCT Blood Glucose.: 123 mg/dL (2023 21:48)      IMAGING REVIEW  < from: CT Abdomen and Pelvis No Cont (23 @ 01:49) >    FINDINGS:  LOWER CHEST: Within normal limits.    LIVER: Within normal limits.  BILE DUCTS: Normal caliber.  GALLBLADDER: Cholecystectomy.  SPLEEN: Within normal limits.  PANCREAS: Within normal limits.  ADRENALS: Within normal limits.  KIDNEYS/URETERS: Within normal limits.    BLADDER: Within normal limits.  REPRODUCTIVE ORGANS: Prostate within normal limits.    BOWEL: No bowel obstruction. Appendix is normal.  PERITONEUM: No ascites.  VESSELS: Within normal limits.  RETROPERITONEUM/LYMPH NODES: No lymphadenopathy.  ABDOMINAL WALL: Within normal limits.  BONES: Within normal limits.    IMPRESSION:  No acute abdominal pathology.    < end of copied text >    MICROBIOLOGY REVIEW      CARDIOLOGY REVIEW LABORATORY DATA                        8.1    6.51  )-----------( 283      ( 2023 23:59 )             26.2         134<L>  |  107  |  43<H>  ----------------------------<  91  5.5<H>   |  17<L>  |  2.55<H>    Ca    8.2<L>      2023 23:59  Phos  5.0       Mg     2.20         TPro  5.1<L>  /  Alb  2.6<L>  /  TBili  <0.2  /  DBili  x   /  AST  12  /  ALT  6   /  AlkPhos  62        Urinalysis Basic - ( 2023 01:24 )    Color: Light Yellow / Appearance: Clear / S.010 / pH: x  Gluc: x / Ketone: Negative  / Bili: Negative / Urobili: <2 mg/dL   Blood: x / Protein: 300 mg/dL / Nitrite: Negative   Leuk Esterase: Negative / RBC: 77 /HPF / WBC 8 /HPF   Sq Epi: x / Non Sq Epi: x / Bacteria: Negative        pH, Venous: 7.24 (23 @ 23:43)  pCO2, Venous: 42 mmHg (23 @ 23:43)  pO2, Venous: 26 mmHg (23 @ 23:43)  HCO3, Venous: 18 mmol/L (23 @ 23:43)    Blood Gas Venous - Lactate: 0.5 mmol/L (23 @ 23:43)      CAPILLARY BLOOD GLUCOSE      POCT Blood Glucose.: 123 mg/dL (2023 21:48)      IMAGING REVIEW  < from: CT Abdomen and Pelvis No Cont (23 @ 01:49) >    FINDINGS:  LOWER CHEST: Within normal limits.    LIVER: Within normal limits.  BILE DUCTS: Normal caliber.  GALLBLADDER: Cholecystectomy.  SPLEEN: Within normal limits.  PANCREAS: Within normal limits.  ADRENALS: Within normal limits.  KIDNEYS/URETERS: Within normal limits.    BLADDER: Within normal limits.  REPRODUCTIVE ORGANS: Prostate within normal limits.    BOWEL: No bowel obstruction. Appendix is normal.  PERITONEUM: No ascites.  VESSELS: Within normal limits.  RETROPERITONEUM/LYMPH NODES: No lymphadenopathy.  ABDOMINAL WALL: Within normal limits.  BONES: Within normal limits.    IMPRESSION:  No acute abdominal pathology.    < end of copied text >    MICROBIOLOGY REVIEW      CARDIOLOGY REVIEW

## 2023-04-28 NOTE — CONSULT NOTE ADULT - PROBLEM SELECTOR RECOMMENDATION 9
Pt. with LIZETH on CKD due to poor intake. Serum creatinine on presentation 2.55 (4/27) and improved to 1.95 with IVF. Outpatient labs reviewed, Scr 1.3-1.8 noted with worsening pattern. Hold home lasix and ARB. No more IVF. Encourage PO intake. Monitor labs and urine output. Avoid any potential nephrotoxins. Dose medications as per eGFR.

## 2023-04-28 NOTE — H&P ADULT - NSHPREVIEWOFSYSTEMS_GEN_ALL_CORE
GEN: No fever, chills, night sweats, weight loss  EYES: No vision changes, irritation, itchiness  ENT: No ear pain, congestion, sore throat  RESP: No cough or trouble breathing  CARDIOVASCULAR: No chest pain or palpitations  GI: No nausea/vomiting, diarrhea, constipation  :  No change in urine output; no dysuria, hematuria, or discharge  MSK: No joint or muscle pain  SKIN: No rashes  NEURO: No headache; no abnormal movements; no numbness or tingling  Remainder negative, except as per the HPI GEN: No fever, chills, night sweats, weight loss  EYES: No vision changes, irritation, itchiness  ENT: No ear pain, congestion, sore throat  RESP: No cough or trouble breathing  CARDIOVASCULAR: No chest pain or palpitations  GI: as per HPI  :  No change in urine output; no dysuria, hematuria, or discharge  MSK: No joint or muscle pain  SKIN: No rashes  NEURO: No headache; no abnormal movements; no numbness or tingling  Remainder negative, except as per the HPI GEN: as per HPI  EYES: No vision changes, irritation, itchiness  ENT: No ear pain, congestion, sore throat  RESP: No cough or trouble breathing  CARDIOVASCULAR: as per HPI  GI: as per HPI  :  No change in urine output; no dysuria, hematuria, or discharge  MSK: No joint or muscle pain  SKIN: No rashes  NEURO: No headache; no abnormal movements; no numbness or tingling  Remainder negative, except as per the HPI

## 2023-04-28 NOTE — H&P ADULT - ATTENDING COMMENTS
Lupus with SLE nephritis 4, prior bx in 2017, on multiple immunomodulators who now p/w LIZETH on CKD 3 w metabolic acidosis, hyperkalemia, concern for REBEKAH nephritis progression vs failure of treatment.   awaiting rheum and renal input  clinically euvolemic, prior TTE normal cardiac fx though on exam w trace to +1 edema, awaiting repeat TTE  defer immunomodulators to rheum except will cw home prednisone to avoid adrenal crisis  send complement and DSDNa for now   imaging wo hydro  now pita po well, if there is some prerenal component will look for improvement in renal fx

## 2023-04-28 NOTE — CONSULT NOTE ADULT - ASSESSMENT
Interventional Radiology    Evaluate for Procedure:     HPI: 48M with h/o SLE c/b lupus nephritis who presents with persistent nausea/vomiting after medication administration at home and found to have LIZETH vs progression of lupus nephritis. Renal US and Non contrast CT unremarkable. IR consulted for renal biopsy.     Allergies: No Known Allergies    Medications (Abx/Cardiac/Anticoagulation/Blood Products)    heparin   Injectable: 5000 Unit(s) SubCutaneous (04-28 @ 14:23)  hydroxychloroquine: 200 milliGRAM(s) Oral (04-28 @ 13:02)    Data:  167.6  62.2  T(C): 36.8  HR: 75  BP: 105/65  RR: 17  SpO2: 100%    -WBC 5.09 / HgB 7.9 / Hct 25.5 / Plt 267  -Na 137 / Cl 113 / BUN 38 / Glucose 87  -K 4.8 / CO2 15 / Cr 1.95  -ALT 6 / Alk Phos 56 / T.Bili <0.2  -INR <0.90 / PTT 27.9    Radiology:   Reviewed with attending     Assessment/Plan:   -48M with h/o SLE c/b lupus nephritis who presents with persistent nausea/vomiting after medication administration at home and found to have LIZETH vs progression of lupus nephritis. Renal US and Non contrast CT unremarkable. IR consulted for renal biopsy.   Imaging reviewed, patient is appropriate candidate for ultrasound guided renal biopsy.       - case reviewed and approved for Tuesday 05/02/23  - please place IR procedure order under Dr. Jeronimo   - STAT labs in AM (cbc,coags, bmp, T&S)  - Patient currently not on AC. please notify IR if plan to initiate AC before procedure date.   - NPO on 5/1/23 at 11:59pm    - d/w primary team    Case d/w Dr. Kandace Vega MD PGY-2

## 2023-04-28 NOTE — CONSULT NOTE ADULT - ATTENDING COMMENTS
Patient seen and examined at bedside. Agree with assessment and plan as above.     Patient with known h/o Class IV crescentic LN s/o Obinutuzumab 1000 mg x 2 doses in Nov 2022 and on CellCept 3 pills twice daily presents with nausea and vomiting a/w increase in serum Cr and fluctuating proteinuria. Overall, appears to have had a partial response to current therapy and likely still to have fairly active disease given sediments on U/A and persistent proteinuria. Has been fairly compliant with medications and follow up. Plan to make changes to CellCept regimen as above to see if that helps his presenting symptoms. Renal biopsy being considered to measure response and establish a new baseline particularly in consideration for switching therapy and enrollment in clinical trial. Tentatively planned for 5/2 - will f/u
LIZETH  Lupus nephritis    Cont to hold ACE-I and lasix  Will need repeat renal biopsy to see if he is responding to treatment as he was thought to have not as an outpatient  Plan as outlined in fellow's note above

## 2023-04-28 NOTE — CONSULT NOTE ADULT - SUBJECTIVE AND OBJECTIVE BOX
After Visit Summary   6/7/2017    Ambrocio Vázquez    MRN: [id number]           Patient Information     Date Of Birth          1951        Visit Information        Provider Department      6/7/2017 1:30 PM Divya Friedman OD [facility]        Today's Diagnoses     Presbyopia    -  1    Myopia of right eye        Hyperopia of left eye with astigmatism        Cataracts, both eyes          Care Instructions        A final glasses prescription was given.  The prescription change is very mild, no change necessary.  Monitor cataracts by having yearly exams.  Wear sunglasses when outside.  Return to clinic 1 year for Comprehensive Vision Exam      Divya Friedman O.D  [facility]  [address]. [place]  [address]    [phone]                  Follow-ups after your visit        Follow-up notes from your care team     Return in about 1 year (around 6/7/2018) for Eye Exam.      Who to contact     If you have questions or need follow up information about today's clinic visit or your schedule please contact [facility] directly at [phone].  Normal or non-critical lab and imaging results will be communicated to you by [employer], letter or phone within 4 business days after the clinic has received the results. If you do not hear from us within 7 days, please contact the clinic through [employer] or phone. If you have a critical or abnormal lab result, we will notify you by phone as soon as possible.  Submit refill requests through [employer] or call your pharmacy and they will forward the refill request to us. Please allow 3 business days for your refill to be completed.          Additional Information About Your Visit        Staaffhart Information     [employer] gives you secure access to your electronic health record. If you see a primary care provider, you can also send messages to your care team and make appointments. If you have questions, please call your  primary care clinic.  If you do not have a primary care provider, please call [phone] and they will assist you.        Care EveryWhere ID     This is your Care EveryWhere ID. This could be used by other organizations to access your [place] medical records  [id number]         Blood Pressure from Last 3 Encounters:   03/28/17 (!) 174/108   02/27/17 124/79   02/09/17 132/74    Weight from Last 3 Encounters:   03/28/17 83.9 kg (185 lb)   02/09/17 77.4 kg (170 lb 11.2 oz)   01/26/17 78.9 kg (174 lb)              We Performed the Following     EYE EXAM (SIMPLE-NONBILLABLE)     REFRACTION        Primary Care Provider Office Phone # Fax #    Joelle Randle [phone]       [facility] [address] [address] [facility] [address]        Thank you!     Thank you for choosing [facility]  for your care. Our goal is always to provide you with excellent care. Hearing back from our patients is one way we can continue to improve our services. Please take a few minutes to complete the written survey that you may receive in the mail after your visit with us. Thank you!             Your Updated Medication List - Protect others around you: Learn how to safely use, store and throw away your medicines at www.disposemymeds.org.          This list is accurate as of: 6/7/17  2:52 PM.  Always use your most recent med list.                   Brand Name Dispense Instructions for use    ALEVE 220 MG capsule   Generic drug:  naproxen sodium      Take 220 mg by mouth as needed.       aspirin 81 MG tablet          ONE DAILY       finasteride 5 MG tablet    PROSCAR    90 tablet    Take 1 tablet (5 mg) by mouth daily Patient said he takes one tablet daily       fish oil-omega-3 fatty acids 1000 MG capsule      Take 1 g by mouth 2 times daily       HYDROcodone-acetaminophen 5-325 MG per tablet    NORCO    7 tablet    Take 1 tablet by mouth every 4 hours as needed       levOCARNitine 330 MG tablet     CARNITOR    90 tablet    Take 1 tablet (330 mg) by mouth daily       lisinopril 30 MG tablet    PRINIVIL,ZESTRIL    90 tablet    Take 1 tablet (30 mg) by mouth daily       metoprolol 25 MG 24 hr tablet    TOPROL-XL    45 tablet    Take 0.5 tablets (12.5 mg) by mouth daily       nitroglycerin 0.4 MG sublingual tablet    NITROSTAT    25 tablet    Place 1 tablet (0.4 mg) under the tongue See Admin Instructions for chest pain       ofloxacin 0.3 % otic solution    FLOXIN    5 mL    PLACE 5 DROPS INTO THE LEFT EAR TWO TIMES A DAY FOR 7 DAYS       promethazine 25 MG tablet    PHENERGAN    7 tablet    Take 0.5 tablets (12.5 mg) by mouth every 6 hours as needed for nausea       sildenafil 20 MG tablet    REVATIO/VIAGRA    50 tablet    Take 2-5 tablets ( mg) by mouth as needed for erectile dysfunction.  Never use with nitroglycerin, terazosin or doxazosin.       simvastatin 40 MG tablet    ZOCOR    90 tablet    Take 1 tablet (40 mg) by mouth At Bedtime          [facility] DIVISION OF KIDNEY DISEASES AND HYPERTENSION -- [phone]  -- INITIAL CONSULT NOTE  --------------------------------------------------------------------------------  HPI:  48 y.o M w/ PMhx of SLE who presents with persistent       PAST HISTORY  --------------------------------------------------------------------------------  PAST MEDICAL & SURGICAL HISTORY:  Lupus nephritis      Systemic lupus      No significant past surgical history        FAMILY HISTORY:    PAST SOCIAL HISTORY:    ALLERGIES & MEDICATIONS  --------------------------------------------------------------------------------  Allergies    No Known Allergies    Intolerances      Standing Inpatient Medications  cholecalciferol 1000 Unit(s) Oral daily  heparin   Injectable 5000 Unit(s) SubCutaneous every 8 hours  hydroxychloroquine 200 milliGRAM(s) Oral daily  mycophenolate mofetil 1500 milliGRAM(s) Oral two times a day  pantoprazole    Tablet 40 milliGRAM(s) Oral before breakfast  pantoprazole    Tablet 40 milliGRAM(s) Oral once  predniSONE   Tablet 5 milliGRAM(s) Oral daily  trimethoprim   80 mG/sulfamethoxazole 400 mG 1 Tablet(s) Oral <User Schedule>    PRN Inpatient Medications      REVIEW OF SYSTEMS  --------------------------------------------------------------------------------  Gen: No fevers/chills  Skin: No rashes  Head/Eyes/Ears: Normal hearing,   Respiratory: No dyspnea, cough  CV: No chest pain  GI: No abdominal pain, diarrhea  : No dysuria, hematuria  MSK: No  edema  Heme: No easy bruising or bleeding  Psych: No significant depression    All other systems were reviewed and are negative, except as noted.    VITALS/PHYSICAL EXAM  --------------------------------------------------------------------------------  T(C): 36.8 (04-28-23 @ 15:29), Max: 37.2 (04-27-23 @ 21:39)  HR: 75 (04-28-23 @ 15:29) (63 - 79)  BP: 105/65 (04-28-23 @ 15:29) (105/65 - 131/72)  RR: 17 (04-28-23 @ 15:29) (15 - 18)  SpO2: 100% (04-28-23 @ 15:29) (100% - 100%)  Wt(kg): --  Height (cm): 167.6 (04-28-23 @ 03:50)  Weight (kg): 62.2 (04-28-23 @ 03:50)  BMI (kg/m2): 22.1 (04-28-23 @ 03:50)  BSA (m2): 1.7 (04-28-23 @ 03:50)      Physical Exam:  	Gen: NAD  	HEENT: MMM  	Pulm: CTA B/L  	CV: S1S2  	Abd: Soft, +BS   	Ext: No LE edema B/L  	Neuro: Awake  	Skin: Warm and dry  	Vascular access:    LABS/STUDIES  --------------------------------------------------------------------------------              7.9    5.09  >-----------<  267      [04-28-23 @ 09:08]              25.5     137  |  113  |  38  ----------------------------<  87      [04-28-23 @ 09:08]  4.8   |  15  |  1.95        Ca     8.3     [04-28-23 @ 09:08]      Mg     1.90     [04-28-23 @ 09:08]      Phos  4.6     [04-28-23 @ 09:08]    TPro  4.6  /  Alb  2.2  /  TBili  <0.2  /  DBili  x   /  AST  15  /  ALT  6   /  AlkPhos  56  [04-28-23 @ 09:08]          Creatinine Trend:  SCr 1.95 [04-28 @ 09:08]  SCr 2.55 [04-27 @ 23:59]    Urinalysis - [04-28-23 @ 01:24]      Color Light Yellow / Appearance Clear / SG 1.010 / pH 6.0      Gluc Negative / Ketone Negative  / Bili Negative / Urobili <2 mg/dL       Blood Large / Protein 300 mg/dL / Leuk Est Negative / Nitrite Negative      RBC 77 / WBC 8 / Hyaline 2 / Gran  / Sq Epi  / Non Sq Epi  / Bacteria Negative    Urine Creatinine 80      [04-28-23 @ 02:42]  Urine Protein 244      [04-28-23 @ 02:42]  Urine Sodium 30      [04-28-23 @ 02:42]  Urine Osmolality 258      [04-28-23 @ 02:42]    Iron 78, TIBC 172, %sat 45      [04-28-23 @ 09:08]  Ferritin 537      [04-28-23 @ 09:08]    HBsAg Nonreact      [10-15-22 @ 06:20]  HCV 0.14, Nonreact      [10-15-22 @ 06:20]  HIV Nonreact      [10-13-22 @ 15:14]    MÓNICA: titer 1:640, pattern Homogeneous      [11-27-21 @ 14:24]  dsDNA 62      [11-01-22 @ 07:10]  C3 Complement 83      [04-28-23 @ 09:08]  C4 Complement 26      [04-28-23 @ 09:08]  Rheumatoid Factor 63      [11-27-21 @ 16:43]  ANCA: cANCA Negative, pANCA Negative, atypical ANCA Indeterminate Method interference due to MÓNICA Fluorescence      [11-28-21 @ 11:28]  Syphilis Screen (Treponema Pallidum Ab) Negative      [11-27-21 @ 14:24]   [facility] DIVISION OF KIDNEY DISEASES AND HYPERTENSION -- [phone]  -- INITIAL CONSULT NOTE  --------------------------------------------------------------------------------  HPI:  48 y.o M w/ PMhx of SLE and LN who presents with one month of vomiting and weight loss. Nephrology consulted for LIZETH on CKD and history of lupus nephritis     Patient follows with rheumatology and Dr. Shultz regularly on the outpatient. Recently, concern for worsening Serum creatinine and treatment failure. Patient was told to come the hospital for another renal biopsy. Patient also complaining of vomiting daily around 6-7pm after taking the cellcept. He endorses poor appetite and weight loss. Denies heart burn, CP, SOB, abdominal pain, difficulty with urination. He has noticed increased bubbles in the urine. Endorses compliance with Cellcept 1500 BID, Pred, plaquenil, lasix and lisinopril. Also take bactrim for ppx    Patient follows with Dr. Shultz on outpatient. Patient underwent a renal biopsy in 2/2022 when he presented with hematuria, non-nephrotic range proteinuria and elevated MÓNICA titers in Feb 2022. Renal biopsy at that time showed focal proliferative type, class 3 LN. A single small cellular crescent, mild acute interstitial nephritis, NIH activity score 4/24 with no chronicity was also noted on kidney biopsy. Patient was started on SLE treatment per rheumatology. Patient again presented to the hospital on 10/13/22 for a worsening skin rash concerning for lupus flare. Scr at that time was 1.66. UA on 10/13 with proteinuria and hematuria. Urine spot TP/CR elevated at 2.9 on 10/13/22. US kidneys done on 10/14/22 showed no hydronephrosis. DsDNA titre were significantly elevated at 834 and complement C3 and C4 levels were low on labs done on 10/11/22. Rheumatology was consulted and pt. was initiated on high dose steroids. Repeat kidney biopsy on 10/17/22 showed class IV LN with crescentic and diffuse proliferative patterns of injury. Active crescents with mild AIN. Received obinutuzumab during hospitalization and immunosuppression managed by rheumatology outpatient. On discharge, the Scr had stabilized to 1.53.       PAST HISTORY  --------------------------------------------------------------------------------  PAST MEDICAL & SURGICAL HISTORY:  Lupus nephritis      Systemic lupus      No significant past surgical history        FAMILY HISTORY:    PAST SOCIAL HISTORY:    ALLERGIES & MEDICATIONS  --------------------------------------------------------------------------------  Allergies    No Known Allergies    Intolerances      Standing Inpatient Medications  cholecalciferol 1000 Unit(s) Oral daily  heparin   Injectable 5000 Unit(s) SubCutaneous every 8 hours  hydroxychloroquine 200 milliGRAM(s) Oral daily  mycophenolate mofetil 1500 milliGRAM(s) Oral two times a day  pantoprazole    Tablet 40 milliGRAM(s) Oral before breakfast  pantoprazole    Tablet 40 milliGRAM(s) Oral once  predniSONE   Tablet 5 milliGRAM(s) Oral daily  trimethoprim   80 mG/sulfamethoxazole 400 mG 1 Tablet(s) Oral <User Schedule>    PRN Inpatient Medications      REVIEW OF SYSTEMS  --------------------------------------------------------------------------------  Gen: No fevers/chills  Skin: No rashes  Head/Eyes/Ears: Normal hearing,   Respiratory: No dyspnea, cough  CV: No chest pain  GI: Per HPI  : No dysuria, hematuria +bubbles in urine   MSK: No  edema  Heme: No easy bruising or bleeding  Psych: No significant depression    All other systems were reviewed and are negative, except as noted.    VITALS/PHYSICAL EXAM  --------------------------------------------------------------------------------  T(C): 36.8 (04-28-23 @ 15:29), Max: 37.2 (04-27-23 @ 21:39)  HR: 75 (04-28-23 @ 15:29) (63 - 79)  BP: 105/65 (04-28-23 @ 15:29) (105/65 - 131/72)  RR: 17 (04-28-23 @ 15:29) (15 - 18)  SpO2: 100% (04-28-23 @ 15:29) (100% - 100%)  Wt(kg): --  Height (cm): 167.6 (04-28-23 @ 03:50)  Weight (kg): 62.2 (04-28-23 @ 03:50)  BMI (kg/m2): 22.1 (04-28-23 @ 03:50)  BSA (m2): 1.7 (04-28-23 @ 03:50)      Physical Exam:  	Gen: NAD  	HEENT: MMM  	Pulm: CTA B/L  	CV: S1S2  	Abd: Soft, +BS   	Ext: trace LE edema B/L  	Neuro: Awake  	Skin: Warm and dry  	Vascular access:    LABS/STUDIES  --------------------------------------------------------------------------------              7.9    5.09  >-----------<  267      [04-28-23 @ 09:08]              25.5     137  |  113  |  38  ----------------------------<  87      [04-28-23 @ 09:08]  4.8   |  15  |  1.95        Ca     8.3     [04-28-23 @ 09:08]      Mg     1.90     [04-28-23 @ 09:08]      Phos  4.6     [04-28-23 @ 09:08]    TPro  4.6  /  Alb  2.2  /  TBili  <0.2  /  DBili  x   /  AST  15  /  ALT  6   /  AlkPhos  56  [04-28-23 @ 09:08]          Creatinine Trend:  SCr 1.95 [04-28 @ 09:08]  SCr 2.55 [04-27 @ 23:59]    Urinalysis - [04-28-23 @ 01:24]      Color Light Yellow / Appearance Clear / SG 1.010 / pH 6.0      Gluc Negative / Ketone Negative  / Bili Negative / Urobili <2 mg/dL       Blood Large / Protein 300 mg/dL / Leuk Est Negative / Nitrite Negative      RBC 77 / WBC 8 / Hyaline 2 / Gran  / Sq Epi  / Non Sq Epi  / Bacteria Negative    Urine Creatinine 80      [04-28-23 @ 02:42]  Urine Protein 244      [04-28-23 @ 02:42]  Urine Sodium 30      [04-28-23 @ 02:42]  Urine Osmolality 258      [04-28-23 @ 02:42]    Iron 78, TIBC 172, %sat 45      [04-28-23 @ 09:08]  Ferritin 537      [04-28-23 @ 09:08]    HBsAg Nonreact      [10-15-22 @ 06:20]  HCV 0.14, Nonreact      [10-15-22 @ 06:20]  HIV Nonreact      [10-13-22 @ 15:14]    MÓNICA: titer 1:640, pattern Homogeneous      [11-27-21 @ 14:24]  dsDNA 62      [11-01-22 @ 07:10]  C3 Complement 83      [04-28-23 @ 09:08]  C4 Complement 26      [04-28-23 @ 09:08]  Rheumatoid Factor 63      [11-27-21 @ 16:43]  ANCA: cANCA Negative, pANCA Negative, atypical ANCA Indeterminate Method interference due to MÓNICA Fluorescence      [11-28-21 @ 11:28]  Syphilis Screen (Treponema Pallidum Ab) Negative      [11-27-21 @ 14:24]   [facility] DIVISION OF KIDNEY DISEASES AND HYPERTENSION -- [phone]  -- INITIAL CONSULT NOTE  --------------------------------------------------------------------------------  HPI:  48 y.o M w/ PMhx of SLE and LN who presents with one month of vomiting and weight loss. Nephrology consulted for LIZETH on CKD and history of lupus nephritis     Patient follows with rheumatology and Dr. Shultz regularly on the outpatient. Recently, concern for worsening Serum creatinine and treatment failure. Patient was told to come the hospital for another renal biopsy. Patient also complaining of vomiting daily around 6-7pm after taking the cellcept. He endorses poor appetite and weight loss. Denies heart burn, CP, SOB, abdominal pain, difficulty with urination. He has noticed increased bubbles in the urine. Endorses compliance with Cellcept 1500 BID, Pred, plaquenil, lasix and lisinopril. Also take bactrim for ppx    Patient follows with Dr. Shultz on outpatient. Patient underwent a renal biopsy in 2/2022 when he presented with hematuria, non-nephrotic range proteinuria and elevated MÓNICA titers in Feb 2022. Renal biopsy at that time showed focal proliferative type, class 3 LN. A single small cellular crescent, mild acute interstitial nephritis, NIH activity score 4/24 with no chronicity was also noted on kidney biopsy. Patient was started on SLE treatment per rheumatology. Patient again presented to the hospital on 10/13/22 for a worsening skin rash concerning for lupus flare. Scr at that time was 1.66. UA on 10/13 with proteinuria and hematuria. Urine spot TP/CR elevated at 2.9 on 10/13/22. US kidneys done on 10/14/22 showed no hydronephrosis. DsDNA titre were significantly elevated at 834 and complement C3 and C4 levels were low on labs done on 10/11/22. Rheumatology was consulted and pt. was initiated on high dose steroids. Repeat kidney biopsy on 10/17/22 showed class IV LN with crescentic and diffuse proliferative patterns of injury. Active crescents with mild AIN. Received obinutuzumab during hospitalization and immunosuppression managed by rheumatology outpatient. On discharge, the Scr had stabilized to 1.53.       PAST HISTORY  --------------------------------------------------------------------------------  PAST MEDICAL & SURGICAL HISTORY:  Lupus nephritis      Systemic lupus      No significant past surgical history        FAMILY HISTORY: Reviewed and non contributory    PAST SOCIAL HISTORY: No smoking, drugs or alcohol use    ALLERGIES & MEDICATIONS  --------------------------------------------------------------------------------  Allergies    No Known Allergies    Intolerances      Standing Inpatient Medications  cholecalciferol 1000 Unit(s) Oral daily  heparin   Injectable 5000 Unit(s) SubCutaneous every 8 hours  hydroxychloroquine 200 milliGRAM(s) Oral daily  mycophenolate mofetil 1500 milliGRAM(s) Oral two times a day  pantoprazole    Tablet 40 milliGRAM(s) Oral before breakfast  pantoprazole    Tablet 40 milliGRAM(s) Oral once  predniSONE   Tablet 5 milliGRAM(s) Oral daily  trimethoprim   80 mG/sulfamethoxazole 400 mG 1 Tablet(s) Oral <User Schedule>    PRN Inpatient Medications      REVIEW OF SYSTEMS  --------------------------------------------------------------------------------  Gen: No fevers/chills  Skin: No rashes  Head/Eyes/Ears: Normal hearing,   Respiratory: No dyspnea, cough  CV: No chest pain  GI: Per HPI  : No dysuria, hematuria +bubbles in urine   MSK: No  edema  Heme: No easy bruising or bleeding  Psych: No significant depression    All other systems were reviewed and are negative, except as noted.    VITALS/PHYSICAL EXAM  --------------------------------------------------------------------------------  T(C): 36.8 (04-28-23 @ 15:29), Max: 37.2 (04-27-23 @ 21:39)  HR: 75 (04-28-23 @ 15:29) (63 - 79)  BP: 105/65 (04-28-23 @ 15:29) (105/65 - 131/72)  RR: 17 (04-28-23 @ 15:29) (15 - 18)  SpO2: 100% (04-28-23 @ 15:29) (100% - 100%)  Wt(kg): --  Height (cm): 167.6 (04-28-23 @ 03:50)  Weight (kg): 62.2 (04-28-23 @ 03:50)  BMI (kg/m2): 22.1 (04-28-23 @ 03:50)  BSA (m2): 1.7 (04-28-23 @ 03:50)      Physical Exam:  	Gen: NAD  	HEENT: MMM  	Pulm: CTA B/L  	CV: S1S2  	Abd: Soft, +BS   	Ext: trace LE edema B/L  	Neuro: Awake  	Skin: Warm and dry  	Vascular access:    LABS/STUDIES  --------------------------------------------------------------------------------              7.9    5.09  >-----------<  267      [04-28-23 @ 09:08]              25.5     137  |  113  |  38  ----------------------------<  87      [04-28-23 @ 09:08]  4.8   |  15  |  1.95        Ca     8.3     [04-28-23 @ 09:08]      Mg     1.90     [04-28-23 @ 09:08]      Phos  4.6     [04-28-23 @ 09:08]    TPro  4.6  /  Alb  2.2  /  TBili  <0.2  /  DBili  x   /  AST  15  /  ALT  6   /  AlkPhos  56  [04-28-23 @ 09:08]          Creatinine Trend:  SCr 1.95 [04-28 @ 09:08]  SCr 2.55 [04-27 @ 23:59]    Urinalysis - [04-28-23 @ 01:24]      Color Light Yellow / Appearance Clear / SG 1.010 / pH 6.0      Gluc Negative / Ketone Negative  / Bili Negative / Urobili <2 mg/dL       Blood Large / Protein 300 mg/dL / Leuk Est Negative / Nitrite Negative      RBC 77 / WBC 8 / Hyaline 2 / Gran  / Sq Epi  / Non Sq Epi  / Bacteria Negative    Urine Creatinine 80      [04-28-23 @ 02:42]  Urine Protein 244      [04-28-23 @ 02:42]  Urine Sodium 30      [04-28-23 @ 02:42]  Urine Osmolality 258      [04-28-23 @ 02:42]    Iron 78, TIBC 172, %sat 45      [04-28-23 @ 09:08]  Ferritin 537      [04-28-23 @ 09:08]    HBsAg Nonreact      [10-15-22 @ 06:20]  HCV 0.14, Nonreact      [10-15-22 @ 06:20]  HIV Nonreact      [10-13-22 @ 15:14]    MÓNICA: titer 1:640, pattern Homogeneous      [11-27-21 @ 14:24]  dsDNA 62      [11-01-22 @ 07:10]  C3 Complement 83      [04-28-23 @ 09:08]  C4 Complement 26      [04-28-23 @ 09:08]  Rheumatoid Factor 63      [11-27-21 @ 16:43]  ANCA: cANCA Negative, pANCA Negative, atypical ANCA Indeterminate Method interference due to MÓNICA Fluorescence      [11-28-21 @ 11:28]  Syphilis Screen (Treponema Pallidum Ab) Negative      [11-27-21 @ 14:24]

## 2023-04-28 NOTE — H&P ADULT - NSHPSOCIALHISTORY_GEN_ALL_CORE
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

## 2023-04-28 NOTE — H&P ADULT - NSHPOUTPATIENTPROVIDERS_GEN_ALL_CORE
PCP: Dr. MD Alicja Silva  Rheumatology: Dr. Nilton Le ([facility])  Nephrology: Dr. Hayde Shultz

## 2023-04-28 NOTE — CONSULT NOTE ADULT - PROBLEM SELECTOR RECOMMENDATION 2
Biopsy proven LN. Kidney biopsy performed on 2/17/22 which showed focal proliferative type, class 3 LN. Pt. initiated on immunosuppressives for SLE/LN by rheumatology team. Admitted for lupus flare and Scr on admission (10/13/22) elevated at 1.66. Spot urine elevated/stable at 2.9 on 10/13/22. DsDNA titer were significantly elevated at 834 and complement C3 and C4 levels were low on labs done on 10/11/22. Pt underwent kidney biopsy on 10/17/22. 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 Patient was started immunosuppression per rheumatology but no improvement in renal function. Discussed with outpatient nephrologist, will pursue a renal biopsy at this time. Please consult IR for renal biopsy.    If you have any questions, please feel free to contact me  Renan Monet  Nephrology Fellow  [phone]; Prefer [employer] TEAMS  (After 5pm or on weekends please page the on-call fellow).

## 2023-04-28 NOTE — H&P ADULT - ASSESSMENT
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     #Nausea / Vomiting  Intermittent, often after pm dose of medications. Not related to meals, no infectious symptoms or diarrhea. CT A/P without explanation of symptoms. May be medication related, may be related to toxin build-up ico worsening LN. Less likely infectious given timeline of symptoms, but pt is immunosuppressed (had been getting mAb infusions). Ultimately, pt tolerating PO, so will involve nephro/Rheum for ongoing input on other worsening labs.  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 Now 2.5 with borderline hyperkalemia. Pt denies decreased UOP. Urine studies suggesting pre-renal etiology (FeNa 0.7%), but pt takes diuretics, so unclear reliability (though would expect MORE Na wasting with diuresis, not less). UPCR with nephrotic-range proteinuria as well, which is consistent with known LN 3/4.   Nephrologist: Dr. Hayde Garland, Nephro C/S, appreciate recs  Rheumatologist: Dr. Nilton Le, Rheum C/S, appreciate rec  ::Normocytic Anemia: previously with AoCD but baseline higher  -- T&S (4/28-)  -- Iron Studies, B12, Folate  ::Electrolytes: Pt with borderline HyperK in ER. Given Lokelma 5x1. Reassess this am & obtain baseline ECG for this admission  ::BMD:   -- C/W Home Vit D3 1kU QD  -- Ca x Phos: 46.5 (<55, okay for now)  ::Volume/BP: SBP at goal, but b/l LE edema suggestive of volume overload (vs decreased oncotic pressure ico nephrotic syndrome)  -- HOLDing Home Lisinpril 10mg PO QD (ico LIZETH)  -- HOLDing Home Furosemide 20mg PO QD (ico LIZETH)  ::HD Access: no current strong indication for HD, no access  ::Rheum/SLE  -- C/W Home TMP/-80 PO TIW (but d/t LIZETH vs CKD, will ask rheum & nephro about other coverage or continuation)  -- C/W Home MMF 1500mg PO BID  -- C/W Home Hydroxychloroquine 200mg PO QD  -- C/W Home Prednisone 5mg PO QD    #Dyspnea on Exertion   Pt with reports of ANGUIANO, fatigue. B/l Leg swelling. No CP during these episodes but SLE increases risk for cardiomyopathy & pericardial effusion, which may absolutely be contributing to these symptoms (though may also just be swelling from nephrotic range proteinuria). TTE 08/2022 without any cardiac abnormalities or decreased systolic function.  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     #Nausea / Vomiting  Intermittent, often after pm dose of medications. Not related to meals, no infectious symptoms or diarrhea. CT A/P without explanation of symptoms. May be medication related, may be related to toxin build-up ico worsening LN. Less likely infectious given timeline of symptoms, but pt is immunosuppressed (had been getting mAb infusions). Ultimately, pt tolerating PO, so will involve nephro/Rheum for ongoing input on other worsening labs.  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 Now 2.5 with borderline hyperkalemia. Pt denies decreased UOP. Urine studies suggesting pre-renal etiology (FeNa 0.7%), but pt takes diuretics, so unclear reliability (though would expect MORE Na wasting with diuresis, not less). UPCR with nephrotic-range proteinuria as well, which is consistent with known LN 3/4.   Nephrologist: Dr. Hayde Garland, Nephro C/S, appreciate recs  Rheumatologist: Dr. Nilton Le, Rheum C/S, appreciate rec  ::Normocytic Anemia: previously with AoCD but baseline higher  -- T&S (4/28-)  -- Iron Studies, B12, Folate  ::Electrolytes: Pt with borderline HyperK in ER. Given Lokelma 5x1. Reassess this am & obtain baseline ECG for this admission  ::BMD:   -- C/W Home Vit D3 1kU QD  -- Ca x Phos: 46.5 (<55, okay for now)  ::Volume/BP: SBP at goal, but b/l LE edema suggestive of volume overload (vs decreased oncotic pressure ico nephrotic syndrome)  -- HOLDing Home Lisinpril 10mg PO QD (ico LIZETH)  -- HOLDing Home Furosemide 20mg PO QD (ico LIZETH)  ::HD Access: no current strong indication for HD, no access  ::Rheum/SLE  -- C/W Home TMP/-80 PO TIW (but d/t LIZETH vs CKD, will ask rheum & nephro about other coverage or continuation)  -- C/W Home MMF 1500mg PO BID  -- C/W Home Hydroxychloroquine 200mg PO QD  -- C/W Home Prednisone 5mg PO QD    #Dyspnea on Exertion   Pt with reports of ANGUIANO, fatigue. B/l Leg swelling. No CP during these episodes but SLE increases risk for cardiomyopathy & pericardial effusion, which may absolutely be contributing to these symptoms (though may also just be swelling from nephrotic range proteinuria). TTE 08/2022 without any cardiac abnormalities or decreased systolic function.  - TTE, ECG  - Trop, BNP

## 2023-04-28 NOTE — CONSULT NOTE ADULT - ASSESSMENT
48M with h/o SLE c/b LN 3/4 who presents with 1-1.5months of recurrent nausea/vomiting and associated weight loss. He says it is primarily occurs about an hour an hour and a half after taking his evening dose of mycophenolate 48M with h/o SLE c/b LN 3/4 who presents with 1-1.5months of recurrent nausea/vomiting and associated weight loss and ANGUIANO with persistently active urine sediment and progressive worsening creatinine    ##SLE/LN (Class IV, crescentic disease)  -s/p Obinutuzumab x2 (Oct/Nov 2022) and on prednisone 5mg/HCQ/Cellcept  -current disease activity limited mostly to the kidney with no current joint/skin manifestations  -last labs from outpatient with stable C3/C4 and dsDNA. From April 2023 with evidence of B-cell depletion  -will switch Cellcept to Myfortic give GI symptoms  -ordered Myfortic to time before meals. would ideally have 1 hour before meals or 2 hours after meals to minimize GI symptoms  -pending kidney biopsy. planned for 5/2  -will coordinate with Nephrology treatment options, patient maybe enrolled into clinical trial    PLAN  -will check Mycophenolic acid level (ordered)  -switching Cellcept to Myfortic (ordered) to avoid GI symptoms. timing as above  -will follow kidney biopsy results  -c/w home prednisone and HCQ    case d/w Dr. Carreon

## 2023-04-28 NOTE — H&P ADULT - HISTORY OF PRESENT ILLNESS
In the ED,  VS: HR 63 RR 18 /72 Temp 99.0 O2 100% RA  PEx: Unremarkable, no abd pain  Labs: CBC Hgb 8.1 Hct 26.2 Plt 283 WBC 6.51 | BMP Na 134 K 5.5 Cl 107 CO2  17 BUN 43 Cr 2.55 Gluc 91 Ca 8.2 | Prot 5.1 Alb 2.6 TBili < 0.2 AST 12 ALT 6 ALP 62 Mg 2.2 Phos 5.0 | VBG 7.24/42/26/18 | UPCR 3.0 UOsm 258 Shalonda 30 UCr 80 FeNa 0.7% | UA with Lrage blood & RBCs  Imaging: CT A/P unremarkable  ECG:  Micro:  Interventions: 1L NS, Lokelma 5mg  Impression: Nausea/Vomiting, possibly polypharmacy         In the ED,  VS: HR 63 RR 18 /72 Temp 99.0 O2 100% RA  PEx: Unremarkable, no abd pain  Labs: CBC Hgb 8.1 Hct 26.2 Plt 283 WBC 6.51 | BMP Na 134 K 5.5 Cl 107 CO2  17 BUN 43 Cr 2.55 Gluc 91 Ca 8.2 | Prot 5.1 Alb 2.6 TBili < 0.2 AST 12 ALT 6 ALP 62 Mg 2.2 Phos 5.0 | VBG 7.24/42/26/18 | UPCR 3.0 UOsm 258 Shalonda 30 UCr 80 FeNa 0.7% | UA with Lrage blood & RBCs  Imaging: CT A/P unremarkable  ECG:   Micro:  Interventions: 1L NS, Lokelma 5mg  Impression: Nausea/Vomiting, possibly polypharmacy   Mr. Silva is a 48M with h/o SLE c/b LN 3/4 who presents with 1-1.5months of recurrent nausea/vomiting and associated weight loss. He says it is primarily occurs about an hour an hour and a half after taking his evening dose of mycophenolate.  He expresses some confusion because he takes this medication twice a day and has no symptoms in the morning.  He says that he takes the medication with food as well.  He was told by his outpatient provider that he may need another biopsy, but he does not know of what.     In the background he has noticed that he has had some progressive dyspnea on exertion particularly as his job where he works as a .  He says that he does not have any orthopnea or bendopnea, but he says that his appetite is decreased.  He denies any chest pain associated with the shortness of breath.  He also denies any fever/chills, but he has noted some "feeling cold" intermittently.    He says he has been taking all of his medications as prescribed, including following his instructed taper of prednisone.      In the ED,  VS: HR 63 RR 18 /72 Temp 99.0 O2 100% RA  PEx: Unremarkable, no abd pain  Labs: CBC Hgb 8.1 Hct 26.2 Plt 283 WBC 6.51 | BMP Na 134 K 5.5 Cl 107 CO2  17 BUN 43 Cr 2.55 Gluc 91 Ca 8.2 | Prot 5.1 Alb 2.6 TBili < 0.2 AST 12 ALT 6 ALP 62 Mg 2.2 Phos 5.0 | VBG 7.24/42/26/18 | UPCR 3.0 UOsm 258 Shalonda 30 UCr 80 FeNa 0.7% | UA with Lrage blood & RBCs  Imaging: CT A/P unremarkable  ECG:   Micro:  Interventions: 1L NS, Lokelma 5mg  Impression: Nausea/Vomiting, possibly polypharmacy    On the floor, He denies any complaints specifically this morning.  Review of systems as above and below.

## 2023-04-28 NOTE — H&P ADULT - PROBLEM SELECTOR PLAN 6
Hospital Bundle  Fluids: PO Ad Shana  Electrolytes: Gentle electrolyte repletion d/t LIZETH/CKD  Nutrition: Diet Renal (Halal), nutrition consulted  PPX  ---VTE: SQH  ---GI: PO, bowel reg PRN  Access: PIV  Code Status: FULL CODE  Dispo: Pending medical evaluation

## 2023-04-28 NOTE — H&P ADULT - NSHPPHYSICALEXAM_GEN_ALL_CORE
GEN: Awake, AOx3, NAD.  HEENT: NCAT  ---EYES: no scleral icterus, EOMI, PERRLA  CARDIO: RRR. Normal S1/S2, no m/r/g. No JVD.  RESP: CTAB  ABD: Soft, NTND. BS+. No masses, no hepatosplenomegaly.  : No CVAT.   MSK: No obvious deformity or ROM deficit. 2+ pulses x4. No edema.  SKIN: Warm, dry. No rashes. Nail beds without cyanosis or clubbing.  NEURO: Moves all four extremities spontaneously  PSYCH: Appropriate mood & affect. No VH/AH. No SI/HI. GEN: Awake, AOx3, NAD.  HEENT: NCAT  CARDIO: RRR. Normal S1/S2, no m/r/g. No JVD.  RESP: CTAB, no w/r/r  ABD: Soft, NT. Mildly distended.  MSK: No obvious deformity or ROM deficit. .  SKIN: Warm, dry. patchy hypopigmentation   NEURO: Moves all four extremities spontaneously  PSYCH: Appropriate mood & affect. GEN: Awake, AOx3, NAD.  HEENT: NCAT  CARDIO: RRR. Normal S1/S2, no m/r/g. No JVD.  RESP: CTAB, no w/r/r  ABD: Soft, NT. Mildly distended  MSK: No obvious deformity or ROM deficit. B/L Trace - 1+ pitting edema to knees  SKIN: Warm, dry. patchy hypopigmentation over chest (pt states healnig from prior rash, not new or worsening)  NEURO: Moves all four extremities spontaneously  PSYCH: Appropriate mood & affect.

## 2023-04-29 LAB
ALBUMIN SERPL ELPH-MCNC: 2.1 G/DL — LOW (ref 3.3–5)
ALP SERPL-CCNC: 51 U/L — SIGNIFICANT CHANGE UP (ref 40–120)
ALT FLD-CCNC: 5 U/L — SIGNIFICANT CHANGE UP (ref 4–41)
ANION GAP SERPL CALC-SCNC: 9 MMOL/L — SIGNIFICANT CHANGE UP (ref 7–14)
AST SERPL-CCNC: 11 U/L — SIGNIFICANT CHANGE UP (ref 4–40)
BASOPHILS # BLD AUTO: 0.02 K/UL — SIGNIFICANT CHANGE UP (ref 0–0.2)
BASOPHILS NFR BLD AUTO: 0.4 % — SIGNIFICANT CHANGE UP (ref 0–2)
BILIRUB SERPL-MCNC: <0.2 MG/DL — SIGNIFICANT CHANGE UP (ref 0.2–1.2)
BUN SERPL-MCNC: 36 MG/DL — HIGH (ref 7–23)
CALCIUM SERPL-MCNC: 8.2 MG/DL — LOW (ref 8.4–10.5)
CHLORIDE SERPL-SCNC: 114 MMOL/L — HIGH (ref 98–107)
CO2 SERPL-SCNC: 16 MMOL/L — LOW (ref 22–31)
CREAT SERPL-MCNC: 1.67 MG/DL — HIGH (ref 0.5–1.3)
CULTURE RESULTS: SIGNIFICANT CHANGE UP
DSDNA AB SER-ACNC: 14 IU/ML — SIGNIFICANT CHANGE UP
EGFR: 50 ML/MIN/1.73M2 — LOW
EOSINOPHIL # BLD AUTO: 0.08 K/UL — SIGNIFICANT CHANGE UP (ref 0–0.5)
EOSINOPHIL NFR BLD AUTO: 1.7 % — SIGNIFICANT CHANGE UP (ref 0–6)
GLUCOSE SERPL-MCNC: 89 MG/DL — SIGNIFICANT CHANGE UP (ref 70–99)
HCT VFR BLD CALC: 24.4 % — LOW (ref 39–50)
HGB BLD-MCNC: 7.5 G/DL — LOW (ref 13–17)
IANC: 3.15 K/UL — SIGNIFICANT CHANGE UP (ref 1.8–7.4)
IMM GRANULOCYTES NFR BLD AUTO: 0.8 % — SIGNIFICANT CHANGE UP (ref 0–0.9)
LYMPHOCYTES # BLD AUTO: 0.84 K/UL — LOW (ref 1–3.3)
LYMPHOCYTES # BLD AUTO: 17.6 % — SIGNIFICANT CHANGE UP (ref 13–44)
MAGNESIUM SERPL-MCNC: 1.8 MG/DL — SIGNIFICANT CHANGE UP (ref 1.6–2.6)
MCHC RBC-ENTMCNC: 26.1 PG — LOW (ref 27–34)
MCHC RBC-ENTMCNC: 30.7 GM/DL — LOW (ref 32–36)
MCV RBC AUTO: 85 FL — SIGNIFICANT CHANGE UP (ref 80–100)
MONOCYTES # BLD AUTO: 0.65 K/UL — SIGNIFICANT CHANGE UP (ref 0–0.9)
MONOCYTES NFR BLD AUTO: 13.6 % — SIGNIFICANT CHANGE UP (ref 2–14)
NEUTROPHILS # BLD AUTO: 3.15 K/UL — SIGNIFICANT CHANGE UP (ref 1.8–7.4)
NEUTROPHILS NFR BLD AUTO: 65.9 % — SIGNIFICANT CHANGE UP (ref 43–77)
NRBC # BLD: 0 /100 WBCS — SIGNIFICANT CHANGE UP (ref 0–0)
NRBC # FLD: 0 K/UL — SIGNIFICANT CHANGE UP (ref 0–0)
PHOSPHATE SERPL-MCNC: 4.8 MG/DL — HIGH (ref 2.5–4.5)
PLATELET # BLD AUTO: 258 K/UL — SIGNIFICANT CHANGE UP (ref 150–400)
POTASSIUM SERPL-MCNC: 5.1 MMOL/L — SIGNIFICANT CHANGE UP (ref 3.5–5.3)
POTASSIUM SERPL-SCNC: 5.1 MMOL/L — SIGNIFICANT CHANGE UP (ref 3.5–5.3)
PROT SERPL-MCNC: 4.3 G/DL — LOW (ref 6–8.3)
RBC # BLD: 2.87 M/UL — LOW (ref 4.2–5.8)
RBC # FLD: 13.4 % — SIGNIFICANT CHANGE UP (ref 10.3–14.5)
SODIUM SERPL-SCNC: 139 MMOL/L — SIGNIFICANT CHANGE UP (ref 135–145)
SPECIMEN SOURCE: SIGNIFICANT CHANGE UP
WBC # BLD: 4.78 K/UL — SIGNIFICANT CHANGE UP (ref 3.8–10.5)
WBC # FLD AUTO: 4.78 K/UL — SIGNIFICANT CHANGE UP (ref 3.8–10.5)

## 2023-04-29 PROCEDURE — 99222 1ST HOSP IP/OBS MODERATE 55: CPT

## 2023-04-29 PROCEDURE — 99232 SBSQ HOSP IP/OBS MODERATE 35: CPT | Mod: GC

## 2023-04-29 RX ORDER — PREGABALIN 225 MG/1
1000 CAPSULE ORAL EVERY 24 HOURS
Refills: 0 | Status: COMPLETED | OUTPATIENT
Start: 2023-04-29 | End: 2023-05-05

## 2023-04-29 RX ORDER — FOLIC ACID 0.8 MG
1 TABLET ORAL DAILY
Refills: 0 | Status: DISCONTINUED | OUTPATIENT
Start: 2023-04-29 | End: 2023-05-08

## 2023-04-29 RX ADMIN — HEPARIN SODIUM 5000 UNIT(S): 5000 INJECTION INTRAVENOUS; SUBCUTANEOUS at 12:55

## 2023-04-29 RX ADMIN — Medication 1 MILLIGRAM(S): at 12:58

## 2023-04-29 RX ADMIN — HEPARIN SODIUM 5000 UNIT(S): 5000 INJECTION INTRAVENOUS; SUBCUTANEOUS at 22:33

## 2023-04-29 RX ADMIN — PANTOPRAZOLE SODIUM 40 MILLIGRAM(S): 20 TABLET, DELAYED RELEASE ORAL at 08:30

## 2023-04-29 RX ADMIN — HEPARIN SODIUM 5000 UNIT(S): 5000 INJECTION INTRAVENOUS; SUBCUTANEOUS at 05:45

## 2023-04-29 RX ADMIN — Medication 200 MILLIGRAM(S): at 12:56

## 2023-04-29 RX ADMIN — Medication 5 MILLIGRAM(S): at 05:44

## 2023-04-29 RX ADMIN — Medication 1000 UNIT(S): at 12:55

## 2023-04-29 RX ADMIN — MYCOPHENOLIC ACID 720 MILLIGRAM(S): 180 TABLET, DELAYED RELEASE ORAL at 08:30

## 2023-04-29 RX ADMIN — PREGABALIN 1000 MICROGRAM(S): 225 CAPSULE ORAL at 12:54

## 2023-04-29 RX ADMIN — MYCOPHENOLIC ACID 720 MILLIGRAM(S): 180 TABLET, DELAYED RELEASE ORAL at 16:50

## 2023-04-29 NOTE — DIETITIAN INITIAL EVALUATION ADULT - ADD RECOMMEND
1. Continue to monitor diet/supplement tolerance, GI distress, fluid status and skin integrity.   2. Continue to encourage po intake.   3. Continue to honor food preference.

## 2023-04-29 NOTE — DIETITIAN INITIAL EVALUATION ADULT - PERTINENT MEDS FT
MEDICATIONS  (STANDING):  cholecalciferol 1000 Unit(s) Oral daily  cyanocobalamin Injectable 1000 MICROGram(s) IntraMuscular every 24 hours  folic acid 1 milliGRAM(s) Oral daily  heparin   Injectable 5000 Unit(s) SubCutaneous every 8 hours  hydroxychloroquine 200 milliGRAM(s) Oral daily  mycophenolic acid  milliGRAM(s) Oral <User Schedule>  pantoprazole    Tablet 40 milliGRAM(s) Oral before breakfast  predniSONE   Tablet 5 milliGRAM(s) Oral daily  trimethoprim   80 mG/sulfamethoxazole 400 mG 1 Tablet(s) Oral <User Schedule>    MEDICATIONS  (PRN):

## 2023-04-29 NOTE — PROGRESS NOTE ADULT - SUBJECTIVE AND OBJECTIVE BOX
Medicine Progress Note  --------------------  Neo Malave M.D.  Internal Medicine/Emergency Medicine  PGY-2  --------------------      Patient: LIZ HOUSER, MRN: [id number], : 1975  Admitted on 23 for Nausea and vomiting      LANGUAGE - English ]OR[ PI (_): #                ------------------------------------------------------------------------------------------------------------  SUMMARY (from last progress note through 23 @ 07:49):   -  ------------------------------------------------------------------------------------------------------------  OVERNIGHT EVENTS  NAEON    SUBJECTIVE  -       ROS negative unless noted above.  ------------------------------------------------------------------------------------------------------------  OBJECTIVE:  Physical Exam  CONST:     NAD, well-appearing; well-developed; appears stated age  EYES:         Conjunctiva clear; PERRL; no conjunctival pallor; no lid lag  ENMT:       MMM, no pharyngeal injection or exudates; normal dentition/dentures present  NECK:        Supple, no LAD; no palpable masses; no thyromegaly  RESP :        Normal respiratory effort; CTA bilaterally; no W/R/R  CHEST:       No TTP, no lines/ports; symmetric chest expansion  CARDIO:     RRR, normal S1 and S2, no M/R/G; apical impulse at MCL  VASC:         No JVD/AJR, no peripheral edema, pulses 2+ B/L, Cap refill <2s  ABD:           Soft, NT/ND, norm bowel sounds; no R/G; No HSM; No CVAT  MSK:          No clubbing of digits; no joint swelling or TTP  EXT:           WWP, no reduction in body hair, no LE skin changes  PSYCH        A&O to person, place, and time; affect appropriate  NEURO:     Non-focal; no gross sensory deficits; moving all extremities   SKIN:          No rashes; no palpable lesions; axillae not dry    Vital Signs Last 24 Hrs  T(F): 98.3, Max: 98.6 (23 @ 21:07)  HR: 75 (75 - 76)  BP: 110/62 (105/65 - 122/73)  RR: 16 (16 - 18)  SpO2: 99% (99% - 100%)        DAILY MEASUREMENTS:  I&O's Summary    2023 07:01  -  2023 07:00  --------------------------------------------------------  IN: 0 mL / OUT: 300 mL / NET: -300 mL      Daily     Daily   Weight (kg): 62.2 (23 @ 03:50)  Orthostatic VS        LABS:                        7.9    5.09  )-----------( 267      ( 2023 09:08 )             25.5     Hgb Trend: 7.9<--, 8.1<--      137  |  113<H>  |  38<H>  ----------------------------<  87  4.8   |  15<L>  |  1.95<H>    Ca    8.3<L>      2023 09:08  Phos  4.6       Mg     1.90         TPro  4.6<L>  /  Alb  2.2<L>  /  TBili  <0.2  /  DBili  x   /  AST  15  /  ALT  6   /  AlkPhos  56        CAPILLARY BLOOD GLUCOSE            Urinalysis Basic - ( 2023 01:24 )    Color: Light Yellow / Appearance: Clear / S.010 / pH: x  Gluc: x / Ketone: Negative  / Bili: Negative / Urobili: <2 mg/dL   Blood: x / Protein: 300 mg/dL / Nitrite: Negative   Leuk Esterase: Negative / RBC: 77 /HPF / WBC 8 /HPF   Sq Epi: x / Non Sq Epi: x / Bacteria: Negative        Culture - Urine (collected 2023 01:24)  Source: Clean Catch Clean Catch (Midstream)  Final Report (2023 06:54):    <10,000 CFU/mL Normal Urogenital Felecia        Venous Blood Gas:  23 @ 09:08  7.24/36/49/15/78.0  VBG Lactate: 0.5      RADIOLOGY & ADDITIONAL TESTS:  Results Reviewed:     Imaging Reviewed:  Electrocardiogram Reviewed:      ------------------------------------------------------------------------------------------------------------  MEDICATIONS  (STANDING):  cholecalciferol 1000 Unit(s) Oral daily  heparin   Injectable 5000 Unit(s) SubCutaneous every 8 hours  hydroxychloroquine 200 milliGRAM(s) Oral daily  mycophenolic acid  milliGRAM(s) Oral <User Schedule>  pantoprazole    Tablet 40 milliGRAM(s) Oral before breakfast  predniSONE   Tablet 5 milliGRAM(s) Oral daily  trimethoprim   80 mG/sulfamethoxazole 400 mG 1 Tablet(s) Oral <User Schedule>    MEDICATIONS  (PRN):    ------------------------------------------------------------------------------------------------------------  COORDINATION OF CARE:  Care discussed with consultants/other providers and notes reviewed [Y]     Medicine Progress Note  --------------------  Neo Malave M.D.  Internal Medicine/Emergency Medicine  PGY-2  --------------------      Patient: LIZ HOUSER, MRN: [id number], : 1975  Admitted on 23 for Nausea and vomiting      LANGUAGE - English ]OR[ PI (_): #                ------------------------------------------------------------------------------------------------------------  SUMMARY (from last progress note through 23 @ 07:49):   - 48M h/o SLE c/b LN p/w nausea/vomiting   ------------------------------------------------------------------------------------------------------------  OVERNIGHT EVENTS  NAEON    SUBJECTIVE  Mr. Houser says that he "feels good" today. He expresses understanding about the kidney biopsy but does wonder if it's necessary.      ROS negative unless noted above.  ------------------------------------------------------------------------------------------------------------  OBJECTIVE:  Physical Exam  GEN: Awake, AOx3, NAD.  HEENT: NCAT  CARDIO: RRR.   RESP: CTAB, no w/r/r  ABD: Soft, NTND.   MSK: No obvious deformity or ROM deficit. Tr edema b/l LE, stable  SKIN: Warm, dry.   NEURO: Moves all four extremities spontaneously  PSYCH: Appropriate mood & affect.    Vital Signs Last 24 Hrs  T(F): 98.3, Max: 98.6 (23 @ 21:07)  HR: 75 (75 - 76)  BP: 110/62 (105/65 - 122/73)  RR: 16 (16 - 18)  SpO2: 99% (99% - 100%)        DAILY MEASUREMENTS:  I&O's Summary    2023 07:01  -  2023 07:00  --------------------------------------------------------  IN: 0 mL / OUT: 300 mL / NET: -300 mL      Daily     Daily   Weight (kg): 62.2 (23 @ 03:50)  Orthostatic VS        LABS:                        7.9    5.09  )-----------( 267      ( 2023 09:08 )             25.5     Hgb Trend: 7.9<--, 8.1<--      137  |  113<H>  |  38<H>  ----------------------------<  87  4.8   |  15<L>  |  1.95<H>    Ca    8.3<L>      2023 09:08  Phos  4.6       Mg     1.90         TPro  4.6<L>  /  Alb  2.2<L>  /  TBili  <0.2  /  DBili  x   /  AST  15  /  ALT  6   /  AlkPhos  56        CAPILLARY BLOOD GLUCOSE            Urinalysis Basic - ( 2023 01:24 )    Color: Light Yellow / Appearance: Clear / S.010 / pH: x  Gluc: x / Ketone: Negative  / Bili: Negative / Urobili: <2 mg/dL   Blood: x / Protein: 300 mg/dL / Nitrite: Negative   Leuk Esterase: Negative / RBC: 77 /HPF / WBC 8 /HPF   Sq Epi: x / Non Sq Epi: x / Bacteria: Negative        Culture - Urine (collected 2023 01:24)  Source: Clean Catch Clean Catch (Midstream)  Final Report (2023 06:54):    <10,000 CFU/mL Normal Urogenital Felecia        Venous Blood Gas:  23 @ 09:08  7.24/36/49/15/78.0  VBG Lactate: 0.5      RADIOLOGY & ADDITIONAL TESTS:  Results Reviewed: C3 (83), C4 (26), Trop 8 (WNL), Iron studies reviewed - iron replete (see EMR), Ferritin (537), TSH (4.29, H) T4 1.1 (WNL)  Imaging Reviewed:  < from: US Kidney and Bladder (23 @ 08:44) >  FINDINGS:  Right kidney: 9.9 cm. No renal mass, hydronephrosis or calculi.    Left kidney: 11.0 cm. No renal mass, hydronephrosis or calculi.    Urinary bladder: Within normal limits.    IMPRESSION:  No hydronephrosis.    < end of copied text >    Electrocardiogram Reviewed:  < from: Transthoracic Echocardiogram (23 @ 13:13) >  ------------------------------------------------------------------------  DIMENSIONS:  Dimensions:     Normal Values:  LA:     3.4 cm    2.0 - 4.0 cm  Ao:     3.6 cm    2.0 - 3.8 cm  SEPTUM: 0.6 cm    0.6 - 1.2 cm  PWT:    0.7 cm    0.6 - 1.1 cm  LVIDd:  5.2 cm    3.0 - 5.6 cm  LVIDs:  3.3 cm    1.8 - 4.0 cm  Derived Variables:  LVMI: 66 g/m2  RWT: 0.26  Fractional short: 37 %  Ejection Fraction (Agrcia Rule): 62 %  ------------------------------------------------------------------------  OBSERVATIONS:  Mitral Valve: Normal mitral valve. Minimal mitral  regurgitation.  Aortic Root: Normal aortic root.  Aortic Valve: Normal trileaflet aortic valve.  Left Atrium: Normal left atrium.  LA volume index = 21  cc/m2.  Left Ventricle: Normal left ventricular systolic function.  No segmental wall motion abnormalities. Normal left  ventricular internal dimensions and wall thicknesses.  Normal left ventricular diastolic function.  Right Heart: Normal right atrium. Normal right ventricular  size and function. Normal tricuspid valve. Minimal  tricuspid regurgitation. Normal pulmonic valve. Mild  pulmonic regurgitation.  Pericardium/PleuraNormal pericardium with no pericardial  effusion.  ------------------------------------------------------------------------  CONCLUSIONS:  1. Normal mitral valve. Minimal mitral regurgitation.  2. Normal left ventricular internal dimensions and wall  thicknesses.  3. Normal left ventricular systolic function. No segmental  wall motion abnormalities.  4. Normal left ventricular diastolic function.  5. Normal right ventricular size and function.    < end of copied text >      ------------------------------------------------------------------------------------------------------------  MEDICATIONS  (STANDING):  cholecalciferol 1000 Unit(s) Oral daily  heparin   Injectable 5000 Unit(s) SubCutaneous every 8 hours  hydroxychloroquine 200 milliGRAM(s) Oral daily  mycophenolic acid  milliGRAM(s) Oral <User Schedule>  pantoprazole    Tablet 40 milliGRAM(s) Oral before breakfast  predniSONE   Tablet 5 milliGRAM(s) Oral daily  trimethoprim   80 mG/sulfamethoxazole 400 mG 1 Tablet(s) Oral <User Schedule>    MEDICATIONS  (PRN):    ------------------------------------------------------------------------------------------------------------  COORDINATION OF CARE:  Care discussed with consultants/other providers and notes reviewed [Y]

## 2023-04-29 NOTE — DIETITIAN INITIAL EVALUATION ADULT - PERTINENT LABORATORY DATA
04-29    139  |  114<H>  |  36<H>  ----------------------------<  89  5.1   |  16<L>  |  1.67<H>    Ca    8.2<L>      29 Apr 2023 06:30  Phos  4.8     04-29  Mg     1.80     04-29    TPro  4.3<L>  /  Alb  2.1<L>  /  TBili  <0.2  /  DBili  x   /  AST  11  /  ALT  5   /  AlkPhos  51  04-29  A1C with Estimated Average Glucose Result: 5.7 % (10-14-22 @ 05:31)

## 2023-04-29 NOTE — PROGRESS NOTE ADULT - ASSESSMENT
48M with h/o SLE c/b LN3/4 who presents with persistent nausea/vomiting after medication administration at home and found to have LIZETH vs progression of LN/CKD who is otherwise hemodynamically stable awaiting nephrology and rheumatology evaluation.     #Nausea / Vomiting  Intermittent, often after pm dose of medications. Not related to meals, no infectious symptoms or diarrhea. CT A/P without explanation of symptoms. May be medication related, may be related to toxin build-up ico worsening LN. Less likely infectious given timeline of symptoms, but pt is immunosuppressed (had been getting mAb infusions). Ultimately, pt tolerating PO, so will involve nephro/Rheum for ongoing input on other worsening labs. Medications changing with rheum, cTM.  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 Now 2.5 with borderline hyperkalemia. Pt denies decreased UOP. Urine studies suggesting pre-renal etiology (FeNa 0.7%), but pt takes diuretics, so unclear reliability (though would expect MORE Na wasting with diuresis, not less). UPCR with nephrotic-range proteinuria as well, which is consistent with known LN. Pending renal biopsy to assess for possible POD/transformation, planned for Tuesday 5/2.  Nephrologist: Dr. Hayde Garland, Nephro C/S, appreciate recs (following)  Rheumatologist: Dr. Nilton Le, Rheum C/S, appreciate recs (following)  IR C/S, Appreciate recs  ::Normocytic Anemia: previously with AoCD but baseline higher  -- T&S (4/28-)  -- Iron Studies (iron replete), B12 (304), Folate (5.7)  ::Electrolytes: Pt with borderline HyperK in ER. Given Lokelma 5x1. Stably WNL. No ECG changes.  ::BMD:   -- C/W Home Vit D3 1kU QD  -- Ca x Phos: < 55  ::Volume/BP: SBP at goal, but b/l LE edema suggestive of volume overload (vs decreased oncotic pressure ico nephrotic syndrome)  -- HOLDing Home Lisinpril 10mg PO QD (ico LIZETH)  -- HOLDing Home Furosemide 20mg PO QD (ico LIZETH)  ::HD Access: no current strong indication for HD, no access  ::Rheum/SLE  -- C/W Home TMP/-80 PO TIW (but d/t LIZETH vs CKD, will ask rheum & nephro about other coverage or continuation)  -- D/C Home MMF 1500mg PO BID  -- START Myfortic 720 ER PO BID (1h pre or 2h post meals)  -- C/W Home Hydroxychloroquine 200mg PO QD  -- C/W Home Prednisone 5mg PO QD    #Dyspnea on Exertion   Pt with reports of ANGUIANO, fatigue. B/l Leg swelling. Cardiac evaluation unremarkable. May be 2/2 anemia. B12 borderline low but EF normal (not wet beriberi). May benefit from further OP testing with pulm/cards.  - TTE (EF 62%, unremarkable echo), ECG  - Trop, BNP WNL 48M with h/o SLE c/b LN3/4 who presents with persistent nausea/vomiting after medication administration at home and found to have LIZETH vs progression of LN/CKD who is otherwise hemodynamically stable awaiting nephrology and rheumatology evaluation.     #Nausea / Vomiting  Intermittent, often after pm dose of medications. Not related to meals, no infectious symptoms or diarrhea. CT A/P without explanation of symptoms. May be medication related, may be related to toxin build-up ico worsening LN. Less likely infectious given timeline of symptoms, but pt is immunosuppressed (had been getting mAb infusions). Ultimately, pt tolerating PO, so will involve nephro/Rheum for ongoing input on other worsening labs. Medications changing with rheum, cTM.  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 Now 2.5 with borderline hyperkalemia. Pt denies decreased UOP. Urine studies suggesting pre-renal etiology (FeNa 0.7%), but pt takes diuretics, so unclear reliability (though would expect MORE Na wasting with diuresis, not less). UPCR with nephrotic-range proteinuria as well, which is consistent with known LN. Pending renal biopsy to assess for possible POD/transformation, planned for Tuesday 5/2.  Nephrologist: Dr. Hayde Garland, Nephro C/S, appreciate recs (following)  Rheumatologist: Dr. Nilton Le, Rheum C/S, appreciate recs (following)  IR C/S, Appreciate recs  ::Normocytic Anemia: previously with AoCD but baseline higher  -- T&S (4/28-)  -- Iron Studies (iron replete), B12 (304), Folate (5.7)  -- Cyanocobalamin 1kU IM QDx 7d, then PO after; Folate 1mg PO QD  ::Electrolytes: Pt with borderline HyperK in ER. Given Lokelma 5x1. Stably WNL. No ECG changes.  ::BMD:   -- C/W Home Vit D3 1kU QD  -- Ca x Phos: < 55  ::Volume/BP: SBP at goal, but b/l LE edema suggestive of volume overload (vs decreased oncotic pressure ico nephrotic syndrome)  -- HOLDing Home Lisinpril 10mg PO QD (ico LIZETH)  -- HOLDing Home Furosemide 20mg PO QD (ico LIZETH)  ::HD Access: no current strong indication for HD, no access  ::Rheum/SLE  -- C/W Home TMP/-80 PO TIW (but d/t LIZETH vs CKD, will ask rheum & nephro about other coverage or continuation)  -- D/C MMF 1500mg PO BID  -- START Myfortic 720 ER PO BID (1h pre or 2h post meals)  -- C/W Home Hydroxychloroquine 200mg PO QD  -- C/W Home Prednisone 5mg PO QD    #Dyspnea on Exertion   Pt with reports of ANGUIANO, fatigue. B/l Leg swelling. Cardiac evaluation unremarkable. May be 2/2 anemia. B12 borderline low but EF normal (not wet beriberi). May benefit from further OP testing with pulm/cards.  - TTE (EF 62%, unremarkable echo), ECG  - Trop, BNP WNL

## 2023-04-29 NOTE — DIETITIAN INITIAL EVALUATION ADULT - NS FNS DIET ORDER
Diet, Regular:   For patients receiving Renal Replacement - No Protein Restr, No Conc K, No Conc Phos, Low Sodium (RENAL)  Halal  No Beef  No Pork (04-28-23 @ 09:45) [Active]

## 2023-04-29 NOTE — DIETITIAN INITIAL EVALUATION ADULT - PERSON TAUGHT/METHOD
Pt provided with written and verbal nutrition education on LIZETH therapeutic diet. Topics included sodium, potassium and phosphorus restriction with food list. Pt verbalized understanding of nutrition education./verbal instruction/written material/patient instructed

## 2023-04-29 NOTE — DIETITIAN INITIAL EVALUATION ADULT - ORAL INTAKE PTA/DIET HISTORY
As per patient, follows low potassium, sodium and phosphorus diet prior to admission. Pt had poor po intake related to nausea and vomiting x 1 month. Confirmed NKFA.

## 2023-04-29 NOTE — DIETITIAN INITIAL EVALUATION ADULT - OTHER INFO
As per chart, pt is a 48M with h/o SLE c/b LN3/4 who presents with persistent nausea/vomiting after medication administration at home and found to have LIZETH vs progression of LN/CKD who is otherwise hemodynamically stable awaiting nephrology and rheumatology evaluation. As per IR (4/28): planning for renal biopsy 5/2. Nephro ad rheumatology are following.     Pt is A&O x 4. As per pt, N/V and appetite has improved as per patient. Completed 100% of breakfast today. Denied constipation and diarrhea. Denied swallowing/chewing difficulties. Pt is noticed with hyperphosphatemia. Therapeutic diet in use. Nutrition education on food list with high phosphorus was provided. Pt verbalized understanding. Recommend Orgain 2x daily to increase energy intake. Food preference obtained and updated.

## 2023-04-30 LAB
ALBUMIN SERPL ELPH-MCNC: 2.1 G/DL — LOW (ref 3.3–5)
ANION GAP SERPL CALC-SCNC: 10 MMOL/L — SIGNIFICANT CHANGE UP (ref 7–14)
BASOPHILS # BLD AUTO: 0.02 K/UL — SIGNIFICANT CHANGE UP (ref 0–0.2)
BASOPHILS NFR BLD AUTO: 0.4 % — SIGNIFICANT CHANGE UP (ref 0–2)
BUN SERPL-MCNC: 33 MG/DL — HIGH (ref 7–23)
CALCIUM SERPL-MCNC: 8.1 MG/DL — LOW (ref 8.4–10.5)
CHLORIDE SERPL-SCNC: 112 MMOL/L — HIGH (ref 98–107)
CO2 SERPL-SCNC: 16 MMOL/L — LOW (ref 22–31)
CREAT SERPL-MCNC: 1.62 MG/DL — HIGH (ref 0.5–1.3)
EGFR: 52 ML/MIN/1.73M2 — LOW
EOSINOPHIL # BLD AUTO: 0.08 K/UL — SIGNIFICANT CHANGE UP (ref 0–0.5)
EOSINOPHIL NFR BLD AUTO: 1.7 % — SIGNIFICANT CHANGE UP (ref 0–6)
GLUCOSE SERPL-MCNC: 83 MG/DL — SIGNIFICANT CHANGE UP (ref 70–99)
HCT VFR BLD CALC: 23.9 % — LOW (ref 39–50)
HGB BLD-MCNC: 7.4 G/DL — LOW (ref 13–17)
IANC: 2.99 K/UL — SIGNIFICANT CHANGE UP (ref 1.8–7.4)
IMM GRANULOCYTES NFR BLD AUTO: 1.5 % — HIGH (ref 0–0.9)
LYMPHOCYTES # BLD AUTO: 0.92 K/UL — LOW (ref 1–3.3)
LYMPHOCYTES # BLD AUTO: 19.4 % — SIGNIFICANT CHANGE UP (ref 13–44)
MAGNESIUM SERPL-MCNC: 2 MG/DL — SIGNIFICANT CHANGE UP (ref 1.6–2.6)
MCHC RBC-ENTMCNC: 25.7 PG — LOW (ref 27–34)
MCHC RBC-ENTMCNC: 31 GM/DL — LOW (ref 32–36)
MCV RBC AUTO: 83 FL — SIGNIFICANT CHANGE UP (ref 80–100)
MONOCYTES # BLD AUTO: 0.66 K/UL — SIGNIFICANT CHANGE UP (ref 0–0.9)
MONOCYTES NFR BLD AUTO: 13.9 % — SIGNIFICANT CHANGE UP (ref 2–14)
NEUTROPHILS # BLD AUTO: 2.99 K/UL — SIGNIFICANT CHANGE UP (ref 1.8–7.4)
NEUTROPHILS NFR BLD AUTO: 63.1 % — SIGNIFICANT CHANGE UP (ref 43–77)
NRBC # BLD: 0 /100 WBCS — SIGNIFICANT CHANGE UP (ref 0–0)
NRBC # FLD: 0 K/UL — SIGNIFICANT CHANGE UP (ref 0–0)
PHOSPHATE SERPL-MCNC: 4.7 MG/DL — HIGH (ref 2.5–4.5)
PLATELET # BLD AUTO: 259 K/UL — SIGNIFICANT CHANGE UP (ref 150–400)
POTASSIUM SERPL-MCNC: 4.8 MMOL/L — SIGNIFICANT CHANGE UP (ref 3.5–5.3)
POTASSIUM SERPL-SCNC: 4.8 MMOL/L — SIGNIFICANT CHANGE UP (ref 3.5–5.3)
RBC # BLD: 2.88 M/UL — LOW (ref 4.2–5.8)
RBC # FLD: 13.3 % — SIGNIFICANT CHANGE UP (ref 10.3–14.5)
SODIUM SERPL-SCNC: 138 MMOL/L — SIGNIFICANT CHANGE UP (ref 135–145)
WBC # BLD: 4.74 K/UL — SIGNIFICANT CHANGE UP (ref 3.8–10.5)
WBC # FLD AUTO: 4.74 K/UL — SIGNIFICANT CHANGE UP (ref 3.8–10.5)

## 2023-04-30 PROCEDURE — 99232 SBSQ HOSP IP/OBS MODERATE 35: CPT | Mod: GC

## 2023-04-30 RX ADMIN — HEPARIN SODIUM 5000 UNIT(S): 5000 INJECTION INTRAVENOUS; SUBCUTANEOUS at 22:08

## 2023-04-30 RX ADMIN — Medication 1 MILLIGRAM(S): at 12:50

## 2023-04-30 RX ADMIN — HEPARIN SODIUM 5000 UNIT(S): 5000 INJECTION INTRAVENOUS; SUBCUTANEOUS at 05:58

## 2023-04-30 RX ADMIN — Medication 5 MILLIGRAM(S): at 05:58

## 2023-04-30 RX ADMIN — Medication 200 MILLIGRAM(S): at 12:50

## 2023-04-30 RX ADMIN — Medication 1000 UNIT(S): at 12:50

## 2023-04-30 RX ADMIN — HEPARIN SODIUM 5000 UNIT(S): 5000 INJECTION INTRAVENOUS; SUBCUTANEOUS at 14:32

## 2023-04-30 RX ADMIN — PREGABALIN 1000 MICROGRAM(S): 225 CAPSULE ORAL at 12:51

## 2023-04-30 RX ADMIN — MYCOPHENOLIC ACID 720 MILLIGRAM(S): 180 TABLET, DELAYED RELEASE ORAL at 05:58

## 2023-04-30 RX ADMIN — PANTOPRAZOLE SODIUM 40 MILLIGRAM(S): 20 TABLET, DELAYED RELEASE ORAL at 05:58

## 2023-04-30 RX ADMIN — MYCOPHENOLIC ACID 720 MILLIGRAM(S): 180 TABLET, DELAYED RELEASE ORAL at 17:18

## 2023-04-30 NOTE — PROGRESS NOTE ADULT - ASSESSMENT
48M with h/o SLE c/b LN3/4 who presents with persistent nausea/vomiting after medication administration at home and found to have LIZETH vs progression of LN/CKD who is otherwise hemodynamically stable awaiting nephrology and rheumatology evaluation.     #Nausea / Vomiting  Intermittent, often after pm dose of medications. Not related to meals, no infectious symptoms or diarrhea. CT A/P without explanation of symptoms. May be medication related, may be related to toxin build-up ico worsening LN. Less likely infectious given timeline of symptoms, but pt is immunosuppressed (had been getting mAb infusions). Ultimately, pt tolerating PO, so will involve nephro/Rheum for ongoing input on other worsening labs. Medications changing with rheum, cTM.  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 Now 2.5 with borderline hyperkalemia. Pt denies decreased UOP. Urine studies suggesting pre-renal etiology (FeNa 0.7%), but pt takes diuretics, so unclear reliability (though would expect MORE Na wasting with diuresis, not less). UPCR with nephrotic-range proteinuria as well, which is consistent with known LN. Pending renal biopsy to assess for possible POD/transformation, planned for Tuesday 5/2.  Nephrologist: Dr. Hayde Garland, Nephro C/S, appreciate recs (following)  Rheumatologist: Dr. Nilton Le, Rheum C/S, appreciate recs (following)  IR C/S, Appreciate recs  ::Normocytic Anemia: previously with AoCD but baseline higher  -- T&S (4/28-)  -- Iron Studies (iron replete), B12 (304), Folate (5.7)  -- Cyanocobalamin 1kU IM QDx 7d, then PO after; Folate 1mg PO QD  ::Electrolytes: Pt with borderline HyperK in ER. Given Lokelma 5x1. Stably WNL. No ECG changes.  ::BMD:   -- C/W Home Vit D3 1kU QD  -- Ca x Phos: < 55  ::Volume/BP: SBP at goal, but b/l LE edema suggestive of volume overload (vs decreased oncotic pressure ico nephrotic syndrome)  -- HOLDing Home Lisinpril 10mg PO QD (ico LIZETH)  -- HOLDing Home Furosemide 20mg PO QD (ico LIZETH)  ::HD Access: no current strong indication for HD, no access  ::Rheum/SLE  -- C/W Home TMP/-80 PO TIW (but d/t LIZETH vs CKD, will ask rheum & nephro about other coverage or continuation)  -- D/C MMF 1500mg PO BID  -- START Myfortic 720 ER PO BID (1h pre or 2h post meals)  -- C/W Home Hydroxychloroquine 200mg PO QD  -- C/W Home Prednisone 5mg PO QD    #Dyspnea on Exertion   Pt with reports of ANGUIANO, fatigue. B/l Leg swelling. Cardiac evaluation unremarkable. May be 2/2 anemia. B12 borderline low but EF normal (not wet beriberi). May benefit from further OP testing with pulm/cards.  - TTE (EF 62%, unremarkable echo), ECG  - Trop, BNP WNL

## 2023-04-30 NOTE — PROGRESS NOTE ADULT - SUBJECTIVE AND OBJECTIVE BOX
***************************************************************  Milly Zayas, PGY2  Internal Medicine   pager: LIALIZA: [phone], [facility]: [phone]  ***************************************************************    LIZ HOUSER  48y  MRN: [id number]    Patient is a 48y old  Male who presents with a chief complaint of Nausea and vomiting     (29 Apr 2023 15:05)      Subjective: no events ON. Denies fever, CP, SOB, abn pain, N/V, dysuria. Tolerating diet.      MEDICATIONS  (STANDING):  cholecalciferol 1000 Unit(s) Oral daily  cyanocobalamin Injectable 1000 MICROGram(s) IntraMuscular every 24 hours  folic acid 1 milliGRAM(s) Oral daily  heparin   Injectable 5000 Unit(s) SubCutaneous every 8 hours  hydroxychloroquine 200 milliGRAM(s) Oral daily  mycophenolic acid  milliGRAM(s) Oral <User Schedule>  pantoprazole    Tablet 40 milliGRAM(s) Oral before breakfast  predniSONE   Tablet 5 milliGRAM(s) Oral daily  trimethoprim   80 mG/sulfamethoxazole 400 mG 1 Tablet(s) Oral <User Schedule>    MEDICATIONS  (PRN):      Objective:    Vitals: Vital Signs Last 24 Hrs  T(C): 36.8 (04-30-23 @ 05:14), Max: 36.9 (04-29-23 @ 12:59)  T(F): 98.3 (04-30-23 @ 05:14), Max: 98.5 (04-29-23 @ 12:59)  HR: 71 (04-30-23 @ 05:14) (69 - 77)  BP: 104/67 (04-30-23 @ 05:14) (104/67 - 121/71)  BP(mean): --  RR: 18 (04-30-23 @ 05:14) (17 - 19)  SpO2: 99% (04-30-23 @ 05:14) (99% - 100%)            I&O's Summary      PHYSICAL EXAM:  GENERAL: NAD  HEAD:  Atraumatic, Normocephalic  EYES: EOMI, conjunctiva and sclera clear  CHEST/LUNG: Clear to auscultation bilaterally; No rales, rhonchi, wheezing, or rubs  HEART: Regular rate and rhythm; No murmurs, rubs, or gallops  ABDOMEN: Soft, Nontender, Nondistended;   SKIN: No rashes or lesions  NERVOUS SYSTEM:  Alert & Oriented X3, no focal deficits    LABS:  04-29    139  |  114<H>  |  36<H>  ----------------------------<  89  5.1   |  16<L>  |  1.67<H>  04-28    137  |  113<H>  |  38<H>  ----------------------------<  87  4.8   |  15<L>  |  1.95<H>  04-27    134<L>  |  107  |  43<H>  ----------------------------<  91  5.5<H>   |  17<L>  |  2.55<H>    Ca    8.2<L>      29 Apr 2023 06:30  Ca    8.3<L>      28 Apr 2023 09:08  Ca    8.2<L>      27 Apr 2023 23:59  Phos  4.8     04-29  Mg     1.80     04-29    TPro  4.3<L>  /  Alb  2.1<L>  /  TBili  <0.2  /  DBili  x   /  AST  11  /  ALT  5   /  AlkPhos  51  04-29  TPro  4.6<L>  /  Alb  2.2<L>  /  TBili  <0.2  /  DBili  x   /  AST  15  /  ALT  6   /  AlkPhos  56  04-28  TPro  5.1<L>  /  Alb  2.6<L>  /  TBili  <0.2  /  DBili  x   /  AST  12  /  ALT  6   /  AlkPhos  62  04-27                                              7.5    4.78  )-----------( 258      ( 29 Apr 2023 06:30 )             24.4                         7.9    5.09  )-----------( 267      ( 28 Apr 2023 09:08 )             25.5                         8.1    6.51  )-----------( 283      ( 27 Apr 2023 23:59 )             26.2     CAPILLARY BLOOD GLUCOSE          RADIOLOGY & ADDITIONAL TESTS:    Imaging Personally Reviewed:  [x ] YES  [ ] NO    Consultants involved in case:   Consultant(s) Notes Reviewed:  [ x] YES  [ ] NO:   Care Discussed with Consultants/Other Providers [x ] YES  [ ] NO

## 2023-05-01 ENCOUNTER — TRANSCRIPTION ENCOUNTER (OUTPATIENT)
Age: 48
End: 2023-05-01

## 2023-05-01 LAB
ANION GAP SERPL CALC-SCNC: 8 MMOL/L — SIGNIFICANT CHANGE UP (ref 7–14)
BASOPHILS # BLD AUTO: 0.03 K/UL — SIGNIFICANT CHANGE UP (ref 0–0.2)
BASOPHILS NFR BLD AUTO: 0.6 % — SIGNIFICANT CHANGE UP (ref 0–2)
BLD GP AB SCN SERPL QL: NEGATIVE — SIGNIFICANT CHANGE UP
BUN SERPL-MCNC: 35 MG/DL — HIGH (ref 7–23)
CALCIUM SERPL-MCNC: 8.3 MG/DL — LOW (ref 8.4–10.5)
CHLORIDE SERPL-SCNC: 114 MMOL/L — HIGH (ref 98–107)
CO2 SERPL-SCNC: 16 MMOL/L — LOW (ref 22–31)
CREAT SERPL-MCNC: 1.6 MG/DL — HIGH (ref 0.5–1.3)
EGFR: 53 ML/MIN/1.73M2 — LOW
EOSINOPHIL # BLD AUTO: 0.09 K/UL — SIGNIFICANT CHANGE UP (ref 0–0.5)
EOSINOPHIL NFR BLD AUTO: 1.7 % — SIGNIFICANT CHANGE UP (ref 0–6)
GLUCOSE SERPL-MCNC: 92 MG/DL — SIGNIFICANT CHANGE UP (ref 70–99)
HCT VFR BLD CALC: 26 % — LOW (ref 39–50)
HGB BLD-MCNC: 8.1 G/DL — LOW (ref 13–17)
IANC: 3.39 K/UL — SIGNIFICANT CHANGE UP (ref 1.8–7.4)
IMM GRANULOCYTES NFR BLD AUTO: 0.6 % — SIGNIFICANT CHANGE UP (ref 0–0.9)
LYMPHOCYTES # BLD AUTO: 0.97 K/UL — LOW (ref 1–3.3)
LYMPHOCYTES # BLD AUTO: 18.5 % — SIGNIFICANT CHANGE UP (ref 13–44)
MAGNESIUM SERPL-MCNC: 1.8 MG/DL — SIGNIFICANT CHANGE UP (ref 1.6–2.6)
MCHC RBC-ENTMCNC: 26.5 PG — LOW (ref 27–34)
MCHC RBC-ENTMCNC: 31.2 GM/DL — LOW (ref 32–36)
MCV RBC AUTO: 85 FL — SIGNIFICANT CHANGE UP (ref 80–100)
MONOCYTES # BLD AUTO: 0.73 K/UL — SIGNIFICANT CHANGE UP (ref 0–0.9)
MONOCYTES NFR BLD AUTO: 13.9 % — SIGNIFICANT CHANGE UP (ref 2–14)
NEUTROPHILS # BLD AUTO: 3.39 K/UL — SIGNIFICANT CHANGE UP (ref 1.8–7.4)
NEUTROPHILS NFR BLD AUTO: 64.7 % — SIGNIFICANT CHANGE UP (ref 43–77)
NRBC # BLD: 0 /100 WBCS — SIGNIFICANT CHANGE UP (ref 0–0)
NRBC # FLD: 0 K/UL — SIGNIFICANT CHANGE UP (ref 0–0)
PHOSPHATE SERPL-MCNC: 5.3 MG/DL — HIGH (ref 2.5–4.5)
PLATELET # BLD AUTO: 275 K/UL — SIGNIFICANT CHANGE UP (ref 150–400)
POTASSIUM SERPL-MCNC: 4.8 MMOL/L — SIGNIFICANT CHANGE UP (ref 3.5–5.3)
POTASSIUM SERPL-SCNC: 4.8 MMOL/L — SIGNIFICANT CHANGE UP (ref 3.5–5.3)
RBC # BLD: 3.06 M/UL — LOW (ref 4.2–5.8)
RBC # FLD: 13.5 % — SIGNIFICANT CHANGE UP (ref 10.3–14.5)
RH IG SCN BLD-IMP: POSITIVE — SIGNIFICANT CHANGE UP
SODIUM SERPL-SCNC: 138 MMOL/L — SIGNIFICANT CHANGE UP (ref 135–145)
WBC # BLD: 5.24 K/UL — SIGNIFICANT CHANGE UP (ref 3.8–10.5)
WBC # FLD AUTO: 5.24 K/UL — SIGNIFICANT CHANGE UP (ref 3.8–10.5)

## 2023-05-01 PROCEDURE — 99232 SBSQ HOSP IP/OBS MODERATE 35: CPT | Mod: GC

## 2023-05-01 RX ADMIN — PREGABALIN 1000 MICROGRAM(S): 225 CAPSULE ORAL at 12:01

## 2023-05-01 RX ADMIN — Medication 5 MILLIGRAM(S): at 06:14

## 2023-05-01 RX ADMIN — Medication 200 MILLIGRAM(S): at 12:01

## 2023-05-01 RX ADMIN — MYCOPHENOLIC ACID 720 MILLIGRAM(S): 180 TABLET, DELAYED RELEASE ORAL at 19:16

## 2023-05-01 RX ADMIN — Medication 1 TABLET(S): at 09:27

## 2023-05-01 RX ADMIN — PANTOPRAZOLE SODIUM 40 MILLIGRAM(S): 20 TABLET, DELAYED RELEASE ORAL at 06:14

## 2023-05-01 RX ADMIN — HEPARIN SODIUM 5000 UNIT(S): 5000 INJECTION INTRAVENOUS; SUBCUTANEOUS at 21:28

## 2023-05-01 RX ADMIN — HEPARIN SODIUM 5000 UNIT(S): 5000 INJECTION INTRAVENOUS; SUBCUTANEOUS at 13:36

## 2023-05-01 RX ADMIN — Medication 1 MILLIGRAM(S): at 12:01

## 2023-05-01 RX ADMIN — Medication 1000 UNIT(S): at 12:01

## 2023-05-01 RX ADMIN — HEPARIN SODIUM 5000 UNIT(S): 5000 INJECTION INTRAVENOUS; SUBCUTANEOUS at 06:14

## 2023-05-01 RX ADMIN — MYCOPHENOLIC ACID 720 MILLIGRAM(S): 180 TABLET, DELAYED RELEASE ORAL at 06:14

## 2023-05-01 NOTE — PROGRESS NOTE ADULT - ASSESSMENT
48M with h/o SLE c/b LN3/4 who presents with persistent nausea/vomiting after medication administration at home and found to have LIZETH vs progression of LN/CKD who is otherwise hemodynamically stable awaiting nephrology and rheumatology evaluation.     #Nausea / Vomiting  Intermittent, often after pm dose of medications. Not related to meals, no infectious symptoms or diarrhea. CT A/P without explanation of symptoms. May be medication related, may be related to toxin build-up ico worsening LN. Less likely infectious given timeline of symptoms, but pt is immunosuppressed (had been getting mAb infusions). Ultimately, pt tolerating PO, so will involve nephro/Rheum for ongoing input on other worsening labs. Medications changing with rheum, cTM.  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 Now 2.5 with borderline hyperkalemia. Pt denies decreased UOP. Urine studies suggesting pre-renal etiology (FeNa 0.7%), but pt takes diuretics, so unclear reliability (though would expect MORE Na wasting with diuresis, not less). UPCR with nephrotic-range proteinuria as well, which is consistent with known LN. Pending renal biopsy to assess for possible POD/transformation, planned for Tuesday 5/2.  Nephrologist: Dr. Hayde Garland, Nephro C/S, appreciate recs (following)  Rheumatologist: Dr. Nilton Le, Rheum C/S, appreciate recs (following)  IR C/S, Appreciate recs  ::Normocytic Anemia: previously with AoCD but baseline higher  -- T&S (4/28-)  -- Iron Studies (iron replete), B12 (304), Folate (5.7)  -- Cyanocobalamin 1kU IM QDx 7d, then PO after; Folate 1mg PO QD  ::Electrolytes: Pt with borderline HyperK in ER. Given Lokelma 5x1. Stably WNL. No ECG changes.  ::BMD:   -- C/W Home Vit D3 1kU QD  -- Ca x Phos: < 55  ::Volume/BP: SBP at goal, but b/l LE edema suggestive of volume overload (vs decreased oncotic pressure ico nephrotic syndrome)  -- HOLDing Home Lisinpril 10mg PO QD (ico LIZETH)  -- HOLDing Home Furosemide 20mg PO QD (ico LIZETH)  ::HD Access: no current strong indication for HD, no access  ::Rheum/SLE  -- C/W Home TMP/-80 PO TIW (but d/t LIZETH vs CKD, will ask rheum & nephro about other coverage or continuation)  -- D/C MMF 1500mg PO BID  -- START Myfortic 720 ER PO BID (1h pre or 2h post meals)  -- C/W Home Hydroxychloroquine 200mg PO QD  -- C/W Home Prednisone 5mg PO QD    #Dyspnea on Exertion   Pt with reports of ANGUIANO, fatigue. B/l Leg swelling. Cardiac evaluation unremarkable. May be 2/2 anemia. B12 borderline low but EF normal (not wet beriberi). May benefit from further OP testing with pulm/cards.  - TTE (EF 62%, unremarkable echo), ECG  - Trop, BNP WNL 48M with h/o SLE c/b LN3/4 who presents with persistent nausea/vomiting after medication administration at home and found to have LIZETH vs progression of LN/CKD who is otherwise hemodynamically stable awaiting nephrology and rheumatology evaluation.     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 Now 2.5 with borderline hyperkalemia. Pt denies decreased UOP. Urine studies suggesting pre-renal etiology (FeNa 0.7%), but pt takes diuretics, so unclear reliability (though would expect MORE Na wasting with diuresis, not less). UPCR with nephrotic-range proteinuria as well, which is consistent with known LN. Pending renal biopsy to assess for possible POD/transformation, planned for Tuesday 5/2. Creatinine improving, HyperK improved. Will f/u with Nephro/Rheum today regarding ongiong recs inpatient vs outpatient.  Nephrologist: Dr. Hayde Garland, Nephro C/S, appreciate recs (following)  Rheumatologist: Dr. Nilton Le, Rheum C/S, appreciate recs (following)  IR C/S, Appreciate recs  ::Normocytic Anemia: previously with AoCD but baseline higher  -- T&S (4/28-)  -- Iron Studies (iron replete), B12 (304), Folate (5.7)  -- Cyanocobalamin 1kU IM QDx 7d, then PO after; Folate 1mg PO QD  ::Electrolytes: Pt with borderline HyperK in ER. Given Lokelma 5x1. Stably WNL. No ECG changes.  ::BMD:   -- C/W Home Vit D3 1kU QD  -- Ca x Phos: < 55  ::Volume/BP: SBP at goal, but b/l LE edema suggestive of volume overload (vs decreased oncotic pressure ico nephrotic syndrome)  -- HOLDing Home Lisinpril 10mg PO QD (ico LIZETH)  -- HOLDing Home Furosemide 20mg PO QD (ico LIZETH)  ::HD Access: no current strong indication for HD, no access  ::Rheum/SLE  -- F/U mycophenolic acid level  -- C/W Home TMP/-80 PO TIW (but d/t LIZETH vs CKD, will ask rheum & nephro about other coverage or continuation)  -- D/C MMF 1500mg PO BID  -- C/W Myfortic 720 ER PO BID (1h pre or 2h post meals)  -- C/W Home Hydroxychloroquine 200mg PO QD  -- C/W Home Prednisone 5mg PO QD    #Dyspnea on Exertion   Pt with reports of ANGUIANO, fatigue. B/l Leg swelling. Cardiac evaluation unremarkable. May be 2/2 anemia. B12 borderline low but EF normal (not wet beriberi). May benefit from further OP testing with pulm/cards.  - TTE (EF 62%, unremarkable echo), ECG  - Trop, BNP WNL

## 2023-05-01 NOTE — DISCHARGE NOTE PROVIDER - CARE PROVIDERS DIRECT ADDRESSES
[email or web address]

## 2023-05-01 NOTE — DISCHARGE NOTE PROVIDER - NSDCCPTREATMENT_GEN_ALL_CORE_FT
PRINCIPAL PROCEDURE  Procedure: CT abdomen and pelvis without contrast  Findings and Treatment:   < end of copied text >  FINDINGS:  LOWER CHEST: Within normal limits.  LIVER: Within normal limits.  BILE DUCTS: Normal caliber.  GALLBLADDER: Cholecystectomy.  SPLEEN: Within normal limits.  PANCREAS: Within normal limits.  ADRENALS: Within normal limits.  KIDNEYS/URETERS: Within normal limits.  BLADDER: Within normal limits.  REPRODUCTIVE ORGANS: Prostate within normal limits.  BOWEL: No bowel obstruction. Appendix is normal.  PERITONEUM: No ascites.  VESSELS: Within normal limits.  RETROPERITONEUM/LYMPH NODES: No lymphadenopathy.  ABDOMINAL WALL: Within normal limits.  BONES: Within normal limits.  IMPRESSION:  No acute abdominal pathology.< from: CT Abdomen and Pelvis No Cont (04.28.23 @ 01:49) >      SECONDARY PROCEDURE  Procedure: US kidney and bladder  Findings and Treatment: FINDINGS:  Right kidney: 9.9 cm. No renal mass, hydronephrosis or calculi.  Left kidney: 11.0 cm. No renal mass, hydronephrosis or calculi.  Urinary bladder: Within normal limits.  IMPRESSION:  No hydronephrosis.< from: US Kidney and Bladder (04.28.23 @ 08:44)    Procedure: Transthoracic echo  Findings and Treatment:   < end of copied text >  DIMENSIONS:  Dimensions:     Normal Values:  LA:     3.4 cm    2.0 - 4.0 cm  Ao:     3.6 cm    2.0 - 3.8 cm  SEPTUM: 0.6 cm    0.6 - 1.2 cm  PWT:    0.7 cm    0.6 - 1.1 cm  LVIDd:  5.2 cm    3.0 - 5.6 cm  LVIDs:  3.3 cm    1.8 - 4.0 cm  Derived Variables:  LVMI: 66 g/m2  RWT: 0.26  Fractional short: 37 %  Ejection Fraction (Garcia Rule): 62 %  ------------------------------------------------------------------------  OBSERVATIONS:  Mitral Valve: Normal mitral valve. Minimal mitral  regurgitation.  Aortic Root: Normal aortic root.  Aortic Valve: Normal trileaflet aortic valve.  Left Atrium: Normal left atrium.  LA volume index = 21  cc/m2.  Left Ventricle: Normal left ventricular systolic function.  No segmental wall motion abnormalities. Normal left  ventricular internal dimensions and wall thicknesses.  Normal left ventricular diastolic function.  Right Heart: Normal right atrium. Normal right ventricular  size and function. Normal tricuspid valve. Minimal  tricuspid regurgitation. Normal pulmonic valve. Mild  pulmonic regurgitation.  Pericardium/PleuraNormal pericardium with no pericardial  effusion.  ------------------------------------------------------------------------  CONCLUSIONS:  1. Normal mitral valve. Minimal mitral regurgitation.  2. Normal left ventricular internal dimensions and wall  thicknesses.  3. Normal left ventricular systolic function. No segmental  wall motion abnormalities.  4. Normal left ventricular diastolic function.  5. Normal right ventricular size and function.< from: Transthoracic Echocardiogram (04.28.23 @ 13:13) >

## 2023-05-01 NOTE — DISCHARGE NOTE PROVIDER - NSDCFUADDAPPT_GEN_ALL_CORE_FT
Please follow up with your rheumatologist Dr. Nilton Le within 1-3 days of discharge.   Please follow up with your nephrologist within 1-3 days of discharge.  Please follow up with your PCP within 1 week of discharge.

## 2023-05-01 NOTE — CHART NOTE - NSCHARTNOTEFT_GEN_A_CORE
Interventional Radiology Pre-Procedure Note    This is a 48y Male with h/o SLE c/b LN p/w nausea/vomiting ico medication intolerance, found to have fluctuating creatinine both in & outpatient c/f progression of disease. IR consulted for renal biopsy.    NPO: @ MN  Antibiotics: N/A  Anticoagulation: Held @ 3283 05/01/2023  Adverse events to anesethsia: N/A  Objection to blood products: N/A    PAST MEDICAL & SURGICAL HISTORY:  Lupus nephritis      Systemic lupus      No significant past surgical history           Vitals:Vital Signs Last 24 Hrs  T(C): 36.7 (01 May 2023 11:28), Max: 36.8 (01 May 2023 05:20)  T(F): 98.1 (01 May 2023 11:28), Max: 98.3 (01 May 2023 05:20)  HR: 67 (01 May 2023 11:28) (67 - 78)  BP: 121/68 (01 May 2023 11:28) (114/71 - 121/68)  BP(mean): --  RR: 17 (01 May 2023 11:28) (17 - 18)  SpO2: 99% (01 May 2023 11:28) (99% - 100%)    Parameters below as of 01 May 2023 11:28  Patient On (Oxygen Delivery Method): room air        Allergies: Allergies    No Known Allergies    Intolerances        Physical Exam:     Labs:                         8.1    5.24  )-----------( 275      ( 01 May 2023 06:17 )             26.0     05-01    138  |  114<H>  |  35<H>  ----------------------------<  92  4.8   |  16<L>  |  1.60<H>    Ca    8.3<L>      01 May 2023 06:17  Phos  5.3     05-01  Mg     1.80     05-01    TPro  x   /  Alb  2.1<L>  /  TBili  x   /  DBili  x   /  AST  x   /  ALT  x   /  AlkPhos  x   04-30        Informed consent to be obtained by performing team. All questions and concerns have been addressed at this time.     Neo Malave MD PGY-2

## 2023-05-01 NOTE — PROGRESS NOTE ADULT - SUBJECTIVE AND OBJECTIVE BOX
Medicine Progress Note  --------------------  Neo Malave M.D.  Internal Medicine/Emergency Medicine  PGY-2  --------------------      Patient: LIZ HOUSER, MRN: [id number], : 1975  Admitted on 23 for Nausea and vomiting      LANGUAGE - English ]OR[ PI (_): #                ------------------------------------------------------------------------------------------------------------  SUMMARY (from last progress note through 23 @ 07:30):   -  ------------------------------------------------------------------------------------------------------------  OVERNIGHT EVENTS  NAEON    SUBJECTIVE  -       ROS negative unless noted above.  ------------------------------------------------------------------------------------------------------------  OBJECTIVE:  Physical Exam  CONST:     NAD, well-appearing; well-developed; appears stated age  EYES:         Conjunctiva clear; PERRL; no conjunctival pallor; no lid lag  ENMT:       MMM, no pharyngeal injection or exudates; normal dentition/dentures present  NECK:        Supple, no LAD; no palpable masses; no thyromegaly  RESP :        Normal respiratory effort; CTA bilaterally; no W/R/R  CHEST:       No TTP, no lines/ports; symmetric chest expansion  CARDIO:     RRR, normal S1 and S2, no M/R/G; apical impulse at MCL  VASC:         No JVD/AJR, no peripheral edema, pulses 2+ B/L, Cap refill <2s  ABD:           Soft, NT/ND, norm bowel sounds; no R/G; No HSM; No CVAT  MSK:          No clubbing of digits; no joint swelling or TTP  EXT:           WWP, no reduction in body hair, no LE skin changes  PSYCH        A&O to person, place, and time; affect appropriate  NEURO:     Non-focal; no gross sensory deficits; moving all extremities   SKIN:          No rashes; no palpable lesions; axillae not dry    Vital Signs Last 24 Hrs  T(F): 98.3, Max: 98.4 (23 @ 12:49)  HR: 78 (63 - 78)  BP: 116/68 (114/71 - 117/74)  RR: 18 (18 - 18)  SpO2: 99% (99% - 100%)        DAILY MEASUREMENTS:  I&O's Summary    2023 07:01  -  01 May 2023 07:00  --------------------------------------------------------  IN: 0 mL / OUT: 1250 mL / NET: -1250 mL      Daily     Daily   Weight (kg): 62.2 (23 @ 03:50)  Orthostatic VS        LABS:                        7.4    4.74  )-----------( 259      ( 2023 05:50 )             23.9     Hgb Trend: 7.4<--, 7.5<--, 7.9<--, 8.1<--      138  |  112<H>  |  33<H>  ----------------------------<  83  4.8   |  16<L>  |  1.62<H>    Ca    8.1<L>      2023 05:50  Phos  4.7     -30  Mg     2.00         TPro  x   /  Alb  2.1<L>  /  TBili  x   /  DBili  x   /  AST  x   /  ALT  x   /  AlkPhos  x         CAPILLARY BLOOD GLUCOSE                      RADIOLOGY & ADDITIONAL TESTS:  Results Reviewed:     Imaging Reviewed:  Electrocardiogram Reviewed:      ------------------------------------------------------------------------------------------------------------  MEDICATIONS  (STANDING):  cholecalciferol 1000 Unit(s) Oral daily  cyanocobalamin Injectable 1000 MICROGram(s) IntraMuscular every 24 hours  folic acid 1 milliGRAM(s) Oral daily  heparin   Injectable 5000 Unit(s) SubCutaneous every 8 hours  hydroxychloroquine 200 milliGRAM(s) Oral daily  mycophenolic acid  milliGRAM(s) Oral <User Schedule>  pantoprazole    Tablet 40 milliGRAM(s) Oral before breakfast  predniSONE   Tablet 5 milliGRAM(s) Oral daily  trimethoprim   80 mG/sulfamethoxazole 400 mG 1 Tablet(s) Oral <User Schedule>    MEDICATIONS  (PRN):    ------------------------------------------------------------------------------------------------------------  COORDINATION OF CARE:  Care discussed with consultants/other providers and notes reviewed [Y]     Medicine Progress Note  --------------------  Neo Malave M.D.  Internal Medicine/Emergency Medicine  PGY-2  --------------------      Patient: LIZ HOUSER, MRN: [id number], : 1975  Admitted on 23 for Nausea and vomiting      LANGUAGE - English ]OR[ PI (_): #                ------------------------------------------------------------------------------------------------------------  SUMMARY (from last progress note through 23 @ 07:30):   48M with h/o SLE c/b LN p/w post-medication n ausea/vomiting  (now switched to Myfortiq) with improvement in symptoms, creatinine. Pending Renal biopsy in consultation with Renal/Rheum.  ------------------------------------------------------------------------------------------------------------  OVERNIGHT EVENTS  NAEON    SUBJECTIVE  He says that he feels much better. He hasn't had any further episodes of nausea/vomiting.      ROS negative unless noted above.  ------------------------------------------------------------------------------------------------------------  OBJECTIVE:  Physical Exam  GEN: Awake, AOx3, NAD.  HEENT: NCAT  ---EYES: no scleral icterus, EOMI, PERRLA  CARDIO: RRR. Normal S1/S2, no m/r/g. No JVD.  RESP: CTAB, no w/r/r  ABD: Soft, NTND.   MSK: No obvious deformity or ROM deficit.   SKIN: Warm, dry.   NEURO: Moves all four extremities spontaneously  PSYCH: Appropriate mood & affect      Vital Signs Last 24 Hrs  T(F): 98.3, Max: 98.4 (23 @ 12:49)  HR: 78 (63 - 78)  BP: 116/68 (114/71 - 117/74)  RR: 18 (18 - 18)  SpO2: 99% (99% - 100%)        DAILY MEASUREMENTS:  I&O's Summary    2023 07:01  -  01 May 2023 07:00  --------------------------------------------------------  IN: 0 mL / OUT: 1250 mL / NET: -1250 mL      Daily     Daily   Weight (kg): 62.2 (23 @ 03:50)  Orthostatic VS        LABS:                        7.4    4.74  )-----------( 259      ( 2023 05:50 )             23.9     Hgb Trend: 7.4<--, 7.5<--, 7.9<--, 8.1<--      138  |  112<H>  |  33<H>  ----------------------------<  83  4.8   |  16<L>  |  1.62<H>    Ca    8.1<L>      2023 05:50  Phos  4.7       Mg     2.00         TPro  x   /  Alb  2.1<L>  /  TBili  x   /  DBili  x   /  AST  x   /  ALT  x   /  AlkPhos  x         CAPILLARY BLOOD GLUCOSE                      RADIOLOGY & ADDITIONAL TESTS:  Results Reviewed:     Imaging Reviewed:  Electrocardiogram Reviewed:      ------------------------------------------------------------------------------------------------------------  MEDICATIONS  (STANDING):  cholecalciferol 1000 Unit(s) Oral daily  cyanocobalamin Injectable 1000 MICROGram(s) IntraMuscular every 24 hours  folic acid 1 milliGRAM(s) Oral daily  heparin   Injectable 5000 Unit(s) SubCutaneous every 8 hours  hydroxychloroquine 200 milliGRAM(s) Oral daily  mycophenolic acid  milliGRAM(s) Oral <User Schedule>  pantoprazole    Tablet 40 milliGRAM(s) Oral before breakfast  predniSONE   Tablet 5 milliGRAM(s) Oral daily  trimethoprim   80 mG/sulfamethoxazole 400 mG 1 Tablet(s) Oral <User Schedule>    MEDICATIONS  (PRN):    ------------------------------------------------------------------------------------------------------------  COORDINATION OF CARE:  Care discussed with consultants/other providers and notes reviewed [Y]

## 2023-05-01 NOTE — DISCHARGE NOTE PROVIDER - NSDCCAREPROVSEEN_GEN_ALL_CORE_FT
[facility] Medicine Team 8  Dr. Richar Malave [facility] Medicine Team 8

## 2023-05-01 NOTE — DISCHARGE NOTE PROVIDER - DETAILS OF MALNUTRITION DIAGNOSIS/DIAGNOSES
This patient has been assessed with a concern for Malnutrition and was treated during this hospitalization for the following Nutrition diagnosis/diagnoses:     -  04/29/2023: Moderate protein-calorie malnutrition

## 2023-05-01 NOTE — DISCHARGE NOTE PROVIDER - HOSPITAL COURSE
OUTSTANDING ISSUES AT DISCHARGE  #SLE c/b LN  [ ] F/U Mycophenolic Acid Levels  [ ] F/U Renal Biopsy Pathology Results    HOSPITAL COURSE  Mr. Silva is a 48M, Korean-speaking with h/o SLE c/b LN4 who presented with 1-1.5months of nausea/vomiting after taking PM meds, associated with some weight loss and found to have a worsened creatinine compared to baseline (1.4-1.6) to 2.55. Urine studies suggestive of pre-renal etiology, so patient given IVF and renal/rheumatology consulted for further evaluation. His symptoms primarily occurred after taking his evening dose of Mycophenolate ("CellCept"). This was discontinued and replaced with Mycophenolic acid DR ("Myfortic"), which resolved his symptoms. his creatinine improved back to 1.6's; his electrolytes normalized; and he was ultimately discharged on the regimen included in this summary.    His SLE flare labs were essentially unremarkable (slightly low C3, but normal C4, dsDNA), so we were not concerned for an acute lupus flare. Still, due to the patient's refractory renal function that has not sufficiently responded to outpatient management, he was recommend for image-guided renal biopsy, which he underwent on 4/2.       HOSPITAL COURSE  Mr. Silva is a 48M, English-speaking with h/o SLE c/b LN4 who presented with 1-1.5months of nausea/vomiting after taking PM meds, associated with some weight loss and found to have a worsened creatinine compared to baseline (1.4-1.6) to 2.55. Urine studies suggestive of pre-renal etiology, so patient given IVF and renal/rheumatology consulted for further evaluation. His symptoms primarily occurred after taking his evening dose of Mycophenolate ("CellCept"). This was discontinued and replaced with Mycophenolic acid DR ("Myfortic"), which was subsequently discontinued as he did not show much improvement.     His SLE flare labs were essentially unremarkable (slightly low C3, but normal C4, dsDNA), so we were not concerned for an acute lupus flare. 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 Patient received pulse dose steroids and will be discharged on a steroid taper. He also received a dose of Cytoxan given 5/5/23. He will follow up with rheumatology outpatient for future infusions.     Upon discharge the patient was hemodynamically stable and medically optimized.        HOSPITAL COURSE  Mr. Silva is a 48M, Chinese-speaking with h/o SLE c/b LN4 who presented with 1-1.5months of nausea/vomiting after taking PM meds, associated with some weight loss and found to have a worsened creatinine compared to baseline (1.4-1.6) to 2.55. Urine studies suggestive of pre-renal etiology, so patient given IVF and renal/rheumatology consulted for further evaluation. His symptoms primarily occurred after taking his evening dose of Mycophenolate ("CellCept"). This was discontinued and replaced with Mycophenolic acid DR ("Myfortic"), which was subsequently discontinued as he did not show much improvement.     His SLE flare labs were essentially unremarkable (slightly low C3, but normal C4, dsDNA), so we were not concerned for an acute lupus flare. 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 Patient received pulse dose steroids and will be discharged on a steroid taper. He also received a dose of Cytoxan given 5/5/23. He will follow up with rheumatology outpatient for future infusions. He will also follow up with nephrology outpatient.     Upon discharge the patient was hemodynamically stable and medically optimized.

## 2023-05-01 NOTE — DISCHARGE NOTE PROVIDER - PROVIDER TOKENS
PROVIDER:[TOKEN:[[id number]],FOLLOWUP:[1-3 days],ESTABLISHEDPATIENT:[T]],PROVIDER:[TOKEN:[[id number]],FOLLOWUP:[1-3 days],ESTABLISHEDPATIENT:[T]],PROVIDER:[TOKEN:[[id number]],FOLLOWUP:[1-3 days],ESTABLISHEDPATIENT:[T]]

## 2023-05-01 NOTE — DISCHARGE NOTE PROVIDER - NSDCMRMEDTOKEN_GEN_ALL_CORE_FT
furosemide 20 mg oral tablet: 1 tab(s) orally once a day  hydroxychloroquine 200 mg oral tablet: 1 tablet with sensor orally once a day  lisinopril 10 mg oral tablet: 1 tab(s) orally once a day  mycophenolate mofetil 500 mg oral tablet: 1,500 milligram(s) orally 2 times a day  predniSONE 10 mg oral tablet: 5 milligram(s) orally once a day  sulfamethoxazole-trimethoprim 400 mg-80 mg oral tablet: 1 tab(s) orally 3 times a week  Vitamin D3 25 mcg (1000 intl units) oral tablet: 1 tab(s) orally once a day    Bactrim 400 mg-80 mg oral tablet: 1 tab(s) orally 3 times a week Please take on Mondays, Wednesdays and Fridays  cyanocobalamin 1000 mcg oral tablet: 1 tab(s) orally once a day  ergocalciferol 1.25 mg (50,000 intl units) oral capsule: 1 cap(s) orally every 7 days Please start taking on Thursday 5/11, then continue taking once a week  folic acid 1 mg oral tablet: 1 tab(s) orally once a day  pantoprazole 40 mg oral delayed release tablet: 1 tab(s) orally once a day (before a meal)  Plaquenil 200 mg oral tablet: 2 tab(s) orally once a day Please take 2 tablets on Tuesday, Thursday and Saturday   Please take 1 tablet on Sunday, Monday, Wednesday, Friday,  predniSONE 20 mg oral tablet: 3 tab(s) orally once a day  sodium bicarbonate 650 mg oral tablet: 2 tab(s) orally every 12 hours

## 2023-05-01 NOTE — DISCHARGE NOTE PROVIDER - NSDCHOSPICE_GEN_A_CORE
Urology Brief Chart Review Note:  Dx:  Chronic urinary retention, catheter self-removed, inability to replace catheter    Assessment: Catheter in place draining well, UCx growing >100,000 colonies/mL Escherichia coli50,000 to 100,000 colonies/mL Strain 2 Lactose fermenting gram negative rods Susceptibility testing in progress.      Plan: 1. Urinary retention - chronic issue                        - continue rutledge catheter                                      - change rutledge catheter every 4 weeks (recommend inflating balloon with 30 mL to prevent him from pulling out the catheter)     2. UTI / ?sepsis                         - Follow UCx (susceptibilities pending)                        - recommend Levaquin 500 mg IV q 24 hrs pending UCx return    -May need ID consult given 2 strains of Gm- bacteria in UCx     3. Bladder stones (largest is 3.9 cm) and Right kidney stone                        - currently asymptomatic                        - defer future treatment to Dr. Hopson of [facility]      4. F/U with Dr. Hopson as scheduled in 1 month for catheter exchange         Please contact me with any questions or concerns.     Jacqueline Arshad PA-C  [facility]  Pager:  [phone]  After 4pm and on weekends, please call [phone]         No

## 2023-05-01 NOTE — DISCHARGE NOTE PROVIDER - CARE PROVIDER_API CALL
Mirella Siegel)  Internal Medicine; Rheumatology  [address], [address]  [place] [address]  Phone: [phone]  Fax: [phone]  Established Patient  Follow Up Time: 1-3 days    Md BEVERLEY Silva (MD)  Internal Medicine  [address] [place] [address]  Phone: [phone]  Fax: [phone]  Established Patient  Follow Up Time: 1-3 days    Hayde Shultz)  Internal Medicine; Nephrology  [address] [place] [address]  Phone: [phone]  Fax: [phone]  Established Patient  Follow Up Time: 1-3 days

## 2023-05-01 NOTE — PROGRESS NOTE ADULT - PROBLEM SELECTOR PLAN 6
Hospital Bundle  Fluids: PO Ad Shana  Electrolytes: Gentle electrolyte repletion d/t LIZETH/CKD  Nutrition: Diet Renal (Halal), nutrition consulted  PPX  ---VTE: SQH  ---GI: PO, bowel reg PRN  Access: PIV  Code Status: FULL CODE  Dispo: Pending medical evaluation Hospital Bundle  Fluids: PO Ad Shana  Electrolytes: Gentle electrolyte repletion d/t LIZETH/CKD  Nutrition: Diet Renal (Halal), nutrition consulted  PPX  ---VTE: SQH (to be held tn for biopsy as indicated)  ---GI: PO, bowel reg PRN  Access: PIV  Code Status: FULL CODE  Dispo: Pending medical evaluation

## 2023-05-01 NOTE — DISCHARGE NOTE PROVIDER - NSDCCPCAREPLAN_GEN_ALL_CORE_FT
PRINCIPAL DISCHARGE DIAGNOSIS  Diagnosis: Nausea & vomiting  Assessment and Plan of Treatment:       SECONDARY DISCHARGE DIAGNOSES  Diagnosis: LIZETH (acute kidney injury)  Assessment and Plan of Treatment:     Diagnosis: Lupus nephritis  Assessment and Plan of Treatment:     Diagnosis: Lupus  Assessment and Plan of Treatment:      PRINCIPAL DISCHARGE DIAGNOSIS  Diagnosis: Nausea & vomiting  Assessment and Plan of Treatment:       SECONDARY DISCHARGE DIAGNOSES  Diagnosis: LIZETH (acute kidney injury)  Assessment and Plan of Treatment:     Diagnosis: Lupus  Assessment and Plan of Treatment:     Diagnosis: Lupus nephritis  Assessment and Plan of Treatment:      PRINCIPAL DISCHARGE DIAGNOSIS  Diagnosis: LIZETH (acute kidney injury)  Assessment and Plan of Treatment: You came to the hospital for chronic nausea and vomiting. Your kidney function was found to be poor.      SECONDARY DISCHARGE DIAGNOSES  Diagnosis: Lupus nephritis  Assessment and Plan of Treatment:     Diagnosis: Lupus  Assessment and Plan of Treatment:      PRINCIPAL DISCHARGE DIAGNOSIS  Diagnosis: LIZETH (acute kidney injury)  Assessment and Plan of Treatment: You came to the hospital for chronic nausea and vomiting. Your kidney function was found to have worsened. The following medications were stopped: torsemide, lisinopril and furosemide. Your  kidney function improved. Please follow up with your nephrologist about restarting these medications. You were also started on a medication called sodium bicarbonate. Please take this medication as prescribed.      SECONDARY DISCHARGE DIAGNOSES  Diagnosis: Lupus nephritis  Assessment and Plan of Treatment: While you were in the hospital you had a kidney biopsy which was concerning for progression of lupus nephritis. Your cellcept was stropped. You received an infusion called cytoxan and you were started on steroids. Please follow up with your rheumatologist for further managmement and to schedule future infusions.    Diagnosis: Vitamin D deficiency  Assessment and Plan of Treatment: You were found to have low vitamin D please take the vitamin D as prescribed.     PRINCIPAL DISCHARGE DIAGNOSIS  Diagnosis: LIZETH (acute kidney injury)  Assessment and Plan of Treatment: You came to the hospital for chronic nausea and vomiting. Your kidney function was found to have worsened. The following medications were stopped: torsemide, lisinopril and furosemide. Your  kidney function improved. Please follow up with your nephrologist about restarting these medications. You were also started on a medication called sodium bicarbonate. Please take this medication as prescribed.      SECONDARY DISCHARGE DIAGNOSES  Diagnosis: Lupus nephritis  Assessment and Plan of Treatment: While you were in the hospital you had a kidney biopsy which was concerning for progression of lupus nephritis. Your cellcept was stropped. You received an infusion called cytoxan and you were started on steroids. Please follow up with your rheumatologist for further managmement and to schedule future infusions. You will also need labs within 7-14 days to monitor blood counts after infusion of cytoxan.    Diagnosis: Vitamin D deficiency  Assessment and Plan of Treatment: You were found to have low vitamin D please take the vitamin D as prescribed.

## 2023-05-02 ENCOUNTER — RESULT REVIEW (OUTPATIENT)
Age: 48
End: 2023-05-02

## 2023-05-02 LAB
ALBUMIN SERPL ELPH-MCNC: 2 G/DL — LOW (ref 3.3–5)
ALP SERPL-CCNC: 53 U/L — SIGNIFICANT CHANGE UP (ref 40–120)
ALT FLD-CCNC: 8 U/L — SIGNIFICANT CHANGE UP (ref 4–41)
ANION GAP SERPL CALC-SCNC: 10 MMOL/L — SIGNIFICANT CHANGE UP (ref 7–14)
APTT BLD: 36.2 SEC — SIGNIFICANT CHANGE UP (ref 27–36.3)
AST SERPL-CCNC: 12 U/L — SIGNIFICANT CHANGE UP (ref 4–40)
BASE EXCESS BLDV CALC-SCNC: -9.3 MMOL/L — LOW (ref -2–3)
BASOPHILS # BLD AUTO: 0.02 K/UL — SIGNIFICANT CHANGE UP (ref 0–0.2)
BASOPHILS NFR BLD AUTO: 0.4 % — SIGNIFICANT CHANGE UP (ref 0–2)
BILIRUB SERPL-MCNC: <0.2 MG/DL — SIGNIFICANT CHANGE UP (ref 0.2–1.2)
BUN SERPL-MCNC: 37 MG/DL — HIGH (ref 7–23)
CA-I SERPL-SCNC: 1.31 MMOL/L — SIGNIFICANT CHANGE UP (ref 1.15–1.33)
CALCIUM SERPL-MCNC: 8.1 MG/DL — LOW (ref 8.4–10.5)
CHLORIDE BLDV-SCNC: 113 MMOL/L — HIGH (ref 96–108)
CHLORIDE SERPL-SCNC: 114 MMOL/L — HIGH (ref 98–107)
CO2 BLDV-SCNC: 17.2 MMOL/L — LOW (ref 22–26)
CO2 SERPL-SCNC: 16 MMOL/L — LOW (ref 22–31)
CREAT SERPL-MCNC: 1.83 MG/DL — HIGH (ref 0.5–1.3)
EGFR: 45 ML/MIN/1.73M2 — LOW
EOSINOPHIL # BLD AUTO: 0.07 K/UL — SIGNIFICANT CHANGE UP (ref 0–0.5)
EOSINOPHIL NFR BLD AUTO: 1.4 % — SIGNIFICANT CHANGE UP (ref 0–6)
GAS PNL BLDV: 138 MMOL/L — SIGNIFICANT CHANGE UP (ref 136–145)
GAS PNL BLDV: SIGNIFICANT CHANGE UP
GAS PNL BLDV: SIGNIFICANT CHANGE UP
GLUCOSE BLDC GLUCOMTR-MCNC: 85 MG/DL — SIGNIFICANT CHANGE UP (ref 70–99)
GLUCOSE BLDV-MCNC: 91 MG/DL — SIGNIFICANT CHANGE UP (ref 70–99)
GLUCOSE SERPL-MCNC: 91 MG/DL — SIGNIFICANT CHANGE UP (ref 70–99)
HCO3 BLDV-SCNC: 16 MMOL/L — LOW (ref 22–29)
HCT VFR BLD CALC: 24.5 % — LOW (ref 39–50)
HCT VFR BLDA CALC: 23 % — LOW (ref 39–51)
HGB BLD CALC-MCNC: 7.7 G/DL — LOW (ref 12.6–17.4)
HGB BLD-MCNC: 7.5 G/DL — LOW (ref 13–17)
IANC: 3.1 K/UL — SIGNIFICANT CHANGE UP (ref 1.8–7.4)
IMM GRANULOCYTES NFR BLD AUTO: 0.6 % — SIGNIFICANT CHANGE UP (ref 0–0.9)
INR BLD: 0.95 RATIO — SIGNIFICANT CHANGE UP (ref 0.88–1.16)
LACTATE BLDV-MCNC: 0.4 MMOL/L — LOW (ref 0.5–2)
LYMPHOCYTES # BLD AUTO: 1.14 K/UL — SIGNIFICANT CHANGE UP (ref 1–3.3)
LYMPHOCYTES # BLD AUTO: 22.6 % — SIGNIFICANT CHANGE UP (ref 13–44)
MAGNESIUM SERPL-MCNC: 1.9 MG/DL — SIGNIFICANT CHANGE UP (ref 1.6–2.6)
MCHC RBC-ENTMCNC: 26.1 PG — LOW (ref 27–34)
MCHC RBC-ENTMCNC: 30.6 GM/DL — LOW (ref 32–36)
MCV RBC AUTO: 85.4 FL — SIGNIFICANT CHANGE UP (ref 80–100)
MONOCYTES # BLD AUTO: 0.68 K/UL — SIGNIFICANT CHANGE UP (ref 0–0.9)
MONOCYTES NFR BLD AUTO: 13.5 % — SIGNIFICANT CHANGE UP (ref 2–14)
NEUTROPHILS # BLD AUTO: 3.1 K/UL — SIGNIFICANT CHANGE UP (ref 1.8–7.4)
NEUTROPHILS NFR BLD AUTO: 61.5 % — SIGNIFICANT CHANGE UP (ref 43–77)
NRBC # BLD: 0 /100 WBCS — SIGNIFICANT CHANGE UP (ref 0–0)
NRBC # FLD: 0 K/UL — SIGNIFICANT CHANGE UP (ref 0–0)
PCO2 BLDV: 33 MMHG — LOW (ref 42–55)
PH BLDV: 7.3 — LOW (ref 7.32–7.43)
PHOSPHATE SERPL-MCNC: 6 MG/DL — HIGH (ref 2.5–4.5)
PLATELET # BLD AUTO: 258 K/UL — SIGNIFICANT CHANGE UP (ref 150–400)
PO2 BLDV: 114 MMHG — HIGH (ref 25–45)
POTASSIUM BLDV-SCNC: 4.7 MMOL/L — SIGNIFICANT CHANGE UP (ref 3.5–5.1)
POTASSIUM SERPL-MCNC: 4.7 MMOL/L — SIGNIFICANT CHANGE UP (ref 3.5–5.3)
POTASSIUM SERPL-SCNC: 4.7 MMOL/L — SIGNIFICANT CHANGE UP (ref 3.5–5.3)
PROT SERPL-MCNC: 4.4 G/DL — LOW (ref 6–8.3)
PROTHROM AB SERPL-ACNC: 11 SEC — SIGNIFICANT CHANGE UP (ref 10.5–13.4)
RBC # BLD: 2.87 M/UL — LOW (ref 4.2–5.8)
RBC # FLD: 13.3 % — SIGNIFICANT CHANGE UP (ref 10.3–14.5)
SAO2 % BLDV: 98.5 % — HIGH (ref 67–88)
SODIUM SERPL-SCNC: 140 MMOL/L — SIGNIFICANT CHANGE UP (ref 135–145)
WBC # BLD: 5.04 K/UL — SIGNIFICANT CHANGE UP (ref 3.8–10.5)
WBC # FLD AUTO: 5.04 K/UL — SIGNIFICANT CHANGE UP (ref 3.8–10.5)

## 2023-05-02 PROCEDURE — 50200 RENAL BIOPSY PERQ: CPT | Mod: LT

## 2023-05-02 PROCEDURE — 88305 TISSUE EXAM BY PATHOLOGIST: CPT | Mod: 26

## 2023-05-02 PROCEDURE — 88313 SPECIAL STAINS GROUP 2: CPT | Mod: 26

## 2023-05-02 PROCEDURE — 88350 IMFLUOR EA ADDL 1ANTB STN PX: CPT | Mod: 26

## 2023-05-02 PROCEDURE — 99232 SBSQ HOSP IP/OBS MODERATE 35: CPT | Mod: GC

## 2023-05-02 PROCEDURE — 76942 ECHO GUIDE FOR BIOPSY: CPT | Mod: 26

## 2023-05-02 PROCEDURE — 88346 IMFLUOR 1ST 1ANTB STAIN PX: CPT | Mod: 26

## 2023-05-02 PROCEDURE — 88348 ELECTRON MICROSCOPY DX: CPT | Mod: 26

## 2023-05-02 RX ADMIN — PREGABALIN 1000 MICROGRAM(S): 225 CAPSULE ORAL at 12:48

## 2023-05-02 RX ADMIN — MYCOPHENOLIC ACID 720 MILLIGRAM(S): 180 TABLET, DELAYED RELEASE ORAL at 18:01

## 2023-05-02 RX ADMIN — PANTOPRAZOLE SODIUM 40 MILLIGRAM(S): 20 TABLET, DELAYED RELEASE ORAL at 06:31

## 2023-05-02 RX ADMIN — MYCOPHENOLIC ACID 720 MILLIGRAM(S): 180 TABLET, DELAYED RELEASE ORAL at 05:33

## 2023-05-02 RX ADMIN — Medication 1000 UNIT(S): at 12:47

## 2023-05-02 RX ADMIN — Medication 5 MILLIGRAM(S): at 05:33

## 2023-05-02 RX ADMIN — Medication 1 MILLIGRAM(S): at 12:48

## 2023-05-02 RX ADMIN — Medication 200 MILLIGRAM(S): at 12:48

## 2023-05-02 NOTE — PROGRESS NOTE ADULT - SUBJECTIVE AND OBJECTIVE BOX
Medicine Progress Note  --------------------  Neo Malave M.D.  Internal Medicine/Emergency Medicine  PGY-2  --------------------      Patient: LIZ HOUSER, MRN: [id number], : 1975  Admitted on 23 for Nausea and vomiting      LANGUAGE - English ]OR[ PI (_): #                ------------------------------------------------------------------------------------------------------------  SUMMARY (from last progress note through 23 @ 12:48):   -  ------------------------------------------------------------------------------------------------------------  OVERNIGHT EVENTS  NAEON    SUBJECTIVE  -       ROS negative unless noted above.  ------------------------------------------------------------------------------------------------------------  OBJECTIVE:  Physical Exam  CONST:     NAD, well-appearing; well-developed; appears stated age  EYES:         Conjunctiva clear; PERRL; no conjunctival pallor; no lid lag  ENMT:       MMM, no pharyngeal injection or exudates; normal dentition/dentures present  NECK:        Supple, no LAD; no palpable masses; no thyromegaly  RESP :        Normal respiratory effort; CTA bilaterally; no W/R/R  CHEST:       No TTP, no lines/ports; symmetric chest expansion  CARDIO:     RRR, normal S1 and S2, no M/R/G; apical impulse at MCL  VASC:         No JVD/AJR, no peripheral edema, pulses 2+ B/L, Cap refill <2s  ABD:           Soft, NT/ND, norm bowel sounds; no R/G; No HSM; No CVAT  MSK:          No clubbing of digits; no joint swelling or TTP  EXT:           WWP, no reduction in body hair, no LE skin changes  PSYCH        A&O to person, place, and time; affect appropriate  NEURO:     Non-focal; no gross sensory deficits; moving all extremities   SKIN:          No rashes; no palpable lesions; axillae not dry    Vital Signs Last 24 Hrs  T(F): 97.9, Max: 98 (23 @ 21:50)  HR: 65 (65 - 74)  BP: 104/60 (104/60 - 130/78)  RR: 17 (17 - 17)  SpO2: 98% (98% - 100%)        DAILY MEASUREMENTS:  I&O's Summary    01 May 2023 07:01  -  02 May 2023 07:00  --------------------------------------------------------  IN: 0 mL / OUT: 1050 mL / NET: -1050 mL      Daily     Daily   Weight (kg): 62.2 (23 @ 03:50)  Orthostatic VS        LABS:                        7.5    5.04  )-----------( 258      ( 02 May 2023 04:40 )             24.5     Hgb Trend: 7.5<--, 8.1<--, 7.4<--, 7.5<--, 7.9<--  05-02    140  |  114<H>  |  37<H>  ----------------------------<  91  4.7   |  16<L>  |  1.83<H>    Ca    8.1<L>      02 May 2023 04:40  Phos  6.0       Mg     1.90         TPro  4.4<L>  /  Alb  2.0<L>  /  TBili  <0.2  /  DBili  x   /  AST  12  /  ALT  8   /  AlkPhos  53  0502    PT/INR - ( 02 May 2023 04:40 )   PT: 11.0 sec;   INR: 0.95 ratio         PTT - ( 02 May 2023 04:40 )  PTT:36.2 sec  CAPILLARY BLOOD GLUCOSE                  Venous Blood Gas:  23 @ 04:40  7.30/33/114/16/98.5  VBG Lactate: 0.4      RADIOLOGY & ADDITIONAL TESTS:  Results Reviewed:     Imaging Reviewed:  Electrocardiogram Reviewed:      ------------------------------------------------------------------------------------------------------------  MEDICATIONS  (STANDING):  cholecalciferol 1000 Unit(s) Oral daily  cyanocobalamin Injectable 1000 MICROGram(s) IntraMuscular every 24 hours  folic acid 1 milliGRAM(s) Oral daily  hydroxychloroquine 200 milliGRAM(s) Oral daily  mycophenolic acid  milliGRAM(s) Oral <User Schedule>  pantoprazole    Tablet 40 milliGRAM(s) Oral before breakfast  predniSONE   Tablet 5 milliGRAM(s) Oral daily  trimethoprim   80 mG/sulfamethoxazole 400 mG 1 Tablet(s) Oral <User Schedule>    MEDICATIONS  (PRN):    ------------------------------------------------------------------------------------------------------------  COORDINATION OF CARE:  Care discussed with consultants/other providers and notes reviewed [Y]     Medicine Progress Note  --------------------  Neo Malave M.D.  Internal Medicine/Emergency Medicine  PGY-2  --------------------      Patient: LIZ HOUSER, MRN: [id number], : 1975  Admitted on 23 for Nausea and vomiting      OVERNIGHT EVENTS  NAEON    SUBJECTIVE  Mr. Houser says that he feels okay this morning. He denies any further episodes of nausea/vomiting with the new medication.       ROS negative unless noted above.  ------------------------------------------------------------------------------------------------------------  OBJECTIVE:  Physical Exam  GEN: Awake, AOx3, NAD.  HEENT: NCAT  CARDIO: RRR.   RESP: Normal respiratory effort  ABD: Soft, NTND.  MSK: No obvious deformity or ROM deficit.  SKIN: Warm, dry. Slightly pale?,s table  NEURO: Moves all four extremities spontaneously; gait stable, ambulates well  PSYCH: Appropriate mood & affect.       Vital Signs Last 24 Hrs  T(F): 97.9, Max: 98 (23 @ 21:50)  HR: 65 (65 - 74)  BP: 104/60 (104/60 - 130/78)  RR: 17 (17 - 17)  SpO2: 98% (98% - 100%)        DAILY MEASUREMENTS:  I&O's Summary    01 May 2023 07:01  -  02 May 2023 07:00  --------------------------------------------------------  IN: 0 mL / OUT: 1050 mL / NET: -1050 mL      Daily     Daily   Weight (kg): 62.2 (23 @ 03:50)  Orthostatic VS        LABS:                        7.5    5.04  )-----------( 258      ( 02 May 2023 04:40 )             24.5     Hgb Trend: 7.5<--, 8.1<--, 7.4<--, 7.5<--, 7.9<--  0502    140  |  114<H>  |  37<H>  ----------------------------<  91  4.7   |  16<L>  |  1.83<H>    Ca    8.1<L>      02 May 2023 04:40  Phos  6.0       Mg     1.90     05    TPro  4.4<L>  /  Alb  2.0<L>  /  TBili  <0.2  /  DBili  x   /  AST  12  /  ALT  8   /  AlkPhos  53  0502    PT/INR - ( 02 May 2023 04:40 )   PT: 11.0 sec;   INR: 0.95 ratio         PTT - ( 02 May 2023 04:40 )  PTT:36.2 sec  CAPILLARY BLOOD GLUCOSE                  Venous Blood Gas:  23 @ 04:40  7.30/33/114/16/98.5  VBG Lactate: 0.4      RADIOLOGY & ADDITIONAL TESTS:  Results Reviewed: as above  Imaging Reviewed: No interval imaging  Electrocardiogram Reviewed: No interval ECG      ------------------------------------------------------------------------------------------------------------  MEDICATIONS  (STANDING):  cholecalciferol 1000 Unit(s) Oral daily  cyanocobalamin Injectable 1000 MICROGram(s) IntraMuscular every 24 hours  folic acid 1 milliGRAM(s) Oral daily  hydroxychloroquine 200 milliGRAM(s) Oral daily  mycophenolic acid  milliGRAM(s) Oral <User Schedule>  pantoprazole    Tablet 40 milliGRAM(s) Oral before breakfast  predniSONE   Tablet 5 milliGRAM(s) Oral daily  trimethoprim   80 mG/sulfamethoxazole 400 mG 1 Tablet(s) Oral <User Schedule>    MEDICATIONS  (PRN):    ------------------------------------------------------------------------------------------------------------  COORDINATION OF CARE:  Care discussed with consultants/other providers and notes reviewed [Y]

## 2023-05-02 NOTE — PRE PROCEDURE NOTE - PRE PROCEDURE EVALUATION
------------------------------------------------------------  Interventional Radiology Pre-Procedure Note  ------------------------------------------------------------  Procedure:     Indication: 48y Male with renal failure    Past Medical History:  No pertinent past medical history    No pertinent past medical history    Lupus nephritis    Systemic lupus        Allergies: No Known Allergies      Medications:  heparin   Injectable: 5000 Unit(s) SubCutaneous (23 @ 21:28)  hydroxychloroquine: 200 milliGRAM(s) Oral (23 @ 12:01)  trimethoprim   80 mG/sulfamethoxazole 400 m Tablet(s) Oral (23 @ 09:27)      Vital Signs:   T(F): 97.9 (05:38), Max: 98.1 (11:28)  HR: 65 (05:38)  BP: 104/60 (05:38)  RR: 17 (05:38)  SpO2: 98% (05:38)    Labs:           7.5  5.04)-----(258     (23 @ 04:40)         24.5     140 | 114 | 37  --------------------< 91     (23 @ 04:40)  4.7 | 16 | 1.83       PT: 11.0 23 @ 04:40  aPTT: 36.2 23 @ 04:40   INR: 0.95 23 @ 04:40    Imaging:  < from: US Kidney and Bladder (23 @ 08:44) >  ACC: [id number] EXAM:  US KIDNEYS AND BLADDER   ORDERED BY: DARWIN LAWRENCE     PROCEDURE DATE:  2023          INTERPRETATION:  CLINICAL INFORMATION: Acute kidney injury    COMPARISON: CT dated 2023    TECHNIQUE: Sonography of the kidneys andbladder.    FINDINGS:  Right kidney: 9.9 cm. No renal mass, hydronephrosis or calculi.    Left kidney: 11.0 cm. No renal mass, hydronephrosis or calculi.    Urinary bladder: Within normal limits.    IMPRESSION:  No hydronephrosis.        --- End of Report ---            KRAIG RIOS MD; Attending Radiologist  This document has been electronically signed. 2023  8:47AM    < end of copied text >      Consent: Risks, benefits, and alternatives were discussed and informed consent obtained.    Procedure Plan:   renal parenchymal biopsy  The patient is a candidate for the procedure.      Anatoly Mendoza MD  Interventional Radiology    -Available on [employer] TEAMS for all non-urgent questions  -Emergent issues: [facility]-p.[phone]; [facility]-[phone] ([phone])  -Non-emergent consults: Please place a [place] order "IR Consult" with an appropriate callback number  -Scheduling questions: [facility]: [phone]; [facility]: [phone]  -Clinic/Outpatient booking: [facility]: [phone]; [facility]: [phone]

## 2023-05-02 NOTE — PROGRESS NOTE ADULT - PROBLEM SELECTOR PLAN 6
Hospital Bundle  Fluids: PO Ad Shana  Electrolytes: Gentle electrolyte repletion d/t LIZETH/CKD  Nutrition: Diet Renal ([place]), nutrition consulted  PPX  ---VTE: SQH (to be held tn for biopsy as indicated)  ---GI: PO, bowel reg PRN  Access: PIV  Code Status: FULL CODE  Dispo: Pending medical evaluation Hospital Bundle  Fluids: PO Ad Shana  Electrolytes: Gentle electrolyte repletion d/t LIZETH/CKD  Nutrition: Diet Renal ([place]), nutrition consulted  PPX  ---VTE: SQH (to be held tn for biopsy as indicated)  ---GI: PO, bowel reg PRN  Access: PIV  Code Status: FULL CODE  Dispo: Home, no specialized needs; pending nephro clearance

## 2023-05-02 NOTE — PROGRESS NOTE ADULT - ASSESSMENT
48M with h/o SLE c/b LN3/4 who presents with persistent nausea/vomiting after medication administration at home and found to have LIZETH vs progression of LN/CKD who is otherwise hemodynamically stable awaiting nephrology and rheumatology evaluation.     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 Now 2.5 with borderline hyperkalemia. Pt denies decreased UOP. Urine studies suggesting pre-renal etiology (FeNa 0.7%), but pt takes diuretics, so unclear reliability (though would expect MORE Na wasting with diuresis, not less). UPCR with nephrotic-range proteinuria as well, which is consistent with known LN. Pending renal biopsy to assess for possible POD/transformation, planned for Tuesday 5/2. Creatinine improving, HyperK improved. Will f/u with Nephro/Rheum today regarding ongiong recs inpatient vs outpatient.  Nephrologist: Dr. Hayde Garland, Nephro C/S, appreciate recs (following)  Rheumatologist: Dr. Nilton Le, Rheum C/S, appreciate recs (following)  IR C/S, Appreciate recs  ::Normocytic Anemia: previously with AoCD but baseline higher  -- T&S (4/28-)  -- Iron Studies (iron replete), B12 (304), Folate (5.7)  -- Cyanocobalamin 1kU IM QDx 7d, then PO after; Folate 1mg PO QD  ::Electrolytes: Pt with borderline HyperK in ER. Given Lokelma 5x1. Stably WNL. No ECG changes.  ::BMD:   -- C/W Home Vit D3 1kU QD  -- Ca x Phos: < 55  ::Volume/BP: SBP at goal, but b/l LE edema suggestive of volume overload (vs decreased oncotic pressure ico nephrotic syndrome)  -- HOLDing Home Lisinpril 10mg PO QD (ico LIZETH)  -- HOLDing Home Furosemide 20mg PO QD (ico LIZETH)  ::HD Access: no current strong indication for HD, no access  ::Rheum/SLE  -- F/U mycophenolic acid level  -- C/W Home TMP/-80 PO TIW (but d/t LIZETH vs CKD, will ask rheum & nephro about other coverage or continuation)  -- D/C MMF 1500mg PO BID  -- C/W Myfortic 720 ER PO BID (1h pre or 2h post meals)  -- C/W Home Hydroxychloroquine 200mg PO QD  -- C/W Home Prednisone 5mg PO QD    #Dyspnea on Exertion   Pt with reports of ANGUIANO, fatigue. B/l Leg swelling. Cardiac evaluation unremarkable. May be 2/2 anemia. B12 borderline low but EF normal (not wet beriberi). May benefit from further OP testing with pulm/cards.  - TTE (EF 62%, unremarkable echo), ECG  - Trop, BNP WNL 48M with h/o SLE c/b LN3/4 who presents with persistent nausea/vomiting after medication administration at home and found to have LIZETH vs progression of LN/CKD who is otherwise hemodynamically stable awaiting nephrology and rheumatology evaluation.     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 Now 2.5 with borderline hyperkalemia. Pt denies decreased UOP. Urine studies suggesting pre-renal etiology (FeNa 0.7%), but pt takes diuretics, so unclear reliability (though would expect MORE Na wasting with diuresis, not less). UPCR with nephrotic-range proteinuria as well, which is consistent with known LN. Pending renal biopsy to assess for possible POD/transformation, planned for Tuesday 5/2. Creatinine improving, HyperK improved. Will f/u with Nephro/Rheum today regarding ongiong recs inpatient vs outpatient.    Nephrologist: Dr. Hayde Garland, Nephro C/S, appreciate recs (following) - still waiting for recs, multiple pages throughout the past several days; specifically would like to know about NaHCO3 recs & phos binder today  Rheumatologist: Dr. Nilton Le, Rheum C/S, appreciate recs (following) - cleared for dc s/p biopsy, can f/u as OP  IR C/S: Renal Bx planned for 5/2    ::Normocytic Anemia: previously with AoCD but baseline higher; unclear if ANGUIANO is symptomatic anemia  -- T&S (5/1-)  -- Iron Studies (iron replete), B12 (304), Folate (5.7)  -- Cyanocobalamin 1kU IM QDx 7d, then PO after; Folate 1mg PO QD    ::Electrolytes: Pt with borderline HyperK in ER. Given Lokelma 5x1. Stably WNL now. No ECG changes.    ::BMD:     -- C/W Home Vit D3 1kU QD  -- Ca x Phos: now > 55, would start phos binder, but pending nephro recs given their follow-up would be important  ----- Can check PTH, Vit D, 1 & 1,25 in AM if still here    ::Volume/BP: SBP at goal, but b/l LE edema suggestive of volume overload (vs decreased oncotic pressure ico nephrotic syndrome)  -- HOLDing Home Lisinpril 10mg PO QD (ico LIZETH)  -- HOLDing Home Furosemide 20mg PO QD (ico LIZETH)    ::HD Access: no current strong indication for HD, no access    ::Rheum/SLE  -- F/U mycophenolic acid level  -- C/W Home TMP/-80 PO TIW (but d/t LIZETH vs CKD, will ask rheum & nephro about other coverage or continuation)  -- D/C MMF 1500mg PO BID  -- C/W Myfortic 720 ER PO BID (1h pre or 2h post meals)  -- C/W Home Hydroxychloroquine 200mg PO QD  -- C/W Home Prednisone 5mg PO QD    #Dyspnea on Exertion   Pt with reports of ANGUIANO, fatigue. B/l Leg swelling. Cardiac evaluation unremarkable. May be 2/2 anemia. B12 borderline low but EF normal (not wet beriberi). May benefit from further OP testing with pulm/cards.  - TTE (EF 62%, unremarkable echo), ECG  - Trop, BNP WNL

## 2023-05-03 DIAGNOSIS — N17.9 ACUTE KIDNEY FAILURE, UNSPECIFIED: ICD-10-CM

## 2023-05-03 DIAGNOSIS — E87.20 ACIDOSIS, UNSPECIFIED: ICD-10-CM

## 2023-05-03 LAB
24R-OH-CALCIDIOL SERPL-MCNC: 6.8 NG/ML — LOW (ref 30–80)
ALBUMIN SERPL ELPH-MCNC: 2.2 G/DL — LOW (ref 3.3–5)
ALP SERPL-CCNC: 56 U/L — SIGNIFICANT CHANGE UP (ref 40–120)
ALT FLD-CCNC: 9 U/L — SIGNIFICANT CHANGE UP (ref 4–41)
ANION GAP SERPL CALC-SCNC: 10 MMOL/L — SIGNIFICANT CHANGE UP (ref 7–14)
AST SERPL-CCNC: 11 U/L — SIGNIFICANT CHANGE UP (ref 4–40)
BASE EXCESS BLDV CALC-SCNC: -7.8 MMOL/L — LOW (ref -2–3)
BASOPHILS # BLD AUTO: 0.02 K/UL — SIGNIFICANT CHANGE UP (ref 0–0.2)
BASOPHILS NFR BLD AUTO: 0.5 % — SIGNIFICANT CHANGE UP (ref 0–2)
BILIRUB SERPL-MCNC: <0.2 MG/DL — SIGNIFICANT CHANGE UP (ref 0.2–1.2)
BUN SERPL-MCNC: 40 MG/DL — HIGH (ref 7–23)
CA-I SERPL-SCNC: 1.34 MMOL/L — HIGH (ref 1.15–1.33)
CALCIUM SERPL-MCNC: 8.4 MG/DL — SIGNIFICANT CHANGE UP (ref 8.4–10.5)
CHLORIDE BLDV-SCNC: 112 MMOL/L — HIGH (ref 96–108)
CHLORIDE SERPL-SCNC: 111 MMOL/L — HIGH (ref 98–107)
CO2 BLDV-SCNC: 18.4 MMOL/L — LOW (ref 22–26)
CO2 SERPL-SCNC: 17 MMOL/L — LOW (ref 22–31)
CREAT SERPL-MCNC: 2 MG/DL — HIGH (ref 0.5–1.3)
EGFR: 40 ML/MIN/1.73M2 — LOW
EOSINOPHIL # BLD AUTO: 0.1 K/UL — SIGNIFICANT CHANGE UP (ref 0–0.5)
EOSINOPHIL NFR BLD AUTO: 2.3 % — SIGNIFICANT CHANGE UP (ref 0–6)
GAS PNL BLDV: 135 MMOL/L — LOW (ref 136–145)
GAS PNL BLDV: SIGNIFICANT CHANGE UP
GLUCOSE BLDV-MCNC: 94 MG/DL — SIGNIFICANT CHANGE UP (ref 70–99)
GLUCOSE SERPL-MCNC: 96 MG/DL — SIGNIFICANT CHANGE UP (ref 70–99)
HCO3 BLDV-SCNC: 17 MMOL/L — LOW (ref 22–29)
HCT VFR BLD CALC: 25.6 % — LOW (ref 39–50)
HCT VFR BLDA CALC: 25 % — LOW (ref 39–51)
HGB BLD CALC-MCNC: 8.3 G/DL — LOW (ref 12.6–17.4)
HGB BLD-MCNC: 7.7 G/DL — LOW (ref 13–17)
IANC: 2.76 K/UL — SIGNIFICANT CHANGE UP (ref 1.8–7.4)
IMM GRANULOCYTES NFR BLD AUTO: 0.5 % — SIGNIFICANT CHANGE UP (ref 0–0.9)
LACTATE BLDV-MCNC: 0.6 MMOL/L — SIGNIFICANT CHANGE UP (ref 0.5–2)
LYMPHOCYTES # BLD AUTO: 0.92 K/UL — LOW (ref 1–3.3)
LYMPHOCYTES # BLD AUTO: 21.2 % — SIGNIFICANT CHANGE UP (ref 13–44)
MAGNESIUM SERPL-MCNC: 2 MG/DL — SIGNIFICANT CHANGE UP (ref 1.6–2.6)
MCHC RBC-ENTMCNC: 25.3 PG — LOW (ref 27–34)
MCHC RBC-ENTMCNC: 30.1 GM/DL — LOW (ref 32–36)
MCV RBC AUTO: 84.2 FL — SIGNIFICANT CHANGE UP (ref 80–100)
MONOCYTES # BLD AUTO: 0.52 K/UL — SIGNIFICANT CHANGE UP (ref 0–0.9)
MONOCYTES NFR BLD AUTO: 12 % — SIGNIFICANT CHANGE UP (ref 2–14)
NEUTROPHILS # BLD AUTO: 2.76 K/UL — SIGNIFICANT CHANGE UP (ref 1.8–7.4)
NEUTROPHILS NFR BLD AUTO: 63.5 % — SIGNIFICANT CHANGE UP (ref 43–77)
NRBC # BLD: 0 /100 WBCS — SIGNIFICANT CHANGE UP (ref 0–0)
NRBC # FLD: 0 K/UL — SIGNIFICANT CHANGE UP (ref 0–0)
PCO2 BLDV: 33 MMHG — LOW (ref 42–55)
PH BLDV: 7.33 — SIGNIFICANT CHANGE UP (ref 7.32–7.43)
PHOSPHATE SERPL-MCNC: 6 MG/DL — HIGH (ref 2.5–4.5)
PLATELET # BLD AUTO: 266 K/UL — SIGNIFICANT CHANGE UP (ref 150–400)
PO2 BLDV: 91 MMHG — HIGH (ref 25–45)
POTASSIUM BLDV-SCNC: 5.4 MMOL/L — HIGH (ref 3.5–5.1)
POTASSIUM SERPL-MCNC: 5.1 MMOL/L — SIGNIFICANT CHANGE UP (ref 3.5–5.3)
POTASSIUM SERPL-SCNC: 5.1 MMOL/L — SIGNIFICANT CHANGE UP (ref 3.5–5.3)
PROT SERPL-MCNC: 4.4 G/DL — LOW (ref 6–8.3)
PTH-INTACT FLD-MCNC: 28 PG/ML — SIGNIFICANT CHANGE UP (ref 15–65)
RBC # BLD: 3.04 M/UL — LOW (ref 4.2–5.8)
RBC # FLD: 13.5 % — SIGNIFICANT CHANGE UP (ref 10.3–14.5)
SAO2 % BLDV: 97.4 % — HIGH (ref 67–88)
SODIUM SERPL-SCNC: 138 MMOL/L — SIGNIFICANT CHANGE UP (ref 135–145)
VIT D25+D1,25 OH+D1,25 PNL SERPL-MCNC: 11.8 PG/ML — LOW (ref 19.9–79.3)
WBC # BLD: 4.34 K/UL — SIGNIFICANT CHANGE UP (ref 3.8–10.5)
WBC # FLD AUTO: 4.34 K/UL — SIGNIFICANT CHANGE UP (ref 3.8–10.5)

## 2023-05-03 PROCEDURE — 99233 SBSQ HOSP IP/OBS HIGH 50: CPT | Mod: GC

## 2023-05-03 PROCEDURE — 99232 SBSQ HOSP IP/OBS MODERATE 35: CPT | Mod: GC

## 2023-05-03 RX ORDER — ERGOCALCIFEROL 1.25 MG/1
50000 CAPSULE ORAL
Refills: 0 | Status: DISCONTINUED | OUTPATIENT
Start: 2023-05-03 | End: 2023-05-08

## 2023-05-03 RX ORDER — HEPARIN SODIUM 5000 [USP'U]/ML
5000 INJECTION INTRAVENOUS; SUBCUTANEOUS EVERY 8 HOURS
Refills: 0 | Status: DISCONTINUED | OUTPATIENT
Start: 2023-05-04 | End: 2023-05-08

## 2023-05-03 RX ORDER — SODIUM BICARBONATE 1 MEQ/ML
650 SYRINGE (ML) INTRAVENOUS EVERY 12 HOURS
Refills: 0 | Status: DISCONTINUED | OUTPATIENT
Start: 2023-05-03 | End: 2023-05-06

## 2023-05-03 RX ADMIN — PREGABALIN 1000 MICROGRAM(S): 225 CAPSULE ORAL at 12:31

## 2023-05-03 RX ADMIN — MYCOPHENOLIC ACID 720 MILLIGRAM(S): 180 TABLET, DELAYED RELEASE ORAL at 16:40

## 2023-05-03 RX ADMIN — Medication 650 MILLIGRAM(S): at 18:06

## 2023-05-03 RX ADMIN — Medication 1 TABLET(S): at 09:06

## 2023-05-03 RX ADMIN — MYCOPHENOLIC ACID 720 MILLIGRAM(S): 180 TABLET, DELAYED RELEASE ORAL at 05:29

## 2023-05-03 RX ADMIN — PANTOPRAZOLE SODIUM 40 MILLIGRAM(S): 20 TABLET, DELAYED RELEASE ORAL at 05:29

## 2023-05-03 RX ADMIN — Medication 1000 UNIT(S): at 12:32

## 2023-05-03 RX ADMIN — Medication 200 MILLIGRAM(S): at 12:31

## 2023-05-03 RX ADMIN — Medication 5 MILLIGRAM(S): at 05:29

## 2023-05-03 RX ADMIN — Medication 1 MILLIGRAM(S): at 12:32

## 2023-05-03 NOTE — PROGRESS NOTE ADULT - SUBJECTIVE AND OBJECTIVE BOX
[facility] DIVISION OF KIDNEY DISEASES AND HYPERTENSION   FOLLOW UP NOTE    --------------------------------------------------------------------------------  Chief Complaint: LIZETH on CKD    24 hour events/subjective: Pt. was seen and examined today, not in distress. Denies fever, chills, SOB, CP, dysuria. Pt follows Dr. Webb (OP nephrologist) for CKD care. Pt currently being followed/managed by Rheumatology LN. Pt was noted to have worsening SCr on OP labs. 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 SCr increased to 2. Pt is currently nonoliguric.     PAST HISTORY  --------------------------------------------------------------------------------  No significant changes to PMH, PSH, FHx, SHx, unless otherwise noted    ALLERGIES & MEDICATIONS  --------------------------------------------------------------------------------  Allergies  No Known Allergies    Intolerances    Standing Inpatient Medications  cholecalciferol 1000 Unit(s) Oral daily  cyanocobalamin Injectable 1000 MICROGram(s) IntraMuscular every 24 hours  folic acid 1 milliGRAM(s) Oral daily  hydroxychloroquine 200 milliGRAM(s) Oral daily  mycophenolic acid  milliGRAM(s) Oral <User Schedule>  pantoprazole    Tablet 40 milliGRAM(s) Oral before breakfast  predniSONE   Tablet 5 milliGRAM(s) Oral daily  sodium bicarbonate 650 milliGRAM(s) Oral every 12 hours  trimethoprim   80 mG/sulfamethoxazole 400 mG 1 Tablet(s) Oral <User Schedule>    PRN Inpatient Medications    REVIEW OF SYSTEMS  --------------------------------------------------------------------------------  Gen: No fevers/chills  Head/Eyes/Ears: No HA   Respiratory: No dyspnea, cough  CV: No chest pain  GI: No abdominal pain, diarrhea  : No dysuria, hematuria  MSK: LE edema  Skin: No rashes  Heme: Anemia    VITALS/PHYSICAL EXAM  --------------------------------------------------------------------------------  T(C): 36.6 (05-03-23 @ 05:31), Max: 37 (05-02-23 @ 21:14)  HR: 64 (05-03-23 @ 05:31) (64 - 82)  BP: 118/70 (05-03-23 @ 05:31) (110/65 - 118/70)  RR: 17 (05-03-23 @ 05:31) (17 - 17)  SpO2: 99% (05-03-23 @ 05:31) (99% - 100%)  Wt(kg): --    05-02-23 @ 07:01  -  05-03-23 @ 07:00  --------------------------------------------------------  IN: 340 mL / OUT: 700 mL / NET: -360 mL    Physical Exam:  	Gen: young male, sitting comfortably, NAD  	HEENT: Anicteric  	Pulm: CTA B/L  	CV: S1S2+  	Abd: Soft, +BS       	Ext: B/L LE edema+  	Neuro: Awake     	Skin: Warm and dry    LABS/STUDIES  --------------------------------------------------------------------------------              7.7    4.34  >-----------<  266      [05-03-23 @ 06:33]              25.6     138  |  111  |  40  ----------------------------<  96      [05-03-23 @ 06:33]  5.1   |  17  |  2.00        Ca     8.4     [05-03-23 @ 06:33]      Mg     2.00     [05-03-23 @ 06:33]      Phos  6.0     [05-03-23 @ 06:33]    Creatinine Trend:  SCr 2.00 [05-03 @ 06:33]  SCr 1.83 [05-02 @ 04:40]  SCr 1.60 [05-01 @ 06:17]  SCr 1.62 [04-30 @ 05:50]  SCr 1.67 [04-29 @ 06:30]    Urinalysis - [04-28-23 @ 01:24]      Color Light Yellow / Appearance Clear / SG 1.010 / pH 6.0      Gluc Negative / Ketone Negative  / Bili Negative / Urobili <2 mg/dL       Blood Large / Protein 300 mg/dL / Leuk Est Negative / Nitrite Negative      RBC 77 / WBC 8 / Hyaline 2 / Gran  / Sq Epi  / Non Sq Epi  / Bacteria Negative    Urine Creatinine 80      [04-28-23 @ 02:42]  Urine Protein 244      [04-28-23 @ 02:42]  Urine Sodium 30      [04-28-23 @ 02:42]  Urine Osmolality 258      [04-28-23 @ 02:42]    dsDNA 14      [04-28-23 @ 09:08]  C3 Complement 83      [04-28-23 @ 09:08]  C4 Complement 26      [04-28-23 @ 09:08] [facility] DIVISION OF KIDNEY DISEASES AND HYPERTENSION   FOLLOW UP NOTE    --------------------------------------------------------------------------------  Chief Complaint: LIZETH on CKD, Lupus nephritis    24 hour events/subjective: Pt. seen and examined today. Pt. resting, feels well. No fever, SOB, CP or dysuria. Pt. follows with nephrologist Dr. Hayde Shultz as outpatient for CKD care. 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 Pt underwent kidney biopsy by IR team on 5/2/23. 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 Scr increased to 2. Pt. currently nonoliguric.     PAST HISTORY  --------------------------------------------------------------------------------  No significant changes to PMH, PSH, FHx, SHx, unless otherwise noted    ALLERGIES & MEDICATIONS  --------------------------------------------------------------------------------  Allergies  No Known Allergies    Intolerances    Standing Inpatient Medications  cholecalciferol 1000 Unit(s) Oral daily  cyanocobalamin Injectable 1000 MICROGram(s) IntraMuscular every 24 hours  folic acid 1 milliGRAM(s) Oral daily  hydroxychloroquine 200 milliGRAM(s) Oral daily  mycophenolic acid  milliGRAM(s) Oral <User Schedule>  pantoprazole    Tablet 40 milliGRAM(s) Oral before breakfast  predniSONE   Tablet 5 milliGRAM(s) Oral daily  sodium bicarbonate 650 milliGRAM(s) Oral every 12 hours  trimethoprim   80 mG/sulfamethoxazole 400 mG 1 Tablet(s) Oral <User Schedule>    PRN Inpatient Medications    REVIEW OF SYSTEMS  --------------------------------------------------------------------------------  Gen: No fevers/chills  Head/Eyes/Ears: No HA   Respiratory: No dyspnea, cough  CV: No chest pain  GI: No abdominal pain, diarrhea  : No dysuria, hematuria  MSK: LE edema  Skin: No rashes  Heme: Anemia    VITALS/PHYSICAL EXAM  --------------------------------------------------------------------------------  T(C): 36.6 (05-03-23 @ 05:31), Max: 37 (05-02-23 @ 21:14)  HR: 64 (05-03-23 @ 05:31) (64 - 82)  BP: 118/70 (05-03-23 @ 05:31) (110/65 - 118/70)  RR: 17 (05-03-23 @ 05:31) (17 - 17)  SpO2: 99% (05-03-23 @ 05:31) (99% - 100%)  Wt(kg): --    05-02-23 @ 07:01  -  05-03-23 @ 07:00  --------------------------------------------------------  IN: 340 mL / OUT: 700 mL / NET: -360 mL    Physical Exam:  	Gen: sitting comfortably in bed, NAD  	HEENT: Anicteric  	Pulm: CTA B/L  	CV: S1S2+  	Abd: Soft, +BS       	Ext: B/L LE pitting edema+  	Neuro: Awake, alert     	Skin: Warm and dry    LABS/STUDIES  --------------------------------------------------------------------------------              7.7    4.34  >-----------<  266      [05-03-23 @ 06:33]              25.6     138  |  111  |  40  ----------------------------<  96      [05-03-23 @ 06:33]  5.1   |  17  |  2.00        Ca     8.4     [05-03-23 @ 06:33]      Mg     2.00     [05-03-23 @ 06:33]      Phos  6.0     [05-03-23 @ 06:33]    Creatinine Trend:  SCr 2.00 [05-03 @ 06:33]  SCr 1.83 [05-02 @ 04:40]  SCr 1.60 [05-01 @ 06:17]  SCr 1.62 [04-30 @ 05:50]  SCr 1.67 [04-29 @ 06:30]    Urinalysis - [04-28-23 @ 01:24]      Color Light Yellow / Appearance Clear / SG 1.010 / pH 6.0      Gluc Negative / Ketone Negative  / Bili Negative / Urobili <2 mg/dL       Blood Large / Protein 300 mg/dL / Leuk Est Negative / Nitrite Negative      RBC 77 / WBC 8 / Hyaline 2 / Gran  / Sq Epi  / Non Sq Epi  / Bacteria Negative    Urine Creatinine 80      [04-28-23 @ 02:42]  Urine Protein 244      [04-28-23 @ 02:42]  Urine Sodium 30      [04-28-23 @ 02:42]  Urine Osmolality 258      [04-28-23 @ 02:42]    dsDNA 14      [04-28-23 @ 09:08]  C3 Complement 83      [04-28-23 @ 09:08]  C4 Complement 26      [04-28-23 @ 09:08]

## 2023-05-03 NOTE — PROGRESS NOTE ADULT - SUBJECTIVE AND OBJECTIVE BOX
Medicine Progress Note  --------------------  Neo Malave M.D.  Internal Medicine/Emergency Medicine  PGY-2  --------------------      Patient: LIZ HOUSER, MRN: [id number], : 1975  Admitted on 23 for Nausea and vomiting      LANGUAGE - English ]OR[ PI (_): #                ------------------------------------------------------------------------------------------------------------  SUMMARY (from last progress note through 23 @ 07:34):   -  ------------------------------------------------------------------------------------------------------------  OVERNIGHT EVENTS  NAEON    SUBJECTIVE  -       ROS negative unless noted above.  ------------------------------------------------------------------------------------------------------------  OBJECTIVE:  Physical Exam  CONST:     NAD, well-appearing; well-developed; appears stated age  EYES:         Conjunctiva clear; PERRL; no conjunctival pallor; no lid lag  ENMT:       MMM, no pharyngeal injection or exudates; normal dentition/dentures present  NECK:        Supple, no LAD; no palpable masses; no thyromegaly  RESP :        Normal respiratory effort; CTA bilaterally; no W/R/R  CHEST:       No TTP, no lines/ports; symmetric chest expansion  CARDIO:     RRR, normal S1 and S2, no M/R/G; apical impulse at MCL  VASC:         No JVD/AJR, no peripheral edema, pulses 2+ B/L, Cap refill <2s  ABD:           Soft, NT/ND, norm bowel sounds; no R/G; No HSM; No CVAT  MSK:          No clubbing of digits; no joint swelling or TTP  EXT:           WWP, no reduction in body hair, no LE skin changes  PSYCH        A&O to person, place, and time; affect appropriate  NEURO:     Non-focal; no gross sensory deficits; moving all extremities   SKIN:          No rashes; no palpable lesions; axillae not dry    Vital Signs Last 24 Hrs  T(F): 97.8, Max: 98.6 (23 @ 21:14)  HR: 64 (64 - 82)  BP: 118/70 (110/65 - 118/70)  RR: 17 (17 - 17)  SpO2: 99% (99% - 100%)        DAILY MEASUREMENTS:  I&O's Summary    02 May 2023 07:01  -  03 May 2023 07:00  --------------------------------------------------------  IN: 340 mL / OUT: 700 mL / NET: -360 mL      Daily     Daily   Weight (kg): 62.2 (23 @ 03:50)  Orthostatic VS        LABS:                        7.7    4.34  )-----------( 266      ( 03 May 2023 06:33 )             25.6     Hgb Trend: 7.7<--, 7.5<--, 8.1<--, 7.4<--, 7.5<--  0503    138  |  111<H>  |  40<H>  ----------------------------<  96  5.1   |  17<L>  |  2.00<H>    Ca    8.4      03 May 2023 06:33  Phos  6.0     05-03  Mg     2.00     -03    TPro  4.4<L>  /  Alb  2.2<L>  /  TBili  <0.2  /  DBili  x   /  AST  11  /  ALT  9   /  AlkPhos  56  -03    PT/INR - ( 02 May 2023 04:40 )   PT: 11.0 sec;   INR: 0.95 ratio         PTT - ( 02 May 2023 04:40 )  PTT:36.2 sec  CAPILLARY BLOOD GLUCOSE      POCT Blood Glucose.: 85 mg/dL (02 May 2023 17:33)              Venous Blood Gas:  23 @ 06:33  7.33/33/91/17/97.4  VBG Lactate: 0.6      RADIOLOGY & ADDITIONAL TESTS:  Results Reviewed:     Imaging Reviewed:  Electrocardiogram Reviewed:      ------------------------------------------------------------------------------------------------------------  MEDICATIONS  (STANDING):  cholecalciferol 1000 Unit(s) Oral daily  cyanocobalamin Injectable 1000 MICROGram(s) IntraMuscular every 24 hours  folic acid 1 milliGRAM(s) Oral daily  hydroxychloroquine 200 milliGRAM(s) Oral daily  mycophenolic acid  milliGRAM(s) Oral <User Schedule>  pantoprazole    Tablet 40 milliGRAM(s) Oral before breakfast  predniSONE   Tablet 5 milliGRAM(s) Oral daily  trimethoprim   80 mG/sulfamethoxazole 400 mG 1 Tablet(s) Oral <User Schedule>    MEDICATIONS  (PRN):    ------------------------------------------------------------------------------------------------------------  COORDINATION OF CARE:  Care discussed with consultants/other providers and notes reviewed [Y]     Medicine Progress Note  --------------------  Neo Malave M.D.  Internal Medicine/Emergency Medicine  PGY-2  --------------------      Patient: LIZ HOUSER, MRN: [id number], : 1975  Admitted on 23 for Nausea and vomiting    OVERNIGHT EVENTS  - s/p renal bx (), no cmplications  - Nephro rec waiting for path d/t creatinine fluctuations    SUBJECTIVE  Mr. Houser says that he feels okay today. He denies any issues at his biopsy site. He has not have any further episodes of nausea/vomiting.      ROS negative unless noted above.  ------------------------------------------------------------------------------------------------------------  OBJECTIVE:  Physical Exam  GEN: Awake, AOx3, NAD.  HEENT: NCAT  CARDIO: RRR. Normal S1/S2, no m/r/g. No JVD.  RESP: CTAB, no w/r/r  ABD: Soft, NTND.  MSK: No obvious deformity or ROM deficit. 2+ pulses x4. No edema.  SKIN: Warm, dry. Biopsy site covered in 2x2 & tegaderm, c/d/i  NEURO: Moves all four extremities spontaneously  PSYCH: Appropriate mood & affect.     Vital Signs Last 24 Hrs  T(F): 97.8, Max: 98.6 (23 @ 21:14)  HR: 64 (64 - 82)  BP: 118/70 (110/65 - 118/70)  RR: 17 (17 - 17)  SpO2: 99% (99% - 100%)        DAILY MEASUREMENTS:  I&O's Summary    02 May 2023 07:01  -  03 May 2023 07:00  --------------------------------------------------------  IN: 340 mL / OUT: 700 mL / NET: -360 mL      Daily     Daily   Weight (kg): 62.2 (23 @ 03:50)  Orthostatic VS        LABS:                        7.7    4.34  )-----------( 266      ( 03 May 2023 06:33 )             25.6     Hgb Trend: 7.7<--, 7.5<--, 8.1<--, 7.4<--, 7.5<--      138  |  111<H>  |  40<H>  ----------------------------<  96  5.1   |  17<L>  |  2.00<H>    Ca    8.4      03 May 2023 06:33  Phos  6.0       Mg     2.00         TPro  4.4<L>  /  Alb  2.2<L>  /  TBili  <0.2  /  DBili  x   /  AST  11  /  ALT  9   /  AlkPhos  56  03    PT/INR - ( 02 May 2023 04:40 )   PT: 11.0 sec;   INR: 0.95 ratio         PTT - ( 02 May 2023 04:40 )  PTT:36.2 sec  CAPILLARY BLOOD GLUCOSE      POCT Blood Glucose.: 85 mg/dL (02 May 2023 17:33)              Venous Blood Gas:  23 @ 06:33  7.33/33/91/17/97.4  VBG Lactate: 0.6      RADIOLOGY & ADDITIONAL TESTS:  Results Reviewed:     Imaging Reviewed:  Electrocardiogram Reviewed:      ------------------------------------------------------------------------------------------------------------  MEDICATIONS  (STANDING):  cholecalciferol 1000 Unit(s) Oral daily  cyanocobalamin Injectable 1000 MICROGram(s) IntraMuscular every 24 hours  folic acid 1 milliGRAM(s) Oral daily  hydroxychloroquine 200 milliGRAM(s) Oral daily  mycophenolic acid  milliGRAM(s) Oral <User Schedule>  pantoprazole    Tablet 40 milliGRAM(s) Oral before breakfast  predniSONE   Tablet 5 milliGRAM(s) Oral daily  trimethoprim   80 mG/sulfamethoxazole 400 mG 1 Tablet(s) Oral <User Schedule>    MEDICATIONS  (PRN):    ------------------------------------------------------------------------------------------------------------  COORDINATION OF CARE:  Care discussed with consultants/other providers and notes reviewed [Y]

## 2023-05-03 NOTE — PROGRESS NOTE ADULT - PROBLEM SELECTOR PLAN 1
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 Pt was diagnosed with SLE and LN during hospital stay at [facility] in Feb 2022. Kidney biopsy performed on 2/17/22 which showed focal proliferative type, class 3 LN. Pt. initiated on immunosuppressives for SLE/LN by rheumatology team. Pt. with history of noncompliance to his medications/immunosuppressive therapy. Pt was readmitted 10/13/22 for worsening Scr and proteinuria. Pt underwent kidney biopsy on 10/17/22 during that hospitalization. 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 Pt received Immunosuppressive therapy for SLE/class IV LN by rheumatology team. Pt followed with Dr. Shultz for CKD care as OP. Upon review of labs, Scr was elevated at 1.5 on 1/11/22. SCr increased to 1.79 on 4/7/22 on OP labs. Scr was elevated at 2.55 on admission (4/27/23). As per discussion with  Rheumatology with concerns for monitoring treatment response and possible enrollment in clinical trial pt underwent kidney biopsy on 5/2/23. Preliminary kidney biopsy results showed crescentic LN. Scr elevated at 2 today (5/3/22). Pt with LE swelling on exam today. Diuretic therapy held on admission. Recommend PO Torsemide 20 mg once daily. Recommend Rheumatology follow for possible need for additional immunosuppressive therapy. Low salt diet. Fluid restriction. Monitor labs and urine output. Avoid any potential nephrotoxins. Dose medications as per eGFR. 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 Pt was diagnosed with SLE and LN during hospital stay at [facility] in Feb 2022. Kidney biopsy performed on 2/17/22 which showed focal proliferative type, class 3 LN. Pt. initiated on immunosuppressives for SLE/LN by rheumatology team. Pt. with history of noncompliance to his medications/immunosuppressive therapy. Pt was readmitted 10/13/22 for worsening Scr and proteinuria. Pt underwent kidney biopsy on 10/17/22 during that hospitalization. 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 Pt. received Immunosuppressive therapy for SLE/class IV LN by rheumatology team. Pt. follows nephrologist Dr. Hayde Shultz for CKD care. Upon review of labs, Scr was 1.5 on 1/11/22. Scr increased to 1.79 on 4/7/22 on OP labs. Scr was elevated at 2.55 on admission (4/27/23). Pt. underwent kidney biopsy by IR team on 5/2/23. 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 Scr elevated at 2 today (5/3/22). Pt with LE swelling on exam today. Diuretic therapy held on admission. Recommend PO Torsemide 20 mg once daily. Recommend Rheumatology follow-up (as pt. with need additional immunosuppressive therapy for kidney biopsy findings). Low salt diet. Fluid restriction. Monitor labs and urine output. Avoid any potential nephrotoxins. Dose medications as per eGFR.

## 2023-05-03 NOTE — PROGRESS NOTE ADULT - ASSESSMENT
48M with h/o SLE c/b LN3/4 who presents with persistent nausea/vomiting after medication administration at home and found to have LIZETH vs progression of LN/CKD who is otherwise hemodynamically stable awaiting nephrology and rheumatology evaluation.     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 Now 2.5 with borderline hyperkalemia. Pt denies decreased UOP. Urine studies suggesting pre-renal etiology (FeNa 0.7%), but pt takes diuretics, so unclear reliability (though would expect MORE Na wasting with diuresis, not less). UPCR with nephrotic-range proteinuria as well, which is consistent with known LN. Pending renal biopsy to assess for possible POD/transformation, planned for Tuesday 5/2. Creatinine improving, HyperK improved. Will f/u with Nephro/Rheum today regarding ongiong recs inpatient vs outpatient.    Nephrologist: Dr. Hayde Garland, Nephro C/S, appreciate recs (following) - still waiting for recs, multiple pages throughout the past several days; specifically would like to know about NaHCO3 recs & phos binder today  Rheumatologist: Dr. Nilton Le, Rheum C/S, appreciate recs (following) - cleared for dc s/p biopsy, can f/u as OP  IR C/S: Renal Bx planned for 5/2    ::Normocytic Anemia: previously with AoCD but baseline higher; unclear if ANGUIANO is symptomatic anemia  -- T&S (5/1-)  -- Iron Studies (iron replete), B12 (304), Folate (5.7)  -- Cyanocobalamin 1kU IM QDx 7d, then PO after; Folate 1mg PO QD    ::Electrolytes: Pt with borderline HyperK in ER. Given Lokelma 5x1 on admission. Stably WNL now. No ECG changes.    ::BMD:     -- C/W Home Vit D3 1kU QD  -- Ca x Phos: now > 55, would start phos binder, but pending nephro recs given their follow-up would be important  ----- Can check PTH, Vit D, 1 & 1,25, F/U    ::Volume/BP: SBP at goal, but b/l LE edema suggestive of volume overload (vs decreased oncotic pressure ico nephrotic syndrome)  -- HOLDing Home Lisinpril 10mg PO QD (ico LIZETH)  -- HOLDing Home Furosemide 20mg PO QD (ico LIZETH)    ::HD Access: no current strong indication for HD, no access    ::Rheum/SLE  -- F/U mycophenolic acid level  -- C/W Home TMP/-80 PO TIW (but d/t LIZETH vs CKD, will ask rheum & nephro about other coverage or continuation)  -- D/C MMF 1500mg PO BID  -- C/W Myfortic 720 ER PO BID (1h pre or 2h post meals)  -- C/W Home Hydroxychloroquine 200mg PO QD  -- C/W Home Prednisone 5mg PO QD    #Dyspnea on Exertion   Pt with reports of ANGUIANO, fatigue. B/l Leg swelling. Cardiac evaluation unremarkable. May be 2/2 anemia. B12 borderline low but EF normal (not wet beriberi). May benefit from further OP testing with pulm/cards.  - TTE (EF 62%, unremarkable echo), ECG  - Trop, BNP WNL 48M with h/o SLE c/b LN3/4 who presents with persistent nausea/vomiting after medication administration at home and found to have LIZETH vs progression of LN/CKD who is otherwise hemodynamically stable awaiting nephrology and rheumatology evaluation.     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 Admitetd with Cr 2.5 with borderline hyperkalemia; no ECG changes. Pt denies decreased UOP. Urine studies suggesting pre-renal etiology (FeNa 0.7%), but pt takes diuretics, so unclear reliability (though would expect MORE Na wasting with diuresis, not less). UPCR with nephrotic-range proteinuria as well, which is consistent with known LN. Pending renal biopsy to assess for possible POD/transformation, planned for Tuesday 5/2. Creatinine improving, HyperK improved. Will f/u with Nephro/Rheum today regarding ongiong recs inpatient vs outpatient.    Nephrologist: Dr. Hayde Garland, Nephro C/S, appreciate recs (following) - still waiting for recs, multiple pages throughout the past several days; specifically would like to know about NaHCO3 recs & phos binder today  Rheumatologist: Dr. Nilton Le, Rheum C/S, appreciate recs (following) - cleared for dc s/p biopsy, can f/u as OP  IR C/S: Renal Bx planned for 5/2    ::Normocytic Anemia: previously with AoCD but baseline higher; unclear if ANGUIANO is symptomatic anemia  -- T&S (5/1-)  -- Iron Studies (iron replete), B12 (304), Folate (5.7)  -- Cyanocobalamin 1kU IM QDx 7d, then PO after; Folate 1mg PO QD    ::Electrolytes: Pt with borderline HyperK in ER. Given Lokelma 5x1 on admission. Stably WNL now. No ECG changes.    ::BMD:     -- C/W Home Vit D3 1kU QD  -- Ca x Phos: now > 55, would start phos binder, but pending nephro recs given their follow-up would be important  ----- F/U PTH, Vit D 25 & 1,25    ::Volume/BP: SBP at goal, but b/l LE edema suggestive of volume overload (vs decreased oncotic pressure ico nephrotic syndrome).   -- HOLDing Home Lisinpril 10mg PO QD (ico LIZETH)  -- START Torsemide 20mg PO QD (will triple check with nephro later today given rise in cr again)  -- D/C Home Furosemide 20mg PO QD (ico LIZETH)    ::HD Access: no current strong indication for HD, no access    ::Rheum/SLE  -- F/U mycophenolic acid level  -- C/W Home TMP/-80 PO TIW (but d/t LIZETH vs CKD, will ask rheum & nephro about other coverage or continuation)  -- D/C MMF 1500mg PO BID  -- C/W Myfortic 720 ER PO BID (1h pre or 2h post meals)  -- C/W Home Hydroxychloroquine 200mg PO QD  -- C/W Home Prednisone 5mg PO QD    #Dyspnea on Exertion   Pt with reports of ANGUIANO, fatigue. B/l Leg swelling. Cardiac evaluation unremarkable. May be 2/2 anemia. B12 borderline low but EF normal (not wet beriberi). May benefit from further OP testing with pulm/cards.  - TTE (EF 62%, unremarkable echo), ECG  - Trop, BNP WNL

## 2023-05-03 NOTE — PROGRESS NOTE ADULT - PROBLEM SELECTOR PLAN 2
In setting of kidney failure. SCo2 low at 17 today. Recommend PO sodium bicarbonate 650 mg BID. Monitor SCo2. Pt. with metabolic acidosis in setting of kidney failure. SCO2 low at 17 today. Recommend PO sodium bicarbonate 650 mg BID. Monitor SCO2.

## 2023-05-03 NOTE — PROGRESS NOTE ADULT - PROBLEM SELECTOR PLAN 6
Hospital Bundle  Fluids: PO Ad Shana  Electrolytes: Gentle electrolyte repletion d/t LIZETH/CKD  Nutrition: Diet Renal (Halal), nutrition consulted  PPX  ---VTE: SQH (to be held tn for biopsy as indicated)  ---GI: PO, bowel reg PRN  Access: PIV  Code Status: FULL CODE  Dispo: Home, no specialized needs; pending nephro clearance Hospital Bundle  Fluids: PO Ad Shana  Electrolytes: Gentle electrolyte repletion d/t LIZETH/CKD  Nutrition: Diet Renal (Halal), nutrition consulted  PPX  ---VTE: SResumse SQH > 24h from bx  ---GI: PO, bowel reg PRN  Access: PIV  Code Status: FULL CODE  Dispo: Home, no specialized needs; pending nephro clearance

## 2023-05-03 NOTE — PROGRESS NOTE ADULT - SUBJECTIVE AND OBJECTIVE BOX
SUBJECTIVE / INTERVAL HPI: Patient seen and examined at bedside. [place]ian interpreters [id number], Surbharath [id number], Shankar [id number].   Patient reports feeling well, no longer with nausea or vomiting. No current complaints. Denies fevers, chills, night sweats, chest pain, abdominal pain, n/v/d/c, changes in urinary freq, dysuria, hematuria. Has foamy urine but less than typical. No joint pain, rash, ulcers.     VITAL SIGNS:  Vital Signs Last 24 Hrs  T(C): 37 (03 May 2023 12:45), Max: 37 (02 May 2023 21:14)  T(F): 98.6 (03 May 2023 12:45), Max: 98.6 (02 May 2023 21:14)  HR: 77 (03 May 2023 12:45) (64 - 82)  BP: 130/66 (03 May 2023 12:45) (113/59 - 130/66)  BP(mean): --  RR: 18 (03 May 2023 12:45) (17 - 18)  SpO2: 99% (03 May 2023 12:45) (99% - 99%)    Parameters below as of 03 May 2023 05:31  Patient On (Oxygen Delivery Method): room air        PHYSICAL EXAM:    General: WDWN  HEENT: NC/AT; clear conjunctiva; MMM, no ulcers  Neck: supple  Cardiovascular: +S1/S2, RRR  Respiratory: CTA B/L; no W/R/R  Gastrointestinal: soft, NT/ND; +BSx4  Extremities: WWP; no edema, clubbing or cyanosis  MSK: No synovitis  Skin: no rash    MEDICATIONS:  MEDICATIONS  (STANDING):  cholecalciferol 1000 Unit(s) Oral daily  cyanocobalamin Injectable 1000 MICROGram(s) IntraMuscular every 24 hours  folic acid 1 milliGRAM(s) Oral daily  hydroxychloroquine 200 milliGRAM(s) Oral daily  pantoprazole    Tablet 40 milliGRAM(s) Oral before breakfast  sodium bicarbonate 650 milliGRAM(s) Oral every 12 hours  torsemide 20 milliGRAM(s) Oral daily  trimethoprim   80 mG/sulfamethoxazole 400 mG 1 Tablet(s) Oral <User Schedule>    MEDICATIONS  (PRN):      ALLERGIES:  Allergies    No Known Allergies    Intolerances        LABS:                        7.7    4.34  )-----------( 266      ( 03 May 2023 06:33 )             25.6     05-03    138  |  111<H>  |  40<H>  ----------------------------<  96  5.1   |  17<L>  |  2.00<H>    Ca    8.4      03 May 2023 06:33  Phos  6.0     05-03  Mg     2.00     05-03    TPro  4.4<L>  /  Alb  2.2<L>  /  TBili  <0.2  /  DBili  x   /  AST  11  /  ALT  9   /  AlkPhos  56  05-03    PT/INR - ( 02 May 2023 04:40 )   PT: 11.0 sec;   INR: 0.95 ratio         PTT - ( 02 May 2023 04:40 )  PTT:36.2 sec    CAPILLARY BLOOD GLUCOSE      POCT Blood Glucose.: 85 mg/dL (02 May 2023 17:33)      RADIOLOGY & ADDITIONAL TESTS: Reviewed.

## 2023-05-03 NOTE — PROGRESS NOTE ADULT - ASSESSMENT
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 Patient with LIZETH and active urinary sediment. There does not appear to be any extra-renal disease at this time. Currently B-cell depleted with MMF transitioned to Myfortic 720mg BID (improvement in GI symptoms), HCQ, and prednisone 5mg. Labs this hospitalization with mild C3 depression 83 with negative C4 and dsDNA. CT A/P, US kidney, echo unremarkable. s/p kidney biopsy 5/2/2023  -Preliminary results for this biopsy 5/2/23 on this admission showed proliferative LN. There were 10 glomeruli of which 2 are globally and 2 are segmentally sclerosed. Of 8 non-globally sclerosed glomeruli, 5 have cellular or fibrocellular crescents and all show diffuse global endocapillary hypercellularity. There is also moderate tubulointerstitial inflammation. There is about 30% IFTA. Activity index 13/24 and chronicity index 7/12.   He had a previous biopsy in Oct 2022, activity index was 15/24 and chronicity was 3/12 IFTA 15-20%.    -f/u Mycophenolic acid level   -d/c Myfortic as patient has not responded to this medication  -c/w home HCQ  -patient is not currently a candidate for clinical trial  -start IV solumedrol 1g x3 days starting 5/4 (ordered)  -plan for IV CYC 500mg q2 weeks for 6 doses. First dose anticipated to be administered on Friday 5/5/23 with remainder to be outpatient. Chemo nurse aware  -patient will require pre-medications and IV hydration for CYC: IV NS 250cc administered over 30 mins prior to infusion then 750cc total @ 75cc/hr once CYC starts; IV Mesna 100mg 15-30 mins prior to CYC infusion, IV mesna 100mg at 4 hours after infusion, and IV mesna 100mg at 8 hours after infusion; PO Acetaminophen 650mg x1 ~30 mins prior to infusion; PO Granisetron 2mg x1 ~30mins prior to infusion. Rheum team will coordinate orders  -c/w PCP ppx   -c/w PPI daily for GI ppx  -check hepatitis panel (ordered)  -Quant TB negative outpatient 2/2023    WALT Felton DO  Rheumatology Fellow PGY5  Pager: [phone]

## 2023-05-03 NOTE — PROGRESS NOTE ADULT - SUBJECTIVE AND OBJECTIVE BOX
ANESTHESIA POSTOP CHECK    48y Male POSTOP DAY 1 S/P   [x ] General Anesthesia  [ ] Jairo Anesthesia  [ ] MAC    Vital Signs Last 24 Hrs  T(C): 36.6 (03 May 2023 05:31), Max: 37 (02 May 2023 21:14)  T(F): 97.8 (03 May 2023 05:31), Max: 98.6 (02 May 2023 21:14)  HR: 64 (03 May 2023 05:31) (64 - 82)  BP: 118/70 (03 May 2023 05:31) (110/65 - 118/70)  BP(mean): --  RR: 17 (03 May 2023 05:31) (17 - 17)  SpO2: 99% (03 May 2023 05:31) (99% - 100%)    Parameters below as of 03 May 2023 05:31  Patient On (Oxygen Delivery Method): room air      I&O's Summary    02 May 2023 07:01  -  03 May 2023 07:00  --------------------------------------------------------  IN: 340 mL / OUT: 700 mL / NET: -360 mL        [x ] NO APPARENT ANESTHESIA COMPLICATIONS      Comments:

## 2023-05-04 LAB
4/8 RATIO: 0.77 RATIO — LOW (ref 0.9–3.6)
ABS CD8: 522 CELLS/UL — SIGNIFICANT CHANGE UP (ref 142–740)
ALBUMIN SERPL ELPH-MCNC: 2.1 G/DL — LOW (ref 3.3–5)
ALP SERPL-CCNC: 57 U/L — SIGNIFICANT CHANGE UP (ref 40–120)
ALT FLD-CCNC: 9 U/L — SIGNIFICANT CHANGE UP (ref 4–41)
ANION GAP SERPL CALC-SCNC: 11 MMOL/L — SIGNIFICANT CHANGE UP (ref 7–14)
AST SERPL-CCNC: 12 U/L — SIGNIFICANT CHANGE UP (ref 4–40)
BASE EXCESS BLDV CALC-SCNC: -7 MMOL/L — LOW (ref -2–3)
BASOPHILS # BLD AUTO: 0.03 K/UL — SIGNIFICANT CHANGE UP (ref 0–0.2)
BASOPHILS NFR BLD AUTO: 0.8 % — SIGNIFICANT CHANGE UP (ref 0–2)
BILIRUB SERPL-MCNC: <0.2 MG/DL — SIGNIFICANT CHANGE UP (ref 0.2–1.2)
BLD GP AB SCN SERPL QL: NEGATIVE — SIGNIFICANT CHANGE UP
BUN SERPL-MCNC: 41 MG/DL — HIGH (ref 7–23)
CA-I SERPL-SCNC: 1.31 MMOL/L — SIGNIFICANT CHANGE UP (ref 1.15–1.33)
CALCIUM SERPL-MCNC: 8.3 MG/DL — LOW (ref 8.4–10.5)
CD16+CD56+ CELLS NFR BLD: 15 % — SIGNIFICANT CHANGE UP (ref 5–23)
CD16+CD56+ CELLS NFR SPEC: 164 CELLS/UL — SIGNIFICANT CHANGE UP (ref 71–410)
CD19 BLASTS SPEC-ACNC: 2 CELLS/UL — LOW (ref 84–469)
CD19 BLASTS SPEC-ACNC: <1 % — LOW (ref 6–24)
CD3 BLASTS SPEC-ACNC: 83 % — SIGNIFICANT CHANGE UP (ref 59–83)
CD3 BLASTS SPEC-ACNC: 934 CELLS/UL — SIGNIFICANT CHANGE UP (ref 672–1870)
CD4 %: 36 % — SIGNIFICANT CHANGE UP (ref 30–62)
CD8 %: 47 % — HIGH (ref 12–36)
CHLORIDE BLDV-SCNC: 111 MMOL/L — HIGH (ref 96–108)
CHLORIDE SERPL-SCNC: 109 MMOL/L — HIGH (ref 98–107)
CO2 BLDV-SCNC: 19.7 MMOL/L — LOW (ref 22–26)
CO2 SERPL-SCNC: 17 MMOL/L — LOW (ref 22–31)
CREAT SERPL-MCNC: 2.03 MG/DL — HIGH (ref 0.5–1.3)
EGFR: 40 ML/MIN/1.73M2 — LOW
EOSINOPHIL # BLD AUTO: 0.06 K/UL — SIGNIFICANT CHANGE UP (ref 0–0.5)
EOSINOPHIL NFR BLD AUTO: 1.6 % — SIGNIFICANT CHANGE UP (ref 0–6)
GAS PNL BLDV: 136 MMOL/L — SIGNIFICANT CHANGE UP (ref 136–145)
GAS PNL BLDV: SIGNIFICANT CHANGE UP
GLUCOSE BLDV-MCNC: 95 MG/DL — SIGNIFICANT CHANGE UP (ref 70–99)
GLUCOSE SERPL-MCNC: 98 MG/DL — SIGNIFICANT CHANGE UP (ref 70–99)
HAV IGM SER-ACNC: SIGNIFICANT CHANGE UP
HBV CORE AB SER-ACNC: SIGNIFICANT CHANGE UP
HBV CORE IGM SER-ACNC: SIGNIFICANT CHANGE UP
HBV SURFACE AB SER-ACNC: SIGNIFICANT CHANGE UP
HBV SURFACE AG SER-ACNC: SIGNIFICANT CHANGE UP
HCO3 BLDV-SCNC: 19 MMOL/L — LOW (ref 22–29)
HCT VFR BLD CALC: 26.2 % — LOW (ref 39–50)
HCT VFR BLDA CALC: 26 % — LOW (ref 39–51)
HCV AB S/CO SERPL IA: 0.06 S/CO — SIGNIFICANT CHANGE UP (ref 0–0.99)
HCV AB SERPL-IMP: SIGNIFICANT CHANGE UP
HGB BLD CALC-MCNC: 8.6 G/DL — LOW (ref 12.6–17.4)
HGB BLD-MCNC: 8 G/DL — LOW (ref 13–17)
IANC: 2.1 K/UL — SIGNIFICANT CHANGE UP (ref 1.8–7.4)
IMM GRANULOCYTES NFR BLD AUTO: 0.8 % — SIGNIFICANT CHANGE UP (ref 0–0.9)
LACTATE BLDV-MCNC: 0.8 MMOL/L — SIGNIFICANT CHANGE UP (ref 0.5–2)
LYMPHOCYTES # BLD AUTO: 1.06 K/UL — SIGNIFICANT CHANGE UP (ref 1–3.3)
LYMPHOCYTES # BLD AUTO: 28 % — SIGNIFICANT CHANGE UP (ref 13–44)
MAGNESIUM SERPL-MCNC: 2 MG/DL — SIGNIFICANT CHANGE UP (ref 1.6–2.6)
MCHC RBC-ENTMCNC: 25.3 PG — LOW (ref 27–34)
MCHC RBC-ENTMCNC: 30.5 GM/DL — LOW (ref 32–36)
MCV RBC AUTO: 82.9 FL — SIGNIFICANT CHANGE UP (ref 80–100)
MONOCYTES # BLD AUTO: 0.51 K/UL — SIGNIFICANT CHANGE UP (ref 0–0.9)
MONOCYTES NFR BLD AUTO: 13.5 % — SIGNIFICANT CHANGE UP (ref 2–14)
NEUTROPHILS # BLD AUTO: 2.1 K/UL — SIGNIFICANT CHANGE UP (ref 1.8–7.4)
NEUTROPHILS NFR BLD AUTO: 55.3 % — SIGNIFICANT CHANGE UP (ref 43–77)
NRBC # BLD: 0 /100 WBCS — SIGNIFICANT CHANGE UP (ref 0–0)
NRBC # FLD: 0 K/UL — SIGNIFICANT CHANGE UP (ref 0–0)
PCO2 BLDV: 37 MMHG — LOW (ref 42–55)
PH BLDV: 7.31 — LOW (ref 7.32–7.43)
PHOSPHATE SERPL-MCNC: 5.2 MG/DL — HIGH (ref 2.5–4.5)
PLATELET # BLD AUTO: 263 K/UL — SIGNIFICANT CHANGE UP (ref 150–400)
PO2 BLDV: 64 MMHG — HIGH (ref 25–45)
POTASSIUM BLDV-SCNC: 4.8 MMOL/L — SIGNIFICANT CHANGE UP (ref 3.5–5.1)
POTASSIUM SERPL-MCNC: 4.6 MMOL/L — SIGNIFICANT CHANGE UP (ref 3.5–5.3)
POTASSIUM SERPL-SCNC: 4.6 MMOL/L — SIGNIFICANT CHANGE UP (ref 3.5–5.3)
PROT SERPL-MCNC: 4.6 G/DL — LOW (ref 6–8.3)
RBC # BLD: 3.16 M/UL — LOW (ref 4.2–5.8)
RBC # FLD: 13.4 % — SIGNIFICANT CHANGE UP (ref 10.3–14.5)
RH IG SCN BLD-IMP: POSITIVE — SIGNIFICANT CHANGE UP
SAO2 % BLDV: 92.5 % — HIGH (ref 67–88)
SODIUM SERPL-SCNC: 137 MMOL/L — SIGNIFICANT CHANGE UP (ref 135–145)
T-CELL CD4 SUBSET PNL BLD: 402 CELLS/UL — LOW (ref 489–1457)
WBC # BLD: 3.79 K/UL — LOW (ref 3.8–10.5)
WBC # FLD AUTO: 3.79 K/UL — LOW (ref 3.8–10.5)

## 2023-05-04 PROCEDURE — 99233 SBSQ HOSP IP/OBS HIGH 50: CPT | Mod: GC

## 2023-05-04 PROCEDURE — 99232 SBSQ HOSP IP/OBS MODERATE 35: CPT | Mod: GC

## 2023-05-04 RX ORDER — CEPHALEXIN 500 MG/1
500 CAPSULE ORAL 3 TIMES DAILY
Qty: 15 | Refills: 0 | Status: COMPLETED | COMMUNITY
Start: 2022-11-11 | End: 2023-05-04

## 2023-05-04 RX ORDER — HYDROXYCHLOROQUINE SULFATE 200 MG
200 TABLET ORAL
Refills: 0 | Status: DISCONTINUED | OUTPATIENT
Start: 2023-05-04 | End: 2023-05-08

## 2023-05-04 RX ORDER — HYDROXYCHLOROQUINE SULFATE 200 MG
300 TABLET ORAL DAILY
Refills: 0 | Status: DISCONTINUED | OUTPATIENT
Start: 2023-05-04 | End: 2023-05-04

## 2023-05-04 RX ORDER — HYDROXYCHLOROQUINE SULFATE 200 MG
400 TABLET ORAL
Refills: 0 | Status: DISCONTINUED | OUTPATIENT
Start: 2023-05-04 | End: 2023-05-08

## 2023-05-04 RX ORDER — MYCOPHENOLATE MOFETIL 500 MG/1
500 TABLET ORAL
Qty: 180 | Refills: 0 | Status: COMPLETED | COMMUNITY
Start: 2022-02-14 | End: 2023-05-04

## 2023-05-04 RX ADMIN — Medication 650 MILLIGRAM(S): at 18:23

## 2023-05-04 RX ADMIN — Medication 100 MILLIGRAM(S): at 06:04

## 2023-05-04 RX ADMIN — HEPARIN SODIUM 5000 UNIT(S): 5000 INJECTION INTRAVENOUS; SUBCUTANEOUS at 12:54

## 2023-05-04 RX ADMIN — HEPARIN SODIUM 5000 UNIT(S): 5000 INJECTION INTRAVENOUS; SUBCUTANEOUS at 21:46

## 2023-05-04 RX ADMIN — ERGOCALCIFEROL 50000 UNIT(S): 1.25 CAPSULE ORAL at 11:32

## 2023-05-04 RX ADMIN — PREGABALIN 1000 MICROGRAM(S): 225 CAPSULE ORAL at 12:53

## 2023-05-04 RX ADMIN — Medication 200 MILLIGRAM(S): at 11:32

## 2023-05-04 RX ADMIN — PANTOPRAZOLE SODIUM 40 MILLIGRAM(S): 20 TABLET, DELAYED RELEASE ORAL at 06:03

## 2023-05-04 RX ADMIN — HEPARIN SODIUM 5000 UNIT(S): 5000 INJECTION INTRAVENOUS; SUBCUTANEOUS at 06:03

## 2023-05-04 RX ADMIN — Medication 20 MILLIGRAM(S): at 06:03

## 2023-05-04 RX ADMIN — Medication 650 MILLIGRAM(S): at 06:03

## 2023-05-04 RX ADMIN — Medication 1 MILLIGRAM(S): at 11:31

## 2023-05-04 NOTE — PROGRESS NOTE ADULT - SUBJECTIVE AND OBJECTIVE BOX
Medicine Progress Note  --------------------  Neo Malave M.D.  Internal Medicine/Emergency Medicine  PGY-2  --------------------      Patient: LIZ HOUSER, MRN: [id number], : 1975  Admitted on 23 for Nausea and vomiting      LANGUAGE - English ]OR[ PI (_): #                ------------------------------------------------------------------------------------------------------------  SUMMARY (from last progress note through 23 @ 07:42):   -  ------------------------------------------------------------------------------------------------------------  OVERNIGHT EVENTS  NAEON    SUBJECTIVE  -       ROS negative unless noted above.  ------------------------------------------------------------------------------------------------------------  OBJECTIVE:  Physical Exam  CONST:     NAD, well-appearing; well-developed; appears stated age  EYES:         Conjunctiva clear; PERRL; no conjunctival pallor; no lid lag  ENMT:       MMM, no pharyngeal injection or exudates; normal dentition/dentures present  NECK:        Supple, no LAD; no palpable masses; no thyromegaly  RESP :        Normal respiratory effort; CTA bilaterally; no W/R/R  CHEST:       No TTP, no lines/ports; symmetric chest expansion  CARDIO:     RRR, normal S1 and S2, no M/R/G; apical impulse at MCL  VASC:         No JVD/AJR, no peripheral edema, pulses 2+ B/L, Cap refill <2s  ABD:           Soft, NT/ND, norm bowel sounds; no R/G; No HSM; No CVAT  MSK:          No clubbing of digits; no joint swelling or TTP  EXT:           WWP, no reduction in body hair, no LE skin changes  PSYCH        A&O to person, place, and time; affect appropriate  NEURO:     Non-focal; no gross sensory deficits; moving all extremities   SKIN:          No rashes; no palpable lesions; axillae not dry    Vital Signs Last 24 Hrs  T(F): 98.5, Max: 98.6 (23 @ 12:45)  HR: 70 (70 - 77)  BP: 108/62 (102/63 - 130/66)  RR: 16 (16 - 18)  SpO2: 100% (99% - 100%)        DAILY MEASUREMENTS:  I&O's Summary    03 May 2023 07:01  -  04 May 2023 07:00  --------------------------------------------------------  IN: 700 mL / OUT: 850 mL / NET: -150 mL      Daily     Daily   Weight (kg): 62.2 (23 @ 03:50)  Orthostatic VS        LABS:                        7.7    4.34  )-----------( 266      ( 03 May 2023 06:33 )             25.6     Hgb Trend: 7.7<--, 7.5<--, 8.1<--, 7.4<--, 7.5<--  03    138  |  111<H>  |  40<H>  ----------------------------<  96  5.1   |  17<L>  |  2.00<H>    Ca    8.4      03 May 2023 06:33  Phos  6.0     05-03  Mg     2.00     05-03    TPro  4.4<L>  /  Alb  2.2<L>  /  TBili  <0.2  /  DBili  x   /  AST  11  /  ALT  9   /  AlkPhos  56  05-03      CAPILLARY BLOOD GLUCOSE                      RADIOLOGY & ADDITIONAL TESTS:  Results Reviewed:     Imaging Reviewed:  Electrocardiogram Reviewed:      ------------------------------------------------------------------------------------------------------------  MEDICATIONS  (STANDING):  cholecalciferol 1000 Unit(s) Oral daily  cyanocobalamin Injectable 1000 MICROGram(s) IntraMuscular every 24 hours  ergocalciferol 03710 Unit(s) Oral <User Schedule>  folic acid 1 milliGRAM(s) Oral daily  heparin   Injectable 5000 Unit(s) SubCutaneous every 8 hours  hydroxychloroquine 200 milliGRAM(s) Oral daily  methylPREDNISolone sodium succinate IVPB 1000 milliGRAM(s) IV Intermittent every 24 hours  pantoprazole    Tablet 40 milliGRAM(s) Oral before breakfast  sodium bicarbonate 650 milliGRAM(s) Oral every 12 hours  torsemide 20 milliGRAM(s) Oral daily  trimethoprim   80 mG/sulfamethoxazole 400 mG 1 Tablet(s) Oral <User Schedule>    MEDICATIONS  (PRN):    ------------------------------------------------------------------------------------------------------------  COORDINATION OF CARE:  Care discussed with consultants/other providers and notes reviewed [Y]     Medicine Progress Note  --------------------  Neo Malave M.D.  Internal Medicine/Emergency Medicine  PGY-2  --------------------      Patient: LIZ HOUSER, MRN: [id number], : 1975  Admitted on 23 for Nausea and vomiting      OVERNIGHT EVENTS  NAEON    SUBJECTIVE  Mr. Houser says that he had an episode of vomiting last night with the sodium bicarb, but otherwise has no new complaints. He expresses that he understands that his lupus is very activated on his kidneys.      ROS negative unless noted above.  ------------------------------------------------------------------------------------------------------------  OBJECTIVE:  Physical Exam  GEN: Awake, AOx3, NAD.  HEENT: NCAT  CARDIO: RRR. Normal S1/S2, no m/r/g. No JVD.  RESP: CTAB, no w/r/r  ABD: Soft, NTND.   MSK: No obvious deformity or ROM deficit. Tr b/l LE edema, stable from prior  SKIN: Warm, dry.   NEURO: Moves all four extremities spontaneously  PSYCH: Appropriate mood & affect.      Vital Signs Last 24 Hrs  T(F): 98.5, Max: 98.6 (23 @ 12:45)  HR: 70 (70 - 77)  BP: 108/62 (102/63 - 130/66)  RR: 16 (16 - 18)  SpO2: 100% (99% - 100%)        DAILY MEASUREMENTS:  I&O's Summary    03 May 2023 07:01  -  04 May 2023 07:00  --------------------------------------------------------  IN: 700 mL / OUT: 850 mL / NET: -150 mL      Daily     Daily   Weight (kg): 62.2 (23 @ 03:50)  Orthostatic VS        LABS:                        7.7    4.34  )-----------( 266      ( 03 May 2023 06:33 )             25.6     Hgb Trend: 7.7<--, 7.5<--, 8.1<--, 7.4<--, 7.5<--  05-03    138  |  111<H>  |  40<H>  ----------------------------<  96  5.1   |  17<L>  |  2.00<H>    Ca    8.4      03 May 2023 06:33  Phos  6.0     05-03  Mg     2.00     05-03    TPro  4.4<L>  /  Alb  2.2<L>  /  TBili  <0.2  /  DBili  x   /  AST  11  /  ALT  9   /  AlkPhos  56  05-03      CAPILLARY BLOOD GLUCOSE    RADIOLOGY & ADDITIONAL TESTS:  Results Reviewed:   - OP Quant negative 2023    Imaging Reviewed:     Electrocardiogram Reviewed:  no interval ECG      ------------------------------------------------------------------------------------------------------------  MEDICATIONS  (STANDING):  cholecalciferol 1000 Unit(s) Oral daily  cyanocobalamin Injectable 1000 MICROGram(s) IntraMuscular every 24 hours  ergocalciferol 50524 Unit(s) Oral <User Schedule>  folic acid 1 milliGRAM(s) Oral daily  heparin   Injectable 5000 Unit(s) SubCutaneous every 8 hours  hydroxychloroquine 200 milliGRAM(s) Oral daily  methylPREDNISolone sodium succinate IVPB 1000 milliGRAM(s) IV Intermittent every 24 hours  pantoprazole    Tablet 40 milliGRAM(s) Oral before breakfast  sodium bicarbonate 650 milliGRAM(s) Oral every 12 hours  torsemide 20 milliGRAM(s) Oral daily  trimethoprim   80 mG/sulfamethoxazole 400 mG 1 Tablet(s) Oral <User Schedule>    MEDICATIONS  (PRN):    ------------------------------------------------------------------------------------------------------------  COORDINATION OF CARE:  Care discussed with consultants/other providers and notes reviewed [Y]

## 2023-05-04 NOTE — PROGRESS NOTE ADULT - SUBJECTIVE AND OBJECTIVE BOX
[facility] DIVISION OF KIDNEY DISEASES AND HYPERTENSION   FOLLOW UP NOTE    --------------------------------------------------------------------------------  Chief Complaint: LIZETH on CKD, Lupus nephritis    24 hour events/subjective: Pt. seen and examined today. Pt. reports feeling well. No fever, SOB, CP or dysuria. Pt. follows with nephrologist Dr. Hayde Shultz as outpatient for CKD care. 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 Pt underwent kidney biopsy by IR team on 5/2/23. Pt. with kidney biopsy findings of crescentic GN/LN. Scr increased to 2.03 Pt. currently nonoliguric.     PAST HISTORY  --------------------------------------------------------------------------------  No significant changes to PMH, PSH, FHx, SHx, unless otherwise noted    ALLERGIES & MEDICATIONS  --------------------------------------------------------------------------------  Allergies    No Known Allergies    Intolerances    Standing Inpatient Medications  cyanocobalamin Injectable 1000 MICROGram(s) IntraMuscular every 24 hours  ergocalciferol 60691 Unit(s) Oral <User Schedule>  folic acid 1 milliGRAM(s) Oral daily  heparin   Injectable 5000 Unit(s) SubCutaneous every 8 hours  hydroxychloroquine 200 milliGRAM(s) Oral daily  methylPREDNISolone sodium succinate IVPB 1000 milliGRAM(s) IV Intermittent every 24 hours  pantoprazole    Tablet 40 milliGRAM(s) Oral before breakfast  sodium bicarbonate 650 milliGRAM(s) Oral every 12 hours  torsemide 20 milliGRAM(s) Oral daily  trimethoprim   80 mG/sulfamethoxazole 400 mG 1 Tablet(s) Oral <User Schedule>    PRN Inpatient Medications    REVIEW OF SYSTEMS  --------------------------------------------------------------------------------  Gen: No fevers/chills  Head/Eyes/Ears: No HA   Respiratory: No dyspnea, cough  CV: No chest pain  GI: No abdominal pain, diarrhea  : No dysuria, hematuria  MSK: LE edema  Skin: No rashes  Heme: Anemia    VITALS/PHYSICAL EXAM  --------------------------------------------------------------------------------  T(C): 36.9 (05-04-23 @ 05:19), Max: 37 (05-03-23 @ 12:45)  HR: 70 (05-04-23 @ 05:19) (70 - 77)  BP: 108/62 (05-04-23 @ 05:19) (102/63 - 130/66)  RR: 16 (05-04-23 @ 05:19) (16 - 18)  SpO2: 100% (05-04-23 @ 05:19) (99% - 100%)  Wt(kg): --    05-03-23 @ 07:01  -  05-04-23 @ 07:00  --------------------------------------------------------  IN: 700 mL / OUT: 850 mL / NET: -150 mL    Physical Exam:  	Gen: sitting comfortably in bed, NAD  	HEENT: Anicteric  	Pulm: CTA B/L  	CV: S1S2+  	Abd: Soft, +BS       	Ext: B/L LE pitting edema+(improved)  	Neuro: Awake, alert     	Skin: Warm and dry    LABS/STUDIES  --------------------------------------------------------------------------------              8.0    3.79  >-----------<  263      [05-04-23 @ 07:05]              26.2     137  |  109  |  41  ----------------------------<  98      [05-04-23 @ 07:05]  4.6   |  17  |  2.03        Ca     8.3     [05-04-23 @ 07:05]      Mg     2.00     [05-04-23 @ 07:05]      Phos  5.2     [05-04-23 @ 07:05]    Creatinine Trend:  SCr 2.03 [05-04 @ 07:05]  SCr 2.00 [05-03 @ 06:33]  SCr 1.83 [05-02 @ 04:40]  SCr 1.60 [05-01 @ 06:17]  SCr 1.62 [04-30 @ 05:50]    Urinalysis - [04-28-23 @ 01:24]      Color Light Yellow / Appearance Clear / SG 1.010 / pH 6.0      Gluc Negative / Ketone Negative  / Bili Negative / Urobili <2 mg/dL       Blood Large / Protein 300 mg/dL / Leuk Est Negative / Nitrite Negative      RBC 77 / WBC 8 / Hyaline 2 / Gran  / Sq Epi  / Non Sq Epi  / Bacteria Negative    Urine Creatinine 80      [04-28-23 @ 02:42]  Urine Protein 244      [04-28-23 @ 02:42]  Urine Sodium 30      [04-28-23 @ 02:42]  Urine Osmolality 258      [04-28-23 @ 02:42]    dsDNA 14      [04-28-23 @ 09:08]  C3 Complement 83      [04-28-23 @ 09:08]  C4 Complement 26      [04-28-23 @ 09:08] [facility] DIVISION OF KIDNEY DISEASES AND HYPERTENSION   FOLLOW UP NOTE    --------------------------------------------------------------------------------  Chief Complaint: LIZETH on CKD, Lupus nephritis    24 hour events/subjective: Pt. seen and examined today. Pt. reports feeling well. No fever, SOB, CP or dysuria. Pt. follows with nephrologist Dr. Hayde Shultz as outpatient for CKD care. 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 Pt underwent kidney biopsy by IR team on 5/2/23. Pt. with kidney biopsy findings of crescentic GN/LN. Scr elevated at 2.03 today. Pt. currently nonoliguric.     PAST HISTORY  --------------------------------------------------------------------------------  No significant changes to PMH, PSH, FHx, SHx, unless otherwise noted    ALLERGIES & MEDICATIONS  --------------------------------------------------------------------------------  Allergies    No Known Allergies    Intolerances    Standing Inpatient Medications  cyanocobalamin Injectable 1000 MICROGram(s) IntraMuscular every 24 hours  ergocalciferol 87241 Unit(s) Oral <User Schedule>  folic acid 1 milliGRAM(s) Oral daily  heparin   Injectable 5000 Unit(s) SubCutaneous every 8 hours  hydroxychloroquine 200 milliGRAM(s) Oral daily  methylPREDNISolone sodium succinate IVPB 1000 milliGRAM(s) IV Intermittent every 24 hours  pantoprazole    Tablet 40 milliGRAM(s) Oral before breakfast  sodium bicarbonate 650 milliGRAM(s) Oral every 12 hours  torsemide 20 milliGRAM(s) Oral daily  trimethoprim   80 mG/sulfamethoxazole 400 mG 1 Tablet(s) Oral <User Schedule>    PRN Inpatient Medications    REVIEW OF SYSTEMS  --------------------------------------------------------------------------------  Gen: No fevers/chills  Head/Eyes/Ears: No HA   Respiratory: No dyspnea, cough  CV: No chest pain  GI: No abdominal pain, diarrhea  : No dysuria, hematuria  MSK: LE edema  Skin: No rashes  Heme: Anemia    VITALS/PHYSICAL EXAM  --------------------------------------------------------------------------------  T(C): 36.9 (05-04-23 @ 05:19), Max: 37 (05-03-23 @ 12:45)  HR: 70 (05-04-23 @ 05:19) (70 - 77)  BP: 108/62 (05-04-23 @ 05:19) (102/63 - 130/66)  RR: 16 (05-04-23 @ 05:19) (16 - 18)  SpO2: 100% (05-04-23 @ 05:19) (99% - 100%)  Wt(kg): --    05-03-23 @ 07:01  -  05-04-23 @ 07:00  --------------------------------------------------------  IN: 700 mL / OUT: 850 mL / NET: -150 mL    Physical Exam:  	Gen: sitting comfortably in bed, NAD  	HEENT: Anicteric  	Pulm: CTA B/L  	CV: S1S2+  	Abd: Soft, +BS       	Ext: Decreased B/L LE pitting edema+  	Neuro: Awake, alert     	Skin: Warm and dry    LABS/STUDIES  --------------------------------------------------------------------------------              8.0    3.79  >-----------<  263      [05-04-23 @ 07:05]              26.2     137  |  109  |  41  ----------------------------<  98      [05-04-23 @ 07:05]  4.6   |  17  |  2.03        Ca     8.3     [05-04-23 @ 07:05]      Mg     2.00     [05-04-23 @ 07:05]      Phos  5.2     [05-04-23 @ 07:05]    Creatinine Trend:  SCr 2.03 [05-04 @ 07:05]  SCr 2.00 [05-03 @ 06:33]  SCr 1.83 [05-02 @ 04:40]  SCr 1.60 [05-01 @ 06:17]  SCr 1.62 [04-30 @ 05:50]    Urine Creatinine 80      [04-28-23 @ 02:42]  Urine Protein 244      [04-28-23 @ 02:42]  Urine Sodium 30      [04-28-23 @ 02:42]  Urine Osmolality 258      [04-28-23 @ 02:42]    dsDNA 14      [04-28-23 @ 09:08]  C3 Complement 83      [04-28-23 @ 09:08]  C4 Complement 26      [04-28-23 @ 09:08]

## 2023-05-04 NOTE — CHART NOTE - NSCHARTNOTEFT_GEN_A_CORE
Source: Patient [x ]    Family [ ]     other [x ] chart review    Patient seen for moderate malnutrition f/u. 48 year old male with a PMH of SLE who presents with persistent nausea/vomiting after medication administration at home and found to have LIZETH vs progression of LN/CKD s/p kidney biopsy (5/2) per chart.    Patient reports fair appetite. Hasn't drank Orgain supplement because he was worried about his kidneys. Educated patient Orgain is low in potassium/phosphorus. States vomiting last night after medications, but none today. Noted w/ +1 L/R ankle + foot edema and no pressure injuries per RN flow sheet. Patient w/ f/u questions regarding phosphorus foods, which was reviewed.     Diet : Diet, Regular:   For patients receiving Renal Replacement - No Protein Restr, No Conc K, No Conc Phos, Low Sodium (RENAL)  Halal  No Beef  No Pork (05-02-23 @ 19:07)    Current Weight: Weight (kg): no new weight to assess  62.2 (4/28)    Pertinent Medications: MEDICATIONS  (STANDING):  cyanocobalamin Injectable 1000 MICROGram(s) IntraMuscular every 24 hours  ergocalciferol 65014 Unit(s) Oral <User Schedule>  folic acid 1 milliGRAM(s) Oral daily  heparin   Injectable 5000 Unit(s) SubCutaneous every 8 hours  hydroxychloroquine 200 milliGRAM(s) Oral daily  methylPREDNISolone sodium succinate IVPB 1000 milliGRAM(s) IV Intermittent every 24 hours  pantoprazole    Tablet 40 milliGRAM(s) Oral before breakfast  sodium bicarbonate 650 milliGRAM(s) Oral every 12 hours  torsemide 20 milliGRAM(s) Oral daily  trimethoprim   80 mG/sulfamethoxazole 400 mG 1 Tablet(s) Oral <User Schedule>    MEDICATIONS  (PRN):    Pertinent Labs:  05-04 Na137 mmol/L Glu 98 mg/dL K+ 4.6 mmol/L Cr  2.03 mg/dL<H> BUN 41 mg/dL<H> 05-04 Phos 5.2 mg/dL<H> 05-04 Alb 2.1 g/dL<L>    Estimated Needs: [ x] no change since previous assessment    Previous Nutrition Diagnosis:     Nutrition Diagnosis is [ ] ongoing  [ ] resolved [ ] not applicable     Recommend    [ ] Change Diet To:    [ ] Nutrition Supplement    [ ] Nutrition Support    [ ] Other:        Monitoring and Evaluation:     [ ] PO intake [ ] Tolerance to diet prescription [ ] weights [ ] follow up per protocol    [ ] other: Source: Patient [x ]    Family [ ]     other [x ] chart review    Patient seen for moderate malnutrition f/u. 48 year old male with a PMH of SLE who presents with persistent nausea/vomiting after medication administration at home and found to have LIZETH vs progression of LN/CKD s/p kidney biopsy (5/2) per chart.    Patient reports fair appetite. Hasn't drank Orgain supplement because he was worried about his kidneys. Educated patient Orgain is low in potassium/phosphorus. States vomiting last night after medications, but none today. Noted w/ +1 L/R ankle + foot edema and no pressure injuries per RN flow sheet. Patient w/ f/u questions regarding phosphorus foods, which was reviewed.     Diet : Diet, Regular:   For patients receiving Renal Replacement - No Protein Restr, No Conc K, No Conc Phos, Low Sodium (RENAL)  Halal  No Beef  No Pork (05-02-23 @ 19:07)    Current Weight: Weight (kg): no new weight to assess  62.2 (4/28)    Pertinent Medications: MEDICATIONS  (STANDING):  cyanocobalamin Injectable 1000 MICROGram(s) IntraMuscular every 24 hours  ergocalciferol 89221 Unit(s) Oral <User Schedule>  folic acid 1 milliGRAM(s) Oral daily  heparin   Injectable 5000 Unit(s) SubCutaneous every 8 hours  hydroxychloroquine 200 milliGRAM(s) Oral daily  methylPREDNISolone sodium succinate IVPB 1000 milliGRAM(s) IV Intermittent every 24 hours  pantoprazole    Tablet 40 milliGRAM(s) Oral before breakfast  sodium bicarbonate 650 milliGRAM(s) Oral every 12 hours  torsemide 20 milliGRAM(s) Oral daily  trimethoprim   80 mG/sulfamethoxazole 400 mG 1 Tablet(s) Oral <User Schedule>    MEDICATIONS  (PRN):    Pertinent Labs:  05-04 Na137 mmol/L Glu 98 mg/dL K+ 4.6 mmol/L Cr  2.03 mg/dL<H> BUN 41 mg/dL<H> 05-04 Phos 5.2 mg/dL<H> 05-04 Alb 2.1 g/dL<L>    Estimated Needs: [ x] no change since previous assessment    Previous Nutrition Diagnosis: Moderate malnutrition    Nutrition Diagnosis is [x ] ongoing  [ ] resolved [ ] not applicable     Recommend  - continue diet as ordered  - will continue w/ Orgain BID (440 kcal, 32 g pro)  - continue w/ micronutrient supplementation  - obtain weekly weight and document PO intake to monitor trend     Monitoring and Evaluation:   [x ] PO intake [x ] Tolerance to diet prescription [x ] weights [x ] follow up per protocol

## 2023-05-04 NOTE — PROGRESS NOTE ADULT - ASSESSMENT
48M with h/o SLE c/b LN3/4 who presents with persistent nausea/vomiting after medication administration at home and found to have LIZETH vs progression of LN/CKD who is otherwise hemodynamically stable awaiting nephrology and rheumatology evaluation.     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 Admitetd with Cr 2.5 with borderline hyperkalemia; no ECG changes. Pt denies decreased UOP. Urine studies suggesting pre-renal etiology (FeNa 0.7%), but pt takes diuretics, so unclear reliability (though would expect MORE Na wasting with diuresis, not less). UPCR with nephrotic-range proteinuria as well, which is consistent with known LN. Pending renal biopsy to assess for possible POD/transformation, planned for Tuesday 5/2. Creatinine improving, HyperK improved.     Nephrologist: Dr. Hayde Garland, Nephro C/S, appreciate recs (following) - still waiting for recs, multiple pages throughout the past several days; specifically would like to know about NaHCO3 recs & phos binder today  Rheumatologist: Dr. Nilton Le, Rheum C/S, appreciate recs (following) - cleared for dc s/p biopsy, can f/u as OP  IR C/S: Renal Bx 5/2 (see nephro note)    ::Normocytic Anemia: previously with AoCD but baseline higher; unclear if ANGUIANO is symptomatic anemia  -- T&S (5/4-)  -- Iron Studies (iron replete), B12 (304), Folate (5.7)  -- Cyanocobalamin 1kU IM QDx 7d, then PO after; Folate 1mg PO QD    ::Electrolytes: Pt with borderline HyperK in ER. Given Lokelma 5x1 on admission. Stably WNL now. No ECG changes.  -- Still awaiting Nephro/Rheum recs re: bactrim in this context    ::BMD:   -- D/C Home Vit D3 1kU QD --> Vit D 50kU Q1W x 8w  -- Ca x Phos: now > 55, would start phos binder, but pending nephro recs given their follow-up would be important  ----- F/U PTH (WNL), Vit D 25 & 1,25 (both low)    ::Volume/BP: SBP at goal, but b/l LE edema suggestive of volume overload (vs decreased oncotic pressure ico nephrotic syndrome).   -- HOLDing Home Lisinpril 10mg PO QD (ico LIZETH)  -- C/W Torsemide 20mg PO QD   -- D/C Home Furosemide 20mg PO QD (ico LIZETH)    ::HD Access: no current strong indication for HD, no access    ::Rheum/SLE  -- F/U Hepatitis Panel  -- F/U mycophenolic acid level  -- C/W Home TMP/-80 PO TIW (but d/t LIZETH vs CKD, will ask rheum & nephro about other coverage or continuation)  -- C/W Myfortic 720 ER PO BID (1h pre or 2h post meals)  -- C/W Home Hydroxychloroquine 200mg PO QD  -- START Methylprednisolone 1g IV QD x3d (5/4-)  -- D/C Home Prednisone 5mg PO QD  -- D/C MMF 1500mg PO BID  -- PLAN CYC 500mg Q2W [id number] (5/5-) & pre-medications per rheumatology    #Dyspnea on Exertion   Pt with reports of ANGUIANO, fatigue. B/l Leg swelling. Cardiac evaluation unremarkable. May be 2/2 anemia. B12 borderline low but EF normal (not wet beriberi). May benefit from further OP testing with pulm/cards.  - TTE (EF 62%, unremarkable echo), ECG  - Trop, BNP WNL 48M with h/o SLE c/b LN3/4 who presents with persistent nausea/vomiting after medication administration at home and found to have LIZETH vs progression of LN/CKD who is otherwise hemodynamically stable awaiting nephrology and rheumatology evaluation.     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 Admitetd with Cr 2.5 with borderline hyperkalemia; no ECG changes. Pt denies decreased UOP. Urine studies suggesting pre-renal etiology (FeNa 0.7%), but pt takes diuretics, so unclear reliability (though would expect MORE Na wasting with diuresis, not less). UPCR with nephrotic-range proteinuria as well, which is consistent with known LN. Pending renal biopsy to assess for possible POD/transformation, planned for Tuesday 5/2. Creatinine improving, HyperK improved.     Nephrologist: Dr. Hayde Garland, Nephro C/S, appreciate recs (following) - still waiting for recs, multiple pages throughout the past several days; specifically would like to know about NaHCO3 recs & phos binder today  Rheumatologist: Dr. Nilton Le, Rheum C/S, appreciate recs (following) - cleared for dc s/p biopsy, can f/u as OP  IR C/S: Renal Bx 5/2 (see nephro note)    ::Normocytic Anemia: previously with AoCD but baseline higher; unclear if ANGUIANO is symptomatic anemia  -- T&S (5/4-)  -- Iron Studies (iron replete), B12 (304), Folate (5.7)  -- Cyanocobalamin 1kU IM QDx 7d, then PO after; Folate 1mg PO QD    ::Electrolytes: Pt with borderline HyperK in ER. Given Lokelma 5x1 on admission. Stably WNL now. No ECG changes.  -- Still awaiting Nephro/Rheum recs re: bactrim in this context    ::BMD:   -- D/C Home Vit D3 1kU QD --> Vit D 50kU Q1W x 8w  -- Ca x Phos: now > 55, would start phos binder, but pending nephro recs given their follow-up would be important  ----- F/U PTH (WNL), Vit D 25 & 1,25 (both low)    ::Volume/BP: SBP at goal, but b/l LE edema suggestive of volume overload (vs decreased oncotic pressure ico nephrotic syndrome).   -- HOLDing Home Lisinpril 10mg PO QD (ico LIZETH)  -- C/W Torsemide 20mg PO QD   -- D/C Home Furosemide 20mg PO QD (ico LIZETH)    ::HD Access: no current strong indication for HD, no access    ::Rheum/SLE  -- F/U Hepatitis Panel  -- F/U mycophenolic acid level  -- C/W Home TMP/-80 PO TIW (but d/t LIZETH vs CKD, will ask rheum & nephro about other coverage or continuation)  -- C/W Home Hydroxychloroquine 200mg PO QD  -- START Methylprednisolone 1g IV QD x3d (5/4-)  -- D/C Myfortic 720 ER PO BID (1h pre or 2h post meals)  -- D/C Home Prednisone 5mg PO QD  -- D/C MMF 1500mg PO BID  -- PLAN CYC 500mg Q2W [id number] (5/5-) & pre-medications per rheumatology    #Dyspnea on Exertion   Pt with reports of ANGUIANO, fatigue. B/l Leg swelling. Cardiac evaluation unremarkable. May be 2/2 anemia. B12 borderline low but EF normal (not wet beriberi). May benefit from further OP testing with pulm/cards.  - TTE (EF 62%, unremarkable echo), ECG  - Trop, BNP WNL 48M with h/o SLE c/b LN3/4 who presents with persistent nausea/vomiting after medication administration at home and found to have LIZETH vs progression of LN/CKD who is otherwise hemodynamically stable awaiting nephrology and rheumatology evaluation.     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 Admitted with Cr 2.5 with borderline hyperkalemia; no ECG changes. Pt denies decreased UOP. Urine studies suggesting pre-renal etiology (FeNa 0.7%), but pt takes diuretics, so unclear reliability (though would expect MORE Na wasting with diuresis, not less). UPCR with nephrotic-range proteinuria as well, which is consistent with known LN. Pending renal biopsy to assess for possible POD/transformation, planned for Tuesday 5/2. Creatinine improving, HyperK improved.     Nephrologist: Dr. Hayde Garland, Nephro C/S, appreciate recs (following)  Rheumatologist: Dr. Nilton Le, Rheum C/S, appreciate recs (following)  IR C/S: Renal Bx 5/2 (see nephro note)    ::Normocytic Anemia: previously with AoCD but baseline higher; unclear if ANGUIANO is symptomatic anemia  -- T&S (5/4-)  -- Iron Studies (iron replete), B12 (304), Folate (5.7)  -- Cyanocobalamin 1kU IM QDx 7d, then PO after; Folate 1mg PO QD    ::Electrolytes: Pt with borderline HyperK in ER. Given Lokelma 5x1 on admission. Stably WNL now. No ECG changes.  -- Still awaiting Nephro/Rheum recs re: bactrim in this context    ::BMD:   -- D/C Home Vit D3 1kU QD --> Vit D 50kU Q1W x 8w  -- Ca x Phos: now > 55, would start phos binder, but pending nephro recs given their follow-up would be important  ----- F/U PTH (WNL), Vit D 25 & 1,25 (both low)    ::Volume/BP: SBP at goal, but b/l LE edema suggestive of volume overload (vs decreased oncotic pressure ico nephrotic syndrome).   -- HOLDing Home Lisinpril 10mg PO QD (ico LIZETH)  -- C/W Torsemide 20mg PO QD   -- D/C Home Furosemide 20mg PO QD (ico LIZETH)    ::HD Access: no current strong indication for HD, no access    ::Rheum/SLE  -- OP Quant Gold Negative 02/2023  -- F/U Hepatitis Panel  -- F/U mycophenolic acid level  -- C/W Home TMP/-80 PO TIW (but d/t LIZETH vs CKD, will ask rheum & nephro about other coverage or continuation)  -- C/W Home Hydroxychloroquine 200mg PO QD  -- START Methylprednisolone 1g IV QD x3d (5/4-)  -- D/C Myfortic 720 ER PO BID (1h pre or 2h post meals)  -- D/C Home Prednisone 5mg PO QD  -- D/C MMF 1500mg PO BID  -- PLAN CYC 500mg Q2W [id number] (5/5-) & pre-medications per rheumatology    #Dyspnea on Exertion   Pt with reports of ANGUIANO, fatigue. B/l Leg swelling. Cardiac evaluation unremarkable. May be 2/2 anemia. B12 borderline low but EF normal (not wet beriberi). May benefit from further OP testing with pulm/cards.  - TTE (EF 62%, unremarkable echo), ECG  - Trop, BNP WNL

## 2023-05-04 NOTE — PROGRESS NOTE ADULT - PROBLEM SELECTOR PLAN 6
Hospital Bundle  Fluids: PO Ad Shana  Electrolytes: Gentle electrolyte repletion d/t LIZETH/CKD  Nutrition: Diet Renal (Halal), nutrition consulted  PPX  ---VTE: S[facility]umse [facility] > 24h from bx  ---GI: PO, bowel reg PRN  Access: PIV  Code Status: FULL CODE  Dispo: Home, no specialized needs; pending nephro clearance Hospital Bundle  Fluids: PO Ad Shana  Electrolytes: Gentle electrolyte repletion d/t LIZETH/CKD  Nutrition: Diet Renal (Halal), nutrition consulted  PPX  ---VTE: [facility]umse [facility] > 24h from bx  ---GI: PO, bowel reg PRN  Access: PIV  Code Status: FULL CODE  Dispo: Home, no specialized needs; pending nephro clearance Hospital Bundle  Fluids: PO Ad Shana  Electrolytes: Gentle electrolyte repletion d/t LIZETH/CKD  Nutrition: Diet Renal (Halal), nutrition consulted  PPX  ---VTE: [facility]  ---GI: PO, bowel reg PRN  Access: PIV  Code Status: FULL CODE  Dispo: Home, no specialized needs; pending nephro clearance

## 2023-05-04 NOTE — PROGRESS NOTE ADULT - SUBJECTIVE AND OBJECTIVE BOX
INCOMPLETE NOTE    OVERNIGHT EVENTS:    SUBJECTIVE / INTERVAL HPI: Patient seen and examined at bedside.     VITAL SIGNS:  Vital Signs Last 24 Hrs  T(C): 36.9 (04 May 2023 05:19), Max: 37 (03 May 2023 12:45)  T(F): 98.5 (04 May 2023 05:19), Max: 98.6 (03 May 2023 12:45)  HR: 70 (04 May 2023 05:19) (70 - 77)  BP: 108/62 (04 May 2023 05:19) (102/63 - 130/66)  BP(mean): --  RR: 16 (04 May 2023 05:19) (16 - 18)  SpO2: 100% (04 May 2023 05:19) (99% - 100%)    Parameters below as of 04 May 2023 05:19  Patient On (Oxygen Delivery Method): room air        PHYSICAL EXAM:    General: WDWN  HEENT: NC/AT; PERRL, anicteric sclera; MMM  Neck: supple  Cardiovascular: +S1/S2, RRR  Respiratory: CTA B/L; no W/R/R  Gastrointestinal: soft, NT/ND; +BSx4  Extremities: WWP; no edema, clubbing or cyanosis  Vascular: 2+ radial, DP/PT pulses B/L  Neurological: AAOx3; no focal deficits    MEDICATIONS:  MEDICATIONS  (STANDING):  cyanocobalamin Injectable 1000 MICROGram(s) IntraMuscular every 24 hours  ergocalciferol 15331 Unit(s) Oral <User Schedule>  folic acid 1 milliGRAM(s) Oral daily  heparin   Injectable 5000 Unit(s) SubCutaneous every 8 hours  hydroxychloroquine 200 milliGRAM(s) Oral daily  methylPREDNISolone sodium succinate IVPB 1000 milliGRAM(s) IV Intermittent every 24 hours  pantoprazole    Tablet 40 milliGRAM(s) Oral before breakfast  sodium bicarbonate 650 milliGRAM(s) Oral every 12 hours  torsemide 20 milliGRAM(s) Oral daily  trimethoprim   80 mG/sulfamethoxazole 400 mG 1 Tablet(s) Oral <User Schedule>    MEDICATIONS  (PRN):      ALLERGIES:  Allergies    No Known Allergies    Intolerances        LABS:                        8.0    3.79  )-----------( 263      ( 04 May 2023 07:05 )             26.2     05-04    137  |  109<H>  |  41<H>  ----------------------------<  98  4.6   |  17<L>  |  2.03<H>    Ca    8.3<L>      04 May 2023 07:05  Phos  5.2     05-04  Mg     2.00     05-04    TPro  4.6<L>  /  Alb  2.1<L>  /  TBili  <0.2  /  DBili  x   /  AST  12  /  ALT  9   /  AlkPhos  57  05-04        CAPILLARY BLOOD GLUCOSE      POCT Blood Glucose.: 85 mg/dL (02 May 2023 17:33)      RADIOLOGY & ADDITIONAL TESTS: Reviewed. INCOMPLETE NOTE    SUBJECTIVE / INTERVAL HPI: Patient seen and examined at bedside. [facility]  ID [id number]. Patient reports feeling well. No issues with 1st dose of pulse steroids. Denies fevers, chills, night sweats, abdominal pain, n/v/d/c, dyspepsia. Denies rash, joint pains, urinary changes. Still with foamy urine. No hematuria.     VITAL SIGNS:  Vital Signs Last 24 Hrs  T(C): 36.9 (04 May 2023 05:19), Max: 37 (03 May 2023 12:45)  T(F): 98.5 (04 May 2023 05:19), Max: 98.6 (03 May 2023 12:45)  HR: 70 (04 May 2023 05:19) (70 - 77)  BP: 108/62 (04 May 2023 05:19) (102/63 - 130/66)  BP(mean): --  RR: 16 (04 May 2023 05:19) (16 - 18)  SpO2: 100% (04 May 2023 05:19) (99% - 100%)    Parameters below as of 04 May 2023 05:19  Patient On (Oxygen Delivery Method): room air        PHYSICAL EXAM:    General: WDWN  HEENT: NC/AT; clear conjunctiva; MMM, no ulcers  Neck: supple  Cardiovascular: +S1/S2, RRR  Respiratory: CTA B/L; no W/R/R  Gastrointestinal: soft, NT/ND; +BSx4  Extremities: WWP; no edema, clubbing or cyanosis  MSK: No synovitis  Skin: no rash    MEDICATIONS:  MEDICATIONS  (STANDING):  cyanocobalamin Injectable 1000 MICROGram(s) IntraMuscular every 24 hours  ergocalciferol 36093 Unit(s) Oral <User Schedule>  folic acid 1 milliGRAM(s) Oral daily  heparin   Injectable 5000 Unit(s) SubCutaneous every 8 hours  hydroxychloroquine 200 milliGRAM(s) Oral daily  methylPREDNISolone sodium succinate IVPB 1000 milliGRAM(s) IV Intermittent every 24 hours  pantoprazole    Tablet 40 milliGRAM(s) Oral before breakfast  sodium bicarbonate 650 milliGRAM(s) Oral every 12 hours  torsemide 20 milliGRAM(s) Oral daily  trimethoprim   80 mG/sulfamethoxazole 400 mG 1 Tablet(s) Oral <User Schedule>    MEDICATIONS  (PRN):      ALLERGIES:  Allergies    No Known Allergies    Intolerances        LABS:                        8.0    3.79  )-----------( 263      ( 04 May 2023 07:05 )             26.2     05-04    137  |  109<H>  |  41<H>  ----------------------------<  98  4.6   |  17<L>  |  2.03<H>    Ca    8.3<L>      04 May 2023 07:05  Phos  5.2     05-04  Mg     2.00     05-04    TPro  4.6<L>  /  Alb  2.1<L>  /  TBili  <0.2  /  DBili  x   /  AST  12  /  ALT  9   /  AlkPhos  57  05-04        CAPILLARY BLOOD GLUCOSE      POCT Blood Glucose.: 85 mg/dL (02 May 2023 17:33)      RADIOLOGY & ADDITIONAL TESTS: Reviewed.  Surgical Pathology Report:   ACCESSION No:  [id number]  Patient:   LIZ HOUSER      Accession:                             [id number]    Collected Date/Time:                   5/2/2023 09:45 EDT  Received Date/Time:                    5/2/2023 10:57 EDT    SurgicalPathology Report - Auth (Verified)    Specimen(s) Submitted  Left kidney core biopsy x 2    Final Diagnosis  Kidney, needle core biopsy    Lupus nephritis, diffuse global proliferative and sclerosing form,  ISN/  RPS class IV-G (A/C) - see comment    - Cellular and fibrocellular crescents in 5 out of 8 non-globally  sclerosed glomeruli  - Diffuse global endocapillary hypercellularity  - NIH activity index score: 13/24    Summary of chronic changes:  - Global glomerulosclerosis (25% of glomeruli)  - Segmental glomerulosclerosis (25% of glomeruli)  - Tubular atrophy and interstitial fibrosis (30% of the sampled  cortex)  - Mild arterial and arteriolar sclerosis  - NIH chronicity index score: 7/12    Comment:  The preliminary findings were communicated via email to Dr. Helm on  5/3/2023 at 10:30AM. The biopsy reveals a rather proliferative form of  lupus nephritis, similar to the previous biopsy findings in October 2022.  The amount of chronic disease appears to be higher at this time when  compared to the previous biopsy. The activity and chronicity scores were  15/24 and 3/12, respectively, in October 2022 ([id number]).  Verified by: Allie Lowe MD    (Electronic Signature)  Reported on: 05/04/23 12:59 EDT, [facility], [address] [place] [address], [place] [address]  _________________________________________________________________    Microscopic Description  1. Light Microscopy:  Sections of formalin-fixed, paraffin embedded tissue were evaluated using  H&E, PAS,JMS, and trichrome stains. An H&E-stained frozen section taken  from the tissue allocated for immunofluorescence microscopy and semi-  thin toluidine blue-stained epoxy sections of the tissue processed for  electron microscopy were also evaluated using light microscopy.    The sample submitted for light microscopy consists of renal cortex  and medulla, with up to 10 glomeruli. The entire biopsy contains 16  glomeruli (LM-10; IF-4; EM-2), of which 4 are globally sclerosed and  4 glomeruli show segmental sclerosis. The glomeruli are enlarged and  hypercellular. Out of 8 non-globally sclerosed glomeruli in the light  microscopy sample, there are 3 glomeruli with active cellular crescents  and 2 glomeruli contain fibrocellular crescents. The glomerular capillary  loops are irregularly thickened. Global endocapillary hypercellularity  is seen in nearly all glomeruli. The mesangium is moderately expanded  by extracellular matrix and increased numbers of mesangial cells.  Tubules reveal focal degenerative changes. The interstitium contains  moderate inflammatory infiltrates composed of mononuclear and rare  polymorphonuclear cells. Approximately 30% of the renal cortex shows  tubular atrophy and interstitial fibrosis. Arteries show mild sclerosis.    Activity Index: 13/24  Endocapillary hypercellularity (0-3+) = 3  Neutrophil infiltration / karyorrhexis (0-3+) = 2  Subendothelial hyaline deposits (0-3+) = 0  Fibrinoid necrosis (0-3+)x2 = 0  Cellular crescents (0-3+)x2 = 6  Interstitial inflammation (0-3+) = 2    Chronicity Index: 7/12  Glomerulosclerosis (0-3+) = 2  Fibrous crescents (0-3+) = 1  Tubular atrophy (0-3+) = 2  Interstitial fibrosis (0-3+) = 2      2. Immunofluorescence Microscopy:  The sections of the sample submitted for immunofluorescence studies were  incubated with antibodies specific for the heavy chains of IgG, IgA, and  IgM, for kappa and lambda light chains, fibrin, albumin, and complement  components C3 and C1q. The intensity of immunofluorescence reactivityis  expressed on a scale 1  -4+.    The sample contains 4 glomeruli, of which 1 is globally sclerosed and  2 are segmentally sclerosed. There is finely granular reactivity for  IgG (3+), IgA (3+), IgM (trace), C3 (2+) and C1q (2+) both along the  capillaries and in the mesangium. Tubular basement membranes show focal  finely granular deposition of IgG (2+), C3 (1+) and C1q (1+). Tubules  contain few intraluminal casts reactive for polyclonal IgA. Arterioles  reveal focal reactivity for C3. There is no difference in reactivity  between kappa and lambda light chains in the glomeruli, tubular casts, or  in the background of the tissue.    3. Electron Microscopy:  The sample submitted for electron microscopy examination contains 2  glomeruli, of which 1 is globally sclerosed; a non-sclerosed glomerulus  is examined ultrastructurally. The glomerular visceral epithelial  cells reveal mild segmental effacement of their foot processes. The  glomerular basement membranes are of normal thickness and texture and  reveal the presence of rare subepithelial, small subendothelial, and  intramembranous electron dense deposits. The endothelial cells show focal  swelling. Tubuloreticular inclusions are not seen within the cytoplasm of  glomerular endothelial cells. The mesangium reveals increased cellularity  and an increased amount of matrix with frequent electron dense deposits  are present in the mesangium.    Clinical Information  A 48M with SLE and LN. Kidney biopsy performed on 2/17/22 which showed  focal proliferative type, class 3 LN. Pt. initiated on immunosuppressives  for SLE/LN by rheumatology team. Admitted for lupus flare and Scr on  admission (10/13/22) elevated at 1.66. Spot urine elevated/stable at  2.9 on 10/13/22. DsDNA titerwere significantly elevated at 834 and  complement C3 and C4 levels were low on labs done on 10/11/22. Pt  underwent kidney biopsy on 10/17/22. Pt. 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 Patient was started  immunosuppression per rheumatology but no improvement in renal function.    Creatinine 1.83 mg/dl; BUN 37 mg/dl      Gross Description  Received:  In formalin labeled  "left kidney core biopsy x2"  FFPE (formalin):  Two cores      Size: Each measuring 0.9 cm  Description:  Tan-brown, soft  IF (Travis 's): One core       Size: 0.4 cm  Description:  Tan-brown, soft  EM:  One core     Size:  0.2 cm  Description:  Tan-brown, soft  Submitted: FFPE  Entirely in one cassette: 1A    Isabela CRAIG 05/02/2023 11:35 AM    Disclaimer  In addition to other data that may appear on the specimen containers, all  labels have been inspected to confirm the presence of the patient's name  and date of birth.    Histological processing is performed at [facility],  Department of Pathology, [address] [place] [address].  [place] [address].  Immunofluorescence tests have been developed and their performance  characteristics determined by [facility], Department  of Pathology, [address] [place] [address]. The tests have  not been cleared or approved by the U.S. Food and Drug Administration;  the FDA has determined that such clearance or approval is not necessary.    These tests are used for clinical purposes.  Electron microscopy [place] [address]. (05.02.23 @ 09:45)     SUBJECTIVE / INTERVAL HPI: Patient seen and examined at bedside. [facility]  ID [id number]. Patient reports feeling well. No issues with 1st dose of pulse steroids. Denies fevers, chills, night sweats, abdominal pain, n/v/d/c, dyspepsia. Denies rash, joint pains, urinary changes. Still with foamy urine. No hematuria.     VITAL SIGNS:  Vital Signs Last 24 Hrs  T(C): 36.9 (04 May 2023 05:19), Max: 37 (03 May 2023 12:45)  T(F): 98.5 (04 May 2023 05:19), Max: 98.6 (03 May 2023 12:45)  HR: 70 (04 May 2023 05:19) (70 - 77)  BP: 108/62 (04 May 2023 05:19) (102/63 - 130/66)  BP(mean): --  RR: 16 (04 May 2023 05:19) (16 - 18)  SpO2: 100% (04 May 2023 05:19) (99% - 100%)    Parameters below as of 04 May 2023 05:19  Patient On (Oxygen Delivery Method): room air        PHYSICAL EXAM:    General: WDWN  HEENT: NC/AT; clear conjunctiva; MMM, no ulcers  Neck: supple  Cardiovascular: +S1/S2, RRR  Respiratory: CTA B/L; no W/R/R  Gastrointestinal: soft, NT/ND; +BSx4  Extremities: WWP; no edema, clubbing or cyanosis  MSK: No synovitis  Skin: no rash    MEDICATIONS:  MEDICATIONS  (STANDING):  cyanocobalamin Injectable 1000 MICROGram(s) IntraMuscular every 24 hours  ergocalciferol 19482 Unit(s) Oral <User Schedule>  folic acid 1 milliGRAM(s) Oral daily  heparin   Injectable 5000 Unit(s) SubCutaneous every 8 hours  hydroxychloroquine 200 milliGRAM(s) Oral daily  methylPREDNISolone sodium succinate IVPB 1000 milliGRAM(s) IV Intermittent every 24 hours  pantoprazole    Tablet 40 milliGRAM(s) Oral before breakfast  sodium bicarbonate 650 milliGRAM(s) Oral every 12 hours  torsemide 20 milliGRAM(s) Oral daily  trimethoprim   80 mG/sulfamethoxazole 400 mG 1 Tablet(s) Oral <User Schedule>    MEDICATIONS  (PRN):      ALLERGIES:  Allergies    No Known Allergies    Intolerances        LABS:                        8.0    3.79  )-----------( 263      ( 04 May 2023 07:05 )             26.2     05-04    137  |  109<H>  |  41<H>  ----------------------------<  98  4.6   |  17<L>  |  2.03<H>    Ca    8.3<L>      04 May 2023 07:05  Phos  5.2     05-04  Mg     2.00     05-04    TPro  4.6<L>  /  Alb  2.1<L>  /  TBili  <0.2  /  DBili  x   /  AST  12  /  ALT  9   /  AlkPhos  57  05-04        CAPILLARY BLOOD GLUCOSE      POCT Blood Glucose.: 85 mg/dL (02 May 2023 17:33)      RADIOLOGY & ADDITIONAL TESTS: Reviewed.  Surgical Pathology Report:   ACCESSION No:  [id number]  Patient:   LIZ HOUSER      Accession:                             [id number]    Collected Date/Time:                   5/2/2023 09:45 EDT  Received Date/Time:                    5/2/2023 10:57 EDT    SurgicalPathology Report - Auth (Verified)    Specimen(s) Submitted  Left kidney core biopsy x 2    Final Diagnosis  Kidney, needle core biopsy    Lupus nephritis, diffuse global proliferative and sclerosing form,  ISN/  RPS class IV-G (A/C) - see comment    - Cellular and fibrocellular crescents in 5 out of 8 non-globally  sclerosed glomeruli  - Diffuse global endocapillary hypercellularity  - NIH activity index score: 13/24    Summary of chronic changes:  - Global glomerulosclerosis (25% of glomeruli)  - Segmental glomerulosclerosis (25% of glomeruli)  - Tubular atrophy and interstitial fibrosis (30% of the sampled  cortex)  - Mild arterial and arteriolar sclerosis  - NIH chronicity index score: 7/12    Comment:  The preliminary findings were communicated via email to Dr. Helm on  5/3/2023 at 10:30AM. The biopsy reveals a rather proliferative form of  lupus nephritis, similar to the previous biopsy findings in October 2022.  The amount of chronic disease appears to be higher at this time when  compared to the previous biopsy. The activity and chronicity scores were  15/24 and 3/12, respectively, in October 2022 ([id number]).  Verified by: Allie Lowe MD    (Electronic Signature)  Reported on: 05/04/23 12:59 EDT, [facility], [address] [place] [address], [place] [address]  _________________________________________________________________    Microscopic Description  1. Light Microscopy:  Sections of formalin-fixed, paraffin embedded tissue were evaluated using  H&E, PAS,JMS, and trichrome stains. An H&E-stained frozen section taken  from the tissue allocated for immunofluorescence microscopy and semi-  thin toluidine blue-stained epoxy sections of the tissue processed for  electron microscopy were also evaluated using light microscopy.    The sample submitted for light microscopy consists of renal cortex  and medulla, with up to 10 glomeruli. The entire biopsy contains 16  glomeruli (LM-10; IF-4; EM-2), of which 4 are globally sclerosed and  4 glomeruli show segmental sclerosis. The glomeruli are enlarged and  hypercellular. Out of 8 non-globally sclerosed glomeruli in the light  microscopy sample, there are 3 glomeruli with active cellular crescents  and 2 glomeruli contain fibrocellular crescents. The glomerular capillary  loops are irregularly thickened. Global endocapillary hypercellularity  is seen in nearly all glomeruli. The mesangium is moderately expanded  by extracellular matrix and increased numbers of mesangial cells.  Tubules reveal focal degenerative changes. The interstitium contains  moderate inflammatory infiltrates composed of mononuclear and rare  polymorphonuclear cells. Approximately 30% of the renal cortex shows  tubular atrophy and interstitial fibrosis. Arteries show mild sclerosis.    Activity Index: 13/24  Endocapillary hypercellularity (0-3+) = 3  Neutrophil infiltration / karyorrhexis (0-3+) = 2  Subendothelial hyaline deposits (0-3+) = 0  Fibrinoid necrosis (0-3+)x2 = 0  Cellular crescents (0-3+)x2 = 6  Interstitial inflammation (0-3+) = 2    Chronicity Index: 7/12  Glomerulosclerosis (0-3+) = 2  Fibrous crescents (0-3+) = 1  Tubular atrophy (0-3+) = 2  Interstitial fibrosis (0-3+) = 2      2. Immunofluorescence Microscopy:  The sections of the sample submitted for immunofluorescence studies were  incubated with antibodies specific for the heavy chains of IgG, IgA, and  IgM, for kappa and lambda light chains, fibrin, albumin, and complement  components C3 and C1q. The intensity of immunofluorescence reactivityis  expressed on a scale 1  -4+.    The sample contains 4 glomeruli, of which 1 is globally sclerosed and  2 are segmentally sclerosed. There is finely granular reactivity for  IgG (3+), IgA (3+), IgM (trace), C3 (2+) and C1q (2+) both along the  capillaries and in the mesangium. Tubular basement membranes show focal  finely granular deposition of IgG (2+), C3 (1+) and C1q (1+). Tubules  contain few intraluminal casts reactive for polyclonal IgA. Arterioles  reveal focal reactivity for C3. There is no difference in reactivity  between kappa and lambda light chains in the glomeruli, tubular casts, or  in the background of the tissue.    3. Electron Microscopy:  The sample submitted for electron microscopy examination contains 2  glomeruli, of which 1 is globally sclerosed; a non-sclerosed glomerulus  is examined ultrastructurally. The glomerular visceral epithelial  cells reveal mild segmental effacement of their foot processes. The  glomerular basement membranes are of normal thickness and texture and  reveal the presence of rare subepithelial, small subendothelial, and  intramembranous electron dense deposits. The endothelial cells show focal  swelling. Tubuloreticular inclusions are not seen within the cytoplasm of  glomerular endothelial cells. The mesangium reveals increased cellularity  and an increased amount of matrix with frequent electron dense deposits  are present in the mesangium.    Clinical Information  A 48M with SLE and LN. Kidney biopsy performed on 2/17/22 which showed  focal proliferative type, class 3 LN. Pt. initiated on immunosuppressives  for SLE/LN by rheumatology team. Admitted for lupus flare and Scr on  admission (10/13/22) elevated at 1.66. Spot urine elevated/stable at  2.9 on 10/13/22. DsDNA titerwere significantly elevated at 834 and  complement C3 and C4 levels were low on labs done on 10/11/22. Pt  underwent kidney biopsy on 10/17/22. Pt. 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 Patient was started  immunosuppression per rheumatology but no improvement in renal function.    Creatinine 1.83 mg/dl; BUN 37 mg/dl      Gross Description  Received:  In formalin labeled  "left kidney core biopsy x2"  FFPE (formalin):  Two cores      Size: Each measuring 0.9 cm  Description:  Tan-brown, soft  IF (Travis 's): One core       Size: 0.4 cm  Description:  Tan-brown, soft  EM:  One core     Size:  0.2 cm  Description:  Tan-brown, soft  Submitted: FFPE  Entirely in one cassette: 1A    Isabela CRAIG 05/02/2023 11:35 AM    Disclaimer  In addition to other data that may appear on the specimen containers, all  labels have been inspected to confirm the presence of the patient's name  and date of birth.    Histological processing is performed at [facility],  Department of Pathology, [address] [place] [address].  [place] [address].  Immunofluorescence tests have been developed and their performance  characteristics determined by [facility], Department  of Pathology, [address] [place] [address]. The tests have  not been cleared or approved by the U.S. Food and Drug Administration;  the FDA has determined that such clearance or approval is not necessary.    These tests are used for clinical purposes.  Electron microscopy [place] [address]. (05.02.23 @ 09:45)

## 2023-05-04 NOTE — PROGRESS NOTE ADULT - PROBLEM SELECTOR PLAN 2
Pt. with metabolic acidosis in setting of kidney failure. SCO2 low at 17 today. Recommend PO sodium bicarbonate 650 mg BID. Monitor SCO2. Pt. with metabolic acidosis in setting of kidney failure. SCO2 low at 17 today. Pt. on sodium bicarbonate 650 mg BID. Monitor SCO2.

## 2023-05-04 NOTE — PROGRESS NOTE ADULT - PROBLEM SELECTOR PLAN 1
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 Pt was diagnosed with SLE and LN during hospital stay at [facility] in Feb 2022. Kidney biopsy performed on 2/17/22 which showed focal proliferative type, class 3 LN. Pt. initiated on immunosuppressives for SLE/LN by rheumatology team. Pt. with history of noncompliance to his medications/immunosuppressive therapy. Pt was readmitted 10/13/22 for worsening Scr and proteinuria. Pt underwent kidney biopsy on 10/17/22 during that hospitalization. 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 Pt. received Immunosuppressive therapy for SLE/class IV LN by rheumatology team. Pt. follows nephrologist Dr. Hayde Shultz for CKD care. Upon review of labs, Scr was 1.5 on 1/11/22. Scr increased to 1.79 on 4/7/22 on OP labs. Scr was elevated at 2.55 on admission (4/27/23). Pt. underwent kidney biopsy by IR team on 5/2/23. 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 Scr elevated at 2.03 today (5/4/22). Pt with resolving LE swelling on exam today. Continue PO Torsemide 20 mg once daily. Recommend Rheumatology follow-up note (5/4/23) reviewed. Plan for IV Solumedrol and Cytoxan as per Rheumatology noted. Low salt diet. Fluid restriction. Monitor labs and urine output. Avoid any potential nephrotoxins. Dose medications as per eGFR. 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 Pt was diagnosed with SLE and LN during hospital stay at [facility] in Feb 2022. Kidney biopsy performed on 2/17/22 which showed focal proliferative type, class 3 LN. Pt. initiated on immunosuppressives for SLE/LN by rheumatology team. Pt. with history of noncompliance to his medications/immunosuppressive therapy. Pt was readmitted 10/13/22 for worsening Scr and proteinuria. Pt underwent kidney biopsy on 10/17/22 during that hospitalization. 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 Pt. received Immunosuppressive therapy for SLE/class IV LN by rheumatology team. Pt. follows nephrologist Dr. Hayde Shultz for CKD care. Upon review of labs, Scr was 1.5 on 1/11/22. Scr increased to 1.79 on 4/7/22 on OP labs. Scr was elevated at 2.55 on admission (4/27/23). Pt. underwent kidney biopsy by IR team on 5/2/23. 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 Scr elevated at 2.03 today (5/4/22). Rheumatology follow-up note from 5/3/23 reviewed. Pt. to receive IV Solumedrol and Cytoxan (as per rheumatology note). Pt. with B/L LE swelling, on PO Torsemide 20 mg once daily. Low salt diet. Fluid restriction. Monitor labs and urine output. Avoid any potential nephrotoxins. Dose medications as per eGFR.

## 2023-05-04 NOTE — PROGRESS NOTE ADULT - ASSESSMENT
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 Patient with LIZETH and active urinary sediment. There does not appear to be any extra-renal disease at this time. Currently B-cell depleted with MMF transitioned to Myfortic 720mg BID (improvement in GI symptoms), HCQ, and prednisone 5mg. Labs this hospitalization with mild C3 depression 83 with negative C4 and dsDNA. CT A/P, US kidney, echo unremarkable. s/p kidney biopsy 5/2/2023  -biopsy 5/2/23 on this admission showed proliferative LN class IV-G with 5 out of 8 non-globally sclerosed glomeruli with cellular and fibrocellular crescents; moderate tubulointerstitial inflammation, ~30% IFTA. Activity index 13/24 and chronicity index 7/12. He had a previous biopsy in Oct 2022, activity index was 15/24 and chronicity was 3/12 IFTA 15-20%.    -f/u Mycophenolic acid level   -d/c Myfortic as patient has not responded to this medication  -c/w home HCQ  -patient is not currently a candidate for clinical trial  -start IV solumedrol 1g x3 (5/4-5/6) then reduce to 1mg/kg IV solu-medrol 60mg/day starting on 5/7   -plan for IV CYC 500mg q2 weeks for 6 doses. First dose anticipated to be administered on Friday 5/5/23 with remainder to be outpatient. Chemo nurse aware  -patient will require pre-medications and IV hydration for CYC: IV NS 250cc administered over 30 mins prior to infusion then 750cc total @ 75cc/hr once CYC starts; IV Mesna 100mg 15-30 mins prior to CYC infusion, IV mesna 100mg at 4 hours after infusion, and IV mesna 100mg at 8 hours after infusion; PO Acetaminophen 650mg x1 ~30 mins prior to infusion; PO Granisetron 2mg x1 ~30mins prior to infusion. Rheum team will coordinate orders  -c/w PCP ppx   -c/w PPI daily for GI ppx  -f/u hepatitis panel  -Quant TB negative outpatient 2/2023    Plan incomplete until DW Dr. 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 Patient with LIZETH and active urinary sediment. There does not appear to be any extra-renal disease at this time. Currently B-cell depleted with MMF transitioned to Myfortic 720mg BID (improvement in GI symptoms), HCQ, and prednisone 5mg. Labs this hospitalization with mild C3 depression 83 with negative C4 and dsDNA. CT A/P, US kidney, echo unremarkable. s/p kidney biopsy 5/2/2023  -biopsy 5/2/23 on this admission showed proliferative LN class IV-G with 5 out of 8 non-globally sclerosed glomeruli with cellular and fibrocellular crescents; moderate tubulointerstitial inflammation, ~30% IFTA. Activity index 13/24 and chronicity index 7/12. He had a previous biopsy in Oct 2022, activity index was 15/24 and chronicity was 3/12 IFTA 15-20%.    -f/u Mycophenolic acid level   -continue to hold Myfortic as patient has not responded to this medication  -increase HCQ to 300mg/day (alternating 200mg/400mg every other day)  -patient is not currently a candidate for clinical trial  -c/w IV solumedrol 1g x3 (5/4-5/6) then reduce to 1mg/kg IV solu-medrol 60mg/day starting on 5/7   -plan for IV CYC 500mg q2 weeks for 6 doses. First dose anticipated to be administered on Friday 5/5/23 with remainder to be outpatient. Chemo nurse aware  -patient will require pre-medications and IV hydration for CYC: IV NS 250cc administered over 30 mins prior to infusion then 750cc total @ 75cc/hr once CYC starts; IV Mesna 100mg 15-30 mins prior to CYC infusion, IV mesna 100mg at 4 hours after infusion, and IV mesna 100mg at 8 hours after infusion; PO Acetaminophen 650mg x1 ~30 mins prior to infusion; PO Granisetron 2mg x1 ~30mins prior to infusion. Rheum team will coordinate orders through pharmacy  -c/w PCP ppx   -c/w PPI daily for GI ppx  -f/u hepatitis panel  -Quant TB negative outpatient 2/2023    Plan DW Dr. Tani Felton DO  Rheumatology Fellow PGY5  Pager: [phone]

## 2023-05-05 LAB
ALBUMIN SERPL ELPH-MCNC: 2.6 G/DL — LOW (ref 3.3–5)
ALP SERPL-CCNC: 63 U/L — SIGNIFICANT CHANGE UP (ref 40–120)
ALT FLD-CCNC: 11 U/L — SIGNIFICANT CHANGE UP (ref 4–41)
ANION GAP SERPL CALC-SCNC: 14 MMOL/L — SIGNIFICANT CHANGE UP (ref 7–14)
APPEARANCE UR: CLEAR — SIGNIFICANT CHANGE UP
AST SERPL-CCNC: 18 U/L — SIGNIFICANT CHANGE UP (ref 4–40)
BACTERIA # UR AUTO: ABNORMAL
BASOPHILS # BLD AUTO: 0.01 K/UL — SIGNIFICANT CHANGE UP (ref 0–0.2)
BASOPHILS NFR BLD AUTO: 0.1 % — SIGNIFICANT CHANGE UP (ref 0–2)
BILIRUB SERPL-MCNC: <0.2 MG/DL — SIGNIFICANT CHANGE UP (ref 0.2–1.2)
BILIRUB UR-MCNC: NEGATIVE — SIGNIFICANT CHANGE UP
BLD GP AB SCN SERPL QL: NEGATIVE — SIGNIFICANT CHANGE UP
BUN SERPL-MCNC: 60 MG/DL — HIGH (ref 7–23)
CALCIUM SERPL-MCNC: 8.7 MG/DL — SIGNIFICANT CHANGE UP (ref 8.4–10.5)
CHLORIDE SERPL-SCNC: 105 MMOL/L — SIGNIFICANT CHANGE UP (ref 98–107)
CO2 SERPL-SCNC: 18 MMOL/L — LOW (ref 22–31)
COLOR SPEC: SIGNIFICANT CHANGE UP
CREAT SERPL-MCNC: 2.41 MG/DL — HIGH (ref 0.5–1.3)
DIFF PNL FLD: ABNORMAL
EGFR: 32 ML/MIN/1.73M2 — LOW
EOSINOPHIL # BLD AUTO: 0.01 K/UL — SIGNIFICANT CHANGE UP (ref 0–0.5)
EOSINOPHIL NFR BLD AUTO: 0.1 % — SIGNIFICANT CHANGE UP (ref 0–6)
EPI CELLS # UR: 1 /HPF — SIGNIFICANT CHANGE UP (ref 0–5)
GAS PNL BLDV: 134 MMOL/L — LOW (ref 136–145)
GLUCOSE SERPL-MCNC: 123 MG/DL — HIGH (ref 70–99)
GLUCOSE UR QL: NEGATIVE — SIGNIFICANT CHANGE UP
HCT VFR BLD CALC: 28 % — LOW (ref 39–50)
HGB BLD-MCNC: 8.9 G/DL — LOW (ref 13–17)
HOROWITZ INDEX BLDV+IHG-RTO: SIGNIFICANT CHANGE UP
HYALINE CASTS # UR AUTO: 11 /LPF — HIGH (ref 0–7)
IANC: 6.53 K/UL — SIGNIFICANT CHANGE UP (ref 1.8–7.4)
IMM GRANULOCYTES NFR BLD AUTO: 1.6 % — HIGH (ref 0–0.9)
KETONES UR-MCNC: NEGATIVE — SIGNIFICANT CHANGE UP
LEUKOCYTE ESTERASE UR-ACNC: NEGATIVE — SIGNIFICANT CHANGE UP
LYMPHOCYTES # BLD AUTO: 1.05 K/UL — SIGNIFICANT CHANGE UP (ref 1–3.3)
LYMPHOCYTES # BLD AUTO: 12.7 % — LOW (ref 13–44)
MAGNESIUM SERPL-MCNC: 1.8 MG/DL — SIGNIFICANT CHANGE UP (ref 1.6–2.6)
MCHC RBC-ENTMCNC: 26.7 PG — LOW (ref 27–34)
MCHC RBC-ENTMCNC: 31.8 GM/DL — LOW (ref 32–36)
MCV RBC AUTO: 84.1 FL — SIGNIFICANT CHANGE UP (ref 80–100)
MONOCYTES # BLD AUTO: 0.54 K/UL — SIGNIFICANT CHANGE UP (ref 0–0.9)
MONOCYTES NFR BLD AUTO: 6.5 % — SIGNIFICANT CHANGE UP (ref 2–14)
NEUTROPHILS # BLD AUTO: 6.53 K/UL — SIGNIFICANT CHANGE UP (ref 1.8–7.4)
NEUTROPHILS NFR BLD AUTO: 79 % — HIGH (ref 43–77)
NITRITE UR-MCNC: NEGATIVE — SIGNIFICANT CHANGE UP
NRBC # BLD: 0 /100 WBCS — SIGNIFICANT CHANGE UP (ref 0–0)
NRBC # FLD: 0 K/UL — SIGNIFICANT CHANGE UP (ref 0–0)
PH UR: 6 — SIGNIFICANT CHANGE UP (ref 5–8)
PHOSPHATE SERPL-MCNC: 6.6 MG/DL — HIGH (ref 2.5–4.5)
PLATELET # BLD AUTO: 318 K/UL — SIGNIFICANT CHANGE UP (ref 150–400)
POTASSIUM BLDV-SCNC: 4.8 MMOL/L — SIGNIFICANT CHANGE UP (ref 3.5–5.1)
POTASSIUM SERPL-MCNC: 4.6 MMOL/L — SIGNIFICANT CHANGE UP (ref 3.5–5.3)
POTASSIUM SERPL-SCNC: 4.6 MMOL/L — SIGNIFICANT CHANGE UP (ref 3.5–5.3)
PROT SERPL-MCNC: 5.1 G/DL — LOW (ref 6–8.3)
PROT UR-MCNC: ABNORMAL
RBC # BLD: 3.33 M/UL — LOW (ref 4.2–5.8)
RBC # FLD: 13.4 % — SIGNIFICANT CHANGE UP (ref 10.3–14.5)
RBC CASTS # UR COMP ASSIST: 10 /HPF — HIGH (ref 0–4)
RH IG SCN BLD-IMP: POSITIVE — SIGNIFICANT CHANGE UP
SODIUM SERPL-SCNC: 137 MMOL/L — SIGNIFICANT CHANGE UP (ref 135–145)
SP GR SPEC: 1.01 — SIGNIFICANT CHANGE UP (ref 1.01–1.05)
UROBILINOGEN FLD QL: SIGNIFICANT CHANGE UP
WBC # BLD: 8.27 K/UL — SIGNIFICANT CHANGE UP (ref 3.8–10.5)
WBC # FLD AUTO: 8.27 K/UL — SIGNIFICANT CHANGE UP (ref 3.8–10.5)
WBC UR QL: 4 /HPF — SIGNIFICANT CHANGE UP (ref 0–5)

## 2023-05-05 PROCEDURE — 99233 SBSQ HOSP IP/OBS HIGH 50: CPT | Mod: GC

## 2023-05-05 PROCEDURE — 99232 SBSQ HOSP IP/OBS MODERATE 35: CPT | Mod: GC

## 2023-05-05 RX ORDER — MESNA 100 MG/ML
100 INJECTION, SOLUTION INTRAVENOUS THREE TIMES A DAY
Refills: 0 | Status: COMPLETED | OUTPATIENT
Start: 2023-05-05 | End: 2023-05-05

## 2023-05-05 RX ORDER — CYCLOPHOSPHAMIDE 100 MG
500 VIAL (EA) INTRAVENOUS ONCE
Refills: 0 | Status: COMPLETED | OUTPATIENT
Start: 2023-05-05 | End: 2023-05-05

## 2023-05-05 RX ORDER — SODIUM CHLORIDE 9 MG/ML
750 INJECTION INTRAMUSCULAR; INTRAVENOUS; SUBCUTANEOUS ONCE
Refills: 0 | Status: COMPLETED | OUTPATIENT
Start: 2023-05-05 | End: 2023-05-05

## 2023-05-05 RX ORDER — SODIUM CHLORIDE 9 MG/ML
250 INJECTION INTRAMUSCULAR; INTRAVENOUS; SUBCUTANEOUS ONCE
Refills: 0 | Status: COMPLETED | OUTPATIENT
Start: 2023-05-05 | End: 2023-05-05

## 2023-05-05 RX ORDER — GRANISETRON HYDROCHLORIDE 1 MG/1
2 TABLET, FILM COATED ORAL ONCE
Refills: 0 | Status: COMPLETED | OUTPATIENT
Start: 2023-05-05 | End: 2023-05-05

## 2023-05-05 RX ORDER — ACETAMINOPHEN 500 MG
650 TABLET ORAL ONCE
Refills: 0 | Status: COMPLETED | OUTPATIENT
Start: 2023-05-05 | End: 2023-05-05

## 2023-05-05 RX ADMIN — HEPARIN SODIUM 5000 UNIT(S): 5000 INJECTION INTRAVENOUS; SUBCUTANEOUS at 05:26

## 2023-05-05 RX ADMIN — Medication 20 MILLIGRAM(S): at 05:26

## 2023-05-05 RX ADMIN — SODIUM CHLORIDE 500 MILLILITER(S): 9 INJECTION INTRAMUSCULAR; INTRAVENOUS; SUBCUTANEOUS at 12:45

## 2023-05-05 RX ADMIN — Medication 500 MILLIGRAM(S): at 14:10

## 2023-05-05 RX ADMIN — MESNA 100 MILLIGRAM(S): 100 INJECTION, SOLUTION INTRAVENOUS at 12:55

## 2023-05-05 RX ADMIN — PANTOPRAZOLE SODIUM 40 MILLIGRAM(S): 20 TABLET, DELAYED RELEASE ORAL at 05:26

## 2023-05-05 RX ADMIN — Medication 650 MILLIGRAM(S): at 12:43

## 2023-05-05 RX ADMIN — Medication 100 MILLIGRAM(S): at 05:26

## 2023-05-05 RX ADMIN — MESNA 100 MILLIGRAM(S): 100 INJECTION, SOLUTION INTRAVENOUS at 19:22

## 2023-05-05 RX ADMIN — Medication 1 TABLET(S): at 08:44

## 2023-05-05 RX ADMIN — GRANISETRON HYDROCHLORIDE 2 MILLIGRAM(S): 1 TABLET, FILM COATED ORAL at 12:44

## 2023-05-05 RX ADMIN — Medication 200 MILLIGRAM(S): at 12:43

## 2023-05-05 RX ADMIN — HEPARIN SODIUM 5000 UNIT(S): 5000 INJECTION INTRAVENOUS; SUBCUTANEOUS at 22:17

## 2023-05-05 RX ADMIN — Medication 650 MILLIGRAM(S): at 13:39

## 2023-05-05 RX ADMIN — SODIUM CHLORIDE 75 MILLILITER(S): 9 INJECTION INTRAMUSCULAR; INTRAVENOUS; SUBCUTANEOUS at 13:35

## 2023-05-05 RX ADMIN — Medication 650 MILLIGRAM(S): at 17:10

## 2023-05-05 RX ADMIN — Medication 1 MILLIGRAM(S): at 12:43

## 2023-05-05 RX ADMIN — MESNA 100 MILLIGRAM(S): 100 INJECTION, SOLUTION INTRAVENOUS at 23:31

## 2023-05-05 RX ADMIN — HEPARIN SODIUM 5000 UNIT(S): 5000 INJECTION INTRAVENOUS; SUBCUTANEOUS at 14:12

## 2023-05-05 RX ADMIN — Medication 650 MILLIGRAM(S): at 05:26

## 2023-05-05 RX ADMIN — PREGABALIN 1000 MICROGRAM(S): 225 CAPSULE ORAL at 12:43

## 2023-05-05 NOTE — PROGRESS NOTE ADULT - PROBLEM SELECTOR PLAN 6
Hospital Bundle  Fluids: PO Ad Shana  Electrolytes: Gentle electrolyte repletion d/t LIZETH/CKD  Nutrition: Diet Renal (Halal), nutrition consulted  PPX  ---VTE: SQH  ---GI: PO, bowel reg PRN  Access: PIV  Code Status: FULL CODE  Dispo: Home, no specialized needs; pending nephro clearance

## 2023-05-05 NOTE — PROGRESS NOTE ADULT - PROBLEM SELECTOR PLAN 2
Pt. with metabolic acidosis in setting of kidney failure. SCO2 low at 18 today. Pt. on sodium bicarbonate 650 mg BID. Monitor SCO2.

## 2023-05-05 NOTE — PROGRESS NOTE ADULT - SUBJECTIVE AND OBJECTIVE BOX
[facility] DIVISION OF KIDNEY DISEASES AND HYPERTENSION   FOLLOW UP NOTE    --------------------------------------------------------------------------------  Chief Complaint: LIZETH on CKD, Lupus nephritis    24 hour events/subjective: Pt. seen and examined today. Pt. reports feeling well. No fever, SOB, CP or dysuria. Pt. follows with nephrologist Dr. Hayde Shultz as outpatient for CKD care. 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 Pt underwent kidney biopsy by IR team on 5/2/23. Pt. with kidney biopsy findings of crescentic GN/LN. Pt initiated on IV solumedrol 1 gm on 5/4/23 as per Rheumatology. Scr increased to 2.41 today. Pt. currently nonoliguric.     PAST HISTORY  --------------------------------------------------------------------------------  No significant changes to PMH, PSH, FHx, SHx, unless otherwise noted    ALLERGIES & MEDICATIONS  --------------------------------------------------------------------------------  Allergies    No Known Allergies    Intolerances    Standing Inpatient Medications  acetaminophen     Tablet .. 650 milliGRAM(s) Oral once  cyanocobalamin Injectable 1000 MICROGram(s) IntraMuscular every 24 hours  cyclophosphamide IVPB (eMAR) 500 milliGRAM(s) IV Intermittent once  ergocalciferol 75762 Unit(s) Oral <User Schedule>  folic acid 1 milliGRAM(s) Oral daily  granisetron 2 milliGRAM(s) Oral once  heparin   Injectable 5000 Unit(s) SubCutaneous every 8 hours  hydroxychloroquine 200 milliGRAM(s) Oral <User Schedule>  hydroxychloroquine 400 milliGRAM(s) Oral <User Schedule>  mesna IVPB (eMAR) 100 milliGRAM(s) IV Intermittent three times a day  methylPREDNISolone sodium succinate IVPB 1000 milliGRAM(s) IV Intermittent every 24 hours  pantoprazole    Tablet 40 milliGRAM(s) Oral before breakfast  sodium bicarbonate 650 milliGRAM(s) Oral every 12 hours  sodium chloride 0.9% Bolus 250 milliLiter(s) IV Bolus once  sodium chloride 0.9% Bolus 750 milliLiter(s) IV Bolus once  trimethoprim   80 mG/sulfamethoxazole 400 mG 1 Tablet(s) Oral <User Schedule>    PRN Inpatient Medications    REVIEW OF SYSTEMS  --------------------------------------------------------------------------------  Gen: No fevers/chills  Head/Eyes/Ears: No HA   Respiratory: No dyspnea, cough  CV: No chest pain  GI: No abdominal pain, diarrhea  : No dysuria, hematuria  MSK: LE edema  Skin: No rashes  Heme: Anemia    VITALS/PHYSICAL EXAM  --------------------------------------------------------------------------------  T(C): 36.6 (05-05-23 @ 05:08), Max: 36.8 (05-04-23 @ 12:36)  HR: 63 (05-05-23 @ 05:08) (63 - 83)  BP: 102/65 (05-05-23 @ 05:08) (102/65 - 113/79)  RR: 18 (05-05-23 @ 05:08) (18 - 19)  SpO2: 100% (05-05-23 @ 05:08) (100% - 100%)  Wt(kg): --    05-04-23 @ 07:01  -  05-05-23 @ 07:00  --------------------------------------------------------  IN: 565 mL / OUT: 250 mL / NET: 315 mL    05-05-23 @ 07:01  -  05-05-23 @ 10:53  --------------------------------------------------------  IN: 0 mL / OUT: 250 mL / NET: -250 mL    Physical Exam:  	Gen: sitting comfortably in bed, NAD  	HEENT: Anicteric  	Pulm: CTA B/L  	CV: S1S2+  	Abd: Soft, +BS       	Ext: Decreased B/L LE pitting edema+  	Neuro: Awake, alert     	Skin: Warm and dry    LABS/STUDIES  --------------------------------------------------------------------------------              8.9    8.27  >-----------<  318      [05-05-23 @ 06:40]              28.0     137  |  105  |  60  ----------------------------<  123      [05-05-23 @ 06:40]  4.6   |  18  |  2.41        Ca     8.7     [05-05-23 @ 06:40]      Mg     1.80     [05-05-23 @ 06:40]      Phos  6.6     [05-05-23 @ 06:40]    Creatinine Trend:  SCr 2.41 [05-05 @ 06:40]  SCr 2.03 [05-04 @ 07:05]  SCr 2.00 [05-03 @ 06:33]  SCr 1.83 [05-02 @ 04:40]  SCr 1.60 [05-01 @ 06:17]    Urinalysis - [04-28-23 @ 01:24]      Color Light Yellow / Appearance Clear / SG 1.010 / pH 6.0      Gluc Negative / Ketone Negative  / Bili Negative / Urobili <2 mg/dL       Blood Large / Protein 300 mg/dL / Leuk Est Negative / Nitrite Negative      RBC 77 / WBC 8 / Hyaline 2 / Gran  / Sq Epi  / Non Sq Epi  / Bacteria Negative    HBsAb Nonreact      [05-04-23 @ 07:05]  HBsAg Nonreact      [05-04-23 @ 07:05]  HBcAb Nonreact      [05-04-23 @ 07:05]  HCV 0.06, Nonreact      [05-04-23 @ 07:05]    dsDNA 14      [04-28-23 @ 09:08]  C3 Complement 83      [04-28-23 @ 09:08]  C4 Complement 26      [04-28-23 @ 09:08] [facility] DIVISION OF KIDNEY DISEASES AND HYPERTENSION   FOLLOW UP NOTE    --------------------------------------------------------------------------------  Chief Complaint: LIZETH on CKD, Lupus nephritis    24 hour events/subjective: Pt. seen and examined today. Pt. reports feeling well. No fever, SOB, CP or dysuria. Pt. follows with nephrologist Dr. Hayde Shultz as outpatient for CKD care. 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 Pt underwent kidney biopsy by IR team on 5/2/23. Pt. with kidney biopsy findings of crescentic GN/LN. Pt. initiated on IV solumedrol 1 g on 5/4/23 as per rheumatology team. Scr increased to 2.41 today. Pt. currently nonoliguric.     PAST HISTORY  --------------------------------------------------------------------------------  No significant changes to PMH, PSH, FHx, SHx, unless otherwise noted    ALLERGIES & MEDICATIONS  --------------------------------------------------------------------------------  Allergies    No Known Allergies    Intolerances    Standing Inpatient Medications  acetaminophen     Tablet .. 650 milliGRAM(s) Oral once  cyanocobalamin Injectable 1000 MICROGram(s) IntraMuscular every 24 hours  cyclophosphamide IVPB (eMAR) 500 milliGRAM(s) IV Intermittent once  ergocalciferol 36231 Unit(s) Oral <User Schedule>  folic acid 1 milliGRAM(s) Oral daily  granisetron 2 milliGRAM(s) Oral once  heparin   Injectable 5000 Unit(s) SubCutaneous every 8 hours  hydroxychloroquine 200 milliGRAM(s) Oral <User Schedule>  hydroxychloroquine 400 milliGRAM(s) Oral <User Schedule>  mesna IVPB (eMAR) 100 milliGRAM(s) IV Intermittent three times a day  methylPREDNISolone sodium succinate IVPB 1000 milliGRAM(s) IV Intermittent every 24 hours  pantoprazole    Tablet 40 milliGRAM(s) Oral before breakfast  sodium bicarbonate 650 milliGRAM(s) Oral every 12 hours  sodium chloride 0.9% Bolus 250 milliLiter(s) IV Bolus once  sodium chloride 0.9% Bolus 750 milliLiter(s) IV Bolus once  trimethoprim   80 mG/sulfamethoxazole 400 mG 1 Tablet(s) Oral <User Schedule>    PRN Inpatient Medications    REVIEW OF SYSTEMS  --------------------------------------------------------------------------------  Gen: No fevers/chills  Head/Eyes/Ears: No HA   Respiratory: No dyspnea, cough  CV: No chest pain  GI: No abdominal pain, diarrhea  : No dysuria, hematuria  MSK: LE edema  Skin: No rashes  Heme: Anemia    VITALS/PHYSICAL EXAM  --------------------------------------------------------------------------------  T(C): 36.6 (05-05-23 @ 05:08), Max: 36.8 (05-04-23 @ 12:36)  HR: 63 (05-05-23 @ 05:08) (63 - 83)  BP: 102/65 (05-05-23 @ 05:08) (102/65 - 113/79)  RR: 18 (05-05-23 @ 05:08) (18 - 19)  SpO2: 100% (05-05-23 @ 05:08) (100% - 100%)  Wt(kg): --    05-04-23 @ 07:01  -  05-05-23 @ 07:00  --------------------------------------------------------  IN: 565 mL / OUT: 250 mL / NET: 315 mL    05-05-23 @ 07:01  -  05-05-23 @ 10:53  --------------------------------------------------------  IN: 0 mL / OUT: 250 mL / NET: -250 mL    Physical Exam:  	Gen: sitting comfortably in bed, NAD  	HEENT: Anicteric  	Pulm: CTA B/L  	CV: S1S2+  	Abd: Soft, +BS       	Ext: Decreased B/L LE pitting edema+  	Neuro: Awake, alert     	Skin: Warm and dry    LABS/STUDIES  --------------------------------------------------------------------------------              8.9    8.27  >-----------<  318      [05-05-23 @ 06:40]              28.0     137  |  105  |  60  ----------------------------<  123      [05-05-23 @ 06:40]  4.6   |  18  |  2.41        Ca     8.7     [05-05-23 @ 06:40]      Mg     1.80     [05-05-23 @ 06:40]      Phos  6.6     [05-05-23 @ 06:40]    Creatinine Trend:  SCr 2.41 [05-05 @ 06:40]  SCr 2.03 [05-04 @ 07:05]  SCr 2.00 [05-03 @ 06:33]  SCr 1.83 [05-02 @ 04:40]  SCr 1.60 [05-01 @ 06:17]    Urinalysis - [04-28-23 @ 01:24]      Color Light Yellow / Appearance Clear / SG 1.010 / pH 6.0      Gluc Negative / Ketone Negative  / Bili Negative / Urobili <2 mg/dL       Blood Large / Protein 300 mg/dL / Leuk Est Negative / Nitrite Negative      RBC 77 / WBC 8 / Hyaline 2 / Gran  / Sq Epi  / Non Sq Epi  / Bacteria Negative    HBsAb Nonreact      [05-04-23 @ 07:05]  HBsAg Nonreact      [05-04-23 @ 07:05]  HBcAb Nonreact      [05-04-23 @ 07:05]  HCV 0.06, Nonreact      [05-04-23 @ 07:05]    dsDNA 14      [04-28-23 @ 09:08]  C3 Complement 83      [04-28-23 @ 09:08]  C4 Complement 26      [04-28-23 @ 09:08]

## 2023-05-05 NOTE — PROGRESS NOTE ADULT - PROBLEM SELECTOR PLAN 1
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 Pt was diagnosed with SLE and LN during hospital stay at [facility] in Feb 2022. Kidney biopsy performed on 2/17/22 which showed focal proliferative type, class 3 LN. Pt. initiated on immunosuppressives for SLE/LN by rheumatology team. Pt. with history of noncompliance to his medications/immunosuppressive therapy. Pt was readmitted 10/13/22 for worsening Scr and proteinuria. Pt underwent kidney biopsy on 10/17/22 during that hospitalization. 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 Pt. received Immunosuppressive therapy for SLE/class IV LN by rheumatology team. Pt. follows nephrologist Dr. Hayde Shultz for CKD care. Upon review of labs, Scr was 1.5 on 1/11/22. Scr increased to 1.79 on 4/7/22 on OP labs. Scr was elevated at 2.55 on admission (4/27/23). Pt. underwent kidney biopsy by IR team on 5/2/23. 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 Rheumatology follow-up note from 5/3/23 reviewed.  Pt initiated on IV solumedrol 1 gm on 5/4/23 as per Rheumatology. Pt. to receive IV Cytoxan today (5/5/23). Pt. with decreased B/L LE swelling, on PO Torsemide 20 mg once daily. SCr increased to 2.4 today. Recommend discontinue PO Torsemide. Low salt diet. Fluid restriction. Monitor labs and urine output. Avoid any potential nephrotoxins. Dose medications as per eGFR. 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 Pt was diagnosed with SLE and LN during hospital stay at [facility] in Feb 2022. Kidney biopsy performed on 2/17/22 which showed focal proliferative type, class 3 LN. Pt. initiated on immunosuppressives for SLE/LN by rheumatology team. Pt. with history of noncompliance to his medications/immunosuppressive therapy. Pt was readmitted 10/13/22 for worsening Scr and proteinuria. Pt underwent kidney biopsy on 10/17/22 during that hospitalization. 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 Pt. received Immunosuppressive therapy for SLE/class IV LN by rheumatology team. Pt. follows nephrologist Dr. Hayde Shultz for CKD care. Upon review of labs, Scr was 1.5 on 1/11/22. Scr increased to 1.79 on 4/7/22 on OP labs. Scr was elevated at 2.55 on admission (4/27/23). Pt. underwent kidney biopsy by IR team on 5/2/23. 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 Rheumatology follow-up note from 5/3/23 reviewed. Pt. initiated on IV solumedrol 1 g on 5/4/23. Pt. to receive IV Cytoxan today (5/5/23) as per rheumatology team. Pt. with decreased B/L LE swelling, on PO Torsemide 20 mg once daily. Scr increased to 2.4 today. Plan is to discontinue PO Torsemide as per discussion with primary team. Low salt diet. Fluid restriction. Monitor labs and urine output. Avoid any potential nephrotoxins. Dose medications as per eGFR.

## 2023-05-05 NOTE — PROGRESS NOTE ADULT - ASSESSMENT
48M with h/o SLE c/b LN3/4 who presents with persistent nausea/vomiting after medication administration at home and found to have LIZETH vs progression of LN/CKD who is otherwise hemodynamically stable awaiting nephrology and rheumatology evaluation.     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 Admitted with Cr 2.5 with borderline hyperkalemia; no ECG changes. Pt denies decreased UOP. Urine studies suggesting pre-renal etiology (FeNa 0.7%), but pt takes diuretics, so unclear reliability (though would expect MORE Na wasting with diuresis, not less). UPCR with nephrotic-range proteinuria as well, which is consistent with known LN. Pending renal biopsy to assess for possible POD/transformation, planned for Tuesday 5/2. Creatinine improving, HyperK improved.     Nephrologist: Dr. Hayde Garland, Nephro C/S, appreciate recs (following)  Rheumatologist: Dr. Nilton Le, Rheum C/S, appreciate recs (following)  IR C/S: Renal Bx 5/2 (see nephro note)    ::Normocytic Anemia: previously with AoCD but baseline higher; unclear if ANGUIANO is symptomatic anemia  -- T&S (5/4-)  -- Iron Studies (iron replete), B12 (304), Folate (5.7)  -- Cyanocobalamin 1kU IM QDx 7d, then PO after; Folate 1mg PO QD    ::Electrolytes: Pt with borderline HyperK in ER. Given Lokelma 5x1 on admission. Stably WNL now. No ECG changes.  -- Still awaiting Nephro/Rheum recs re: bactrim in this context    ::BMD:   -- D/C Home Vit D3 1kU QD --> Vit D 50kU Q1W x 8w  -- Ca x Phos: now > 55, would start phos binder, but pending nephro recs given their follow-up would be important  ----- F/U PTH (WNL), Vit D 25 & 1,25 (both low)    ::Volume/BP: SBP at goal, but b/l LE edema suggestive of volume overload (vs decreased oncotic pressure ico nephrotic syndrome).   -- HOLDing Home Lisinpril 10mg PO QD (ico LIZETH)  -- C/W Torsemide 20mg PO QD   -- D/C Home Furosemide 20mg PO QD (ico LIZETH)    ::HD Access: no current strong indication for HD, no access    ::Rheum/SLE  -- OP Quant Gold Negative 02/2023  -- F/U Hepatitis Panel  -- F/U mycophenolic acid level  -- C/W Home TMP/-80 PO TIW (but d/t LIZETH vs CKD, will ask rheum & nephro about other coverage or continuation)  -- C/W Home Hydroxychloroquine 200mg PO QD  -- START Methylprednisolone 1g IV QD x3d (5/4-)  -- D/C Myfortic 720 ER PO BID (1h pre or 2h post meals)  -- D/C Home Prednisone 5mg PO QD  -- D/C MMF 1500mg PO BID  -- PLAN CYC 500mg Q2W f4ifgnh (5/5-) & pre-medications per rheumatology    #Dyspnea on Exertion   Pt with reports of ANGUIANO, fatigue. B/l Leg swelling. Cardiac evaluation unremarkable. May be 2/2 anemia. B12 borderline low but EF normal (not wet beriberi). May benefit from further OP testing with pulm/cards.  - TTE (EF 62%, unremarkable echo), ECG  - Trop, BNP WNL

## 2023-05-05 NOTE — PROGRESS NOTE ADULT - SUBJECTIVE AND OBJECTIVE BOX
Faiza Quach, PGY1  Internal Medicine    Patient is a 48y old  Male who presents with a chief complaint of Nausea & Vomiting (04 May 2023 12:12)      SUBJECTIVE/INTERVAL EVENTS: Stat VBG ordered. Patient denies fevers, chills, night sweats, abdominal pain, n/v/d/c, dyspepsia. Denies rash, joint pains, urinary changes. Has some hematuria and foamy urine.     MEDICATIONS  (STANDING):  cyanocobalamin Injectable 1000 MICROGram(s) IntraMuscular every 24 hours  ergocalciferol 85127 Unit(s) Oral <User Schedule>  folic acid 1 milliGRAM(s) Oral daily  heparin   Injectable 5000 Unit(s) SubCutaneous every 8 hours  hydroxychloroquine 400 milliGRAM(s) Oral <User Schedule>  hydroxychloroquine 200 milliGRAM(s) Oral <User Schedule>  methylPREDNISolone sodium succinate IVPB 1000 milliGRAM(s) IV Intermittent every 24 hours  pantoprazole    Tablet 40 milliGRAM(s) Oral before breakfast  sodium bicarbonate 650 milliGRAM(s) Oral every 12 hours  torsemide 20 milliGRAM(s) Oral daily  trimethoprim   80 mG/sulfamethoxazole 400 mG 1 Tablet(s) Oral <User Schedule>    MEDICATIONS  (PRN):      VITAL SIGNS:  T(F): 97.8 (05-05-23 @ 05:08), Max: 98.2 (05-04-23 @ 12:36)  HR: 63 (05-05-23 @ 05:08) (63 - 83)  BP: 102/65 (05-05-23 @ 05:08) (102/65 - 113/79)  RR: 18 (05-05-23 @ 05:08) (18 - 19)  SpO2: 100% (05-05-23 @ 05:08) (100% - 100%)    I&O's Summary    04 May 2023 07:01  -  05 May 2023 07:00  --------------------------------------------------------  IN: 565 mL / OUT: 250 mL / NET: 315 mL      Daily     Daily     PHYSICAL EXAM:  Gen: Alert, NAD  HEENT: NCAT, conjunctiva clear, sclera anicteric, no erythema or exudates in the oropharynx, mmm  Neck: Supple, no JVD  CV: RRR, S1S2, no m/r/g  Resp: CTAB, normal respiratory effort  Abd: Soft, nontender, nondistended, normal bowel sounds  Ext: no edema, no clubbing or cyanosis  Neuro: AOx3, CN2-12 grossly intact, DE DIOS  SKIN: warm, perfused    LABS:                        8.9    8.27  )-----------( 318      ( 05 May 2023 06:40 )             28.0     Hgb Trend: 8.9<--, 8.0<--, 7.7<--, 7.5<--, 8.1<--  05-04    137  |  109<H>  |  41<H>  ----------------------------<  98  4.6   |  17<L>  |  2.03<H>    Ca    8.3<L>      04 May 2023 07:05  Phos  5.2     05-04  Mg     2.00     05-04    TPro  4.6<L>  /  Alb  2.1<L>  /  TBili  <0.2  /  DBili  x   /  AST  12  /  ALT  9   /  AlkPhos  57  05-04    Creatinine Trend: 2.03<--, 2.00<--, 1.83<--, 1.60<--, 1.62<--, 1.67<--  LIVER FUNCTIONS - ( 04 May 2023 07:05 )  Alb: 2.1 g/dL / Pro: 4.6 g/dL / ALK PHOS: 57 U/L / ALT: 9 U/L / AST: 12 U/L / GGT: x                     CAPILLARY BLOOD GLUCOSE          RADIOLOGY & ADDITIONAL TESTS: Reviewed    Imaging Personally Reviewed:    Consultant(s) Notes Reviewed:      Care Discussed with Consultants/Other Providers:

## 2023-05-06 LAB
ALBUMIN SERPL ELPH-MCNC: 2.4 G/DL — LOW (ref 3.3–5)
ALP SERPL-CCNC: 50 U/L — SIGNIFICANT CHANGE UP (ref 40–120)
ALT FLD-CCNC: 8 U/L — SIGNIFICANT CHANGE UP (ref 4–41)
ANION GAP SERPL CALC-SCNC: 12 MMOL/L — SIGNIFICANT CHANGE UP (ref 7–14)
AST SERPL-CCNC: 11 U/L — SIGNIFICANT CHANGE UP (ref 4–40)
BASE EXCESS BLDV CALC-SCNC: -7 MMOL/L — LOW (ref -2–3)
BASOPHILS # BLD AUTO: 0.01 K/UL — SIGNIFICANT CHANGE UP (ref 0–0.2)
BASOPHILS NFR BLD AUTO: 0.1 % — SIGNIFICANT CHANGE UP (ref 0–2)
BILIRUB SERPL-MCNC: <0.2 MG/DL — SIGNIFICANT CHANGE UP (ref 0.2–1.2)
BUN SERPL-MCNC: 63 MG/DL — HIGH (ref 7–23)
CA-I SERPL-SCNC: 1.32 MMOL/L — SIGNIFICANT CHANGE UP (ref 1.15–1.33)
CALCIUM SERPL-MCNC: 8.5 MG/DL — SIGNIFICANT CHANGE UP (ref 8.4–10.5)
CHLORIDE BLDV-SCNC: 110 MMOL/L — HIGH (ref 96–108)
CHLORIDE SERPL-SCNC: 110 MMOL/L — HIGH (ref 98–107)
CO2 BLDV-SCNC: 19.6 MMOL/L — LOW (ref 22–26)
CO2 SERPL-SCNC: 17 MMOL/L — LOW (ref 22–31)
CREAT SERPL-MCNC: 2.38 MG/DL — HIGH (ref 0.5–1.3)
EGFR: 33 ML/MIN/1.73M2 — LOW
EOSINOPHIL # BLD AUTO: 0 K/UL — SIGNIFICANT CHANGE UP (ref 0–0.5)
EOSINOPHIL NFR BLD AUTO: 0 % — SIGNIFICANT CHANGE UP (ref 0–6)
GAS PNL BLDV: 136 MMOL/L — SIGNIFICANT CHANGE UP (ref 136–145)
GAS PNL BLDV: SIGNIFICANT CHANGE UP
GLUCOSE BLDV-MCNC: 98 MG/DL — SIGNIFICANT CHANGE UP (ref 70–99)
GLUCOSE SERPL-MCNC: 104 MG/DL — HIGH (ref 70–99)
HCO3 BLDV-SCNC: 18 MMOL/L — LOW (ref 22–29)
HCT VFR BLD CALC: 25.1 % — LOW (ref 39–50)
HCT VFR BLDA CALC: 24 % — LOW (ref 39–51)
HGB BLD CALC-MCNC: 7.9 G/DL — LOW (ref 12.6–17.4)
HGB BLD-MCNC: 7.8 G/DL — LOW (ref 13–17)
IANC: 9.49 K/UL — HIGH (ref 1.8–7.4)
IMM GRANULOCYTES NFR BLD AUTO: 2.3 % — HIGH (ref 0–0.9)
LACTATE BLDV-MCNC: 1.5 MMOL/L — SIGNIFICANT CHANGE UP (ref 0.5–2)
LYMPHOCYTES # BLD AUTO: 0.88 K/UL — LOW (ref 1–3.3)
LYMPHOCYTES # BLD AUTO: 7.5 % — LOW (ref 13–44)
MAGNESIUM SERPL-MCNC: 1.7 MG/DL — SIGNIFICANT CHANGE UP (ref 1.6–2.6)
MCHC RBC-ENTMCNC: 26.1 PG — LOW (ref 27–34)
MCHC RBC-ENTMCNC: 31.1 GM/DL — LOW (ref 32–36)
MCV RBC AUTO: 83.9 FL — SIGNIFICANT CHANGE UP (ref 80–100)
MONOCYTES # BLD AUTO: 1.07 K/UL — HIGH (ref 0–0.9)
MONOCYTES NFR BLD AUTO: 9.1 % — SIGNIFICANT CHANGE UP (ref 2–14)
NEUTROPHILS # BLD AUTO: 9.49 K/UL — HIGH (ref 1.8–7.4)
NEUTROPHILS NFR BLD AUTO: 81 % — HIGH (ref 43–77)
NRBC # BLD: 0 /100 WBCS — SIGNIFICANT CHANGE UP (ref 0–0)
NRBC # FLD: 0 K/UL — SIGNIFICANT CHANGE UP (ref 0–0)
PCO2 BLDV: 36 MMHG — LOW (ref 42–55)
PH BLDV: 7.32 — SIGNIFICANT CHANGE UP (ref 7.32–7.43)
PHOSPHATE SERPL-MCNC: 6 MG/DL — HIGH (ref 2.5–4.5)
PLATELET # BLD AUTO: 291 K/UL — SIGNIFICANT CHANGE UP (ref 150–400)
PO2 BLDV: 55 MMHG — HIGH (ref 25–45)
POTASSIUM BLDV-SCNC: 4.6 MMOL/L — SIGNIFICANT CHANGE UP (ref 3.5–5.1)
POTASSIUM SERPL-MCNC: 4.6 MMOL/L — SIGNIFICANT CHANGE UP (ref 3.5–5.3)
POTASSIUM SERPL-SCNC: 4.6 MMOL/L — SIGNIFICANT CHANGE UP (ref 3.5–5.3)
PROT SERPL-MCNC: 4.4 G/DL — LOW (ref 6–8.3)
RBC # BLD: 2.99 M/UL — LOW (ref 4.2–5.8)
RBC # FLD: 13.3 % — SIGNIFICANT CHANGE UP (ref 10.3–14.5)
SAO2 % BLDV: 87.9 % — SIGNIFICANT CHANGE UP (ref 67–88)
SODIUM SERPL-SCNC: 139 MMOL/L — SIGNIFICANT CHANGE UP (ref 135–145)
WBC # BLD: 11.72 K/UL — HIGH (ref 3.8–10.5)
WBC # FLD AUTO: 11.72 K/UL — HIGH (ref 3.8–10.5)

## 2023-05-06 PROCEDURE — 99232 SBSQ HOSP IP/OBS MODERATE 35: CPT | Mod: GC

## 2023-05-06 PROCEDURE — 99231 SBSQ HOSP IP/OBS SF/LOW 25: CPT | Mod: GC

## 2023-05-06 PROCEDURE — 99232 SBSQ HOSP IP/OBS MODERATE 35: CPT

## 2023-05-06 RX ORDER — MAGNESIUM SULFATE 500 MG/ML
1 VIAL (ML) INJECTION ONCE
Refills: 0 | Status: COMPLETED | OUTPATIENT
Start: 2023-05-06 | End: 2023-05-06

## 2023-05-06 RX ORDER — SODIUM BICARBONATE 1 MEQ/ML
1300 SYRINGE (ML) INTRAVENOUS EVERY 12 HOURS
Refills: 0 | Status: DISCONTINUED | OUTPATIENT
Start: 2023-05-06 | End: 2023-05-08

## 2023-05-06 RX ADMIN — Medication 400 MILLIGRAM(S): at 12:20

## 2023-05-06 RX ADMIN — Medication 100 GRAM(S): at 10:33

## 2023-05-06 RX ADMIN — Medication 1 MILLIGRAM(S): at 12:20

## 2023-05-06 RX ADMIN — Medication 1300 MILLIGRAM(S): at 17:33

## 2023-05-06 RX ADMIN — PANTOPRAZOLE SODIUM 40 MILLIGRAM(S): 20 TABLET, DELAYED RELEASE ORAL at 06:53

## 2023-05-06 RX ADMIN — HEPARIN SODIUM 5000 UNIT(S): 5000 INJECTION INTRAVENOUS; SUBCUTANEOUS at 22:27

## 2023-05-06 RX ADMIN — Medication 100 MILLIGRAM(S): at 06:53

## 2023-05-06 RX ADMIN — HEPARIN SODIUM 5000 UNIT(S): 5000 INJECTION INTRAVENOUS; SUBCUTANEOUS at 13:58

## 2023-05-06 RX ADMIN — HEPARIN SODIUM 5000 UNIT(S): 5000 INJECTION INTRAVENOUS; SUBCUTANEOUS at 06:53

## 2023-05-06 RX ADMIN — Medication 650 MILLIGRAM(S): at 06:53

## 2023-05-06 NOTE — PROGRESS NOTE ADULT - PROBLEM SELECTOR PLAN 1
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 Pt was diagnosed with SLE and LN during hospital stay at [facility] in Feb 2022. Kidney biopsy performed on 2/17/22 which showed focal proliferative type, class 3 LN. Pt. initiated on immunosuppressives for SLE/LN by rheumatology team. Pt. with history of noncompliance to his medications/immunosuppressive therapy. Pt was readmitted 10/13/22 for worsening Scr and proteinuria. Pt underwent kidney biopsy on 10/17/22 during that hospitalization. 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 Pt. received Immunosuppressive therapy for SLE/class IV LN by rheumatology team. Pt. follows nephrologist Dr. Hayde Shultz for CKD care. Upon review of labs, Scr was 1.5 on 1/11/22. Scr increased to 1.79 on 4/7/22 on OP labs. Scr was elevated at 2.55 on admission (4/27/23). Pt. underwent kidney biopsy by IR team on 5/2/23. 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 Rheumatology follow-up note from 5/3/23 reviewed. Pt. received on IV solumedrol 1 g ( 5/4/23-5/6/23) as per rheumatology team. SCr further increased to 2.41 on 5/5/23. PO torsemide held. Pt. received 1st dose of IV Cytoxan on 5/5/23. Scr elevated/stable at 2.38 today. Pt. currently nonoliguric. Low salt diet. Fluid restriction. Monitor labs and urine output. Avoid any potential nephrotoxins. Dose medications as per eGFR. 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 Pt. was diagnosed with SLE and LN during hospital stay at [facility] in Feb 2022. Kidney biopsy performed on 2/17/22 which showed focal proliferative type, class 3 LN. Pt. initiated on immunosuppressives for SLE/LN by rheumatology team. Pt. with history of noncompliance to his medications/immunosuppressive therapy. Pt was readmitted 10/13/22 for worsening Scr and proteinuria. Pt underwent kidney biopsy on 10/17/22 during that hospitalization. 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 Pt. received Immunosuppressive therapy for SLE/class IV LN by rheumatology team. Pt. follows nephrologist Dr. Hayde Shultz for CKD care. Upon review of labs, Scr was 1.5 on 1/11/22. Scr increased to 1.79 on 4/7/22 on OP labs. Scr was elevated at 2.55 on admission (4/27/23). Pt. underwent kidney biopsy by IR team on 5/2/23. 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 Rheumatology follow-up note from 5/3/23 reviewed. Pt. received on IV solumedrol 1 g on 5/4 and 5/5 as per rheumatology team. Scr further increased to 2.41 on 5/5/23. PO Torsemide held. Pt. received first dose of IV Cytoxan on 5/5/23. Scr elevated/stable at 2.38 today. Pt. currently nonoliguric. Low salt diet. Fluid restriction. Monitor labs and urine output. Avoid any potential nephrotoxins. Dose medications as per eGFR.

## 2023-05-06 NOTE — PROGRESS NOTE ADULT - ASSESSMENT
48M with h/o SLE c/b LN3/4 who presents with persistent nausea/vomiting after medication administration at home and found to have LIZETH vs progression of LN/CKD who is otherwise hemodynamically stable awaiting nephrology and rheumatology evaluation.     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 Admitted with Cr 2.5 with borderline hyperkalemia; no ECG changes. Pt denies decreased UOP. Urine studies suggesting pre-renal etiology (FeNa 0.7%), but pt takes diuretics, so unclear reliability (though would expect MORE Na wasting with diuresis, not less). UPCR with nephrotic-range proteinuria as well, which is consistent with known LN. Pending renal biopsy to assess for possible POD/transformation, planned for Tuesday 5/2. Creatinine improving, HyperK improved.     Nephrologist: Dr. Hayde Garland, Nephro C/S, appreciate recs (following)  Rheumatologist: Dr. Nilton Le, Rheum C/S, appreciate recs (following)  IR C/S: Renal Bx 5/2 (see nephro note)    ::Normocytic Anemia: previously with AoCD but baseline higher; unclear if ANGUIANO is symptomatic anemia  -- T&S (5/4-)  -- Iron Studies (iron replete), B12 (304), Folate (5.7)  -- Cyanocobalamin 1kU IM QDx 7d, then PO after; Folate 1mg PO QD    ::Electrolytes: Pt with borderline HyperK in ER. Given Lokelma 5x1 on admission. Stably WNL now. No ECG changes.  -- Still awaiting Nephro/Rheum recs re: bactrim in this context    ::BMD:   -- D/C Home Vit D3 1kU QD --> Vit D 50kU Q1W x 8w  -- Ca x Phos: now > 55, would start phos binder, but pending nephro recs given their follow-up would be important  ----- F/U PTH (WNL), Vit D 25 & 1,25 (both low)    ::Volume/BP: SBP at goal, but b/l LE edema suggestive of volume overload (vs decreased oncotic pressure ico nephrotic syndrome).   -- HOLDing Home Lisinpril 10mg PO QD (ico LIZETH)  -- C/W Torsemide 20mg PO QD   -- D/C Home Furosemide 20mg PO QD (ico LIZETH)    ::HD Access: no current strong indication for HD, no access    ::Rheum/SLE  -- OP Quant Gold Negative 02/2023  -- F/U Hepatitis Panel  -- F/U mycophenolic acid level  -- C/W Home TMP/-80 PO TIW (but d/t LIZETH vs CKD, will ask rheum & nephro about other coverage or continuation)  -- C/W Home Hydroxychloroquine 200mg PO QD  -- START Methylprednisolone 1g IV QD x3d (5/4-)  -- D/C Myfortic 720 ER PO BID (1h pre or 2h post meals)  -- D/C Home Prednisone 5mg PO QD  -- D/C MMF 1500mg PO BID  -- PLAN CYC 500mg Q2W [id number] (5/5-) & pre-medications per rheumatology    #Dyspnea on Exertion   Pt with reports of ANGUIANO, fatigue. B/l Leg swelling. Cardiac evaluation unremarkable. May be 2/2 anemia. B12 borderline low but EF normal (not wet beriberi). May benefit from further OP testing with pulm/cards.  - TTE (EF 62%, unremarkable echo), ECG  - Trop, BNP WNL

## 2023-05-06 NOTE — PROGRESS NOTE ADULT - SUBJECTIVE AND OBJECTIVE BOX
[facility] DIVISION OF KIDNEY DISEASES AND HYPERTENSION   FOLLOW UP NOTE    --------------------------------------------------------------------------------  Chief Complaint: LIZETH on CKD, Lupus nephritis    24 hour events/subjective: Pt. seen and examined today. Pt. reports feeling well. No fever, SOB, CP or dysuria. Pt. follows with nephrologist Dr. Hayde Shultz as outpatient for CKD care. 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 Pt underwent kidney biopsy by IR team on 5/2/23. Pt. with kidney biopsy findings of crescentic GN/LN. Pt. received on IV solumedrol 1 g ( 5/4/23-5/6/23) as per rheumatology team. SCr further increased to 2.41 on 5/5/23. PO torsemide held. Pt. received 1st dose of IV Cytoxan on 5/5/23. Scr elevated/stable at  2.38 today. Pt. currently nonoliguric.     PAST HISTORY  --------------------------------------------------------------------------------  No significant changes to PMH, PSH, FHx, SHx, unless otherwise noted    ALLERGIES & MEDICATIONS  --------------------------------------------------------------------------------  Allergies    No Known Allergies    Intolerances    Standing Inpatient Medications  ergocalciferol 88388 Unit(s) Oral <User Schedule>  folic acid 1 milliGRAM(s) Oral daily  heparin   Injectable 5000 Unit(s) SubCutaneous every 8 hours  hydroxychloroquine 400 milliGRAM(s) Oral <User Schedule>  hydroxychloroquine 200 milliGRAM(s) Oral <User Schedule>  pantoprazole    Tablet 40 milliGRAM(s) Oral before breakfast  sodium bicarbonate 650 milliGRAM(s) Oral every 12 hours  trimethoprim   80 mG/sulfamethoxazole 400 mG 1 Tablet(s) Oral <User Schedule>    PRN Inpatient Medications    REVIEW OF SYSTEMS  --------------------------------------------------------------------------------  Gen: No fevers/chills  Head/Eyes/Ears: No HA   Respiratory: No dyspnea, cough  CV: No chest pain  GI: No abdominal pain, diarrhea  : No dysuria, hematuria  MSK: LE edema  Skin: No rashes  Heme: Anemia    VITALS/PHYSICAL EXAM  --------------------------------------------------------------------------------  T(C): 36.6 (05-06-23 @ 06:19), Max: 36.9 (05-05-23 @ 21:06)  HR: 60 (05-06-23 @ 06:19) (60 - 71)  BP: 112/76 (05-06-23 @ 06:19) (112/76 - 127/69)  RR: 17 (05-06-23 @ 06:19) (17 - 17)  SpO2: 99% (05-06-23 @ 06:19) (99% - 100%)  Wt(kg): --    05-05-23 @ 07:01  -  05-06-23 @ 07:00  --------------------------------------------------------  IN: 410 mL / OUT: 1475 mL / NET: -1065 mL    Physical Exam:  	Gen: sitting comfortably in bed, NAD  	HEENT: Anicteric  	Pulm: CTA B/L  	CV: S1S2+  	Abd: Soft, +BS       	Ext: Decreased B/L LE pitting edema+  	Neuro: Awake, alert     	Skin: Warm and dry    LABS/STUDIES  --------------------------------------------------------------------------------              7.8    11.72 >-----------<  291      [05-06-23 @ 06:30]              25.1     139  |  110  |  63  ----------------------------<  104      [05-06-23 @ 06:30]  4.6   |  17  |  2.38        Ca     8.5     [05-06-23 @ 06:30]      Mg     1.70     [05-06-23 @ 06:30]      Phos  6.0     [05-06-23 @ 06:30]    Creatinine Trend:  SCr 2.38 [05-06 @ 06:30]  SCr 2.41 [05-05 @ 06:40]  SCr 2.03 [05-04 @ 07:05]  SCr 2.00 [05-03 @ 06:33]  SCr 1.83 [05-02 @ 04:40]    Urinalysis - [05-05-23 @ 13:52]      Color Light Yellow / Appearance Clear / SG 1.011 / pH 6.0      Gluc Negative / Ketone Negative  / Bili Negative / Urobili <2 mg/dL       Blood Large / Protein 100 mg/dL / Leuk Est Negative / Nitrite Negative      RBC 10 / WBC 4 / Hyaline 11 / Gran  / Sq Epi  / Non Sq Epi  / Bacteria Occasional    HBsAb Nonreact      [05-04-23 @ 07:05]  HBsAg Nonreact      [05-04-23 @ 07:05]  HBcAb Nonreact      [05-04-23 @ 07:05]  HCV 0.06, Nonreact      [05-04-23 @ 07:05]    dsDNA 14      [04-28-23 @ 09:08]  C3 Complement 83      [04-28-23 @ 09:08]  C4 Complement 26      [04-28-23 @ 09:08] [facility] DIVISION OF KIDNEY DISEASES AND HYPERTENSION   FOLLOW UP NOTE    --------------------------------------------------------------------------------  Chief Complaint: LIZETH on CKD, Lupus nephritis    HPI: Pt. follows with nephrologist Dr. Hayde Shultz as outpatient for CKD care. 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 Pt. underwent kidney biopsy by IR team on 5/2/23. Pt. with kidney biopsy findings of crescentic GN/LN. Pt. received on IV solumedrol 1 g on 5/4 and 5/5 as per rheumatology team. Scr increased to 2.41 on 5/5/23. PO Torsemide held. Pt. received first dose of IV Cytoxan on 5/5/23. Scr elevated/stable at  2.38 today. Pt. currently nonoliguric.     24 hour events/subjective: Pt. seen and examined today. Pt. reports feeling well. No fever, SOB, CP or dysuria.       PAST HISTORY  --------------------------------------------------------------------------------  No significant changes to PMH, PSH, FHx, SHx, unless otherwise noted    ALLERGIES & MEDICATIONS  --------------------------------------------------------------------------------  Allergies    No Known Allergies    Intolerances    Standing Inpatient Medications  ergocalciferol 17071 Unit(s) Oral <User Schedule>  folic acid 1 milliGRAM(s) Oral daily  heparin   Injectable 5000 Unit(s) SubCutaneous every 8 hours  hydroxychloroquine 400 milliGRAM(s) Oral <User Schedule>  hydroxychloroquine 200 milliGRAM(s) Oral <User Schedule>  pantoprazole    Tablet 40 milliGRAM(s) Oral before breakfast  sodium bicarbonate 650 milliGRAM(s) Oral every 12 hours  trimethoprim   80 mG/sulfamethoxazole 400 mG 1 Tablet(s) Oral <User Schedule>    PRN Inpatient Medications    REVIEW OF SYSTEMS  --------------------------------------------------------------------------------  Gen: No fevers/chills  Head/Eyes/Ears: No HA   Respiratory: No dyspnea, cough  CV: No chest pain  GI: No abdominal pain, diarrhea  : No dysuria, hematuria  MSK: LE edema  Skin: No rashes  Heme: Anemia    VITALS/PHYSICAL EXAM  --------------------------------------------------------------------------------  T(C): 36.6 (05-06-23 @ 06:19), Max: 36.9 (05-05-23 @ 21:06)  HR: 60 (05-06-23 @ 06:19) (60 - 71)  BP: 112/76 (05-06-23 @ 06:19) (112/76 - 127/69)  RR: 17 (05-06-23 @ 06:19) (17 - 17)  SpO2: 99% (05-06-23 @ 06:19) (99% - 100%)  Wt(kg): --    05-05-23 @ 07:01  -  05-06-23 @ 07:00  --------------------------------------------------------  IN: 410 mL / OUT: 1475 mL / NET: -1065 mL    Physical Exam:  	Gen: sitting comfortably in bed, NAD  	HEENT: Anicteric  	Pulm: CTA B/L  	CV: S1S2+  	Abd: Soft, +BS       	Ext: Decreased B/L LE pitting edema+  	Neuro: Awake, alert     	Skin: Warm and dry    LABS/STUDIES  --------------------------------------------------------------------------------              7.8    11.72 >-----------<  291      [05-06-23 @ 06:30]              25.1     139  |  110  |  63  ----------------------------<  104      [05-06-23 @ 06:30]  4.6   |  17  |  2.38        Ca     8.5     [05-06-23 @ 06:30]      Mg     1.70     [05-06-23 @ 06:30]      Phos  6.0     [05-06-23 @ 06:30]    Creatinine Trend:  SCr 2.38 [05-06 @ 06:30]  SCr 2.41 [05-05 @ 06:40]  SCr 2.03 [05-04 @ 07:05]  SCr 2.00 [05-03 @ 06:33]  SCr 1.83 [05-02 @ 04:40]    Urinalysis - [05-05-23 @ 13:52]      Color Light Yellow / Appearance Clear / SG 1.011 / pH 6.0      Gluc Negative / Ketone Negative  / Bili Negative / Urobili <2 mg/dL       Blood Large / Protein 100 mg/dL / Leuk Est Negative / Nitrite Negative      RBC 10 / WBC 4 / Hyaline 11 / Gran  / Sq Epi  / Non Sq Epi  / Bacteria Occasional    HBsAb Nonreact      [05-04-23 @ 07:05]  HBsAg Nonreact      [05-04-23 @ 07:05]  HBcAb Nonreact      [05-04-23 @ 07:05]  HCV 0.06, Nonreact      [05-04-23 @ 07:05]    dsDNA 14      [04-28-23 @ 09:08]  C3 Complement 83      [04-28-23 @ 09:08]  C4 Complement 26      [04-28-23 @ 09:08]

## 2023-05-06 NOTE — PROGRESS NOTE ADULT - PROBLEM SELECTOR PLAN 2
Pt. with metabolic acidosis in setting of kidney failure. SCO2 low at 17 today. Recommend increase PO sodium bicarbonate to 1300 mg BID. Monitor SCO2. Pt. with metabolic acidosis in setting of kidney failure. SCO2 low at 17 today. Increase PO sodium bicarbonate to 1300 mg BID. Monitor SCO2.

## 2023-05-06 NOTE — PROGRESS NOTE ADULT - SUBJECTIVE AND OBJECTIVE BOX
INTERVAL HPI/OVERNIGHT EVENTS:  Patient lying in bed. No active complaints. Tolerated Cytoxan well. Denies fevers, chills, chest pain, shortness of breath, legs swelling.    MEDICATIONS  (STANDING):  ergocalciferol 21799 Unit(s) Oral <User Schedule>  folic acid 1 milliGRAM(s) Oral daily  heparin   Injectable 5000 Unit(s) SubCutaneous every 8 hours  hydroxychloroquine 200 milliGRAM(s) Oral <User Schedule>  hydroxychloroquine 400 milliGRAM(s) Oral <User Schedule>  pantoprazole    Tablet 40 milliGRAM(s) Oral before breakfast  sodium bicarbonate 1300 milliGRAM(s) Oral every 12 hours  trimethoprim   80 mG/sulfamethoxazole 400 mG 1 Tablet(s) Oral <User Schedule>    MEDICATIONS  (PRN):        Vital Signs Last 24 Hrs  T(C): 36.4 (06 May 2023 12:15), Max: 36.9 (05 May 2023 21:06)  T(F): 97.6 (06 May 2023 12:15), Max: 98.4 (05 May 2023 21:06)  HR: 62 (06 May 2023 12:15) (60 - 71)  BP: 106/60 (06 May 2023 12:15) (106/60 - 118/65)  BP(mean): --  RR: 17 (06 May 2023 12:15) (17 - 17)  SpO2: 99% (06 May 2023 12:15) (99% - 100%)    Parameters below as of 06 May 2023 12:15  Patient On (Oxygen Delivery Method): room air        PHYSICAL EXAMINATION:  General: WDWN  HEENT: NC/AT; clear conjunctiva; MMM, no ulcers  Neck: supple  Cardiovascular: +S1/S2, RRR  Respiratory: CTA B/L; no W/R/R  Gastrointestinal: soft, NT/ND; +BSx4  Extremities: WWP; no edema, clubbing or cyanosis  MSK: No synovitis  Skin: no rash; hyperpigmented chronic skin lesion on anterior chest      LABS:                        7.8    11.72 )-----------( 291      ( 06 May 2023 06:30 )             25.1     05-06    139  |  110<H>  |  63<H>  ----------------------------<  104<H>  4.6   |  17<L>  |  2.38<H>    Ca    8.5      06 May 2023 06:30  Phos  6.0     -  Mg     1.70         TPro  4.4<L>  /  Alb  2.4<L>  /  TBili  <0.2  /  DBili  x   /  AST  11  /  ALT  8   /  AlkPhos  50        Urinalysis Basic - ( 05 May 2023 13:52 )    Color: Light Yellow / Appearance: Clear / S.011 / pH: x  Gluc: x / Ketone: Negative  / Bili: Negative / Urobili: <2 mg/dL   Blood: x / Protein: 100 mg/dL / Nitrite: Negative   Leuk Esterase: Negative / RBC: 10 /HPF / WBC 4 /HPF   Sq Epi: x / Non Sq Epi: x / Bacteria: Occasional          RADIOLOGY & ADDITIONAL TESTS:

## 2023-05-06 NOTE — PROGRESS NOTE ADULT - PROBLEM SELECTOR PLAN 6
Hospital Bundle  Fluids: PO Ad Shana  Electrolytes: Gentle electrolyte repletion d/t LIZETH/CKD  Nutrition: Diet Renal (Halal), nutrition consulted  PPX  ---VTE: SQH  ---GI: PO, bowel reg PRN  Access: PIV  Code Status: FULL CODE  Dispo: Home, no specialized needs; pending nephro clearance No

## 2023-05-06 NOTE — PROGRESS NOTE ADULT - SUBJECTIVE AND OBJECTIVE BOX
Medicine Progress Note  --------------------  Neo Malave M.D.  Internal Medicine/Emergency Medicine  PGY-2  --------------------      Patient: LIZ HOUSER, MRN: [id number], : 1975  Admitted on 23 for Nausea and vomiting      LANGUAGE - English ]OR[ PI (_): #                ------------------------------------------------------------------------------------------------------------  SUMMARY (from last progress note through 23 @ 07:13):   -  ------------------------------------------------------------------------------------------------------------  OVERNIGHT EVENTS  NAEON    SUBJECTIVE  -       ROS negative unless noted above.  ------------------------------------------------------------------------------------------------------------  OBJECTIVE:  Physical Exam  CONST:     NAD, well-appearing; well-developed; appears stated age  EYES:         Conjunctiva clear; PERRL; no conjunctival pallor; no lid lag  ENMT:       MMM, no pharyngeal injection or exudates; normal dentition/dentures present  NECK:        Supple, no LAD; no palpable masses; no thyromegaly  RESP :        Normal respiratory effort; CTA bilaterally; no W/R/R  CHEST:       No TTP, no lines/ports; symmetric chest expansion  CARDIO:     RRR, normal S1 and S2, no M/R/G; apical impulse at MCL  VASC:         No JVD/AJR, no peripheral edema, pulses 2+ B/L, Cap refill <2s  ABD:           Soft, NT/ND, norm bowel sounds; no R/G; No HSM; No CVAT  MSK:          No clubbing of digits; no joint swelling or TTP  EXT:           WWP, no reduction in body hair, no LE skin changes  PSYCH        A&O to person, place, and time; affect appropriate  NEURO:     Non-focal; no gross sensory deficits; moving all extremities   SKIN:          No rashes; no palpable lesions; axillae not dry    Vital Signs Last 24 Hrs  T(F): 98.4, Max: 98.4 (23 @ 21:06)  HR: 71 (71 - 71)  BP: 118/65 (118/65 - 127/69)  RR: 17 (17 - 17)  SpO2: 100% (100% - 100%)        DAILY MEASUREMENTS:  I&O's Summary    05 May 2023 07:01  -  06 May 2023 07:00  --------------------------------------------------------  IN: 240 mL / OUT: 1125 mL / NET: -885 mL      Daily     Daily   Weight (kg): 62.2 (23 @ 03:50)  Orthostatic VS        LABS:                        8.9    8.27  )-----------( 318      ( 05 May 2023 06:40 )             28.0     Hgb Trend: 8.9<--, 8.0<--, 7.7<--, 7.5<--, 8.1<--  0505    137  |  105  |  60<H>  ----------------------------<  123<H>  4.6   |  18<L>  |  2.41<H>    Ca    8.7      05 May 2023 06:40  Phos  6.6     05-05  Mg     1.80     05-05    TPro  5.1<L>  /  Alb  2.6<L>  /  TBili  <0.2  /  DBili  x   /  AST  18  /  ALT  11  /  AlkPhos  63  05-05      CAPILLARY BLOOD GLUCOSE            Urinalysis Basic - ( 05 May 2023 13:52 )    Color: Light Yellow / Appearance: Clear / S.011 / pH: x  Gluc: x / Ketone: Negative  / Bili: Negative / Urobili: <2 mg/dL   Blood: x / Protein: 100 mg/dL / Nitrite: Negative   Leuk Esterase: Negative / RBC: 10 /HPF / WBC 4 /HPF   Sq Epi: x / Non Sq Epi: x / Bacteria: Occasional          Venous Blood Gas:  23 @ 06:30  7.32/36/55/18/87.9  VBG Lactate: 1.5      RADIOLOGY & ADDITIONAL TESTS:  Results Reviewed:     Imaging Reviewed:  Electrocardiogram Reviewed:      ------------------------------------------------------------------------------------------------------------  MEDICATIONS  (STANDING):  ergocalciferol 74280 Unit(s) Oral <User Schedule>  folic acid 1 milliGRAM(s) Oral daily  heparin   Injectable 5000 Unit(s) SubCutaneous every 8 hours  hydroxychloroquine 200 milliGRAM(s) Oral <User Schedule>  hydroxychloroquine 400 milliGRAM(s) Oral <User Schedule>  pantoprazole    Tablet 40 milliGRAM(s) Oral before breakfast  sodium bicarbonate 650 milliGRAM(s) Oral every 12 hours  trimethoprim   80 mG/sulfamethoxazole 400 mG 1 Tablet(s) Oral <User Schedule>    MEDICATIONS  (PRN):    ------------------------------------------------------------------------------------------------------------  COORDINATION OF CARE:  Care discussed with consultants/other providers and notes reviewed [Y]     Medicine Progress Note  --------------------  Neo Malave M.D.  Internal Medicine/Emergency Medicine  PGY-2  --------------------      Patient: LIZ HOUSER, MRN: [id number], : 1975  Admitted on 23 for Nausea and vomiting      OVERNIGHT EVENTS  NAEON    SUBJECTIVE  -       ROS negative unless noted above.  ------------------------------------------------------------------------------------------------------------  OBJECTIVE:  Physical Exam  GEN: Awake, AOx3, NAD.  HEENT: NCAT  ---EYES: no scleral icterus, EOMI, PERRLA  CARDIO: RRR. Normal S1/S2, no m/r/g. No JVD.  RESP: CTAB  ABD: Soft, NTND. BS+. No masses, no hepatosplenomegaly.  : No CVAT.   MSK: No obvious deformity or ROM deficit. 2+ pulses x4. No edema.  SKIN: Warm, dry.  NEURO: Moves all four extremities spontaneously  PSYCH: Appropriate mood & affect.      Vital Signs Last 24 Hrs  T(F): 98.4, Max: 98.4 (23 @ 21:06)  HR: 71 (71 - 71)  BP: 118/65 (118/65 - 127/69)  RR: 17 (17 - 17)  SpO2: 100% (100% - 100%)        DAILY MEASUREMENTS:  I&O's Summary    05 May 2023 07:01  -  06 May 2023 07:00  --------------------------------------------------------  IN: 240 mL / OUT: 1125 mL / NET: -885 mL      Daily     Daily   Weight (kg): 62.2 (23 @ 03:50)  Orthostatic VS        LABS:                        8.9    8.27  )-----------( 318      ( 05 May 2023 06:40 )             28.0     Hgb Trend: 8.9<--, 8.0<--, 7.7<--, 7.5<--, 8.1<--      137  |  105  |  60<H>  ----------------------------<  123<H>  4.6   |  18<L>  |  2.41<H>    Ca    8.7      05 May 2023 06:40  Phos  6.6       Mg     1.80         TPro  5.1<L>  /  Alb  2.6<L>  /  TBili  <0.2  /  DBili  x   /  AST  18  /  ALT  11  /  AlkPhos  63        CAPILLARY BLOOD GLUCOSE            Urinalysis Basic - ( 05 May 2023 13:52 )    Color: Light Yellow / Appearance: Clear / S.011 / pH: x  Gluc: x / Ketone: Negative  / Bili: Negative / Urobili: <2 mg/dL   Blood: x / Protein: 100 mg/dL / Nitrite: Negative   Leuk Esterase: Negative / RBC: 10 /HPF / WBC 4 /HPF   Sq Epi: x / Non Sq Epi: x / Bacteria: Occasional          Venous Blood Gas:  23 @ 06:30  7.32/36/55/18/87.9  VBG Lactate: 1.5      RADIOLOGY & ADDITIONAL TESTS:  Results Reviewed:     Imaging Reviewed:  Electrocardiogram Reviewed:      ------------------------------------------------------------------------------------------------------------  MEDICATIONS  (STANDING):  ergocalciferol 30708 Unit(s) Oral <User Schedule>  folic acid 1 milliGRAM(s) Oral daily  heparin   Injectable 5000 Unit(s) SubCutaneous every 8 hours  hydroxychloroquine 200 milliGRAM(s) Oral <User Schedule>  hydroxychloroquine 400 milliGRAM(s) Oral <User Schedule>  pantoprazole    Tablet 40 milliGRAM(s) Oral before breakfast  sodium bicarbonate 650 milliGRAM(s) Oral every 12 hours  trimethoprim   80 mG/sulfamethoxazole 400 mG 1 Tablet(s) Oral <User Schedule>    MEDICATIONS  (PRN):    ------------------------------------------------------------------------------------------------------------  COORDINATION OF CARE:  Care discussed with consultants/other providers and notes reviewed [Y]     Medicine Progress Note  --------------------  Neo Malave M.D.  Internal Medicine/Emergency Medicine  PGY-2  --------------------      Patient: LIZ HOUSER, MRN: [id number], : 1975  Admitted on 23 for Nausea and vomiting      OVERNIGHT EVENTS  NAEON    SUBJECTIVE  Mr. Houser says that he feels okay this morning. he denies any dysuria, decreased UOP, pain, nausea/vomiting/sob/cp.       ROS negative unless noted above.  ------------------------------------------------------------------------------------------------------------  OBJECTIVE:  Physical Exam  GEN: Awake, AOx3, NAD.  HEENT: NCAT  CARDIO: RRR. Normal S1/S2, no m/r/g.   RESP: CTAB, no w/r/r  ABD: Soft, NTND.  : No CVAT.   MSK: No obvious deformity or ROM deficit. Tr to 1+ pulm.   SKIN: Warm, dry.  NEURO: Moves all four extremities spontaneously  PSYCH: Appropriate mood & affect.      Vital Signs Last 24 Hrs  T(F): 98.4, Max: 98.4 (23 @ 21:06)  HR: 71 (71 - 71)  BP: 118/65 (118/65 - 127/69)  RR: 17 (17 - 17)  SpO2: 100% (100% - 100%)        DAILY MEASUREMENTS:  I&O's Summary    05 May 2023 07:01  -  06 May 2023 07:00  --------------------------------------------------------  IN: 240 mL / OUT: 1125 mL / NET: -885 mL      Daily     Daily   Weight (kg): 62.2 (23 @ 03:50)  Orthostatic VS        LABS:                        8.9    8.27  )-----------( 318      ( 05 May 2023 06:40 )             28.0     Hgb Trend: 8.9<--, 8.0<--, 7.7<--, 7.5<--, 8.1<--      137  |  105  |  60<H>  ----------------------------<  123<H>  4.6   |  18<L>  |  2.41<H>    Ca    8.7      05 May 2023 06:40  Phos  6.6       Mg     1.80         TPro  5.1<L>  /  Alb  2.6<L>  /  TBili  <0.2  /  DBili  x   /  AST  18  /  ALT  11  /  AlkPhos  63        CAPILLARY BLOOD GLUCOSE            Urinalysis Basic - ( 05 May 2023 13:52 )    Color: Light Yellow / Appearance: Clear / S.011 / pH: x  Gluc: x / Ketone: Negative  / Bili: Negative / Urobili: <2 mg/dL   Blood: x / Protein: 100 mg/dL / Nitrite: Negative   Leuk Esterase: Negative / RBC: 10 /HPF / WBC 4 /HPF   Sq Epi: x / Non Sq Epi: x / Bacteria: Occasional          Venous Blood Gas:  23 @ 06:30  7.32/36/55/18/87.9  VBG Lactate: 1.5      RADIOLOGY & ADDITIONAL TESTS:  Results Reviewed: No interval     Imaging Reviewed: No interval imaging    Electrocardiogram Reviewed: No interval ECG      ------------------------------------------------------------------------------------------------------------  MEDICATIONS  (STANDING):  ergocalciferol 32024 Unit(s) Oral <User Schedule>  folic acid 1 milliGRAM(s) Oral daily  heparin   Injectable 5000 Unit(s) SubCutaneous every 8 hours  hydroxychloroquine 200 milliGRAM(s) Oral <User Schedule>  hydroxychloroquine 400 milliGRAM(s) Oral <User Schedule>  pantoprazole    Tablet 40 milliGRAM(s) Oral before breakfast  sodium bicarbonate 650 milliGRAM(s) Oral every 12 hours  trimethoprim   80 mG/sulfamethoxazole 400 mG 1 Tablet(s) Oral <User Schedule>    MEDICATIONS  (PRN):    ------------------------------------------------------------------------------------------------------------  COORDINATION OF CARE:  Care discussed with consultants/other providers and notes reviewed [Y]

## 2023-05-06 NOTE — PROGRESS NOTE ADULT - ASSESSMENT
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 Patient with LIZETH and active urinary sediment. There does not appear to be any extra-renal disease at this time. Currently B-cell depleted with MMF transitioned to Myfortic 720mg BID (improvement in GI symptoms), HCQ, and prednisone 5mg. Labs this hospitalization with mild C3 depression 83 with negative C4 and dsDNA. CT A/P, US kidney, echo unremarkable. s/p kidney biopsy 5/2/2023  -biopsy 5/2/23 on this admission showed proliferative LN class IV-G with 5 out of 8 non-globally sclerosed glomeruli with cellular and fibrocellular crescents; moderate tubulointerstitial inflammation, ~30% IFTA. Activity index 13/24 and chronicity index 7/12. He had a previous biopsy in Oct 2022, activity index was 15/24 and chronicity was 3/12 IFTA 15-20%.    -f/u Mycophenolic acid level   -continue to hold Myfortic as patient has not responded to this medication  -c/w HCQ  -patient is not currently a candidate for clinical trial  -c/w IV solumedrol 1g x3 (5/4-5/6) then reduce to 1mg/kg IV solu-medrol 60mg/day starting on 5/7   -first dose of Cytoxan given 5/5/23 with remainder to be outpatient. Plan for IV  mg q2 weeks; will need labs outpatient 7-10 days after Cytoxan infusion   -c/w PCP ppx   -c/w PPI daily for GI ppx  -Quant TB negative outpatient 2/2023; Hep B checked this admission    Discussed with Dr. 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 Patient with LIZETH and active urinary sediment. There does not appear to be any extra-renal disease at this time. Currently B-cell depleted with MMF transitioned to Myfortic 720mg BID (improvement in GI symptoms), HCQ, and prednisone 5mg. Labs this hospitalization with mild C3 depression 83 with negative C4 and dsDNA. CT A/P, US kidney, echo unremarkable. s/p kidney biopsy 5/2/2023  -biopsy 5/2/23 on this admission showed proliferative LN class IV-G with 5 out of 8 non-globally sclerosed glomeruli with cellular and fibrocellular crescents; moderate tubulointerstitial inflammation, ~30% IFTA. Activity index 13/24 and chronicity index 7/12. He had a previous biopsy in Oct 2022, activity index was 15/24 and chronicity was 3/12 IFTA 15-20%.    -f/u Mycophenolic acid level   -continue to hold Myfortic as patient has not responded to this medication  -c/w HCQ  -patient is not currently a candidate for clinical trial  -c/w IV solumedrol 1g x3 (5/4-5/6) then reduce to 1mg/kg IV solu-medrol 60mg/day starting on 5/7   -first dose of Cytoxan given 5/5/23 with remainder to be outpatient. Plan for IV  mg q2 weeks; will need labs outpatient 10-14 days after Cytoxan infusion  -c/w PCP ppx   -c/w PPI daily for GI ppx  -Quant TB negative outpatient 2/2023; Hep B checked this admission    Discussed with Dr. 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 Patient with LIZETH and active urinary sediment. There does not appear to be any extra-renal disease at this time. Currently B-cell depleted with MMF transitioned to Myfortic 720mg BID (improvement in GI symptoms), HCQ, and prednisone 5mg. Labs this hospitalization with mild C3 depression 83 with negative C4 and dsDNA. CT A/P, US kidney, echo unremarkable. s/p kidney biopsy 5/2/2023  -biopsy 5/2/23 on this admission showed proliferative LN class IV-G with 5 out of 8 non-globally sclerosed glomeruli with cellular and fibrocellular crescents; moderate tubulointerstitial inflammation, ~30% IFTA. Activity index 13/24 and chronicity index 7/12. He had a previous biopsy in Oct 2022, activity index was 15/24 and chronicity was 3/12 IFTA 15-20%.    -f/u Mycophenolic acid level   -continue to hold Myfortic as patient has not responded to this medication  -c/w HCQ  -patient is not currently a candidate for clinical trial  -c/w IV solumedrol 1g x3 (5/4-5/6) then reduce to 1mg/kg IV solu-medrol 60mg/day starting on 5/7   -first dose of Cytoxan given 5/5/23 with remainder to be outpatient. Plan for IV  mg q2 weeks; will need labs outpatient 7-14 days after Cytoxan infusion to assess for WBC herrera  -c/w PCP ppx   -c/w PPI daily for GI ppx  -Quant TB negative outpatient 2/2023; Hep B checked this admission    Discussed with Dr. Hung, Attending    Edilson Loya MD  Rheumatology Fellow, PGY-5  Available on TEAMS

## 2023-05-07 LAB
ALBUMIN SERPL ELPH-MCNC: 2.1 G/DL — LOW (ref 3.3–5)
ALP SERPL-CCNC: 49 U/L — SIGNIFICANT CHANGE UP (ref 40–120)
ALT FLD-CCNC: 10 U/L — SIGNIFICANT CHANGE UP (ref 4–41)
ANION GAP SERPL CALC-SCNC: 11 MMOL/L — SIGNIFICANT CHANGE UP (ref 7–14)
AST SERPL-CCNC: 10 U/L — SIGNIFICANT CHANGE UP (ref 4–40)
BASE EXCESS BLDV CALC-SCNC: -4.8 MMOL/L — LOW (ref -2–3)
BASOPHILS # BLD AUTO: 0 K/UL — SIGNIFICANT CHANGE UP (ref 0–0.2)
BASOPHILS NFR BLD AUTO: 0 % — SIGNIFICANT CHANGE UP (ref 0–2)
BILIRUB SERPL-MCNC: <0.2 MG/DL — SIGNIFICANT CHANGE UP (ref 0.2–1.2)
BUN SERPL-MCNC: 69 MG/DL — HIGH (ref 7–23)
CA-I SERPL-SCNC: 1.31 MMOL/L — SIGNIFICANT CHANGE UP (ref 1.15–1.33)
CALCIUM SERPL-MCNC: 8.4 MG/DL — SIGNIFICANT CHANGE UP (ref 8.4–10.5)
CHLORIDE BLDV-SCNC: 109 MMOL/L — HIGH (ref 96–108)
CHLORIDE SERPL-SCNC: 109 MMOL/L — HIGH (ref 98–107)
CO2 BLDV-SCNC: 21.5 MMOL/L — LOW (ref 22–26)
CO2 SERPL-SCNC: 19 MMOL/L — LOW (ref 22–31)
CREAT SERPL-MCNC: 1.84 MG/DL — HIGH (ref 0.5–1.3)
EGFR: 45 ML/MIN/1.73M2 — LOW
EOSINOPHIL # BLD AUTO: 0 K/UL — SIGNIFICANT CHANGE UP (ref 0–0.5)
EOSINOPHIL NFR BLD AUTO: 0 % — SIGNIFICANT CHANGE UP (ref 0–6)
GAS PNL BLDV: 137 MMOL/L — SIGNIFICANT CHANGE UP (ref 136–145)
GAS PNL BLDV: SIGNIFICANT CHANGE UP
GLUCOSE BLDV-MCNC: 93 MG/DL — SIGNIFICANT CHANGE UP (ref 70–99)
GLUCOSE SERPL-MCNC: 99 MG/DL — SIGNIFICANT CHANGE UP (ref 70–99)
HCO3 BLDV-SCNC: 20 MMOL/L — LOW (ref 22–29)
HCT VFR BLD CALC: 23.4 % — LOW (ref 39–50)
HCT VFR BLDA CALC: 23 % — LOW (ref 39–51)
HGB BLD CALC-MCNC: 7.6 G/DL — LOW (ref 12.6–17.4)
HGB BLD-MCNC: 7.4 G/DL — LOW (ref 13–17)
IANC: 8.26 K/UL — HIGH (ref 1.8–7.4)
IMM GRANULOCYTES NFR BLD AUTO: 1.8 % — HIGH (ref 0–0.9)
LACTATE BLDV-MCNC: 1.5 MMOL/L — SIGNIFICANT CHANGE UP (ref 0.5–2)
LYMPHOCYTES # BLD AUTO: 0.8 K/UL — LOW (ref 1–3.3)
LYMPHOCYTES # BLD AUTO: 7.7 % — LOW (ref 13–44)
MAGNESIUM SERPL-MCNC: 2.1 MG/DL — SIGNIFICANT CHANGE UP (ref 1.6–2.6)
MCHC RBC-ENTMCNC: 25.6 PG — LOW (ref 27–34)
MCHC RBC-ENTMCNC: 31.6 GM/DL — LOW (ref 32–36)
MCV RBC AUTO: 81 FL — SIGNIFICANT CHANGE UP (ref 80–100)
MONOCYTES # BLD AUTO: 1.1 K/UL — HIGH (ref 0–0.9)
MONOCYTES NFR BLD AUTO: 10.6 % — SIGNIFICANT CHANGE UP (ref 2–14)
NEUTROPHILS # BLD AUTO: 8.26 K/UL — HIGH (ref 1.8–7.4)
NEUTROPHILS NFR BLD AUTO: 79.9 % — HIGH (ref 43–77)
NRBC # BLD: 0 /100 WBCS — SIGNIFICANT CHANGE UP (ref 0–0)
NRBC # FLD: 0 K/UL — SIGNIFICANT CHANGE UP (ref 0–0)
PCO2 BLDV: 37 MMHG — LOW (ref 42–55)
PH BLDV: 7.35 — SIGNIFICANT CHANGE UP (ref 7.32–7.43)
PHOSPHATE SERPL-MCNC: 4.9 MG/DL — HIGH (ref 2.5–4.5)
PLATELET # BLD AUTO: 307 K/UL — SIGNIFICANT CHANGE UP (ref 150–400)
PO2 BLDV: 82 MMHG — HIGH (ref 25–45)
POTASSIUM BLDV-SCNC: 4.5 MMOL/L — SIGNIFICANT CHANGE UP (ref 3.5–5.1)
POTASSIUM SERPL-MCNC: 4.4 MMOL/L — SIGNIFICANT CHANGE UP (ref 3.5–5.3)
POTASSIUM SERPL-SCNC: 4.4 MMOL/L — SIGNIFICANT CHANGE UP (ref 3.5–5.3)
PROT SERPL-MCNC: 4.4 G/DL — LOW (ref 6–8.3)
RBC # BLD: 2.89 M/UL — LOW (ref 4.2–5.8)
RBC # FLD: 13.3 % — SIGNIFICANT CHANGE UP (ref 10.3–14.5)
SAO2 % BLDV: 96.9 % — HIGH (ref 67–88)
SODIUM SERPL-SCNC: 139 MMOL/L — SIGNIFICANT CHANGE UP (ref 135–145)
WBC # BLD: 10.35 K/UL — SIGNIFICANT CHANGE UP (ref 3.8–10.5)
WBC # FLD AUTO: 10.35 K/UL — SIGNIFICANT CHANGE UP (ref 3.8–10.5)

## 2023-05-07 PROCEDURE — 99232 SBSQ HOSP IP/OBS MODERATE 35: CPT

## 2023-05-07 RX ORDER — PREGABALIN 225 MG/1
1000 CAPSULE ORAL DAILY
Refills: 0 | Status: DISCONTINUED | OUTPATIENT
Start: 2023-05-07 | End: 2023-05-08

## 2023-05-07 RX ADMIN — Medication 1 MILLIGRAM(S): at 12:16

## 2023-05-07 RX ADMIN — PANTOPRAZOLE SODIUM 40 MILLIGRAM(S): 20 TABLET, DELAYED RELEASE ORAL at 06:49

## 2023-05-07 RX ADMIN — PREGABALIN 1000 MICROGRAM(S): 225 CAPSULE ORAL at 12:16

## 2023-05-07 RX ADMIN — Medication 1300 MILLIGRAM(S): at 06:49

## 2023-05-07 RX ADMIN — HEPARIN SODIUM 5000 UNIT(S): 5000 INJECTION INTRAVENOUS; SUBCUTANEOUS at 23:31

## 2023-05-07 RX ADMIN — Medication 200 MILLIGRAM(S): at 12:17

## 2023-05-07 RX ADMIN — Medication 60 MILLIGRAM(S): at 09:00

## 2023-05-07 RX ADMIN — HEPARIN SODIUM 5000 UNIT(S): 5000 INJECTION INTRAVENOUS; SUBCUTANEOUS at 14:07

## 2023-05-07 RX ADMIN — HEPARIN SODIUM 5000 UNIT(S): 5000 INJECTION INTRAVENOUS; SUBCUTANEOUS at 06:49

## 2023-05-07 RX ADMIN — Medication 1300 MILLIGRAM(S): at 17:56

## 2023-05-07 NOTE — PROGRESS NOTE ADULT - PROBLEM SELECTOR PLAN 6
Hospital Bundle  Fluids: PO Ad Shana  Electrolytes: Gentle electrolyte repletion d/t LIZETH/CKD  Nutrition: Diet Renal ([place]), nutrition consulted  PPX  ---VTE: SQH  ---GI: PO, bowel reg PRN  Access: PIV  Code Status: FULL CODE  Dispo: Home, no specialized needs; pending nephro clearance Hospital Bundle  Fluids: PO Ad Shana  Electrolytes: Gentle electrolyte repletion d/t LIZETH/CKD  Nutrition: Diet Renal ([place]), nutrition consulted  PPX  ---VTE: SQH  ---GI: PO, bowel reg PRN  Access: PIV  Code Status: FULL CODE  Dispo: Home, no specialized needs; pending nephro/rheum clearance

## 2023-05-07 NOTE — PROGRESS NOTE ADULT - ASSESSMENT
48M with h/o SLE c/b LN3/4 who presents with persistent nausea/vomiting after medication administration at home and found to have LIZETH vs progression of LN/CKD who is otherwise hemodynamically stable awaiting nephrology and rheumatology evaluation.     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 Admitted with Cr 2.5 with borderline hyperkalemia; no ECG changes. Pt denies decreased UOP. Urine studies suggesting pre-renal etiology (FeNa 0.7%), but pt takes diuretics, so unclear reliability (though would expect MORE Na wasting with diuresis, not less). UPCR with nephrotic-range proteinuria as well, which is consistent with known LN. Pending renal biopsy to assess for possible POD/transformation, planned for Tuesday 5/2. Creatinine improving, HyperK improved.     Nephrologist: Dr. Hayde Garland, Nephro C/S, appreciate recs (following)  Rheumatologist: Dr. Nilton Le, Rheum C/S, appreciate recs (following)  IR C/S: Renal Bx 5/2 (see nephro note)    ::Normocytic Anemia: previously with AoCD but baseline higher; unclear if ANGUIANO is symptomatic anemia  -- T&S (5/4-)  -- Iron Studies (iron replete), B12 (304), Folate (5.7)  -- Cyanocobalamin 1kU IM QDx 7d, then PO after; Folate 1mg PO QD    ::Electrolytes: Pt with borderline HyperK in ER. Given Lokelma 5x1 on admission. Stably WNL now. No ECG changes.  -- Still awaiting Nephro/Rheum recs re: bactrim in this context    ::BMD:   -- D/C Home Vit D3 1kU QD --> Vit D 50kU Q1W x 8w  -- Ca x Phos: now > 55, would start phos binder, but pending nephro recs given their follow-up would be important  ----- F/U PTH (WNL), Vit D 25 & 1,25 (both low)    ::Volume/BP: SBP at goal, but b/l LE edema suggestive of volume overload (vs decreased oncotic pressure ico nephrotic syndrome).   -- HOLDing Home Lisinpril 10mg PO QD (ico LIZETH)  -- C/W Torsemide 20mg PO QD   -- D/C Home Furosemide 20mg PO QD (ico LIZETH)    ::HD Access: no current strong indication for HD, no access    ::Rheum/SLE  -- OP Quant Gold Negative 02/2023  -- F/U Hepatitis Panel  -- F/U mycophenolic acid level  -- C/W Home TMP/-80 PO TIW (but d/t LIZETH vs CKD, will ask rheum & nephro about other coverage or continuation)  -- C/W Home Hydroxychloroquine 200mg PO QD  -- START Methylprednisolone 1g IV QD x3d (5/4-)  -- D/C Myfortic 720 ER PO BID (1h pre or 2h post meals)  -- D/C Home Prednisone 5mg PO QD  -- D/C MMF 1500mg PO BID  -- PLAN CYC 500mg Q2W n4vnpsy (5/5-) & pre-medications per rheumatology    #Dyspnea on Exertion   Pt with reports of ANGUIANO, fatigue. B/l Leg swelling. Cardiac evaluation unremarkable. May be 2/2 anemia. B12 borderline low but EF normal (not wet beriberi). May benefit from further OP testing with pulm/cards.  - TTE (EF 62%, unremarkable echo), ECG  - Trop, BNP WNL SYNTHESIS  48M with h/o SLE c/b LN3/4 who presents with persistent nausea/vomiting after medication administration at home and found to have LIZETH vs progression of LN/CKD who is otherwise hemodynamically stable awaiting nephrology and rheumatology evaluation.     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 Admitted with Cr 2.5 with borderline hyperkalemia; no ECG changes. Pt denies decreased UOP. Urine studies suggesting pre-renal etiology (FeNa 0.7%), but pt takes diuretics, so unclear reliability (though would expect MORE Na wasting with diuresis, not less). UPCR with nephrotic-range proteinuria as well, which is consistent with known LN. Pending renal biopsy to assess for possible POD/transformation, planned for Tuesday 5/2. Creatinine improving, HyperK improved.     Nephrologist: Dr. Hayde Garland, Nephro C/S, appreciate recs (following)  Rheumatologist: Dr. Nilton Le, Rheum C/S, appreciate recs (following)  IR C/S: S/P Renal Bx 5/2    ::Normocytic Anemia: previously with AoCD but baseline higher; unclear if ANGUIANO is symptomatic anemia  -- T&S (5/5-) (repeat 5/8 if still inpatient)  -- Iron Studies (iron replete), B12 (304), Folate (5.7)  -- S/P Cyanocobalamin 1kU IM QDx 7d, now on PO  -- C/W Folate 1mg PO QD    ::Electrolytes: Borderline HyperK, resolved; okay to continue bactrim per nephro at renal dosing    ::BMD:   -- D/C Home Vit D3 1kU QD --> Vit D 50kU Q1W x 8w  -- Ca x Phos: now > 55, would start phos binder, but pending nephro recs given their follow-up would be important  ----- F/U PTH (WNL), Vit D 25 & 1,25 (both low)    ::Volume/BP: SBP at goal, but b/l LE edema suggestive of volume overload (vs decreased oncotic pressure ico nephrotic syndrome).   -- HOLDing Home Lisinpril 10mg PO QD (ico LIZETH)  -- C/W Torsemide 20mg PO QD   -- D/C Home Furosemide 20mg PO QD (ico LIZETH)    ::HD Access: no current strong indication for HD, no access    ::Rheum/SLE  -- OP Quant Gold Negative 02/2023  -- Hep Panel Negative this admission  -- F/U mycophenolic acid level    -- C/W Home TMP/-80 PO TIW (confirmed to continue)  -- C/W HCQ 400mg PO TTS, 200mg PO MWF  -- C/W Methylpred 60mg IV QD (5/7-)  -- C/W CYC 500mg Q2W m3hexrw (5/5-) & pre-medications per rheumatology    -- S/P Methylprednisolone 1g IV QD x3d (5/4-5/6)  -- D/C Myfortic 720 ER PO BID (1h pre or 2h post meals)  -- D/C Home Prednisone 5mg PO QD  -- D/C MMF 1500mg PO BID    #Dyspnea on Exertion   Pt with reports of ANGUIANO, fatigue. B/l Leg swelling. Cardiac evaluation unremarkable. May be 2/2 anemia. B12 borderline low but EF normal (not wet beriberi). May benefit from further OP testing with pulm/cards. Overall improved.   - TTE (EF 62%, unremarkable echo), ECG  - Trop, BNP WNL

## 2023-05-08 ENCOUNTER — TRANSCRIPTION ENCOUNTER (OUTPATIENT)
Age: 48
End: 2023-05-08

## 2023-05-08 VITALS
TEMPERATURE: 98 F | OXYGEN SATURATION: 100 % | SYSTOLIC BLOOD PRESSURE: 118 MMHG | RESPIRATION RATE: 17 BRPM | HEART RATE: 73 BPM | DIASTOLIC BLOOD PRESSURE: 65 MMHG

## 2023-05-08 DIAGNOSIS — E55.9 VITAMIN D DEFICIENCY, UNSPECIFIED: ICD-10-CM

## 2023-05-08 LAB
ALBUMIN SERPL ELPH-MCNC: 2 G/DL — LOW (ref 3.3–5)
ALP SERPL-CCNC: 44 U/L — SIGNIFICANT CHANGE UP (ref 40–120)
ALT FLD-CCNC: 9 U/L — SIGNIFICANT CHANGE UP (ref 4–41)
ANION GAP SERPL CALC-SCNC: 10 MMOL/L — SIGNIFICANT CHANGE UP (ref 7–14)
AST SERPL-CCNC: 10 U/L — SIGNIFICANT CHANGE UP (ref 4–40)
BASE EXCESS BLDV CALC-SCNC: -3.4 MMOL/L — LOW (ref -2–3)
BASOPHILS # BLD AUTO: 0.01 K/UL — SIGNIFICANT CHANGE UP (ref 0–0.2)
BASOPHILS NFR BLD AUTO: 0.1 % — SIGNIFICANT CHANGE UP (ref 0–2)
BILIRUB SERPL-MCNC: <0.2 MG/DL — SIGNIFICANT CHANGE UP (ref 0.2–1.2)
BUN SERPL-MCNC: 77 MG/DL — HIGH (ref 7–23)
CA-I SERPL-SCNC: 1.3 MMOL/L — SIGNIFICANT CHANGE UP (ref 1.15–1.33)
CALCIUM SERPL-MCNC: 8.2 MG/DL — LOW (ref 8.4–10.5)
CHLORIDE BLDV-SCNC: 108 MMOL/L — SIGNIFICANT CHANGE UP (ref 96–108)
CHLORIDE SERPL-SCNC: 108 MMOL/L — HIGH (ref 98–107)
CO2 BLDV-SCNC: 23.4 MMOL/L — SIGNIFICANT CHANGE UP (ref 22–26)
CO2 SERPL-SCNC: 20 MMOL/L — LOW (ref 22–31)
CREAT SERPL-MCNC: 1.96 MG/DL — HIGH (ref 0.5–1.3)
EGFR: 41 ML/MIN/1.73M2 — LOW
EOSINOPHIL # BLD AUTO: 0.02 K/UL — SIGNIFICANT CHANGE UP (ref 0–0.5)
EOSINOPHIL NFR BLD AUTO: 0.2 % — SIGNIFICANT CHANGE UP (ref 0–6)
GAS PNL BLDV: 137 MMOL/L — SIGNIFICANT CHANGE UP (ref 136–145)
GAS PNL BLDV: SIGNIFICANT CHANGE UP
GAS PNL BLDV: SIGNIFICANT CHANGE UP
GLUCOSE BLDV-MCNC: 89 MG/DL — SIGNIFICANT CHANGE UP (ref 70–99)
GLUCOSE SERPL-MCNC: 94 MG/DL — SIGNIFICANT CHANGE UP (ref 70–99)
HCO3 BLDV-SCNC: 22 MMOL/L — SIGNIFICANT CHANGE UP (ref 22–29)
HCT VFR BLD CALC: 24.6 % — LOW (ref 39–50)
HCT VFR BLDA CALC: 24 % — LOW (ref 39–51)
HGB BLD CALC-MCNC: 7.9 G/DL — LOW (ref 12.6–17.4)
HGB BLD-MCNC: 7.6 G/DL — LOW (ref 13–17)
IANC: 5.7 K/UL — SIGNIFICANT CHANGE UP (ref 1.8–7.4)
IMM GRANULOCYTES NFR BLD AUTO: 1.5 % — HIGH (ref 0–0.9)
LACTATE BLDV-MCNC: 1.5 MMOL/L — SIGNIFICANT CHANGE UP (ref 0.5–2)
LYMPHOCYTES # BLD AUTO: 1.12 K/UL — SIGNIFICANT CHANGE UP (ref 1–3.3)
LYMPHOCYTES # BLD AUTO: 13.7 % — SIGNIFICANT CHANGE UP (ref 13–44)
MAGNESIUM SERPL-MCNC: 2.1 MG/DL — SIGNIFICANT CHANGE UP (ref 1.6–2.6)
MCHC RBC-ENTMCNC: 26.4 PG — LOW (ref 27–34)
MCHC RBC-ENTMCNC: 30.9 GM/DL — LOW (ref 32–36)
MCV RBC AUTO: 85.4 FL — SIGNIFICANT CHANGE UP (ref 80–100)
MONOCYTES # BLD AUTO: 1.23 K/UL — HIGH (ref 0–0.9)
MONOCYTES NFR BLD AUTO: 15 % — HIGH (ref 2–14)
MYCOPHENOLATE SERPL-MCNC: 1.6 UG/ML — SIGNIFICANT CHANGE UP (ref 1–3.5)
MYCOPHENOLIC ACID GLUCURONIDE: 62 UG/ML — SIGNIFICANT CHANGE UP (ref 15–125)
NEUTROPHILS # BLD AUTO: 5.7 K/UL — SIGNIFICANT CHANGE UP (ref 1.8–7.4)
NEUTROPHILS NFR BLD AUTO: 69.5 % — SIGNIFICANT CHANGE UP (ref 43–77)
NRBC # BLD: 0 /100 WBCS — SIGNIFICANT CHANGE UP (ref 0–0)
NRBC # FLD: 0 K/UL — SIGNIFICANT CHANGE UP (ref 0–0)
PCO2 BLDV: 41 MMHG — LOW (ref 42–55)
PH BLDV: 7.34 — SIGNIFICANT CHANGE UP (ref 7.32–7.43)
PHOSPHATE SERPL-MCNC: 4.9 MG/DL — HIGH (ref 2.5–4.5)
PLATELET # BLD AUTO: 300 K/UL — SIGNIFICANT CHANGE UP (ref 150–400)
PO2 BLDV: 60 MMHG — HIGH (ref 25–45)
POTASSIUM BLDV-SCNC: 4.4 MMOL/L — SIGNIFICANT CHANGE UP (ref 3.5–5.1)
POTASSIUM SERPL-MCNC: 4.6 MMOL/L — SIGNIFICANT CHANGE UP (ref 3.5–5.3)
POTASSIUM SERPL-SCNC: 4.6 MMOL/L — SIGNIFICANT CHANGE UP (ref 3.5–5.3)
PROT SERPL-MCNC: 4.1 G/DL — LOW (ref 6–8.3)
RBC # BLD: 2.88 M/UL — LOW (ref 4.2–5.8)
RBC # FLD: 13.6 % — SIGNIFICANT CHANGE UP (ref 10.3–14.5)
SAO2 % BLDV: 90 % — HIGH (ref 67–88)
SODIUM SERPL-SCNC: 138 MMOL/L — SIGNIFICANT CHANGE UP (ref 135–145)
WBC # BLD: 8.2 K/UL — SIGNIFICANT CHANGE UP (ref 3.8–10.5)
WBC # FLD AUTO: 8.2 K/UL — SIGNIFICANT CHANGE UP (ref 3.8–10.5)

## 2023-05-08 PROCEDURE — 99239 HOSP IP/OBS DSCHRG MGMT >30: CPT | Mod: GC

## 2023-05-08 PROCEDURE — 99232 SBSQ HOSP IP/OBS MODERATE 35: CPT | Mod: GC

## 2023-05-08 RX ORDER — LISINOPRIL 2.5 MG/1
1 TABLET ORAL
Refills: 0 | DISCHARGE

## 2023-05-08 RX ORDER — HYDROXYCHLOROQUINE SULFATE 200 MG
2 TABLET ORAL
Qty: 42 | Refills: 0
Start: 2023-05-08 | End: 2023-06-06

## 2023-05-08 RX ORDER — FOLIC ACID 0.8 MG
1 TABLET ORAL
Qty: 30 | Refills: 0
Start: 2023-05-08 | End: 2023-06-06

## 2023-05-08 RX ORDER — SODIUM BICARBONATE 1 MEQ/ML
2 SYRINGE (ML) INTRAVENOUS
Qty: 120 | Refills: 0
Start: 2023-05-08 | End: 2023-06-06

## 2023-05-08 RX ORDER — HYDROXYCHLOROQUINE SULFATE 200 MG
1 TABLET ORAL
Qty: 42 | Refills: 0 | DISCHARGE
Start: 2023-05-08 | End: 2023-06-06

## 2023-05-08 RX ORDER — PANTOPRAZOLE SODIUM 20 MG/1
1 TABLET, DELAYED RELEASE ORAL
Qty: 30 | Refills: 0
Start: 2023-05-08 | End: 2023-06-06

## 2023-05-08 RX ORDER — MYCOPHENOLATE MOFETIL 250 MG/1
1500 CAPSULE ORAL
Refills: 0 | DISCHARGE

## 2023-05-08 RX ORDER — FUROSEMIDE 40 MG
1 TABLET ORAL
Refills: 0 | DISCHARGE

## 2023-05-08 RX ORDER — HYDROXYCHLOROQUINE SULFATE 200 MG
1 TABLET ORAL
Refills: 0 | DISCHARGE

## 2023-05-08 RX ORDER — ERGOCALCIFEROL 1.25 MG/1
1 CAPSULE ORAL
Qty: 7 | Refills: 0
Start: 2023-05-08 | End: 2023-06-25

## 2023-05-08 RX ORDER — PREGABALIN 225 MG/1
1 CAPSULE ORAL
Qty: 30 | Refills: 0
Start: 2023-05-08 | End: 2023-06-06

## 2023-05-08 RX ADMIN — HEPARIN SODIUM 5000 UNIT(S): 5000 INJECTION INTRAVENOUS; SUBCUTANEOUS at 14:16

## 2023-05-08 RX ADMIN — Medication 200 MILLIGRAM(S): at 11:26

## 2023-05-08 RX ADMIN — Medication 1300 MILLIGRAM(S): at 17:26

## 2023-05-08 RX ADMIN — Medication 60 MILLIGRAM(S): at 09:22

## 2023-05-08 RX ADMIN — PREGABALIN 1000 MICROGRAM(S): 225 CAPSULE ORAL at 11:25

## 2023-05-08 RX ADMIN — Medication 1 MILLIGRAM(S): at 11:26

## 2023-05-08 RX ADMIN — HEPARIN SODIUM 5000 UNIT(S): 5000 INJECTION INTRAVENOUS; SUBCUTANEOUS at 05:33

## 2023-05-08 RX ADMIN — Medication 1300 MILLIGRAM(S): at 05:32

## 2023-05-08 RX ADMIN — Medication 1 TABLET(S): at 09:20

## 2023-05-08 RX ADMIN — PANTOPRAZOLE SODIUM 40 MILLIGRAM(S): 20 TABLET, DELAYED RELEASE ORAL at 05:33

## 2023-05-08 NOTE — PROGRESS NOTE ADULT - PROBLEM SELECTOR PLAN 2
presented with nausea and vomiting, which has since resolved   Pt. with metabolic acidosis in setting of kidney failure.   -C/w PO sodium bicarbonate 1300 mg BID.

## 2023-05-08 NOTE — PROGRESS NOTE ADULT - SUBJECTIVE AND OBJECTIVE BOX
PROGRESS NOTE:    Paulina Jose MD  Internal Medicine PGY-2  Available on [employer] Teams      Patient is a 48y old  Male who presents with a chief complaint of Nausea & Vomiting (07 May 2023 07:47)      SUBJECTIVE / OVERNIGHT EVENTS: No acute overnight events. Pt seen and examined. Denies fevers, chills, CP, SOB, Abdominal pain, N/V, Constipation, Diarrhea      MEDICATIONS  (STANDING):  cyanocobalamin 1000 MICROGram(s) Oral daily  ergocalciferol 91610 Unit(s) Oral <User Schedule>  folic acid 1 milliGRAM(s) Oral daily  heparin   Injectable 5000 Unit(s) SubCutaneous every 8 hours  hydroxychloroquine 400 milliGRAM(s) Oral <User Schedule>  hydroxychloroquine 200 milliGRAM(s) Oral <User Schedule>  methylPREDNISolone sodium succinate Injectable 60 milliGRAM(s) IV Push every 24 hours  pantoprazole    Tablet 40 milliGRAM(s) Oral before breakfast  sodium bicarbonate 1300 milliGRAM(s) Oral every 12 hours  trimethoprim   80 mG/sulfamethoxazole 400 mG 1 Tablet(s) Oral <User Schedule>    MEDICATIONS  (PRN):      I&O's Summary      Vital Signs Last 24 Hrs  T(C): 36.7 (08 May 2023 05:10), Max: 36.7 (08 May 2023 05:10)  T(F): 98 (08 May 2023 05:10), Max: 98 (08 May 2023 05:10)  HR: 68 (08 May 2023 05:10) (68 - 79)  BP: 117/77 (08 May 2023 05:10) (117/77 - 122/76)  BP(mean): --  RR: 17 (08 May 2023 05:10) (17 - 17)  SpO2: 100% (08 May 2023 05:10) (100% - 100%)    Parameters below as of 08 May 2023 05:10  Patient On (Oxygen Delivery Method): room air        =================PHYSICAL EXAM=================    GENERAL: NAD, lying in bed comfortably  HEAD:  Atraumatic, Normocephalic  EYES: EOMI, PERRLA, conjunctiva and sclera clear  ENT: Moist mucous membranes  NECK: Supple, No JVD  CHEST/LUNG: Clear to auscultation bilaterally; No rales, rhonchi, wheezing, or rubs. Unlabored respirations  HEART: Regular rate and rhythm; No murmurs, rubs, or gallops  ABDOMEN: Bowel sounds present; Soft, Nontender, Nondistended. No hepatomegally  EXTREMITIES:  2+ Peripheral Pulses, brisk capillary refill. No clubbing, cyanosis, or edema  NERVOUS SYSTEM:  Alert & Oriented X3, speech clear. No deficits   MSK: FROM all 4 extremities, full and equal strength  SKIN: No rashes or lesions    =================================================    LABS:                        7.4    10.35 )-----------( 307      ( 07 May 2023 06:35 )             23.4     Auto Eosinophil # 0.00  / Auto Eosinophil % 0.0   / Auto Neutrophil # 8.26  / Auto Neutrophil % 79.9  / BANDS % x        05-07    139  |  109<H>  |  69<H>  ----------------------------<  99  4.4   |  19<L>  |  1.84<H>    Ca    8.4      07 May 2023 06:35  Mg     2.10     05-07  Phos  4.9     05-07  TPro  4.4<L>  /  Alb  2.1<L>  /  TBili  <0.2  /  DBili  x   /  AST  10  /  ALT  10  /  AlkPhos  49  05-07                  RADIOLOGY & ADDITIONAL TESTS:    Imaging Personally Reviewed:    Consultant(s) Notes Reviewed:      Care Discussed with Consultants/Other Providers:   PROGRESS NOTE:    Paulina Jose MD  Internal Medicine PGY-2  Available on [employer] Teams      Patient is a 48y old  Male who presents with a chief complaint of Nausea & Vomiting (07 May 2023 07:47)      SUBJECTIVE / OVERNIGHT EVENTS: No acute overnight events. Pt seen and examined. He states that he feels well. Denies fevers, chills, CP, SOB, Abdominal pain, N/V, Constipation, Diarrhea      MEDICATIONS  (STANDING):  cyanocobalamin 1000 MICROGram(s) Oral daily  ergocalciferol 35545 Unit(s) Oral <User Schedule>  folic acid 1 milliGRAM(s) Oral daily  heparin   Injectable 5000 Unit(s) SubCutaneous every 8 hours  hydroxychloroquine 400 milliGRAM(s) Oral <User Schedule>  hydroxychloroquine 200 milliGRAM(s) Oral <User Schedule>  methylPREDNISolone sodium succinate Injectable 60 milliGRAM(s) IV Push every 24 hours  pantoprazole    Tablet 40 milliGRAM(s) Oral before breakfast  sodium bicarbonate 1300 milliGRAM(s) Oral every 12 hours  trimethoprim   80 mG/sulfamethoxazole 400 mG 1 Tablet(s) Oral <User Schedule>    MEDICATIONS  (PRN):      I&O's Summary      Vital Signs Last 24 Hrs  T(C): 36.7 (08 May 2023 05:10), Max: 36.7 (08 May 2023 05:10)  T(F): 98 (08 May 2023 05:10), Max: 98 (08 May 2023 05:10)  HR: 68 (08 May 2023 05:10) (68 - 79)  BP: 117/77 (08 May 2023 05:10) (117/77 - 122/76)  BP(mean): --  RR: 17 (08 May 2023 05:10) (17 - 17)  SpO2: 100% (08 May 2023 05:10) (100% - 100%)    Parameters below as of 08 May 2023 05:10  Patient On (Oxygen Delivery Method): room air        =================PHYSICAL EXAM=================    GENERAL: NAD, lying in bed comfortably  HEAD:  Atraumatic, Normocephalic  EYES: EOMI, PERRLA, conjunctiva and sclera clear  NECK: Supple  CHEST/LUNG: Clear to auscultation bilaterally; No rales, rhonchi, wheezing, or rubs. Unlabored respirations  HEART: Regular rate and rhythm; No murmurs, rubs, or gallops  ABDOMEN: Bowel sounds present; Soft, Nontender, Nondistended. No hepatomegally  EXTREMITIES:  2+ Peripheral Pulses, brisk capillary refill. No clubbing, cyanosis, or edema  NERVOUS SYSTEM:  Alert & Oriented X3, speech clear. No deficits   MSK: FROM all 4 extremities, full and equal strength  SKIN: No rashes or lesions    =================================================    LABS:                        7.4    10.35 )-----------( 307      ( 07 May 2023 06:35 )             23.4     Auto Eosinophil # 0.00  / Auto Eosinophil % 0.0   / Auto Neutrophil # 8.26  / Auto Neutrophil % 79.9  / BANDS % x        05-07    139  |  109<H>  |  69<H>  ----------------------------<  99  4.4   |  19<L>  |  1.84<H>    Ca    8.4      07 May 2023 06:35  Mg     2.10     05-07  Phos  4.9     05-07  TPro  4.4<L>  /  Alb  2.1<L>  /  TBili  <0.2  /  DBili  x   /  AST  10  /  ALT  10  /  AlkPhos  49  05-07                  RADIOLOGY & ADDITIONAL TESTS:    Imaging Personally Reviewed:    Consultant(s) Notes Reviewed:      Care Discussed with Consultants/Other Providers:

## 2023-05-08 NOTE — PROGRESS NOTE ADULT - PROBLEM SELECTOR PLAN 8
Diet: : Diet Renal (Halal), nutrition consulted  DVT: subq heparin   Dispo: pending rheum clearance and steroid taper

## 2023-05-08 NOTE — DISCHARGE NOTE NURSING/CASE MANAGEMENT/SOCIAL WORK - PATIENT PORTAL LINK FT
You can access the FollowMyHealth Patient Portal offered by [facility] by registering at the following website: [email or web address]. By joining [employer]’s FollowMyHealth portal, you will also be able to view your health information using other applications (apps) compatible with our system.

## 2023-05-08 NOTE — PROGRESS NOTE ADULT - PROBLEM SELECTOR PROBLEM 6
Prophylactic measure
Vitamin D deficiency
Prophylactic measure
Prophylactic measure

## 2023-05-08 NOTE — PROGRESS NOTE ADULT - PROBLEM SELECTOR PROBLEM 2
Metabolic acidosis
Metabolic acidosis
Normocytic anemia
Normocytic anemia
Metabolic acidosis
Metabolic acidosis
Normocytic anemia
Metabolic acidosis
Normocytic anemia
Metabolic acidosis

## 2023-05-08 NOTE — PROGRESS NOTE ADULT - PROBLEM SELECTOR PLAN 2
Pt. with metabolic acidosis in setting of kidney failure. SCO2 low/improved at 20 today. Continue PO sodium bicarbonate 1300 mg BID. Monitor SCO2.    If you have any questions, please feel free to contact me  Diogo Braga  Nephrology Fellow  [phone]/ [employer] Teams(Preferred)  (After 5pm or on weekends please page the on-call fellow). Pt. with metabolic acidosis in setting of kidney failure. SCO2 low at 20 today. Continue PO sodium bicarbonate 1300 mg BID. Monitor SCO2.    If you have any questions, please feel free to contact me  Diogo Braga  Nephrology Fellow  [phone]/ [employer] Teams(Preferred)  (After 5pm or on weekends please page the on-call fellow).

## 2023-05-08 NOTE — PROGRESS NOTE ADULT - ATTENDING SUPERVISION STATEMENT
Resident
Fellow
Resident

## 2023-05-08 NOTE — PROGRESS NOTE ADULT - PROBLEM SELECTOR PROBLEM 4
Lupus nephritis
Lupus
Lupus nephritis

## 2023-05-08 NOTE — PROGRESS NOTE ADULT - PROBLEM SELECTOR PLAN 5
SLE/LN (Class IV, crescentic disease)  biopsy 5/2/23 on this admission showed proliferative LN class IV-G with 5 out of 8 non-globally sclerosed glomeruli with cellular and fibrocellular crescents; moderate tubulointerstitial inflammation,   -management as above

## 2023-05-08 NOTE — PROGRESS NOTE ADULT - PROBLEM SELECTOR PLAN 1
Cr baseline 1.5-1.7 11/2022.   Admitted with Cr 2.5 with borderline hyperkalemia; no ECG changes. Pt denies decreased UOP. Urine studies suggesting pre-renal etiology (FeNa 0.7%), but pt takes diuretics, so unclear reliability (though would expect MORE Na wasting with diuresis, not less). UPCR with nephrotic-range proteinuria as well, which is consistent with known LN.  Creatinine improving, HyperK improved.   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  Cr. 1.96  SBP at goal, but b/l LE edema suggestive of volume overload (vs decreased oncotic pressure ico nephrotic syndrome).   -hold home  Furosemide 20mg PO QD (ico LIZETH)  -continue to hold  Torsemide   -hold Home Lisinpril 10mg PO QD (ico LIZETH)  -outpatient nephrology follow up

## 2023-05-08 NOTE — PROGRESS NOTE ADULT - PROBLEM SELECTOR PLAN 3
previously with AoCD but baseline higher; unclear if ANGUIANO is symptomatic anemia  -- maintain active T&S   -- Iron Studies (iron replete), B12 (304), Folate (5.7)  -- S/P Cyanocobalamin 1kU IM QDx 7d, now on PO  -- C/W Folate 1mg PO QD

## 2023-05-08 NOTE — PROGRESS NOTE ADULT - PROVIDER SPECIALTY LIST ADULT
Anesthesia
Nephrology
Internal Medicine
Rheumatology
Rheumatology
Internal Medicine
Rheumatology
Nephrology
Internal Medicine
Internal Medicine
Nephrology
Internal Medicine

## 2023-05-08 NOTE — PROGRESS NOTE ADULT - PROBLEM SELECTOR PLAN 7
Pt with presented with ANGUIANO, fatigue. B/l Leg swelling. Cardiac evaluation unremarkable. May be 2/2 anemia. B12 borderline low but EF normal (not wet beriberi). May benefit from further OP testing with pulm/cards. Overall improved.   - TTE (EF 62%, unremarkable echo), ECG  - Trop, BNP WNL  - ctm

## 2023-05-08 NOTE — PROGRESS NOTE ADULT - PROBLEM SELECTOR PROBLEM 5
Dyspnea on exertion
Lupus nephritis

## 2023-05-08 NOTE — PROGRESS NOTE ADULT - NUTRITIONAL ASSESSMENT
This patient has been assessed with a concern for Malnutrition and has been determined to have a diagnosis/diagnoses of Moderate protein-calorie malnutrition.    This patient is being managed with:   Diet NPO-  Entered: May  2 2023  9:59AM    Diet NPO after Midnight-     NPO Start Date: 01-May-2023   NPO Start Time: 23:59  Except Medications  Entered: May  1 2023  4:06PM  
This patient has been assessed with a concern for Malnutrition and has been determined to have a diagnosis/diagnoses of Moderate protein-calorie malnutrition.    This patient is being managed with:   Diet Regular-  For patients receiving Renal Replacement - No Protein Restr No Conc K No Conc Phos Low Sodium (RENAL)  Halal  No Beef  No Pork  Entered: May  2 2023  7:07PM  
This patient has been assessed with a concern for Malnutrition and has been determined to have a diagnosis/diagnoses of Moderate protein-calorie malnutrition.    This patient is being managed with:   Diet Regular-  For patients receiving Renal Replacement - No Protein Restr No Conc K No Conc Phos Low Sodium (RENAL)  Halal  No Beef  No Pork  Entered: Apr 28 2023  9:45AM  
This patient has been assessed with a concern for Malnutrition and has been determined to have a diagnosis/diagnoses of Moderate protein-calorie malnutrition.    This patient is being managed with:   Diet Regular-  For patients receiving Renal Replacement - No Protein Restr No Conc K No Conc Phos Low Sodium (RENAL)  Halal  No Beef  No Pork  Entered: Apr 28 2023  9:45AM  
This patient has been assessed with a concern for Malnutrition and has been determined to have a diagnosis/diagnoses of Moderate protein-calorie malnutrition.    This patient is being managed with:   Diet Regular-  For patients receiving Renal Replacement - No Protein Restr No Conc K No Conc Phos Low Sodium (RENAL)  Halal  No Beef  No Pork  Entered: May  2 2023  7:07PM  
This patient has been assessed with a concern for Malnutrition and has been determined to have a diagnosis/diagnoses of Moderate protein-calorie malnutrition.    This patient is being managed with:   Diet Regular-  For patients receiving Renal Replacement - No Protein Restr No Conc K No Conc Phos Low Sodium (RENAL)  Halal  No Beef  No Pork  Entered: May  2 2023  7:07PM

## 2023-05-08 NOTE — PROGRESS NOTE ADULT - SUBJECTIVE AND OBJECTIVE BOX
[facility] DIVISION OF KIDNEY DISEASES AND HYPERTENSION -- FOLLOW UP NOTE  --------------------------------------------------------------------------------  Chief Complaint: LIZETH on CKD, Lupus nephritis    HPI: Pt. follows with nephrologist Dr. Hayde Shultz as outpatient for CKD care. 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 Pt. underwent kidney biopsy by IR team on 5/2/23. Pt. with kidney biopsy findings of crescentic GN/LN. Pt. received on IV solumedrol 1 g on 5/4 and 5/5 as per rheumatology team. Scr increased to 2.41 on 5/5/23. PO Torsemide held. Pt. received first dose of IV Cytoxan on 5/5/23. Scr elevated/improved at 1.96 today. Pt. currently nonoliguric.     24 hour events/subjective: Pt. seen and examined today. Pt. reports feeling well. No fever, SOB, CP or dysuria.     PAST HISTORY  --------------------------------------------------------------------------------  No significant changes to PMH, PSH, FHx, SHx, unless otherwise noted    ALLERGIES & MEDICATIONS  --------------------------------------------------------------------------------  Allergies    No Known Allergies    Intolerances    Standing Inpatient Medications  cyanocobalamin 1000 MICROGram(s) Oral daily  ergocalciferol 09992 Unit(s) Oral <User Schedule>  folic acid 1 milliGRAM(s) Oral daily  heparin   Injectable 5000 Unit(s) SubCutaneous every 8 hours  hydroxychloroquine 400 milliGRAM(s) Oral <User Schedule>  hydroxychloroquine 200 milliGRAM(s) Oral <User Schedule>  methylPREDNISolone sodium succinate Injectable 60 milliGRAM(s) IV Push every 24 hours  pantoprazole    Tablet 40 milliGRAM(s) Oral before breakfast  sodium bicarbonate 1300 milliGRAM(s) Oral every 12 hours  trimethoprim   80 mG/sulfamethoxazole 400 mG 1 Tablet(s) Oral <User Schedule>    PRN Inpatient Medications    REVIEW OF SYSTEMS  --------------------------------------------------------------------------------  Gen: No fevers/chills  Head/Eyes/Ears: No HA   Respiratory: No dyspnea, cough  CV: No chest pain  GI: No abdominal pain, diarrhea  : No dysuria, hematuria  MSK: LE edema  Skin: No rashes  Heme: Anemia  All other systems were reviewed and are negative, except as noted.    VITALS/PHYSICAL EXAM  --------------------------------------------------------------------------------  T(C): 36.7 (05-08-23 @ 05:10), Max: 36.7 (05-08-23 @ 05:10)  HR: 68 (05-08-23 @ 05:10) (68 - 79)  BP: 117/77 (05-08-23 @ 05:10) (117/77 - 122/76)  RR: 17 (05-08-23 @ 05:10) (17 - 17)  SpO2: 100% (05-08-23 @ 05:10) (100% - 100%)  Wt(kg): --    Physical Exam:  	Gen: sitting comfortably in bed, NAD  	HEENT: Anicteric  	Pulm: CTA B/L  	CV: S1S2+  	Abd: Soft, +BS       	Ext: Decreased B/L LE pitting edema+  	Neuro: Awake, alert     	Skin: Warm and dry    LABS/STUDIES  --------------------------------------------------------------------------------              7.6    8.20  >-----------<  300      [05-08-23 @ 06:14]              24.6     138  |  108  |  77  ----------------------------<  94      [05-08-23 @ 06:14]  4.6   |  20  |  1.96        Ca     8.2     [05-08-23 @ 06:14]      Mg     2.10     [05-08-23 @ 06:14]      Phos  4.9     [05-08-23 @ 06:14]    TPro  4.1  /  Alb  2.0  /  TBili  <0.2  /  DBili  x   /  AST  10  /  ALT  9   /  AlkPhos  44  [05-08-23 @ 06:14]    Creatinine Trend:  SCr 1.96 [05-08 @ 06:14]  SCr 1.84 [05-07 @ 06:35]  SCr 2.38 [05-06 @ 06:30]  SCr 2.41 [05-05 @ 06:40]  SCr 2.03 [05-04 @ 07:05]    Urinalysis - [05-05-23 @ 13:52]      Color Light Yellow / Appearance Clear / SG 1.011 / pH 6.0      Gluc Negative / Ketone Negative  / Bili Negative / Urobili <2 mg/dL       Blood Large / Protein 100 mg/dL / Leuk Est Negative / Nitrite Negative      RBC 10 / WBC 4 / Hyaline 11 / Gran  / Sq Epi  / Non Sq Epi  / Bacteria Occasional    Iron 78, TIBC 172, %sat 45      [04-28-23 @ 09:08]  Ferritin 537      [04-28-23 @ 09:08]  PTH -- (Ca --)      [05-03-23 @ 06:33]   28  Vitamin D (25OH) 6.8      [05-03-23 @ 06:33]    HBsAb Nonreact      [05-04-23 @ 07:05]  HBsAg Nonreact      [05-04-23 @ 07:05]  HBcAb Nonreact      [05-04-23 @ 07:05]  HCV 0.06, Nonreact      [05-04-23 @ 07:05]    dsDNA 14      [04-28-23 @ 09:08]  C3 Complement 83      [04-28-23 @ 09:08]  C4 Complement 26      [04-28-23 @ 09:08] [facility] DIVISION OF KIDNEY DISEASES AND HYPERTENSION -- FOLLOW UP NOTE  --------------------------------------------------------------------------------  Chief Complaint: LIZETH on CKD, Lupus nephritis    HPI: Pt. follows with nephrologist Dr. Hayde Shultz as outpatient for CKD care. 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 Pt. underwent kidney biopsy by IR team on 5/2/23. Pt. with kidney biopsy findings of crescentic GN/LN. Pt. received on IV Solumedrol 1 g from 5/4/23-5/6/23 as per rheumatology team. Scr increased to 2.41 on 5/5/23. PO Torsemide held. Pt. received first dose of IV Cytoxan on 5/5/23. Scr elevated at 1.96 today. Pt. currently nonoliguric.     24 hour events/subjective: Pt. seen and examined today. Pt. reports feeling well. No fever, SOB, CP or dysuria.     PAST HISTORY  --------------------------------------------------------------------------------  No significant changes to PMH, PSH, FHx, SHx, unless otherwise noted    ALLERGIES & MEDICATIONS  --------------------------------------------------------------------------------  Allergies    No Known Allergies    Intolerances    Standing Inpatient Medications  cyanocobalamin 1000 MICROGram(s) Oral daily  ergocalciferol 68577 Unit(s) Oral <User Schedule>  folic acid 1 milliGRAM(s) Oral daily  heparin   Injectable 5000 Unit(s) SubCutaneous every 8 hours  hydroxychloroquine 400 milliGRAM(s) Oral <User Schedule>  hydroxychloroquine 200 milliGRAM(s) Oral <User Schedule>  methylPREDNISolone sodium succinate Injectable 60 milliGRAM(s) IV Push every 24 hours  pantoprazole    Tablet 40 milliGRAM(s) Oral before breakfast  sodium bicarbonate 1300 milliGRAM(s) Oral every 12 hours  trimethoprim   80 mG/sulfamethoxazole 400 mG 1 Tablet(s) Oral <User Schedule>    PRN Inpatient Medications    REVIEW OF SYSTEMS  --------------------------------------------------------------------------------  Gen: No fevers/chills  Head/Eyes/Ears: No HA   Respiratory: No dyspnea, cough  CV: No chest pain  GI: No abdominal pain, diarrhea  : No dysuria, hematuria  MSK: LE edema  Skin: No rash  Heme: Anemia  All other systems were reviewed and are negative, except as noted.    VITALS/PHYSICAL EXAM  --------------------------------------------------------------------------------  T(C): 36.7 (05-08-23 @ 05:10), Max: 36.7 (05-08-23 @ 05:10)  HR: 68 (05-08-23 @ 05:10) (68 - 79)  BP: 117/77 (05-08-23 @ 05:10) (117/77 - 122/76)  RR: 17 (05-08-23 @ 05:10) (17 - 17)  SpO2: 100% (05-08-23 @ 05:10) (100% - 100%)  Wt(kg): --    Physical Exam:  	Gen: sitting comfortably in bed, NAD  	HEENT: Anicteric  	Pulm: CTA B/L  	CV: S1S2+  	Abd: Soft, +BS       	Ext: Decreased B/L LE pitting edema+  	Neuro: Awake, alert     	Skin: Warm and dry    LABS/STUDIES  --------------------------------------------------------------------------------              7.6    8.20  >-----------<  300      [05-08-23 @ 06:14]              24.6     138  |  108  |  77  ----------------------------<  94      [05-08-23 @ 06:14]  4.6   |  20  |  1.96        Ca     8.2     [05-08-23 @ 06:14]      Mg     2.10     [05-08-23 @ 06:14]      Phos  4.9     [05-08-23 @ 06:14]    TPro  4.1  /  Alb  2.0  /  TBili  <0.2  /  DBili  x   /  AST  10  /  ALT  9   /  AlkPhos  44  [05-08-23 @ 06:14]    Creatinine Trend:  SCr 1.96 [05-08 @ 06:14]  SCr 1.84 [05-07 @ 06:35]  SCr 2.38 [05-06 @ 06:30]  SCr 2.41 [05-05 @ 06:40]  SCr 2.03 [05-04 @ 07:05]    Urinalysis - [05-05-23 @ 13:52]      Color Light Yellow / Appearance Clear / SG 1.011 / pH 6.0      Gluc Negative / Ketone Negative  / Bili Negative / Urobili <2 mg/dL       Blood Large / Protein 100 mg/dL / Leuk Est Negative / Nitrite Negative      RBC 10 / WBC 4 / Hyaline 11 / Gran  / Sq Epi  / Non Sq Epi  / Bacteria Occasional    Iron 78, TIBC 172, %sat 45      [04-28-23 @ 09:08]  Ferritin 537      [04-28-23 @ 09:08]  PTH -- (Ca --)      [05-03-23 @ 06:33]   28  Vitamin D (25OH) 6.8      [05-03-23 @ 06:33]    HBsAb Nonreact      [05-04-23 @ 07:05]  HBsAg Nonreact      [05-04-23 @ 07:05]  HBcAb Nonreact      [05-04-23 @ 07:05]  HCV 0.06, Nonreact      [05-04-23 @ 07:05]    dsDNA 14      [04-28-23 @ 09:08]  C3 Complement 83      [04-28-23 @ 09:08]  C4 Complement 26      [04-28-23 @ 09:08]

## 2023-05-08 NOTE — PROGRESS NOTE ADULT - PROBLEM SELECTOR PLAN 1
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 Pt. was diagnosed with SLE and LN during hospital stay at [facility] in Feb 2022. Kidney biopsy performed on 2/17/22 which showed focal proliferative type, class 3 LN. Pt. initiated on immunosuppressives for SLE/LN by rheumatology team. Pt. with history of noncompliance to his medications/immunosuppressive therapy. Pt was readmitted 10/13/22 for worsening Scr and proteinuria. Pt underwent kidney biopsy on 10/17/22 during that hospitalization. 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 Pt. received Immunosuppressive therapy for SLE/class IV LN by rheumatology team. Pt. follows nephrologist Dr. Hayde Shultz for CKD care. Upon review of labs, Scr was 1.5 on 1/11/22. Scr increased to 1.79 on 4/7/22 on OP labs. Scr was elevated at 2.55 on admission (4/27/23). Pt. underwent kidney biopsy by IR team on 5/2/23. 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 Rheumatology follow-up note from 5/3/23 reviewed. Pt. received on IV solumedrol 1 g on 5/4 and 5/5 as per rheumatology team. Scr further increased to 2.41 on 5/5/23. PO Torsemide held. Pt. received first dose of IV Cytoxan on 5/5/23. Scr elevated/improved at 1.96 today. Pt. currently nonoliguric. Low salt diet. Fluid restriction. Monitor labs and urine output. Avoid any potential nephrotoxins. Dose medications as per eGFR. 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 Pt. was diagnosed with SLE and LN during hospital stay at [facility] in Feb 2022. Kidney biopsy performed on 2/17/22 which showed focal proliferative type, class 3 LN. Pt. initiated on immunosuppressives for SLE/LN by rheumatology team. Pt. with history of noncompliance to his medications/immunosuppressive therapy. Pt was readmitted 10/13/22 for worsening Scr and proteinuria. Pt underwent kidney biopsy on 10/17/22 during that hospitalization. 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 Pt. received Immunosuppressive therapy for SLE/class IV LN by rheumatology team. Pt. follows nephrologist Dr. Hayde Shultz for CKD care. Upon review of labs, Scr was 1.5 on 1/11/22. Scr increased to 1.79 on 4/7/22 on OP labs. Scr was elevated at 2.55 on admission (4/27/23). Pt. underwent kidney biopsy by IR team on 5/2/23. 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 Rheumatology follow-up note from 5/3/23 reviewed. Pt. received on IV solumedrol 1 g from 5/4/23 to 5/6/23 as per rheumatology team. Scr further increased to 2.41 on 5/5/23. PO Torsemide held. Pt. received first dose of IV Cytoxan on 5/5/23. Scr elevated at 1.96 today. Pt. currently nonoliguric. Low salt diet. Fluid restriction. Monitor labs and urine output. Avoid any potential nephrotoxins. Dose medications as per eGFR.

## 2023-05-08 NOTE — PROGRESS NOTE ADULT - ASSESSMENT
SYNTHESIS  48M with h/o SLE c/b LN3/4 who presents with persistent nausea/vomiting after medication administration at home and found to have LIZETH vs progression of LN/CKD who is otherwise hemodynamically stable awaiting nephrology and rheumatology evaluation.

## 2023-05-08 NOTE — PROGRESS NOTE ADULT - PROBLEM SELECTOR PROBLEM 1
Acute kidney injury superimposed on CKD
Nausea and vomiting
Acute kidney injury superimposed on CKD
Nausea and vomiting
Nausea and vomiting
Acute kidney injury superimposed on CKD
Nausea and vomiting
Nausea and vomiting
Acute kidney injury superimposed on CKD
Nausea and vomiting

## 2023-05-08 NOTE — PROGRESS NOTE ADULT - PROBLEM SELECTOR PLAN 4
OP Quant Gold Negative 02/2023  Hep Panel Negative this admission  D/C Myfortic 720 ER PO BID (1h pre or 2h post meals)  D/C Home Prednisone 5mg PO QD  D/C MMF 1500mg PO BID  S/P Methylprednisolone 1g IV QD x3d (5/4-5/6)  -rheum recs appreciated  -- C/W Methylpred 60mg IV QD (5/7-), f/u rheum regarding steroid taper  -C/W CYC 500mg Q2W o5fdscc (5/5-)  outpatient   -C/W Home TMP/-80 PO TIW   - F/U mycophenolic acid level  -C/W HCQ 400mg PO TTS, 200mg PO MWF

## 2023-05-08 NOTE — PROGRESS NOTE ADULT - ATTENDING COMMENTS
Active lupus nephritis despite tx with mmf and with obinituzumab  d/c mmf  start pulse steroids tomorrow morning   cytoxan 500mg IVSS  with mesna and premeds x 1 tomorrow   Plan for cytoxan Q 2 weeks x 6 doses (first dose in hospital and subsequent doses as outpatient)   Simi Freire MD  [phone]
agree with above      Dr. Bernie Hung  Rheumatology Attending  [facility]  Rheumatology at [place]     Contact:   call the office:: [phone] or reach [place] [employer] Teams, weekdays before 5 pm   after 5 pm or on weekends, contact [phone]
stephenie rheum attending - considering plan for possible clinical trials and that nephritis w progression on prior bx, would want repeat bx now depside improvement in renal fx  for IR bx today  stephenie rheum attending - no objection to dc home w out-pt f.u post bx if ok from IR perspective
SLE with nephritis   s/p pulse steroid x1 today and will continue for 3 days   cytoxan 500mg tomorrow and then Q 2 weeks x 12 weeks     Simi Freire MD  [phone]
Pt. with LIZETH on CKD in setting of SLE/active LN. 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 Scr elevated at 2.41 today. Assessment and plan for LIZETH on CKD as outlined above. Rheumatology follow-up note from 5/4/23 reviewed. Monitor labs and urine output. Avoid any potential nephrotoxins. Dose medications as per eGFR.
Pt. with LIZETH on CKD in setting of SLE/active LN. Pt. with preliminary findings of crescentic GN/LN on kidney biopsy done on 5/2/23. Scr elevated at 2.03 today. Assessment and plan for LIZETH on CKD as outlined above. Rheumatology follow-up note from 5/3/23 reviewed. Pt. with LE edema, on oral diuretic therapy as outlined above. Monitor labs and urine output. Avoid any potential nephrotoxins. Dose medications as per eGFR.
Pt. with LIZETH on CKD in setting of SLE/active LN. Pt. with preliminary findings of crescentic GN/LN on kidney biopsy done on 5/2/23. Scr increased to 2.0 today. Assessment and plan for LIZETH on CKD as outlined above. Recommend rheumatology follow-up. Pt. with LE edema, add oral diuretic therapy as outlined above. Monitor labs and urine output. Avoid any potential nephrotoxins. Dose medications as per eGFR.
Pt. with LIZETH on CKD in setting of SLE/active LN. 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 Scr elevated at 1.96 today. Assessment and plan for LIZETH on CKD as outlined above. Monitor labs and urine output. Avoid any potential nephrotoxins. Dose medications as per eGFR.
Pt. with LIZETH on CKD in setting of SLE/active LN. 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 Scr elevated at 2.38 today. Assessment and plan for LIZETH on CKD as outlined above. Rheumatology follow-up note from 5/4/23 reviewed. Monitor labs and urine output. Avoid any potential nephrotoxins. Dose medications as per eGFR.
LIZETH continues to improve  per rheum changed Cellcept to Myfortic, check mycophenolic level and cw prednisone, Plaquenil  C3 low, C4 normal, DsDNA pending  per renal plan for bx - will fu renal if still indicated considering LIZETH improving - for now on IR schedule   TTE normal, no e/o CHF - some peripheral edema likely d.t 3rd spacing 2 to low albumin - nutrition eval  anemia - iron studies compatible w AOCD, no overt bleed - also on immunomodulators so likely component of marrow suppression - hgb down to 7.5, might need transfusion later - will trend - B12 borderline, will supplement
path concerning for acute SLE nephritis  MMF dcd  plan to start pulse steroids today x 3 days  Cytoxan connor  volume overload w normal TTE d.t renal insufficiency - started on po diuretic per renal  LIZETH appears to be plateauing today, trend renal fx   anemia of chronic dz - count stable today - monitor
pt reporting foamy urine and hematuria - denies dysuria - likely d.t nephritic syndrome  check UA to assure no infx as on immunosuppressives  LIZETH w up-trending renal fx today, d.t crescentic lupus nephritis  - dw renal attending Dr Helm this morning  -as peripheral edema (d.t LIZETH is improved and normal TTE) DC torsemide and trend renal fx   now day 2/3 of pulse steroids and plan for Cytoxan infusion today with steroid titration down to solumedrol 60 mg daily after pulse dosing  HCQ inc to 400/200mg alternating doses per rheum, Myfortic dcd as no response   raymundo rheum and renal f.u
LIZETH continues to improve - awaiting BMP for today   per rheum changed Cellcept to Myfortic, f.u mycophenolic level and cw prednisone, Plaquenil  C3 low, C4 normal, DsDNA pending  per renal plan for bx - will fu renal if still indicated considering LIZETH improving - for now on IR schedule   TTE normal, no e/o CHF - some peripheral edema likely d.t 3rd spacing 2 to low albumin - nutrition eval  anemia - iron studies compatible w AOCD, no overt bleed - also on immunomodulators so likely component of marrow suppression - hgb stable today, might need transfusion later - will trend - B12 borderline, supplementing
awaiting renal and rheum input on bx considering resolved LIZETH  DsDNA neg  for now on IR schedule for connor
48 y.o. M w/ lupus hospitalized for LIZETH 2/2 lupus nephritis s/p Cytoxan infusion 5/5 on steroids.     Patient feels fine, no complaints.  Cr improved to 1.84 today   Cont Solumedrol 60, Plaquenil
48 y.o. M w/ lupus hospitalized for LIZETH 2/2 lupus nephritis s/p Cytoxan infusion 5/5 on steroids.     Patient feels fine, no complaints.  Cr stable   Cont Plaquenil, solumedrol
renal fx up-trending now  dw renal - rec to remain inpt for prelim path   renal & rheum follow up
48M with PMH of SLE, c/b lupus nephritis, class IV, here w/ NV found to have LIZETH w/ concerns for progression of lupus nephritis. Pt received dose of Cytoxan. He was also started on IV steroid. Remains hospitalized pending further improvement.    Pt seen at bedside. No complaints. Voiding w/o issues. No leg swelling. No f/c.    -Continue dex. F/u rheum for further recs. Appreciate neph follow up.  -Monitor labs.  -Dispo pending clearance from rheum/neph consultants.

## 2023-05-08 NOTE — CHART NOTE - NSCHARTNOTEFT_GEN_A_CORE
pt s/p cytoxan induction 5/5/23 with remainder to be outpatient. Creatinine stabilized and improving. Plan for IV  mg q2 weeks; will need labs outpatient 7-14 days after Cytoxan infusion to assess for WBC herrera. Can be discharged on prednisone 60mg qd, PCP ppx, and PPI daily for GI ppx. Will f/u w/ his rheumatologist Dr. Nilton Le for further treatment.    Discussed with Attending Dr. Abhilash Shepherd DO  Rheumatology Fellow  Pager: [phone]  Available on TEAMS pt s/p cytoxan induction 5/5/23 with remainder to be outpatient. Creatinine stabilized and improving. Plan for IV  mg q2 weeks; will need labs outpatient 7-14 days after Cytoxan infusion to assess for WBC herrera. Can be discharged on prednisone 60mg qd, PCP ppx, and PPI daily for GI ppx. Will f/u w/ his rheumatologist Dr. Nilton Le for further treatment.    Will sign off. Please call/TEAMS if any questions  Discussed with Attending Dr. Abhilash Shepherd DO  Rheumatology Fellow  Pager: [phone]  Available on TEAMS

## 2023-05-09 ENCOUNTER — NON-APPOINTMENT (OUTPATIENT)
Age: 48
End: 2023-05-09

## 2023-05-09 PROBLEM — M32.9 SYSTEMIC LUPUS ERYTHEMATOSUS, UNSPECIFIED: Chronic | Status: ACTIVE | Noted: 2023-04-28

## 2023-05-09 RX ORDER — DIPHENHYDRAMINE HCL 50 MG/1
50 CAPSULE ORAL
Qty: 1 | Refills: 0 | Status: DISCONTINUED | COMMUNITY
Start: 2023-05-09 | End: 2023-05-09

## 2023-05-09 RX ORDER — CAMPHOR 0.45 %
25 GEL (GRAM) TOPICAL
Refills: 0 | Status: DISCONTINUED | OUTPATIENT
Start: 2023-05-04 | End: 2023-05-09

## 2023-05-09 RX ORDER — CYCLOPHOSPHAMIDE 200 MG/ML
500 INJECTION, SOLUTION INTRAVENOUS
Qty: 2.5 | Refills: 0 | Status: DISCONTINUED | COMMUNITY
Start: 2023-05-09 | End: 2023-05-09

## 2023-05-09 RX ORDER — SODIUM CHLORIDE 9 G/ML
0.9 INJECTION, SOLUTION INTRAVENOUS
Qty: 1 | Refills: 0 | Status: DISCONTINUED | COMMUNITY
Start: 2023-05-09 | End: 2023-05-09

## 2023-05-09 RX ORDER — ACETAMINOPHEN 325 MG/1
325 TABLET, FILM COATED ORAL
Qty: 2 | Refills: 0 | Status: DISCONTINUED | COMMUNITY
Start: 2023-05-09 | End: 2023-05-09

## 2023-05-16 ENCOUNTER — APPOINTMENT (OUTPATIENT)
Dept: RHEUMATOLOGY | Facility: CLINIC | Age: 48
End: 2023-05-16
Payer: COMMERCIAL

## 2023-05-16 ENCOUNTER — LABORATORY RESULT (OUTPATIENT)
Age: 48
End: 2023-05-16

## 2023-05-16 VITALS
TEMPERATURE: 97.1 F | WEIGHT: 156 LBS | DIASTOLIC BLOOD PRESSURE: 78 MMHG | HEART RATE: 78 BPM | OXYGEN SATURATION: 98 % | BODY MASS INDEX: 25.07 KG/M2 | SYSTOLIC BLOOD PRESSURE: 128 MMHG | RESPIRATION RATE: 16 BRPM | HEIGHT: 66 IN

## 2023-05-16 PROCEDURE — 99214 OFFICE O/P EST MOD 30 MIN: CPT

## 2023-05-16 NOTE — ASSESSMENT
[FreeTextEntry1] : Acute visit for evaluation of LE swelling\par Bilateral LE pitting edema on exam, no erythema/tenderness\par Denies any SOB or cough\par Lasix was stopped upon d/c from the hospital \par I contacted Dr Shultz and advised to resume Lasix 40 mg daily\par Lower prednisone to 50 mgd aily\par Labs done today - will review prior to infusion on 5/19\par Dr Shultz's office to schedule a follow up with him next week \par \par

## 2023-05-16 NOTE — HISTORY OF PRESENT ILLNESS
[FreeTextEntry1] : acute visit\par patient was getting labs done and reported LE swelling was advised to be seen by PMD\par

## 2023-05-16 NOTE — PHYSICAL EXAM
[General Appearance - Alert] : alert [General Appearance - In No Acute Distress] : in no acute distress [FreeTextEntry1] : LE pitting edema, no tenderness no warmth

## 2023-05-17 ENCOUNTER — NON-APPOINTMENT (OUTPATIENT)
Age: 48
End: 2023-05-17

## 2023-05-18 RX ORDER — CYCLOPHOSPHAMIDE 200 MG/ML
500 INJECTION, SOLUTION INTRAVENOUS
Qty: 2.5 | Refills: 0 | Status: DISCONTINUED | COMMUNITY
Start: 2023-05-09 | End: 2023-05-18

## 2023-05-18 RX ORDER — GRANISETRON HYDROCHLORIDE 1 MG/ML
1 INJECTION, SOLUTION INTRAVENOUS
Refills: 0 | Status: DISCONTINUED | OUTPATIENT
Start: 2023-05-04 | End: 2023-05-18

## 2023-05-18 RX ORDER — ACETAMINOPHEN 325 MG/1
325 TABLET, FILM COATED ORAL
Refills: 0 | Status: DISCONTINUED | OUTPATIENT
Start: 2023-05-04 | End: 2023-05-18

## 2023-05-19 ENCOUNTER — APPOINTMENT (OUTPATIENT)
Dept: RHEUMATOLOGY | Facility: CLINIC | Age: 48
End: 2023-05-19
Payer: COMMERCIAL

## 2023-05-19 ENCOUNTER — NON-APPOINTMENT (OUTPATIENT)
Age: 48
End: 2023-05-19

## 2023-05-19 VITALS
SYSTOLIC BLOOD PRESSURE: 129 MMHG | DIASTOLIC BLOOD PRESSURE: 79 MMHG | OXYGEN SATURATION: 97 % | TEMPERATURE: 98.3 F | WEIGHT: 170 LBS | BODY MASS INDEX: 27.44 KG/M2 | HEART RATE: 77 BPM | RESPIRATION RATE: 17 BRPM

## 2023-05-19 VITALS
RESPIRATION RATE: 16 BRPM | OXYGEN SATURATION: 98 % | DIASTOLIC BLOOD PRESSURE: 83 MMHG | SYSTOLIC BLOOD PRESSURE: 139 MMHG | HEART RATE: 70 BPM

## 2023-05-19 PROCEDURE — 96374 THER/PROPH/DIAG INJ IV PUSH: CPT | Mod: 59

## 2023-05-19 PROCEDURE — 96375 TX/PRO/DX INJ NEW DRUG ADDON: CPT | Mod: 59

## 2023-05-19 PROCEDURE — 96413 CHEMO IV INFUSION 1 HR: CPT

## 2023-05-19 RX ORDER — METHYLPREDNISOLONE 125 MG/2ML
125 INJECTION, POWDER, LYOPHILIZED, FOR SOLUTION INTRAMUSCULAR; INTRAVENOUS
Qty: 1 | Refills: 0 | Status: COMPLETED | COMMUNITY
Start: 2022-10-21 | End: 2023-05-19

## 2023-05-19 RX ORDER — CYCLOPHOSPHAMIDE 500 MG/25ML
500 INJECTION, POWDER, FOR SOLUTION INTRAVENOUS; ORAL
Qty: 1 | Refills: 0 | Status: COMPLETED
Start: 2023-05-19

## 2023-05-19 RX ORDER — GRANISETRON HYDROCHLORIDE 1 MG/ML
1 INJECTION, SOLUTION INTRAVENOUS
Qty: 0 | Refills: 0 | Status: COMPLETED
Start: 2023-05-19

## 2023-05-19 RX ORDER — CETIRIZINE HYDROCHLORIDE 10 MG/1
10 TABLET, FILM COATED ORAL
Qty: 1 | Refills: 0 | Status: COMPLETED | COMMUNITY
Start: 2022-10-21 | End: 2023-05-19

## 2023-05-19 RX ORDER — OBINUTUZUMAB 1000 MG/40ML
1000 INJECTION, SOLUTION, CONCENTRATE INTRAVENOUS
Qty: 1 | Refills: 0 | Status: COMPLETED | COMMUNITY
Start: 2022-10-21 | End: 2023-05-19

## 2023-05-19 RX ORDER — MESNA 100 MG/ML
100 INJECTION, SOLUTION INTRAVENOUS
Qty: 0 | Refills: 0 | Status: COMPLETED
Start: 2023-05-04

## 2023-05-19 RX ORDER — SODIUM CHLORIDE 9 G/ML
0.9 INJECTION, SOLUTION INTRAVENOUS
Qty: 0 | Refills: 0 | Status: COMPLETED
Start: 2023-05-19

## 2023-05-19 NOTE — HISTORY OF PRESENT ILLNESS
[N/A] : N/A [Denies] : Denies [No] : No [Yes] : Yes [Declined] : Declined [Left upper extremity] : Left upper extremity [24g] : 24g [Patient  instructed to seek medical attention with signs and symptoms of adverse effects] : Patient  instructed to seek medical attention with signs and symptoms of adverse effects [Patient left unit in no acute distress] : Patient left unit in no acute distress [Medications administered as ordered and tolerated well.] : Medications administered as ordered and tolerated well. [Medication Name: ___] : Medication Name: [unfilled] [Start Time: ___] : Medication Start Time: [unfilled] [End Time: ___] : Medication End Time: [unfilled] [IV discontinued. Intact. No signs or symptoms of IV complications noted. Time: ___] : IV discontinued. Intact. No signs or symptoms of IV complications noted. Time: [unfilled] [de-identified] : 1st dose received in the hospital [de-identified] : Median cubital vein [de-identified] : Patient presents for 2:6 Cytoxan infusion, first dose received in the hospital. Patient doing well overall. Patient denies any recent infection or antibiotic use. Patient reports receiving first dose on May 5th in the hospital and infusion went well. Patient accompanied by nephew. Patient report having swollen feet, weakness, occasional vomiting and loss of taste that has been ongoing for a while. Patient also reports changes in skin color. Patient admits that swelling to legs is very bad. Patient denies any blood to the urine. Patient pre-medicated as ordered and tolerated infusion well.

## 2023-05-22 ENCOUNTER — APPOINTMENT (OUTPATIENT)
Dept: NEPHROLOGY | Facility: CLINIC | Age: 48
End: 2023-05-22
Payer: COMMERCIAL

## 2023-05-22 VITALS
SYSTOLIC BLOOD PRESSURE: 146 MMHG | DIASTOLIC BLOOD PRESSURE: 81 MMHG | OXYGEN SATURATION: 100 % | HEART RATE: 70 BPM | BODY MASS INDEX: 24.45 KG/M2 | WEIGHT: 152.12 LBS | HEIGHT: 66 IN | TEMPERATURE: 98.2 F

## 2023-05-22 PROCEDURE — 99215 OFFICE O/P EST HI 40 MIN: CPT

## 2023-05-23 LAB
ALBUMIN SERPL ELPH-MCNC: 2.3 G/DL
ALP BLD-CCNC: 49 U/L
ALT SERPL-CCNC: 10 U/L
ANION GAP SERPL CALC-SCNC: 9 MMOL/L
AST SERPL-CCNC: 17 U/L
BASOPHILS # BLD AUTO: 0 K/UL
BASOPHILS NFR BLD AUTO: 0 %
BILIRUB SERPL-MCNC: <0.2 MG/DL
BUN SERPL-MCNC: 61 MG/DL
CALCIUM SERPL-MCNC: 8.1 MG/DL
CHLORIDE SERPL-SCNC: 105 MMOL/L
CO2 SERPL-SCNC: 22 MMOL/L
CREAT SERPL-MCNC: 2.35 MG/DL
EGFR: 33 ML/MIN/1.73M2
EOSINOPHIL # BLD AUTO: 0 K/UL
EOSINOPHIL NFR BLD AUTO: 0 %
HCT VFR BLD CALC: 27.7 %
HGB BLD-MCNC: 8.5 G/DL
LYMPHOCYTES # BLD AUTO: 0.25 K/UL
LYMPHOCYTES NFR BLD AUTO: 3.5 %
MAN DIFF?: NORMAL
MCHC RBC-ENTMCNC: 27 PG
MCHC RBC-ENTMCNC: 30.7 GM/DL
MCV RBC AUTO: 87.9 FL
MONOCYTES # BLD AUTO: 0.12 K/UL
MONOCYTES NFR BLD AUTO: 1.7 %
NEUTROPHILS # BLD AUTO: 6.71 K/UL
NEUTROPHILS NFR BLD AUTO: 94.8 %
PLATELET # BLD AUTO: 368 K/UL
POTASSIUM SERPL-SCNC: 5.3 MMOL/L
PROT SERPL-MCNC: 4.2 G/DL
RBC # BLD: 3.15 M/UL
RBC # FLD: 13.8 %
SODIUM SERPL-SCNC: 136 MMOL/L
WBC # FLD AUTO: 7.08 K/UL

## 2023-05-25 ENCOUNTER — NON-APPOINTMENT (OUTPATIENT)
Age: 48
End: 2023-05-25

## 2023-05-25 NOTE — ED ADULT NURSE NOTE - INTERPRETATION SERVICES DECLINED
Pt awake, alert and comfortable at this time. Pt tolerated PO and ambulating without difficulty. Pt breathing easy, unlabored and appears to not to be in distress at this time. Vital signs stable at this time. Patient appropriate for discharge per ER MD at this time. Follow up instructions and signs and symptoms to look for to return to PED given to pt family. No questions at this time.    Patient/Caregiver requests family/friend to interpret.

## 2023-05-27 ENCOUNTER — NON-APPOINTMENT (OUTPATIENT)
Age: 48
End: 2023-05-27

## 2023-05-27 ENCOUNTER — INPATIENT (INPATIENT)
Facility: HOSPITAL | Age: 48
LOS: 9 days | Discharge: HOME | End: 2023-06-06
Attending: STUDENT IN AN ORGANIZED HEALTH CARE EDUCATION/TRAINING PROGRAM | Admitting: STUDENT IN AN ORGANIZED HEALTH CARE EDUCATION/TRAINING PROGRAM
Payer: COMMERCIAL

## 2023-05-27 VITALS
TEMPERATURE: 98 F | HEART RATE: 87 BPM | RESPIRATION RATE: 18 BRPM | SYSTOLIC BLOOD PRESSURE: 131 MMHG | DIASTOLIC BLOOD PRESSURE: 83 MMHG | HEIGHT: 66 IN | OXYGEN SATURATION: 100 %

## 2023-05-27 DIAGNOSIS — Z29.9 ENCOUNTER FOR PROPHYLACTIC MEASURES, UNSPECIFIED: ICD-10-CM

## 2023-05-27 DIAGNOSIS — D64.9 ANEMIA, UNSPECIFIED: ICD-10-CM

## 2023-05-27 DIAGNOSIS — B37.0 CANDIDAL STOMATITIS: ICD-10-CM

## 2023-05-27 DIAGNOSIS — R31.9 HEMATURIA, UNSPECIFIED: ICD-10-CM

## 2023-05-27 DIAGNOSIS — N17.9 ACUTE KIDNEY FAILURE, UNSPECIFIED: ICD-10-CM

## 2023-05-27 DIAGNOSIS — M32.14 GLOMERULAR DISEASE IN SYSTEMIC LUPUS ERYTHEMATOSUS: ICD-10-CM

## 2023-05-27 DIAGNOSIS — M79.89 OTHER SPECIFIED SOFT TISSUE DISORDERS: ICD-10-CM

## 2023-05-27 LAB
ALBUMIN SERPL ELPH-MCNC: 2 G/DL — LOW (ref 3.3–5)
ALP SERPL-CCNC: 45 U/L — SIGNIFICANT CHANGE UP (ref 40–120)
ALT FLD-CCNC: 8 U/L — SIGNIFICANT CHANGE UP (ref 4–41)
ANION GAP SERPL CALC-SCNC: 9 MMOL/L — SIGNIFICANT CHANGE UP (ref 7–14)
APPEARANCE UR: ABNORMAL
AST SERPL-CCNC: 16 U/L — SIGNIFICANT CHANGE UP (ref 4–40)
BACTERIA # UR AUTO: NEGATIVE — SIGNIFICANT CHANGE UP
BASE EXCESS BLDV CALC-SCNC: 5.7 MMOL/L — HIGH (ref -2–3)
BASOPHILS # BLD AUTO: 0 K/UL — SIGNIFICANT CHANGE UP (ref 0–0.2)
BASOPHILS NFR BLD AUTO: 0 % — SIGNIFICANT CHANGE UP (ref 0–2)
BILIRUB SERPL-MCNC: <0.2 MG/DL — SIGNIFICANT CHANGE UP (ref 0.2–1.2)
BILIRUB UR-MCNC: NEGATIVE — SIGNIFICANT CHANGE UP
BLOOD GAS VENOUS COMPREHENSIVE RESULT: SIGNIFICANT CHANGE UP
BUN SERPL-MCNC: 46 MG/DL — HIGH (ref 7–23)
CALCIUM SERPL-MCNC: 7.4 MG/DL — LOW (ref 8.4–10.5)
CHLORIDE BLDV-SCNC: 104 MMOL/L — SIGNIFICANT CHANGE UP (ref 96–108)
CHLORIDE SERPL-SCNC: 103 MMOL/L — SIGNIFICANT CHANGE UP (ref 98–107)
CHLORIDE UR-SCNC: 73 MMOL/L — SIGNIFICANT CHANGE UP
CO2 BLDV-SCNC: 32.6 MMOL/L — HIGH (ref 22–26)
CO2 SERPL-SCNC: 27 MMOL/L — SIGNIFICANT CHANGE UP (ref 22–31)
COLOR SPEC: ABNORMAL
COMMENT - URINE: SIGNIFICANT CHANGE UP
CREAT ?TM UR-MCNC: 28 MG/DL — SIGNIFICANT CHANGE UP
CREAT SERPL-MCNC: 2.56 MG/DL — HIGH (ref 0.5–1.3)
DIFF PNL FLD: ABNORMAL
EGFR: 30 ML/MIN/1.73M2 — LOW
EOSINOPHIL # BLD AUTO: 0 K/UL — SIGNIFICANT CHANGE UP (ref 0–0.5)
EOSINOPHIL NFR BLD AUTO: 0 % — SIGNIFICANT CHANGE UP (ref 0–6)
EPI CELLS # UR: SIGNIFICANT CHANGE UP /HPF (ref 0–5)
GAS PNL BLDV: 136 MMOL/L — SIGNIFICANT CHANGE UP (ref 136–145)
GIANT PLATELETS BLD QL SMEAR: PRESENT — SIGNIFICANT CHANGE UP
GLUCOSE BLDV-MCNC: 121 MG/DL — HIGH (ref 70–99)
GLUCOSE SERPL-MCNC: 124 MG/DL — HIGH (ref 70–99)
GLUCOSE UR QL: NEGATIVE — SIGNIFICANT CHANGE UP
HCO3 BLDV-SCNC: 31 MMOL/L — HIGH (ref 22–29)
HCT VFR BLD CALC: 28 % — LOW (ref 39–50)
HCT VFR BLDA CALC: 27 % — LOW (ref 39–51)
HGB BLD CALC-MCNC: 9 G/DL — LOW (ref 12.6–17.4)
HGB BLD-MCNC: 8.4 G/DL — LOW (ref 13–17)
IANC: 5.67 K/UL — SIGNIFICANT CHANGE UP (ref 1.8–7.4)
KETONES UR-MCNC: NEGATIVE — SIGNIFICANT CHANGE UP
LACTATE BLDV-MCNC: 0.9 MMOL/L — SIGNIFICANT CHANGE UP (ref 0.5–2)
LEUKOCYTE ESTERASE UR-ACNC: ABNORMAL
LYMPHOCYTES # BLD AUTO: 0.11 K/UL — LOW (ref 1–3.3)
LYMPHOCYTES # BLD AUTO: 1.8 % — LOW (ref 13–44)
MANUAL SMEAR VERIFICATION: SIGNIFICANT CHANGE UP
MCHC RBC-ENTMCNC: 26.8 PG — LOW (ref 27–34)
MCHC RBC-ENTMCNC: 30 GM/DL — LOW (ref 32–36)
MCV RBC AUTO: 89.5 FL — SIGNIFICANT CHANGE UP (ref 80–100)
MONOCYTES # BLD AUTO: 0 K/UL — SIGNIFICANT CHANGE UP (ref 0–0.9)
MONOCYTES NFR BLD AUTO: 0 % — LOW (ref 2–14)
NEUTROPHILS # BLD AUTO: 6.05 K/UL — SIGNIFICANT CHANGE UP (ref 1.8–7.4)
NEUTROPHILS NFR BLD AUTO: 97.3 % — HIGH (ref 43–77)
NITRITE UR-MCNC: NEGATIVE — SIGNIFICANT CHANGE UP
NT-PROBNP SERPL-SCNC: 385 PG/ML — HIGH
OSMOLALITY SERPL: 304 MOSM/KG — HIGH (ref 275–295)
OSMOLALITY UR: 415 MOSM/KG — SIGNIFICANT CHANGE UP (ref 50–1200)
PCO2 BLDV: 49 MMHG — SIGNIFICANT CHANGE UP (ref 42–55)
PH BLDV: 7.41 — SIGNIFICANT CHANGE UP (ref 7.32–7.43)
PH UR: 6.5 — SIGNIFICANT CHANGE UP (ref 5–8)
PLAT MORPH BLD: NORMAL — SIGNIFICANT CHANGE UP
PLATELET # BLD AUTO: 234 K/UL — SIGNIFICANT CHANGE UP (ref 150–400)
PLATELET COUNT - ESTIMATE: NORMAL — SIGNIFICANT CHANGE UP
PO2 BLDV: 43 MMHG — SIGNIFICANT CHANGE UP (ref 25–45)
POTASSIUM BLDV-SCNC: 4.3 MMOL/L — SIGNIFICANT CHANGE UP (ref 3.5–5.1)
POTASSIUM SERPL-MCNC: 4.3 MMOL/L — SIGNIFICANT CHANGE UP (ref 3.5–5.3)
POTASSIUM SERPL-SCNC: 4.3 MMOL/L — SIGNIFICANT CHANGE UP (ref 3.5–5.3)
POTASSIUM UR-SCNC: 53.9 MMOL/L — SIGNIFICANT CHANGE UP
PROT ?TM UR-MCNC: 172 MG/DL — SIGNIFICANT CHANGE UP
PROT SERPL-MCNC: 4.6 G/DL — LOW (ref 6–8.3)
PROT UR-MCNC: ABNORMAL
PROT/CREAT UR-RTO: 6.1 RATIO — HIGH (ref 0–0.2)
RBC # BLD: 3.13 M/UL — LOW (ref 4.2–5.8)
RBC # FLD: 13.2 % — SIGNIFICANT CHANGE UP (ref 10.3–14.5)
RBC BLD AUTO: NORMAL — SIGNIFICANT CHANGE UP
RBC CASTS # UR COMP ASSIST: >50 /HPF — SIGNIFICANT CHANGE UP (ref 0–4)
SAO2 % BLDV: 72 % — SIGNIFICANT CHANGE UP (ref 67–88)
SCHISTOCYTES BLD QL AUTO: SLIGHT — SIGNIFICANT CHANGE UP
SODIUM SERPL-SCNC: 139 MMOL/L — SIGNIFICANT CHANGE UP (ref 135–145)
SODIUM UR-SCNC: 63 MMOL/L — SIGNIFICANT CHANGE UP
SP GR SPEC: 1.02 — SIGNIFICANT CHANGE UP (ref 1.01–1.05)
UROBILINOGEN FLD QL: SIGNIFICANT CHANGE UP
VARIANT LYMPHS # BLD: 0.9 % — SIGNIFICANT CHANGE UP (ref 0–6)
WBC # BLD: 6.22 K/UL — SIGNIFICANT CHANGE UP (ref 3.8–10.5)
WBC # FLD AUTO: 6.22 K/UL — SIGNIFICANT CHANGE UP (ref 3.8–10.5)
WBC UR QL: SIGNIFICANT CHANGE UP /HPF (ref 0–5)

## 2023-05-27 PROCEDURE — 74176 CT ABD & PELVIS W/O CONTRAST: CPT | Mod: 26

## 2023-05-27 PROCEDURE — 99223 1ST HOSP IP/OBS HIGH 75: CPT | Mod: GC

## 2023-05-27 PROCEDURE — 99285 EMERGENCY DEPT VISIT HI MDM: CPT

## 2023-05-27 PROCEDURE — 99223 1ST HOSP IP/OBS HIGH 75: CPT

## 2023-05-27 PROCEDURE — 71045 X-RAY EXAM CHEST 1 VIEW: CPT | Mod: 26

## 2023-05-27 RX ORDER — BUMETANIDE 0.25 MG/ML
2 INJECTION INTRAMUSCULAR; INTRAVENOUS
Refills: 0 | Status: DISCONTINUED | OUTPATIENT
Start: 2023-05-27 | End: 2023-05-30

## 2023-05-27 RX ORDER — NYSTATIN 500MM UNIT
500000 POWDER (EA) MISCELLANEOUS
Refills: 0 | Status: DISCONTINUED | OUTPATIENT
Start: 2023-05-27 | End: 2023-06-06

## 2023-05-27 RX ORDER — ACETAMINOPHEN 500 MG
650 TABLET ORAL EVERY 6 HOURS
Refills: 0 | Status: DISCONTINUED | OUTPATIENT
Start: 2023-05-27 | End: 2023-06-06

## 2023-05-27 RX ORDER — CEFTRIAXONE 500 MG/1
1000 INJECTION, POWDER, FOR SOLUTION INTRAMUSCULAR; INTRAVENOUS EVERY 24 HOURS
Refills: 0 | Status: DISCONTINUED | OUTPATIENT
Start: 2023-05-27 | End: 2023-05-29

## 2023-05-27 RX ORDER — SENNA PLUS 8.6 MG/1
2 TABLET ORAL AT BEDTIME
Refills: 0 | Status: DISCONTINUED | OUTPATIENT
Start: 2023-05-27 | End: 2023-06-06

## 2023-05-27 RX ORDER — FOLIC ACID 0.8 MG
1 TABLET ORAL DAILY
Refills: 0 | Status: DISCONTINUED | OUTPATIENT
Start: 2023-05-27 | End: 2023-06-06

## 2023-05-27 RX ORDER — POLYETHYLENE GLYCOL 3350 17 G/17G
17 POWDER, FOR SOLUTION ORAL
Refills: 0 | Status: DISCONTINUED | OUTPATIENT
Start: 2023-05-27 | End: 2023-06-06

## 2023-05-27 RX ORDER — MYCOPHENOLATE MOFETIL 250 MG/1
500 CAPSULE ORAL
Refills: 0 | Status: DISCONTINUED | OUTPATIENT
Start: 2023-05-27 | End: 2023-05-27

## 2023-05-27 RX ORDER — PREGABALIN 225 MG/1
1000 CAPSULE ORAL DAILY
Refills: 0 | Status: DISCONTINUED | OUTPATIENT
Start: 2023-05-27 | End: 2023-06-06

## 2023-05-27 RX ORDER — MYCOPHENOLATE MOFETIL 250 MG/1
1500 CAPSULE ORAL
Refills: 0 | Status: DISCONTINUED | OUTPATIENT
Start: 2023-05-27 | End: 2023-05-27

## 2023-05-27 RX ORDER — HEPARIN SODIUM 5000 [USP'U]/ML
5000 INJECTION INTRAVENOUS; SUBCUTANEOUS EVERY 8 HOURS
Refills: 0 | Status: DISCONTINUED | OUTPATIENT
Start: 2023-05-27 | End: 2023-06-06

## 2023-05-27 RX ORDER — HYDROXYCHLOROQUINE SULFATE 200 MG
200 TABLET ORAL DAILY
Refills: 0 | Status: DISCONTINUED | OUTPATIENT
Start: 2023-05-27 | End: 2023-06-06

## 2023-05-27 RX ORDER — SODIUM BICARBONATE 1 MEQ/ML
650 SYRINGE (ML) INTRAVENOUS EVERY 12 HOURS
Refills: 0 | Status: DISCONTINUED | OUTPATIENT
Start: 2023-05-27 | End: 2023-06-06

## 2023-05-27 RX ORDER — PANTOPRAZOLE SODIUM 20 MG/1
40 TABLET, DELAYED RELEASE ORAL
Refills: 0 | Status: DISCONTINUED | OUTPATIENT
Start: 2023-05-27 | End: 2023-06-06

## 2023-05-27 RX ORDER — ONDANSETRON 8 MG/1
4 TABLET, FILM COATED ORAL EVERY 8 HOURS
Refills: 0 | Status: DISCONTINUED | OUTPATIENT
Start: 2023-05-27 | End: 2023-06-06

## 2023-05-27 RX ORDER — ERGOCALCIFEROL 1.25 MG/1
50000 CAPSULE ORAL
Refills: 0 | Status: DISCONTINUED | OUTPATIENT
Start: 2023-05-27 | End: 2023-06-06

## 2023-05-27 RX ORDER — LANOLIN ALCOHOL/MO/W.PET/CERES
3 CREAM (GRAM) TOPICAL AT BEDTIME
Refills: 0 | Status: DISCONTINUED | OUTPATIENT
Start: 2023-05-27 | End: 2023-06-06

## 2023-05-27 RX ADMIN — BUMETANIDE 2 MILLIGRAM(S): 0.25 INJECTION INTRAMUSCULAR; INTRAVENOUS at 18:18

## 2023-05-27 RX ADMIN — CEFTRIAXONE 100 MILLIGRAM(S): 500 INJECTION, POWDER, FOR SOLUTION INTRAMUSCULAR; INTRAVENOUS at 22:12

## 2023-05-27 RX ADMIN — Medication 500000 UNIT(S): at 18:52

## 2023-05-27 RX ADMIN — Medication 650 MILLIGRAM(S): at 18:17

## 2023-05-27 RX ADMIN — HEPARIN SODIUM 5000 UNIT(S): 5000 INJECTION INTRAVENOUS; SUBCUTANEOUS at 22:12

## 2023-05-27 RX ADMIN — Medication 500000 UNIT(S): at 23:06

## 2023-05-27 NOTE — H&P ADULT - PROBLEM SELECTOR PLAN 3
Cr baseline 1.5-1.7 11/2022. Pt w/ recent admission for LIZETH. Cr now 2.56.  Pt denies decreased UOP. Pt follows with Dr. Shultz.    -appreciate nephrology recs Pt w/ bilateral leg swelling, takes lasix 40 bid.    -start 2 bumex IV bid  -appreciate nephrology recs

## 2023-05-27 NOTE — H&P ADULT - NSHPLABSRESULTS_GEN_ALL_CORE
LABS: Personally reviewed labs, imaging, and ECG                          8.4    6.22  )-----------( 234      ( 27 May 2023 11:35 )             28.0           139  |  103  |  46<H>  ----------------------------<  124<H>  4.3   |  27  |  2.56<H>    Ca    7.4<L>      27 May 2023 11:35    TPro  4.6<L>  /  Alb  2.0<L>  /  TBili  <0.2  /  DBili  x   /  AST  16  /  ALT  8   /  AlkPhos  45         LIVER FUNCTIONS - ( 27 May 2023 11:35 )  Alb: 2.0 g/dL / Pro: 4.6 g/dL / ALK PHOS: 45 U/L / ALT: 8 U/L / AST: 16 U/L / GGT: x                    Urinalysis Basic - ( 27 May 2023 11:37 )    Color: Light Red / Appearance: Slightly Turbid / S.016 / pH: x  Gluc: x / Ketone: Negative  / Bili: Negative / Urobili: <2 mg/dL   Blood: x / Protein: 300 mg/dL / Nitrite: Negative   Leuk Esterase: Moderate / RBC: >50 /HPF / WBC 10 - 15 /HPF   Sq Epi: x / Non Sq Epi: x / Bacteria: Negative        Lactate Trend        CAPILLARY BLOOD GLUCOSE        Culture Results:   <10,000 CFU/mL Normal Urogenital Felecia ( @ 01:24)      RADIOLOGY & ADDITIONAL TESTS:

## 2023-05-27 NOTE — ED PROVIDER NOTE - ATTENDING CONTRIBUTION TO CARE
Pily: 48 year old male with history of lupus nephritis, recent admission for LIZETH, now on Cytoxan infusions, also on daily prednisone/plaquenil, presenting with b/l LE swelling and hematuria- on exam VSS, non toxic appearing, no resp distress, lungs CTAB, b/l LE pitting edema 2-3+, no assoc erythema, Cr uptrending on labs here. Plan for rheum consult for c/f lupus flare/worsening nephritis, admit to medicine .

## 2023-05-27 NOTE — H&P ADULT - PROBLEM SELECTOR PLAN 5
DVT ppx: heparin subq    Diet: DASH no K or phos    Dispo: Anemia of chronic disease. Appears to be at baseline Hgb ~8.    - T&S iso hematuria  - Iron Studies consistent with AoCD  - B12, Folate normal Likely iso immunocompromised status.    -start nystatin swish and spit

## 2023-05-27 NOTE — H&P ADULT - PROBLEM SELECTOR PLAN 6
DVT ppx: heparin subq    Diet: DASH no K or phos    Dispo: Anemia of chronic disease. Appears to be at baseline Hgb ~8.    - T&S iso hematuria  - Iron Studies consistent with AoCD  - B12, Folate normal

## 2023-05-27 NOTE — CONSULT NOTE ADULT - SUBJECTIVE AND OBJECTIVE BOX
[facility] DIVISION OF KIDNEY DISEASES AND HYPERTENSION -- [phone]  -- INITIAL CONSULT NOTE  --------------------------------------------------------------------------------  HPI: 48 year old male with h/o SLE, LN 3/4 with recent admission  for lupus nephritis flare who presents to [facility] on 5/27 with worsening LE swelling and hematuria. Nephrology team was consutled for LIZETH on CKD.     On review of [facility], it was noted that Pt. was diagnosed with SLE and LN during hospital stay at [facility] in Feb 2022. 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 Pt. initiated on immunosuppressives for SLE/LN by rheumatology team. Pt. with history of noncompliance to his medications/immunosuppressive therapy. Pt was readmitted 10/13/22 for worsening Scr and proteinuria. Pt underwent kidney biopsy on 10/17/22 during that hospitalization. 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 Pt. received Immunosuppressive therapy for SLE/class IV LN by rheumatology team. Pt. follows nephrologist Dr. Hayde Shultz for CKD care. Pt was recently admitted at [facility] (4/27/23-5/8/2023) for lupus flare. Pt. underwent kidney biopsy by IR team on 5/2/23. Pt. with kidney biopsy findings of crescentic GN/LN. Pt. received on IV Solumedrol 1 g from 5/4/23-5/6/23 as per rheumatology team. Pt. received first dose of IV Cytoxan on 5/5/23 and then 2nd dose on 5/19/23. Scr was elevated at 2.55 on previous admission on 4/28. Scr was elevated/ improved at 1.96 on 5/8/23. On this admission, Scr is elevated at 2.56 today. UA showed >50 RBCs.     Pt seen and examined at bedside. Pt reports of worsening LE swelling for ~ 5 days. pt says he was taking Lasix 40 mg po BID at home. Also endorse bloody urine and fever ~ 2 days. Denies chills, SOB, CP, HA, N/V, abdominal pain, diarrhea or constipation or dizziness.       PAST HISTORY  --------------------------------------------------------------------------------  PAST MEDICAL & SURGICAL HISTORY:  Lupus nephritis  Systemic lupus  No significant past surgical history    FAMILY HISTORY:    PAST SOCIAL HISTORY:    ALLERGIES & MEDICATIONS  --------------------------------------------------------------------------------  Allergies    No Known Allergies    Intolerances    Standing Inpatient Medications  buMETAnide Injectable 2 milliGRAM(s) IV Push two times a day  heparin   Injectable 5000 Unit(s) SubCutaneous every 8 hours    PRN Inpatient Medications  acetaminophen     Tablet .. 650 milliGRAM(s) Oral every 6 hours PRN  aluminum hydroxide/magnesium hydroxide/simethicone Suspension 30 milliLiter(s) Oral every 4 hours PRN  melatonin 3 milliGRAM(s) Oral at bedtime PRN  ondansetron Injectable 4 milliGRAM(s) IV Push every 8 hours PRN  polyethylene glycol 3350 17 Gram(s) Oral two times a day PRN  senna 2 Tablet(s) Oral at bedtime PRN    REVIEW OF SYSTEMS  --------------------------------------------------------------------------------  Gen: No fevers/chills  Skin: No rashes  Head/Eyes/Ears: Normal hearing,   Respiratory: No dyspnea, cough  CV: No chest pain  GI: No abdominal pain, diarrhea  : No dysuria, hematuria  MSK: B/ L LE edema +  Heme: No easy bruising or bleeding  Psych: No significant depression  All other systems were reviewed and are negative, except as noted.    VITALS/PHYSICAL EXAM  --------------------------------------------------------------------------------  T(C): 36.5 (05-27-23 @ 16:35), Max: 36.9 (05-27-23 @ 10:37)  HR: 78 (05-27-23 @ 16:35) (76 - 87)  BP: 147/88 (05-27-23 @ 16:35) (127/86 - 147/88)  RR: 17 (05-27-23 @ 16:35) (14 - 18)  SpO2: 100% (05-27-23 @ 16:35) (100% - 100%)  Wt(kg): --  Height (cm): 165.1 (05-27-23 @ 16:35)  Weight (kg): 67.8 (05-27-23 @ 16:35)  BMI (kg/m2): 24.9 (05-27-23 @ 16:35)  BSA (m2): 1.75 (05-27-23 @ 16:35)    Physical Exam:  	Gen: NAD  	HEENT: MMM  	Pulm: CTA B/L  	CV: S1S2  	Abd: Soft, +BS   	Ext: ++ LE edema B/L  	Neuro: Awake  	Skin: Warm and dry  	Vascular access: IV peripheral canula    LABS/STUDIES  --------------------------------------------------------------------------------              8.4    6.22  >-----------<  234      [05-27-23 @ 11:35]              28.0     139  |  103  |  46  ----------------------------<  124      [05-27-23 @ 11:35]  4.3   |  27  |  2.56        Ca     7.4     [05-27-23 @ 11:35]    TPro  4.6  /  Alb  2.0  /  TBili  <0.2  /  DBili  x   /  AST  16  /  ALT  8   /  AlkPhos  45  [05-27-23 @ 11:35]    Serum Osmolality 304      [05-27-23 @ 11:35]    Creatinine Trend:  SCr 2.56 [05-27 @ 11:35]  SCr 1.96 [05-08 @ 06:14]  SCr 1.84 [05-07 @ 06:35]  SCr 2.38 [05-06 @ 06:30]  SCr 2.41 [05-05 @ 06:40]    Urinalysis - [05-27-23 @ 11:37]      Color Light Red / Appearance Slightly Turbid / SG 1.016 / pH 6.5      Gluc Negative / Ketone Negative  / Bili Negative / Urobili <2 mg/dL       Blood Large / Protein 300 mg/dL / Leuk Est Moderate / Nitrite Negative      RBC >50 / WBC 10 - 15 / Hyaline  / Gran  / Sq Epi  / Non Sq Epi  / Bacteria Negative    Urine Sodium 63      [05-27-23 @ 11:38]  Urine Potassium 53.9      [05-27-23 @ 11:38]  Urine Chloride 73      [05-27-23 @ 11:38]  Urine Osmolality 415      [05-27-23 @ 11:37]    Iron 78, TIBC 172, %sat 45      [04-28-23 @ 09:08]  Ferritin 537      [04-28-23 @ 09:08]  PTH -- (Ca --)      [05-03-23 @ 06:33]   28  Vitamin D (25OH) 6.8      [05-03-23 @ 06:33]    HBsAb Nonreact      [05-04-23 @ 07:05]  HBsAg Nonreact      [05-04-23 @ 07:05]  HBcAb Nonreact      [05-04-23 @ 07:05]  HCV 0.06, Nonreact      [05-04-23 @ 07:05]  HIV Nonreact      [10-13-22 @ 15:14]    MÓNICA: titer 1:640, pattern Homogeneous      [11-27-21 @ 14:24]  dsDNA 14      [04-28-23 @ 09:08]  C3 Complement 83      [04-28-23 @ 09:08]  C4 Complement 26      [04-28-23 @ 09:08]  Rheumatoid Factor 63      [11-27-21 @ 16:43]  ANCA: cANCA Negative, pANCA Negative, atypical ANCA Indeterminate Method interference due to MÓNICA Fluorescence      [11-28-21 @ 11:28]  Syphilis Screen (Treponema Pallidum Ab) Negative      [11-27-21 @ 14:24] [facility] DIVISION OF KIDNEY DISEASES AND HYPERTENSION -- [phone]  -- INITIAL CONSULT NOTE  --------------------------------------------------------------------------------  HPI: 48 year old male with h/o SLE, 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 Nephrology team was consutled for LIZETH on CKD.     On review of [facility], it was noted that Pt. was diagnosed with SLE and LN during hospital stay at [facility] in Feb 2022. 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 Pt. initiated on immunosuppressives for SLE/LN by rheumatology team. Pt. with history of noncompliance to his medications/immunosuppressive therapy. Pt was readmitted 10/13/22 for worsening Scr and proteinuria. Pt underwent kidney biopsy on 10/17/22 during that hospitalization. 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 Pt. received Immunosuppressive therapy for SLE/class IV LN by rheumatology team. Pt. follows nephrologist Dr. Hayde Shultz for CKD care. Pt was recently admitted at [facility] (4/27/23-5/8/2023) for lupus flare. Pt. underwent kidney biopsy by IR team on 5/2/23. Pt. with kidney biopsy findings of crescentic GN/LN. Pt. received on IV Solumedrol 1 g from 5/4/23-5/6/23 as per rheumatology team. Pt. received first dose of IV Cytoxan on 5/5/23 and then 2nd dose on 5/19/23. Scr was elevated at 2.55 on previous admission on 4/28. Scr was elevated/ improved at 1.96 on 5/8/23. On this admission, Scr is elevated at 2.56 today. UA showed >50 RBCs.     Pt seen and examined at bedside. Pt reports of worsening LE swelling for ~ 5 days. pt says he was taking Lasix 40 mg po BID at home. Also endorse bloody urine and fever ~ 2 days. Denies chills, SOB, CP, HA, N/V, abdominal pain, diarrhea or constipation or dizziness.       PAST HISTORY  --------------------------------------------------------------------------------  PAST MEDICAL & SURGICAL HISTORY:  Lupus nephritis  Systemic lupus  No significant past surgical history    FAMILY HISTORY: Reviewed and non contributory    PAST SOCIAL HISTORY: No smoking, drugs or alcohol use    ALLERGIES & MEDICATIONS  --------------------------------------------------------------------------------  Allergies    No Known Allergies    Intolerances    Standing Inpatient Medications  buMETAnide Injectable 2 milliGRAM(s) IV Push two times a day  heparin   Injectable 5000 Unit(s) SubCutaneous every 8 hours    PRN Inpatient Medications  acetaminophen     Tablet .. 650 milliGRAM(s) Oral every 6 hours PRN  aluminum hydroxide/magnesium hydroxide/simethicone Suspension 30 milliLiter(s) Oral every 4 hours PRN  melatonin 3 milliGRAM(s) Oral at bedtime PRN  ondansetron Injectable 4 milliGRAM(s) IV Push every 8 hours PRN  polyethylene glycol 3350 17 Gram(s) Oral two times a day PRN  senna 2 Tablet(s) Oral at bedtime PRN    REVIEW OF SYSTEMS  --------------------------------------------------------------------------------  Gen: No fevers/chills  Skin: No rashes  Head/Eyes/Ears: Normal hearing,   Respiratory: No dyspnea, cough  CV: No chest pain  GI: No abdominal pain, diarrhea  : No dysuria, hematuria  MSK: B/ L LE edema +  Heme: No easy bruising or bleeding  Psych: No significant depression  All other systems were reviewed and are negative, except as noted.    VITALS/PHYSICAL EXAM  --------------------------------------------------------------------------------  T(C): 36.5 (05-27-23 @ 16:35), Max: 36.9 (05-27-23 @ 10:37)  HR: 78 (05-27-23 @ 16:35) (76 - 87)  BP: 147/88 (05-27-23 @ 16:35) (127/86 - 147/88)  RR: 17 (05-27-23 @ 16:35) (14 - 18)  SpO2: 100% (05-27-23 @ 16:35) (100% - 100%)  Wt(kg): --  Height (cm): 165.1 (05-27-23 @ 16:35)  Weight (kg): 67.8 (05-27-23 @ 16:35)  BMI (kg/m2): 24.9 (05-27-23 @ 16:35)  BSA (m2): 1.75 (05-27-23 @ 16:35)    Physical Exam:  	Gen: NAD  	HEENT: MMM  	Pulm: CTA B/L  	CV: S1S2  	Abd: Soft, +BS   	Ext: ++ LE edema B/L  	Neuro: Awake  	Skin: Warm and dry  	Vascular access: IV peripheral canula    LABS/STUDIES  --------------------------------------------------------------------------------              8.4    6.22  >-----------<  234      [05-27-23 @ 11:35]              28.0     139  |  103  |  46  ----------------------------<  124      [05-27-23 @ 11:35]  4.3   |  27  |  2.56        Ca     7.4     [05-27-23 @ 11:35]    TPro  4.6  /  Alb  2.0  /  TBili  <0.2  /  DBili  x   /  AST  16  /  ALT  8   /  AlkPhos  45  [05-27-23 @ 11:35]    Serum Osmolality 304      [05-27-23 @ 11:35]    Creatinine Trend:  SCr 2.56 [05-27 @ 11:35]  SCr 1.96 [05-08 @ 06:14]  SCr 1.84 [05-07 @ 06:35]  SCr 2.38 [05-06 @ 06:30]  SCr 2.41 [05-05 @ 06:40]    Urinalysis - [05-27-23 @ 11:37]      Color Light Red / Appearance Slightly Turbid / SG 1.016 / pH 6.5      Gluc Negative / Ketone Negative  / Bili Negative / Urobili <2 mg/dL       Blood Large / Protein 300 mg/dL / Leuk Est Moderate / Nitrite Negative      RBC >50 / WBC 10 - 15 / Hyaline  / Gran  / Sq Epi  / Non Sq Epi  / Bacteria Negative    Urine Sodium 63      [05-27-23 @ 11:38]  Urine Potassium 53.9      [05-27-23 @ 11:38]  Urine Chloride 73      [05-27-23 @ 11:38]  Urine Osmolality 415      [05-27-23 @ 11:37]    Iron 78, TIBC 172, %sat 45      [04-28-23 @ 09:08]  Ferritin 537      [04-28-23 @ 09:08]  PTH -- (Ca --)      [05-03-23 @ 06:33]   28  Vitamin D (25OH) 6.8      [05-03-23 @ 06:33]    HBsAb Nonreact      [05-04-23 @ 07:05]  HBsAg Nonreact      [05-04-23 @ 07:05]  HBcAb Nonreact      [05-04-23 @ 07:05]  HCV 0.06, Nonreact      [05-04-23 @ 07:05]  HIV Nonreact      [10-13-22 @ 15:14]    MÓNICA: titer 1:640, pattern Homogeneous      [11-27-21 @ 14:24]  dsDNA 14      [04-28-23 @ 09:08]  C3 Complement 83      [04-28-23 @ 09:08]  C4 Complement 26      [04-28-23 @ 09:08]  Rheumatoid Factor 63      [11-27-21 @ 16:43]  ANCA: cANCA Negative, pANCA Negative, atypical ANCA Indeterminate Method interference due to MÓNICA Fluorescence      [11-28-21 @ 11:28]  Syphilis Screen (Treponema Pallidum Ab) Negative      [11-27-21 @ 14:24]

## 2023-05-27 NOTE — CONSULT NOTE ADULT - ATTENDING COMMENTS
pt seen and examined by me personally   agree with above   pt aware of our impression and ongoing plans
LIZETH on CKD  Nephrotic syndrome  fluid overload    Plan as outlined in fellows note above  Monitor labs and Is/Os

## 2023-05-27 NOTE — H&P ADULT - ASSESSMENT
49 yo M with PMHx of SLE c/b LN 3/4 w/ recent admission 4/27/23-5/8/2023 for lupus nephritis flare (switched from cellcept to IV cytoxan) p/w b/l LE swelling and hematuria. 47 yo M with PMHx of SLE c/b LN 3/4 w/ recent admission 4/27/23-5/8/2023 for lupus nephritis flare (switched from cellcept to IV cytoxan) p/w b/l LE swelling and hematuria.    Cr baseline 1.5-1.7 11/2022. Now 2.5 with borderline hyperkalemia. Pt denies decreased UOP. Urine studies suggesting pre-renal etiology (FeNa 0.7%), but pt takes diuretics, so unclear reliability (though would expect MORE Na wasting with diuresis, not less). UPCR with nephrotic-range proteinuria as well, which is consistent with known LN 3/4.   Nephrologist: Dr. Hayde Garland, Nephro C/S, appreciate recs  Rheumatologist: Dr. Nilton Le, Rheum C/S, appreciate rec  ::Normocytic Anemia: previously with AoCD but baseline higher  -- T&S (4/28-)  -- Iron Studies, B12, Folate  ::Electrolytes: Pt with borderline HyperK in ER. Given Lokelma 5x1. Reassess this am & obtain baseline ECG for this admission  ::BMD:   -- C/W Home Vit D3 1kU QD  -- Ca x Phos: 46.5 (<55, okay for now)  ::Volume/BP: SBP at goal, but b/l LE edema suggestive of volume overload (vs decreased oncotic pressure ico nephrotic syndrome)  -- HOLDing Home Lisinpril 10mg PO QD (ico LIZETH)  -- HOLDing Home Furosemide 20mg PO QD (ico LIZETH)  ::HD Access: no current strong indication for HD, no access  ::Rheum/SLE       49 yo M with PMHx of SLE c/b LN 3/4 w/ recent admission 4/27/23-5/8/2023 for lupus nephritis flare (switched from cellcept to IV cytoxan) p/w b/l LE swelling and hematuria.

## 2023-05-27 NOTE — CONSULT NOTE ADULT - ASSESSMENT
48M with h/o SLE c/b LN 3/4 w/ recent admission 4/27/23-5/8/2023 for lupus nephritis flare (switched from cellcept to IV cytoxan) p/w b/l LE swelling and hematuria. States having significant swelling to both legs over past 3 weeks despite having his lasix dose increased  up to 40mg BID. Noticed urine had reddish color so son spoke with his Rheumatologist, Dr. Nilton Le, who advised to come to ED for eval     #hematuria: pt says he noticed red tinged urine for one day. Denies UTI symptoms. Hgb 8.4 (baseline), lymphocytes 1.8%, Creatinine 2.56 (most recently 1.8-2.4, 4/2023 1.6), no transaminitis, UA w/ large blood, >50 RBC, moderate leuk esterase, w/ cyclophosphomide vs UTI vs worsening lupus nephritis?    #SLE w/ Lupus Nephritis: Baseline slightly elevated than recent 1.8-2.4 when was inducted w/ cytoxan   48M with h/o SLE c/b LN 3/4 w/ recent admission 4/27/23-5/8/2023 for lupus nephritis flare (switched from cellcept to IV cytoxan) p/w b/l LE swelling and hematuria. States having significant swelling to both legs over past 3 weeks despite having his lasix dose increased  up to 40mg BID. Noticed urine had reddish color so son spoke with his Rheumatologist, Dr. Nilton Le, who advised to come to ED for eval     #hematuria: pt says he noticed red tinged urine 3-4 occasions for the last day. Denies UTI symptoms. Hgb 8.4 (baseline), Creatinine 2.56 (most recently 1.8-2.4, 4/2023 1.6), UA w/ large blood, >50 RBC, moderate leuk esterase. Received 2 doses IV cytoxan w/ mesna (5/5/23, 5/19/23). Differential includes cytoxan induced w/ hemorrhagic cystitis vs UTI vs kidney stone vs prostatitis.  -would obtain urology evaluation for any signs of hemorrhagic cystitis  -would obtain CT abdomen to r/o renal stone, assess for bladder wall changes, prostatitis ect.  -would consider treating for UTI as pt is immunocompromised w/ fever and neutrophil predominance (received recent cytoxan which causes leukopenia and can Supress response to infection)    #b/l leg swelling: Most likely 2/2 proteinuria 2/2 kidney disease. TTE wnl 4/2023.   -would obtain doppler of legs to r/o dvt (hypercoagulable state w/ nephrotic range proteinuria)  -f/u nephro recs for diuresis    #SLE w/ Lupus Nephritis class 4G (bx 5/2023): p/w creatinine 2.56 (most recently 1.8-2.4, 4/2023 1.6), UA w/ large blood, >50 RBC, moderate leuk esterase. 12/2022 APS labs negative. May be 2/2 UTI vs renal stone vs worsening lupus nephritis vs chronic kidney changes vs recent increased diuretics (says he had recent increase lasix 40 qd to BID)  -will repeat protein/creatinine ratio and 24 hour urine protein    -will obtain ds-dna, c3, c4  -will switch home prednisone 50mg to solumedrol 40mg (equivalent dose) to ensure absorption while being diuresed  -c/w home plaquenil  -c/w home PPI and mepron while on high dose steroids    #Anemia: Iron Studies/B12/folate wnl, ferritin elevated, consistent with AOCD  -will order haptoglobin/LDH to eval for hemolysis    Discussed with Attending Dr. Alyce Shepherd DO  Rheumatology Fellow  Pager: [phone]  Available on TEAMS

## 2023-05-27 NOTE — H&P ADULT - PROBLEM SELECTOR PLAN 4
IMPRESSION:  Fungal laryngitis    RECOMMENDATIONS:  Ambisome 230 mg iv q24h.  F/U with ENT Anemia of chronic disease. Appears to be at baseline Hgb ~8.    - T&S iso hematuria  - Iron Studies consistent with AoCD  - B12, Folate normal Cr baseline 1.5-1.7 11/2022. Pt w/ recent admission for LIZETH. Cr now 2.56.  Pt denies decreased UOP. Pt follows with Dr. Shultz.    -appreciate nephrology recs

## 2023-05-27 NOTE — H&P ADULT - PROBLEM SELECTOR PLAN 2
Pt followed by Dr. Nilton Le. Underwent renal biopsy in 2/2022 showing focal proliferative class III, segmental endocapillary hypercellularity in 12%, a single small cellular crescents, mild acute interstitial nephritis, NIH activity score 4/24, no chronicity. Pt w/ hx of multiple renal bx. Last received cytoxan 5/19/23.    -appreciate rheum recs:  -4/2023 p/w worsening edema w/ LIZETH and active urinary sediment. mild C3 depression 83 with negative C4 and dsDNA. CT A/P, US kidney, echo unremarkable. s/p kidney biopsy 5/2/2023 showed proliferative LN class IV-G with 5 out of 8 non-globally sclerosed glomeruli with cellular and fibrocellular crescents; moderate tubulointerstitial inflammation, Patient was not a candidate for clinical trial.  - Received IV solumedrol 1g x3 (5/4-5/6) then prednisone 60mg/day starting on 5/7. Received induction Cytoxan 5/5/23 with remainder to be outpatient w/ plan for IV  mg q2 weeks   -c/w PCP ppx   -c/w PPI daily for GI ppx  -c/w plaquenil  -c/w Home Prednisone   -Quant TB negative outpatient 2/2023; Hep B checked this admission

## 2023-05-27 NOTE — H&P ADULT - PROBLEM SELECTOR PLAN 1
UA w/ large blood, >50 RBC, moderate LE. UTI vs worsening lupus nephritis    -appreciate nephrology and rheumatology recs  -pt w/o dysuria, no leukocytosis, afebrile: monitor off abx

## 2023-05-27 NOTE — ED ADULT NURSE NOTE - NSFALLRISKINTERV_ED_ALL_ED

## 2023-05-27 NOTE — H&P ADULT - ATTENDING COMMENTS
48M w/ SLE c/b lupus nephritis with recent admission for lupus nephritis flare where he was switched from mycophenolate to cyclophosphamide presenting with new hemauturia, worsening leg swelling, and found to have LIZETH on CKD. Subjective fever at home x1 but no documented fevers at home or since admission and no dysuria/frequency/urgency/flank pain. Etiology of hematuria not clear, but hemorrhagic cystitis 2/2 cyclophosphamide is on the differential.   - Appreciate rheumatology recommendations  - CT A/P to evaluate  system  - Urology consult per rheumatology  - Appreciate nephrology recommendations  - Bumex IV per nephrology  - Transition steroids to IV per rheumatology

## 2023-05-27 NOTE — H&P ADULT - PROBLEM SELECTOR PROBLEM 4
Encounter for deep vein thrombosis prophylaxis Normocytic anemia Acute kidney injury superimposed on CKD

## 2023-05-27 NOTE — ED PROVIDER NOTE - PHYSICAL EXAMINATION
Gen - NAD; well-appearing; A+Ox3   HEENT - NCAT, EOMI, somewhat dry oral mucosa  Neck - supple, no palpable lymphadenopathy  Resp - CTAB, no increased WOB  CV -  RRR, no m/r/g  Abd - soft, NT, ND; no guarding or rebound  Back - no CVA tenderness  MSK - FROM of b/l UE and LE, no gross deformities  Extrem - 3+ pitting edema to b/l LEs without tenderness/erythema/warmth  Neuro - no focal motor or sensation deficits  Skin - warm, well perfused

## 2023-05-27 NOTE — ED PROVIDER NOTE - OBJECTIVE STATEMENT
48 year old male with history of lupus nephritis, recent admission for LIZETH, now on Cytoxan infusions, also on daily prednisone/plaquenil, presenting with b/l LE swelling and hematuria. States having significant swelling to both legs over past 3 weeks despite having his lasix dose tapered up to 40mg BID. Noticed urine had reddish color so son spoke with rheumatology office and told to come to ED for eval. Has had low grade temperatures but denies chills, cough, chest pain, sob, abdominal pain, n/v/d, dysuria.

## 2023-05-27 NOTE — PATIENT PROFILE ADULT - FUNCTIONAL ASSESSMENT - BASIC MOBILITY 6.
4-calculated by average/Not able to assess (calculate score using [facility] averaging method)

## 2023-05-27 NOTE — ED ADULT NURSE REASSESSMENT NOTE - NS ED NURSE REASSESS COMMENT FT1
Pt resting comfortably in bed. denies any pain or discomfort, SOB, chest pain, headache, dizziness. Pt waiting for bed assignment.

## 2023-05-27 NOTE — ED ADULT TRIAGE NOTE - CHIEF COMPLAINT QUOTE
c/o worsening leg swelling +4 pedal edema noted, pt diagnosed with LIZETH and was following up with nephrologics, states ent in by PMD for further eval. pt reports this morning  noticed urine with some blood and dark color. denies fever, chills.

## 2023-05-27 NOTE — CONSULT NOTE ADULT - PROBLEM SELECTOR RECOMMENDATION 9
Balloon removed intact.   Pt with recurrent LIZETH on CKD of unclear etiology- ? Lupus flare Vs UTI.   Pt. was diagnosed with SLE and LN during hospital stay at [facility] in Feb 2022. 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 Pt. initiated on immunosuppressives for SLE/LN by rheumatology team. Pt. with history of noncompliance to his medications/immunosuppressive therapy. Pt was readmitted 10/13/22 for worsening Scr and proteinuria. Pt underwent kidney biopsy on 10/17/22 during that hospitalization. 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 Pt. received Immunosuppressive therapy for SLE/class IV LN by rheumatology team. Pt. follows nephrologist Dr. Hayde Shultz for CKD care. Pt was recently admitted at [facility] (4/27/23-5/8/2023) for lupus flare. Pt. underwent kidney biopsy by IR team on 5/2/23. Pt. with kidney biopsy findings of crescentic GN/LN. Pt. received on IV Solumedrol 1 g from 5/4/23-5/6/23 as per rheumatology team. Pt. received first dose of IV Cytoxan on 5/5/23 and then 2nd dose on 5/19/23. Scr was elevated at 2.55 on previous admission on 4/28. Scr was elevated/ improved at 1.96 on 5/8/23. On this admission, Scr is elevated at 2.56 today. UA showed >50 RBCs and 15 WBCs. Labs reviewed. Pt with significant LE edema. Recommend to discontinue PO lasix . Start IV Bumex 2 mg BID. Send urine Culture, C3 and C4 levels. Follow up with rheumatology regarding Lupus flare. Monitor labs and urine output. Avoid any potential nephrotoxins. Dose medications as per eGFR.     If you have any questions, please feel free to contact me  Diogo Braga  Nephrology Fellow  [phone]/ [employer] Teams(Preferred)  (After 5pm or on weekends please page the on-call fellow). Pt with recurrent LIZETH on CKD of unclear etiology- ? Lupus flare Vs UTI.   Pt. was diagnosed with SLE and LN during hospital stay at [facility] in Feb 2022. 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 Pt. initiated on immunosuppressives for SLE/LN by rheumatology team. Pt. with history of noncompliance to his medications/immunosuppressive therapy. Pt was readmitted 10/13/22 for worsening Scr and proteinuria. Pt underwent kidney biopsy on 10/17/22 during that hospitalization. 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 Pt. received Immunosuppressive therapy for SLE/class IV LN by rheumatology team. Pt. follows nephrologist Dr. Hayde Shultz for CKD care. Pt was recently admitted at [facility] (4/27/23-5/8/2023) for lupus flare. Pt. underwent kidney biopsy by IR team on 5/2/23. Pt. with kidney biopsy findings of crescentic GN/LN. Pt. received on IV Solumedrol 1 g from 5/4/23-5/6/23 as per rheumatology team. Pt. received first dose of IV Cytoxan on 5/5/23 and then 2nd dose on 5/19/23. Scr was elevated at 2.55 on previous admission on 4/28. Scr was elevated/ improved at 1.96 on 5/8/23. On this admission, Scr is elevated at 2.56 today. UA showed >50 RBCs and 15 WBCs. Labs reviewed. Pt with significant LE edema. Recommend to discontinue PO lasix . Start IV Bumex 2 mg BID. Send urine Culture, C3 and C4 levels. Obtain US kidney and bladder. Bladder scan to rule urine retention. Follow up with rheumatology regarding Lupus flare. Monitor labs and urine output. Avoid any potential nephrotoxins. Dose medications as per eGFR.     Plan discussed with primary team at bedside and attending on call.     If you have any questions, please feel free to contact me  Diogo Braga  Nephrology Fellow  [phone]/ [employer] Teams(Preferred)  (After 5pm or on weekends please page the on-call fellow).

## 2023-05-27 NOTE — H&P ADULT - NSHPSOCIALHISTORY_GEN_ALL_CORE
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 Tobacco Use: never smoker    EtOH Use: does not drink    Other Substances:  never used any other substances Pt denied tobacco, alcohol or illicit drug use. Lives at home with wife and 3 children. 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

## 2023-05-27 NOTE — ED ADULT NURSE NOTE - OBJECTIVE STATEMENT
Received pt in room 5 A&O x4, pt speaking Syriac but able to communicate in english. Past medical history of Lupus. Pt came to the ED due to B/L leg swelling, pt states his PMD told him to come to the ED to get future evaluated. Pt B/L lower extremities is +4 Edema with + pulses, Pt able to walk but slowly, states he has slight pain 4 out of 10 when walking. Pt states was recently diagnosed with LIZETH and follows up with a nephrologist who started him on Lasix 40mg twice a day and sad the swelling went down by came back. Pt states since this morning he noted he had bloody urine. No pain when urinating or discomfort. Pt states his frequency of urination has not changed. Pt has no edema to upper extremities. Heart sounds are normal with normal sinus rhythm on the cardiac monitor. Lung sounds are clear, nonlabored breathing. Abdomen soft nondistended + bowel sounds in all four quadrants. Pt denies any chest pain, headache, SOB, nausea, vomiting, diarrhea, dizziness or chills.  Pt stated he had a low grade fever last night but none this morning and temperature was taken her and was 98.3. Safety maintained.

## 2023-05-27 NOTE — CONSULT NOTE ADULT - SUBJECTIVE AND OBJECTIVE BOX
ILZ HOUSER  [id number]    HPI: 48M with h/o SLE c/b LN 3/ w/ recent admission  for lupus nephritis flare (switched from cellcept to IV cytoxan) p/w b/l LE swelling and hematuria. States having significant swelling to both legs over past 3 weeks despite having his lasix dose increased  up to 40mg BID. Noticed urine had reddish color so son spoke with his Rheumatologist, Dr. Nilton Le, who advised to come to ED for eval. Has had low grade temperatures but denies chills, cough, chest pain, sob, abdominal pain, n/v/d, dysuria.    SLE hx:  -pt followed by Dr. Nilton malcolm  -was evaluated by nephrology, underwent renal biopsy in 2022 showing focal proliferative class III, segmental endocapillary hypercellularity in 12%, a single small cellular crescents, mild acute interstitial nephritis, NIH activity score , no chronicity.  -Serology: MÓNICA 1:1280, RNP 1.8, overton negative, DS-DNA 57/62 2022, C3 67 (10/22), C4 wnl, protein/creatinien ratio 3.0 was 3.2 (10/22)  -then had second renal biopsy 10/2022 showed Class IV LN with crescentic disease Activity: 15/24 Chronicity: 3/12, IFTA 15-20%.    -was on prednisone 5 mg daily, MMF 3 g daily, hydroxychloroquine, Obinutuzumab 10/21/22 ([facility] protocol) second dose 22  -2023 p/w worsening edema w/ LIZETH and active urinary sediment. mild C3 depression 83 with negative C4 and dsDNA. CT A/P, US kidney, echo unremarkable. s/p kidney biopsy 2023 showed proliferative LN class IV-G with 5 out of 8 non-globally sclerosed glomeruli with cellular and fibrocellular crescents; moderate tubulointerstitial inflammation, ~30% IFTA. Activity index  and chronicity index . Mycophenolic acid level was wnl. Patient was not a candidate for clinical trial. Received IV solumedrol 1g x3 (-) then prednisone 60mg/day starting on . Received induction Cytoxan 23 with remainder to be outpatient w/ plan for IV  mg q2 weeks   -c/w PCP ppx   -c/w PPI daily for GI ppx  -Quant TB negative outpatient 2023; Hep B checked this admission    Labs: DS-DNA 14 () was 57/62 2022. C3 83 () was 67 (10/22),  C4 26 () was 16 (10/22), protein/creatinien ratio 3.0 was 3.2 (10/22)     VSS  WBC 6.2, Hgb 8.4 (baseline), lymphocytes 1.8%, Creatinine 2.56 (most recently 1.8-2.4, 2023 1.6), no transaminitis, UA w/ large blood, >50 RBC, moderate leuk esterase,   CXR wnl      #hematuria: cyclophosphomide vs UTI vs worsening lupus nephritis?    #LN: Baseline slightly elevated than recent 1.8-2.4 when was inducted w/ cytoxan  MEDICATIONS  (STANDING):    MEDICATIONS  (PRN):      Allergies    No Known Allergies    Intolerances        PERTINENT MEDICATION HISTORY:    MEDICATIONS:    ALLERGIES:    SURGERIES:    HOSPITALIZATIONS:    FAMILY HISTORY:    TRAVEL HISTORY:    SOCIAL HISTORY:    FAMILY HISTORY:      Vital Signs Last 24 Hrs  T(C): 36.8 (27 May 2023 11:06), Max: 36.9 (27 May 2023 10:37)  T(F): 98.3 (27 May 2023 11:06), Max: 98.4 (27 May 2023 10:37)  HR: 85 (27 May 2023 11:06) (85 - 87)  BP: 134/71 (27 May 2023 11:06) (131/83 - 143/71)  BP(mean): --  RR: 14 (27 May 2023 11:06) (14 - 18)  SpO2: 100% (27 May 2023 11:06) (100% - 100%)    Parameters below as of 27 May 2023 11:06  Patient On (Oxygen Delivery Method): room air        Physical Exam:  General: Breathing comfortably on room air, no distress  HEENT: EOMI, MMM, no oral ulcers  CVS: +S1/S2, RRR  Resp: CTA b/l. No crackles/wheezing  GI: Soft, NT/ND +BS  MSK: 5/5 muscle strength in arms and legs. B/l shoulder, elbow, wrist, MCP/PIP/DIP, Knee, ankle w/o swelling/erythema/or tenderness  Skin: no visible rashes    LABS:                        8.4    6.22  )-----------( 234      ( 27 May 2023 11:35 )             28.0         139  |  103  |  46<H>  ----------------------------<  124<H>  4.3   |  27  |  2.56<H>    Ca    7.4<L>      27 May 2023 11:35    TPro  4.6<L>  /  Alb  2.0<L>  /  TBili  <0.2  /  DBili  x   /  AST  16  /  ALT  8   /  AlkPhos  45        Urinalysis Basic - ( 27 May 2023 11:37 )    Color: Light Red / Appearance: Slightly Turbid / S.016 / pH: x  Gluc: x / Ketone: Negative  / Bili: Negative / Urobili: <2 mg/dL   Blood: x / Protein: 300 mg/dL / Nitrite: Negative   Leuk Esterase: Moderate / RBC: >50 /HPF / WBC 10 - 15 /HPF   Sq Epi: x / Non Sq Epi: x / Bacteria: Negative        RADIOLOGY & ADDITIONAL STUDIES: Reviewed     LIZ HOUSER  [id number]    HPI: 48M with h/o SLE c/b LN 3/ w/ recent admission  for lupus nephritis flare (switched from cellcept to IV cytoxan) p/w b/l LE swelling and hematuria. States having significant swelling to both legs over past 3 weeks despite having his lasix dose increased  up to 40mg BID. Noticed urine had reddish color so son spoke with his Rheumatologist, Dr. Nilton Le, who advised to come to ED for eval     Subjective: Albanian : [id number]. Pt says he has been having worsening facial and leg swelling since hospital discharge. Has been on lasix 40mg BID for last 2-3 weeks without improvement. Received his second cytoxan dose 23, currently on prednisone 55mg qd. Taking PPI and mepron. Says he also felt fever last night, however did not measure. Today he noticed red tinged urine, however denies roxane blood. Also mentions having white residue on L side of tongue for last 2 days, denies any pain from it.     ROS:  -Denies gross hematuria, dysuria, increased urinary frequency, chest pain, sob, rash, joint pain or swelling    SLE hx:  -pt followed by Dr. Nilton malcolm  -was evaluated by nephrology, underwent renal biopsy in 2022 showing focal proliferative class III, segmental endocapillary hypercellularity in 12%, a single small cellular crescents, mild acute interstitial nephritis, NIH activity score , no chronicity.  -Serology: MÓNICA 1:1280, RNP 1.8, overton negative, DS-DNA 57/62 2022, C3 67 (10/22), C4 wnl, protein/creatinien ratio 3.0 was 3.2 (10/22)  -then had second renal biopsy 10/2022 showed Class IV LN with crescentic disease Activity: 15/24 Chronicity: 3/12, IFTA 15-20%.    -was on prednisone 5 mg daily, MMF 3 g daily, hydroxychloroquine, Obinutuzumab 10/21/22 ([facility] protocol) second dose 22  -2023 p/w worsening edema w/ LIZETH and active urinary sediment. mild C3 depression 83 with negative C4 and dsDNA. CT A/P, US kidney, echo unremarkable. s/p kidney biopsy 2023 showed proliferative LN class IV-G with 5 out of 8 non-globally sclerosed glomeruli with cellular and fibrocellular crescents; moderate tubulointerstitial inflammation, ~30% IFTA. Activity index  and chronicity index . Mycophenolic acid level was wnl. Patient was not a candidate for clinical trial. Received IV solumedrol 1g x3 (-) then prednisone 60mg/day starting on . Received induction Cytoxan 23 with remainder to be outpatient w/ plan for IV  mg q2 weeks   -c/w PCP ppx   -c/w PPI daily for GI ppx  -Quant TB negative outpatient 2023; Hep B checked this admission    Labs: DS-DNA 14 () was 57/62 2022. C3 83 () was 67 (10/22),  C4 26 () was 16 (10/22), protein/creatinine ratio 3.0 was 3.2 (10/22)     VSS  WBC 6.2, Hgb 8.4 (baseline), lymphocytes 1.8%, Creatinine 2.56 (most recently 1.8-2.4, 2023 1.6), no transaminitis, UA w/ large blood, >50 RBC, moderate leuk esterase,   CXR wnl    MEDICATIONS  (STANDING):    MEDICATIONS  (PRN):      Allergies    No Known Allergies    Intolerances        Vital Signs Last 24 Hrs  T(C): 36.8 (27 May 2023 11:06), Max: 36.9 (27 May 2023 10:37)  T(F): 98.3 (27 May 2023 11:06), Max: 98.4 (27 May 2023 10:37)  HR: 85 (27 May 2023 11:06) (85 - 87)  BP: 134/71 (27 May 2023 11:06) (131/83 - 143/71)  BP(mean): --  RR: 14 (27 May 2023 11:06) (14 - 18)  SpO2: 100% (27 May 2023 11:06) (100% - 100%)    Parameters below as of 27 May 2023 11:06  Patient On (Oxygen Delivery Method): room air        Physical Exam:  General: Breathing comfortably on room air, no distress, +facial swelling  HEENT: EOMI, MMM, no oral ulcers, unscrapable white residue on L side of tongue  CVS: +S1/S2, RRR  Resp: CTA b/l. No crackles/wheezing  GI: Soft, NT/ND +BS  MSK: no synovitis  Skin: no visible rashes. +pitting leg edema of b/l legs and feet    LABS:                        8.4    6.22  )-----------( 234      ( 27 May 2023 11:35 )             28.0         139  |  103  |  46<H>  ----------------------------<  124<H>  4.3   |  27  |  2.56<H>    Ca    7.4<L>      27 May 2023 11:35    TPro  4.6<L>  /  Alb  2.0<L>  /  TBili  <0.2  /  DBili  x   /  AST  16  /  ALT  8   /  AlkPhos  45        Urinalysis Basic - ( 27 May 2023 11:37 )    Color: Light Red / Appearance: Slightly Turbid / S.016 / pH: x  Gluc: x / Ketone: Negative  / Bili: Negative / Urobili: <2 mg/dL   Blood: x / Protein: 300 mg/dL / Nitrite: Negative   Leuk Esterase: Moderate / RBC: >50 /HPF / WBC 10 - 15 /HPF   Sq Epi: x / Non Sq Epi: x / Bacteria: Negative        RADIOLOGY & ADDITIONAL STUDIES: Reviewed     LIZ HOUSER  [id number]    HPI: 48M with h/o SLE c/b LN 3/4 w/ recent admission  for lupus nephritis flare (switched from cellcept to IV cytoxan) p/w b/l LE swelling and hematuria. States having significant swelling to both legs over past 3 weeks despite having his lasix dose increased  up to 40mg BID. Noticed urine had reddish color so son spoke with his Rheumatologist, Dr. Nilton Le, who advised to come to ED for eval     Subjective: [place] : [id number]. Pt says he has been having worsening facial and leg swelling since hospital discharge. Has been on lasix 40mg BID for last 2-3 weeks without improvement. Received his second cytoxan dose 23, currently on prednisone 50mg qd. Taking PPI and mepron. Says he also felt fever last night, however did not measure. Today he noticed red tinged urine, however denies roxane blood.      ROS:  -Denies gross hematuria, dysuria, increased urinary frequency, chest pain, sob, rash, joint pain or swelling    SLE hx:  -pt followed by Dr. Nilton malcolm  -was evaluated by nephrology, underwent renal biopsy in 2022 showing focal proliferative class III, segmental endocapillary hypercellularity in 12%, a single small cellular crescents, mild acute interstitial nephritis, NIH activity score , no chronicity.  -Serology: MÓNICA 1:1280, RNP 1.8, overton negative, DS-DNA 57/62 2022, C3 67 (10/22), C4 wnl, protein/creatinien ratio 3.0 was 3.2 (10/22)  -then had second renal biopsy 10/2022 showed Class IV LN with crescentic disease Activity: 15/24 Chronicity: 3/12, IFTA 15-20%.    -was on prednisone 5 mg daily, MMF 3 g daily, hydroxychloroquine, Obinutuzumab 10/21/22 ([facility] protocol) second dose 22  -2023 p/w worsening edema w/ LIZETH and active urinary sediment. mild C3 depression 83 with negative C4 and dsDNA. CT A/P, US kidney, echo unremarkable. s/p kidney biopsy 2023 showed proliferative LN class IV-G with 5 out of 8 non-globally sclerosed glomeruli with cellular and fibrocellular crescents; moderate tubulointerstitial inflammation, ~30% IFTA. Activity index  and chronicity index . Mycophenolic acid level was wnl. Patient was not a candidate for clinical trial. Received IV solumedrol 1g x3 (-) then prednisone 60mg/day starting on . Received induction Cytoxan 23 with remainder to be outpatient w/ plan for IV  mg q2 weeks   -c/w PCP ppx   -c/w PPI daily for GI ppx  -Quant TB negative outpatient 2023; Hep B checked this admission    Labs: DS-DNA 14 () was 57/62 2022. C3 83 () was 67 (10/22),  C4 26 () was 16 (10/22), protein/creatinine ratio 3.0 was 3.2 (10/22)     VSS  WBC 6.2, Hgb 8.4 (baseline), lymphocytes 1.8%, Creatinine 2.56 (most recently 1.8-2.4, 2023 1.6), no transaminitis, UA w/ large blood, >50 RBC, moderate leuk esterase,   CXR wnl    MEDICATIONS  (STANDING):    MEDICATIONS  (PRN):      Allergies    No Known Allergies    Intolerances        Vital Signs Last 24 Hrs  T(C): 36.8 (27 May 2023 11:06), Max: 36.9 (27 May 2023 10:37)  T(F): 98.3 (27 May 2023 11:06), Max: 98.4 (27 May 2023 10:37)  HR: 85 (27 May 2023 11:06) (85 - 87)  BP: 134/71 (27 May 2023 11:06) (131/83 - 143/71)  BP(mean): --  RR: 14 (27 May 2023 11:06) (14 - 18)  SpO2: 100% (27 May 2023 11:06) (100% - 100%)    Parameters below as of 27 May 2023 11:06  Patient On (Oxygen Delivery Method): room air        Physical Exam:  General: Breathing comfortably on room air, no distress, +cushingoid facies  HEENT: EOMI, MMM, no oral ulcers, unscrapable white residue on L side of tongue  CVS: +S1/S2, RRR  Resp: CTA b/l. No crackles/wheezing  GI: Soft, NT/ND +BS  MSK: no synovitis  Skin: no visible rashes. +pitting leg edema of b/l legs and feet    LABS:                        8.4    6.22  )-----------( 234      ( 27 May 2023 11:35 )             28.0         139  |  103  |  46<H>  ----------------------------<  124<H>  4.3   |  27  |  2.56<H>    Ca    7.4<L>      27 May 2023 11:35    TPro  4.6<L>  /  Alb  2.0<L>  /  TBili  <0.2  /  DBili  x   /  AST  16  /  ALT  8   /  AlkPhos  45        Urinalysis Basic - ( 27 May 2023 11:37 )    Color: Light Red / Appearance: Slightly Turbid / S.016 / pH: x  Gluc: x / Ketone: Negative  / Bili: Negative / Urobili: <2 mg/dL   Blood: x / Protein: 300 mg/dL / Nitrite: Negative   Leuk Esterase: Moderate / RBC: >50 /HPF / WBC 10 - 15 /HPF   Sq Epi: x / Non Sq Epi: x / Bacteria: Negative        RADIOLOGY & ADDITIONAL STUDIES: Reviewed

## 2023-05-27 NOTE — H&P ADULT - HISTORY OF PRESENT ILLNESS
47 yo M with PMHx of SLE c/b LN 3/4 w/ recent admission 4/27/23-5/8/2023 for lupus nephritis flare (switched from cellcept to IV cytoxan) p/w b/l LE swelling and hematuria. Pt endorsed having significant swelling to both legs over past 3 weeks despite having his lasix dose increased  up to 40mg BID. He also noticed his urine had reddish color so son spoke with his Rheumatologist, Dr. Nilton Le, who advised to come to ED for eval. Pt also had low grade temperatures but denied chills, cough, chest pain, sob, abdominal pain, n/v/d, dysuria.    In ED,  49 yo M with PMHx of SLE c/b LN 3/4 w/ recent admission 4/27/23-5/8/2023 for lupus nephritis flare (switched from cellcept to IV cytoxan) p/w b/l LE swelling and hematuria. Pt endorsed having significant swelling to both legs over past 3 weeks despite having his lasix dose increased  up to 40mg BID. He also noticed his urine had reddish color so son spoke with his Rheumatologist, Dr. Nilton Le, who advised to come to ED for eval. Pt also had low grade temperatures but denied chills, cough, chest pain, sob, abdominal pain, n/v/d, dysuria.    In ED, pt w/ stable vitals. 47 yo M with PMHx of SLE c/b LN 3/4 w/ recent admission 4/27/23-5/8/2023 for lupus nephritis flare (switched from cellcept to IV cytoxan) p/w b/l LE swelling and hematuria. Pt endorsed having significant swelling to both legs over past 3 weeks despite having his lasix dose increased  up to 40mg BID. He also noticed his urine had reddish color so son spoke with his Rheumatologist, Dr. Nilton Le, who advised to come to ED for eval. Received his second cytoxan dose 5/19/23, currently on prednisone 55mg qd. Taking PPI and mepron. Says he also felt fever last night, however did not measure. Also mentions having white residue on L side of tongue for last 2 days, denies any pain from it. Pt denied chills, cough, chest pain, sob, abdominal pain, n/v/d, dysuria.    In ED, pt w/ stable vitals.

## 2023-05-27 NOTE — H&P ADULT - NSHPPHYSICALEXAM_GEN_ALL_CORE
T(C): 36.8 (05-27-23 @ 11:06), Max: 36.9 (05-27-23 @ 10:37)  HR: 76 (05-27-23 @ 15:08) (76 - 87)  BP: 127/86 (05-27-23 @ 15:08) (127/86 - 143/71)  RR: 16 (05-27-23 @ 15:08) (14 - 18)  SpO2: 100% (05-27-23 @ 15:08) (100% - 100%)    PHYSICAL EXAM:  GENERAL: NAD, well-groomed, well-developed  HEAD:  Atraumatic, Normocephalic  EYES: EOMI, PERRLA, conjunctiva and sclera clear, no jaundice   ENMT: No tonsillar erythema, exudates, or enlargement; Moist mucous membranes  NECK: Supple, non tender, No JVD, Normal thyroid  HEART: Regular rate and rhythm; No murmurs, rubs, or gallops. S1/S2 present  RESPIRATORY: CTA B/L, No W/R/R  ABDOMEN: Soft, Nontender, Nondistended; Bowel sounds present. No hepatosplenomegally  NEUROLOGY: A&Ox3, nonfocal, moving all extremities,  EXTREMITIES:  2+ Peripheral Pulses, No clubbing, cyanosis, or edema. 5/5 muscle tone in UE/LE  SKIN: warm, dry, normal color, no rash or abnormal lesions

## 2023-05-28 ENCOUNTER — TRANSCRIPTION ENCOUNTER (OUTPATIENT)
Age: 48
End: 2023-05-28

## 2023-05-28 LAB
ALBUMIN SERPL ELPH-MCNC: 2 G/DL — LOW (ref 3.3–5)
ALP SERPL-CCNC: 37 U/L — LOW (ref 40–120)
ALT FLD-CCNC: 8 U/L — SIGNIFICANT CHANGE UP (ref 4–41)
ANION GAP SERPL CALC-SCNC: 10 MMOL/L — SIGNIFICANT CHANGE UP (ref 7–14)
APTT BLD: 33.3 SEC — SIGNIFICANT CHANGE UP (ref 27–36.3)
AST SERPL-CCNC: 15 U/L — SIGNIFICANT CHANGE UP (ref 4–40)
BASOPHILS # BLD AUTO: 0.01 K/UL — SIGNIFICANT CHANGE UP (ref 0–0.2)
BASOPHILS NFR BLD AUTO: 0.2 % — SIGNIFICANT CHANGE UP (ref 0–2)
BILIRUB SERPL-MCNC: <0.2 MG/DL — SIGNIFICANT CHANGE UP (ref 0.2–1.2)
BLD GP AB SCN SERPL QL: NEGATIVE — SIGNIFICANT CHANGE UP
BUN SERPL-MCNC: 49 MG/DL — HIGH (ref 7–23)
C3 SERPL-MCNC: 86 MG/DL — LOW (ref 90–180)
C4 SERPL-MCNC: 27 MG/DL — SIGNIFICANT CHANGE UP (ref 10–40)
CALCIUM SERPL-MCNC: 7.7 MG/DL — LOW (ref 8.4–10.5)
CHLORIDE SERPL-SCNC: 103 MMOL/L — SIGNIFICANT CHANGE UP (ref 98–107)
CO2 SERPL-SCNC: 28 MMOL/L — SIGNIFICANT CHANGE UP (ref 22–31)
COLLECT DURATION TIME UR: 24 HR — SIGNIFICANT CHANGE UP
CREAT SERPL-MCNC: 2.5 MG/DL — HIGH (ref 0.5–1.3)
CULTURE RESULTS: NO GROWTH — SIGNIFICANT CHANGE UP
EGFR: 31 ML/MIN/1.73M2 — LOW
EOSINOPHIL # BLD AUTO: 0.04 K/UL — SIGNIFICANT CHANGE UP (ref 0–0.5)
EOSINOPHIL NFR BLD AUTO: 0.8 % — SIGNIFICANT CHANGE UP (ref 0–6)
GLUCOSE SERPL-MCNC: 94 MG/DL — SIGNIFICANT CHANGE UP (ref 70–99)
HAPTOGLOB SERPL-MCNC: 131 MG/DL — SIGNIFICANT CHANGE UP (ref 34–200)
HCT VFR BLD CALC: 24.5 % — LOW (ref 39–50)
HGB BLD-MCNC: 7.4 G/DL — LOW (ref 13–17)
IANC: 3.29 K/UL — SIGNIFICANT CHANGE UP (ref 1.8–7.4)
IMM GRANULOCYTES NFR BLD AUTO: 1 % — HIGH (ref 0–0.9)
LDH SERPL L TO P-CCNC: 233 U/L — HIGH (ref 135–225)
LYMPHOCYTES # BLD AUTO: 1.26 K/UL — SIGNIFICANT CHANGE UP (ref 1–3.3)
LYMPHOCYTES # BLD AUTO: 24.3 % — SIGNIFICANT CHANGE UP (ref 13–44)
MAGNESIUM SERPL-MCNC: 2.1 MG/DL — SIGNIFICANT CHANGE UP (ref 1.6–2.6)
MCHC RBC-ENTMCNC: 27 PG — SIGNIFICANT CHANGE UP (ref 27–34)
MCHC RBC-ENTMCNC: 30.2 GM/DL — LOW (ref 32–36)
MCV RBC AUTO: 89.4 FL — SIGNIFICANT CHANGE UP (ref 80–100)
MONOCYTES # BLD AUTO: 0.54 K/UL — SIGNIFICANT CHANGE UP (ref 0–0.9)
MONOCYTES NFR BLD AUTO: 10.4 % — SIGNIFICANT CHANGE UP (ref 2–14)
NEUTROPHILS # BLD AUTO: 3.29 K/UL — SIGNIFICANT CHANGE UP (ref 1.8–7.4)
NEUTROPHILS NFR BLD AUTO: 63.3 % — SIGNIFICANT CHANGE UP (ref 43–77)
NRBC # BLD: 0 /100 WBCS — SIGNIFICANT CHANGE UP (ref 0–0)
NRBC # FLD: 0 K/UL — SIGNIFICANT CHANGE UP (ref 0–0)
PHOSPHATE SERPL-MCNC: 5.2 MG/DL — HIGH (ref 2.5–4.5)
PLATELET # BLD AUTO: 201 K/UL — SIGNIFICANT CHANGE UP (ref 150–400)
POTASSIUM SERPL-MCNC: 3.6 MMOL/L — SIGNIFICANT CHANGE UP (ref 3.5–5.3)
POTASSIUM SERPL-SCNC: 3.6 MMOL/L — SIGNIFICANT CHANGE UP (ref 3.5–5.3)
PROT 24H UR-MRATE: 5074 MG/24 H — HIGH
PROT SERPL-MCNC: 4 G/DL — LOW (ref 6–8.3)
RBC # BLD: 2.74 M/UL — LOW (ref 4.2–5.8)
RBC # FLD: 13.2 % — SIGNIFICANT CHANGE UP (ref 10.3–14.5)
RH IG SCN BLD-IMP: POSITIVE — SIGNIFICANT CHANGE UP
SODIUM SERPL-SCNC: 141 MMOL/L — SIGNIFICANT CHANGE UP (ref 135–145)
SPECIMEN SOURCE: SIGNIFICANT CHANGE UP
TOTAL VOLUME - 24 HOUR: 2100 ML — SIGNIFICANT CHANGE UP
URINE CREATININE CALCULATION: 0.9 G/24 H — SIGNIFICANT CHANGE UP (ref 0.8–1.8)
WBC # BLD: 5.19 K/UL — SIGNIFICANT CHANGE UP (ref 3.8–10.5)
WBC # FLD AUTO: 5.19 K/UL — SIGNIFICANT CHANGE UP (ref 3.8–10.5)

## 2023-05-28 PROCEDURE — 76770 US EXAM ABDO BACK WALL COMP: CPT | Mod: 26

## 2023-05-28 PROCEDURE — 99222 1ST HOSP IP/OBS MODERATE 55: CPT

## 2023-05-28 PROCEDURE — 93970 EXTREMITY STUDY: CPT | Mod: 26

## 2023-05-28 PROCEDURE — 99232 SBSQ HOSP IP/OBS MODERATE 35: CPT

## 2023-05-28 RX ADMIN — HEPARIN SODIUM 5000 UNIT(S): 5000 INJECTION INTRAVENOUS; SUBCUTANEOUS at 22:20

## 2023-05-28 RX ADMIN — Medication 500000 UNIT(S): at 23:38

## 2023-05-28 RX ADMIN — Medication 500000 UNIT(S): at 12:45

## 2023-05-28 RX ADMIN — Medication 40 MILLIGRAM(S): at 09:50

## 2023-05-28 RX ADMIN — PANTOPRAZOLE SODIUM 40 MILLIGRAM(S): 20 TABLET, DELAYED RELEASE ORAL at 05:42

## 2023-05-28 RX ADMIN — Medication 650 MILLIGRAM(S): at 05:42

## 2023-05-28 RX ADMIN — BUMETANIDE 2 MILLIGRAM(S): 0.25 INJECTION INTRAMUSCULAR; INTRAVENOUS at 05:39

## 2023-05-28 RX ADMIN — Medication 500000 UNIT(S): at 05:42

## 2023-05-28 RX ADMIN — HEPARIN SODIUM 5000 UNIT(S): 5000 INJECTION INTRAVENOUS; SUBCUTANEOUS at 05:42

## 2023-05-28 RX ADMIN — CEFTRIAXONE 100 MILLIGRAM(S): 500 INJECTION, POWDER, FOR SOLUTION INTRAMUSCULAR; INTRAVENOUS at 22:21

## 2023-05-28 RX ADMIN — Medication 1 MILLIGRAM(S): at 12:46

## 2023-05-28 RX ADMIN — Medication 200 MILLIGRAM(S): at 12:46

## 2023-05-28 RX ADMIN — Medication 650 MILLIGRAM(S): at 17:38

## 2023-05-28 RX ADMIN — PREGABALIN 1000 MICROGRAM(S): 225 CAPSULE ORAL at 12:46

## 2023-05-28 RX ADMIN — Medication 500000 UNIT(S): at 17:38

## 2023-05-28 RX ADMIN — BUMETANIDE 2 MILLIGRAM(S): 0.25 INJECTION INTRAMUSCULAR; INTRAVENOUS at 16:02

## 2023-05-28 RX ADMIN — HEPARIN SODIUM 5000 UNIT(S): 5000 INJECTION INTRAVENOUS; SUBCUTANEOUS at 16:01

## 2023-05-28 NOTE — CHART NOTE - NSCHARTNOTEFT_GEN_A_CORE
Discussed with medicine resident, Dr. Rosales.   Pt admitted with lupus nephritis, LE swelling and hematuria. Rheumatology recommending urologic workup for hemorrhagic cystitis as patient s/p cyclophosphamide.   Patient voiding well, primary team to document PVR, without clots and h/h stable.   Recommend outpatient hematuria workup and cystoscopy.   Please call urology if hematuria worsens and patient requires acute  attention.     [facility]  [address] [place] [address], [place] [address]  [phone]

## 2023-05-28 NOTE — DISCHARGE NOTE PROVIDER - NSDCCPTREATMENT_GEN_ALL_CORE_FT
PRINCIPAL PROCEDURE  Procedure: CT abdomen  Findings and Treatment: ACC: [id number] EXAM:  CT ABDOMEN AND PELVIS   ORDERED BY: HAYLEE HUDSON   PROCEDURE DATE:  05/27/2023    INTERPRETATION:  CLINICAL INFORMATION: 48 year old male with history of   lupus nephritis, recent admission for LIZETH, now on Cytoxan infusions, also   on daily prednisone/plaquenil, presenting with b/l LE swelling and   hematuria.  COMPARISON: April 28, 2023  CONTRAST/COMPLICATIONS:  IV Contrast: None  Oral Contrast: None  Complications: None reported  PROCEDURE:  CT of the Abdomen and Pelvis was performed.  Sagittal and coronal reformats were performed.  FINDINGS:  LOWER CHEST: Sm  < end of copied text >  all bilateral pleural effusions, right greater than left.   There is associated mild infiltrate and/or atelectasis right lung base.  LIVER: Within normal limits.  BILE DUCTS: Normal caliber.  GALLBLADDER: Within normal limits.  SPLEEN: Within normal limits.  PANCREAS: Within normal limits.  ADRENALS: Within normal limits.  KIDNEYS/URETERS: No renal/ureteral stones or hydronephrosis.  BLADDER: Within normal limits.  REPRODUCTIVE ORGANS: Prostate within normal limits.  Scattered diverticula without diverticulitis. No colitis.  BOWEL: No bowel obstruction. Appendix is normal.  PERITONEUM: Small amount of free fluid within the paracolic gutters   bilaterally.  No free air or abscess.  VESSELS: Within normal limits.  RETROPERITONEUM/LYMPH NODES: No lymphadenopathy.  ABDOMINAL WALL: Within normal limits.  BONES: Within normal limits.  IMPRESSION:  Small bilateral pleural effusions, right greater than left. There is   associated mild infiltrate and/or atelectasis right lung base.  No renal/ureteral stones or hydronephrosis.  Small amount of free fluid within the paracolic gutters bilaterally.  Please refer to detailed findingsotherwise described above.  --- End of Report ---  ANNEMARIE NUNO MD; Attending Radiologist  This document has been electronically signed. May 27 2023 11:46PM< from: CT Abdomen and Pelvis No Cont (05.27.23 @ 21:52) >        SECONDARY PROCEDURE  Procedure: Venous duplex scan LLE  Findings and Treatment:   < end of copied text >  ACC: [id number] EXAM:  US DPLX LWR EXT VEINS COMPL BI   ORDERED BY: HAYLEE HUDSON   PROCEDURE DATE:  05/28/2023    INTERPRETATION:  CLINICAL INFORMATION: DVT in lupus  COMPARISON: None available.  TECHNIQUE: Duplex sonography of the BILATERAL LOWER extremity veins with   color and spectral Doppler, with and without compression.  FINDINGS:  RIGHT:  Normal compressibility of the RIGHT common femoral, femoral and popliteal   veins.  Doppler examination shows normal spontaneous and phasic flow.  No RIGHT calf vein thrombosis is detected.  LEFT:  Normal compressibility of the LEFT common femoral, femoral and popliteal   veins.  Doppler examination shows normal spontaneous and phasic flow.  No LEFT calf vein thrombosis is detected.  IMPRESSION:  No evidence of deep venous thrombosis in either lower extremity.  --- End of Report ---  LUZMARIA JASSO MD; Attending Radiologist  This document has been electronically signed. May 28 2023  9:37AM< from: US Duplex Venous Lower Ext Complete, Bilateral (05.28.23 @ 09:01) >      Procedure: Duplex ultrasound of kidney  Findings and Treatment:   < end of copied text >  ACC: [id number] EXAM:  US KIDNEYS AND BLADDER   ORDERED BY: HAYLEE HUDSON   PROCEDURE DATE:  05/28/2023    INTERPRETATION:  CLINICAL INFORMATION: Renal insufficiency.  COMPARISON: CT abdomen/pelvis 5/27/2023.  TECHNIQUE: Sonography of the kidneys and bladder.  FINDINGS:  Right kidney: 11.0 cm. No renal mass, hydronephrosis or calculi.  Left kidney: 10.7 cm. No renal mass, hydronephrosis or calculi.  Urinary bladder: Visualized portions unremarkable.  Small bilateral pleural effusions.  IMPRESSION:  No evidence for bilateral hydronephrosis.  --- End of Report ---  KELSI HORNE MD; Attending Radiologist  This document has been electronically signed. May 28 2023 10:12AM< from: US Kidney and Bladder (05.28.23 @ 09:01) >

## 2023-05-28 NOTE — DISCHARGE NOTE PROVIDER - NSFOLLOWUPCLINICS_GEN_ALL_ED_FT
[facility]  [facility]rology  [address] [place] [address]  Phone: [phone]  Fax:   Follow Up Time: 1 week

## 2023-05-28 NOTE — DISCHARGE NOTE PROVIDER - PROVIDER TOKENS
PROVIDER:[TOKEN:[[id number]],ESTABLISHEDPATIENT:[T]] PROVIDER:[TOKEN:[[id number]],ESTABLISHEDPATIENT:[T]],PROVIDER:[TOKEN:[[id number]],FOLLOWUP:[1 week],ESTABLISHEDPATIENT:[T]] PROVIDER:[TOKEN:[[id number]],ESTABLISHEDPATIENT:[T]],PROVIDER:[TOKEN:[[id number]],FOLLOWUP:[1 week],ESTABLISHEDPATIENT:[T]],PROVIDER:[TOKEN:[[id number]],FOLLOWUP:[1 week],ESTABLISHEDPATIENT:[T]]

## 2023-05-28 NOTE — PROGRESS NOTE ADULT - PROBLEM SELECTOR PLAN 5
Likely iso immunocompromised status.    -start nystatin swish and spit Likely iso immunocompromised status.    -c/w nystatin swish and spit

## 2023-05-28 NOTE — DISCHARGE NOTE PROVIDER - CARE PROVIDER_API CALL
Hayde Shultz  Nephrology  [address] [place] [address], [address]  [place] [address]  Phone: [phone]  Fax: [phone]  Established Patient  Follow Up Time:    Hayde Shultz  Nephrology  [address] [place] [address], [address] 2  [place] [address]  Phone: [phone]  Fax: [phone]  Established Patient  Follow Up Time:     MD Shira [facility]  Internal Medicine  [address] [facility] [place] [address]  Phone: [phone]  Fax: [phone]  Established Patient  Follow Up Time: 1 week   Hayde Shultz  Nephrology  [address] [place] [address], Floor [address]  [place] [address]  Phone: [phone]  Fax: [phone]  Established Patient  Follow Up Time:     MD Shira [facility]  Internal Medicine  [address] [facility] [place] [address]  Phone: [phone]  Fax: [phone]  Established Patient  Follow Up Time: 1 week    Mirella Siegel  Rheumatology  [address] [place] [address] [address]  [place] [address]  Phone: [phone]  Fax: [phone]  Established Patient  Follow Up Time: 1 week

## 2023-05-28 NOTE — DISCHARGE NOTE PROVIDER - NSDCFUADDAPPT_GEN_ALL_CORE_FT
1. Please schedule an appointment with Dr. Shultz within 1 week of being discharged from the hospital.   2. Please follow up with rheumatology on 6/6/23.  3. Please schedule a follow up with your primary care doctor (Dr. Silva) within 1 week of being discharged from the hospital. 1. Please schedule an appointment with Dr. Shultz within 1 week of being discharged from the hospital.   2. Please follow up with rheumatology on 6/6/23.  3. Please schedule a follow up with your primary care doctor (Dr. Silva) within 1 week of being discharged from the hospital.  4. Please follow up with nephrology (Dr. Shultz) on 6/8/23. 1. Please follow up with Nephrology (Dr. Shultz) on 6/8.  2. Please follow up with rheumatology (Dr. Nilton Le) on 6/21/23.  3. Please schedule a follow up with your primary care doctor (Dr. Silva) within 1 week of being discharged from the hospital.  4. Please follow up with nephrology (Dr. Shultz) on 6/8/23.  5. Please schedule a follow up appointment with urology within 1 week of being discharged from the hospital

## 2023-05-28 NOTE — DISCHARGE NOTE PROVIDER - HOSPITAL COURSE
48M w/ SLE c/b lupus nephritis with recent admission for lupus nephritis flare where he was switched from mycophenolate to cyclophosphamide presenting with new hemauturia, worsening leg swelling, and found to have LIZETH on CKD. Subjective fever at home x1 but no documented fevers at home or since admission and no dysuria/frequency/urgency/flank pain. Etiology of hematuria not clear, but hemorrhagic cystitis 2/2 cyclophosphamide is on the differential. Urology deemed pt can follow up outpatient for cystoscopy. Pt was started on bumex 2 IV for leg swelling which improved it as well as the LIZETH. 48M w/ SLE c/b lupus nephritis with recent admission for lupus nephritis flare where he was switched from mycophenolate to cyclophosphamide presenting with new hematuria, worsening leg swelling, and found to have LIZETH on CKD. Subjective fever at home x1 but no documented fevers at home or since admission and no dysuria/frequency/urgency/flank pain. Etiology of hematuria not clear, but hemorrhagic cystitis 2/2 cyclophosphamide is on the differential. Urology deemed pt can follow up outpatient for cystoscopy. Pt was started on bumex 2 IV for leg swelling. Nephrology was consulted and pt was increased to bumex 1mg TID. Rheumatology was consulted and recommended pt be started on solumedrol 40 mg, continuing home plaquenil, and home PPI and mepron. As pt's proteinuria improved, pt was transitioned back to oral prednisone 50 mg. Pt was recommended to f/u urologist when discharged from hospital as outpt.     On day of discharge, pt is afebrile and medically stable to be discharged home. Pt will follow up with PCP, rheumatologist, and urology after marcelo discharged from the hospital. 48M w/ SLE c/b lupus nephritis with recent admission for lupus nephritis flare where he was switched from mycophenolate to cyclophosphamide presenting with new hematuria, worsening leg swelling, and found to have LIZETH on CKD. Subjective fever at home x1 but no documented fevers at home or since admission and no dysuria/frequency/urgency/flank pain. Etiology of hematuria not clear, but hemorrhagic cystitis 2/2 cyclophosphamide is on the differential. Urology deemed pt can follow up outpatient for cystoscopy. Pt was started on bumex 2 IV for leg swelling. Nephrology was consulted and pt was increased to bumex 2mg TID. Rheumatology was consulted and recommended pt be started on solumedrol 40 mg, continuing home plaquenil, and home PPI and mepron. As pt's proteinuria improved, pt was transitioned back to oral prednisone 50 mg. Pt was recommended to f/u urologist when discharged from hospital as outpt. Pt to start Tacrolimus as outpt after DC form hospital.     On day of discharge, pt is afebrile and medically stable to be discharged home. Pt will follow up with PCP, rheumatologist, nephrology, and urology after marcelo discharged from the hospital. 48M w/ SLE c/b lupus nephritis with recent admission for lupus nephritis flare where he was switched from mycophenolate to cyclophosphamide presenting with new hematuria, worsening leg swelling, and found to have LIZETH on CKD. Subjective fever at home x1 but no documented fevers at home or since admission and no dysuria/frequency/urgency/flank pain. Etiology of hematuria not clear, but hemorrhagic cystitis 2/2 cyclophosphamide is on the differential. Urology deemed pt can follow up outpatient for cystoscopy. Pt was started on bumex 2 IV for leg swelling. Nephrology was consulted and pt was increased to bumex 2mg TID. Rheumatology was consulted and recommended pt be started on solumedrol 40 mg, continuing home plaquenil, and home PPI and mepron. As pt's proteinuria improved, pt was transitioned back to oral prednisone 50 mg. Pt was recommended to f/u urologist when discharged from hospital as outpt. Pt being considered to start calcineurin inhibitor per rheumatology. Pt will be discharged on bumex 2 mg PO TID and PO prednisone 50 mg.     On day of discharge, pt is afebrile and medically stable to be discharged home. Pt will follow up with PCP, rheumatologist, nephrology, and urology after marcelo discharged from the hospital.

## 2023-05-28 NOTE — PROGRESS NOTE ADULT - ATTENDING COMMENTS
48M w/ SLE c/b lupus nephritis with recent admission for lupus nephritis flare where he was switched from mycophenolate to cyclophosphamide presenting with subjective fever at home x1 but no documented fevers at home, new hematuria, worsening leg swelling, and found to have LIZETH on CKD.     S/p CTA/P notable for: Small bilateral pleural effusions, right greater than left. There is associated mild infiltrate and/or atelectasis right lung base.Small amount of free fluid within the paracolic gutters bilaterally.  S/p renal/ureteral stones or hydronephrosis.    -Will continue to appreciate rheumatology recommendations. Hemorrhagic cystitis 2/2 cyclophosphamide is on the differential. Pt currently without clots and h/h overall stable. Pt seen by urology per rheum recs and plan is for outpatient w/u for hematuria. C3 low, C4 wnl. Pending DSDNA. C/w PCP ppx , plaquenil and solumedrol as prescribed.   -on ceftriaxone for possible UTI. F/u urine cx   - Monitor creatinine levels  and I and Os. Nephrology also following and will continue to appreciate recommendations. C/w Bumex IV per nephrology 48M w/ SLE c/b lupus nephritis with recent admission for lupus nephritis flare where he was switched from mycophenolate to cyclophosphamide presenting with subjective fever at home x1 but no documented fevers at home, new hematuria, worsening leg swelling, and found to have LIZETH on CKD.     S/p CTA/P notable for: Small bilateral pleural effusions, right greater than left. There is associated mild infiltrate and/or atelectasis right lung base.Small amount of free fluid within the paracolic gutters bilaterally.  S/p renal/ureteral stones or hydronephrosis.    -Will continue to appreciate rheumatology recommendations. Hemorrhagic cystitis 2/2 cyclophosphamide is on the differential. Pt currently without clots and h/h overall stable. Pt seen by urology per rheum recs and plan is for outpatient w/u for hematuria. C3 low, C4 wnl. Pending DSDNA. C/w PCP ppx , plaquenil and solumedrol as prescribed.   -on ceftriaxone for possible UTI. F/u urine cx   - Monitor creatinine levels  and I and Os. Nephrology also following and will continue to appreciate recommendations. C/w Bumex IV per nephrology  -Monitor LE edema. LE doppler neg for DVT. C/w bumex as stated

## 2023-05-28 NOTE — PROGRESS NOTE ADULT - PROBLEM SELECTOR PLAN 4
Cr baseline 1.5-1.7 11/2022. Pt w/ recent admission for LIZETH. Cr now 2.56.  Pt denies decreased UOP. Pt follows with Dr. Shultz.    -appreciate nephrology recs Cr baseline 1.5-1.7 11/2022. Pt w/ recent admission for LIZETH. Cr now 2.56.  Pt denies decreased UOP. Pt follows with Dr. Shultz.    -appreciate nephrology recs  -improvement w/ bumex

## 2023-05-28 NOTE — DISCHARGE NOTE PROVIDER - NSDCFUSCHEDAPPT_GEN_ALL_CORE_FT
[facility]  RHEUM [address] [facility]  Scheduled Appointment: 06/06/2023    Hayde Shultz  [facility]  NEPHRO [address]  Scheduled Appointment: 06/08/2023    [facility]  RHEUM [address] [facility]  Scheduled Appointment: 06/21/2023     Hayde Shultz  [facility]  NEPHRO [address]  Scheduled Appointment: 06/08/2023    [address]  Scheduled Appointment: 06/21/2023

## 2023-05-28 NOTE — PROGRESS NOTE ADULT - PROBLEM SELECTOR PLAN 3
Pt w/ bilateral leg swelling, takes lasix 40 bid.    -start 2 bumex IV bid  -appreciate nephrology recs Pt w/ bilateral leg swelling, takes lasix 40 bid.    -c/w 2 bumex IV bid, improving edema  -appreciate nephrology recs

## 2023-05-28 NOTE — PROGRESS NOTE ADULT - PROBLEM SELECTOR PLAN 2
Pt followed by Dr. Nilton Le. Underwent renal biopsy in 2/2022 showing focal proliferative class III, segmental endocapillary hypercellularity in 12%, a single small cellular crescents, mild acute interstitial nephritis, NIH activity score 4/24, no chronicity. Pt w/ hx of multiple renal bx. Last received cytoxan 5/19/23.    -appreciate rheum recs:  -4/2023 p/w worsening edema w/ LIZETH and active urinary sediment. mild C3 depression 83 with negative C4 and dsDNA. CT A/P, US kidney, echo unremarkable. s/p kidney biopsy 5/2/2023 showed proliferative LN class IV-G with 5 out of 8 non-globally sclerosed glomeruli with cellular and fibrocellular crescents; moderate tubulointerstitial inflammation, Patient was not a candidate for clinical trial.  - Received IV solumedrol 1g x3 (5/4-5/6) then prednisone 60mg/day starting on 5/7. Received induction Cytoxan 5/5/23 with remainder to be outpatient w/ plan for IV  mg q2 weeks   -c/w PCP ppx   -c/w PPI daily for GI ppx  -c/w plaquenil  -c/w Home Prednisone   -Quant TB negative outpatient 2/2023; Hep B checked this admission Pt followed by Dr. Nilton Le. Underwent renal biopsy in 2/2022 showing focal proliferative class III, segmental endocapillary hypercellularity in 12%, a single small cellular crescents, mild acute interstitial nephritis, NIH activity score 4/24, no chronicity. Pt w/ hx of multiple renal bx. Last received cytoxan 5/19/23.    -appreciate rheum recs:  -4/2023 p/w worsening edema w/ LIZETH and active urinary sediment. mild C3 depression 83 with negative C4 and dsDNA. CT A/P, US kidney, echo unremarkable. s/p kidney biopsy 5/2/2023 showed proliferative LN class IV-G with 5 out of 8 non-globally sclerosed glomeruli with cellular and fibrocellular crescents; moderate tubulointerstitial inflammation, Patient was not a candidate for clinical trial.  - Received IV solumedrol 1g x3 (5/4-5/6) then prednisone 60mg/day starting on 5/7. Received induction Cytoxan 5/5/23 with remainder to be outpatient w/ plan for IV  mg q2 weeks   - protein/creatinine ratio and 24 hour urine protein    - ds-dna, c3, c4 mildly low  -c/w PCP ppx   -c/w PPI daily for GI ppx  -c/w plaquenil  -c/w Home Prednisone   -Quant TB negative outpatient 2/2023; Hep B checked this admission

## 2023-05-28 NOTE — DISCHARGE NOTE PROVIDER - NSDCMRMEDTOKEN_GEN_ALL_CORE_FT
Bactrim 400 mg-80 mg oral tablet: 1 tab(s) orally 3 times a week Please take on Mondays, Wednesdays and Fridays  cyanocobalamin 1000 mcg oral tablet: 1 tab(s) orally once a day  cytoxan:   ergocalciferol 1.25 mg (50,000 intl units) oral capsule: 1 cap(s) orally every 7 days Please start taking on Thursday 5/11, then continue taking once a week  folic acid 1 mg oral tablet: 1 tab(s) orally once a day  furosemide 40 mg oral tablet: 1 orally 2 times a day  mycophenolate mofetil 500 mg oral tablet: 3 tab(s) orally 2 times a day  pantoprazole 40 mg oral delayed release tablet: 1 tab(s) orally once a day (before a meal)  Plaquenil 200 mg oral tablet: 1 tab(s) orally once a day  Prednisone 55 mg daily: Take one tablet daily.  sodium bicarbonate 650 mg oral tablet: 2 tab(s) orally every 12 hours   Bactrim 400 mg-80 mg oral tablet: 1 tab(s) orally 3 times a week Please take on Mondays, Wednesdays and Fridays  bumetanide 2 mg oral tablet: 1 tab(s) orally 3 times a day  cyanocobalamin 1000 mcg oral tablet: 1 tab(s) orally once a day  ergocalciferol 1.25 mg (50,000 intl units) oral capsule: 1 cap(s) orally every 7 days Please start taking on Thursday 5/11, then continue taking once a week  folic acid 1 mg oral tablet: 1 tab(s) orally once a day  furosemide 40 mg oral tablet: 1 orally 2 times a day  mycophenolate mofetil 500 mg oral tablet: 3 tab(s) orally 2 times a day  pantoprazole 40 mg oral delayed release tablet: 1 tab(s) orally once a day (before a meal)  Plaquenil 200 mg oral tablet: 1 tab(s) orally once a day  predniSONE 50 mg oral tablet: 1 tab(s) orally once a day  sodium bicarbonate 650 mg oral tablet: 2 tab(s) orally every 12 hours   Bactrim 400 mg-80 mg oral tablet: 1 tab(s) orally 3 times a week Please take on Mondays, Wednesdays and Fridays  bumetanide 2 mg oral tablet: 1 tab(s) orally 2 times a day  cyanocobalamin 1000 mcg oral tablet: 1 tab(s) orally once a day  ergocalciferol 1.25 mg (50,000 intl units) oral capsule: 1 cap(s) orally every 7 days Please start taking on Thursday 5/11, then continue taking once a week  folic acid 1 mg oral tablet: 1 tab(s) orally once a day  furosemide 40 mg oral tablet: 1 orally 2 times a day  mycophenolate mofetil 500 mg oral tablet: 3 tab(s) orally 2 times a day  pantoprazole 40 mg oral delayed release tablet: 1 tab(s) orally once a day (before a meal)  Plaquenil 200 mg oral tablet: 1 tab(s) orally once a day  predniSONE 50 mg oral tablet: 1 tab(s) orally once a day  sodium bicarbonate 650 mg oral tablet: 2 tab(s) orally every 12 hours

## 2023-05-28 NOTE — PROGRESS NOTE ADULT - PROBLEM SELECTOR PLAN 1
UA w/ large blood, >50 RBC, moderate LE. UTI vs worsening lupus nephritis    -appreciate nephrology and rheumatology recs  -pt w/o dysuria, no leukocytosis, afebrile: monitor off abx UA w/ large blood, >50 RBC, moderate LE. UTI vs worsening lupus nephritis vs. hemorrhagic cystitis (cytoxan exposure)    -appreciate nephrology and rheumatology recs  -pt w/o dysuria, no leukocytosis, afebrile: monitor off abx  -urology recommending outpatient cystoscopy to r/o hemorrhagic cystitis

## 2023-05-28 NOTE — PROGRESS NOTE ADULT - SUBJECTIVE AND OBJECTIVE BOX
Patient is a 48y old  Male who presents with a chief complaint of hematuria (27 May 2023 17:06)      INTERVAL HPI/OVERNIGHT EVENTS:      REVIEW OF SYSTEMS:  CONSTITUTIONAL: No fever, weight loss, or fatigue  EYES: No eye pain, visual disturbances, or discharge  ENMT:  No difficulty hearing, tinnitus, vertigo; No sinus or throat pain  NECK: No pain or stiffness  BREASTS: No pain, masses, or nipple discharge  RESPIRATORY: No cough, wheezing, chills or hemoptysis; No shortness of breath  CARDIOVASCULAR: No chest pain, palpitations, dizziness, or leg swelling  GASTROINTESTINAL: No abdominal or epigastric pain. No nausea, vomiting, or hematemesis; No diarrhea or constipation. No melena or hematochezia.  GENITOURINARY: No dysuria, frequency, hematuria, or incontinence  NEUROLOGICAL: No headaches, memory loss, loss of strength, numbness, or tremors  SKIN: No itching, burning, rashes, or lesions   LYMPH NODES: No enlarged glands  ENDOCRINE: No heat or cold intolerance; No hair loss  MUSCULOSKELETAL: No joint pain or swelling; No muscle, back, or extremity pain  PSYCHIATRIC: No depression, anxiety, mood swings, or difficulty sleeping  HEME/LYMPH: No easy bruising, or bleeding gums  ALLERGY AND IMMUNOLOGIC: No hives or eczema    FAMILY HISTORY:      Vital Signs Last 24 Hrs  T(C): 36.7 (28 May 2023 05:25), Max: 36.9 (27 May 2023 10:37)  T(F): 98 (28 May 2023 05:25), Max: 98.4 (27 May 2023 10:37)  HR: 77 (28 May 2023 05:49) (72 - 87)  BP: 136/91 (28 May 2023 05:49) (127/86 - 147/88)  BP(mean): --  RR: 16 (28 May 2023 05:49) (14 - 18)  SpO2: 97% (28 May 2023 05:49) (97% - 100%)    Parameters below as of 28 May 2023 05:49  Patient On (Oxygen Delivery Method): room air        No Known Allergies      PHYSICAL EXAM:  GENERAL: NAD, well-groomed, well-developed  HEAD:  Atraumatic, Normocephalic  EYES: EOMI, PERRLA, conjunctiva and sclera clear  ENMT: No tonsillar erythema, exudates, or enlargement; Moist mucous membranes, Good dentition, No lesions  NECK: Supple, No JVD, Normal thyroid  NERVOUS SYSTEM:  Alert & Oriented X3, Good concentration; Motor Strength 5/5 B/L upper and lower extremities; DTRs 2+ intact and symmetric  CHEST/LUNG: Clear to percussion bilaterally; No rales, rhonchi, wheezing, or rubs  HEART: Regular rate and rhythm; No murmurs, rubs, or gallops  ABDOMEN: Soft, Nontender, Nondistended; Bowel sounds present  EXTREMITIES:  2+ Peripheral Pulses, No clubbing, cyanosis, or edema  LYMPH: No lymphadenopathy noted  SKIN: No rashes or lesions    Consultant(s) Notes Reviewed:  [x ] YES  [ ] NO  Care Discussed with Consultants/Other Providers [ x] YES  [ ] NO    LABS:      Culture - Urine (collected 05-27-23 @ 11:33)  Source: Clean Catch Clean Catch (Midstream)  Final Report (05-28-23 @ 09:20):    No growth        RADIOLOGY & ADDITIONAL TESTS:    Imaging Personally Reviewed:  [ ] YES  [ ] NO    acetaminophen     Tablet .. 650 milliGRAM(s) Oral every 6 hours PRN  aluminum hydroxide/magnesium hydroxide/simethicone Suspension 30 milliLiter(s) Oral every 4 hours PRN  buMETAnide Injectable 2 milliGRAM(s) IV Push two times a day  cefTRIAXone   IVPB 1000 milliGRAM(s) IV Intermittent every 24 hours  cyanocobalamin 1000 MICROGram(s) Oral daily  ergocalciferol 03096 Unit(s) Oral <User Schedule>  folic acid 1 milliGRAM(s) Oral daily  heparin   Injectable 5000 Unit(s) SubCutaneous every 8 hours  hydroxychloroquine 200 milliGRAM(s) Oral daily  melatonin 3 milliGRAM(s) Oral at bedtime PRN  methylPREDNISolone sodium succinate Injectable 40 milliGRAM(s) IV Push every 24 hours  nystatin    Suspension 354928 Unit(s) Oral four times a day  ondansetron Injectable 4 milliGRAM(s) IV Push every 8 hours PRN  pantoprazole    Tablet 40 milliGRAM(s) Oral before breakfast  polyethylene glycol 3350 17 Gram(s) Oral two times a day PRN  senna 2 Tablet(s) Oral at bedtime PRN  sodium bicarbonate 650 milliGRAM(s) Oral every 12 hours  trimethoprim   80 mG/sulfamethoxazole 400 mG 1 Tablet(s) Oral <User Schedule>      HEALTH ISSUES - PROBLEM Dx:  Hematuria    Lupus nephritis    Leg swelling    Acute kidney injury superimposed on CKD    Oral thrush    Normocytic anemia    Encounter for deep vein thrombosis prophylaxis             Patient is a 48y old  Male who presents with a chief complaint of hematuria (27 May 2023 17:06)      INTERVAL HPI/OVERNIGHT EVENTS: NAEON.     Patient seen and examined at bedside. Stated his leg swelling has improved a little. Still endorsing red urine. Denied chest pain or SOB.      REVIEW OF SYSTEMS:  CONSTITUTIONAL: No fever, weight loss, or fatigue  RESPIRATORY: No cough, wheezing, chills or hemoptysis; No shortness of breath  CARDIOVASCULAR: No chest pain, palpitations, dizziness, or leg swelling  GASTROINTESTINAL: No abdominal or epigastric pain. No nausea, vomiting, or hematemesis; No diarrhea or constipation. No melena or hematochezia.  GENITOURINARY: No dysuria, frequency, hematuria, or incontinence  NEUROLOGICAL: No headaches, memory loss, loss of strength, numbness, or tremors  SKIN: No itching, burning, rashes, or lesions   LYMPH NODES: No enlarged glands  ENDOCRINE: No heat or cold intolerance; No hair loss  MUSCULOSKELETAL: No joint pain or swelling; No muscle, back, or extremity pain      FAMILY HISTORY:      Vital Signs Last 24 Hrs  T(C): 36.7 (28 May 2023 05:25), Max: 36.9 (27 May 2023 10:37)  T(F): 98 (28 May 2023 05:25), Max: 98.4 (27 May 2023 10:37)  HR: 77 (28 May 2023 05:49) (72 - 87)  BP: 136/91 (28 May 2023 05:49) (127/86 - 147/88)  BP(mean): --  RR: 16 (28 May 2023 05:49) (14 - 18)  SpO2: 97% (28 May 2023 05:49) (97% - 100%)    Parameters below as of 28 May 2023 05:49  Patient On (Oxygen Delivery Method): room air        No Known Allergies      PHYSICAL EXAM:  GENERAL: NAD, well-groomed, well-developed  HEAD:  Atraumatic, Normocephalic  EYES: EOMI, PERRLA, conjunctiva and sclera clear  ENMT: Mild thrush on L side of tongue and less in R side of tongue. No tonsillar erythema, exudates, or enlargement  NERVOUS SYSTEM:  Alert & Oriented X3, Good concentration; Motor Strength 5/5 B/L upper and lower extremities; DTRs 2+ intact and symmetric  CHEST/LUNG: Clear to percussion bilaterally; No rales, rhonchi, wheezing, or rubs  HEART: Regular rate and rhythm; No murmurs, rubs, or gallops  ABDOMEN: Soft, Nontender, Nondistended; Bowel sounds present  EXTREMITIES: Pitting edema below knees and pronounced in b/l feet. 2+ Peripheral Pulses, No clubbing, cyanosis  LYMPH: No lymphadenopathy noted  SKIN: No rashes or lesions    Consultant(s) Notes Reviewed:  [x ] YES  [ ] NO  Care Discussed with Consultants/Other Providers [ x] YES  [ ] NO    LABS:    CBC Full  -  ( 28 May 2023 06:15 )  WBC Count : 5.19 K/uL  RBC Count : 2.74 M/uL  Hemoglobin : 7.4 g/dL  Hematocrit : 24.5 %  Platelet Count - Automated : 201 K/uL  Mean Cell Volume : 89.4 fL  Mean Cell Hemoglobin : 27.0 pg  Mean Cell Hemoglobin Concentration : 30.2 gm/dL  Auto Neutrophil # : 3.29 K/uL  Auto Lymphocyte # : 1.26 K/uL  Auto Monocyte # : 0.54 K/uL  Auto Eosinophil # : 0.04 K/uL  Auto Basophil # : 0.01 K/uL  Auto Neutrophil % : 63.3 %  Auto Lymphocyte % : 24.3 %  Auto Monocyte % : 10.4 %  Auto Eosinophil % : 0.8 %  Auto Basophil % : 0.2 %    05-28    141  |  103  |  49<H>  ----------------------------<  94  3.6   |  28  |  2.50<H>    Ca    7.7<L>      28 May 2023 06:15  Phos  5.2     05-28  Mg     2.10     05-28    TPro  4.0<L>  /  Alb  2.0<L>  /  TBili  <0.2  /  DBili  x   /  AST  15  /  ALT  8   /  AlkPhos  37<L>  05-28      Culture - Urine (collected 05-27-23 @ 11:33)  Source: Clean Catch Clean Catch (Midstream)  Final Report (05-28-23 @ 09:20):    No growth        RADIOLOGY & ADDITIONAL TESTS:    Imaging Personally Reviewed:  [ ] YES  [ ] NO    acetaminophen     Tablet .. 650 milliGRAM(s) Oral every 6 hours PRN  aluminum hydroxide/magnesium hydroxide/simethicone Suspension 30 milliLiter(s) Oral every 4 hours PRN  buMETAnide Injectable 2 milliGRAM(s) IV Push two times a day  cefTRIAXone   IVPB 1000 milliGRAM(s) IV Intermittent every 24 hours  cyanocobalamin 1000 MICROGram(s) Oral daily  ergocalciferol 93738 Unit(s) Oral <User Schedule>  folic acid 1 milliGRAM(s) Oral daily  heparin   Injectable 5000 Unit(s) SubCutaneous every 8 hours  hydroxychloroquine 200 milliGRAM(s) Oral daily  melatonin 3 milliGRAM(s) Oral at bedtime PRN  methylPREDNISolone sodium succinate Injectable 40 milliGRAM(s) IV Push every 24 hours  nystatin    Suspension 835961 Unit(s) Oral four times a day  ondansetron Injectable 4 milliGRAM(s) IV Push every 8 hours PRN  pantoprazole    Tablet 40 milliGRAM(s) Oral before breakfast  polyethylene glycol 3350 17 Gram(s) Oral two times a day PRN  senna 2 Tablet(s) Oral at bedtime PRN  sodium bicarbonate 650 milliGRAM(s) Oral every 12 hours  trimethoprim   80 mG/sulfamethoxazole 400 mG 1 Tablet(s) Oral <User Schedule>      HEALTH ISSUES - PROBLEM Dx:  Hematuria    Lupus nephritis    Leg swelling    Acute kidney injury superimposed on CKD    Oral thrush    Normocytic anemia    Encounter for deep vein thrombosis prophylaxis

## 2023-05-29 LAB
ANION GAP SERPL CALC-SCNC: 6 MMOL/L — LOW (ref 7–14)
APTT BLD: 30.1 SEC — SIGNIFICANT CHANGE UP (ref 27–36.3)
BASOPHILS # BLD AUTO: 0.02 K/UL — SIGNIFICANT CHANGE UP (ref 0–0.2)
BASOPHILS NFR BLD AUTO: 0.3 % — SIGNIFICANT CHANGE UP (ref 0–2)
BUN SERPL-MCNC: 45 MG/DL — HIGH (ref 7–23)
CALCIUM SERPL-MCNC: 7.8 MG/DL — LOW (ref 8.4–10.5)
CHLORIDE SERPL-SCNC: 103 MMOL/L — SIGNIFICANT CHANGE UP (ref 98–107)
CO2 SERPL-SCNC: 29 MMOL/L — SIGNIFICANT CHANGE UP (ref 22–31)
CREAT SERPL-MCNC: 2.29 MG/DL — HIGH (ref 0.5–1.3)
EGFR: 34 ML/MIN/1.73M2 — LOW
EOSINOPHIL # BLD AUTO: 0.04 K/UL — SIGNIFICANT CHANGE UP (ref 0–0.5)
EOSINOPHIL NFR BLD AUTO: 0.6 % — SIGNIFICANT CHANGE UP (ref 0–6)
GLUCOSE SERPL-MCNC: 88 MG/DL — SIGNIFICANT CHANGE UP (ref 70–99)
HCT VFR BLD CALC: 25.3 % — LOW (ref 39–50)
HGB BLD-MCNC: 7.7 G/DL — LOW (ref 13–17)
IANC: 4.84 K/UL — SIGNIFICANT CHANGE UP (ref 1.8–7.4)
IMM GRANULOCYTES NFR BLD AUTO: 0.6 % — SIGNIFICANT CHANGE UP (ref 0–0.9)
INR BLD: <0.9 RATIO — SIGNIFICANT CHANGE UP (ref 0.88–1.16)
LYMPHOCYTES # BLD AUTO: 1.58 K/UL — SIGNIFICANT CHANGE UP (ref 1–3.3)
LYMPHOCYTES # BLD AUTO: 21.9 % — SIGNIFICANT CHANGE UP (ref 13–44)
MAGNESIUM SERPL-MCNC: 2 MG/DL — SIGNIFICANT CHANGE UP (ref 1.6–2.6)
MCHC RBC-ENTMCNC: 27.1 PG — SIGNIFICANT CHANGE UP (ref 27–34)
MCHC RBC-ENTMCNC: 30.4 GM/DL — LOW (ref 32–36)
MCV RBC AUTO: 89.1 FL — SIGNIFICANT CHANGE UP (ref 80–100)
MONOCYTES # BLD AUTO: 0.68 K/UL — SIGNIFICANT CHANGE UP (ref 0–0.9)
MONOCYTES NFR BLD AUTO: 9.4 % — SIGNIFICANT CHANGE UP (ref 2–14)
NEUTROPHILS # BLD AUTO: 4.84 K/UL — SIGNIFICANT CHANGE UP (ref 1.8–7.4)
NEUTROPHILS NFR BLD AUTO: 67.2 % — SIGNIFICANT CHANGE UP (ref 43–77)
NRBC # BLD: 0 /100 WBCS — SIGNIFICANT CHANGE UP (ref 0–0)
NRBC # FLD: 0 K/UL — SIGNIFICANT CHANGE UP (ref 0–0)
PHOSPHATE SERPL-MCNC: 5 MG/DL — HIGH (ref 2.5–4.5)
PLATELET # BLD AUTO: 207 K/UL — SIGNIFICANT CHANGE UP (ref 150–400)
POTASSIUM SERPL-MCNC: 3.8 MMOL/L — SIGNIFICANT CHANGE UP (ref 3.5–5.3)
POTASSIUM SERPL-SCNC: 3.8 MMOL/L — SIGNIFICANT CHANGE UP (ref 3.5–5.3)
PROTHROM AB SERPL-ACNC: 10.3 SEC — LOW (ref 10.5–13.4)
RBC # BLD: 2.84 M/UL — LOW (ref 4.2–5.8)
RBC # FLD: 12.9 % — SIGNIFICANT CHANGE UP (ref 10.3–14.5)
SODIUM SERPL-SCNC: 138 MMOL/L — SIGNIFICANT CHANGE UP (ref 135–145)
WBC # BLD: 7.2 K/UL — SIGNIFICANT CHANGE UP (ref 3.8–10.5)
WBC # FLD AUTO: 7.2 K/UL — SIGNIFICANT CHANGE UP (ref 3.8–10.5)

## 2023-05-29 PROCEDURE — 99232 SBSQ HOSP IP/OBS MODERATE 35: CPT | Mod: GC

## 2023-05-29 RX ADMIN — Medication 200 MILLIGRAM(S): at 12:14

## 2023-05-29 RX ADMIN — HEPARIN SODIUM 5000 UNIT(S): 5000 INJECTION INTRAVENOUS; SUBCUTANEOUS at 14:55

## 2023-05-29 RX ADMIN — HEPARIN SODIUM 5000 UNIT(S): 5000 INJECTION INTRAVENOUS; SUBCUTANEOUS at 06:09

## 2023-05-29 RX ADMIN — Medication 500000 UNIT(S): at 06:09

## 2023-05-29 RX ADMIN — PANTOPRAZOLE SODIUM 40 MILLIGRAM(S): 20 TABLET, DELAYED RELEASE ORAL at 06:09

## 2023-05-29 RX ADMIN — Medication 500000 UNIT(S): at 17:46

## 2023-05-29 RX ADMIN — Medication 1 MILLIGRAM(S): at 12:14

## 2023-05-29 RX ADMIN — Medication 500000 UNIT(S): at 12:14

## 2023-05-29 RX ADMIN — Medication 40 MILLIGRAM(S): at 09:11

## 2023-05-29 RX ADMIN — BUMETANIDE 2 MILLIGRAM(S): 0.25 INJECTION INTRAMUSCULAR; INTRAVENOUS at 18:04

## 2023-05-29 RX ADMIN — PREGABALIN 1000 MICROGRAM(S): 225 CAPSULE ORAL at 12:14

## 2023-05-29 RX ADMIN — BUMETANIDE 2 MILLIGRAM(S): 0.25 INJECTION INTRAMUSCULAR; INTRAVENOUS at 06:11

## 2023-05-29 RX ADMIN — HEPARIN SODIUM 5000 UNIT(S): 5000 INJECTION INTRAVENOUS; SUBCUTANEOUS at 21:32

## 2023-05-29 RX ADMIN — Medication 650 MILLIGRAM(S): at 17:47

## 2023-05-29 RX ADMIN — Medication 1 TABLET(S): at 06:10

## 2023-05-29 RX ADMIN — Medication 650 MILLIGRAM(S): at 06:09

## 2023-05-29 NOTE — PROGRESS NOTE ADULT - ATTENDING COMMENTS
48M w/ SLE c/b lupus nephritis with recent admission for lupus nephritis flare where he was switched from mycophenolate to cyclophosphamide presenting with subjective fever at home x1 but no documented fevers at home, new hematuria, worsening leg swelling, and found to have LIZETH on CKD.     S/p CTA/P notable for: Small bilateral pleural effusions, right greater than left. There is associated mild infiltrate and/or atelectasis right lung base.Small amount of free fluid within the paracolic gutters bilaterally.  S/p renal US w/ no renal/ureteral stones or hydronephrosis.    -Will continue to appreciate rheumatology recommendations. Hemorrhagic cystitis 2/2 cyclophosphamide is on the differential. Pt currently without clots and h/h remains overall stable. Pt seen by urology per rheum recs and plan is for outpatient w/u for hematuria. C3 low, C4 wnl. Pending DSDNA. C/w PCP ppx , plaquenil and solumedrol as prescribed.   -on ceftriaxone for possible UTI. However, given urine cx neg will discontinue ceftriaxone at this time  -Monitor creatinine levels  and I and Os.  Cr levels have been improving. Nephrology also following and will continue to appreciate recommendations. C/w Bumex IV per nephrology  -Monitor LE edema. LE doppler neg for DVT. C/w bumex as stated above

## 2023-05-29 NOTE — PROGRESS NOTE ADULT - ASSESSMENT
47 yo M with PMHx of SLE c/b LN 3/4 w/ recent admission 4/27/23-5/8/2023 for lupus nephritis flare (switched from cellcept to IV cytoxan) p/w b/l LE swelling and hematuria.

## 2023-05-29 NOTE — PROGRESS NOTE ADULT - PROBLEM SELECTOR PLAN 4
Cr baseline 1.5-1.7 11/2022. Pt w/ recent admission for LIZETH. Cr now 2.56.  Pt denies decreased UOP. Pt follows with Dr. Shultz.    -appreciate nephrology recs  -improvement w/ bumex

## 2023-05-29 NOTE — PROGRESS NOTE ADULT - PROBLEM SELECTOR PLAN 3
Pt w/ bilateral leg swelling, takes lasix 40 bid.    -c/w 2 bumex IV bid, improving edema  -appreciate nephrology recs

## 2023-05-29 NOTE — PROGRESS NOTE ADULT - PROBLEM SELECTOR PLAN 2
Pt followed by Dr. Nilton Le. Underwent renal biopsy in 2/2022 showing focal proliferative class III, segmental endocapillary hypercellularity in 12%, a single small cellular crescents, mild acute interstitial nephritis, NIH activity score 4/24, no chronicity. Pt w/ hx of multiple renal bx. Last received cytoxan 5/19/23.    -appreciate rheum recs:  -4/2023 p/w worsening edema w/ LIZETH and active urinary sediment. mild C3 depression 83 with negative C4 and dsDNA. CT A/P, US kidney, echo unremarkable. s/p kidney biopsy 5/2/2023 showed proliferative LN class IV-G with 5 out of 8 non-globally sclerosed glomeruli with cellular and fibrocellular crescents; moderate tubulointerstitial inflammation, Patient was not a candidate for clinical trial.  - Received IV solumedrol 1g x3 (5/4-5/6) then prednisone 60mg/day starting on 5/7. Received induction Cytoxan 5/5/23 with remainder to be outpatient w/ plan for IV  mg q2 weeks   - protein/creatinine ratio and 24 hour urine protein    - ds-dna, c3, c4 mildly low  -c/w PCP ppx   -c/w PPI daily for GI ppx  -c/w plaquenil  -c/w Home Prednisone   -Quant TB negative outpatient 2/2023; Hep B checked this admission

## 2023-05-29 NOTE — PROGRESS NOTE ADULT - PROBLEM SELECTOR PLAN 1
UA w/ large blood, >50 RBC, moderate LE. UTI vs worsening lupus nephritis vs. hemorrhagic cystitis (cytoxan exposure)    -appreciate nephrology and rheumatology recs  -pt w/o dysuria, no leukocytosis, afebrile: monitor off abx  -urology recommending outpatient cystoscopy to r/o hemorrhagic cystitis UA w/ large blood, >50 RBC, moderate LE. UTI vs worsening lupus nephritis vs. hemorrhagic cystitis (cytoxan exposure)    -appreciate nephrology and rheumatology recs  -pt w/o dysuria, no leukocytosis, afebrile: monitor off abx  -Pt reports urine no longer pink  -urology recommending outpatient cystoscopy to r/o hemorrhagic cystitis

## 2023-05-29 NOTE — PROGRESS NOTE ADULT - SUBJECTIVE AND OBJECTIVE BOX
Internal Medicine   Celena Newman | PGY-1    OVERNIGHT EVENTS: No acute overnight events.    SUBJECTIVE:       MEDICATIONS  (STANDING):  buMETAnide Injectable 2 milliGRAM(s) IV Push two times a day  cefTRIAXone   IVPB 1000 milliGRAM(s) IV Intermittent every 24 hours  cyanocobalamin 1000 MICROGram(s) Oral daily  ergocalciferol 97640 Unit(s) Oral <User Schedule>  folic acid 1 milliGRAM(s) Oral daily  heparin   Injectable 5000 Unit(s) SubCutaneous every 8 hours  hydroxychloroquine 200 milliGRAM(s) Oral daily  methylPREDNISolone sodium succinate Injectable 40 milliGRAM(s) IV Push every 24 hours  nystatin    Suspension 107183 Unit(s) Oral four times a day  pantoprazole    Tablet 40 milliGRAM(s) Oral before breakfast  sodium bicarbonate 650 milliGRAM(s) Oral every 12 hours  trimethoprim   80 mG/sulfamethoxazole 400 mG 1 Tablet(s) Oral <User Schedule>    MEDICATIONS  (PRN):  acetaminophen     Tablet .. 650 milliGRAM(s) Oral every 6 hours PRN Temp greater or equal to 38C (100.4F), Mild Pain (1 - 3)  aluminum hydroxide/magnesium hydroxide/simethicone Suspension 30 milliLiter(s) Oral every 4 hours PRN Dyspepsia  melatonin 3 milliGRAM(s) Oral at bedtime PRN Insomnia  ondansetron Injectable 4 milliGRAM(s) IV Push every 8 hours PRN Nausea and/or Vomiting  polyethylene glycol 3350 17 Gram(s) Oral two times a day PRN Constipation  senna 2 Tablet(s) Oral at bedtime PRN Constipation        T(F): 97.9 (05-29-23 @ 05:27), Max: 98.1 (05-28-23 @ 13:01)  HR: 72 (05-29-23 @ 05:27) (66 - 78)  BP: 137/76 (05-29-23 @ 05:27) (126/73 - 137/76)  BP(mean): --  RR: 17 (05-29-23 @ 05:27) (17 - 17)  SpO2: 100% (05-29-23 @ 05:27) (100% - 100%)    PHYSICAL EXAM:     GENERAL: NAD, lying in bed comfortably  HEAD:  Atraumatic, Normocephalic  EYES: EOMI, PERRLA, conjunctiva and sclera clear, no nystagmus noted  ENT: Moist mucous membranes,   NECK: Supple, No JVD, trachea midline  CHEST/LUNG: Clear to auscultation bilaterally; No rales, rhonchi, wheezing, or rubs. Unlabored respirations  HEART: Regular rate and rhythm; No murmurs, rubs, or gallops, normal S1/S2  ABDOMEN: normal bowel sounds; Soft, nontender, nondistended, no organomegaly   EXTREMITIES:  2+ Peripheral Pulses, brisk capillary refill. No clubbing, cyanosis, or edema  MSK: No gross deformities noted   Neurological:  A&Ox3, no focal deficits   SKIN: No rashes or lesions  PSYCH: Normal mood, affect     TELEMETRY:    LABS:                        7.4    5.19  )-----------( 201      ( 28 May 2023 06:15 )             24.5     05-28    141  |  103  |  49<H>  ----------------------------<  94  3.6   |  28  |  2.50<H>    Ca    7.7<L>      28 May 2023 06:15  Phos  5.2     05-28  Mg     2.10     05-28    TPro  4.0<L>  /  Alb  2.0<L>  /  TBili  <0.2  /  DBili  x   /  AST  15  /  ALT  8   /  AlkPhos  37<L>  05-28        PTT - ( 28 May 2023 06:15 )  PTT:33.3 sec    Creatinine Trend: 2.50<--, 2.56<--, 1.96<--, 1.84<--, 2.38<--, 2.41<--  I&O's Summary    28 May 2023 07:01  -  29 May 2023 07:00  --------------------------------------------------------  IN: 0 mL / OUT: 2020 mL / NET: -2020 mL      BNP    RADIOLOGY & ADDITIONAL STUDIES:             Internal Medicine   Celena Newman | PGY-1    OVERNIGHT EVENTS: No acute overnight events.    SUBJECTIVE:       MEDICATIONS  (STANDING):  buMETAnide Injectable 2 milliGRAM(s) IV Push two times a day  cefTRIAXone   IVPB 1000 milliGRAM(s) IV Intermittent every 24 hours  cyanocobalamin 1000 MICROGram(s) Oral daily  ergocalciferol 32537 Unit(s) Oral <User Schedule>  folic acid 1 milliGRAM(s) Oral daily  heparin   Injectable 5000 Unit(s) SubCutaneous every 8 hours  hydroxychloroquine 200 milliGRAM(s) Oral daily  methylPREDNISolone sodium succinate Injectable 40 milliGRAM(s) IV Push every 24 hours  nystatin    Suspension 433857 Unit(s) Oral four times a day  pantoprazole    Tablet 40 milliGRAM(s) Oral before breakfast  sodium bicarbonate 650 milliGRAM(s) Oral every 12 hours  trimethoprim   80 mG/sulfamethoxazole 400 mG 1 Tablet(s) Oral <User Schedule>    MEDICATIONS  (PRN):  acetaminophen     Tablet .. 650 milliGRAM(s) Oral every 6 hours PRN Temp greater or equal to 38C (100.4F), Mild Pain (1 - 3)  aluminum hydroxide/magnesium hydroxide/simethicone Suspension 30 milliLiter(s) Oral every 4 hours PRN Dyspepsia  melatonin 3 milliGRAM(s) Oral at bedtime PRN Insomnia  ondansetron Injectable 4 milliGRAM(s) IV Push every 8 hours PRN Nausea and/or Vomiting  polyethylene glycol 3350 17 Gram(s) Oral two times a day PRN Constipation  senna 2 Tablet(s) Oral at bedtime PRN Constipation        T(F): 97.9 (05-29-23 @ 05:27), Max: 98.1 (05-28-23 @ 13:01)  HR: 72 (05-29-23 @ 05:27) (66 - 78)  BP: 137/76 (05-29-23 @ 05:27) (126/73 - 137/76)  BP(mean): --  RR: 17 (05-29-23 @ 05:27) (17 - 17)  SpO2: 100% (05-29-23 @ 05:27) (100% - 100%)    PHYSICAL EXAM:     GENERAL: NAD, well-groomed, well-developed  HEAD:  Atraumatic, Normocephalic  EYES: EOMI, PERRLA, conjunctiva and sclera clear  ENMT: Mild thrush on L side of tongue and less in R side of tongue. No tonsillar erythema, exudates, or enlargement  NERVOUS SYSTEM:  Alert & Oriented X3, Good concentration; Motor Strength 5/5 B/L upper and lower extremities; DTRs 2+ intact and symmetric  CHEST/LUNG: Clear to percussion bilaterally; No rales, rhonchi, wheezing, or rubs  HEART: Regular rate and rhythm; No murmurs, rubs, or gallops  ABDOMEN: Soft, Nontender, Nondistended; Bowel sounds present  EXTREMITIES: Pitting edema below knees and pronounced in b/l feet. 2+ Peripheral Pulses, No clubbing, cyanosis  LYMPH: No lymphadenopathy noted  SKIN: No rashes or lesions    TELEMETRY:    LABS:                        7.4    5.19  )-----------( 201      ( 28 May 2023 06:15 )             24.5     05-28    141  |  103  |  49<H>  ----------------------------<  94  3.6   |  28  |  2.50<H>    Ca    7.7<L>      28 May 2023 06:15  Phos  5.2     05-28  Mg     2.10     05-28    TPro  4.0<L>  /  Alb  2.0<L>  /  TBili  <0.2  /  DBili  x   /  AST  15  /  ALT  8   /  AlkPhos  37<L>  05-28        PTT - ( 28 May 2023 06:15 )  PTT:33.3 sec    Creatinine Trend: 2.50<--, 2.56<--, 1.96<--, 1.84<--, 2.38<--, 2.41<--  I&O's Summary    28 May 2023 07:01  -  29 May 2023 07:00  --------------------------------------------------------  IN: 0 mL / OUT: 2020 mL / NET: -2020 mL      BNP    RADIOLOGY & ADDITIONAL STUDIES:             Internal Medicine   Celena Ro | PGY-1    OVERNIGHT EVENTS: No acute overnight events.    SUBJECTIVE: Patient was seen and examined at bedside this morning. Denies any nausea/vomiting/diarrhea, headache, shortness of breath, abdominal pain or chest pain/palpitations. Pt reports improvement in facial and santhosh LE edema. Patient responding appropriately to questions and able to make needs known. Vital signs/imaging/telemetry events reviewed.       MEDICATIONS  (STANDING):  buMETAnide Injectable 2 milliGRAM(s) IV Push two times a day  cefTRIAXone   IVPB 1000 milliGRAM(s) IV Intermittent every 24 hours  cyanocobalamin 1000 MICROGram(s) Oral daily  ergocalciferol 95509 Unit(s) Oral <User Schedule>  folic acid 1 milliGRAM(s) Oral daily  heparin   Injectable 5000 Unit(s) SubCutaneous every 8 hours  hydroxychloroquine 200 milliGRAM(s) Oral daily  methylPREDNISolone sodium succinate Injectable 40 milliGRAM(s) IV Push every 24 hours  nystatin    Suspension 772984 Unit(s) Oral four times a day  pantoprazole    Tablet 40 milliGRAM(s) Oral before breakfast  sodium bicarbonate 650 milliGRAM(s) Oral every 12 hours  trimethoprim   80 mG/sulfamethoxazole 400 mG 1 Tablet(s) Oral <User Schedule>    MEDICATIONS  (PRN):  acetaminophen     Tablet .. 650 milliGRAM(s) Oral every 6 hours PRN Temp greater or equal to 38C (100.4F), Mild Pain (1 - 3)  aluminum hydroxide/magnesium hydroxide/simethicone Suspension 30 milliLiter(s) Oral every 4 hours PRN Dyspepsia  melatonin 3 milliGRAM(s) Oral at bedtime PRN Insomnia  ondansetron Injectable 4 milliGRAM(s) IV Push every 8 hours PRN Nausea and/or Vomiting  polyethylene glycol 3350 17 Gram(s) Oral two times a day PRN Constipation  senna 2 Tablet(s) Oral at bedtime PRN Constipation        T(F): 97.9 (05-29-23 @ 05:27), Max: 98.1 (05-28-23 @ 13:01)  HR: 72 (05-29-23 @ 05:27) (66 - 78)  BP: 137/76 (05-29-23 @ 05:27) (126/73 - 137/76)  BP(mean): --  RR: 17 (05-29-23 @ 05:27) (17 - 17)  SpO2: 100% (05-29-23 @ 05:27) (100% - 100%)    PHYSICAL EXAM:     GENERAL: NAD, well-groomed, well-developed  HEAD:  Atraumatic, Normocephalic  EYES: EOMI, PERRLA, conjunctiva and sclera clear  ENMT: Mild thrush on L side of tongue and less in R side of tongue. No tonsillar erythema, exudates, or enlargement  NERVOUS SYSTEM:  Alert & Oriented X3, Good concentration; Motor Strength 5/5 B/L upper and lower extremities; DTRs 2+ intact and symmetric  CHEST/LUNG: Clear to percussion bilaterally; No rales, rhonchi, wheezing, or rubs  HEART: Regular rate and rhythm; No murmurs, rubs, or gallops  ABDOMEN: Soft, Nontender, Nondistended; Bowel sounds present  EXTREMITIES: 2+ santhosh LE pitting edema to knees; 2+ Peripheral Pulses, No clubbing, cyanosis  LYMPH: No lymphadenopathy noted  SKIN: No rashes or lesions    TELEMETRY:    LABS:                        7.4    5.19  )-----------( 201      ( 28 May 2023 06:15 )             24.5     05-28    141  |  103  |  49<H>  ----------------------------<  94  3.6   |  28  |  2.50<H>    Ca    7.7<L>      28 May 2023 06:15  Phos  5.2     05-28  Mg     2.10     05-28    TPro  4.0<L>  /  Alb  2.0<L>  /  TBili  <0.2  /  DBili  x   /  AST  15  /  ALT  8   /  AlkPhos  37<L>  05-28        PTT - ( 28 May 2023 06:15 )  PTT:33.3 sec    Creatinine Trend: 2.50<--, 2.56<--, 1.96<--, 1.84<--, 2.38<--, 2.41<--  I&O's Summary    28 May 2023 07:01  -  29 May 2023 07:00  --------------------------------------------------------  IN: 0 mL / OUT: 2020 mL / NET: -2020 mL      BNP    RADIOLOGY & ADDITIONAL STUDIES:

## 2023-05-30 LAB
ALBUMIN SERPL ELPH-MCNC: 2.3 G/DL
ALP BLD-CCNC: 51 U/L
ALT SERPL-CCNC: 9 U/L
ANION GAP SERPL CALC-SCNC: 10 MMOL/L — SIGNIFICANT CHANGE UP (ref 7–14)
ANION GAP SERPL CALC-SCNC: 11 MMOL/L
APPEARANCE: ABNORMAL
APTT BLD: 30.1 SEC — SIGNIFICANT CHANGE UP (ref 27–36.3)
AST SERPL-CCNC: 15 U/L
BACTERIA: NEGATIVE /HPF
BILIRUB SERPL-MCNC: <0.2 MG/DL
BILIRUBIN URINE: NEGATIVE
BLOOD URINE: ABNORMAL
BUN SERPL-MCNC: 42 MG/DL — HIGH (ref 7–23)
BUN SERPL-MCNC: 45 MG/DL
CALCIUM SERPL-MCNC: 7.8 MG/DL — LOW (ref 8.4–10.5)
CALCIUM SERPL-MCNC: 8.2 MG/DL
CAST: 10 /LPF
CHLORIDE SERPL-SCNC: 101 MMOL/L — SIGNIFICANT CHANGE UP (ref 98–107)
CHLORIDE SERPL-SCNC: 104 MMOL/L
CO2 SERPL-SCNC: 26 MMOL/L
CO2 SERPL-SCNC: 29 MMOL/L — SIGNIFICANT CHANGE UP (ref 22–31)
COLOR: YELLOW
CREAT SERPL-MCNC: 2.15 MG/DL
CREAT SERPL-MCNC: 2.23 MG/DL — HIGH (ref 0.5–1.3)
CREAT SPEC-SCNC: 55 MG/DL
CREAT/PROT UR: 8.4 RATIO
DSDNA AB SER-ACNC: 13 IU/ML — SIGNIFICANT CHANGE UP
EGFR: 35 ML/MIN/1.73M2 — LOW
EGFR: 37 ML/MIN/1.73M2
EPITHELIAL CELLS: 3 /HPF
GLUCOSE QUALITATIVE U: NEGATIVE MG/DL
GLUCOSE SERPL-MCNC: 111 MG/DL
GLUCOSE SERPL-MCNC: 85 MG/DL — SIGNIFICANT CHANGE UP (ref 70–99)
HCT VFR BLD CALC: 25.7 % — LOW (ref 39–50)
HGB BLD-MCNC: 7.9 G/DL — LOW (ref 13–17)
HYALINE CASTS: PRESENT
INR BLD: <0.9 RATIO — LOW (ref 0.88–1.16)
KETONES URINE: NEGATIVE MG/DL
LEUKOCYTE ESTERASE URINE: NEGATIVE
MAGNESIUM SERPL-MCNC: 1.9 MG/DL — SIGNIFICANT CHANGE UP (ref 1.6–2.6)
MCHC RBC-ENTMCNC: 27.1 PG — SIGNIFICANT CHANGE UP (ref 27–34)
MCHC RBC-ENTMCNC: 30.7 GM/DL — LOW (ref 32–36)
MCV RBC AUTO: 88 FL — SIGNIFICANT CHANGE UP (ref 80–100)
MICROSCOPIC-UA: NORMAL
NITRITE URINE: NEGATIVE
NRBC # BLD: 0 /100 WBCS — SIGNIFICANT CHANGE UP (ref 0–0)
NRBC # FLD: 0 K/UL — SIGNIFICANT CHANGE UP (ref 0–0)
PH URINE: 6
PHOSPHATE SERPL-MCNC: 5.5 MG/DL — HIGH (ref 2.5–4.5)
PLATELET # BLD AUTO: 223 K/UL — SIGNIFICANT CHANGE UP (ref 150–400)
POTASSIUM SERPL-MCNC: 3.5 MMOL/L — SIGNIFICANT CHANGE UP (ref 3.5–5.3)
POTASSIUM SERPL-SCNC: 3.5 MMOL/L — SIGNIFICANT CHANGE UP (ref 3.5–5.3)
POTASSIUM SERPL-SCNC: 5 MMOL/L
PROT SERPL-MCNC: 4.5 G/DL
PROT UR-MCNC: 466 MG/DL
PROTEIN URINE: 300 MG/DL
PROTHROM AB SERPL-ACNC: 9.9 SEC — LOW (ref 10.5–13.4)
RBC # BLD: 2.92 M/UL — LOW (ref 4.2–5.8)
RBC # FLD: 12.9 % — SIGNIFICANT CHANGE UP (ref 10.3–14.5)
RED BLOOD CELLS URINE: 165 /HPF
REVIEW: NORMAL
SODIUM SERPL-SCNC: 140 MMOL/L — SIGNIFICANT CHANGE UP (ref 135–145)
SODIUM SERPL-SCNC: 141 MMOL/L
SPECIFIC GRAVITY URINE: 1.01
UROBILINOGEN URINE: 0.2 MG/DL
WBC # BLD: 6.46 K/UL — SIGNIFICANT CHANGE UP (ref 3.8–10.5)
WBC # FLD AUTO: 6.46 K/UL — SIGNIFICANT CHANGE UP (ref 3.8–10.5)
WBC CASTS: PRESENT
WHITE BLOOD CELLS URINE: 17 /HPF

## 2023-05-30 PROCEDURE — 99233 SBSQ HOSP IP/OBS HIGH 50: CPT

## 2023-05-30 PROCEDURE — 99233 SBSQ HOSP IP/OBS HIGH 50: CPT | Mod: GC

## 2023-05-30 PROCEDURE — 99232 SBSQ HOSP IP/OBS MODERATE 35: CPT

## 2023-05-30 RX ORDER — POTASSIUM CHLORIDE 20 MEQ
40 PACKET (EA) ORAL ONCE
Refills: 0 | Status: COMPLETED | OUTPATIENT
Start: 2023-05-30 | End: 2023-05-30

## 2023-05-30 RX ORDER — BUMETANIDE 0.25 MG/ML
2 INJECTION INTRAMUSCULAR; INTRAVENOUS EVERY 12 HOURS
Refills: 0 | Status: DISCONTINUED | OUTPATIENT
Start: 2023-05-30 | End: 2023-05-31

## 2023-05-30 RX ADMIN — PREGABALIN 1000 MICROGRAM(S): 225 CAPSULE ORAL at 12:21

## 2023-05-30 RX ADMIN — Medication 40 MILLIEQUIVALENT(S): at 10:41

## 2023-05-30 RX ADMIN — Medication 500000 UNIT(S): at 17:00

## 2023-05-30 RX ADMIN — Medication 40 MILLIGRAM(S): at 12:20

## 2023-05-30 RX ADMIN — HEPARIN SODIUM 5000 UNIT(S): 5000 INJECTION INTRAVENOUS; SUBCUTANEOUS at 22:03

## 2023-05-30 RX ADMIN — Medication 500000 UNIT(S): at 12:20

## 2023-05-30 RX ADMIN — Medication 500000 UNIT(S): at 06:11

## 2023-05-30 RX ADMIN — BUMETANIDE 2 MILLIGRAM(S): 0.25 INJECTION INTRAMUSCULAR; INTRAVENOUS at 17:01

## 2023-05-30 RX ADMIN — PANTOPRAZOLE SODIUM 40 MILLIGRAM(S): 20 TABLET, DELAYED RELEASE ORAL at 06:03

## 2023-05-30 RX ADMIN — Medication 650 MILLIGRAM(S): at 06:04

## 2023-05-30 RX ADMIN — HEPARIN SODIUM 5000 UNIT(S): 5000 INJECTION INTRAVENOUS; SUBCUTANEOUS at 13:15

## 2023-05-30 RX ADMIN — Medication 1 MILLIGRAM(S): at 12:20

## 2023-05-30 RX ADMIN — BUMETANIDE 2 MILLIGRAM(S): 0.25 INJECTION INTRAMUSCULAR; INTRAVENOUS at 06:06

## 2023-05-30 RX ADMIN — HEPARIN SODIUM 5000 UNIT(S): 5000 INJECTION INTRAVENOUS; SUBCUTANEOUS at 06:03

## 2023-05-30 RX ADMIN — Medication 200 MILLIGRAM(S): at 12:21

## 2023-05-30 RX ADMIN — Medication 650 MILLIGRAM(S): at 17:01

## 2023-05-30 RX ADMIN — Medication 500000 UNIT(S): at 00:18

## 2023-05-30 NOTE — PROGRESS NOTE ADULT - ASSESSMENT
49 yo male who initially presented to dermatology with a rash and work up revealed MÓNICA titer 1:1280 (speckled) - diagnosed with SLE and acute cutaneous lupus. urinalysis showed hematuria and 1.5 gms of proteinuria. 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 He was initiated on Cellcept, prednisone and Plaquenil but was non compliant     Presented in October 2022 to [facility] with worsening LIZETH- Kidney biopsy repeated October 2022- Lupus Nephritis Class 4- activity index 15/24, chronicity 3/12 ). Received obinutuzumab 10/21/22 and 11/1/2022 and restarted on Cellcept - now at 1500 bid. his creatinine did not really improve and stayed at 1.7-1.8 mg/dl.     5/2/2023- persistence of renal disease/ hematuria/ proteinuria/ elevated creatinine   Repeat kidney biopsy 5/2/23 on this admission showed proliferative LN class IV-G with 5 out of 8 non-globally sclerosed glomeruli with cellular and fibrocellular crescents; moderate tubulointerstitial inflammation, ~30% IFTA. Activity index 13/24 and chronicity index 7/12. He had a previous biopsy in Oct 2022, activity index was 15/24 and chronicity was 3/12 IFTA 15-20%. Mycophenolic acid level wnl indicating compliance.     Received iv solumedrol 1g x3 (5/4-5/6) then reduce to 1mg/kg IV solu-medrol 60mg/day starting on 5/7. Patient discharged on 5/8/23 on prednisone 60mg to be further tapered outpatient.   -discharged on bactrim and PPI while on high dose steroids  -first dose of Cytoxan given 5/5/23 with second dose on 5/18.   -Quant TB negative outpatient 2/2023; Hep B negative         now presenting with fluid overload    - Increase Bumex to 2 mg iv every 8 hours  - add 25 gms of 25% albumin every 12 hours   - continue iv steroids  - on cytoxan as outpatient     chucky marks  nephrology attending   please contact me on TEAMS   Office- [phone]

## 2023-05-30 NOTE — PROGRESS NOTE ADULT - ATTENDING COMMENTS
pt seen and examined by me personally   agree w/ above will follow   pt is aware of our impression and plans pt seen and examined by me personally   agree w/ above will follow   pt is aware of our impression and plans  call with questions via teams

## 2023-05-30 NOTE — PROGRESS NOTE ADULT - SUBJECTIVE AND OBJECTIVE BOX
[facility] Division of Kidney Diseases & Hypertension  FOLLOW UP NOTE  --------------------------------------------------------------------------------  Chief Complaint:    24 hour events/subjective:    feels swelling is improving   no other complaints      PAST HISTORY  --------------------------------------------------------------------------------  No significant changes to PMH, PSH, FHx, SHx, unless otherwise noted    ALLERGIES & MEDICATIONS  --------------------------------------------------------------------------------  Allergies    No Known Allergies    Intolerances      Standing Inpatient Medications  buMETAnide 2 milliGRAM(s) Oral every 12 hours  cyanocobalamin 1000 MICROGram(s) Oral daily  ergocalciferol 26374 Unit(s) Oral <User Schedule>  folic acid 1 milliGRAM(s) Oral daily  heparin   Injectable 5000 Unit(s) SubCutaneous every 8 hours  hydroxychloroquine 200 milliGRAM(s) Oral daily  methylPREDNISolone sodium succinate Injectable 40 milliGRAM(s) IV Push every 24 hours  nystatin    Suspension 618626 Unit(s) Oral four times a day  pantoprazole    Tablet 40 milliGRAM(s) Oral before breakfast  sodium bicarbonate 650 milliGRAM(s) Oral every 12 hours  trimethoprim   80 mG/sulfamethoxazole 400 mG 1 Tablet(s) Oral <User Schedule>    PRN Inpatient Medications  acetaminophen     Tablet .. 650 milliGRAM(s) Oral every 6 hours PRN  aluminum hydroxide/magnesium hydroxide/simethicone Suspension 30 milliLiter(s) Oral every 4 hours PRN  melatonin 3 milliGRAM(s) Oral at bedtime PRN  ondansetron Injectable 4 milliGRAM(s) IV Push every 8 hours PRN  polyethylene glycol 3350 17 Gram(s) Oral two times a day PRN  senna 2 Tablet(s) Oral at bedtime PRN      VITALS/PHYSICAL EXAM  --------------------------------------------------------------------------------  T(C): 36.7 (05-30-23 @ 12:26), Max: 36.9 (05-29-23 @ 20:56)  HR: 70 (05-30-23 @ 12:26) (65 - 70)  BP: 121/78 (05-30-23 @ 12:26) (121/78 - 152/80)  RR: 18 (05-30-23 @ 12:26) (17 - 18)  SpO2: 100% (05-30-23 @ 12:26) (100% - 100%)  Wt(kg): --        05-29-23 @ 07:01  -  05-30-23 @ 07:00  --------------------------------------------------------  IN: 480 mL / OUT: 950 mL / NET: -470 mL    05-30-23 @ 07:01  -  05-30-23 @ 15:20  --------------------------------------------------------  IN: 540 mL / OUT: 1000 mL / NET: -460 mL      Physical Exam:  	Gen: NAD, well-appearing  	HEENT: supple neck, facial swelling, clear oropharynx  	Pulm: CTA B/L  	CV: RRR, S1S2; no rub  	Back: No spinal or CVA tenderness; no sacral edema  	Abd: +BS, soft, nontender/nondistended  	: No suprapubic tenderness  	LE: Warm, 3+ edema  	Neuro: No focal deficits, intact gait  	Psych: Normal affect and mood  	Skin: Warm, without rashes  	Vascular access: None    LABS/STUDIES  --------------------------------------------------------------------------------              7.9    6.46  >-----------<  223      [05-30-23 @ 07:16]              25.7     140  |  101  |  42  ----------------------------<  85      [05-30-23 @ 07:16]  3.5   |  29  |  2.23        Ca     7.8     [05-30-23 @ 07:16]      Mg     1.90     [05-30-23 @ 07:16]      Phos  5.5     [05-30-23 @ 07:16]      PT/INR: PT 9.9  , INR <0.90      [05-30-23 @ 07:16]  PTT: 30.1       [05-30-23 @ 07:16]      Creatinine Trend:  SCr 2.23 [05-30 @ 07:16]  SCr 2.29 [05-29 @ 06:25]  SCr 2.50 [05-28 @ 06:15]  SCr 2.56 [05-27 @ 11:35]  SCr 1.96 [05-08 @ 06:14]    Urinalysis - [05-27-23 @ 11:37]      Color Light Red / Appearance Slightly Turbid / SG 1.016 / pH 6.5      Gluc Negative / Ketone Negative  / Bili Negative / Urobili <2 mg/dL       Blood Large / Protein 300 mg/dL / Leuk Est Moderate / Nitrite Negative      RBC >50 / WBC 10 - 15 / Hyaline  / Gran  / Sq Epi  / Non Sq Epi  / Bacteria Negative    Urine Creatinine 28      [05-27-23 @ 22:05]  Urine Protein 172      [05-27-23 @ 22:05]  Urine Sodium 63      [05-27-23 @ 11:38]  Urine Potassium 53.9      [05-27-23 @ 11:38]  Urine Chloride 73      [05-27-23 @ 11:38]  Urine Osmolality 415      [05-27-23 @ 11:37]        dsDNA 13      [05-28-23 @ 06:15]  C3 Complement 86      [05-28-23 @ 06:15]  C4 Complement 27      [05-28-23 @ 06:15]

## 2023-05-30 NOTE — PROGRESS NOTE ADULT - PROBLEM SELECTOR PLAN 1
Pt with recurrent LIZETH on CKD of unclear etiology-   Pt. was diagnosed with SLE and LN during hospital stay at [facility] in Feb 2022. 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 Pt. initiated on immunosuppressives for SLE/LN by rheumatology team. Pt. with history of noncompliance to his medications/immunosuppressive therapy. Pt was readmitted 10/13/22 for worsening Scr and proteinuria. Pt underwent kidney biopsy on 10/17/22 during that hospitalization. 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 Pt. received Immunosuppressive therapy for SLE/class IV LN by rheumatology team. Pt. follows nephrologist Dr. Hayde Shultz for CKD care. Pt was recently admitted at [facility] (4/27/23-5/8/2023) for lupus flare. Pt. underwent kidney biopsy by IR team on 5/2/23. Pt. with kidney biopsy findings of crescentic GN/LN. Pt. received on IV Solumedrol 1 g from 5/4/23-5/6/23 as per rheumatology team. Pt. received first dose of IV Cytoxan on 5/5/23 and then 2nd dose on 5/19/23. Scr was elevated at 2.55 on previous admission on 4/28. Scr was elevated/ improved at 1.96 on 5/8/23. On this admission, Scr was elevated at 2.56 on presentation. UA showed >50 RBCs and 15 WBCs. Pt currently on IV Bumex. Labs reviewed. SCr elevated/stable at 2.23. Pt with significant fluid overload on exam. Recommend to increase IV Bumex to 2 mg TID. Rheumatology following, on IV steroids.  Bladder scan to rule urine retention. Monitor labs and urine output. Avoid any potential nephrotoxins. Dose medications as per eGFR.

## 2023-05-30 NOTE — PROGRESS NOTE ADULT - SUBJECTIVE AND OBJECTIVE BOX
[facility] DIVISION OF KIDNEY DISEASES AND HYPERTENSION   FOLLOW UP NOTE    --------------------------------------------------------------------------------  Chief Complaint: LIZETH on CKD/fluid overload    24 hour events/subjective: Pt. was seen and examined today, not in distress. Denies fever, chills, SOB, CP, dysuria. Scr elevated/stable at 2.23. Pt on IV diuretics. Pt says his LE swelling is slightly better today.     PAST HISTORY  --------------------------------------------------------------------------------  No significant changes to PMH, PSH, FHx, SHx, unless otherwise noted    ALLERGIES & MEDICATIONS  --------------------------------------------------------------------------------  Allergies    No Known Allergies    Intolerances      Standing Inpatient Medications  buMETAnide 2 milliGRAM(s) Oral every 12 hours  cyanocobalamin 1000 MICROGram(s) Oral daily  ergocalciferol 26284 Unit(s) Oral <User Schedule>  folic acid 1 milliGRAM(s) Oral daily  heparin   Injectable 5000 Unit(s) SubCutaneous every 8 hours  hydroxychloroquine 200 milliGRAM(s) Oral daily  methylPREDNISolone sodium succinate Injectable 40 milliGRAM(s) IV Push every 24 hours  nystatin    Suspension 097517 Unit(s) Oral four times a day  pantoprazole    Tablet 40 milliGRAM(s) Oral before breakfast  sodium bicarbonate 650 milliGRAM(s) Oral every 12 hours  trimethoprim   80 mG/sulfamethoxazole 400 mG 1 Tablet(s) Oral <User Schedule>    PRN Inpatient Medications  acetaminophen     Tablet .. 650 milliGRAM(s) Oral every 6 hours PRN  aluminum hydroxide/magnesium hydroxide/simethicone Suspension 30 milliLiter(s) Oral every 4 hours PRN  melatonin 3 milliGRAM(s) Oral at bedtime PRN  ondansetron Injectable 4 milliGRAM(s) IV Push every 8 hours PRN  polyethylene glycol 3350 17 Gram(s) Oral two times a day PRN  senna 2 Tablet(s) Oral at bedtime PRN      REVIEW OF SYSTEMS  --------------------------------------------------------------------------------  Gen: No fevers/chills  Head/Eyes/Ears: No HA , facial swelling+  Respiratory: No dyspnea, cough  CV: No chest pain  GI: No abdominal pain, diarrhea  : No dysuria, hematuria  MSK:  LE edema  Skin: No rashes  Heme: No easy bruising or bleeding    VITALS/PHYSICAL EXAM  --------------------------------------------------------------------------------  T(C): 36.7 (05-30-23 @ 12:26), Max: 36.9 (05-29-23 @ 20:56)  HR: 70 (05-30-23 @ 12:26) (65 - 70)  BP: 121/78 (05-30-23 @ 12:26) (121/78 - 152/80)  RR: 18 (05-30-23 @ 12:26) (17 - 18)  SpO2: 100% (05-30-23 @ 12:26) (100% - 100%)  Wt(kg): --        05-29-23 @ 07:01  -  05-30-23 @ 07:00  --------------------------------------------------------  IN: 480 mL / OUT: 950 mL / NET: -470 mL    05-30-23 @ 07:01  -  05-30-23 @ 15:21  --------------------------------------------------------  IN: 540 mL / OUT: 1000 mL / NET: -460 mL      Physical Exam:  	Gen: NAD, facial puffiness noted  	HEENT: Anicteric  	Pulm: CTA B/L  	CV: S1S2+  	Abd: Soft, +BS       	Ext: B/L LE edema+++  	Neuro: Awake    	Skin: Warm and dry      LABS/STUDIES  --------------------------------------------------------------------------------              7.9    6.46  >-----------<  223      [05-30-23 @ 07:16]              25.7     140  |  101  |  42  ----------------------------<  85      [05-30-23 @ 07:16]  3.5   |  29  |  2.23        Ca     7.8     [05-30-23 @ 07:16]      Mg     1.90     [05-30-23 @ 07:16]      Phos  5.5     [05-30-23 @ 07:16]    Creatinine Trend:  SCr 2.23 [05-30 @ 07:16]  SCr 2.29 [05-29 @ 06:25]  SCr 2.50 [05-28 @ 06:15]  SCr 2.56 [05-27 @ 11:35]  SCr 1.96 [05-08 @ 06:14]    Urinalysis - [05-27-23 @ 11:37]      Color Light Red / Appearance Slightly Turbid / SG 1.016 / pH 6.5      Gluc Negative / Ketone Negative  / Bili Negative / Urobili <2 mg/dL       Blood Large / Protein 300 mg/dL / Leuk Est Moderate / Nitrite Negative      RBC >50 / WBC 10 - 15 / Hyaline  / Gran  / Sq Epi  / Non Sq Epi  / Bacteria Negative    Urine Creatinine 28      [05-27-23 @ 22:05]  Urine Protein 172      [05-27-23 @ 22:05]  Urine Sodium 63      [05-27-23 @ 11:38]  Urine Potassium 53.9      [05-27-23 @ 11:38]  Urine Chloride 73      [05-27-23 @ 11:38]  Urine Osmolality 415      [05-27-23 @ 11:37]    HBsAb Nonreact      [05-04-23 @ 07:05]  HBsAg Nonreact      [05-04-23 @ 07:05]  HBcAb Nonreact      [05-04-23 @ 07:05]  HCV 0.06, Nonreact      [05-04-23 @ 07:05]    dsDNA 13      [05-28-23 @ 06:15]  C3 Complement 86      [05-28-23 @ 06:15]  C4 Complement 27      [05-28-23 @ 06:15]

## 2023-05-30 NOTE — PROGRESS NOTE ADULT - SUBJECTIVE AND OBJECTIVE BOX
LIZ HOUSER  [id number]    INTERVAL HPI/OVERNIGHT EVENTS:    Patient is currently on 40 mg of Methylprednisolone. He is tolerating well. His hematuria is better but remains w/3+ pitting edema b/l legs. Urology is planning for cytoscopy as outpatient on 6/6.     PMHx/PSHx/FamHx/SocHx reviewed and no significant changes    REVIEW OF SYSTEMS   - reviewed with patient and  negative other than as above or previously documented.     MEDICATIONS  (STANDING):  buMETAnide 2 milliGRAM(s) Oral every 12 hours  cyanocobalamin 1000 MICROGram(s) Oral daily  ergocalciferol 45561 Unit(s) Oral <User Schedule>  folic acid 1 milliGRAM(s) Oral daily  heparin   Injectable 5000 Unit(s) SubCutaneous every 8 hours  hydroxychloroquine 200 milliGRAM(s) Oral daily  methylPREDNISolone sodium succinate Injectable 40 milliGRAM(s) IV Push every 24 hours  nystatin    Suspension 201172 Unit(s) Oral four times a day  pantoprazole    Tablet 40 milliGRAM(s) Oral before breakfast  sodium bicarbonate 650 milliGRAM(s) Oral every 12 hours  trimethoprim   80 mG/sulfamethoxazole 400 mG 1 Tablet(s) Oral <User Schedule>    MEDICATIONS  (PRN):  acetaminophen     Tablet .. 650 milliGRAM(s) Oral every 6 hours PRN Temp greater or equal to 38C (100.4F), Mild Pain (1 - 3)  aluminum hydroxide/magnesium hydroxide/simethicone Suspension 30 milliLiter(s) Oral every 4 hours PRN Dyspepsia  melatonin 3 milliGRAM(s) Oral at bedtime PRN Insomnia  ondansetron Injectable 4 milliGRAM(s) IV Push every 8 hours PRN Nausea and/or Vomiting  polyethylene glycol 3350 17 Gram(s) Oral two times a day PRN Constipation  senna 2 Tablet(s) Oral at bedtime PRN Constipation      Allergies    No Known Allergies    Intolerances          Vital Signs Last 24 Hrs  T(C): 36.7 (30 May 2023 12:26), Max: 36.9 (29 May 2023 20:56)  T(F): 98 (30 May 2023 12:26), Max: 98.4 (29 May 2023 20:56)  HR: 70 (30 May 2023 12:26) (65 - 70)  BP: 121/78 (30 May 2023 12:26) (121/78 - 152/80)  BP(mean): --  RR: 18 (30 May 2023 12:26) (17 - 18)  SpO2: 100% (30 May 2023 12:26) (100% - 100%)    Parameters below as of 30 May 2023 12:26  Patient On (Oxygen Delivery Method): room air        Physical Exam:  General: NAD  HEENT: EOMI, MMM, no ulcer in the mouth  Cardio: +S1/S2, RRR  Resp: CTA b/l  GI: +BS, soft, NT/ND  MSK: no sign of joint swelling or tenosynovitis   Neuro: AAOx3  Psych: wnl    LABS:                        7.9    6.46  )-----------( 223      ( 30 May 2023 07:16 )             25.7     05-30    140  |  101  |  42<H>  ----------------------------<  85  3.5   |  29  |  2.23<H>    Ca    7.8<L>      30 May 2023 07:16  Phos  5.5     05-30  Mg     1.90     05-30      PT/INR - ( 30 May 2023 07:16 )   PT: 9.9 sec;   INR: <0.90 ratio         PTT - ( 30 May 2023 07:16 )  PTT:30.1 sec    C4 Complement, Serum: 27 mg/dL (05.28.23 @ 06:15) C3 Complement, Serum: 86 mg/dL (05.28.23 @ 06:15) Double Stranded DNA Antibody: 13: Method: EIA Protein/Creatinine Ratio Calculation: 6.1 Ratio (05.27.23 @ 22:05)     RADIOLOGY & ADDITIONAL TESTS:    < from: CT Abdomen and Pelvis No Cont (05.27.23 @ 21:52) >  associated mild infiltrate and/or atelectasis right lung base.    No renal/ureteral stones or hydronephrosis.    Small amount of free fluid within the paracolic gutters bilaterally.    Please refer to detailed findingsotherwise described above.    < end of copied text >

## 2023-05-30 NOTE — PHYSICAL EXAM
[General Appearance - Alert] : alert [General Appearance - In No Acute Distress] : in no acute distress [General Appearance - Well Nourished] : well nourished [General Appearance - Well Developed] : well developed [General Appearance - Well-Appearing] : healthy appearing [Neck Appearance] : the appearance of the neck was normal [Neck Cervical Mass (___cm)] : no neck mass was observed [Jugular Venous Distention Increased] : there was no jugular-venous distention [Respiration, Rhythm And Depth] : normal respiratory rhythm and effort [Exaggerated Use Of Accessory Muscles For Inspiration] : no accessory muscle use [Auscultation Breath Sounds / Voice Sounds] : lungs were clear to auscultation bilaterally [Heart Rate And Rhythm] : heart rate was normal and rhythm regular [Heart Sounds] : normal S1 and S2 [Heart Sounds Gallop] : no gallops [Bowel Sounds] : normal bowel sounds [Abdomen Soft] : soft [Abdomen Tenderness] : non-tender [No CVA Tenderness] : no ~M costovertebral angle tenderness [No Spinal Tenderness] : no spinal tenderness [Abnormal Walk] : normal gait [Nail Clubbing] : no clubbing  or cyanosis of the fingernails [Musculoskeletal - Swelling] : no joint swelling seen [Skin Color & Pigmentation] : normal skin color and pigmentation [] : no rash [Skin Lesions] : no skin lesions [Oriented To Time, Place, And Person] : oriented to person, place, and time [Impaired Insight] : insight and judgment were intact [Affect] : the affect was normal [Mood] : the mood was normal [FreeTextEntry1] : 3+ edema

## 2023-05-30 NOTE — ASSESSMENT
[FreeTextEntry1] : Lupus Nephritis- detailed history as above \par now on Cytoxan and prednisone \par very volume overloaded- increase Lasix to 40 mg BID. Call me in 2-3 days to tell me if responding \par continue Bactrim/PPI/Calcium and Vitamin D

## 2023-05-30 NOTE — PROGRESS NOTE ADULT - PROBLEM SELECTOR PLAN 1
UA w/ large blood, >50 RBC, moderate LE. UTI vs worsening lupus nephritis vs. hemorrhagic cystitis (cytoxan exposure)    -appreciate nephrology and rheumatology recs  -pt w/o dysuria, no leukocytosis, afebrile: monitor off abx  -Pt reports urine no longer pink  -urology recommending outpatient cystoscopy to r/o hemorrhagic cystitis UA w/ large blood, >50 RBC, moderate LE. UTI vs worsening lupus nephritis vs. hemorrhagic cystitis (cytoxan exposure)    -appreciate nephrology and rheumatology recs  -pt w/o dysuria, no leukocytosis, afebrile: monitor off abx  -Pt denies further eps of hematuria at this time  -urology recommending outpatient cystoscopy to r/o hemorrhagic cystitis

## 2023-05-30 NOTE — HISTORY OF PRESENT ILLNESS
[FreeTextEntry1] : follow up Lupus Nephritis \par \par 47 yo male who initially presented to dermatology with a rash and work up revealed MÓNICA titer 1:1280 (speckled) - diagnosed with SLE and acute cutaneous lupus. urinalysis showed hematuria and 1.5 gms of proteinuria. 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 He was initiated on Cellcept, prednisone and Plaquenil but was non compliant \par \par Presented in October 2022 to [facility] with worsening ILZETH- Kidney biopsy repeated October 2022- Lupus Nephritis Class 4- activity index 15/24, chronicity 3/12 ). Received obinutuzumab 10/21/22 and 11/1/2022 and restarted on Cellcept - now at 1500 bid. his creatinine did not really improve and stayed at 1.7-1.8 mg/dl. \par \par 5/2/2023- persistence of renal disease/ hematuria/ proteinuria/ elevated creatinine \par Repeat kidney biopsy 5/2/23 on this admission showed proliferative LN class IV-G with 5 out of 8 non-globally sclerosed glomeruli with cellular and fibrocellular crescents; moderate tubulointerstitial inflammation, ~30% IFTA. Activity index 13/24 and chronicity index 7/12. He had a previous biopsy in Oct 2022, activity index was 15/24 and chronicity was 3/12 IFTA 15-20%. Mycophenolic acid level wnl indicating compliance. \par \par Received iv solumedrol 1g x3 (5/4-5/6) then reduce to 1mg/kg IV solu-medrol 60mg/day starting on 5/7. Patient discharged on 5/8/23 on prednisone 60mg to be further tapered outpatient. \par -discharged on bactrim and PPI while on high dose steroids\par -first dose of Cytoxan given 5/5/23 with second dose on 5/18. \par -Quant TB negative outpatient 2/2023; Hep B negative \par \par this visit- he continues to complain of severe edema \par he has a lot of leg and facial swelling \par no changes in urination, no foamy urine, no blood in the urine \par taking Lasix 40 mg daily \par

## 2023-05-30 NOTE — PROGRESS NOTE ADULT - ASSESSMENT
48M with h/o SLE c/b LN 3/4 w/ recent admission 4/27/23-5/8/2023 for lupus nephritis flare (switched from cellcept to IV cytoxan) p/w b/l LE swelling and hematuria. States having significant swelling to both legs over past 3 weeks despite having his lasix dose increased  up to 40mg BID. Noticed urine had reddish color so son spoke with his Rheumatologist, Dr. Nilton Le, who advised to come to ED for eval     #Hematuria:  UA w/ large blood, >50 RBC, moderate leuk esterase. Received 2 doses IV cytoxan w/ mesna (5/5/23, 5/19/23).   Differential includes cytoxan induced w/ hemorrhagic cystitis vs UTI vs kidney stone vs prostatitis.  CT negative for renal stone and prostatitis. Urine culture negative     #Nephrotic range proteinuria   Most likely 2/2 proteinuria 2/2 kidney disease.   TTE wnl 4/2023.   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 We will continue on solumedrol 40 mg and reassess tomorrow to transition back to prednisone 50 mg   -Patient will follow w/urologist for cytoscopy   -Repeat UA again to assess hematuria     Discussed with Attending Dr. Max-Toño Ivey MD  PGY-4 Rheumatology Fellow  Pager: [phone]   Available in teams

## 2023-05-30 NOTE — PROGRESS NOTE ADULT - SUBJECTIVE AND OBJECTIVE BOX
Internal Medicine   Celena Newman | PGY-1    OVERNIGHT EVENTS: No acute overnight events.    SUBJECTIVE:       MEDICATIONS  (STANDING):  buMETAnide Injectable 2 milliGRAM(s) IV Push two times a day  cyanocobalamin 1000 MICROGram(s) Oral daily  ergocalciferol 36311 Unit(s) Oral <User Schedule>  folic acid 1 milliGRAM(s) Oral daily  heparin   Injectable 5000 Unit(s) SubCutaneous every 8 hours  hydroxychloroquine 200 milliGRAM(s) Oral daily  methylPREDNISolone sodium succinate Injectable 40 milliGRAM(s) IV Push every 24 hours  nystatin    Suspension 843302 Unit(s) Oral four times a day  pantoprazole    Tablet 40 milliGRAM(s) Oral before breakfast  sodium bicarbonate 650 milliGRAM(s) Oral every 12 hours  trimethoprim   80 mG/sulfamethoxazole 400 mG 1 Tablet(s) Oral <User Schedule>    MEDICATIONS  (PRN):  acetaminophen     Tablet .. 650 milliGRAM(s) Oral every 6 hours PRN Temp greater or equal to 38C (100.4F), Mild Pain (1 - 3)  aluminum hydroxide/magnesium hydroxide/simethicone Suspension 30 milliLiter(s) Oral every 4 hours PRN Dyspepsia  melatonin 3 milliGRAM(s) Oral at bedtime PRN Insomnia  ondansetron Injectable 4 milliGRAM(s) IV Push every 8 hours PRN Nausea and/or Vomiting  polyethylene glycol 3350 17 Gram(s) Oral two times a day PRN Constipation  senna 2 Tablet(s) Oral at bedtime PRN Constipation        T(F): 98.1 (05-30-23 @ 05:09), Max: 98.4 (05-29-23 @ 13:20)  HR: 65 (05-30-23 @ 05:09) (65 - 70)  BP: 146/77 (05-30-23 @ 05:09) (120/87 - 152/80)  BP(mean): --  RR: 17 (05-30-23 @ 05:09) (17 - 18)  SpO2: 100% (05-30-23 @ 05:09) (100% - 100%)    PHYSICAL EXAM:     GENERAL: NAD, lying in bed comfortably  HEAD:  Atraumatic, Normocephalic  EYES: EOMI, PERRLA, conjunctiva and sclera clear, no nystagmus noted  ENT: Moist mucous membranes,   NECK: Supple, No JVD, trachea midline  CHEST/LUNG: Clear to auscultation bilaterally; No rales, rhonchi, wheezing, or rubs. Unlabored respirations  HEART: Regular rate and rhythm; No murmurs, rubs, or gallops, normal S1/S2  ABDOMEN: normal bowel sounds; Soft, nontender, nondistended, no organomegaly   EXTREMITIES:  2+ Peripheral Pulses, brisk capillary refill. No clubbing, cyanosis, or edema  MSK: No gross deformities noted   Neurological:  A&Ox3, no focal deficits   SKIN: No rashes or lesions  PSYCH: Normal mood, affect     TELEMETRY:    LABS:                        7.7    7.20  )-----------( 207      ( 29 May 2023 06:25 )             25.3     05-29    138  |  103  |  45<H>  ----------------------------<  88  3.8   |  29  |  2.29<H>    Ca    7.8<L>      29 May 2023 06:25  Phos  5.0     05-29  Mg     2.00     05-29          PT/INR - ( 29 May 2023 06:25 )   PT: 10.3 sec;   INR: <0.90 ratio         PTT - ( 29 May 2023 06:25 )  PTT:30.1 sec    Creatinine Trend: 2.29<--, 2.50<--, 2.56<--, 1.96<--, 1.84<--, 2.38<--  I&O's Summary    29 May 2023 07:01  -  30 May 2023 07:00  --------------------------------------------------------  IN: 480 mL / OUT: 950 mL / NET: -470 mL      BNP    RADIOLOGY & ADDITIONAL STUDIES:             Internal Medicine   Celena Ro | PGY-1    OVERNIGHT EVENTS: No acute overnight events.    SUBJECTIVE: Patient was seen and examined at bedside this morning. Denies any nausea/vomiting/diarrhea, headache, shortness of breath, abdominal pain or chest pain/palpitations. Pt reports improvement in santhosh LE edema and facial edema. Patient responding appropriately to questions and able to make needs known. Vital signs/imaging/telemetry events reviewed.       MEDICATIONS  (STANDING):  buMETAnide Injectable 2 milliGRAM(s) IV Push two times a day  cyanocobalamin 1000 MICROGram(s) Oral daily  ergocalciferol 01368 Unit(s) Oral <User Schedule>  folic acid 1 milliGRAM(s) Oral daily  heparin   Injectable 5000 Unit(s) SubCutaneous every 8 hours  hydroxychloroquine 200 milliGRAM(s) Oral daily  methylPREDNISolone sodium succinate Injectable 40 milliGRAM(s) IV Push every 24 hours  nystatin    Suspension 865386 Unit(s) Oral four times a day  pantoprazole    Tablet 40 milliGRAM(s) Oral before breakfast  sodium bicarbonate 650 milliGRAM(s) Oral every 12 hours  trimethoprim   80 mG/sulfamethoxazole 400 mG 1 Tablet(s) Oral <User Schedule>    MEDICATIONS  (PRN):  acetaminophen     Tablet .. 650 milliGRAM(s) Oral every 6 hours PRN Temp greater or equal to 38C (100.4F), Mild Pain (1 - 3)  aluminum hydroxide/magnesium hydroxide/simethicone Suspension 30 milliLiter(s) Oral every 4 hours PRN Dyspepsia  melatonin 3 milliGRAM(s) Oral at bedtime PRN Insomnia  ondansetron Injectable 4 milliGRAM(s) IV Push every 8 hours PRN Nausea and/or Vomiting  polyethylene glycol 3350 17 Gram(s) Oral two times a day PRN Constipation  senna 2 Tablet(s) Oral at bedtime PRN Constipation        T(F): 98.1 (05-30-23 @ 05:09), Max: 98.4 (05-29-23 @ 13:20)  HR: 65 (05-30-23 @ 05:09) (65 - 70)  BP: 146/77 (05-30-23 @ 05:09) (120/87 - 152/80)  BP(mean): --  RR: 17 (05-30-23 @ 05:09) (17 - 18)  SpO2: 100% (05-30-23 @ 05:09) (100% - 100%)    PHYSICAL EXAM:     GENERAL: +mild facial edema; NAD, lying in bed comfortably  HEAD:  Atraumatic, Normocephalic  EYES: EOMI, PERRLA, conjunctiva and sclera clear, no nystagmus noted  ENT: Moist mucous membranes,   NECK: Supple, No JVD, trachea midline  CHEST/LUNG: Clear to auscultation bilaterally; No rales, rhonchi, wheezing, or rubs. Unlabored respirations  HEART: Regular rate and rhythm; No murmurs, rubs, or gallops, normal S1/S2  ABDOMEN: normal bowel sounds; Soft, nontender, nondistended, no organomegaly   EXTREMITIES:  +santhosh LE 2+ pitting edema to knees; 2+ Peripheral Pulses, brisk capillary refill. No clubbing, cyanosis  MSK: No gross deformities noted   Neurological:  A&Ox3, no focal deficits   SKIN: No rashes or lesions  PSYCH: Normal mood, affect     TELEMETRY:    LABS:                        7.7    7.20  )-----------( 207      ( 29 May 2023 06:25 )             25.3     05-29    138  |  103  |  45<H>  ----------------------------<  88  3.8   |  29  |  2.29<H>    Ca    7.8<L>      29 May 2023 06:25  Phos  5.0     05-29  Mg     2.00     05-29          PT/INR - ( 29 May 2023 06:25 )   PT: 10.3 sec;   INR: <0.90 ratio         PTT - ( 29 May 2023 06:25 )  PTT:30.1 sec    Creatinine Trend: 2.29<--, 2.50<--, 2.56<--, 1.96<--, 1.84<--, 2.38<--  I&O's Summary    29 May 2023 07:01  -  30 May 2023 07:00  --------------------------------------------------------  IN: 480 mL / OUT: 950 mL / NET: -470 mL      BNP    RADIOLOGY & ADDITIONAL STUDIES:

## 2023-05-30 NOTE — PROGRESS NOTE ADULT - PROBLEM SELECTOR PLAN 2
Pt followed by Dr. Nilton Le. Underwent renal biopsy in 2/2022 showing focal proliferative class III, segmental endocapillary hypercellularity in 12%, a single small cellular crescents, mild acute interstitial nephritis, NIH activity score 4/24, no chronicity. Pt w/ hx of multiple renal bx. Last received cytoxan 5/19/23.    -appreciate rheum recs:  -4/2023 p/w worsening edema w/ LIZETH and active urinary sediment. mild C3 depression 83 with negative C4 and dsDNA. CT A/P, US kidney, echo unremarkable. s/p kidney biopsy 5/2/2023 showed proliferative LN class IV-G with 5 out of 8 non-globally sclerosed glomeruli with cellular and fibrocellular crescents; moderate tubulointerstitial inflammation, Patient was not a candidate for clinical trial.  - Received IV solumedrol 1g x3 (5/4-5/6) then prednisone 60mg/day starting on 5/7. Received induction Cytoxan 5/5/23 with remainder to be outpatient w/ plan for IV  mg q2 weeks   - protein/creatinine ratio and 24 hour urine protein    - ds-dna, c3, c4 mildly low  -c/w PCP ppx   -c/w PPI daily for GI ppx  -c/w plaquenil  -c/w Home Prednisone   -Quant TB negative outpatient 2/2023; Hep B checked this admission Pt followed by Dr. iNlton Le. Underwent renal biopsy in 2/2022 showing focal proliferative class III, segmental endocapillary hypercellularity in 12%, a single small cellular crescents, mild acute interstitial nephritis, NIH activity score 4/24, no chronicity. Pt w/ hx of multiple renal bx. Last received cytoxan 5/19/23.    -appreciate rheum recs:  -4/2023 p/w worsening edema w/ LIZETH and active urinary sediment. mild C3 depression 83 with negative C4 and dsDNA. CT A/P, US kidney, echo unremarkable. s/p kidney biopsy 5/2/2023 showed proliferative LN class IV-G with 5 out of 8 non-globally sclerosed glomeruli with cellular and fibrocellular crescents; moderate tubulointerstitial inflammation, Patient was not a candidate for clinical trial.  - Received IV solumedrol 1g x3 (5/4-5/6) then prednisone 60mg/day starting on 5/7. Received induction Cytoxan 5/5/23 with remainder to be outpatient w/ plan for IV  mg q2 weeks   - protein/creatinine ratio elevated at 6.1 and 24 hour urine protein elevated  - c3, c4 mildly low  -c/w PCP ppx   -c/w PPI daily for GI ppx  -c/w plaquenil  -c/w Home Prednisone   -Quant TB negative outpatient 2/2023; Hep B checked this admission  - f/u dsDNA

## 2023-05-30 NOTE — PROGRESS NOTE ADULT - ATTENDING COMMENTS
Patient seen and examined at bedside. In brief, 48M w/ SLE c/b lupus nephritis with recent admission for lupus nephritis flare where he was switched from mycophenolate to cyclophosphamide presenting with subjective fever at home x1 but no documented fevers at home, new hematuria, worsening leg swelling, and found to have LIZETH on CKD. Patient S/p CTA/P notable for: Small bilateral pleural effusions, right greater than left. There is associated mild infiltrate and/or atelectasis right lung base. Small amount of free fluid within the paracolic gutters bilaterally. S/p renal US w/ no renal/ureteral stones or hydronephrosis.    -Will continue to appreciate rheumatology recommendations. Hemorrhagic cystitis 2/2 cyclophosphamide is on the differential. Pt currently without clots and h/h remains overall stable. Pt seen by urology per rheum recs and plan is for outpatient w/u for hematuria. C3 low, C4 wnl. DSDNA: negative. C/w PCP ppx , plaquenil and solumedrol as prescribed.   -on ceftriaxone for possible UTI. However, given urine cx neg will discontinue ceftriaxone at this time  -Monitor creatinine levels  and I and Os.  Cr levels have been improving. Nephrology also following and will continue to appreciate recommendations. C/w Bumex IV per nephrology -> will trial PO bumex for now.   -Monitor LE edema. LE doppler neg for DVT. C/w bumex as stated above .

## 2023-05-30 NOTE — PROGRESS NOTE ADULT - PROBLEM SELECTOR PLAN 4
English Cr baseline 1.5-1.7 11/2022. Pt w/ recent admission for LIZETH. Cr now 2.56.  Pt denies decreased UOP. Pt follows with Dr. Shultz.    -appreciate nephrology recs  -improvement w/ bumex

## 2023-05-30 NOTE — PROGRESS NOTE ADULT - ASSESSMENT
47 yo M with PMHx of SLE c/b LN 3/4 w/ recent admission 4/27/23-5/8/2023 for lupus nephritis flare (switched from cellcept to IV cytoxan) p/w b/l LE swelling and hematuria.           49 yo M with PMHx of SLE c/b LN 3/4 w/ recent admission 4/27/23-5/8/2023 for lupus nephritis flare (switched from cellcept to IV cytoxan) p/w b/l LE swelling and hematuria.

## 2023-05-30 NOTE — PROGRESS NOTE ADULT - PROBLEM SELECTOR PLAN 3
Pt w/ bilateral leg swelling, takes lasix 40 bid.    -c/w 2 bumex IV bid, improving edema  -appreciate nephrology recs Pt w/ bilateral leg swelling, takes lasix 40 bid.    -s/p 2 bumex IV bid, w/ improving edema  -appreciate nephrology recs  - Transition to PO bumex

## 2023-05-31 LAB
4/8 RATIO: 0.83 RATIO — LOW (ref 0.9–3.6)
ABS CD8: 797 CELLS/UL — HIGH (ref 142–740)
ALBUMIN SERPL ELPH-MCNC: 1.9 G/DL — LOW (ref 3.3–5)
ALP SERPL-CCNC: 44 U/L — SIGNIFICANT CHANGE UP (ref 40–120)
ALT FLD-CCNC: 10 U/L — SIGNIFICANT CHANGE UP (ref 4–41)
ANION GAP SERPL CALC-SCNC: 11 MMOL/L — SIGNIFICANT CHANGE UP (ref 7–14)
ANION GAP SERPL CALC-SCNC: 11 MMOL/L — SIGNIFICANT CHANGE UP (ref 7–14)
APPEARANCE UR: ABNORMAL
APTT BLD: 31 SEC — SIGNIFICANT CHANGE UP (ref 27–36.3)
AST SERPL-CCNC: 16 U/L — SIGNIFICANT CHANGE UP (ref 4–40)
BACTERIA # UR AUTO: NEGATIVE — SIGNIFICANT CHANGE UP
BILIRUB SERPL-MCNC: <0.2 MG/DL — SIGNIFICANT CHANGE UP (ref 0.2–1.2)
BILIRUB UR-MCNC: NEGATIVE — SIGNIFICANT CHANGE UP
BLD GP AB SCN SERPL QL: NEGATIVE — SIGNIFICANT CHANGE UP
BUN SERPL-MCNC: 46 MG/DL — HIGH (ref 7–23)
BUN SERPL-MCNC: 46 MG/DL — HIGH (ref 7–23)
CALCIUM SERPL-MCNC: 7.8 MG/DL — LOW (ref 8.4–10.5)
CALCIUM SERPL-MCNC: 8.1 MG/DL — LOW (ref 8.4–10.5)
CD16+CD56+ CELLS NFR BLD: 6 % — SIGNIFICANT CHANGE UP (ref 5–23)
CD16+CD56+ CELLS NFR SPEC: 98 CELLS/UL — SIGNIFICANT CHANGE UP (ref 71–410)
CD19 BLASTS SPEC-ACNC: 5 CELLS/UL — LOW (ref 84–469)
CD19 BLASTS SPEC-ACNC: <1 % — LOW (ref 6–24)
CD3 BLASTS SPEC-ACNC: 1506 CELLS/UL — SIGNIFICANT CHANGE UP (ref 672–1870)
CD3 BLASTS SPEC-ACNC: 92 % — HIGH (ref 59–83)
CD4 %: 40 % — SIGNIFICANT CHANGE UP (ref 30–62)
CD8 %: 49 % — HIGH (ref 12–36)
CHLORIDE SERPL-SCNC: 100 MMOL/L — SIGNIFICANT CHANGE UP (ref 98–107)
CHLORIDE SERPL-SCNC: 101 MMOL/L — SIGNIFICANT CHANGE UP (ref 98–107)
CO2 SERPL-SCNC: 29 MMOL/L — SIGNIFICANT CHANGE UP (ref 22–31)
CO2 SERPL-SCNC: 29 MMOL/L — SIGNIFICANT CHANGE UP (ref 22–31)
COLOR SPEC: COLORLESS — SIGNIFICANT CHANGE UP
CREAT SERPL-MCNC: 2.31 MG/DL — HIGH (ref 0.5–1.3)
CREAT SERPL-MCNC: 2.44 MG/DL — HIGH (ref 0.5–1.3)
DIFF PNL FLD: ABNORMAL
EGFR: 32 ML/MIN/1.73M2 — LOW
EGFR: 34 ML/MIN/1.73M2 — LOW
EPI CELLS # UR: 5 /HPF — SIGNIFICANT CHANGE UP (ref 0–5)
GLUCOSE SERPL-MCNC: 139 MG/DL — HIGH (ref 70–99)
GLUCOSE SERPL-MCNC: 89 MG/DL — SIGNIFICANT CHANGE UP (ref 70–99)
GLUCOSE UR QL: NEGATIVE — SIGNIFICANT CHANGE UP
HCT VFR BLD CALC: 23.9 % — LOW (ref 39–50)
HGB BLD-MCNC: 7.5 G/DL — LOW (ref 13–17)
HYALINE CASTS # UR AUTO: 4 /LPF — SIGNIFICANT CHANGE UP (ref 0–7)
INR BLD: <0.9 RATIO — LOW (ref 0.88–1.16)
KETONES UR-MCNC: NEGATIVE — SIGNIFICANT CHANGE UP
LEUKOCYTE ESTERASE UR-ACNC: NEGATIVE — SIGNIFICANT CHANGE UP
MAGNESIUM SERPL-MCNC: 1.8 MG/DL — SIGNIFICANT CHANGE UP (ref 1.6–2.6)
MAGNESIUM SERPL-MCNC: 1.9 MG/DL — SIGNIFICANT CHANGE UP (ref 1.6–2.6)
MCHC RBC-ENTMCNC: 27.2 PG — SIGNIFICANT CHANGE UP (ref 27–34)
MCHC RBC-ENTMCNC: 31.4 GM/DL — LOW (ref 32–36)
MCV RBC AUTO: 86.6 FL — SIGNIFICANT CHANGE UP (ref 80–100)
NITRITE UR-MCNC: NEGATIVE — SIGNIFICANT CHANGE UP
NRBC # BLD: 0 /100 WBCS — SIGNIFICANT CHANGE UP (ref 0–0)
NRBC # FLD: 0 K/UL — SIGNIFICANT CHANGE UP (ref 0–0)
PH UR: 7 — SIGNIFICANT CHANGE UP (ref 5–8)
PHOSPHATE SERPL-MCNC: 4.4 MG/DL — SIGNIFICANT CHANGE UP (ref 2.5–4.5)
PHOSPHATE SERPL-MCNC: 4.8 MG/DL — HIGH (ref 2.5–4.5)
PLATELET # BLD AUTO: 241 K/UL — SIGNIFICANT CHANGE UP (ref 150–400)
POTASSIUM SERPL-MCNC: 3.9 MMOL/L — SIGNIFICANT CHANGE UP (ref 3.5–5.3)
POTASSIUM SERPL-MCNC: 3.9 MMOL/L — SIGNIFICANT CHANGE UP (ref 3.5–5.3)
POTASSIUM SERPL-SCNC: 3.9 MMOL/L — SIGNIFICANT CHANGE UP (ref 3.5–5.3)
POTASSIUM SERPL-SCNC: 3.9 MMOL/L — SIGNIFICANT CHANGE UP (ref 3.5–5.3)
PROT SERPL-MCNC: 4.3 G/DL — LOW (ref 6–8.3)
PROT UR-MCNC: ABNORMAL
PROTHROM AB SERPL-ACNC: 9.9 SEC — LOW (ref 10.5–13.4)
RBC # BLD: 2.76 M/UL — LOW (ref 4.2–5.8)
RBC # FLD: 12.9 % — SIGNIFICANT CHANGE UP (ref 10.3–14.5)
RBC CASTS # UR COMP ASSIST: 142 /HPF — HIGH (ref 0–4)
RH IG SCN BLD-IMP: POSITIVE — SIGNIFICANT CHANGE UP
SODIUM SERPL-SCNC: 140 MMOL/L — SIGNIFICANT CHANGE UP (ref 135–145)
SODIUM SERPL-SCNC: 141 MMOL/L — SIGNIFICANT CHANGE UP (ref 135–145)
SP GR SPEC: 1.01 — SIGNIFICANT CHANGE UP (ref 1.01–1.05)
T-CELL CD4 SUBSET PNL BLD: 660 CELLS/UL — SIGNIFICANT CHANGE UP (ref 489–1457)
UROBILINOGEN FLD QL: SIGNIFICANT CHANGE UP
WBC # BLD: 7.15 K/UL — SIGNIFICANT CHANGE UP (ref 3.8–10.5)
WBC # FLD AUTO: 7.15 K/UL — SIGNIFICANT CHANGE UP (ref 3.8–10.5)
WBC UR QL: 13 /HPF — HIGH (ref 0–5)

## 2023-05-31 PROCEDURE — 99233 SBSQ HOSP IP/OBS HIGH 50: CPT | Mod: GC

## 2023-05-31 PROCEDURE — 99232 SBSQ HOSP IP/OBS MODERATE 35: CPT | Mod: GC

## 2023-05-31 PROCEDURE — 99233 SBSQ HOSP IP/OBS HIGH 50: CPT

## 2023-05-31 RX ORDER — BUMETANIDE 0.25 MG/ML
2 INJECTION INTRAMUSCULAR; INTRAVENOUS EVERY 8 HOURS
Refills: 0 | Status: COMPLETED | OUTPATIENT
Start: 2023-05-31 | End: 2023-06-05

## 2023-05-31 RX ORDER — ALBUMIN HUMAN 25 %
25 VIAL (ML) INTRAVENOUS EVERY 12 HOURS
Refills: 0 | Status: DISCONTINUED | OUTPATIENT
Start: 2023-05-31 | End: 2023-05-31

## 2023-05-31 RX ORDER — ALBUMIN HUMAN 25 %
25 VIAL (ML) INTRAVENOUS EVERY 12 HOURS
Refills: 0 | Status: COMPLETED | OUTPATIENT
Start: 2023-05-31 | End: 2023-06-01

## 2023-05-31 RX ORDER — BUMETANIDE 0.25 MG/ML
2 INJECTION INTRAMUSCULAR; INTRAVENOUS EVERY 12 HOURS
Refills: 0 | Status: DISCONTINUED | OUTPATIENT
Start: 2023-05-31 | End: 2023-05-31

## 2023-05-31 RX ADMIN — HEPARIN SODIUM 5000 UNIT(S): 5000 INJECTION INTRAVENOUS; SUBCUTANEOUS at 05:30

## 2023-05-31 RX ADMIN — BUMETANIDE 2 MILLIGRAM(S): 0.25 INJECTION INTRAMUSCULAR; INTRAVENOUS at 22:10

## 2023-05-31 RX ADMIN — Medication 650 MILLIGRAM(S): at 05:30

## 2023-05-31 RX ADMIN — Medication 200 MILLIGRAM(S): at 11:46

## 2023-05-31 RX ADMIN — Medication 500000 UNIT(S): at 17:29

## 2023-05-31 RX ADMIN — HEPARIN SODIUM 5000 UNIT(S): 5000 INJECTION INTRAVENOUS; SUBCUTANEOUS at 13:08

## 2023-05-31 RX ADMIN — Medication 1 TABLET(S): at 05:29

## 2023-05-31 RX ADMIN — HEPARIN SODIUM 5000 UNIT(S): 5000 INJECTION INTRAVENOUS; SUBCUTANEOUS at 22:08

## 2023-05-31 RX ADMIN — BUMETANIDE 2 MILLIGRAM(S): 0.25 INJECTION INTRAMUSCULAR; INTRAVENOUS at 05:29

## 2023-05-31 RX ADMIN — Medication 40 MILLIGRAM(S): at 09:02

## 2023-05-31 RX ADMIN — PREGABALIN 1000 MICROGRAM(S): 225 CAPSULE ORAL at 11:46

## 2023-05-31 RX ADMIN — Medication 500000 UNIT(S): at 00:04

## 2023-05-31 RX ADMIN — Medication 100 GRAM(S): at 17:28

## 2023-05-31 RX ADMIN — Medication 3 MILLIGRAM(S): at 22:17

## 2023-05-31 RX ADMIN — PANTOPRAZOLE SODIUM 40 MILLIGRAM(S): 20 TABLET, DELAYED RELEASE ORAL at 06:09

## 2023-05-31 RX ADMIN — Medication 500000 UNIT(S): at 11:46

## 2023-05-31 RX ADMIN — Medication 500000 UNIT(S): at 05:30

## 2023-05-31 RX ADMIN — BUMETANIDE 2 MILLIGRAM(S): 0.25 INJECTION INTRAMUSCULAR; INTRAVENOUS at 15:07

## 2023-05-31 RX ADMIN — Medication 650 MILLIGRAM(S): at 17:29

## 2023-05-31 RX ADMIN — Medication 1 MILLIGRAM(S): at 11:46

## 2023-05-31 NOTE — PROGRESS NOTE ADULT - ASSESSMENT
48M with h/o SLE c/b LN 3/4 w/ recent admission 4/27/23-5/8/2023 for lupus nephritis flare (switched from cellcept to IV cytoxan) p/w b/l LE swelling and hematuria. States having significant swelling to both legs over past 3 weeks despite having his lasix dose increased  up to 40mg BID. Noticed urine had reddish color so son spoke with his Rheumatologist, Dr. Nilton Le, who advised to come to ED for eval     #Hematuria:  UA w/ large blood, >50 RBC, moderate leuk esterase. Received 2 doses IV cytoxan w/ mesna (5/5/23, 5/19/23).   Differential includes cytoxan induced w/ hemorrhagic cystitis vs UTI vs kidney stone vs prostatitis.  CT negative for renal stone and prostatitis. Urine culture negative     #Nephrotic range proteinuria   Most likely 2/2 proteinuria 2/2 kidney disease.   TTE wnl 4/2023.   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  -Current LN is very refractory to multiple medication: MMF, Obinutuzumab, cyclophosphamide. We will discuss with nephrology for possible induction treatment with calcineurin inhibitor: tacrolimus or voclosporin   -Keep on Solumedrol 40 mg, we will discuss with nephrologist to transition solumedrol to PO prednisone   -Patient will follow w/urologist for cytoscopy       Discussed with Attending Dr. Max-Toño Ivey MD  PGY-4 Rheumatology Fellow  Pager: [phone]   Available in teams

## 2023-05-31 NOTE — PROGRESS NOTE ADULT - PROBLEM SELECTOR PLAN 1
UA w/ large blood, >50 RBC, moderate LE. UTI vs worsening lupus nephritis vs. hemorrhagic cystitis (cytoxan exposure)    -appreciate nephrology and rheumatology recs  -pt w/o dysuria, no leukocytosis, afebrile: monitor off abx  -Pt denies further eps of hematuria at this time  -urology recommending outpatient cystoscopy to r/o hemorrhagic cystitis

## 2023-05-31 NOTE — PROGRESS NOTE ADULT - PROBLEM SELECTOR PLAN 2
Pt followed by Dr. Nilton Le. Underwent renal biopsy in 2/2022 showing focal proliferative class III, segmental endocapillary hypercellularity in 12%, a single small cellular crescents, mild acute interstitial nephritis, NIH activity score 4/24, no chronicity. Pt w/ hx of multiple renal bx. Last received cytoxan 5/19/23.    -appreciate rheum recs:  -4/2023 p/w worsening edema w/ LIZETH and active urinary sediment. mild C3 depression 83 with negative C4 and dsDNA. CT A/P, US kidney, echo unremarkable. s/p kidney biopsy 5/2/2023 showed proliferative LN class IV-G with 5 out of 8 non-globally sclerosed glomeruli with cellular and fibrocellular crescents; moderate tubulointerstitial inflammation, Patient was not a candidate for clinical trial.  - Received IV solumedrol 1g x3 (5/4-5/6) then prednisone 60mg/day starting on 5/7. Received induction Cytoxan 5/5/23 with remainder to be outpatient w/ plan for IV  mg q2 weeks   - protein/creatinine ratio elevated at 6.1 and 24 hour urine protein elevated  - c3, c4 mildly low  -c/w PCP ppx   -c/w PPI daily for GI ppx  -c/w plaquenil  -c/w Home Prednisone   -Quant TB negative outpatient 2/2023; Hep B checked this admission  - f/u dsDNA Pt followed by Dr. Nilton Le. Underwent renal biopsy in 2/2022 showing focal proliferative class III, segmental endocapillary hypercellularity in 12%, a single small cellular crescents, mild acute interstitial nephritis, NIH activity score 4/24, no chronicity. Pt w/ hx of multiple renal bx. Last received cytoxan 5/19/23.    -appreciate rheum recs:  -4/2023 p/w worsening edema w/ LIZETH and active urinary sediment. mild C3 depression 83 with negative C4 and dsDNA. CT A/P, US kidney, echo unremarkable. s/p kidney biopsy 5/2/2023 showed proliferative LN class IV-G with 5 out of 8 non-globally sclerosed glomeruli with cellular and fibrocellular crescents; moderate tubulointerstitial inflammation, Patient was not a candidate for clinical trial.  - Received IV solumedrol 1g x3 (5/4-5/6) then prednisone 60mg/day starting on 5/7. Received induction Cytoxan 5/5/23 with remainder to be outpatient w/ plan for IV  mg q2 weeks   - protein/creatinine ratio elevated at 6.1 and 24 hour urine protein elevated  - c3, c4 mildly low  -c/w PCP ppx   -c/w PPI daily for GI ppx  -c/w plaquenil  -c/w home steroid  -Quant TB negative outpatient 2/2023; Hep B checked this admission  - ds DNA neg

## 2023-05-31 NOTE — PROGRESS NOTE ADULT - PROBLEM SELECTOR PLAN 3
Pt w/ bilateral leg swelling, takes lasix 40 bid.    -s/p 2 bumex IV bid, w/ improving edema  -appreciate nephrology recs  - Transition to PO bumex Pt w/ bilateral leg swelling, takes lasix 40 bid.    -s/p 2 bumex IV bid, w/ improving edema  -appreciate nephrology recs  - Increase bumex to 1 mg TID

## 2023-05-31 NOTE — PROGRESS NOTE ADULT - SUBJECTIVE AND OBJECTIVE BOX
[facility] Division of Kidney Diseases & Hypertension  FOLLOW UP NOTE  --------------------------------------------------------------------------------  Chief Complaint:    24 hour events/subjective:    Feels okay  hematuria has resolved   swelling is better         PAST HISTORY  --------------------------------------------------------------------------------  No significant changes to PMH, PSH, FHx, SHx, unless otherwise noted    ALLERGIES & MEDICATIONS  --------------------------------------------------------------------------------  Allergies    No Known Allergies    Intolerances      Standing Inpatient Medications  albumin human 25% IVPB 25 Gram(s) IV Intermittent every 12 hours  buMETAnide Injectable 2 milliGRAM(s) IV Push every 8 hours  cyanocobalamin 1000 MICROGram(s) Oral daily  ergocalciferol 13574 Unit(s) Oral <User Schedule>  folic acid 1 milliGRAM(s) Oral daily  heparin   Injectable 5000 Unit(s) SubCutaneous every 8 hours  hydroxychloroquine 200 milliGRAM(s) Oral daily  methylPREDNISolone sodium succinate Injectable 40 milliGRAM(s) IV Push every 24 hours  nystatin    Suspension 587739 Unit(s) Oral four times a day  pantoprazole    Tablet 40 milliGRAM(s) Oral before breakfast  sodium bicarbonate 650 milliGRAM(s) Oral every 12 hours  trimethoprim   80 mG/sulfamethoxazole 400 mG 1 Tablet(s) Oral <User Schedule>    PRN Inpatient Medications  acetaminophen     Tablet .. 650 milliGRAM(s) Oral every 6 hours PRN  aluminum hydroxide/magnesium hydroxide/simethicone Suspension 30 milliLiter(s) Oral every 4 hours PRN  melatonin 3 milliGRAM(s) Oral at bedtime PRN  ondansetron Injectable 4 milliGRAM(s) IV Push every 8 hours PRN  polyethylene glycol 3350 17 Gram(s) Oral two times a day PRN  senna 2 Tablet(s) Oral at bedtime PRN      REVIEW OF SYSTEMS  --------------------------------------------------------------------------------  Gen: No weight changes, fatigue, fevers/chills, weakness  Skin: No rashes  Head/Eyes/Ears/Mouth: No headache; Normal hearing; Normal vision w/o blurriness; No sinus pain/discomfort, sore throat  Respiratory: No dyspnea, cough, wheezing, hemoptysis  CV: No chest pain, PND, orthopnea  GI: No abdominal pain, diarrhea, constipation, nausea, vomiting, melena, hematochezia  : No increased frequency, dysuria, hematuria, nocturia  MSK: No joint pain/swelling; no back pain; no edema  Neuro: No dizziness/lightheadedness, weakness, seizures, numbness, tingling  Heme: No easy bruising or bleeding  Endo: No heat/cold intolerance  Psych: No significant nervousness, anxiety, stress, depression    All other systems were reviewed and are negative, except as noted.    VITALS/PHYSICAL EXAM  --------------------------------------------------------------------------------  T(C): 36.7 (05-31-23 @ 21:29), Max: 36.8 (05-31-23 @ 17:27)  HR: 72 (05-31-23 @ 21:29) (67 - 75)  BP: 118/- (05-31-23 @ 21:29) (118/- - 144/88)  RR: 18 (05-31-23 @ 21:29) (17 - 18)  SpO2: 100% (05-31-23 @ 21:29) (98% - 100%)  Wt(kg): --        05-30-23 @ 07:01  -  05-31-23 @ 07:00  --------------------------------------------------------  IN: 720 mL / OUT: 1650 mL / NET: -930 mL    05-31-23 @ 07:01  -  05-31-23 @ 21:57  --------------------------------------------------------  IN: 0 mL / OUT: 900 mL / NET: -900 mL      Physical Exam:  	Gen: NAD, well-appearing  	HEENT: PERRL, supple neck, clear oropharynx  	Pulm: CTA B/L  	CV: RRR, S1S2; no rub  	Back: No spinal or CVA tenderness; no sacral edema  	Abd: +BS, soft, nontender/nondistended  	: No suprapubic tenderness  	UE: Warm, FROM, no clubbing, intact strength; no edema; no asterixis  	LE: Warm, FROM, no clubbing, intact strength; 3+ edema  	Neuro: No focal deficits, intact gait  	Psych: Normal affect and mood  	Skin: Warm, without rashes  	    LABS/STUDIES  --------------------------------------------------------------------------------              7.5    7.15  >-----------<  241      [05-31-23 @ 06:20]              23.9     141  |  101  |  46  ----------------------------<  139      [05-31-23 @ 20:00]  3.9   |  29  |  2.31        Ca     8.1     [05-31-23 @ 20:00]      Mg     1.80     [05-31-23 @ 20:00]      Phos  4.4     [05-31-23 @ 20:00]    TPro  4.3  /  Alb  1.9  /  TBili  <0.2  /  DBili  x   /  AST  16  /  ALT  10  /  AlkPhos  44  [05-31-23 @ 06:20]    PT/INR: PT 9.9  , INR <0.90      [05-31-23 @ 06:20]  PTT: 31.0       [05-31-23 @ 06:20]      Creatinine Trend:  SCr 2.31 [05-31 @ 20:00]  SCr 2.44 [05-31 @ 06:20]  SCr 2.23 [05-30 @ 07:16]  SCr 2.29 [05-29 @ 06:25]  SCr 2.50 [05-28 @ 06:15]    Urinalysis - [05-31-23 @ 07:17]      Color Colorless / Appearance Slightly Turbid / SG 1.013 / pH 7.0      Gluc Negative / Ketone Negative  / Bili Negative / Urobili <2 mg/dL       Blood Large / Protein 300 mg/dL / Leuk Est Negative / Nitrite Negative       / WBC 13 / Hyaline 4 / Gran  / Sq Epi  / Non Sq Epi  / Bacteria Negative    Urine Creatinine 28      [05-27-23 @ 22:05]  Urine Protein 172      [05-27-23 @ 22:05]  Urine Sodium 63      [05-27-23 @ 11:38]  Urine Potassium 53.9      [05-27-23 @ 11:38]  Urine Chloride 73      [05-27-23 @ 11:38]  Urine Osmolality 415      [05-27-23 @ 11:37]        dsDNA 13      [05-28-23 @ 06:15]  C3 Complement 86      [05-28-23 @ 06:15]  C4 Complement 27      [05-28-23 @ 06:15]

## 2023-05-31 NOTE — PROGRESS NOTE ADULT - ASSESSMENT
49 yo male who initially presented to dermatology with a rash and work up revealed MÓNICA titer 1:1280 (speckled) - diagnosed with SLE and acute cutaneous lupus. urinalysis showed hematuria and 1.5 gms of proteinuria. 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 He was initiated on Cellcept, prednisone and Plaquenil but was non compliant     Presented in October 2022 to [facility] with worsening LIZETH- Kidney biopsy repeated October 2022- Lupus Nephritis Class 4- activity index 15/24, chronicity 3/12 ). Received obinutuzumab 10/21/22 and 11/1/2022 and restarted on Cellcept - now at 1500 bid. his creatinine did not really improve and stayed at 1.7-1.8 mg/dl.     5/2/2023- persistence of renal disease/ hematuria/ proteinuria/ elevated creatinine   Repeat kidney biopsy 5/2/23 on this admission showed proliferative LN class IV-G with 5 out of 8 non-globally sclerosed glomeruli with cellular and fibrocellular crescents; moderate tubulointerstitial inflammation, ~30% IFTA. Activity index 13/24 and chronicity index 7/12. He had a previous biopsy in Oct 2022, activity index was 15/24 and chronicity was 3/12 IFTA 15-20%. Mycophenolic acid level wnl indicating compliance.     Received iv solumedrol 1g x3 (5/4-5/6) then reduce to 1mg/kg IV solu-medrol 60mg/day starting on 5/7. Patient discharged on 5/8/23 on prednisone 60mg to be further tapered outpatient.   -discharged on bactrim and PPI while on high dose steroids  -first dose of Cytoxan given 5/5/23 with second dose on 5/18.   -Quant TB negative outpatient 2/2023; Hep B negative         now presenting with fluid overload/hematuria    - continue Bumex 2 mg iv every 8 hours  - 25 gms of 25% albumin every 12 hours   - continue iv steroids  - hold off further cytoxan as hematuria likely hemorrhagic cystitis.   - unfortunately he is running out of options to treat his disease. Once renal function is better, Voclosporin/ Tacro can be used   - consider clinical trial ( wont be eligible for car-t as b cell depleted)    chucky marks  nephrology attending   please contact me on TEAMS   Office- [phone]

## 2023-05-31 NOTE — PROGRESS NOTE ADULT - ATTENDING COMMENTS
Patient seen and examined at bedside. In brief, 48M w/ SLE c/b lupus nephritis with recent admission for lupus nephritis flare where he was switched from mycophenolate to cyclophosphamide presenting with subjective fever at home x1 but no documented fevers at home, new hematuria, worsening leg swelling, and found to have LIZETH on CKD. Patient S/p CTA/P notable for: Small bilateral pleural effusions, right greater than left. There is associated mild infiltrate and/or atelectasis right lung base. Small amount of free fluid within the paracolic gutters bilaterally. S/p renal US w/ no renal/ureteral stones or hydronephrosis.    -Will continue to appreciate rheumatology recommendations. Hemorrhagic cystitis 2/2 cyclophosphamide is on the differential. Pt currently without clots and h/h remains overall stable. Pt seen by urology per rheum recs and plan is for outpatient w/u for hematuria. C3 low, C4 wnl. DSDNA: negative. C/w PCP ppx , plaquenil and solumedrol as prescribed.   -on ceftriaxone for possible UTI. However, given urine cx neg will discontinue ceftriaxone at this time  -Monitor creatinine levels  and I and Os.  Cr levels have been improving. Nephrology also following and will continue to appreciate recommendations. C/w Bumex IV per nephrology ->increased to bumex IV 2mg TID   -Monitor LE edema. LE doppler neg for DVT. C/w bumex as stated above .   Dispo: Likely FRiday/Saturday pending improved volume status

## 2023-05-31 NOTE — PROGRESS NOTE ADULT - SUBJECTIVE AND OBJECTIVE BOX
Internal Medicine   Celena Newman | PGY-1    OVERNIGHT EVENTS: No acute overnight events.    SUBJECTIVE:       MEDICATIONS  (STANDING):  buMETAnide 2 milliGRAM(s) Oral every 12 hours  cyanocobalamin 1000 MICROGram(s) Oral daily  ergocalciferol 33482 Unit(s) Oral <User Schedule>  folic acid 1 milliGRAM(s) Oral daily  heparin   Injectable 5000 Unit(s) SubCutaneous every 8 hours  hydroxychloroquine 200 milliGRAM(s) Oral daily  methylPREDNISolone sodium succinate Injectable 40 milliGRAM(s) IV Push every 24 hours  nystatin    Suspension 347835 Unit(s) Oral four times a day  pantoprazole    Tablet 40 milliGRAM(s) Oral before breakfast  sodium bicarbonate 650 milliGRAM(s) Oral every 12 hours  trimethoprim   80 mG/sulfamethoxazole 400 mG 1 Tablet(s) Oral <User Schedule>    MEDICATIONS  (PRN):  acetaminophen     Tablet .. 650 milliGRAM(s) Oral every 6 hours PRN Temp greater or equal to 38C (100.4F), Mild Pain (1 - 3)  aluminum hydroxide/magnesium hydroxide/simethicone Suspension 30 milliLiter(s) Oral every 4 hours PRN Dyspepsia  melatonin 3 milliGRAM(s) Oral at bedtime PRN Insomnia  ondansetron Injectable 4 milliGRAM(s) IV Push every 8 hours PRN Nausea and/or Vomiting  polyethylene glycol 3350 17 Gram(s) Oral two times a day PRN Constipation  senna 2 Tablet(s) Oral at bedtime PRN Constipation        T(F): 97.7 (05-31-23 @ 05:23), Max: 98 (05-30-23 @ 12:26)  HR: 67 (05-31-23 @ 05:23) (67 - 73)  BP: 124/74 (05-31-23 @ 05:23) (121/78 - 134/86)  BP(mean): --  RR: 18 (05-31-23 @ 05:23) (18 - 18)  SpO2: 98% (05-31-23 @ 05:23) (98% - 100%)    PHYSICAL EXAM:     GENERAL: NAD, lying in bed comfortably  HEAD:  Atraumatic, Normocephalic  EYES: EOMI, PERRLA, conjunctiva and sclera clear, no nystagmus noted  ENT: Moist mucous membranes,   NECK: Supple, No JVD, trachea midline  CHEST/LUNG: Clear to auscultation bilaterally; No rales, rhonchi, wheezing, or rubs. Unlabored respirations  HEART: Regular rate and rhythm; No murmurs, rubs, or gallops, normal S1/S2  ABDOMEN: normal bowel sounds; Soft, nontender, nondistended, no organomegaly   EXTREMITIES:  2+ Peripheral Pulses, brisk capillary refill. No clubbing, cyanosis, or edema  MSK: No gross deformities noted   Neurological:  A&Ox3, no focal deficits   SKIN: No rashes or lesions  PSYCH: Normal mood, affect     TELEMETRY:    LABS:                        7.9    6.46  )-----------( 223      ( 30 May 2023 07:16 )             25.7     05-30    140  |  101  |  42<H>  ----------------------------<  85  3.5   |  29  |  2.23<H>    Ca    7.8<L>      30 May 2023 07:16  Phos  5.5     05-30  Mg     1.90     05-30          PT/INR - ( 30 May 2023 07:16 )   PT: 9.9 sec;   INR: <0.90 ratio         PTT - ( 30 May 2023 07:16 )  PTT:30.1 sec    Creatinine Trend: 2.23<--, 2.29<--, 2.50<--, 2.56<--, 1.96<--, 1.84<--  I&O's Summary    30 May 2023 07:01  -  31 May 2023 07:00  --------------------------------------------------------  IN: 720 mL / OUT: 1200 mL / NET: -480 mL      BNP    RADIOLOGY & ADDITIONAL STUDIES:             Internal Medicine   Celena Newman | PGY-1    OVERNIGHT EVENTS: No acute overnight events.    SUBJECTIVE:     MEDICATIONS  (STANDING):  buMETAnide 2 milliGRAM(s) Oral every 12 hours  cyanocobalamin 1000 MICROGram(s) Oral daily  ergocalciferol 83979 Unit(s) Oral <User Schedule>  folic acid 1 milliGRAM(s) Oral daily  heparin   Injectable 5000 Unit(s) SubCutaneous every 8 hours  hydroxychloroquine 200 milliGRAM(s) Oral daily  methylPREDNISolone sodium succinate Injectable 40 milliGRAM(s) IV Push every 24 hours  nystatin    Suspension 802969 Unit(s) Oral four times a day  pantoprazole    Tablet 40 milliGRAM(s) Oral before breakfast  sodium bicarbonate 650 milliGRAM(s) Oral every 12 hours  trimethoprim   80 mG/sulfamethoxazole 400 mG 1 Tablet(s) Oral <User Schedule>    MEDICATIONS  (PRN):  acetaminophen     Tablet .. 650 milliGRAM(s) Oral every 6 hours PRN Temp greater or equal to 38C (100.4F), Mild Pain (1 - 3)  aluminum hydroxide/magnesium hydroxide/simethicone Suspension 30 milliLiter(s) Oral every 4 hours PRN Dyspepsia  melatonin 3 milliGRAM(s) Oral at bedtime PRN Insomnia  ondansetron Injectable 4 milliGRAM(s) IV Push every 8 hours PRN Nausea and/or Vomiting  polyethylene glycol 3350 17 Gram(s) Oral two times a day PRN Constipation  senna 2 Tablet(s) Oral at bedtime PRN Constipation        T(F): 97.7 (05-31-23 @ 05:23), Max: 98 (05-30-23 @ 12:26)  HR: 67 (05-31-23 @ 05:23) (67 - 73)  BP: 124/74 (05-31-23 @ 05:23) (121/78 - 134/86)  BP(mean): --  RR: 18 (05-31-23 @ 05:23) (18 - 18)  SpO2: 98% (05-31-23 @ 05:23) (98% - 100%)    PHYSICAL EXAM:     GENERAL: +mild facial edema; NAD, lying in bed comfortably  HEAD:  Atraumatic, Normocephalic  EYES: EOMI, PERRLA, conjunctiva and sclera clear, no nystagmus noted  ENT: Moist mucous membranes,   NECK: Supple, No JVD, trachea midline  CHEST/LUNG: Clear to auscultation bilaterally; No rales, rhonchi, wheezing, or rubs. Unlabored respirations  HEART: Regular rate and rhythm; No murmurs, rubs, or gallops, normal S1/S2  ABDOMEN: normal bowel sounds; Soft, nontender, nondistended, no organomegaly   EXTREMITIES:  +santhosh LE 2+ pitting edema to knees; 2+ Peripheral Pulses, brisk capillary refill. No clubbing, cyanosis  MSK: No gross deformities noted   Neurological:  A&Ox3, no focal deficits   SKIN: No rashes or lesions  PSYCH: Normal mood, affect     TELEMETRY:    LABS:                        7.9    6.46  )-----------( 223      ( 30 May 2023 07:16 )             25.7     05-30    140  |  101  |  42<H>  ----------------------------<  85  3.5   |  29  |  2.23<H>    Ca    7.8<L>      30 May 2023 07:16  Phos  5.5     05-30  Mg     1.90     05-30          PT/INR - ( 30 May 2023 07:16 )   PT: 9.9 sec;   INR: <0.90 ratio         PTT - ( 30 May 2023 07:16 )  PTT:30.1 sec    Creatinine Trend: 2.23<--, 2.29<--, 2.50<--, 2.56<--, 1.96<--, 1.84<--  I&O's Summary    30 May 2023 07:01  -  31 May 2023 07:00  --------------------------------------------------------  IN: 720 mL / OUT: 1200 mL / NET: -480 mL      BNP    RADIOLOGY & ADDITIONAL STUDIES:             Internal Medicine   Celena Ro | PGY-1    OVERNIGHT EVENTS: No acute overnight events.    SUBJECTIVE: Patient was seen and examined at bedside this morning. Denies any nausea/vomiting/diarrhea, headache, shortness of breath, abdominal pain or chest pain/palpitations. Pt reports improvement in santhosh LE edema. Pt denies additional episodes of hematuria. Patient responding appropriately to questions and able to make needs known. Vital signs/imaging/telemetry events reviewed.     MEDICATIONS  (STANDING):  buMETAnide 2 milliGRAM(s) Oral every 12 hours  cyanocobalamin 1000 MICROGram(s) Oral daily  ergocalciferol 40432 Unit(s) Oral <User Schedule>  folic acid 1 milliGRAM(s) Oral daily  heparin   Injectable 5000 Unit(s) SubCutaneous every 8 hours  hydroxychloroquine 200 milliGRAM(s) Oral daily  methylPREDNISolone sodium succinate Injectable 40 milliGRAM(s) IV Push every 24 hours  nystatin    Suspension 841845 Unit(s) Oral four times a day  pantoprazole    Tablet 40 milliGRAM(s) Oral before breakfast  sodium bicarbonate 650 milliGRAM(s) Oral every 12 hours  trimethoprim   80 mG/sulfamethoxazole 400 mG 1 Tablet(s) Oral <User Schedule>    MEDICATIONS  (PRN):  acetaminophen     Tablet .. 650 milliGRAM(s) Oral every 6 hours PRN Temp greater or equal to 38C (100.4F), Mild Pain (1 - 3)  aluminum hydroxide/magnesium hydroxide/simethicone Suspension 30 milliLiter(s) Oral every 4 hours PRN Dyspepsia  melatonin 3 milliGRAM(s) Oral at bedtime PRN Insomnia  ondansetron Injectable 4 milliGRAM(s) IV Push every 8 hours PRN Nausea and/or Vomiting  polyethylene glycol 3350 17 Gram(s) Oral two times a day PRN Constipation  senna 2 Tablet(s) Oral at bedtime PRN Constipation        T(F): 97.7 (05-31-23 @ 05:23), Max: 98 (05-30-23 @ 12:26)  HR: 67 (05-31-23 @ 05:23) (67 - 73)  BP: 124/74 (05-31-23 @ 05:23) (121/78 - 134/86)  BP(mean): --  RR: 18 (05-31-23 @ 05:23) (18 - 18)  SpO2: 98% (05-31-23 @ 05:23) (98% - 100%)    PHYSICAL EXAM:     GENERAL: +mild facial edema; NAD, lying in bed comfortably  HEAD:  Atraumatic, Normocephalic  EYES: EOMI, PERRLA, conjunctiva and sclera clear, no nystagmus noted  ENT: Moist mucous membranes,   NECK: Supple, No JVD, trachea midline  CHEST/LUNG: Clear to auscultation bilaterally; No rales, rhonchi, wheezing, or rubs. Unlabored respirations  HEART: Regular rate and rhythm; No murmurs, rubs, or gallops, normal S1/S2  ABDOMEN: normal bowel sounds; Soft, nontender, nondistended, no organomegaly   EXTREMITIES:  +santhosh LE 2+ pitting edema to knees; 2+ Peripheral Pulses, brisk capillary refill. No clubbing, cyanosis  MSK: No gross deformities noted   Neurological:  A&Ox3, no focal deficits   SKIN: No rashes or lesions  PSYCH: Normal mood, affect     TELEMETRY:    LABS:                        7.9    6.46  )-----------( 223      ( 30 May 2023 07:16 )             25.7     05-30    140  |  101  |  42<H>  ----------------------------<  85  3.5   |  29  |  2.23<H>    Ca    7.8<L>      30 May 2023 07:16  Phos  5.5     05-30  Mg     1.90     05-30          PT/INR - ( 30 May 2023 07:16 )   PT: 9.9 sec;   INR: <0.90 ratio         PTT - ( 30 May 2023 07:16 )  PTT:30.1 sec    Creatinine Trend: 2.23<--, 2.29<--, 2.50<--, 2.56<--, 1.96<--, 1.84<--  I&O's Summary    30 May 2023 07:01  -  31 May 2023 07:00  --------------------------------------------------------  IN: 720 mL / OUT: 1200 mL / NET: -480 mL      BNP    RADIOLOGY & ADDITIONAL STUDIES:

## 2023-05-31 NOTE — PROGRESS NOTE ADULT - SUBJECTIVE AND OBJECTIVE BOX
LIZ HOUSER  [id number]    INTERVAL HPI/OVERNIGHT EVENTS:    Patient was examined at the [facility], No new vent    PMHx/PSHx/FamHx/SocHx reviewed and no significant changes    REVIEW OF SYSTEMS   - reviewed with patient and  negative other than as above or previously documented.     MEDICATIONS  (STANDING):  albumin human 25% IVPB 25 Gram(s) IV Intermittent every 12 hours  buMETAnide Injectable 2 milliGRAM(s) IV Push every 8 hours  cyanocobalamin 1000 MICROGram(s) Oral daily  ergocalciferol 65855 Unit(s) Oral <User Schedule>  folic acid 1 milliGRAM(s) Oral daily  heparin   Injectable 5000 Unit(s) SubCutaneous every 8 hours  hydroxychloroquine 200 milliGRAM(s) Oral daily  methylPREDNISolone sodium succinate Injectable 40 milliGRAM(s) IV Push every 24 hours  nystatin    Suspension 256518 Unit(s) Oral four times a day  pantoprazole    Tablet 40 milliGRAM(s) Oral before breakfast  sodium bicarbonate 650 milliGRAM(s) Oral every 12 hours  trimethoprim   80 mG/sulfamethoxazole 400 mG 1 Tablet(s) Oral <User Schedule>    MEDICATIONS  (PRN):  acetaminophen     Tablet .. 650 milliGRAM(s) Oral every 6 hours PRN Temp greater or equal to 38C (100.4F), Mild Pain (1 - 3)  aluminum hydroxide/magnesium hydroxide/simethicone Suspension 30 milliLiter(s) Oral every 4 hours PRN Dyspepsia  melatonin 3 milliGRAM(s) Oral at bedtime PRN Insomnia  ondansetron Injectable 4 milliGRAM(s) IV Push every 8 hours PRN Nausea and/or Vomiting  polyethylene glycol 3350 17 Gram(s) Oral two times a day PRN Constipation  senna 2 Tablet(s) Oral at bedtime PRN Constipation      Allergies    No Known Allergies    Intolerances          Vital Signs Last 24 Hrs  T(C): 36.7 (31 May 2023 12:03), Max: 36.7 (31 May 2023 12:03)  T(F): 98.1 (31 May 2023 12:03), Max: 98.1 (31 May 2023 12:03)  HR: 67 (31 May 2023 12:03) (67 - 73)  BP: 135/76 (31 May 2023 12:03) (124/74 - 135/76)  BP(mean): --  RR: 18 (31 May 2023 12:03) (18 - 18)  SpO2: 98% (31 May 2023 12:03) (98% - 99%)    Parameters below as of 31 May 2023 12:03  Patient On (Oxygen Delivery Method): room air        Physical Exam:  General: NAD  HEENT: EOMI, MMM  Cardio: +S1/S2, RRR  Resp: CTA b/l  GI: +BS, soft, NT/ND  MSK:  Neuro: AAOx3  Psych: wnl    LABS:                        7.5    7.15  )-----------( 241      ( 31 May 2023 06:20 )             23.9     05-31    140  |  100  |  46<H>  ----------------------------<  89  3.9   |  29  |  2.44<H>    Ca    7.8<L>      31 May 2023 06:20  Phos  4.8     05-31  Mg     1.90     05-    TPro  4.3<L>  /  Alb  1.9<L>  /  TBili  <0.2  /  DBili  x   /  AST  16  /  ALT  10  /  AlkPhos  44  05-31    PT/INR - ( 31 May 2023 06:20 )   PT: 9.9 sec;   INR: <0.90 ratio         PTT - ( 31 May 2023 06:20 )  PTT:31.0 sec  Urinalysis Basic - ( 31 May 2023 07:17 )    Color: Colorless / Appearance: Slightly Turbid / S.013 / pH: x  Gluc: x / Ketone: Negative  / Bili: Negative / Urobili: <2 mg/dL   Blood: x / Protein: 300 mg/dL / Nitrite: Negative   Leuk Esterase: Negative / RBC: 142 /HPF / WBC 13 /HPF   Sq Epi: x / Non Sq Epi: x / Bacteria: Negative          RADIOLOGY & ADDITIONAL TESTS:       SHARMILAJASBIR HOUSER  [id number]    INTERVAL HPI/OVERNIGHT EVENTS:    Patient was examined at the bedside. No new event overnight.     PMHx/PSHx/FamHx/SocHx reviewed and no significant changes    REVIEW OF SYSTEMS   - reviewed with patient and  negative other than as above or previously documented.     MEDICATIONS  (STANDING):  albumin human 25% IVPB 25 Gram(s) IV Intermittent every 12 hours  buMETAnide Injectable 2 milliGRAM(s) IV Push every 8 hours  cyanocobalamin 1000 MICROGram(s) Oral daily  ergocalciferol 84061 Unit(s) Oral <User Schedule>  folic acid 1 milliGRAM(s) Oral daily  heparin   Injectable 5000 Unit(s) SubCutaneous every 8 hours  hydroxychloroquine 200 milliGRAM(s) Oral daily  methylPREDNISolone sodium succinate Injectable 40 milliGRAM(s) IV Push every 24 hours  nystatin    Suspension 801752 Unit(s) Oral four times a day  pantoprazole    Tablet 40 milliGRAM(s) Oral before breakfast  sodium bicarbonate 650 milliGRAM(s) Oral every 12 hours  trimethoprim   80 mG/sulfamethoxazole 400 mG 1 Tablet(s) Oral <User Schedule>    MEDICATIONS  (PRN):  acetaminophen     Tablet .. 650 milliGRAM(s) Oral every 6 hours PRN Temp greater or equal to 38C (100.4F), Mild Pain (1 - 3)  aluminum hydroxide/magnesium hydroxide/simethicone Suspension 30 milliLiter(s) Oral every 4 hours PRN Dyspepsia  melatonin 3 milliGRAM(s) Oral at bedtime PRN Insomnia  ondansetron Injectable 4 milliGRAM(s) IV Push every 8 hours PRN Nausea and/or Vomiting  polyethylene glycol 3350 17 Gram(s) Oral two times a day PRN Constipation  senna 2 Tablet(s) Oral at bedtime PRN Constipation      Allergies    No Known Allergies    Intolerances          Vital Signs Last 24 Hrs  T(C): 36.7 (31 May 2023 12:03), Max: 36.7 (31 May 2023 12:03)  T(F): 98.1 (31 May 2023 12:03), Max: 98.1 (31 May 2023 12:03)  HR: 67 (31 May 2023 12:03) (67 - 73)  BP: 135/76 (31 May 2023 12:03) (124/74 - 135/76)  BP(mean): --  RR: 18 (31 May 2023 12:03) (18 - 18)  SpO2: 98% (31 May 2023 12:03) (98% - 99%)    Parameters below as of 31 May 2023 12:03  Patient On (Oxygen Delivery Method): room air        Physical Exam:  General: NAD  HEENT: EOMI, MMM  Cardio: no mumur, RRR, 3+ pitting edema in the lower extremities   Resp: CTA b/l  GI: +BS, soft, NT/ND  MSK: No sign of tenosynovitis and good ROM  Neuro: AAOx3  Psych: wnl    LABS:                        7.5    7.15  )-----------( 241      ( 31 May 2023 06:20 )             23.9         140  |  100  |  46<H>  ----------------------------<  89  3.9   |  29  |  2.44<H>    Ca    7.8<L>      31 May 2023 06:20  Phos  4.8       Mg     1.90         TPro  4.3<L>  /  Alb  1.9<L>  /  TBili  <0.2  /  DBili  x   /  AST  16  /  ALT  10  /  AlkPhos  44      PT/INR - ( 31 May 2023 06:20 )   PT: 9.9 sec;   INR: <0.90 ratio         PTT - ( 31 May 2023 06:20 )  PTT:31.0 sec  Urinalysis Basic - ( 31 May 2023 07:17 )    Color: Colorless / Appearance: Slightly Turbid / S.013 / pH: x  Gluc: x / Ketone: Negative  / Bili: Negative / Urobili: <2 mg/dL   Blood: x / Protein: 300 mg/dL / Nitrite: Negative   Leuk Esterase: Negative / RBC: 142 /HPF / WBC 13 /HPF   Sq Epi: x / Non Sq Epi: x / Bacteria: Negative      Protein/Creatinine Ratio Calculation: 6.1 Ratio (23 @ 22:05)   < from: CT Abdomen and Pelvis No Cont (23 @ 21:52) >  Small bilateral pleural effusions, right greater than left. There is   associated mild infiltrate and/or atelectasis right lung base.    No renal/ureteral stones or hydronephrosis.    Small amount of free fluid within the paracolic gutters bilaterally.    < end of copied text >    RADIOLOGY & ADDITIONAL TESTS:

## 2023-05-31 NOTE — PROGRESS NOTE ADULT - ATTENDING COMMENTS
pt seen and examined by me personally   agree w/ above   will d/w renal MDs next steps re: refractory d/o     will follow

## 2023-06-01 LAB
ALBUMIN SERPL ELPH-MCNC: 2.8 G/DL — LOW (ref 3.3–5)
ALP SERPL-CCNC: 38 U/L — LOW (ref 40–120)
ALT FLD-CCNC: 9 U/L — SIGNIFICANT CHANGE UP (ref 4–41)
ANION GAP SERPL CALC-SCNC: 10 MMOL/L — SIGNIFICANT CHANGE UP (ref 7–14)
APPEARANCE UR: CLEAR — SIGNIFICANT CHANGE UP
AST SERPL-CCNC: 14 U/L — SIGNIFICANT CHANGE UP (ref 4–40)
BACTERIA # UR AUTO: NEGATIVE — SIGNIFICANT CHANGE UP
BILIRUB SERPL-MCNC: <0.2 MG/DL — SIGNIFICANT CHANGE UP (ref 0.2–1.2)
BILIRUB UR-MCNC: NEGATIVE — SIGNIFICANT CHANGE UP
BUN SERPL-MCNC: 41 MG/DL — HIGH (ref 7–23)
CALCIUM SERPL-MCNC: 8.3 MG/DL — LOW (ref 8.4–10.5)
CHLORIDE SERPL-SCNC: 101 MMOL/L — SIGNIFICANT CHANGE UP (ref 98–107)
CO2 SERPL-SCNC: 30 MMOL/L — SIGNIFICANT CHANGE UP (ref 22–31)
COLOR SPEC: COLORLESS — SIGNIFICANT CHANGE UP
CREAT SERPL-MCNC: 2.3 MG/DL — HIGH (ref 0.5–1.3)
DIFF PNL FLD: ABNORMAL
EGFR: 34 ML/MIN/1.73M2 — LOW
EPI CELLS # UR: 1 /HPF — SIGNIFICANT CHANGE UP (ref 0–5)
GLUCOSE SERPL-MCNC: 84 MG/DL — SIGNIFICANT CHANGE UP (ref 70–99)
GLUCOSE UR QL: NEGATIVE — SIGNIFICANT CHANGE UP
HCT VFR BLD CALC: 23.9 % — LOW (ref 39–50)
HGB BLD-MCNC: 7.5 G/DL — LOW (ref 13–17)
HYALINE CASTS # UR AUTO: 1 /LPF — SIGNIFICANT CHANGE UP (ref 0–7)
KETONES UR-MCNC: NEGATIVE — SIGNIFICANT CHANGE UP
LEUKOCYTE ESTERASE UR-ACNC: NEGATIVE — SIGNIFICANT CHANGE UP
MAGNESIUM SERPL-MCNC: 1.9 MG/DL — SIGNIFICANT CHANGE UP (ref 1.6–2.6)
MCHC RBC-ENTMCNC: 26.7 PG — LOW (ref 27–34)
MCHC RBC-ENTMCNC: 31.4 GM/DL — LOW (ref 32–36)
MCV RBC AUTO: 85.1 FL — SIGNIFICANT CHANGE UP (ref 80–100)
NITRITE UR-MCNC: NEGATIVE — SIGNIFICANT CHANGE UP
NRBC # BLD: 0 /100 WBCS — SIGNIFICANT CHANGE UP (ref 0–0)
NRBC # FLD: 0 K/UL — SIGNIFICANT CHANGE UP (ref 0–0)
PH UR: 7 — SIGNIFICANT CHANGE UP (ref 5–8)
PHOSPHATE SERPL-MCNC: 4.8 MG/DL — HIGH (ref 2.5–4.5)
PLATELET # BLD AUTO: 246 K/UL — SIGNIFICANT CHANGE UP (ref 150–400)
POTASSIUM SERPL-MCNC: 3.3 MMOL/L — LOW (ref 3.5–5.3)
POTASSIUM SERPL-SCNC: 3.3 MMOL/L — LOW (ref 3.5–5.3)
PROT SERPL-MCNC: 4.6 G/DL — LOW (ref 6–8.3)
PROT UR-MCNC: ABNORMAL
RBC # BLD: 2.81 M/UL — LOW (ref 4.2–5.8)
RBC # FLD: 12.9 % — SIGNIFICANT CHANGE UP (ref 10.3–14.5)
RBC CASTS # UR COMP ASSIST: 93 /HPF — HIGH (ref 0–4)
SODIUM SERPL-SCNC: 141 MMOL/L — SIGNIFICANT CHANGE UP (ref 135–145)
SP GR SPEC: 1.01 — SIGNIFICANT CHANGE UP (ref 1.01–1.05)
UROBILINOGEN FLD QL: SIGNIFICANT CHANGE UP
WBC # BLD: 7 K/UL — SIGNIFICANT CHANGE UP (ref 3.8–10.5)
WBC # FLD AUTO: 7 K/UL — SIGNIFICANT CHANGE UP (ref 3.8–10.5)
WBC UR QL: 8 /HPF — HIGH (ref 0–5)

## 2023-06-01 PROCEDURE — 99233 SBSQ HOSP IP/OBS HIGH 50: CPT | Mod: GC

## 2023-06-01 PROCEDURE — 99232 SBSQ HOSP IP/OBS MODERATE 35: CPT | Mod: GC

## 2023-06-01 PROCEDURE — 99233 SBSQ HOSP IP/OBS HIGH 50: CPT

## 2023-06-01 RX ORDER — POTASSIUM CHLORIDE 20 MEQ
20 PACKET (EA) ORAL
Refills: 0 | Status: COMPLETED | OUTPATIENT
Start: 2023-06-01 | End: 2023-06-01

## 2023-06-01 RX ADMIN — Medication 100 GRAM(S): at 05:24

## 2023-06-01 RX ADMIN — HEPARIN SODIUM 5000 UNIT(S): 5000 INJECTION INTRAVENOUS; SUBCUTANEOUS at 16:17

## 2023-06-01 RX ADMIN — Medication 500000 UNIT(S): at 17:43

## 2023-06-01 RX ADMIN — BUMETANIDE 2 MILLIGRAM(S): 0.25 INJECTION INTRAMUSCULAR; INTRAVENOUS at 21:50

## 2023-06-01 RX ADMIN — HEPARIN SODIUM 5000 UNIT(S): 5000 INJECTION INTRAVENOUS; SUBCUTANEOUS at 21:51

## 2023-06-01 RX ADMIN — Medication 20 MILLIEQUIVALENT(S): at 12:31

## 2023-06-01 RX ADMIN — Medication 500000 UNIT(S): at 00:19

## 2023-06-01 RX ADMIN — Medication 200 MILLIGRAM(S): at 12:31

## 2023-06-01 RX ADMIN — Medication 20 MILLIEQUIVALENT(S): at 16:17

## 2023-06-01 RX ADMIN — Medication 650 MILLIGRAM(S): at 17:43

## 2023-06-01 RX ADMIN — Medication 40 MILLIGRAM(S): at 09:42

## 2023-06-01 RX ADMIN — BUMETANIDE 2 MILLIGRAM(S): 0.25 INJECTION INTRAMUSCULAR; INTRAVENOUS at 05:25

## 2023-06-01 RX ADMIN — BUMETANIDE 2 MILLIGRAM(S): 0.25 INJECTION INTRAMUSCULAR; INTRAVENOUS at 16:26

## 2023-06-01 RX ADMIN — Medication 1 MILLIGRAM(S): at 12:30

## 2023-06-01 RX ADMIN — HEPARIN SODIUM 5000 UNIT(S): 5000 INJECTION INTRAVENOUS; SUBCUTANEOUS at 05:25

## 2023-06-01 RX ADMIN — Medication 20 MILLIEQUIVALENT(S): at 09:42

## 2023-06-01 RX ADMIN — PREGABALIN 1000 MICROGRAM(S): 225 CAPSULE ORAL at 12:30

## 2023-06-01 RX ADMIN — PANTOPRAZOLE SODIUM 40 MILLIGRAM(S): 20 TABLET, DELAYED RELEASE ORAL at 06:18

## 2023-06-01 RX ADMIN — Medication 650 MILLIGRAM(S): at 05:25

## 2023-06-01 RX ADMIN — Medication 500000 UNIT(S): at 05:26

## 2023-06-01 RX ADMIN — ERGOCALCIFEROL 50000 UNIT(S): 1.25 CAPSULE ORAL at 15:29

## 2023-06-01 RX ADMIN — Medication 500000 UNIT(S): at 12:35

## 2023-06-01 RX ADMIN — Medication 100 GRAM(S): at 17:59

## 2023-06-01 NOTE — PROGRESS NOTE ADULT - SUBJECTIVE AND OBJECTIVE BOX
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 He is only drinking water with meds and if he eats food he has a few sips. No shortness of breath.      PAST HISTORY  --------------------------------------------------------------------------------  No significant changes to PMH, PSH, FHx, SHx, unless otherwise noted    ALLERGIES & MEDICATIONS  --------------------------------------------------------------------------------  Allergies    No Known Allergies    Intolerances      Standing Inpatient Medications  albumin human 25% IVPB 25 Gram(s) IV Intermittent every 12 hours  buMETAnide Injectable 2 milliGRAM(s) IV Push every 8 hours  cyanocobalamin 1000 MICROGram(s) Oral daily  ergocalciferol 53487 Unit(s) Oral <User Schedule>  folic acid 1 milliGRAM(s) Oral daily  heparin   Injectable 5000 Unit(s) SubCutaneous every 8 hours  hydroxychloroquine 200 milliGRAM(s) Oral daily  methylPREDNISolone sodium succinate Injectable 40 milliGRAM(s) IV Push every 24 hours  nystatin    Suspension 836643 Unit(s) Oral four times a day  pantoprazole    Tablet 40 milliGRAM(s) Oral before breakfast  potassium chloride    Tablet ER 20 milliEquivalent(s) Oral every 2 hours  sodium bicarbonate 650 milliGRAM(s) Oral every 12 hours  trimethoprim   80 mG/sulfamethoxazole 400 mG 1 Tablet(s) Oral <User Schedule>    PRN Inpatient Medications  acetaminophen     Tablet .. 650 milliGRAM(s) Oral every 6 hours PRN  aluminum hydroxide/magnesium hydroxide/simethicone Suspension 30 milliLiter(s) Oral every 4 hours PRN  melatonin 3 milliGRAM(s) Oral at bedtime PRN  ondansetron Injectable 4 milliGRAM(s) IV Push every 8 hours PRN  polyethylene glycol 3350 17 Gram(s) Oral two times a day PRN  senna 2 Tablet(s) Oral at bedtime PRN          VITALS/PHYSICAL EXAM  --------------------------------------------------------------------------------  T(C): 36.6 (06-01-23 @ 08:16), Max: 36.8 (05-31-23 @ 17:27)  HR: 62 (06-01-23 @ 08:16) (62 - 75)  BP: 136/84 (06-01-23 @ 08:16) (118/76 - 144/88)  RR: 17 (06-01-23 @ 08:16) (16 - 18)  SpO2: 100% (06-01-23 @ 08:16) (98% - 100%)  Wt(kg): --        05-31-23 @ 07:01  -  06-01-23 @ 07:00  --------------------------------------------------------  IN: 0 mL / OUT: 1700 mL / NET: -1700 mL      Physical Exam:  	Gen: NAD, well-appearing  	HEENT: PERRL, supple neck, clear oropharynx  	Pulm: CTA B/L  	CV: RRR, S1S2; no rub  	Back: No spinal or CVA tenderness  	Abd: +BS, soft, nontender/nondistended  	: No suprapubic tenderness  	UE: Warm, FROM, no clubbing, intact strength; no edema; no asterixis  	LE: Warm, FROM, no clubbing, intact strength; ++ edema  	Neuro: No focal deficits, intact gait  	Psych: Normal affect and mood  	Skin: Warm, without rashes      LABS/STUDIES  --------------------------------------------------------------------------------              7.5    7.00  >-----------<  246      [06-01-23 @ 06:43]              23.9     141  |  101  |  41  ----------------------------<  84      [06-01-23 @ 06:43]  3.3   |  30  |  2.30        Ca     8.3     [06-01-23 @ 06:43]      Mg     1.90     [06-01-23 @ 06:43]      Phos  4.8     [06-01-23 @ 06:43]    TPro  4.6  /  Alb  2.8  /  TBili  <0.2  /  DBili  x   /  AST  14  /  ALT  9   /  AlkPhos  38  [06-01-23 @ 06:43]    PT/INR: PT 9.9  , INR <0.90      [05-31-23 @ 06:20]  PTT: 31.0       [05-31-23 @ 06:20]      Creatinine Trend:  SCr 2.30 [06-01 @ 06:43]  SCr 2.31 [05-31 @ 20:00]  SCr 2.44 [05-31 @ 06:20]  SCr 2.23 [05-30 @ 07:16]  SCr 2.29 [05-29 @ 06:25]    Urinalysis - [06-01-23 @ 07:34]      Color Colorless / Appearance Clear / SG 1.010 / pH 7.0      Gluc Negative / Ketone Negative  / Bili Negative / Urobili <2 mg/dL       Blood Large / Protein 300 mg/dL / Leuk Est Negative / Nitrite Negative      RBC 93 / WBC 8 / Hyaline 1 / Gran  / Sq Epi  / Non Sq Epi  / Bacteria Negative    Urine Creatinine 28      [05-27-23 @ 22:05]  Urine Protein 172      [05-27-23 @ 22:05]  Urine Sodium 63      [05-27-23 @ 11:38]  Urine Potassium 53.9      [05-27-23 @ 11:38]  Urine Chloride 73      [05-27-23 @ 11:38]  Urine Osmolality 415      [05-27-23 @ 11:37]    Iron 78, TIBC 172, %sat 45      [04-28-23 @ 09:08]  Ferritin 537      [04-28-23 @ 09:08]  PTH -- (Ca --)      [05-03-23 @ 06:33]   28  Vitamin D (25OH) 6.8      [05-03-23 @ 06:33]    HBsAb Nonreact      [05-04-23 @ 07:05]  HBsAg Nonreact      [05-04-23 @ 07:05]  HBcAb Nonreact      [05-04-23 @ 07:05]  HCV 0.06, Nonreact      [05-04-23 @ 07:05]    dsDNA 13      [05-28-23 @ 06:15]  C3 Complement 86      [05-28-23 @ 06:15]  C4 Complement 27      [05-28-23 @ 06:15]

## 2023-06-01 NOTE — PROGRESS NOTE ADULT - SUBJECTIVE AND OBJECTIVE BOX
LIZ HOUSER  [id number]    INTERVAL HPI/OVERNIGHT EVENTS:  Patient was seen at the bedside. No new event overnight.       PMHx/PSHx/FamHx/SocHx reviewed and no significant changes    REVIEW OF SYSTEMS   - reviewed with patient and  negative other than as above or previously documented.     MEDICATIONS  (STANDING):  albumin human 25% IVPB 25 Gram(s) IV Intermittent every 12 hours  buMETAnide Injectable 2 milliGRAM(s) IV Push every 8 hours  cyanocobalamin 1000 MICROGram(s) Oral daily  ergocalciferol 81506 Unit(s) Oral <User Schedule>  folic acid 1 milliGRAM(s) Oral daily  heparin   Injectable 5000 Unit(s) SubCutaneous every 8 hours  hydroxychloroquine 200 milliGRAM(s) Oral daily  methylPREDNISolone sodium succinate Injectable 40 milliGRAM(s) IV Push every 24 hours  nystatin    Suspension 505012 Unit(s) Oral four times a day  pantoprazole    Tablet 40 milliGRAM(s) Oral before breakfast  potassium chloride    Tablet ER 20 milliEquivalent(s) Oral every 2 hours  sodium bicarbonate 650 milliGRAM(s) Oral every 12 hours  trimethoprim   80 mG/sulfamethoxazole 400 mG 1 Tablet(s) Oral <User Schedule>    MEDICATIONS  (PRN):  acetaminophen     Tablet .. 650 milliGRAM(s) Oral every 6 hours PRN Temp greater or equal to 38C (100.4F), Mild Pain (1 - 3)  aluminum hydroxide/magnesium hydroxide/simethicone Suspension 30 milliLiter(s) Oral every 4 hours PRN Dyspepsia  melatonin 3 milliGRAM(s) Oral at bedtime PRN Insomnia  ondansetron Injectable 4 milliGRAM(s) IV Push every 8 hours PRN Nausea and/or Vomiting  polyethylene glycol 3350 17 Gram(s) Oral two times a day PRN Constipation  senna 2 Tablet(s) Oral at bedtime PRN Constipation      Allergies    No Known Allergies    Intolerances          Vital Signs Last 24 Hrs  T(C): 36.6 (2023 08:16), Max: 36.8 (31 May 2023 17:27)  T(F): 97.8 (2023 08:16), Max: 98.3 (2023 05:23)  HR: 62 (2023 08:16) (62 - 75)  BP: 136/84 (2023 08:16) (118/76 - 144/88)  BP(mean): --  RR: 17 (2023 08:16) (16 - 18)  SpO2: 100% (2023 08:16) (98% - 100%)    Parameters below as of 2023 08:16  Patient On (Oxygen Delivery Method): room air        Physical Exam:  General: NAD  HEENT: EOMI, MMM  Cardio: +S1/S2, RRR, 3+ pitting edema bilateral  Resp: CTA b/l  GI: +BS, soft, NT/ND  MSK: no sign of synovitis   Neuro: AAOx3  Psych: wnl    LABS:                        7.5    7.00  )-----------( 246      ( 2023 06:43 )             23.9     06-    141  |  101  |  41<H>  ----------------------------<  84  3.3<L>   |  30  |  2.30<H>    Ca    8.3<L>      2023 06:43  Phos  4.8     06-  Mg     1.90     06-    TPro  4.6<L>  /  Alb  2.8<L>  /  TBili  <0.2  /  DBili  x   /  AST  14  /  ALT  9   /  AlkPhos  38<L>  06-    PT/INR - ( 31 May 2023 06:20 )   PT: 9.9 sec;   INR: <0.90 ratio         PTT - ( 31 May 2023 06:20 )  PTT:31.0 sec  Urinalysis Basic - ( 2023 07:34 )    Color: Colorless / Appearance: Clear / S.010 / pH: x  Gluc: x / Ketone: Negative  / Bili: Negative / Urobili: <2 mg/dL   Blood: x / Protein: 300 mg/dL / Nitrite: Negative   Leuk Esterase: Negative / RBC: 93 /HPF / WBC 8 /HPF   Sq Epi: x / Non Sq Epi: x / Bacteria: Negative          RADIOLOGY & ADDITIONAL TESTS:

## 2023-06-01 NOTE — PROGRESS NOTE ADULT - PROBLEM SELECTOR PLAN 2
Pt followed by Dr. Nilton Le. Underwent renal biopsy in 2/2022 showing focal proliferative class III, segmental endocapillary hypercellularity in 12%, a single small cellular crescents, mild acute interstitial nephritis, NIH activity score 4/24, no chronicity. Pt w/ hx of multiple renal bx. Last received cytoxan 5/19/23.    -appreciate rheum recs:  -4/2023 p/w worsening edema w/ LIZETH and active urinary sediment. mild C3 depression 83 with negative C4 and dsDNA. CT A/P, US kidney, echo unremarkable. s/p kidney biopsy 5/2/2023 showed proliferative LN class IV-G with 5 out of 8 non-globally sclerosed glomeruli with cellular and fibrocellular crescents; moderate tubulointerstitial inflammation, Patient was not a candidate for clinical trial.  - Received IV solumedrol 1g x3 (5/4-5/6) then prednisone 60mg/day starting on 5/7. Received induction Cytoxan 5/5/23 with remainder to be outpatient w/ plan for IV  mg q2 weeks   - protein/creatinine ratio elevated at 6.1 and 24 hour urine protein elevated  - c3, c4 mildly low  -c/w PCP ppx   -c/w PPI daily for GI ppx  -c/w plaquenil  -c/w home steroid  -Quant TB negative outpatient 2/2023; Hep B checked this admission  - ds DNA neg Pt followed by Dr. Nilton Le. Underwent renal biopsy in 2/2022 showing focal proliferative class III, segmental endocapillary hypercellularity in 12%, a single small cellular crescents, mild acute interstitial nephritis, NIH activity score 4/24, no chronicity. Pt w/ hx of multiple renal bx. Last received cytoxan 5/19/23.    -appreciate rheum recs:  -4/2023 p/w worsening edema w/ LIZETH and active urinary sediment. mild C3 depression 83 with negative C4 and dsDNA. CT A/P, US kidney, echo unremarkable. s/p kidney biopsy 5/2/2023 showed proliferative LN class IV-G with 5 out of 8 non-globally sclerosed glomeruli with cellular and fibrocellular crescents; moderate tubulointerstitial inflammation, Patient was not a candidate for clinical trial.  - Received IV solumedrol 1g x3 (5/4-5/6) then prednisone 60mg/day starting on 5/7. Received induction Cytoxan 5/5/23 with remainder to be outpatient w/ plan for IV  mg q2 weeks   - protein/creatinine ratio elevated at 6.1 and 24 hour urine protein elevated  - c3, c4 mildly low  -c/w PCP ppx   -c/w PPI daily for GI ppx  -c/w plaquenil  -c/w home steroid  -Quant TB negative outpatient 2/2023; Hep B checked this admission  - ds DNA neg  - Per rheum- pt being considered for Tacrolimus or voclosporin as LN currently refractory to multiple meds

## 2023-06-01 NOTE — PROGRESS NOTE ADULT - SUBJECTIVE AND OBJECTIVE BOX
Internal Medicine   Celena Paty | PGY-1    OVERNIGHT EVENTS: No acute overnight events.    SUBJECTIVE:       MEDICATIONS  (STANDING):  albumin human 25% IVPB 25 Gram(s) IV Intermittent every 12 hours  buMETAnide Injectable 2 milliGRAM(s) IV Push every 8 hours  cyanocobalamin 1000 MICROGram(s) Oral daily  ergocalciferol 16285 Unit(s) Oral <User Schedule>  folic acid 1 milliGRAM(s) Oral daily  heparin   Injectable 5000 Unit(s) SubCutaneous every 8 hours  hydroxychloroquine 200 milliGRAM(s) Oral daily  methylPREDNISolone sodium succinate Injectable 40 milliGRAM(s) IV Push every 24 hours  nystatin    Suspension 134149 Unit(s) Oral four times a day  pantoprazole    Tablet 40 milliGRAM(s) Oral before breakfast  sodium bicarbonate 650 milliGRAM(s) Oral every 12 hours  trimethoprim   80 mG/sulfamethoxazole 400 mG 1 Tablet(s) Oral <User Schedule>    MEDICATIONS  (PRN):  acetaminophen     Tablet .. 650 milliGRAM(s) Oral every 6 hours PRN Temp greater or equal to 38C (100.4F), Mild Pain (1 - 3)  aluminum hydroxide/magnesium hydroxide/simethicone Suspension 30 milliLiter(s) Oral every 4 hours PRN Dyspepsia  melatonin 3 milliGRAM(s) Oral at bedtime PRN Insomnia  ondansetron Injectable 4 milliGRAM(s) IV Push every 8 hours PRN Nausea and/or Vomiting  polyethylene glycol 3350 17 Gram(s) Oral two times a day PRN Constipation  senna 2 Tablet(s) Oral at bedtime PRN Constipation        T(F): 98.3 (06-01-23 @ 05:23), Max: 98.3 (06-01-23 @ 05:23)  HR: 67 (06-01-23 @ 05:23) (67 - 75)  BP: 126/67 (06-01-23 @ 05:23) (118/76 - 144/88)  BP(mean): --  RR: 16 (06-01-23 @ 05:23) (16 - 18)  SpO2: 98% (06-01-23 @ 05:23) (98% - 100%)    PHYSICAL EXAM:     GENERAL: NAD, lying in bed comfortably  HEAD:  Atraumatic, Normocephalic  EYES: EOMI, PERRLA, conjunctiva and sclera clear, no nystagmus noted  ENT: Moist mucous membranes,   NECK: Supple, No JVD, trachea midline  CHEST/LUNG: Clear to auscultation bilaterally; No rales, rhonchi, wheezing, or rubs. Unlabored respirations  HEART: Regular rate and rhythm; No murmurs, rubs, or gallops, normal S1/S2  ABDOMEN: normal bowel sounds; Soft, nontender, nondistended, no organomegaly   EXTREMITIES:  2+ Peripheral Pulses, brisk capillary refill. No clubbing, cyanosis, or edema  MSK: No gross deformities noted   Neurological:  A&Ox3, no focal deficits   SKIN: No rashes or lesions  PSYCH: Normal mood, affect     TELEMETRY:    LABS:                        7.5    7.15  )-----------( 241      ( 31 May 2023 06:20 )             23.9     05-31    141  |  101  |  46<H>  ----------------------------<  139<H>  3.9   |  29  |  2.31<H>    Ca    8.1<L>      31 May 2023 20:00  Phos  4.4     05-31  Mg     1.80     05-31    TPro  4.3<L>  /  Alb  1.9<L>  /  TBili  <0.2  /  DBili  x   /  AST  16  /  ALT  10  /  AlkPhos  44  05-31        PT/INR - ( 31 May 2023 06:20 )   PT: 9.9 sec;   INR: <0.90 ratio         PTT - ( 31 May 2023 06:20 )  PTT:31.0 sec    Creatinine Trend: 2.31<--, 2.44<--, 2.23<--, 2.29<--, 2.50<--, 2.56<--  I&O's Summary    31 May 2023 07:01  -  01 Jun 2023 07:00  --------------------------------------------------------  IN: 0 mL / OUT: 1700 mL / NET: -1700 mL      BNP    RADIOLOGY & ADDITIONAL STUDIES:             Internal Medicine   Celena Newman | PGY-1    OVERNIGHT EVENTS: No acute overnight events.    SUBJECTIVE:       MEDICATIONS  (STANDING):  albumin human 25% IVPB 25 Gram(s) IV Intermittent every 12 hours  buMETAnide Injectable 2 milliGRAM(s) IV Push every 8 hours  cyanocobalamin 1000 MICROGram(s) Oral daily  ergocalciferol 35113 Unit(s) Oral <User Schedule>  folic acid 1 milliGRAM(s) Oral daily  heparin   Injectable 5000 Unit(s) SubCutaneous every 8 hours  hydroxychloroquine 200 milliGRAM(s) Oral daily  methylPREDNISolone sodium succinate Injectable 40 milliGRAM(s) IV Push every 24 hours  nystatin    Suspension 786628 Unit(s) Oral four times a day  pantoprazole    Tablet 40 milliGRAM(s) Oral before breakfast  sodium bicarbonate 650 milliGRAM(s) Oral every 12 hours  trimethoprim   80 mG/sulfamethoxazole 400 mG 1 Tablet(s) Oral <User Schedule>    MEDICATIONS  (PRN):  acetaminophen     Tablet .. 650 milliGRAM(s) Oral every 6 hours PRN Temp greater or equal to 38C (100.4F), Mild Pain (1 - 3)  aluminum hydroxide/magnesium hydroxide/simethicone Suspension 30 milliLiter(s) Oral every 4 hours PRN Dyspepsia  melatonin 3 milliGRAM(s) Oral at bedtime PRN Insomnia  ondansetron Injectable 4 milliGRAM(s) IV Push every 8 hours PRN Nausea and/or Vomiting  polyethylene glycol 3350 17 Gram(s) Oral two times a day PRN Constipation  senna 2 Tablet(s) Oral at bedtime PRN Constipation        T(F): 98.3 (06-01-23 @ 05:23), Max: 98.3 (06-01-23 @ 05:23)  HR: 67 (06-01-23 @ 05:23) (67 - 75)  BP: 126/67 (06-01-23 @ 05:23) (118/76 - 144/88)  BP(mean): --  RR: 16 (06-01-23 @ 05:23) (16 - 18)  SpO2: 98% (06-01-23 @ 05:23) (98% - 100%)    PHYSICAL EXAM:     GENERAL: +mild facial edema; NAD, lying in bed comfortably  HEAD:  Atraumatic, Normocephalic  EYES: EOMI, PERRLA, conjunctiva and sclera clear, no nystagmus noted  ENT: Moist mucous membranes,   NECK: Supple, No JVD, trachea midline  CHEST/LUNG: Clear to auscultation bilaterally; No rales, rhonchi, wheezing, or rubs. Unlabored respirations  HEART: Regular rate and rhythm; No murmurs, rubs, or gallops, normal S1/S2  ABDOMEN: normal bowel sounds; Soft, nontender, nondistended, no organomegaly   EXTREMITIES:  +santhosh LE 2+ pitting edema to knees; 2+ Peripheral Pulses, brisk capillary refill. No clubbing, cyanosis  MSK: No gross deformities noted   Neurological:  A&Ox3, no focal deficits   SKIN: No rashes or lesions  PSYCH: Normal mood, affect     TELEMETRY:    LABS:                        7.5    7.15  )-----------( 241      ( 31 May 2023 06:20 )             23.9     05-31    141  |  101  |  46<H>  ----------------------------<  139<H>  3.9   |  29  |  2.31<H>    Ca    8.1<L>      31 May 2023 20:00  Phos  4.4     05-31  Mg     1.80     05-31    TPro  4.3<L>  /  Alb  1.9<L>  /  TBili  <0.2  /  DBili  x   /  AST  16  /  ALT  10  /  AlkPhos  44  05-31        PT/INR - ( 31 May 2023 06:20 )   PT: 9.9 sec;   INR: <0.90 ratio         PTT - ( 31 May 2023 06:20 )  PTT:31.0 sec    Creatinine Trend: 2.31<--, 2.44<--, 2.23<--, 2.29<--, 2.50<--, 2.56<--  I&O's Summary    31 May 2023 07:01  -  01 Jun 2023 07:00  --------------------------------------------------------  IN: 0 mL / OUT: 1700 mL / NET: -1700 mL      BNP    RADIOLOGY & ADDITIONAL STUDIES:

## 2023-06-01 NOTE — PROGRESS NOTE ADULT - ATTENDING COMMENTS
Patient seen and examined at bedside. In brief, 48M w/ SLE c/b lupus nephritis with recent admission for lupus nephritis flare where he was switched from mycophenolate to cyclophosphamide presenting with subjective fever at home x1 but no documented fevers at home, new hematuria, worsening leg swelling, and found to have LIZETH on CKD. Patient S/p CTA/P notable for: Small bilateral pleural effusions, right greater than left. There is associated mild infiltrate and/or atelectasis right lung base. Small amount of free fluid within the paracolic gutters bilaterally. S/p renal US w/ no renal/ureteral stones or hydronephrosis.    -Will continue to appreciate rheumatology recommendations. Hemorrhagic cystitis 2/2 cyclophosphamide is on the differential. Pt currently without clots and h/h remains overall stable. Pt seen by urology per rheum recs and plan is for outpatient w/u for hematuria. C3 low, C4 wnl. DSDNA: negative. C/w PCP ppx , plaquenil and solumedrol as prescribed.   -on ceftriaxone for possible UTI. However, given urine cx neg will discontinue ceftriaxone at this time  -Monitor creatinine levels  and I and Os.  Cr levels have been improving. Nephrology also following and will continue to appreciate recommendations. C/w Bumex IV per nephrology ->c/w increased to bumex IV 2mg TID   -Monitor LE edema. LE doppler neg for DVT. C/w bumex as stated above .   Dispo:  pending improved volume status and whether patient would have induction treatment with calcineurin inhibitor .

## 2023-06-01 NOTE — PROGRESS NOTE ADULT - PROBLEM SELECTOR PLAN 4
Cr baseline 1.5-1.7 11/2022. Pt w/ recent admission for LIZETH. Cr now 2.56.  Pt denies decreased UOP. Pt follows with Dr. Shultz.    -appreciate nephrology recs  -improvement w/ bumex Cr baseline 1.5-1.7 11/2022. Pt w/ recent admission for LIZETH. Cr now 2.56.  Pt denies decreased UOP. Pt follows with Dr. Shultz.    -appreciate nephrology recs  -C/q bumex 2mg IV Q8H  - c/w IV steroids

## 2023-06-01 NOTE — PROGRESS NOTE ADULT - ASSESSMENT
49 yo M with PMHx of SLE c/b LN 3/4 w/ recent admission 4/27/23-5/8/2023 for lupus nephritis flare (switched from cellcept to IV cytoxan) p/w b/l LE swelling and hematuria.

## 2023-06-01 NOTE — PROGRESS NOTE ADULT - ASSESSMENT
47 yo male who initially presented to dermatology with a rash and work up revealed MÓNICA titer 1:1280 (speckled) - diagnosed with SLE and acute cutaneous lupus. urinalysis showed hematuria and 1.5 gms of proteinuria. 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 He was initiated on Cellcept, prednisone and Plaquenil but was non compliant     Presented in October 2022 to [facility] with worsening LIZETH- Kidney biopsy repeated October 2022- Lupus Nephritis Class 4- activity index 15/24, chronicity 3/12 ). Received obinutuzumab 10/21/22 and 11/1/2022 and restarted on Cellcept - now at 1500 bid. his creatinine did not really improve and stayed at 1.7-1.8 mg/dl.     5/2/2023- persistence of renal disease/ hematuria/ proteinuria/ elevated creatinine   Repeat kidney biopsy 5/2/23 on this admission showed proliferative LN class IV-G with 5 out of 8 non-globally sclerosed glomeruli with cellular and fibrocellular crescents; moderate tubulointerstitial inflammation, ~30% IFTA. Activity index 13/24 and chronicity index 7/12. He had a previous biopsy in Oct 2022, activity index was 15/24 and chronicity was 3/12 IFTA 15-20%. Mycophenolic acid level wnl indicating compliance.     Received iv solumedrol 1g x3 (5/4-5/6) then reduce to 1mg/kg IV solu-medrol 60mg/day starting on 5/7. Patient discharged on 5/8/23 on prednisone 60mg to be further tapered outpatient.   -discharged on bactrim and PPI while on high dose steroids  -first dose of Cytoxan given 5/5/23 with second dose on 5/18.   -Quant TB negative outpatient 2/2023; Hep B negative         now presenting with fluid overload/hematuria    - Please continue Bumex 2 mg IV every 8 hours  - 25 gms of 25% albumin every 12 hours   - continue IV steroids  - Would hold off further cytoxan as hematuria likely hemorrhagic cystitis.   - Usually follows with Dr. Hayde Shultz and unfortunately he is running out of options to treat his disease. Once renal function is better, Voclosporin/ Tacro can be used   - She may consider clinical trial ( wont be eligible for car-t as b cell depleted)    Maida Romero MD  Nephrology Attending  Office- [phone]  After 5pm and on weekends please contact renal fellow on call

## 2023-06-01 NOTE — PROGRESS NOTE ADULT - PROBLEM SELECTOR PLAN 7
DVT ppx: heparin subq    Diet: DASH no K or phos    Dispo: DVT ppx: heparin subq    Diet: DASH no K or phos    Dispo: Pending clinical improvement

## 2023-06-01 NOTE — PROGRESS NOTE ADULT - PROBLEM SELECTOR PLAN 3
Pt w/ bilateral leg swelling, takes lasix 40 bid.    -s/p 2 bumex IV bid, w/ improving edema  -appreciate nephrology recs  - Increase bumex to 1 mg TID Pt w/ bilateral leg swelling, takes lasix 40 bid.    -s/p 2 bumex IV bid, w/ improving edema  -appreciate nephrology recs  - C/w bumex to 2 mg TID

## 2023-06-01 NOTE — PROGRESS NOTE ADULT - ASSESSMENT
48M with h/o SLE c/b LN 3/4 w/ recent admission 4/27/23-5/8/2023 for lupus nephritis flare (switched from cellcept to IV cytoxan) p/w b/l LE swelling and hematuria. States having significant swelling to both legs over past 3 weeks despite having his lasix dose increased  up to 40mg BID. Noticed urine had reddish color so son spoke with his Rheumatologist, Dr. Nilton Le, who advised to come to ED for eval     #Hematuria:  UA w/ large blood, >50 RBC, moderate leuk esterase. Received 2 doses IV cytoxan w/ mesna (5/5/23, 5/19/23).   Differential includes cytoxan induced w/ hemorrhagic cystitis vs UTI vs kidney stone vs prostatitis.  CT negative for renal stone and prostatitis. Urine culture negative     #Nephrotic range proteinuria   Most likely 2/2 proteinuria 2/2 kidney disease.   TTE wnl 4/2023.   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   -Discussed the case w/nephrology, Dr. Lock, and agreed to change to PO prednisone 50 mg. Her proteinuria is very resistant and we will consider Calcineurin inhibitor as outpatient. 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 Max-Toño Ivey MD  PGY-4 Rheumatology Fellow  Pager: [phone]   Available in teams

## 2023-06-02 LAB
ANION GAP SERPL CALC-SCNC: 10 MMOL/L — SIGNIFICANT CHANGE UP (ref 7–14)
BASOPHILS # BLD AUTO: 0.02 K/UL — SIGNIFICANT CHANGE UP (ref 0–0.2)
BASOPHILS NFR BLD AUTO: 0.3 % — SIGNIFICANT CHANGE UP (ref 0–2)
BUN SERPL-MCNC: 48 MG/DL — HIGH (ref 7–23)
CALCIUM SERPL-MCNC: 8.4 MG/DL — SIGNIFICANT CHANGE UP (ref 8.4–10.5)
CHLORIDE SERPL-SCNC: 102 MMOL/L — SIGNIFICANT CHANGE UP (ref 98–107)
CO2 SERPL-SCNC: 30 MMOL/L — SIGNIFICANT CHANGE UP (ref 22–31)
CREAT SERPL-MCNC: 2.25 MG/DL — HIGH (ref 0.5–1.3)
EGFR: 35 ML/MIN/1.73M2 — LOW
EOSINOPHIL # BLD AUTO: 0.04 K/UL — SIGNIFICANT CHANGE UP (ref 0–0.5)
EOSINOPHIL NFR BLD AUTO: 0.6 % — SIGNIFICANT CHANGE UP (ref 0–6)
GLUCOSE SERPL-MCNC: 86 MG/DL — SIGNIFICANT CHANGE UP (ref 70–99)
HCT VFR BLD CALC: 23.4 % — LOW (ref 39–50)
HGB BLD-MCNC: 7.3 G/DL — LOW (ref 13–17)
IANC: 4.38 K/UL — SIGNIFICANT CHANGE UP (ref 1.8–7.4)
IMM GRANULOCYTES NFR BLD AUTO: 0.4 % — SIGNIFICANT CHANGE UP (ref 0–0.9)
LYMPHOCYTES # BLD AUTO: 1.72 K/UL — SIGNIFICANT CHANGE UP (ref 1–3.3)
LYMPHOCYTES # BLD AUTO: 24.2 % — SIGNIFICANT CHANGE UP (ref 13–44)
MAGNESIUM SERPL-MCNC: 1.7 MG/DL — SIGNIFICANT CHANGE UP (ref 1.6–2.6)
MCHC RBC-ENTMCNC: 27.1 PG — SIGNIFICANT CHANGE UP (ref 27–34)
MCHC RBC-ENTMCNC: 31.2 GM/DL — LOW (ref 32–36)
MCV RBC AUTO: 87 FL — SIGNIFICANT CHANGE UP (ref 80–100)
MONOCYTES # BLD AUTO: 0.93 K/UL — HIGH (ref 0–0.9)
MONOCYTES NFR BLD AUTO: 13.1 % — SIGNIFICANT CHANGE UP (ref 2–14)
NEUTROPHILS # BLD AUTO: 4.38 K/UL — SIGNIFICANT CHANGE UP (ref 1.8–7.4)
NEUTROPHILS NFR BLD AUTO: 61.4 % — SIGNIFICANT CHANGE UP (ref 43–77)
NRBC # BLD: 2 /100 WBCS — HIGH (ref 0–0)
NRBC # FLD: 0.11 K/UL — HIGH (ref 0–0)
PHOSPHATE SERPL-MCNC: 3.9 MG/DL — SIGNIFICANT CHANGE UP (ref 2.5–4.5)
PLATELET # BLD AUTO: 254 K/UL — SIGNIFICANT CHANGE UP (ref 150–400)
POTASSIUM SERPL-MCNC: 3.8 MMOL/L — SIGNIFICANT CHANGE UP (ref 3.5–5.3)
POTASSIUM SERPL-SCNC: 3.8 MMOL/L — SIGNIFICANT CHANGE UP (ref 3.5–5.3)
RBC # BLD: 2.69 M/UL — LOW (ref 4.2–5.8)
RBC # FLD: 12.9 % — SIGNIFICANT CHANGE UP (ref 10.3–14.5)
SODIUM SERPL-SCNC: 142 MMOL/L — SIGNIFICANT CHANGE UP (ref 135–145)
WBC # BLD: 7.12 K/UL — SIGNIFICANT CHANGE UP (ref 3.8–10.5)
WBC # FLD AUTO: 7.12 K/UL — SIGNIFICANT CHANGE UP (ref 3.8–10.5)

## 2023-06-02 PROCEDURE — 99233 SBSQ HOSP IP/OBS HIGH 50: CPT | Mod: GC

## 2023-06-02 PROCEDURE — 99233 SBSQ HOSP IP/OBS HIGH 50: CPT

## 2023-06-02 RX ORDER — ALBUMIN HUMAN 25 %
25 VIAL (ML) INTRAVENOUS EVERY 12 HOURS
Refills: 0 | Status: COMPLETED | OUTPATIENT
Start: 2023-06-02 | End: 2023-06-03

## 2023-06-02 RX ADMIN — Medication 500000 UNIT(S): at 00:11

## 2023-06-02 RX ADMIN — Medication 650 MILLIGRAM(S): at 17:43

## 2023-06-02 RX ADMIN — HEPARIN SODIUM 5000 UNIT(S): 5000 INJECTION INTRAVENOUS; SUBCUTANEOUS at 22:25

## 2023-06-02 RX ADMIN — BUMETANIDE 2 MILLIGRAM(S): 0.25 INJECTION INTRAMUSCULAR; INTRAVENOUS at 12:20

## 2023-06-02 RX ADMIN — Medication 1 TABLET(S): at 05:40

## 2023-06-02 RX ADMIN — BUMETANIDE 2 MILLIGRAM(S): 0.25 INJECTION INTRAMUSCULAR; INTRAVENOUS at 05:41

## 2023-06-02 RX ADMIN — Medication 100 GRAM(S): at 17:49

## 2023-06-02 RX ADMIN — Medication 500000 UNIT(S): at 12:18

## 2023-06-02 RX ADMIN — HEPARIN SODIUM 5000 UNIT(S): 5000 INJECTION INTRAVENOUS; SUBCUTANEOUS at 12:19

## 2023-06-02 RX ADMIN — Medication 200 MILLIGRAM(S): at 12:19

## 2023-06-02 RX ADMIN — HEPARIN SODIUM 5000 UNIT(S): 5000 INJECTION INTRAVENOUS; SUBCUTANEOUS at 05:40

## 2023-06-02 RX ADMIN — Medication 500000 UNIT(S): at 23:18

## 2023-06-02 RX ADMIN — Medication 40 MILLIGRAM(S): at 09:41

## 2023-06-02 RX ADMIN — PANTOPRAZOLE SODIUM 40 MILLIGRAM(S): 20 TABLET, DELAYED RELEASE ORAL at 05:41

## 2023-06-02 RX ADMIN — Medication 500000 UNIT(S): at 05:40

## 2023-06-02 RX ADMIN — PREGABALIN 1000 MICROGRAM(S): 225 CAPSULE ORAL at 12:20

## 2023-06-02 RX ADMIN — Medication 500000 UNIT(S): at 17:43

## 2023-06-02 RX ADMIN — Medication 650 MILLIGRAM(S): at 05:40

## 2023-06-02 RX ADMIN — Medication 1 MILLIGRAM(S): at 12:18

## 2023-06-02 RX ADMIN — BUMETANIDE 2 MILLIGRAM(S): 0.25 INJECTION INTRAMUSCULAR; INTRAVENOUS at 22:26

## 2023-06-02 NOTE — PROGRESS NOTE ADULT - SUBJECTIVE AND OBJECTIVE BOX
[facility] DIVISION OF KIDNEY DISEASES AND HYPERTENSION -- FOLLOW UP NOTE  --------------------------------------------------------------------------------  Chief Complaint: Nephrotic syndrome    24 hour events/subjective:  No shortness of breath or complaints, edema is better      PAST HISTORY  --------------------------------------------------------------------------------  No significant changes to PMH, PSH, FHx, SHx, unless otherwise noted    ALLERGIES & MEDICATIONS  --------------------------------------------------------------------------------  Allergies    No Known Allergies    Intolerances      Standing Inpatient Medications  albumin human 25% IVPB 25 Gram(s) IV Intermittent every 12 hours  buMETAnide Injectable 2 milliGRAM(s) IV Push every 8 hours  cyanocobalamin 1000 MICROGram(s) Oral daily  ergocalciferol 86701 Unit(s) Oral <User Schedule>  folic acid 1 milliGRAM(s) Oral daily  heparin   Injectable 5000 Unit(s) SubCutaneous every 8 hours  hydroxychloroquine 200 milliGRAM(s) Oral daily  nystatin    Suspension 764824 Unit(s) Oral four times a day  pantoprazole    Tablet 40 milliGRAM(s) Oral before breakfast  sodium bicarbonate 650 milliGRAM(s) Oral every 12 hours  trimethoprim   80 mG/sulfamethoxazole 400 mG 1 Tablet(s) Oral <User Schedule>    PRN Inpatient Medications  acetaminophen     Tablet .. 650 milliGRAM(s) Oral every 6 hours PRN  aluminum hydroxide/magnesium hydroxide/simethicone Suspension 30 milliLiter(s) Oral every 4 hours PRN  melatonin 3 milliGRAM(s) Oral at bedtime PRN  ondansetron Injectable 4 milliGRAM(s) IV Push every 8 hours PRN  polyethylene glycol 3350 17 Gram(s) Oral two times a day PRN  senna 2 Tablet(s) Oral at bedtime PRN        VITALS/PHYSICAL EXAM  --------------------------------------------------------------------------------  T(C): 36.7 (06-02-23 @ 12:19), Max: 36.8 (06-01-23 @ 21:46)  HR: 72 (06-02-23 @ 12:19) (70 - 76)  BP: 123/73 (06-02-23 @ 12:19) (122/81 - 146/83)  RR: 18 (06-02-23 @ 12:19) (16 - 18)  SpO2: 100% (06-02-23 @ 12:19) (100% - 100%)  Wt(kg): --        06-01-23 @ 07:01  -  06-02-23 @ 07:00  --------------------------------------------------------  IN: 0 mL / OUT: 1150 mL / NET: -1150 mL      Physical Exam:  	Gen: NAD, well-appearing  	HEENT: PERRL, supple neck, clear oropharynx  	Pulm: CTA B/L  	CV: RRR, S1S2; no rub  	Back: No spinal or CVA tenderness  	Abd: +BS, soft, nontender/nondistended  	: No suprapubic tenderness  	UE: Warm, FROM, no clubbing, intact strength; no edema; no asterixis  	LE: Warm, FROM, no clubbing, intact strength; ++ edema  	Neuro: No focal deficits, intact gait  	Psych: Normal affect and mood  	Skin: Warm, without rashes    LABS/STUDIES  --------------------------------------------------------------------------------              7.3    7.12  >-----------<  254      [06-02-23 @ 06:30]              23.4     142  |  102  |  48  ----------------------------<  86      [06-02-23 @ 06:30]  3.8   |  30  |  2.25        Ca     8.4     [06-02-23 @ 06:30]      Mg     1.70     [06-02-23 @ 06:30]      Phos  3.9     [06-02-23 @ 06:30]    TPro  4.6  /  Alb  2.8  /  TBili  <0.2  /  DBili  x   /  AST  14  /  ALT  9   /  AlkPhos  38  [06-01-23 @ 06:43]          Creatinine Trend:  SCr 2.25 [06-02 @ 06:30]  SCr 2.30 [06-01 @ 06:43]  SCr 2.31 [05-31 @ 20:00]  SCr 2.44 [05-31 @ 06:20]  SCr 2.23 [05-30 @ 07:16]    Urinalysis - [06-01-23 @ 07:34]      Color Colorless / Appearance Clear / SG 1.010 / pH 7.0      Gluc Negative / Ketone Negative  / Bili Negative / Urobili <2 mg/dL       Blood Large / Protein 300 mg/dL / Leuk Est Negative / Nitrite Negative      RBC 93 / WBC 8 / Hyaline 1 / Gran  / Sq Epi  / Non Sq Epi  / Bacteria Negative    Urine Creatinine 28      [05-27-23 @ 22:05]  Urine Protein 172      [05-27-23 @ 22:05]  Urine Sodium 63      [05-27-23 @ 11:38]  Urine Potassium 53.9      [05-27-23 @ 11:38]  Urine Chloride 73      [05-27-23 @ 11:38]  Urine Osmolality 415      [05-27-23 @ 11:37]    Iron 78, TIBC 172, %sat 45      [04-28-23 @ 09:08]  Ferritin 537      [04-28-23 @ 09:08]  PTH -- (Ca --)      [05-03-23 @ 06:33]   28  Vitamin D (25OH) 6.8      [05-03-23 @ 06:33]    HBsAb Nonreact      [05-04-23 @ 07:05]  HBsAg Nonreact      [05-04-23 @ 07:05]  HBcAb Nonreact      [05-04-23 @ 07:05]  HCV 0.06, Nonreact      [05-04-23 @ 07:05]    dsDNA 13      [05-28-23 @ 06:15]  C3 Complement 86      [05-28-23 @ 06:15]  C4 Complement 27      [05-28-23 @ 06:15]

## 2023-06-02 NOTE — PROGRESS NOTE ADULT - PROBLEM SELECTOR PLAN 2
Pt followed by Dr. Nilton Le. Underwent renal biopsy in 2/2022 showing focal proliferative class III, segmental endocapillary hypercellularity in 12%, a single small cellular crescents, mild acute interstitial nephritis, NIH activity score 4/24, no chronicity. Pt w/ hx of multiple renal bx. Last received cytoxan 5/19/23.    -appreciate rheum recs:  -4/2023 p/w worsening edema w/ LIZETH and active urinary sediment. mild C3 depression 83 with negative C4 and dsDNA. CT A/P, US kidney, echo unremarkable. s/p kidney biopsy 5/2/2023 showed proliferative LN class IV-G with 5 out of 8 non-globally sclerosed glomeruli with cellular and fibrocellular crescents; moderate tubulointerstitial inflammation, Patient was not a candidate for clinical trial.  - Received IV solumedrol 1g x3 (5/4-5/6) then prednisone 60mg/day starting on 5/7. Received induction Cytoxan 5/5/23 with remainder to be outpatient w/ plan for IV  mg q2 weeks   - protein/creatinine ratio elevated at 6.1 and 24 hour urine protein elevated  - c3, c4 mildly low  -c/w PCP ppx   -c/w PPI daily for GI ppx  -c/w plaquenil  -c/w home steroid  -Quant TB negative outpatient 2/2023; Hep B checked this admission  - ds DNA neg  - Per rheum- pt being considered for Tacrolimus or voclosporin as LN currently refractory to multiple meds Pt followed by Dr. Nilton Le. Underwent renal biopsy in 2/2022 showing focal proliferative class III, segmental endocapillary hypercellularity in 12%, a single small cellular crescents, mild acute interstitial nephritis, NIH activity score 4/24, no chronicity. Pt w/ hx of multiple renal bx. Last received cytoxan 5/19/23.    -appreciate rheum recs:  -4/2023 p/w worsening edema w/ LIZETH and active urinary sediment. mild C3 depression 83 with negative C4 and dsDNA. CT A/P, US kidney, echo unremarkable. s/p kidney biopsy 5/2/2023 showed proliferative LN class IV-G with 5 out of 8 non-globally sclerosed glomeruli with cellular and fibrocellular crescents; moderate tubulointerstitial inflammation, Patient was not a candidate for clinical trial.  - Received IV solumedrol 1g x3 (5/4-5/6) then prednisone 60mg/day starting on 5/7. Received induction Cytoxan 5/5/23 with remainder to be outpatient w/ plan for IV  mg q2 weeks   - protein/creatinine ratio elevated at 6.1 and 24 hour urine protein elevated  - c3, c4 mildly low  -c/w PCP ppx, PPI daily for GI ppx, plaquenil  -Quant TB negative outpatient 2/2023; Hep B checked this admission  - ds DNA neg  - Per rheum- pt being considered for Tacrolimus or voclosporin as LN currently refractory to multiple meds

## 2023-06-02 NOTE — PROGRESS NOTE ADULT - ASSESSMENT
47 yo male who initially presented to dermatology with a rash and work up revealed MÓNICA titer 1:1280 (speckled) - diagnosed with SLE and acute cutaneous lupus. urinalysis showed hematuria and 1.5 gms of proteinuria. 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 He was initiated on Cellcept, prednisone and Plaquenil but was non compliant     Presented in October 2022 to [facility] with worsening LIZETH- Kidney biopsy repeated October 2022- Lupus Nephritis Class 4- activity index 15/24, chronicity 3/12 ). Received obinutuzumab 10/21/22 and 11/1/2022 and restarted on Cellcept - now at 1500 bid. his creatinine did not really improve and stayed at 1.7-1.8 mg/dl.     5/2/2023- persistence of renal disease/ hematuria/ proteinuria/ elevated creatinine   Repeat kidney biopsy 5/2/23 on this admission showed proliferative LN class IV-G with 5 out of 8 non-globally sclerosed glomeruli with cellular and fibrocellular crescents; moderate tubulointerstitial inflammation, ~30% IFTA. Activity index 13/24 and chronicity index 7/12. He had a previous biopsy in Oct 2022, activity index was 15/24 and chronicity was 3/12 IFTA 15-20%. Mycophenolic acid level wnl indicating compliance.     Received iv solumedrol 1g x3 (5/4-5/6) then reduce to 1mg/kg IV solu-medrol 60mg/day starting on 5/7. Patient discharged on 5/8/23 on prednisone 60mg to be further tapered outpatient.   -discharged on bactrim and PPI while on high dose steroids  -first dose of Cytoxan given 5/5/23 with second dose on 5/18.   -Quant TB negative outpatient 2/2023; Hep B negative         now presenting with fluid overload/hematuria    - Please continue Bumex 2 mg IV every 8 hours  - 25 gms of 25% albumin every 12 hours   - continue IV steroids  - Would hold off further cytoxan as hematuria likely hemorrhagic cystitis.   - Usually follows with Dr. Hayde Shultz and unfortunately he is running out of options to treat his disease. Once renal function is better, Voclosporin/ Tacro can be used   - She may consider clinical trial ( wont be eligible for car-t as b cell depleted)    Any questions please contact fellow:  Courtney Rangel MD  Office- [phone]  After 5pm and on weekends please contact renal fellow on call

## 2023-06-02 NOTE — PROGRESS NOTE ADULT - SUBJECTIVE AND OBJECTIVE BOX
Internal Medicine   Celena Newman | PGY-1    OVERNIGHT EVENTS: No acute overnight events.    SUBJECTIVE:       MEDICATIONS  (STANDING):  buMETAnide Injectable 2 milliGRAM(s) IV Push every 8 hours  cyanocobalamin 1000 MICROGram(s) Oral daily  ergocalciferol 96718 Unit(s) Oral <User Schedule>  folic acid 1 milliGRAM(s) Oral daily  heparin   Injectable 5000 Unit(s) SubCutaneous every 8 hours  hydroxychloroquine 200 milliGRAM(s) Oral daily  methylPREDNISolone sodium succinate Injectable 40 milliGRAM(s) IV Push every 24 hours  nystatin    Suspension 705567 Unit(s) Oral four times a day  pantoprazole    Tablet 40 milliGRAM(s) Oral before breakfast  sodium bicarbonate 650 milliGRAM(s) Oral every 12 hours  trimethoprim   80 mG/sulfamethoxazole 400 mG 1 Tablet(s) Oral <User Schedule>    MEDICATIONS  (PRN):  acetaminophen     Tablet .. 650 milliGRAM(s) Oral every 6 hours PRN Temp greater or equal to 38C (100.4F), Mild Pain (1 - 3)  aluminum hydroxide/magnesium hydroxide/simethicone Suspension 30 milliLiter(s) Oral every 4 hours PRN Dyspepsia  melatonin 3 milliGRAM(s) Oral at bedtime PRN Insomnia  ondansetron Injectable 4 milliGRAM(s) IV Push every 8 hours PRN Nausea and/or Vomiting  polyethylene glycol 3350 17 Gram(s) Oral two times a day PRN Constipation  senna 2 Tablet(s) Oral at bedtime PRN Constipation        T(F): 97.7 (06-02-23 @ 05:18), Max: 98.3 (06-01-23 @ 21:46)  HR: 70 (06-02-23 @ 05:18) (62 - 76)  BP: 128/73 (06-02-23 @ 05:18) (122/81 - 146/83)  BP(mean): --  RR: 17 (06-02-23 @ 05:18) (16 - 18)  SpO2: 100% (06-02-23 @ 05:18) (100% - 100%)    PHYSICAL EXAM:     GENERAL: NAD, lying in bed comfortably  HEAD:  Atraumatic, Normocephalic  EYES: EOMI, PERRLA, conjunctiva and sclera clear, no nystagmus noted  ENT: Moist mucous membranes,   NECK: Supple, No JVD, trachea midline  CHEST/LUNG: Clear to auscultation bilaterally; No rales, rhonchi, wheezing, or rubs. Unlabored respirations  HEART: Regular rate and rhythm; No murmurs, rubs, or gallops, normal S1/S2  ABDOMEN: normal bowel sounds; Soft, nontender, nondistended, no organomegaly   EXTREMITIES:  2+ Peripheral Pulses, brisk capillary refill. No clubbing, cyanosis, or edema  MSK: No gross deformities noted   Neurological:  A&Ox3, no focal deficits   SKIN: No rashes or lesions  PSYCH: Normal mood, affect     TELEMETRY:    LABS:                        7.5    7.00  )-----------( 246      ( 01 Jun 2023 06:43 )             23.9     06-01    141  |  101  |  41<H>  ----------------------------<  84  3.3<L>   |  30  |  2.30<H>    Ca    8.3<L>      01 Jun 2023 06:43  Phos  4.8     06-01  Mg     1.90     06-01    TPro  4.6<L>  /  Alb  2.8<L>  /  TBili  <0.2  /  DBili  x   /  AST  14  /  ALT  9   /  AlkPhos  38<L>  06-01            Creatinine Trend: 2.30<--, 2.31<--, 2.44<--, 2.23<--, 2.29<--, 2.50<--  I&O's Summary    31 May 2023 07:01  -  01 Jun 2023 07:00  --------------------------------------------------------  IN: 0 mL / OUT: 1700 mL / NET: -1700 mL    01 Jun 2023 07:01  -  02 Jun 2023 06:57  --------------------------------------------------------  IN: 0 mL / OUT: 1150 mL / NET: -1150 mL      BNP    RADIOLOGY & ADDITIONAL STUDIES:             Internal Medicine   Celena Newman | PGY-1    OVERNIGHT EVENTS: No acute overnight events.    SUBJECTIVE:       MEDICATIONS  (STANDING):  buMETAnide Injectable 2 milliGRAM(s) IV Push every 8 hours  cyanocobalamin 1000 MICROGram(s) Oral daily  ergocalciferol 92424 Unit(s) Oral <User Schedule>  folic acid 1 milliGRAM(s) Oral daily  heparin   Injectable 5000 Unit(s) SubCutaneous every 8 hours  hydroxychloroquine 200 milliGRAM(s) Oral daily  methylPREDNISolone sodium succinate Injectable 40 milliGRAM(s) IV Push every 24 hours  nystatin    Suspension 693666 Unit(s) Oral four times a day  pantoprazole    Tablet 40 milliGRAM(s) Oral before breakfast  sodium bicarbonate 650 milliGRAM(s) Oral every 12 hours  trimethoprim   80 mG/sulfamethoxazole 400 mG 1 Tablet(s) Oral <User Schedule>    MEDICATIONS  (PRN):  acetaminophen     Tablet .. 650 milliGRAM(s) Oral every 6 hours PRN Temp greater or equal to 38C (100.4F), Mild Pain (1 - 3)  aluminum hydroxide/magnesium hydroxide/simethicone Suspension 30 milliLiter(s) Oral every 4 hours PRN Dyspepsia  melatonin 3 milliGRAM(s) Oral at bedtime PRN Insomnia  ondansetron Injectable 4 milliGRAM(s) IV Push every 8 hours PRN Nausea and/or Vomiting  polyethylene glycol 3350 17 Gram(s) Oral two times a day PRN Constipation  senna 2 Tablet(s) Oral at bedtime PRN Constipation        T(F): 97.7 (06-02-23 @ 05:18), Max: 98.3 (06-01-23 @ 21:46)  HR: 70 (06-02-23 @ 05:18) (62 - 76)  BP: 128/73 (06-02-23 @ 05:18) (122/81 - 146/83)  BP(mean): --  RR: 17 (06-02-23 @ 05:18) (16 - 18)  SpO2: 100% (06-02-23 @ 05:18) (100% - 100%)    PHYSICAL EXAM:     GENERAL: +mild facial edema; NAD, lying in bed comfortably  HEAD:  Atraumatic, Normocephalic  EYES: EOMI, PERRLA, conjunctiva and sclera clear, no nystagmus noted  ENT: Moist mucous membranes,   NECK: Supple, No JVD, trachea midline  CHEST/LUNG: Clear to auscultation bilaterally; No rales, rhonchi, wheezing, or rubs. Unlabored respirations  HEART: Regular rate and rhythm; No murmurs, rubs, or gallops, normal S1/S2  ABDOMEN: normal bowel sounds; Soft, nontender, nondistended, no organomegaly   EXTREMITIES:  +santhosh LE 2+ pitting edema to knees; 2+ Peripheral Pulses, brisk capillary refill. No clubbing, cyanosis  MSK: No gross deformities noted   Neurological:  A&Ox3, no focal deficits   SKIN: No rashes or lesions  PSYCH: Normal mood, affect     TELEMETRY:    LABS:                        7.5    7.00  )-----------( 246      ( 01 Jun 2023 06:43 )             23.9     06-01    141  |  101  |  41<H>  ----------------------------<  84  3.3<L>   |  30  |  2.30<H>    Ca    8.3<L>      01 Jun 2023 06:43  Phos  4.8     06-01  Mg     1.90     06-01    TPro  4.6<L>  /  Alb  2.8<L>  /  TBili  <0.2  /  DBili  x   /  AST  14  /  ALT  9   /  AlkPhos  38<L>  06-01            Creatinine Trend: 2.30<--, 2.31<--, 2.44<--, 2.23<--, 2.29<--, 2.50<--  I&O's Summary    31 May 2023 07:01  -  01 Jun 2023 07:00  --------------------------------------------------------  IN: 0 mL / OUT: 1700 mL / NET: -1700 mL    01 Jun 2023 07:01  -  02 Jun 2023 06:57  --------------------------------------------------------  IN: 0 mL / OUT: 1150 mL / NET: -1150 mL      BNP    RADIOLOGY & ADDITIONAL STUDIES:             Internal Medicine   Celena Newman | PGY-1    OVERNIGHT EVENTS: No acute overnight events.    SUBJECTIVE:       MEDICATIONS  (STANDING):  buMETAnide Injectable 2 milliGRAM(s) IV Push every 8 hours  cyanocobalamin 1000 MICROGram(s) Oral daily  ergocalciferol 29183 Unit(s) Oral <User Schedule>  folic acid 1 milliGRAM(s) Oral daily  heparin   Injectable 5000 Unit(s) SubCutaneous every 8 hours  hydroxychloroquine 200 milliGRAM(s) Oral daily  methylPREDNISolone sodium succinate Injectable 40 milliGRAM(s) IV Push every 24 hours  nystatin    Suspension 482008 Unit(s) Oral four times a day  pantoprazole    Tablet 40 milliGRAM(s) Oral before breakfast  sodium bicarbonate 650 milliGRAM(s) Oral every 12 hours  trimethoprim   80 mG/sulfamethoxazole 400 mG 1 Tablet(s) Oral <User Schedule>    MEDICATIONS  (PRN):  acetaminophen     Tablet .. 650 milliGRAM(s) Oral every 6 hours PRN Temp greater or equal to 38C (100.4F), Mild Pain (1 - 3)  aluminum hydroxide/magnesium hydroxide/simethicone Suspension 30 milliLiter(s) Oral every 4 hours PRN Dyspepsia  melatonin 3 milliGRAM(s) Oral at bedtime PRN Insomnia  ondansetron Injectable 4 milliGRAM(s) IV Push every 8 hours PRN Nausea and/or Vomiting  polyethylene glycol 3350 17 Gram(s) Oral two times a day PRN Constipation  senna 2 Tablet(s) Oral at bedtime PRN Constipation        T(F): 97.7 (06-02-23 @ 05:18), Max: 98.3 (06-01-23 @ 21:46)  HR: 70 (06-02-23 @ 05:18) (62 - 76)  BP: 128/73 (06-02-23 @ 05:18) (122/81 - 146/83)  BP(mean): --  RR: 17 (06-02-23 @ 05:18) (16 - 18)  SpO2: 100% (06-02-23 @ 05:18) (100% - 100%)    PHYSICAL EXAM:     GENERAL: +mild facial edema; NAD, lying in bed comfortably  HEAD:  Atraumatic, Normocephalic  EYES: EOMI, PERRLA, conjunctiva and sclera clear, no nystagmus noted  ENT: Moist mucous membranes,   NECK: Supple, No JVD, trachea midline  CHEST/LUNG: Clear to auscultation bilaterally; No rales, rhonchi, wheezing, or rubs. Unlabored respirations  HEART: Regular rate and rhythm; No murmurs, rubs, or gallops, normal S1/S2  ABDOMEN: normal bowel sounds; Soft, nontender, nondistended, no organomegaly   EXTREMITIES:  +santhosh LE 2+ pitting edema to knees; 2+ Peripheral Pulses, brisk capillary refill. No clubbing, cyanosis  MSK: No gross deformities noted   Neurological:  A&Ox3, no focal deficits   SKIN: No rashes or lesions  PSYCH: Normal mood, affect    TELEMETRY:    LABS:                        7.5    7.00  )-----------( 246      ( 01 Jun 2023 06:43 )             23.9     06-01    141  |  101  |  41<H>  ----------------------------<  84  3.3<L>   |  30  |  2.30<H>    Ca    8.3<L>      01 Jun 2023 06:43  Phos  4.8     06-01  Mg     1.90     06-01    TPro  4.6<L>  /  Alb  2.8<L>  /  TBili  <0.2  /  DBili  x   /  AST  14  /  ALT  9   /  AlkPhos  38<L>  06-01            Creatinine Trend: 2.30<--, 2.31<--, 2.44<--, 2.23<--, 2.29<--, 2.50<--  I&O's Summary    31 May 2023 07:01  -  01 Jun 2023 07:00  --------------------------------------------------------  IN: 0 mL / OUT: 1700 mL / NET: -1700 mL    01 Jun 2023 07:01  -  02 Jun 2023 06:57  --------------------------------------------------------  IN: 0 mL / OUT: 1150 mL / NET: -1150 mL      BNP    RADIOLOGY & ADDITIONAL STUDIES:             Internal Medicine   Celena Newman | PGY-1    OVERNIGHT EVENTS: No acute overnight events.    SUBJECTIVE: Patient was seen and examined at bedside this morning. Pt reports santhosh LE edema continues to improve. Denies any nausea/vomiting/diarrhea, headache, shortness of breath, abdominal pain or chest pain/palpitations. Patient responding appropriately to questions and able to make needs known. Vital signs/imaging/telemetry events reviewed.       MEDICATIONS  (STANDING):  buMETAnide Injectable 2 milliGRAM(s) IV Push every 8 hours  cyanocobalamin 1000 MICROGram(s) Oral daily  ergocalciferol 56676 Unit(s) Oral <User Schedule>  folic acid 1 milliGRAM(s) Oral daily  heparin   Injectable 5000 Unit(s) SubCutaneous every 8 hours  hydroxychloroquine 200 milliGRAM(s) Oral daily  methylPREDNISolone sodium succinate Injectable 40 milliGRAM(s) IV Push every 24 hours  nystatin    Suspension 795968 Unit(s) Oral four times a day  pantoprazole    Tablet 40 milliGRAM(s) Oral before breakfast  sodium bicarbonate 650 milliGRAM(s) Oral every 12 hours  trimethoprim   80 mG/sulfamethoxazole 400 mG 1 Tablet(s) Oral <User Schedule>    MEDICATIONS  (PRN):  acetaminophen     Tablet .. 650 milliGRAM(s) Oral every 6 hours PRN Temp greater or equal to 38C (100.4F), Mild Pain (1 - 3)  aluminum hydroxide/magnesium hydroxide/simethicone Suspension 30 milliLiter(s) Oral every 4 hours PRN Dyspepsia  melatonin 3 milliGRAM(s) Oral at bedtime PRN Insomnia  ondansetron Injectable 4 milliGRAM(s) IV Push every 8 hours PRN Nausea and/or Vomiting  polyethylene glycol 3350 17 Gram(s) Oral two times a day PRN Constipation  senna 2 Tablet(s) Oral at bedtime PRN Constipation        T(F): 97.7 (06-02-23 @ 05:18), Max: 98.3 (06-01-23 @ 21:46)  HR: 70 (06-02-23 @ 05:18) (62 - 76)  BP: 128/73 (06-02-23 @ 05:18) (122/81 - 146/83)  BP(mean): --  RR: 17 (06-02-23 @ 05:18) (16 - 18)  SpO2: 100% (06-02-23 @ 05:18) (100% - 100%)    PHYSICAL EXAM:     GENERAL: NAD, lying in bed comfortably  HEAD:  Atraumatic, Normocephalic  EYES: EOMI, PERRLA, conjunctiva and sclera clear, no nystagmus noted  ENT: Moist mucous membranes,   NECK: Supple, No JVD, trachea midline  CHEST/LUNG: Clear to auscultation bilaterally; No rales, rhonchi, wheezing, or rubs. Unlabored respirations  HEART: Regular rate and rhythm; No murmurs, rubs, or gallops, normal S1/S2  ABDOMEN: normal bowel sounds; Soft, nontender, nondistended, no organomegaly   EXTREMITIES:  +santhosh LE 2+ pitting edema to knees improving; 2+ Peripheral Pulses, brisk capillary refill. No clubbing, cyanosis  MSK: No gross deformities noted   Neurological:  A&Ox3, no focal deficits   SKIN: No rashes or lesions  PSYCH: Normal mood, affect    TELEMETRY:    LABS:                        7.5    7.00  )-----------( 246      ( 01 Jun 2023 06:43 )             23.9     06-01    141  |  101  |  41<H>  ----------------------------<  84  3.3<L>   |  30  |  2.30<H>    Ca    8.3<L>      01 Jun 2023 06:43  Phos  4.8     06-01  Mg     1.90     06-01    TPro  4.6<L>  /  Alb  2.8<L>  /  TBili  <0.2  /  DBili  x   /  AST  14  /  ALT  9   /  AlkPhos  38<L>  06-01            Creatinine Trend: 2.30<--, 2.31<--, 2.44<--, 2.23<--, 2.29<--, 2.50<--  I&O's Summary    31 May 2023 07:01  -  01 Jun 2023 07:00  --------------------------------------------------------  IN: 0 mL / OUT: 1700 mL / NET: -1700 mL    01 Jun 2023 07:01  -  02 Jun 2023 06:57  --------------------------------------------------------  IN: 0 mL / OUT: 1150 mL / NET: -1150 mL      BNP    RADIOLOGY & ADDITIONAL STUDIES:

## 2023-06-02 NOTE — PROGRESS NOTE ADULT - PROBLEM SELECTOR PLAN 3
Pt w/ bilateral leg swelling, takes lasix 40 bid.    -s/p 2 bumex IV bid, w/ improving edema  -appreciate nephrology recs  - C/w bumex to 2 mg TID Pt w/ bilateral leg swelling, takes lasix 40 bid.    -s/p 2 bumex IV bid, w/ improving edema  -appreciate nephrology recs  - C/w bumex IV 2 mg TID

## 2023-06-02 NOTE — PROGRESS NOTE ADULT - PROBLEM SELECTOR PLAN 1
UA w/ large blood, >50 RBC, moderate LE. UTI vs worsening lupus nephritis vs. hemorrhagic cystitis (cytoxan exposure)    -appreciate nephrology and rheumatology recs  -pt w/o dysuria, no leukocytosis, afebrile: monitor off abx  -Pt denies further eps of hematuria at this time  -urology recommending outpatient cystoscopy to r/o hemorrhagic cystitis UA w/ large blood, >50 RBC, moderate LE. UTI vs worsening lupus nephritis vs. hemorrhagic cystitis (cytoxan exposure)    -appreciate nephrology and rheumatology recs  -pt w/o dysuria, no leukocytosis, afebrile: monitor off abx  -Pt denies additional eps of hematuria at this time  -urology recommending outpatient cystoscopy to r/o hemorrhagic cystitis

## 2023-06-02 NOTE — PROGRESS NOTE ADULT - PROBLEM SELECTOR PLAN 4
Cr baseline 1.5-1.7 11/2022. Pt w/ recent admission for LIZETH. Cr now 2.56.  Pt denies decreased UOP. Pt follows with Dr. Shultz.    -appreciate nephrology recs  -C/q bumex 2mg IV Q8H  - c/w IV steroids Cr baseline 1.5-1.7 11/2022. Pt w/ recent admission for LIZETH. Cr now 2.56.  Pt denies decreased UOP. Pt follows with Dr. Shultz.    -appreciate nephrology recs  - C/w bumex 2mg IV Q8H  - c/w IV steroids

## 2023-06-03 LAB
ALBUMIN SERPL ELPH-MCNC: 3 G/DL — LOW (ref 3.3–5)
ALP SERPL-CCNC: 34 U/L — LOW (ref 40–120)
ALT FLD-CCNC: 9 U/L — SIGNIFICANT CHANGE UP (ref 4–41)
ANION GAP SERPL CALC-SCNC: 10 MMOL/L — SIGNIFICANT CHANGE UP (ref 7–14)
AST SERPL-CCNC: 13 U/L — SIGNIFICANT CHANGE UP (ref 4–40)
BILIRUB SERPL-MCNC: <0.2 MG/DL — SIGNIFICANT CHANGE UP (ref 0.2–1.2)
BLD GP AB SCN SERPL QL: NEGATIVE — SIGNIFICANT CHANGE UP
BUN SERPL-MCNC: 46 MG/DL — HIGH (ref 7–23)
CALCIUM SERPL-MCNC: 8.4 MG/DL — SIGNIFICANT CHANGE UP (ref 8.4–10.5)
CHLORIDE SERPL-SCNC: 98 MMOL/L — SIGNIFICANT CHANGE UP (ref 98–107)
CO2 SERPL-SCNC: 30 MMOL/L — SIGNIFICANT CHANGE UP (ref 22–31)
CREAT SERPL-MCNC: 2.55 MG/DL — HIGH (ref 0.5–1.3)
EGFR: 30 ML/MIN/1.73M2 — LOW
GLUCOSE SERPL-MCNC: 90 MG/DL — SIGNIFICANT CHANGE UP (ref 70–99)
HCT VFR BLD CALC: 22.7 % — LOW (ref 39–50)
HGB BLD-MCNC: 7.2 G/DL — LOW (ref 13–17)
MAGNESIUM SERPL-MCNC: 1.8 MG/DL — SIGNIFICANT CHANGE UP (ref 1.6–2.6)
MCHC RBC-ENTMCNC: 27.3 PG — SIGNIFICANT CHANGE UP (ref 27–34)
MCHC RBC-ENTMCNC: 31.7 GM/DL — LOW (ref 32–36)
MCV RBC AUTO: 86 FL — SIGNIFICANT CHANGE UP (ref 80–100)
NRBC # BLD: 0 /100 WBCS — SIGNIFICANT CHANGE UP (ref 0–0)
NRBC # FLD: 0 K/UL — SIGNIFICANT CHANGE UP (ref 0–0)
PHOSPHATE SERPL-MCNC: 4.3 MG/DL — SIGNIFICANT CHANGE UP (ref 2.5–4.5)
PLATELET # BLD AUTO: 246 K/UL — SIGNIFICANT CHANGE UP (ref 150–400)
POTASSIUM SERPL-MCNC: 3.4 MMOL/L — LOW (ref 3.5–5.3)
POTASSIUM SERPL-SCNC: 3.4 MMOL/L — LOW (ref 3.5–5.3)
PROT SERPL-MCNC: 4.9 G/DL — LOW (ref 6–8.3)
RBC # BLD: 2.64 M/UL — LOW (ref 4.2–5.8)
RBC # FLD: 12.9 % — SIGNIFICANT CHANGE UP (ref 10.3–14.5)
RH IG SCN BLD-IMP: POSITIVE — SIGNIFICANT CHANGE UP
SODIUM SERPL-SCNC: 138 MMOL/L — SIGNIFICANT CHANGE UP (ref 135–145)
WBC # BLD: 7.01 K/UL — SIGNIFICANT CHANGE UP (ref 3.8–10.5)
WBC # FLD AUTO: 7.01 K/UL — SIGNIFICANT CHANGE UP (ref 3.8–10.5)

## 2023-06-03 PROCEDURE — 99233 SBSQ HOSP IP/OBS HIGH 50: CPT | Mod: GC

## 2023-06-03 RX ORDER — POTASSIUM CHLORIDE 20 MEQ
40 PACKET (EA) ORAL ONCE
Refills: 0 | Status: COMPLETED | OUTPATIENT
Start: 2023-06-03 | End: 2023-06-03

## 2023-06-03 RX ORDER — MAGNESIUM SULFATE 500 MG/ML
2 VIAL (ML) INJECTION ONCE
Refills: 0 | Status: COMPLETED | OUTPATIENT
Start: 2023-06-03 | End: 2023-06-03

## 2023-06-03 RX ORDER — ALBUMIN HUMAN 25 %
25 VIAL (ML) INTRAVENOUS EVERY 12 HOURS
Refills: 0 | Status: COMPLETED | OUTPATIENT
Start: 2023-06-04 | End: 2023-06-05

## 2023-06-03 RX ADMIN — Medication 200 MILLIGRAM(S): at 11:50

## 2023-06-03 RX ADMIN — Medication 1 MILLIGRAM(S): at 11:49

## 2023-06-03 RX ADMIN — Medication 50 MILLIGRAM(S): at 05:17

## 2023-06-03 RX ADMIN — BUMETANIDE 2 MILLIGRAM(S): 0.25 INJECTION INTRAMUSCULAR; INTRAVENOUS at 14:32

## 2023-06-03 RX ADMIN — PREGABALIN 1000 MICROGRAM(S): 225 CAPSULE ORAL at 11:50

## 2023-06-03 RX ADMIN — Medication 500000 UNIT(S): at 17:28

## 2023-06-03 RX ADMIN — BUMETANIDE 2 MILLIGRAM(S): 0.25 INJECTION INTRAMUSCULAR; INTRAVENOUS at 22:35

## 2023-06-03 RX ADMIN — HEPARIN SODIUM 5000 UNIT(S): 5000 INJECTION INTRAVENOUS; SUBCUTANEOUS at 13:57

## 2023-06-03 RX ADMIN — Medication 500000 UNIT(S): at 05:18

## 2023-06-03 RX ADMIN — Medication 650 MILLIGRAM(S): at 05:17

## 2023-06-03 RX ADMIN — Medication 650 MILLIGRAM(S): at 17:28

## 2023-06-03 RX ADMIN — Medication 500000 UNIT(S): at 23:11

## 2023-06-03 RX ADMIN — HEPARIN SODIUM 5000 UNIT(S): 5000 INJECTION INTRAVENOUS; SUBCUTANEOUS at 22:05

## 2023-06-03 RX ADMIN — BUMETANIDE 2 MILLIGRAM(S): 0.25 INJECTION INTRAMUSCULAR; INTRAVENOUS at 05:17

## 2023-06-03 RX ADMIN — Medication 500000 UNIT(S): at 11:49

## 2023-06-03 RX ADMIN — Medication 40 MILLIEQUIVALENT(S): at 09:40

## 2023-06-03 RX ADMIN — HEPARIN SODIUM 5000 UNIT(S): 5000 INJECTION INTRAVENOUS; SUBCUTANEOUS at 05:18

## 2023-06-03 RX ADMIN — Medication 40 MILLIEQUIVALENT(S): at 11:49

## 2023-06-03 RX ADMIN — PANTOPRAZOLE SODIUM 40 MILLIGRAM(S): 20 TABLET, DELAYED RELEASE ORAL at 05:17

## 2023-06-03 RX ADMIN — Medication 100 GRAM(S): at 17:41

## 2023-06-03 RX ADMIN — Medication 25 GRAM(S): at 12:13

## 2023-06-03 RX ADMIN — Medication 100 GRAM(S): at 05:18

## 2023-06-03 NOTE — PROGRESS NOTE ADULT - PROBLEM SELECTOR PLAN 2
Pt followed by Dr. Nilton Le. Underwent renal biopsy in 2/2022 showing focal proliferative class III, segmental endocapillary hypercellularity in 12%, a single small cellular crescents, mild acute interstitial nephritis, NIH activity score 4/24, no chronicity. Pt w/ hx of multiple renal bx. Last received cytoxan 5/19/23.    -appreciate rheum recs:  -4/2023 p/w worsening edema w/ LIZETH and active urinary sediment. mild C3 depression 83 with negative C4 and dsDNA. CT A/P, US kidney, echo unremarkable. s/p kidney biopsy 5/2/2023 showed proliferative LN class IV-G with 5 out of 8 non-globally sclerosed glomeruli with cellular and fibrocellular crescents; moderate tubulointerstitial inflammation, Patient was not a candidate for clinical trial.  - Received IV solumedrol 1g x3 (5/4-5/6) then prednisone 60mg/day starting on 5/7. Received induction Cytoxan 5/5/23 with remainder to be outpatient w/ plan for IV  mg q2 weeks   - protein/creatinine ratio elevated at 6.1 and 24 hour urine protein elevated  - c3, c4 mildly low  -c/w PCP ppx, PPI daily for GI ppx, plaquenil  -Quant TB negative outpatient 2/2023; Hep B checked this admission  - ds DNA neg  - Per rheum- pt being considered for Tacrolimus or voclosporin as LN currently refractory to multiple meds

## 2023-06-03 NOTE — PROGRESS NOTE ADULT - PROBLEM SELECTOR PLAN 4
Cr baseline 1.5-1.7 11/2022. Pt w/ recent admission for LIZETH. Cr now 2.56.  Pt denies decreased UOP. Pt follows with Dr. Shultz.    -appreciate nephrology recs  - C/w bumex 2mg IV Q8H  - c/w IV steroids Cr baseline 1.5-1.7 11/2022. Pt w/ recent admission for LIZETH. Cr now 2.56.  Pt denies decreased UOP. Pt follows with Dr. Shultz.    -appreciate nephrology recs  - C/w bumex 2mg IV Q8H  - c/w IV steroids  - c/w albumin tid

## 2023-06-03 NOTE — PROGRESS NOTE ADULT - PROBLEM SELECTOR PLAN 1
UA w/ large blood, >50 RBC, moderate LE. UTI vs worsening lupus nephritis vs. hemorrhagic cystitis (cytoxan exposure)    -appreciate nephrology and rheumatology recs  -pt w/o dysuria, no leukocytosis, afebrile: monitor off abx  -Pt denies additional eps of hematuria at this time  -urology recommending outpatient cystoscopy to r/o hemorrhagic cystitis

## 2023-06-03 NOTE — PROGRESS NOTE ADULT - ASSESSMENT
49 yo M with PMHx of SLE c/b LN 3/4 w/ recent admission 4/27/23-5/8/2023 for lupus nephritis flare (switched from cellcept to IV cytoxan) p/w b/l LE swelling and hematuria.            49 yo M with PMHx of SLE c/b LN 3/4 w/ recent admission 4/27/23-5/8/2023 for lupus nephritis flare (switched from cellcept to IV cytoxan) p/w b/l LE swelling and hematuria 2/2 lupus nephritis flare vs hemorrhagic cystitis 2/2 cyclophosphamide

## 2023-06-03 NOTE — PROGRESS NOTE ADULT - SUBJECTIVE AND OBJECTIVE BOX
Patient is a 48y old  Male who presents with a chief complaint of hematuria (2023 14:31)      SUBJECTIVE :      MEDICATIONS  (STANDING):  albumin human 25% IVPB 25 Gram(s) IV Intermittent every 12 hours  buMETAnide Injectable 2 milliGRAM(s) IV Push every 8 hours  cyanocobalamin 1000 MICROGram(s) Oral daily  ergocalciferol 80838 Unit(s) Oral <User Schedule>  folic acid 1 milliGRAM(s) Oral daily  heparin   Injectable 5000 Unit(s) SubCutaneous every 8 hours  hydroxychloroquine 200 milliGRAM(s) Oral daily  nystatin    Suspension 504871 Unit(s) Oral four times a day  pantoprazole    Tablet 40 milliGRAM(s) Oral before breakfast  predniSONE   Tablet 50 milliGRAM(s) Oral daily  sodium bicarbonate 650 milliGRAM(s) Oral every 12 hours  trimethoprim   80 mG/sulfamethoxazole 400 mG 1 Tablet(s) Oral <User Schedule>    MEDICATIONS  (PRN):  acetaminophen     Tablet .. 650 milliGRAM(s) Oral every 6 hours PRN Temp greater or equal to 38C (100.4F), Mild Pain (1 - 3)  aluminum hydroxide/magnesium hydroxide/simethicone Suspension 30 milliLiter(s) Oral every 4 hours PRN Dyspepsia  melatonin 3 milliGRAM(s) Oral at bedtime PRN Insomnia  ondansetron Injectable 4 milliGRAM(s) IV Push every 8 hours PRN Nausea and/or Vomiting  polyethylene glycol 3350 17 Gram(s) Oral two times a day PRN Constipation  senna 2 Tablet(s) Oral at bedtime PRN Constipation      CAPILLARY BLOOD GLUCOSE        I&O's Summary    2023 07:01  -  2023 07:00  --------------------------------------------------------  IN: 0 mL / OUT: 1150 mL / NET: -1150 mL    2023 07:01  -  2023 06:45  --------------------------------------------------------  IN: 0 mL / OUT: 1375 mL / NET: -1375 mL        PHYSICAL EXAM:  Vital Signs Last 24 Hrs  T(C): 36.5 (2023 05:12), Max: 36.7 (2023 12:19)  T(F): 97.7 (2023 05:12), Max: 98 (2023 12:19)  HR: 71 (2023 05:12) (71 - 87)  BP: 126/80 (2023 05:12) (123/73 - 140/74)  BP(mean): --  RR: 17 (2023 05:12) (17 - 19)  SpO2: 99% (2023 05:12) (99% - 100%)    Parameters below as of 2023 05:12  Patient On (Oxygen Delivery Method): room air        GENERAL: NAD, lying in bed comfortably  HEAD:  Atraumatic, Normocephalic  EYES: EOMI, PERRLA, conjunctiva and sclera clear, no nystagmus noted  ENT: Moist mucous membranes,   NECK: Supple, No JVD, trachea midline  CHEST/LUNG: Clear to auscultation bilaterally; No rales, rhonchi, wheezing, or rubs. Unlabored respirations  HEART: Regular rate and rhythm; No murmurs, rubs, or gallops, normal S1/S2  ABDOMEN: normal bowel sounds; Soft, nontender, nondistended, no organomegaly   EXTREMITIES:  +santhosh LE 2+ pitting edema to knees improving; 2+ Peripheral Pulses, brisk capillary refill. No clubbing, cyanosis  MSK: No gross deformities noted   Neurological:  A&Ox3, no focal deficits   SKIN: No rashes or lesions  PSYCH: Normal mood, affect    LABS:                        7.3    7.12  )-----------( 254      ( 2023 06:30 )             23.4     06-02    142  |  102  |  48<H>  ----------------------------<  86  3.8   |  30  |  2.25<H>    Ca    8.4      2023 06:30  Phos  3.9     06-02  Mg     1.70     06-02            Urinalysis Basic - ( 2023 07:34 )    Color: Colorless / Appearance: Clear / S.010 / pH: x  Gluc: x / Ketone: Negative  / Bili: Negative / Urobili: <2 mg/dL   Blood: x / Protein: 300 mg/dL / Nitrite: Negative   Leuk Esterase: Negative / RBC: 93 /HPF / WBC 8 /HPF   Sq Epi: x / Non Sq Epi: x / Bacteria: Negative          RADIOLOGY & ADDITIONAL TESTS:  Results Reviewed:   Imaging Personally Reviewed:  Electrocardiogram Personally Reviewed:    COORDINATION OF CARE:  Care Discussed with Consultants/Other Providers [Y/N]:  Prior or Outpatient Records Reviewed [Y/N]:   Patient is a 48y old  Male who presents with a chief complaint of hematuria (2023 14:31)      SUBJECTIVE : NAEO. Pt seen and examined at bedside. Feels well overall. Reports his swelling has improved. Denies CP, SOB, fever/chills, dysuria, nausea./, diarrhea/constipation.       MEDICATIONS  (STANDING):  albumin human 25% IVPB 25 Gram(s) IV Intermittent every 12 hours  buMETAnide Injectable 2 milliGRAM(s) IV Push every 8 hours  cyanocobalamin 1000 MICROGram(s) Oral daily  ergocalciferol 17856 Unit(s) Oral <User Schedule>  folic acid 1 milliGRAM(s) Oral daily  heparin   Injectable 5000 Unit(s) SubCutaneous every 8 hours  hydroxychloroquine 200 milliGRAM(s) Oral daily  nystatin    Suspension 159275 Unit(s) Oral four times a day  pantoprazole    Tablet 40 milliGRAM(s) Oral before breakfast  predniSONE   Tablet 50 milliGRAM(s) Oral daily  sodium bicarbonate 650 milliGRAM(s) Oral every 12 hours  trimethoprim   80 mG/sulfamethoxazole 400 mG 1 Tablet(s) Oral <User Schedule>    MEDICATIONS  (PRN):  acetaminophen     Tablet .. 650 milliGRAM(s) Oral every 6 hours PRN Temp greater or equal to 38C (100.4F), Mild Pain (1 - 3)  aluminum hydroxide/magnesium hydroxide/simethicone Suspension 30 milliLiter(s) Oral every 4 hours PRN Dyspepsia  melatonin 3 milliGRAM(s) Oral at bedtime PRN Insomnia  ondansetron Injectable 4 milliGRAM(s) IV Push every 8 hours PRN Nausea and/or Vomiting  polyethylene glycol 3350 17 Gram(s) Oral two times a day PRN Constipation  senna 2 Tablet(s) Oral at bedtime PRN Constipation      CAPILLARY BLOOD GLUCOSE        I&O's Summary    2023 07:01  -  2023 07:00  --------------------------------------------------------  IN: 0 mL / OUT: 1150 mL / NET: -1150 mL    2023 07:01  -  2023 06:45  --------------------------------------------------------  IN: 0 mL / OUT: 1375 mL / NET: -1375 mL        PHYSICAL EXAM:  Vital Signs Last 24 Hrs  T(C): 36.5 (2023 05:12), Max: 36.7 (2023 12:19)  T(F): 97.7 (2023 05:12), Max: 98 (2023 12:19)  HR: 71 (2023 05:12) (71 - 87)  BP: 126/80 (2023 05:12) (123/73 - 140/74)  BP(mean): --  RR: 17 (2023 05:12) (17 - 19)  SpO2: 99% (2023 05:12) (99% - 100%)    Parameters below as of 2023 05:12  Patient On (Oxygen Delivery Method): room air        GENERAL: NAD, lying in bed comfortably  HEAD:  Atraumatic, Normocephalic  EYES: EOMI, PERRLA, conjunctiva and sclera clear, no nystagmus noted  ENT: Moist mucous membranes,   NECK: Supple, No JVD, trachea midline  CHEST/LUNG: Clear to auscultation bilaterally; No rales, rhonchi, wheezing, or rubs. Unlabored respirations  HEART: Regular rate and rhythm; No murmurs, rubs, or gallops, normal S1/S2  ABDOMEN: normal bowel sounds; Soft, nontender, nondistended, no organomegaly   EXTREMITIES:  +santhosh LE 2+ pitting edema to knees improving; 2+ Peripheral Pulses, brisk capillary refill. No clubbing, cyanosis  MSK: No gross deformities noted   Neurological:  A&Ox3, no focal deficits   SKIN: No rashes or lesions  PSYCH: Normal mood, affect    LABS:                        7.3    7.12  )-----------( 254      ( 2023 06:30 )             23.4     06-02    142  |  102  |  48<H>  ----------------------------<  86  3.8   |  30  |  2.25<H>    Ca    8.4      2023 06:30  Phos  3.9     06-02  Mg     1.70     06-02            Urinalysis Basic - ( 2023 07:34 )    Color: Colorless / Appearance: Clear / S.010 / pH: x  Gluc: x / Ketone: Negative  / Bili: Negative / Urobili: <2 mg/dL   Blood: x / Protein: 300 mg/dL / Nitrite: Negative   Leuk Esterase: Negative / RBC: 93 /HPF / WBC 8 /HPF   Sq Epi: x / Non Sq Epi: x / Bacteria: Negative          RADIOLOGY & ADDITIONAL TESTS:  Results Reviewed:   Imaging Personally Reviewed:  Electrocardiogram Personally Reviewed:    COORDINATION OF CARE:  Care Discussed with Consultants/Other Providers [Y/N]:  Prior or Outpatient Records Reviewed [Y/N]:   Patient is a 48y old  Male who presents with a chief complaint of hematuria (2023 14:31)      SUBJECTIVE : NAEO. Pt seen and examined at bedside. Feels well overall. Reports his swelling has improved. Denies CP, SOB, fever/chills, dysuria, nausea/vomiting, diarrhea/constipation.       MEDICATIONS  (STANDING):  albumin human 25% IVPB 25 Gram(s) IV Intermittent every 12 hours  buMETAnide Injectable 2 milliGRAM(s) IV Push every 8 hours  cyanocobalamin 1000 MICROGram(s) Oral daily  ergocalciferol 77082 Unit(s) Oral <User Schedule>  folic acid 1 milliGRAM(s) Oral daily  heparin   Injectable 5000 Unit(s) SubCutaneous every 8 hours  hydroxychloroquine 200 milliGRAM(s) Oral daily  nystatin    Suspension 685533 Unit(s) Oral four times a day  pantoprazole    Tablet 40 milliGRAM(s) Oral before breakfast  predniSONE   Tablet 50 milliGRAM(s) Oral daily  sodium bicarbonate 650 milliGRAM(s) Oral every 12 hours  trimethoprim   80 mG/sulfamethoxazole 400 mG 1 Tablet(s) Oral <User Schedule>    MEDICATIONS  (PRN):  acetaminophen     Tablet .. 650 milliGRAM(s) Oral every 6 hours PRN Temp greater or equal to 38C (100.4F), Mild Pain (1 - 3)  aluminum hydroxide/magnesium hydroxide/simethicone Suspension 30 milliLiter(s) Oral every 4 hours PRN Dyspepsia  melatonin 3 milliGRAM(s) Oral at bedtime PRN Insomnia  ondansetron Injectable 4 milliGRAM(s) IV Push every 8 hours PRN Nausea and/or Vomiting  polyethylene glycol 3350 17 Gram(s) Oral two times a day PRN Constipation  senna 2 Tablet(s) Oral at bedtime PRN Constipation      CAPILLARY BLOOD GLUCOSE        I&O's Summary    2023 07:01  -  2023 07:00  --------------------------------------------------------  IN: 0 mL / OUT: 1150 mL / NET: -1150 mL    2023 07:01  -  2023 06:45  --------------------------------------------------------  IN: 0 mL / OUT: 1375 mL / NET: -1375 mL        PHYSICAL EXAM:  Vital Signs Last 24 Hrs  T(C): 36.5 (2023 05:12), Max: 36.7 (2023 12:19)  T(F): 97.7 (2023 05:12), Max: 98 (2023 12:19)  HR: 71 (2023 05:12) (71 - 87)  BP: 126/80 (2023 05:12) (123/73 - 140/74)  BP(mean): --  RR: 17 (2023 05:12) (17 - 19)  SpO2: 99% (2023 05:12) (99% - 100%)    Parameters below as of 2023 05:12  Patient On (Oxygen Delivery Method): room air        GENERAL: NAD, lying in bed comfortably  HEAD:  Atraumatic, Normocephalic  EYES: EOMI, PERRLA, conjunctiva and sclera clear, no nystagmus noted  ENT: Moist mucous membranes,   NECK: Supple, No JVD, trachea midline  CHEST/LUNG: Clear to auscultation bilaterally; No rales, rhonchi, wheezing, or rubs. Unlabored respirations  HEART: Regular rate and rhythm; No murmurs, rubs, or gallops, normal S1/S2  ABDOMEN: normal bowel sounds; Soft, nontender, nondistended, no organomegaly   EXTREMITIES:  +santhosh LE 2+ pitting edema to knees improving; 2+ Peripheral Pulses, brisk capillary refill. No clubbing, cyanosis  MSK: No gross deformities noted   Neurological:  A&Ox3, no focal deficits   SKIN: No rashes or lesions  PSYCH: Normal mood, affect    LABS:                        7.3    7.12  )-----------( 254      ( 2023 06:30 )             23.4     06-02    142  |  102  |  48<H>  ----------------------------<  86  3.8   |  30  |  2.25<H>    Ca    8.4      2023 06:30  Phos  3.9     06-02  Mg     1.70     06-02            Urinalysis Basic - ( 2023 07:34 )    Color: Colorless / Appearance: Clear / S.010 / pH: x  Gluc: x / Ketone: Negative  / Bili: Negative / Urobili: <2 mg/dL   Blood: x / Protein: 300 mg/dL / Nitrite: Negative   Leuk Esterase: Negative / RBC: 93 /HPF / WBC 8 /HPF   Sq Epi: x / Non Sq Epi: x / Bacteria: Negative          RADIOLOGY & ADDITIONAL TESTS:  Results Reviewed:   Imaging Personally Reviewed:  Electrocardiogram Personally Reviewed:    COORDINATION OF CARE:  Care Discussed with Consultants/Other Providers [Y/N]:  Prior or Outpatient Records Reviewed [Y/N]:

## 2023-06-03 NOTE — PROGRESS NOTE ADULT - PROBLEM SELECTOR PLAN 3
Pt w/ bilateral leg swelling, takes lasix 40 bid.    -s/p 2 bumex IV bid, w/ improving edema  -appreciate nephrology recs  - C/w bumex IV 2 mg TID Pt w/ bilateral leg swelling, takes lasix 40 bid.    -s/p 2 bumex IV bid, w/ improving edema  -appreciate nephrology recs  - C/w bumex IV 2 mg TID  - c/w albumin tid

## 2023-06-04 LAB
ALBUMIN SERPL ELPH-MCNC: 3.5 G/DL — SIGNIFICANT CHANGE UP (ref 3.3–5)
ALP SERPL-CCNC: 32 U/L — LOW (ref 40–120)
ALT FLD-CCNC: 5 U/L — SIGNIFICANT CHANGE UP (ref 4–41)
ANION GAP SERPL CALC-SCNC: 11 MMOL/L — SIGNIFICANT CHANGE UP (ref 7–14)
AST SERPL-CCNC: 12 U/L — SIGNIFICANT CHANGE UP (ref 4–40)
BILIRUB SERPL-MCNC: <0.2 MG/DL — SIGNIFICANT CHANGE UP (ref 0.2–1.2)
BUN SERPL-MCNC: 47 MG/DL — HIGH (ref 7–23)
CALCIUM SERPL-MCNC: 8.7 MG/DL — SIGNIFICANT CHANGE UP (ref 8.4–10.5)
CHLORIDE SERPL-SCNC: 100 MMOL/L — SIGNIFICANT CHANGE UP (ref 98–107)
CO2 SERPL-SCNC: 31 MMOL/L — SIGNIFICANT CHANGE UP (ref 22–31)
CREAT SERPL-MCNC: 2.44 MG/DL — HIGH (ref 0.5–1.3)
EGFR: 32 ML/MIN/1.73M2 — LOW
GLUCOSE SERPL-MCNC: 90 MG/DL — SIGNIFICANT CHANGE UP (ref 70–99)
HCT VFR BLD CALC: 23 % — LOW (ref 39–50)
HGB BLD-MCNC: 7.1 G/DL — LOW (ref 13–17)
MAGNESIUM SERPL-MCNC: 2.1 MG/DL — SIGNIFICANT CHANGE UP (ref 1.6–2.6)
MCHC RBC-ENTMCNC: 26.9 PG — LOW (ref 27–34)
MCHC RBC-ENTMCNC: 30.9 GM/DL — LOW (ref 32–36)
MCV RBC AUTO: 87.1 FL — SIGNIFICANT CHANGE UP (ref 80–100)
NRBC # BLD: 0 /100 WBCS — SIGNIFICANT CHANGE UP (ref 0–0)
NRBC # FLD: 0 K/UL — SIGNIFICANT CHANGE UP (ref 0–0)
PHOSPHATE SERPL-MCNC: 4.4 MG/DL — SIGNIFICANT CHANGE UP (ref 2.5–4.5)
PLATELET # BLD AUTO: 276 K/UL — SIGNIFICANT CHANGE UP (ref 150–400)
POTASSIUM SERPL-MCNC: 3.9 MMOL/L — SIGNIFICANT CHANGE UP (ref 3.5–5.3)
POTASSIUM SERPL-SCNC: 3.9 MMOL/L — SIGNIFICANT CHANGE UP (ref 3.5–5.3)
PROT SERPL-MCNC: 5.1 G/DL — LOW (ref 6–8.3)
RBC # BLD: 2.64 M/UL — LOW (ref 4.2–5.8)
RBC # FLD: 13 % — SIGNIFICANT CHANGE UP (ref 10.3–14.5)
SODIUM SERPL-SCNC: 142 MMOL/L — SIGNIFICANT CHANGE UP (ref 135–145)
WBC # BLD: 6.57 K/UL — SIGNIFICANT CHANGE UP (ref 3.8–10.5)
WBC # FLD AUTO: 6.57 K/UL — SIGNIFICANT CHANGE UP (ref 3.8–10.5)

## 2023-06-04 PROCEDURE — 99233 SBSQ HOSP IP/OBS HIGH 50: CPT | Mod: GC

## 2023-06-04 RX ADMIN — Medication 100 GRAM(S): at 17:09

## 2023-06-04 RX ADMIN — Medication 650 MILLIGRAM(S): at 17:09

## 2023-06-04 RX ADMIN — Medication 650 MILLIGRAM(S): at 05:28

## 2023-06-04 RX ADMIN — PANTOPRAZOLE SODIUM 40 MILLIGRAM(S): 20 TABLET, DELAYED RELEASE ORAL at 05:28

## 2023-06-04 RX ADMIN — BUMETANIDE 2 MILLIGRAM(S): 0.25 INJECTION INTRAMUSCULAR; INTRAVENOUS at 15:24

## 2023-06-04 RX ADMIN — HEPARIN SODIUM 5000 UNIT(S): 5000 INJECTION INTRAVENOUS; SUBCUTANEOUS at 15:24

## 2023-06-04 RX ADMIN — HEPARIN SODIUM 5000 UNIT(S): 5000 INJECTION INTRAVENOUS; SUBCUTANEOUS at 05:28

## 2023-06-04 RX ADMIN — BUMETANIDE 2 MILLIGRAM(S): 0.25 INJECTION INTRAMUSCULAR; INTRAVENOUS at 05:28

## 2023-06-04 RX ADMIN — PREGABALIN 1000 MICROGRAM(S): 225 CAPSULE ORAL at 11:35

## 2023-06-04 RX ADMIN — HEPARIN SODIUM 5000 UNIT(S): 5000 INJECTION INTRAVENOUS; SUBCUTANEOUS at 21:47

## 2023-06-04 RX ADMIN — Medication 100 GRAM(S): at 05:29

## 2023-06-04 RX ADMIN — Medication 50 MILLIGRAM(S): at 05:28

## 2023-06-04 RX ADMIN — Medication 1 MILLIGRAM(S): at 11:35

## 2023-06-04 RX ADMIN — Medication 500000 UNIT(S): at 17:09

## 2023-06-04 RX ADMIN — Medication 500000 UNIT(S): at 05:28

## 2023-06-04 RX ADMIN — BUMETANIDE 2 MILLIGRAM(S): 0.25 INJECTION INTRAMUSCULAR; INTRAVENOUS at 21:44

## 2023-06-04 RX ADMIN — Medication 500000 UNIT(S): at 11:34

## 2023-06-04 RX ADMIN — Medication 200 MILLIGRAM(S): at 11:35

## 2023-06-04 NOTE — PROGRESS NOTE ADULT - PROBLEM SELECTOR PLAN 3
Pt w/ bilateral leg swelling, takes lasix 40 bid.    -s/p 2 bumex IV bid, w/ improving edema  -appreciate nephrology recs  - C/w bumex IV 2 mg TID  - c/w albumin tid

## 2023-06-04 NOTE — PROGRESS NOTE ADULT - SUBJECTIVE AND OBJECTIVE BOX
Internal Medicine   Celena Paty | PGY-1    OVERNIGHT EVENTS: No acute overnight events.    SUBJECTIVE:       MEDICATIONS  (STANDING):  albumin human 25% IVPB 25 Gram(s) IV Intermittent every 12 hours  buMETAnide Injectable 2 milliGRAM(s) IV Push every 8 hours  cyanocobalamin 1000 MICROGram(s) Oral daily  ergocalciferol 15159 Unit(s) Oral <User Schedule>  folic acid 1 milliGRAM(s) Oral daily  heparin   Injectable 5000 Unit(s) SubCutaneous every 8 hours  hydroxychloroquine 200 milliGRAM(s) Oral daily  nystatin    Suspension 486409 Unit(s) Oral four times a day  pantoprazole    Tablet 40 milliGRAM(s) Oral before breakfast  predniSONE   Tablet 50 milliGRAM(s) Oral daily  sodium bicarbonate 650 milliGRAM(s) Oral every 12 hours  trimethoprim   80 mG/sulfamethoxazole 400 mG 1 Tablet(s) Oral <User Schedule>    MEDICATIONS  (PRN):  acetaminophen     Tablet .. 650 milliGRAM(s) Oral every 6 hours PRN Temp greater or equal to 38C (100.4F), Mild Pain (1 - 3)  aluminum hydroxide/magnesium hydroxide/simethicone Suspension 30 milliLiter(s) Oral every 4 hours PRN Dyspepsia  melatonin 3 milliGRAM(s) Oral at bedtime PRN Insomnia  ondansetron Injectable 4 milliGRAM(s) IV Push every 8 hours PRN Nausea and/or Vomiting  polyethylene glycol 3350 17 Gram(s) Oral two times a day PRN Constipation  senna 2 Tablet(s) Oral at bedtime PRN Constipation        T(F): 97.7 (06-04-23 @ 05:14), Max: 98.1 (06-03-23 @ 17:41)  HR: 61 (06-04-23 @ 05:14) (61 - 76)  BP: 127/65 (06-04-23 @ 05:14) (121/75 - 151/93)  BP(mean): --  RR: 17 (06-04-23 @ 05:14) (16 - 17)  SpO2: 100% (06-04-23 @ 05:14) (100% - 100%)    PHYSICAL EXAM:     GENERAL: NAD, lying in bed comfortably  HEAD:  Atraumatic, Normocephalic  EYES: EOMI, PERRLA, conjunctiva and sclera clear, no nystagmus noted  ENT: Moist mucous membranes,   NECK: Supple, No JVD, trachea midline  CHEST/LUNG: Clear to auscultation bilaterally; No rales, rhonchi, wheezing, or rubs. Unlabored respirations  HEART: Regular rate and rhythm; No murmurs, rubs, or gallops, normal S1/S2  ABDOMEN: normal bowel sounds; Soft, nontender, nondistended, no organomegaly   EXTREMITIES:  2+ Peripheral Pulses, brisk capillary refill. No clubbing, cyanosis, or edema  MSK: No gross deformities noted   Neurological:  A&Ox3, no focal deficits   SKIN: No rashes or lesions  PSYCH: Normal mood, affect     TELEMETRY:    LABS:                        7.2    7.01  )-----------( 246      ( 03 Jun 2023 06:15 )             22.7     06-03    138  |  98  |  46<H>  ----------------------------<  90  3.4<L>   |  30  |  2.55<H>    Ca    8.4      03 Jun 2023 06:15  Phos  4.3     06-03  Mg     1.80     06-03    TPro  4.9<L>  /  Alb  3.0<L>  /  TBili  <0.2  /  DBili  x   /  AST  13  /  ALT  9   /  AlkPhos  34<L>  06-03            Creatinine Trend: 2.55<--, 2.25<--, 2.30<--, 2.31<--, 2.44<--, 2.23<--  I&O's Summary    02 Jun 2023 07:01  -  03 Jun 2023 07:00  --------------------------------------------------------  IN: 0 mL / OUT: 1375 mL / NET: -1375 mL    03 Jun 2023 07:01  -  04 Jun 2023 06:49  --------------------------------------------------------  IN: 0 mL / OUT: 1325 mL / NET: -1325 mL      BNP    RADIOLOGY & ADDITIONAL STUDIES:             Internal Medicine   Celena Paty | PGY-1    OVERNIGHT EVENTS: No acute overnight events.    SUBJECTIVE:       MEDICATIONS  (STANDING):  albumin human 25% IVPB 25 Gram(s) IV Intermittent every 12 hours  buMETAnide Injectable 2 milliGRAM(s) IV Push every 8 hours  cyanocobalamin 1000 MICROGram(s) Oral daily  ergocalciferol 68920 Unit(s) Oral <User Schedule>  folic acid 1 milliGRAM(s) Oral daily  heparin   Injectable 5000 Unit(s) SubCutaneous every 8 hours  hydroxychloroquine 200 milliGRAM(s) Oral daily  nystatin    Suspension 501507 Unit(s) Oral four times a day  pantoprazole    Tablet 40 milliGRAM(s) Oral before breakfast  predniSONE   Tablet 50 milliGRAM(s) Oral daily  sodium bicarbonate 650 milliGRAM(s) Oral every 12 hours  trimethoprim   80 mG/sulfamethoxazole 400 mG 1 Tablet(s) Oral <User Schedule>    MEDICATIONS  (PRN):  acetaminophen     Tablet .. 650 milliGRAM(s) Oral every 6 hours PRN Temp greater or equal to 38C (100.4F), Mild Pain (1 - 3)  aluminum hydroxide/magnesium hydroxide/simethicone Suspension 30 milliLiter(s) Oral every 4 hours PRN Dyspepsia  melatonin 3 milliGRAM(s) Oral at bedtime PRN Insomnia  ondansetron Injectable 4 milliGRAM(s) IV Push every 8 hours PRN Nausea and/or Vomiting  polyethylene glycol 3350 17 Gram(s) Oral two times a day PRN Constipation  senna 2 Tablet(s) Oral at bedtime PRN Constipation        T(F): 97.7 (06-04-23 @ 05:14), Max: 98.1 (06-03-23 @ 17:41)  HR: 61 (06-04-23 @ 05:14) (61 - 76)  BP: 127/65 (06-04-23 @ 05:14) (121/75 - 151/93)  BP(mean): --  RR: 17 (06-04-23 @ 05:14) (16 - 17)  SpO2: 100% (06-04-23 @ 05:14) (100% - 100%)    PHYSICAL EXAM:     GENERAL: NAD, lying in bed comfortably  HEAD:  Atraumatic, Normocephalic  EYES: EOMI, PERRLA, conjunctiva and sclera clear, no nystagmus noted  ENT: Moist mucous membranes,   NECK: Supple, No JVD, trachea midline  CHEST/LUNG: Clear to auscultation bilaterally; No rales, rhonchi, wheezing, or rubs. Unlabored respirations  HEART: Regular rate and rhythm; No murmurs, rubs, or gallops, normal S1/S2  ABDOMEN: normal bowel sounds; Soft, nontender, nondistended, no organomegaly   EXTREMITIES:  +santhosh LE 2+ pitting edema to knees improving; 2+ Peripheral Pulses, brisk capillary refill. No clubbing, cyanosis  MSK: No gross deformities noted   Neurological:  A&Ox3, no focal deficits   SKIN: No rashes or lesions  PSYCH: Normal mood, affect    TELEMETRY:    LABS:                        7.2    7.01  )-----------( 246      ( 03 Jun 2023 06:15 )             22.7     06-03    138  |  98  |  46<H>  ----------------------------<  90  3.4<L>   |  30  |  2.55<H>    Ca    8.4      03 Jun 2023 06:15  Phos  4.3     06-03  Mg     1.80     06-03    TPro  4.9<L>  /  Alb  3.0<L>  /  TBili  <0.2  /  DBili  x   /  AST  13  /  ALT  9   /  AlkPhos  34<L>  06-03            Creatinine Trend: 2.55<--, 2.25<--, 2.30<--, 2.31<--, 2.44<--, 2.23<--  I&O's Summary    02 Jun 2023 07:01  -  03 Jun 2023 07:00  --------------------------------------------------------  IN: 0 mL / OUT: 1375 mL / NET: -1375 mL    03 Jun 2023 07:01  -  04 Jun 2023 06:49  --------------------------------------------------------  IN: 0 mL / OUT: 1325 mL / NET: -1325 mL      BNP    RADIOLOGY & ADDITIONAL STUDIES:             Internal Medicine   Celena Newman | PGY-1    OVERNIGHT EVENTS: No acute overnight events.    SUBJECTIVE: Patient was seen and examined at bedside this morning. Pt reports continued improvement in santhosh LE edema. Denies any nausea/vomiting/diarrhea, headache, shortness of breath, abdominal pain or chest pain/palpitations. Patient responding appropriately to questions and able to make needs known. Vital signs/imaging/telemetry events reviewed.       MEDICATIONS  (STANDING):  albumin human 25% IVPB 25 Gram(s) IV Intermittent every 12 hours  buMETAnide Injectable 2 milliGRAM(s) IV Push every 8 hours  cyanocobalamin 1000 MICROGram(s) Oral daily  ergocalciferol 14499 Unit(s) Oral <User Schedule>  folic acid 1 milliGRAM(s) Oral daily  heparin   Injectable 5000 Unit(s) SubCutaneous every 8 hours  hydroxychloroquine 200 milliGRAM(s) Oral daily  nystatin    Suspension 512211 Unit(s) Oral four times a day  pantoprazole    Tablet 40 milliGRAM(s) Oral before breakfast  predniSONE   Tablet 50 milliGRAM(s) Oral daily  sodium bicarbonate 650 milliGRAM(s) Oral every 12 hours  trimethoprim   80 mG/sulfamethoxazole 400 mG 1 Tablet(s) Oral <User Schedule>    MEDICATIONS  (PRN):  acetaminophen     Tablet .. 650 milliGRAM(s) Oral every 6 hours PRN Temp greater or equal to 38C (100.4F), Mild Pain (1 - 3)  aluminum hydroxide/magnesium hydroxide/simethicone Suspension 30 milliLiter(s) Oral every 4 hours PRN Dyspepsia  melatonin 3 milliGRAM(s) Oral at bedtime PRN Insomnia  ondansetron Injectable 4 milliGRAM(s) IV Push every 8 hours PRN Nausea and/or Vomiting  polyethylene glycol 3350 17 Gram(s) Oral two times a day PRN Constipation  senna 2 Tablet(s) Oral at bedtime PRN Constipation        T(F): 97.7 (06-04-23 @ 05:14), Max: 98.1 (06-03-23 @ 17:41)  HR: 61 (06-04-23 @ 05:14) (61 - 76)  BP: 127/65 (06-04-23 @ 05:14) (121/75 - 151/93)  BP(mean): --  RR: 17 (06-04-23 @ 05:14) (16 - 17)  SpO2: 100% (06-04-23 @ 05:14) (100% - 100%)    PHYSICAL EXAM:     GENERAL: NAD, lying in bed comfortably  HEAD:  Atraumatic, Normocephalic  EYES: EOMI, PERRLA, conjunctiva and sclera clear, no nystagmus noted  ENT: Moist mucous membranes,   NECK: Supple, No JVD, trachea midline  CHEST/LUNG: Clear to auscultation bilaterally; No rales, rhonchi, wheezing, or rubs. Unlabored respirations  HEART: Regular rate and rhythm; No murmurs, rubs, or gallops, normal S1/S2  ABDOMEN: normal bowel sounds; Soft, nontender, nondistended, no organomegaly   EXTREMITIES:  +santhosh LE 2+ pitting edema to knees improving; 2+ Peripheral Pulses, brisk capillary refill. No clubbing, cyanosis  MSK: No gross deformities noted   Neurological:  A&Ox3, no focal deficits   SKIN: No rashes or lesions  PSYCH: Normal mood, affect    TELEMETRY:    LABS:                        7.2    7.01  )-----------( 246      ( 03 Jun 2023 06:15 )             22.7     06-03    138  |  98  |  46<H>  ----------------------------<  90  3.4<L>   |  30  |  2.55<H>    Ca    8.4      03 Jun 2023 06:15  Phos  4.3     06-03  Mg     1.80     06-03    TPro  4.9<L>  /  Alb  3.0<L>  /  TBili  <0.2  /  DBili  x   /  AST  13  /  ALT  9   /  AlkPhos  34<L>  06-03            Creatinine Trend: 2.55<--, 2.25<--, 2.30<--, 2.31<--, 2.44<--, 2.23<--  I&O's Summary    02 Jun 2023 07:01  -  03 Jun 2023 07:00  --------------------------------------------------------  IN: 0 mL / OUT: 1375 mL / NET: -1375 mL    03 Jun 2023 07:01  -  04 Jun 2023 06:49  --------------------------------------------------------  IN: 0 mL / OUT: 1325 mL / NET: -1325 mL      BNP    RADIOLOGY & ADDITIONAL STUDIES:

## 2023-06-04 NOTE — PROGRESS NOTE ADULT - ASSESSMENT
47 yo M with PMHx of SLE c/b LN 3/4 w/ recent admission 4/27/23-5/8/2023 for lupus nephritis flare (switched from cellcept to IV cytoxan) p/w b/l LE swelling and hematuria 2/2 lupus nephritis flare vs hemorrhagic cystitis 2/2 cyclophosphamide

## 2023-06-04 NOTE — PROGRESS NOTE ADULT - PROBLEM SELECTOR PLAN 4
Cr baseline 1.5-1.7 11/2022. Pt w/ recent admission for LIZETH. Cr now 2.56.  Pt denies decreased UOP. Pt follows with Dr. Shultz.    -appreciate nephrology recs  - C/w bumex 2mg IV Q8H  - c/w IV steroids  - c/w albumin tid

## 2023-06-05 LAB
ALBUMIN SERPL ELPH-MCNC: 3.1 G/DL — LOW (ref 3.3–5)
ALP SERPL-CCNC: 34 U/L — LOW (ref 40–120)
ALT FLD-CCNC: 8 U/L — SIGNIFICANT CHANGE UP (ref 4–41)
ANION GAP SERPL CALC-SCNC: 14 MMOL/L — SIGNIFICANT CHANGE UP (ref 7–14)
AST SERPL-CCNC: 11 U/L — SIGNIFICANT CHANGE UP (ref 4–40)
BILIRUB SERPL-MCNC: <0.2 MG/DL — SIGNIFICANT CHANGE UP (ref 0.2–1.2)
BLD GP AB SCN SERPL QL: NEGATIVE — SIGNIFICANT CHANGE UP
BUN SERPL-MCNC: 51 MG/DL — HIGH (ref 7–23)
CALCIUM SERPL-MCNC: 8.7 MG/DL — SIGNIFICANT CHANGE UP (ref 8.4–10.5)
CHLORIDE SERPL-SCNC: 99 MMOL/L — SIGNIFICANT CHANGE UP (ref 98–107)
CO2 SERPL-SCNC: 28 MMOL/L — SIGNIFICANT CHANGE UP (ref 22–31)
CREAT SERPL-MCNC: 2.59 MG/DL — HIGH (ref 0.5–1.3)
EGFR: 30 ML/MIN/1.73M2 — LOW
GLUCOSE SERPL-MCNC: 104 MG/DL — HIGH (ref 70–99)
HCT VFR BLD CALC: 25.6 % — LOW (ref 39–50)
HGB BLD-MCNC: 7.7 G/DL — LOW (ref 13–17)
MAGNESIUM SERPL-MCNC: 1.9 MG/DL — SIGNIFICANT CHANGE UP (ref 1.6–2.6)
MCHC RBC-ENTMCNC: 26.1 PG — LOW (ref 27–34)
MCHC RBC-ENTMCNC: 30.1 GM/DL — LOW (ref 32–36)
MCV RBC AUTO: 86.8 FL — SIGNIFICANT CHANGE UP (ref 80–100)
NRBC # BLD: 0 /100 WBCS — SIGNIFICANT CHANGE UP (ref 0–0)
NRBC # FLD: 0 K/UL — SIGNIFICANT CHANGE UP (ref 0–0)
PHOSPHATE SERPL-MCNC: 4.8 MG/DL — HIGH (ref 2.5–4.5)
PLATELET # BLD AUTO: 293 K/UL — SIGNIFICANT CHANGE UP (ref 150–400)
POTASSIUM SERPL-MCNC: 3.4 MMOL/L — LOW (ref 3.5–5.3)
POTASSIUM SERPL-SCNC: 3.4 MMOL/L — LOW (ref 3.5–5.3)
PROT SERPL-MCNC: 5.2 G/DL — LOW (ref 6–8.3)
RBC # BLD: 2.95 M/UL — LOW (ref 4.2–5.8)
RBC # FLD: 12.9 % — SIGNIFICANT CHANGE UP (ref 10.3–14.5)
RH IG SCN BLD-IMP: POSITIVE — SIGNIFICANT CHANGE UP
SODIUM SERPL-SCNC: 141 MMOL/L — SIGNIFICANT CHANGE UP (ref 135–145)
WBC # BLD: 7.91 K/UL — SIGNIFICANT CHANGE UP (ref 3.8–10.5)
WBC # FLD AUTO: 7.91 K/UL — SIGNIFICANT CHANGE UP (ref 3.8–10.5)

## 2023-06-05 PROCEDURE — 99233 SBSQ HOSP IP/OBS HIGH 50: CPT

## 2023-06-05 PROCEDURE — 99233 SBSQ HOSP IP/OBS HIGH 50: CPT | Mod: GC

## 2023-06-05 RX ORDER — BUMETANIDE 0.25 MG/ML
2 INJECTION INTRAMUSCULAR; INTRAVENOUS
Refills: 0 | Status: DISCONTINUED | OUTPATIENT
Start: 2023-06-06 | End: 2023-06-06

## 2023-06-05 RX ORDER — ALBUMIN HUMAN 25 %
25 VIAL (ML) INTRAVENOUS EVERY 12 HOURS
Refills: 0 | Status: DISCONTINUED | OUTPATIENT
Start: 2023-06-05 | End: 2023-06-06

## 2023-06-05 RX ORDER — POTASSIUM CHLORIDE 20 MEQ
20 PACKET (EA) ORAL
Refills: 0 | Status: COMPLETED | OUTPATIENT
Start: 2023-06-05 | End: 2023-06-05

## 2023-06-05 RX ADMIN — Medication 100 GRAM(S): at 17:05

## 2023-06-05 RX ADMIN — BUMETANIDE 2 MILLIGRAM(S): 0.25 INJECTION INTRAMUSCULAR; INTRAVENOUS at 05:57

## 2023-06-05 RX ADMIN — Medication 1 TABLET(S): at 05:57

## 2023-06-05 RX ADMIN — Medication 20 MILLIEQUIVALENT(S): at 12:14

## 2023-06-05 RX ADMIN — HEPARIN SODIUM 5000 UNIT(S): 5000 INJECTION INTRAVENOUS; SUBCUTANEOUS at 13:08

## 2023-06-05 RX ADMIN — Medication 1 MILLIGRAM(S): at 12:15

## 2023-06-05 RX ADMIN — Medication 50 MILLIGRAM(S): at 05:58

## 2023-06-05 RX ADMIN — Medication 100 GRAM(S): at 06:21

## 2023-06-05 RX ADMIN — Medication 650 MILLIGRAM(S): at 17:07

## 2023-06-05 RX ADMIN — BUMETANIDE 2 MILLIGRAM(S): 0.25 INJECTION INTRAMUSCULAR; INTRAVENOUS at 22:19

## 2023-06-05 RX ADMIN — Medication 200 MILLIGRAM(S): at 12:15

## 2023-06-05 RX ADMIN — BUMETANIDE 2 MILLIGRAM(S): 0.25 INJECTION INTRAMUSCULAR; INTRAVENOUS at 13:02

## 2023-06-05 RX ADMIN — Medication 500000 UNIT(S): at 00:29

## 2023-06-05 RX ADMIN — Medication 650 MILLIGRAM(S): at 05:58

## 2023-06-05 RX ADMIN — PREGABALIN 1000 MICROGRAM(S): 225 CAPSULE ORAL at 12:14

## 2023-06-05 RX ADMIN — PANTOPRAZOLE SODIUM 40 MILLIGRAM(S): 20 TABLET, DELAYED RELEASE ORAL at 05:58

## 2023-06-05 RX ADMIN — HEPARIN SODIUM 5000 UNIT(S): 5000 INJECTION INTRAVENOUS; SUBCUTANEOUS at 22:28

## 2023-06-05 RX ADMIN — Medication 20 MILLIEQUIVALENT(S): at 14:29

## 2023-06-05 RX ADMIN — Medication 20 MILLIEQUIVALENT(S): at 09:33

## 2023-06-05 RX ADMIN — Medication 500000 UNIT(S): at 17:06

## 2023-06-05 RX ADMIN — Medication 500000 UNIT(S): at 12:15

## 2023-06-05 RX ADMIN — HEPARIN SODIUM 5000 UNIT(S): 5000 INJECTION INTRAVENOUS; SUBCUTANEOUS at 05:59

## 2023-06-05 RX ADMIN — Medication 500000 UNIT(S): at 05:57

## 2023-06-05 RX ADMIN — Medication 500000 UNIT(S): at 23:26

## 2023-06-05 NOTE — PROGRESS NOTE ADULT - SUBJECTIVE AND OBJECTIVE BOX
[facility] DIVISION OF KIDNEY DISEASES AND HYPERTENSION -- FOLLOW UP NOTE  --------------------------------------------------------------------------------  Chief Complaint: Class 4 Lupus nephritis, volume overload LIZETH    24 hour events/subjective:  Edema is improved. No complaints.       PAST HISTORY  --------------------------------------------------------------------------------  No significant changes to PMH, PSH, FHx, SHx, unless otherwise noted    ALLERGIES & MEDICATIONS  --------------------------------------------------------------------------------  Allergies    No Known Allergies    Intolerances      Standing Inpatient Medications  albumin human 25% IVPB 25 Gram(s) IV Intermittent every 12 hours  buMETAnide Injectable 2 milliGRAM(s) IV Push every 8 hours  cyanocobalamin 1000 MICROGram(s) Oral daily  ergocalciferol 02562 Unit(s) Oral <User Schedule>  folic acid 1 milliGRAM(s) Oral daily  heparin   Injectable 5000 Unit(s) SubCutaneous every 8 hours  hydroxychloroquine 200 milliGRAM(s) Oral daily  nystatin    Suspension 134116 Unit(s) Oral four times a day  pantoprazole    Tablet 40 milliGRAM(s) Oral before breakfast  predniSONE   Tablet 50 milliGRAM(s) Oral daily  sodium bicarbonate 650 milliGRAM(s) Oral every 12 hours  trimethoprim   80 mG/sulfamethoxazole 400 mG 1 Tablet(s) Oral <User Schedule>    PRN Inpatient Medications  acetaminophen     Tablet .. 650 milliGRAM(s) Oral every 6 hours PRN  aluminum hydroxide/magnesium hydroxide/simethicone Suspension 30 milliLiter(s) Oral every 4 hours PRN  melatonin 3 milliGRAM(s) Oral at bedtime PRN  ondansetron Injectable 4 milliGRAM(s) IV Push every 8 hours PRN  polyethylene glycol 3350 17 Gram(s) Oral two times a day PRN  senna 2 Tablet(s) Oral at bedtime PRN      VITALS/PHYSICAL EXAM  --------------------------------------------------------------------------------  T(C): 36.9 (06-05-23 @ 12:57), Max: 36.9 (06-05-23 @ 05:56)  HR: 77 (06-05-23 @ 12:57) (61 - 77)  BP: 150/93 (06-05-23 @ 12:57) (129/70 - 150/93)  RR: 17 (06-05-23 @ 12:57) (17 - 18)  SpO2: 100% (06-05-23 @ 12:57) (99% - 100%)  Wt(kg): --        06-04-23 @ 07:01  -  06-05-23 @ 07:00  --------------------------------------------------------  IN: 0 mL / OUT: 750 mL / NET: -750 mL    06-05-23 @ 07:01  -  06-05-23 @ 15:33  --------------------------------------------------------  IN: 0 mL / OUT: 375 mL / NET: -375 mL      Physical Exam:              Gen: NAD, well-appearing  	HEENT: PERRL, supple neck, clear oropharynx  	Pulm: CTA B/L  	CV: RRR, S1S2; no rub  	Back: No spinal or CVA tenderness  	Abd: +BS, soft, nontender/nondistended  	UE: Warm, FROM, no clubbing, intact strength; no edema; no asterixis  	LE: Warm, FROM, no clubbing, intact strength; + edema better  	Neuro: No focal deficits, intact gait  	Psych: Normal affect and mood  	Skin: Warm, without rashes       LABS/STUDIES  --------------------------------------------------------------------------------              7.7    7.91  >-----------<  293      [06-05-23 @ 06:35]              25.6     141  |  99  |  51  ----------------------------<  104      [06-05-23 @ 06:35]  3.4   |  28  |  2.59        Ca     8.7     [06-05-23 @ 06:35]      Mg     1.90     [06-05-23 @ 06:35]      Phos  4.8     [06-05-23 @ 06:35]    TPro  5.2  /  Alb  3.1  /  TBili  <0.2  /  DBili  x   /  AST  11  /  ALT  8   /  AlkPhos  34  [06-05-23 @ 06:35]          Creatinine Trend:  SCr 2.59 [06-05 @ 06:35]  SCr 2.44 [06-04 @ 07:27]  SCr 2.55 [06-03 @ 06:15]  SCr 2.25 [06-02 @ 06:30]  SCr 2.30 [06-01 @ 06:43]    Urinalysis - [06-01-23 @ 07:34]      Color Colorless / Appearance Clear / SG 1.010 / pH 7.0      Gluc Negative / Ketone Negative  / Bili Negative / Urobili <2 mg/dL       Blood Large / Protein 300 mg/dL / Leuk Est Negative / Nitrite Negative      RBC 93 / WBC 8 / Hyaline 1 / Gran  / Sq Epi  / Non Sq Epi  / Bacteria Negative      Iron 78, TIBC 172, %sat 45      [04-28-23 @ 09:08]  Ferritin 537      [04-28-23 @ 09:08]  PTH -- (Ca --)      [05-03-23 @ 06:33]   28  Vitamin D (25OH) 6.8      [05-03-23 @ 06:33]      dsDNA 13      [05-28-23 @ 06:15]  C3 Complement 86      [05-28-23 @ 06:15]  C4 Complement 27      [05-28-23 @ 06:15]

## 2023-06-05 NOTE — DIETITIAN INITIAL EVALUATION ADULT - OTHER INFO
48 year old male with a PMH of SLE c/b lupus nephritis w/ recent admission 4/27/23-5/8/2023 for lupus nephritis flare (switched from cellcept to IV cytoxan) p/w b/l LE swelling and hematuria 2/2 lupus nephritis flare vs hemorrhagic cystitis 2/2 cyclophosphamide per chart.    Patient reporting good appetite. Noted w/ 1 intake % per RN flow sheet. No GI distress or chewing/swallowing difficulties reported. Has no food allergies. Patient reporting no weight changes PTA. Per RD note (4/29) noted w/ weight 62.2 kg. ABW is 67.8 kg (5/27) per chart indicating a +9% weight gain x 1 month, will monitor. Noted w/ +2 L/R foot and ankle edema per RN flow sheet so weight change expected. No pressure injuries noted per RN flow sheet. Noted w/ potassium being within normal limits/low at times and elevated phosphorus per chart.    Reviewed phosphorus nutrition therapy w/ patient. Patient was previously educated on diet as well in previous admission.

## 2023-06-05 NOTE — CHART NOTE - NSCHARTNOTEFT_GEN_A_CORE
Rheumatology    48M with h/o SLE c/b LN 3/4 w/ recent admission 4/27/23-5/8/2023 for lupus nephritis flare (switched from cellcept to IV cytoxan) p/w b/l LE swelling and hematuria. States having significant swelling to both legs over past 3 weeks despite having his lasix dose increased  up to 40mg BID. Noticed urine had reddish color so son spoke with his Rheumatologist, Dr. Nilton Le, who advised to come to ED for eval     #Hematuria:  UA w/ large blood, >50 RBC, moderate leuk esterase. Received 2 doses IV cytoxan w/ mesna (5/5/23, 5/19/23).   Differential includes cytoxan induced w/ hemorrhagic cystitis vs UTI vs kidney stone vs prostatitis.  CT negative for renal stone and prostatitis. Urine culture negative     #Nephrotic range proteinuria   Most likely 2/2 proteinuria 2/2 kidney disease.   TTE wnl 4/2023.   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   -CW prednisone 50 mg daily. His proteinuria is very resistant and we will consider Calcineurin inhibitor as outpatient. 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 Proteinuria related w/chronic damage plus active mesangial cell damage     -Patient will follow w/urologist for cytoscopy   -Diuretics managed by nephrology team  -Patient can follow with Dr. Nilton Le as outpatient, we will schedule an appointment once patient is being DC     Discussed with Attending Dr. Tani Ivey MD  PGY-4 Rheumatology Fellow  Pager: [phone]   Available in teams Rheumatology    48M with h/o SLE c/b LN 3/4 w/ recent admission 4/27/23-5/8/2023 for lupus nephritis flare (switched from cellcept to IV cytoxan) p/w b/l LE swelling and hematuria. States having significant swelling to both legs over past 3 weeks despite having his lasix dose increased  up to 40mg BID. Noticed urine had reddish color so son spoke with his Rheumatologist, Dr. Nilton Le, who advised to come to ED for eval     #Hematuria:  UA w/ large blood, >50 RBC, moderate leuk esterase. Received 2 doses IV cytoxan w/ mesna (5/5/23, 5/19/23).   Differential includes cytoxan induced w/ hemorrhagic cystitis vs UTI vs kidney stone vs prostatitis.  CT negative for renal stone and prostatitis. Urine culture negative     #Nephrotic range proteinuria   Most likely 2/2 proteinuria 2/2 kidney disease.   TTE wnl 4/2023.   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   -CW prednisone 50 mg daily. His proteinuria is very resistant and we will consider Calcineurin inhibitor as outpatient. 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 Proteinuria related w/chronic damage plus active mesangial cell damage     -Patient will follow w/urologist for cytoscopy   -Diuretics managed by nephrology team  -Patient can follow with Dr. Nilton Le as outpatient, we will schedule an appointment once patient is being DC     Discussed with Attending Dr. Tani Ivey MD  PGY-4 Rheumatology Fellow  Pager: [phone]   Available in teams    Agree with above   Simi Freire MD

## 2023-06-05 NOTE — DIETITIAN INITIAL EVALUATION ADULT - ADD RECOMMEND
D/c potassium restriction from diet given levels being within normal limits/low at times. Continue micronutrient supplementation. Document PO intake to monitor trend.

## 2023-06-05 NOTE — DIETITIAN INITIAL EVALUATION ADULT - PERTINENT LABORATORY DATA
06-05    141  |  99  |  51<H>  ----------------------------<  104<H>  3.4<L>   |  28  |  2.59<H>    Ca    8.7      05 Jun 2023 06:35  Phos  4.8     06-05  Mg     1.90     06-05    TPro  5.2<L>  /  Alb  3.1<L>  /  TBili  <0.2  /  DBili  x   /  AST  11  /  ALT  8   /  AlkPhos  34<L>  06-05  A1C with Estimated Average Glucose Result: 5.7 % (10-14-22 @ 05:31)

## 2023-06-05 NOTE — DIETITIAN INITIAL EVALUATION ADULT - PROBLEM SELECTOR PLAN 3
Pt w/ bilateral leg swelling, takes lasix 40 bid.    -start 2 bumex IV bid  -appreciate nephrology recs

## 2023-06-05 NOTE — DIETITIAN INITIAL EVALUATION ADULT - PERTINENT MEDS FT
MEDICATIONS  (STANDING):  albumin human 25% IVPB 25 Gram(s) IV Intermittent every 12 hours  buMETAnide Injectable 2 milliGRAM(s) IV Push every 8 hours  cyanocobalamin 1000 MICROGram(s) Oral daily  ergocalciferol 08712 Unit(s) Oral <User Schedule>  folic acid 1 milliGRAM(s) Oral daily  heparin   Injectable 5000 Unit(s) SubCutaneous every 8 hours  hydroxychloroquine 200 milliGRAM(s) Oral daily  nystatin    Suspension 153197 Unit(s) Oral four times a day  pantoprazole    Tablet 40 milliGRAM(s) Oral before breakfast  potassium chloride    Tablet ER 20 milliEquivalent(s) Oral every 2 hours  predniSONE   Tablet 50 milliGRAM(s) Oral daily  sodium bicarbonate 650 milliGRAM(s) Oral every 12 hours  trimethoprim   80 mG/sulfamethoxazole 400 mG 1 Tablet(s) Oral <User Schedule>    MEDICATIONS  (PRN):  acetaminophen     Tablet .. 650 milliGRAM(s) Oral every 6 hours PRN Temp greater or equal to 38C (100.4F), Mild Pain (1 - 3)  aluminum hydroxide/magnesium hydroxide/simethicone Suspension 30 milliLiter(s) Oral every 4 hours PRN Dyspepsia  melatonin 3 milliGRAM(s) Oral at bedtime PRN Insomnia  ondansetron Injectable 4 milliGRAM(s) IV Push every 8 hours PRN Nausea and/or Vomiting  polyethylene glycol 3350 17 Gram(s) Oral two times a day PRN Constipation  senna 2 Tablet(s) Oral at bedtime PRN Constipation

## 2023-06-05 NOTE — PROGRESS NOTE ADULT - ASSESSMENT
49 yo male who initially presented to dermatology with a rash and work up revealed MÓNICA titer 1:1280 (speckled) - diagnosed with SLE and acute cutaneous lupus. urinalysis showed hematuria and 1.5 gms of proteinuria. 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 He was initiated on Cellcept, prednisone and Plaquenil but was non compliant     Presented in October 2022 to [facility] with worsening LIZETH- Kidney biopsy repeated October 2022- Lupus Nephritis Class 4- activity index 15/24, chronicity 3/12 ). Received obinutuzumab 10/21/22 and 11/1/2022 and restarted on Cellcept - now at 1500 bid. his creatinine did not really improve and stayed at 1.7-1.8 mg/dl.     5/2/2023- persistence of renal disease/ hematuria/ proteinuria/ elevated creatinine   Repeat kidney biopsy 5/2/23 on this admission showed proliferative LN class IV-G with 5 out of 8 non-globally sclerosed glomeruli with cellular and fibrocellular crescents; moderate tubulointerstitial inflammation, ~30% IFTA. Activity index 13/24 and chronicity index 7/12. He had a previous biopsy in Oct 2022, activity index was 15/24 and chronicity was 3/12 IFTA 15-20%. Mycophenolic acid level wnl indicating compliance.     Received iv solumedrol 1g x3 (5/4-5/6) then reduce to 1mg/kg IV solu-medrol 60mg/day starting on 5/7. Patient discharged on 5/8/23 on prednisone 60mg to be further tapered outpatient.   -discharged on bactrim and PPI while on high dose steroids  -first dose of Cytoxan given 5/5/23 with second dose on 5/18.   -Quant TB negative outpatient 2/2023; Hep B negative         now presenting with fluid overload/hematuria on this admission    - Please continue Bumex 2 mg IV and switch to PO tomorrow  - 25 gms of 25% albumin every 12 hours   - continue po Prednisone  - Would hold off further cytoxan as hematuria likely hemorrhagic cystitis.   - Usually follows with Dr. Hayde Shultz and unfortunately he is running out of options to treat his disease. Once renal function is better, Voclosporin/ Tacro can be used   - She may consider clinical trial ( wont be eligible for car-t as b cell depleted)    Any questions please contact fellow:  Courtney Rangel MD  Office- [phone]  After 5pm and on weekends please contact renal fellow on call

## 2023-06-05 NOTE — DIETITIAN INITIAL EVALUATION ADULT - PROBLEM SELECTOR PLAN 6
Anemia of chronic disease. Appears to be at baseline Hgb ~8.    - T&S iso hematuria  - Iron Studies consistent with AoCD  - B12, Folate normal

## 2023-06-05 NOTE — PROGRESS NOTE ADULT - ASSESSMENT
49 yo M with PMHx of SLE c/b LN 3/4 w/ recent admission 4/27/23-5/8/2023 for lupus nephritis flare (switched from cellcept to IV cytoxan) p/w b/l LE swelling and hematuria 2/2 lupus nephritis flare vs hemorrhagic cystitis 2/2 cyclophosphamide

## 2023-06-05 NOTE — DIETITIAN INITIAL EVALUATION ADULT - PROBLEM SELECTOR PLAN 4
Cr baseline 1.5-1.7 11/2022. Pt w/ recent admission for LIZETH. Cr now 2.56.  Pt denies decreased UOP. Pt follows with Dr. Shultz.    -appreciate nephrology recs

## 2023-06-05 NOTE — DIETITIAN INITIAL EVALUATION ADULT - ORAL INTAKE PTA/DIET HISTORY
Patient seen for assessment. Patient known to writer from previous admission. Information obtained w/ help from  #[id number], patient able to make needs known in english as well. Reports variable appetite due to nausea, which was going on for a bit per RD note (4/29). Monitors potassium intake at home.

## 2023-06-05 NOTE — PROGRESS NOTE ADULT - SUBJECTIVE AND OBJECTIVE BOX
Internal Medicine   Celena Newman | PGY-1    OVERNIGHT EVENTS: No acute overnight events.    SUBJECTIVE:       MEDICATIONS  (STANDING):  buMETAnide Injectable 2 milliGRAM(s) IV Push every 8 hours  cyanocobalamin 1000 MICROGram(s) Oral daily  ergocalciferol 27065 Unit(s) Oral <User Schedule>  folic acid 1 milliGRAM(s) Oral daily  heparin   Injectable 5000 Unit(s) SubCutaneous every 8 hours  hydroxychloroquine 200 milliGRAM(s) Oral daily  nystatin    Suspension 390123 Unit(s) Oral four times a day  pantoprazole    Tablet 40 milliGRAM(s) Oral before breakfast  predniSONE   Tablet 50 milliGRAM(s) Oral daily  sodium bicarbonate 650 milliGRAM(s) Oral every 12 hours  trimethoprim   80 mG/sulfamethoxazole 400 mG 1 Tablet(s) Oral <User Schedule>    MEDICATIONS  (PRN):  acetaminophen     Tablet .. 650 milliGRAM(s) Oral every 6 hours PRN Temp greater or equal to 38C (100.4F), Mild Pain (1 - 3)  aluminum hydroxide/magnesium hydroxide/simethicone Suspension 30 milliLiter(s) Oral every 4 hours PRN Dyspepsia  melatonin 3 milliGRAM(s) Oral at bedtime PRN Insomnia  ondansetron Injectable 4 milliGRAM(s) IV Push every 8 hours PRN Nausea and/or Vomiting  polyethylene glycol 3350 17 Gram(s) Oral two times a day PRN Constipation  senna 2 Tablet(s) Oral at bedtime PRN Constipation        T(F): 98.4 (06-05-23 @ 05:56), Max: 98.4 (06-05-23 @ 05:56)  HR: 70 (06-05-23 @ 05:56) (61 - 75)  BP: 129/70 (06-05-23 @ 05:56) (129/70 - 147/91)  BP(mean): --  RR: 17 (06-05-23 @ 05:56) (17 - 18)  SpO2: 99% (06-05-23 @ 05:56) (99% - 100%)    PHYSICAL EXAM:     GENERAL: NAD, lying in bed comfortably  HEAD:  Atraumatic, Normocephalic  EYES: EOMI, PERRLA, conjunctiva and sclera clear, no nystagmus noted  ENT: Moist mucous membranes,   NECK: Supple, No JVD, trachea midline  CHEST/LUNG: Clear to auscultation bilaterally; No rales, rhonchi, wheezing, or rubs. Unlabored respirations  HEART: Regular rate and rhythm; No murmurs, rubs, or gallops, normal S1/S2  ABDOMEN: normal bowel sounds; Soft, nontender, nondistended, no organomegaly   EXTREMITIES:  2+ Peripheral Pulses, brisk capillary refill. No clubbing, cyanosis, or edema  MSK: No gross deformities noted   Neurological:  A&Ox3, no focal deficits   SKIN: No rashes or lesions  PSYCH: Normal mood, affect     TELEMETRY:    LABS:                        7.1    6.57  )-----------( 276      ( 04 Jun 2023 07:27 )             23.0     06-04    142  |  100  |  47<H>  ----------------------------<  90  3.9   |  31  |  2.44<H>    Ca    8.7      04 Jun 2023 07:27  Phos  4.4     06-04  Mg     2.10     06-04    TPro  5.1<L>  /  Alb  3.5  /  TBili  <0.2  /  DBili  x   /  AST  12  /  ALT  5   /  AlkPhos  32<L>  06-04            Creatinine Trend: 2.44<--, 2.55<--, 2.25<--, 2.30<--, 2.31<--, 2.44<--  I&O's Summary    04 Jun 2023 07:01  -  05 Jun 2023 07:00  --------------------------------------------------------  IN: 0 mL / OUT: 750 mL / NET: -750 mL      BNP    RADIOLOGY & ADDITIONAL STUDIES:             Internal Medicine   Celena Ro | PGY-1    OVERNIGHT EVENTS: No acute overnight events.    SUBJECTIVE: Patient was seen and examined at bedside this morning. Pt reports improvement in santhosh LE edema. Denies any nausea/vomiting/diarrhea, headache, shortness of breath, abdominal pain or chest pain/palpitations. Patient responding appropriately to questions and able to make needs known. Vital signs/imaging/telemetry events reviewed.       MEDICATIONS  (STANDING):  buMETAnide Injectable 2 milliGRAM(s) IV Push every 8 hours  cyanocobalamin 1000 MICROGram(s) Oral daily  ergocalciferol 71987 Unit(s) Oral <User Schedule>  folic acid 1 milliGRAM(s) Oral daily  heparin   Injectable 5000 Unit(s) SubCutaneous every 8 hours  hydroxychloroquine 200 milliGRAM(s) Oral daily  nystatin    Suspension 925023 Unit(s) Oral four times a day  pantoprazole    Tablet 40 milliGRAM(s) Oral before breakfast  predniSONE   Tablet 50 milliGRAM(s) Oral daily  sodium bicarbonate 650 milliGRAM(s) Oral every 12 hours  trimethoprim   80 mG/sulfamethoxazole 400 mG 1 Tablet(s) Oral <User Schedule>    MEDICATIONS  (PRN):  acetaminophen     Tablet .. 650 milliGRAM(s) Oral every 6 hours PRN Temp greater or equal to 38C (100.4F), Mild Pain (1 - 3)  aluminum hydroxide/magnesium hydroxide/simethicone Suspension 30 milliLiter(s) Oral every 4 hours PRN Dyspepsia  melatonin 3 milliGRAM(s) Oral at bedtime PRN Insomnia  ondansetron Injectable 4 milliGRAM(s) IV Push every 8 hours PRN Nausea and/or Vomiting  polyethylene glycol 3350 17 Gram(s) Oral two times a day PRN Constipation  senna 2 Tablet(s) Oral at bedtime PRN Constipation        T(F): 98.4 (06-05-23 @ 05:56), Max: 98.4 (06-05-23 @ 05:56)  HR: 70 (06-05-23 @ 05:56) (61 - 75)  BP: 129/70 (06-05-23 @ 05:56) (129/70 - 147/91)  BP(mean): --  RR: 17 (06-05-23 @ 05:56) (17 - 18)  SpO2: 99% (06-05-23 @ 05:56) (99% - 100%)    PHYSICAL EXAM:     GENERAL: NAD, lying in bed comfortably  HEAD:  Atraumatic, Normocephalic  EYES: EOMI, PERRLA, conjunctiva and sclera clear, no nystagmus noted  ENT: Moist mucous membranes,   NECK: Supple, No JVD, trachea midline  CHEST/LUNG: Clear to auscultation bilaterally; No rales, rhonchi, wheezing, or rubs. Unlabored respirations  HEART: Regular rate and rhythm; No murmurs, rubs, or gallops, normal S1/S2  ABDOMEN: normal bowel sounds; Soft, nontender, nondistended, no organomegaly   EXTREMITIES:  +santhosh LE 2+ pitting edema to knees improving; 2+ Peripheral Pulses, brisk capillary refill. No clubbing, cyanosis  MSK: No gross deformities noted   Neurological:  A&Ox3, no focal deficits   SKIN: No rashes or lesions  PSYCH: Normal mood, affect    TELEMETRY:    LABS:                        7.1    6.57  )-----------( 276      ( 04 Jun 2023 07:27 )             23.0     06-04    142  |  100  |  47<H>  ----------------------------<  90  3.9   |  31  |  2.44<H>    Ca    8.7      04 Jun 2023 07:27  Phos  4.4     06-04  Mg     2.10     06-04    TPro  5.1<L>  /  Alb  3.5  /  TBili  <0.2  /  DBili  x   /  AST  12  /  ALT  5   /  AlkPhos  32<L>  06-04            Creatinine Trend: 2.44<--, 2.55<--, 2.25<--, 2.30<--, 2.31<--, 2.44<--  I&O's Summary    04 Jun 2023 07:01  -  05 Jun 2023 07:00  --------------------------------------------------------  IN: 0 mL / OUT: 750 mL / NET: -750 mL      BNP    RADIOLOGY & ADDITIONAL STUDIES:

## 2023-06-06 ENCOUNTER — TRANSCRIPTION ENCOUNTER (OUTPATIENT)
Age: 48
End: 2023-06-06

## 2023-06-06 ENCOUNTER — APPOINTMENT (OUTPATIENT)
Dept: RHEUMATOLOGY | Facility: CLINIC | Age: 48
End: 2023-06-06

## 2023-06-06 VITALS — HEART RATE: 74 BPM | SYSTOLIC BLOOD PRESSURE: 131 MMHG | DIASTOLIC BLOOD PRESSURE: 86 MMHG

## 2023-06-06 LAB
ALBUMIN SERPL ELPH-MCNC: 3.5 G/DL — SIGNIFICANT CHANGE UP (ref 3.3–5)
ALP SERPL-CCNC: 31 U/L — LOW (ref 40–120)
ALT FLD-CCNC: 9 U/L — SIGNIFICANT CHANGE UP (ref 4–41)
ANION GAP SERPL CALC-SCNC: 13 MMOL/L — SIGNIFICANT CHANGE UP (ref 7–14)
AST SERPL-CCNC: 12 U/L — SIGNIFICANT CHANGE UP (ref 4–40)
BILIRUB SERPL-MCNC: <0.2 MG/DL — SIGNIFICANT CHANGE UP (ref 0.2–1.2)
BUN SERPL-MCNC: 53 MG/DL — HIGH (ref 7–23)
CALCIUM SERPL-MCNC: 9 MG/DL — SIGNIFICANT CHANGE UP (ref 8.4–10.5)
CHLORIDE SERPL-SCNC: 99 MMOL/L — SIGNIFICANT CHANGE UP (ref 98–107)
CO2 SERPL-SCNC: 27 MMOL/L — SIGNIFICANT CHANGE UP (ref 22–31)
CREAT SERPL-MCNC: 2.84 MG/DL — HIGH (ref 0.5–1.3)
EGFR: 27 ML/MIN/1.73M2 — LOW
GLUCOSE SERPL-MCNC: 86 MG/DL — SIGNIFICANT CHANGE UP (ref 70–99)
HCT VFR BLD CALC: 24.9 % — LOW (ref 39–50)
HGB BLD-MCNC: 7.7 G/DL — LOW (ref 13–17)
MAGNESIUM SERPL-MCNC: 1.9 MG/DL — SIGNIFICANT CHANGE UP (ref 1.6–2.6)
MCHC RBC-ENTMCNC: 27.3 PG — SIGNIFICANT CHANGE UP (ref 27–34)
MCHC RBC-ENTMCNC: 30.9 GM/DL — LOW (ref 32–36)
MCV RBC AUTO: 88.3 FL — SIGNIFICANT CHANGE UP (ref 80–100)
NRBC # BLD: 0 /100 WBCS — SIGNIFICANT CHANGE UP (ref 0–0)
NRBC # FLD: 0 K/UL — SIGNIFICANT CHANGE UP (ref 0–0)
PHOSPHATE SERPL-MCNC: 4.5 MG/DL — SIGNIFICANT CHANGE UP (ref 2.5–4.5)
PLATELET # BLD AUTO: 318 K/UL — SIGNIFICANT CHANGE UP (ref 150–400)
POTASSIUM SERPL-MCNC: 3.5 MMOL/L — SIGNIFICANT CHANGE UP (ref 3.5–5.3)
POTASSIUM SERPL-SCNC: 3.5 MMOL/L — SIGNIFICANT CHANGE UP (ref 3.5–5.3)
PROT SERPL-MCNC: 5.4 G/DL — LOW (ref 6–8.3)
RBC # BLD: 2.82 M/UL — LOW (ref 4.2–5.8)
RBC # FLD: 13.1 % — SIGNIFICANT CHANGE UP (ref 10.3–14.5)
SODIUM SERPL-SCNC: 139 MMOL/L — SIGNIFICANT CHANGE UP (ref 135–145)
WBC # BLD: 7.4 K/UL — SIGNIFICANT CHANGE UP (ref 3.8–10.5)
WBC # FLD AUTO: 7.4 K/UL — SIGNIFICANT CHANGE UP (ref 3.8–10.5)

## 2023-06-06 PROCEDURE — 99232 SBSQ HOSP IP/OBS MODERATE 35: CPT | Mod: GC

## 2023-06-06 PROCEDURE — 99233 SBSQ HOSP IP/OBS HIGH 50: CPT | Mod: GC

## 2023-06-06 RX ORDER — BUMETANIDE 0.25 MG/ML
1 INJECTION INTRAMUSCULAR; INTRAVENOUS
Qty: 28 | Refills: 0
Start: 2023-06-06 | End: 2023-06-19

## 2023-06-06 RX ORDER — CYCLOPHOSPHAMIDE 100 MG
0 VIAL (EA) INTRAVENOUS
Refills: 0 | DISCHARGE

## 2023-06-06 RX ORDER — FUROSEMIDE 40 MG
1 TABLET ORAL
Refills: 0 | DISCHARGE

## 2023-06-06 RX ORDER — MYCOPHENOLATE MOFETIL 250 MG/1
3 CAPSULE ORAL
Refills: 0 | DISCHARGE

## 2023-06-06 RX ORDER — PANTOPRAZOLE SODIUM 20 MG/1
1 TABLET, DELAYED RELEASE ORAL
Qty: 30 | Refills: 0
Start: 2023-06-06 | End: 2023-07-05

## 2023-06-06 RX ORDER — ALBUMIN HUMAN 25 %
50 VIAL (ML) INTRAVENOUS ONCE
Refills: 0 | Status: DISCONTINUED | OUTPATIENT
Start: 2023-06-06 | End: 2023-06-06

## 2023-06-06 RX ORDER — BUMETANIDE 0.25 MG/ML
1 INJECTION INTRAMUSCULAR; INTRAVENOUS
Qty: 42 | Refills: 0
Start: 2023-06-06 | End: 2023-06-19

## 2023-06-06 RX ORDER — SODIUM BICARBONATE 1 MEQ/ML
2 SYRINGE (ML) INTRAVENOUS
Qty: 120 | Refills: 0
Start: 2023-06-06 | End: 2023-07-05

## 2023-06-06 RX ORDER — ERGOCALCIFEROL 1.25 MG/1
1 CAPSULE ORAL
Qty: 7 | Refills: 0
Start: 2023-06-06 | End: 2023-07-24

## 2023-06-06 RX ADMIN — Medication 1 MILLIGRAM(S): at 11:19

## 2023-06-06 RX ADMIN — Medication 100 GRAM(S): at 05:30

## 2023-06-06 RX ADMIN — Medication 500000 UNIT(S): at 17:29

## 2023-06-06 RX ADMIN — HEPARIN SODIUM 5000 UNIT(S): 5000 INJECTION INTRAVENOUS; SUBCUTANEOUS at 14:52

## 2023-06-06 RX ADMIN — PANTOPRAZOLE SODIUM 40 MILLIGRAM(S): 20 TABLET, DELAYED RELEASE ORAL at 06:51

## 2023-06-06 RX ADMIN — HEPARIN SODIUM 5000 UNIT(S): 5000 INJECTION INTRAVENOUS; SUBCUTANEOUS at 05:32

## 2023-06-06 RX ADMIN — Medication 650 MILLIGRAM(S): at 17:29

## 2023-06-06 RX ADMIN — BUMETANIDE 2 MILLIGRAM(S): 0.25 INJECTION INTRAMUSCULAR; INTRAVENOUS at 06:51

## 2023-06-06 RX ADMIN — Medication 200 MILLIGRAM(S): at 11:19

## 2023-06-06 RX ADMIN — Medication 500000 UNIT(S): at 11:18

## 2023-06-06 RX ADMIN — BUMETANIDE 2 MILLIGRAM(S): 0.25 INJECTION INTRAMUSCULAR; INTRAVENOUS at 14:56

## 2023-06-06 RX ADMIN — Medication 500000 UNIT(S): at 05:32

## 2023-06-06 RX ADMIN — Medication 650 MILLIGRAM(S): at 05:33

## 2023-06-06 RX ADMIN — PREGABALIN 1000 MICROGRAM(S): 225 CAPSULE ORAL at 11:19

## 2023-06-06 RX ADMIN — Medication 50 MILLIGRAM(S): at 05:32

## 2023-06-06 NOTE — PROGRESS NOTE ADULT - PROBLEM SELECTOR PROBLEM 2
Lupus nephritis

## 2023-06-06 NOTE — PROGRESS NOTE ADULT - SUBJECTIVE AND OBJECTIVE BOX
Internal Medicine   Celena Paty | PGY-1    OVERNIGHT EVENTS: No acute overnight events.    SUBJECTIVE:       MEDICATIONS  (STANDING):  albumin human 25% IVPB 25 Gram(s) IV Intermittent every 12 hours  buMETAnide 2 milliGRAM(s) Oral <User Schedule>  cyanocobalamin 1000 MICROGram(s) Oral daily  ergocalciferol 41540 Unit(s) Oral <User Schedule>  folic acid 1 milliGRAM(s) Oral daily  heparin   Injectable 5000 Unit(s) SubCutaneous every 8 hours  hydroxychloroquine 200 milliGRAM(s) Oral daily  nystatin    Suspension 869828 Unit(s) Oral four times a day  pantoprazole    Tablet 40 milliGRAM(s) Oral before breakfast  predniSONE   Tablet 50 milliGRAM(s) Oral daily  sodium bicarbonate 650 milliGRAM(s) Oral every 12 hours  trimethoprim   80 mG/sulfamethoxazole 400 mG 1 Tablet(s) Oral <User Schedule>    MEDICATIONS  (PRN):  acetaminophen     Tablet .. 650 milliGRAM(s) Oral every 6 hours PRN Temp greater or equal to 38C (100.4F), Mild Pain (1 - 3)  aluminum hydroxide/magnesium hydroxide/simethicone Suspension 30 milliLiter(s) Oral every 4 hours PRN Dyspepsia  melatonin 3 milliGRAM(s) Oral at bedtime PRN Insomnia  ondansetron Injectable 4 milliGRAM(s) IV Push every 8 hours PRN Nausea and/or Vomiting  polyethylene glycol 3350 17 Gram(s) Oral two times a day PRN Constipation  senna 2 Tablet(s) Oral at bedtime PRN Constipation        T(F): 98 (06-06-23 @ 05:25), Max: 98.5 (06-05-23 @ 12:57)  HR: 73 (06-06-23 @ 05:25) (71 - 77)  BP: 141/69 (06-06-23 @ 05:25) (138/89 - 150/93)  BP(mean): --  RR: 17 (06-06-23 @ 05:25) (17 - 18)  SpO2: 100% (06-06-23 @ 05:25) (100% - 100%)    PHYSICAL EXAM:     GENERAL: NAD, lying in bed comfortably  HEAD:  Atraumatic, Normocephalic  EYES: EOMI, PERRLA, conjunctiva and sclera clear, no nystagmus noted  ENT: Moist mucous membranes,   NECK: Supple, No JVD, trachea midline  CHEST/LUNG: Clear to auscultation bilaterally; No rales, rhonchi, wheezing, or rubs. Unlabored respirations  HEART: Regular rate and rhythm; No murmurs, rubs, or gallops, normal S1/S2  ABDOMEN: normal bowel sounds; Soft, nontender, nondistended, no organomegaly   EXTREMITIES:  2+ Peripheral Pulses, brisk capillary refill. No clubbing, cyanosis, or edema  MSK: No gross deformities noted   Neurological:  A&Ox3, no focal deficits   SKIN: No rashes or lesions  PSYCH: Normal mood, affect     TELEMETRY:    LABS:                        7.7    7.91  )-----------( 293      ( 05 Jun 2023 06:35 )             25.6     06-05    141  |  99  |  51<H>  ----------------------------<  104<H>  3.4<L>   |  28  |  2.59<H>    Ca    8.7      05 Jun 2023 06:35  Phos  4.8     06-05  Mg     1.90     06-05    TPro  5.2<L>  /  Alb  3.1<L>  /  TBili  <0.2  /  DBili  x   /  AST  11  /  ALT  8   /  AlkPhos  34<L>  06-05            Creatinine Trend: 2.59<--, 2.44<--, 2.55<--, 2.25<--, 2.30<--, 2.31<--  I&O's Summary    05 Jun 2023 07:01  -  06 Jun 2023 07:00  --------------------------------------------------------  IN: 0 mL / OUT: 650 mL / NET: -650 mL      BNP    RADIOLOGY & ADDITIONAL STUDIES:             Internal Medicine   Celena Ro | PGY-1    OVERNIGHT EVENTS: No acute overnight events.    SUBJECTIVE: Patient was seen and examined at bedside this morning. Denies any nausea/vomiting/diarrhea, headache, shortness of breath, abdominal pain or chest pain/palpitations. Patient responding appropriately to questions and able to make needs known. Vital signs/imaging/telemetry events reviewed.       MEDICATIONS  (STANDING):  albumin human 25% IVPB 25 Gram(s) IV Intermittent every 12 hours  buMETAnide 2 milliGRAM(s) Oral <User Schedule>  cyanocobalamin 1000 MICROGram(s) Oral daily  ergocalciferol 14491 Unit(s) Oral <User Schedule>  folic acid 1 milliGRAM(s) Oral daily  heparin   Injectable 5000 Unit(s) SubCutaneous every 8 hours  hydroxychloroquine 200 milliGRAM(s) Oral daily  nystatin    Suspension 316147 Unit(s) Oral four times a day  pantoprazole    Tablet 40 milliGRAM(s) Oral before breakfast  predniSONE   Tablet 50 milliGRAM(s) Oral daily  sodium bicarbonate 650 milliGRAM(s) Oral every 12 hours  trimethoprim   80 mG/sulfamethoxazole 400 mG 1 Tablet(s) Oral <User Schedule>    MEDICATIONS  (PRN):  acetaminophen     Tablet .. 650 milliGRAM(s) Oral every 6 hours PRN Temp greater or equal to 38C (100.4F), Mild Pain (1 - 3)  aluminum hydroxide/magnesium hydroxide/simethicone Suspension 30 milliLiter(s) Oral every 4 hours PRN Dyspepsia  melatonin 3 milliGRAM(s) Oral at bedtime PRN Insomnia  ondansetron Injectable 4 milliGRAM(s) IV Push every 8 hours PRN Nausea and/or Vomiting  polyethylene glycol 3350 17 Gram(s) Oral two times a day PRN Constipation  senna 2 Tablet(s) Oral at bedtime PRN Constipation        T(F): 98 (06-06-23 @ 05:25), Max: 98.5 (06-05-23 @ 12:57)  HR: 73 (06-06-23 @ 05:25) (71 - 77)  BP: 141/69 (06-06-23 @ 05:25) (138/89 - 150/93)  BP(mean): --  RR: 17 (06-06-23 @ 05:25) (17 - 18)  SpO2: 100% (06-06-23 @ 05:25) (100% - 100%)    PHYSICAL EXAM:     GENERAL: NAD, lying in bed comfortably  HEAD:  Atraumatic, Normocephalic  EYES: EOMI, PERRLA, conjunctiva and sclera clear, no nystagmus noted  ENT: Moist mucous membranes,   NECK: Supple, No JVD, trachea midline  CHEST/LUNG: Clear to auscultation bilaterally; No rales, rhonchi, wheezing, or rubs. Unlabored respirations  HEART: Regular rate and rhythm; No murmurs, rubs, or gallops, normal S1/S2  ABDOMEN: normal bowel sounds; Soft, nontender, nondistended, no organomegaly   EXTREMITIES:  +santhosh LE 2+ pitting edema to knees improving; 2+ Peripheral Pulses, brisk capillary refill. No clubbing, cyanosis  MSK: No gross deformities noted   Neurological:  A&Ox3, no focal deficits   SKIN: No rashes or lesions  PSYCH: Normal mood, affect    TELEMETRY:    LABS:                        7.7    7.91  )-----------( 293      ( 05 Jun 2023 06:35 )             25.6     06-05    141  |  99  |  51<H>  ----------------------------<  104<H>  3.4<L>   |  28  |  2.59<H>    Ca    8.7      05 Jun 2023 06:35  Phos  4.8     06-05  Mg     1.90     06-05    TPro  5.2<L>  /  Alb  3.1<L>  /  TBili  <0.2  /  DBili  x   /  AST  11  /  ALT  8   /  AlkPhos  34<L>  06-05            Creatinine Trend: 2.59<--, 2.44<--, 2.55<--, 2.25<--, 2.30<--, 2.31<--  I&O's Summary    05 Jun 2023 07:01  -  06 Jun 2023 07:00  --------------------------------------------------------  IN: 0 mL / OUT: 650 mL / NET: -650 mL      BNP    RADIOLOGY & ADDITIONAL STUDIES:

## 2023-06-06 NOTE — PROGRESS NOTE ADULT - REASON FOR ADMISSION
hematuria

## 2023-06-06 NOTE — PROGRESS NOTE ADULT - ATTENDING COMMENTS
Patient seen and examined at bedside. In brief, 48M w/ SLE c/b lupus nephritis with recent admission for lupus nephritis flare where he was switched from mycophenolate to cyclophosphamide presenting with subjective fever at home x1 but no documented fevers at home, new hematuria, worsening leg swelling, and found to have LIZETH on CKD. Patient S/p CTA/P notable for: Small bilateral pleural effusions, right greater than left. There is associated mild infiltrate and/or atelectasis right lung base. Small amount of free fluid within the paracolic gutters bilaterally. S/p renal US w/ no renal/ureteral stones or hydronephrosis.    -Will continue to appreciate rheumatology recommendations. Hemorrhagic cystitis 2/2 cyclophosphamide is on the differential. Pt currently without clots and h/h remains overall stable. Pt seen by urology per rheum recs and plan is for outpatient w/u for hematuria. C3 low, C4 wnl. DSDNA: negative. C/w PCP ppx , plaquenil and solumedrol as prescribed.   -on ceftriaxone for possible UTI. However, given urine cx neg will discontinue ceftriaxone at this time  -Monitor creatinine levels  and I and Os.  Cr levels have been improving. Nephrology also following and will continue to appreciate recommendations. C/w Bumex IV per nephrology ->c/w increased to bumex IV 2mg TID -> switched to po on 6.6 will clarify with renal dosage   -Monitor LE edema. LE doppler neg for DVT. C/w bumex as stated above .   Dispo:  pending improved volume status and whether patient would have induction treatment with calcineurin inhibitor . Later today or tomorrow  35 minutes spent discharge planning.     Time-based billing (NON-critical care).   40 minutes spent on total encounter. The necessity of the time spent during the encounter on this date of service was due to:     Review of laboratory data, radiology results, consultants' recommendations, documentation in [place], discussion with patient/house staff and interdisciplinary staff (such as , social workers, etc). Interventions were performed as documented above.

## 2023-06-06 NOTE — PROGRESS NOTE ADULT - ATTENDING COMMENTS
Class 4 Lupus Nephritis  Volume overload    LIZETH in the setting of IV diuresis, edema is better but Cr worse. Switched to po bumex today bid- can follow up with Dr. Shultz on discharge

## 2023-06-06 NOTE — DISCHARGE NOTE NURSING/CASE MANAGEMENT/SOCIAL WORK - PATIENT PORTAL LINK FT
You can access the FollowMyHealth Patient Portal offered by [facility] by registering at the following website: [email or web address]. By joining [facility] FollowMyHealth portal, you will also be able to view your health information using other applications (apps) compatible with our system.

## 2023-06-06 NOTE — PROGRESS NOTE ADULT - PROBLEM SELECTOR PROBLEM 1
Acute kidney injury superimposed on CKD
Hematuria
musculoskeletal

## 2023-06-06 NOTE — PROGRESS NOTE ADULT - PROBLEM SELECTOR PLAN 3
Pt w/ bilateral leg swelling, takes lasix 40 bid.    -s/p 2 bumex IV bid, w/ improving edema  -appreciate nephrology recs  - C/w bumex IV 2 mg TID  - c/w albumin tid Pt w/ bilateral leg swelling, takes lasix 40 bid.    -s/p 2 bumex IV bid, w/ improving edema  -appreciate nephrology recs  - C/w bumex PO 2 mg TID  - c/w albumin tid

## 2023-06-06 NOTE — PROGRESS NOTE ADULT - SUBJECTIVE AND OBJECTIVE BOX
[facility] Division of Kidney Diseases & Hypertension  FOLLOW UP NOTE  [phone]--------------------------------------------------------------------------------  Chief Complaint: Class 4 Lupus nephritis, volume overload LIZETH    24 hour events/subjective: Pt. was seen and evaluated at bedside this morning. He reports significant improvement in BL LE edema with diuresis, requesting to go home. He denies any headaches, fevers/chills, chest pain, SOB, and abdominal pain.      PAST HISTORY  --------------------------------------------------------------------------------  No significant changes to PMH, PSH, FHx, SHx, unless otherwise noted    ALLERGIES & MEDICATIONS  --------------------------------------------------------------------------------  Allergies    No Known Allergies    Intolerances      Standing Inpatient Medications  albumin human 25% IVPB 25 Gram(s) IV Intermittent every 12 hours  buMETAnide 2 milliGRAM(s) Oral <User Schedule>  cyanocobalamin 1000 MICROGram(s) Oral daily  ergocalciferol 98989 Unit(s) Oral <User Schedule>  folic acid 1 milliGRAM(s) Oral daily  heparin   Injectable 5000 Unit(s) SubCutaneous every 8 hours  hydroxychloroquine 200 milliGRAM(s) Oral daily  nystatin    Suspension 437468 Unit(s) Oral four times a day  pantoprazole    Tablet 40 milliGRAM(s) Oral before breakfast  predniSONE   Tablet 50 milliGRAM(s) Oral daily  sodium bicarbonate 650 milliGRAM(s) Oral every 12 hours  trimethoprim   80 mG/sulfamethoxazole 400 mG 1 Tablet(s) Oral <User Schedule>    PRN Inpatient Medications  acetaminophen     Tablet .. 650 milliGRAM(s) Oral every 6 hours PRN  aluminum hydroxide/magnesium hydroxide/simethicone Suspension 30 milliLiter(s) Oral every 4 hours PRN  melatonin 3 milliGRAM(s) Oral at bedtime PRN  ondansetron Injectable 4 milliGRAM(s) IV Push every 8 hours PRN  polyethylene glycol 3350 17 Gram(s) Oral two times a day PRN  senna 2 Tablet(s) Oral at bedtime PRN      REVIEW OF SYSTEMS  --------------------------------------------------------------------------------  See HPI    VITALS/PHYSICAL EXAM  --------------------------------------------------------------------------------  T(C): 37.1 (06-06-23 @ 12:25), Max: 37.1 (06-06-23 @ 12:25)  HR: 71 (06-06-23 @ 12:25) (70 - 76)  BP: 123/69 (06-06-23 @ 12:25) (123/69 - 143/88)  RR: 18 (06-06-23 @ 12:25) (17 - 18)  SpO2: 100% (06-06-23 @ 12:25) (99% - 100%)  Wt(kg): --    06-05-23 @ 07:01  -  06-06-23 @ 07:00  --------------------------------------------------------  IN: 0 mL / OUT: 650 mL / NET: -650 mL      Physical Exam:  Gen: NAD, well-appearing  HEENT: PERRL, supple neck, clear oropharynx  Pulm: CTA B/L  CV: RRR, S1S2; no rub  Back: No spinal or CVA tenderness  Abd: +BS, soft, nontender/nondistended  UE: Warm, FROM, no clubbing, intact strength; no edema; no asterixis  LE: +BL LE edema, improved significantly  Neuro: No focal deficits, intact gait  Psych: Normal affect and mood  Skin: Warm, without rashes       LABS/STUDIES  --------------------------------------------------------------------------------              7.7    7.40  >-----------<  318      [06-06-23 @ 06:20]              24.9     139  |  99  |  53  ----------------------------<  86      [06-06-23 @ 06:20]  3.5   |  27  |  2.84        Ca     9.0     [06-06-23 @ 06:20]      Mg     1.90     [06-06-23 @ 06:20]      Phos  4.5     [06-06-23 @ 06:20]    TPro  5.4  /  Alb  3.5  /  TBili  <0.2  /  DBili  x   /  AST  12  /  ALT  9   /  AlkPhos  31  [06-06-23 @ 06:20]    Creatinine Trend:  SCr 2.84 [06-06 @ 06:20]  SCr 2.59 [06-05 @ 06:35]  SCr 2.44 [06-04 @ 07:27]  SCr 2.55 [06-03 @ 06:15]  SCr 2.25 [06-02 @ 06:30]    Urinalysis - [06-01-23 @ 07:34]      Color Colorless / Appearance Clear / SG 1.010 / pH 7.0      Gluc Negative / Ketone Negative  / Bili Negative / Urobili <2 mg/dL       Blood Large / Protein 300 mg/dL / Leuk Est Negative / Nitrite Negative      RBC 93 / WBC 8 / Hyaline 1 / Gran  / Sq Epi  / Non Sq Epi  / Bacteria Negative

## 2023-06-06 NOTE — PROGRESS NOTE ADULT - PROBLEM SELECTOR PLAN 4
Cr baseline 1.5-1.7 11/2022. Pt w/ recent admission for LIZETH. Cr now 2.56.  Pt denies decreased UOP. Pt follows with Dr. Shultz.    -appreciate nephrology recs  - C/w bumex 2mg IV Q8H  - c/w IV steroids  - c/w albumin tid Cr baseline 1.5-1.7 11/2022. Pt w/ recent admission for LIZETH. Cr now 2.56.  Pt denies decreased UOP. Pt follows with Dr. Shultz.    -appreciate nephrology recs  - C/w bumex 2mg PO Q8H  - c/w IV steroids  - c/w albumin tid

## 2023-06-06 NOTE — DISCHARGE NOTE NURSING/CASE MANAGEMENT/SOCIAL WORK - NSDCFUADDAPPT_GEN_ALL_CORE_FT
1. Please follow up with Nephrology (Dr. Shultz) on 6/8.  2. Please follow up with rheumatology (Dr. Nilton Le) on 6/21/23.  3. Please schedule a follow up with your primary care doctor (Dr. Silva) within 1 week of being discharged from the hospital.  4. Please follow up with nephrology (Dr. Shultz) on 6/8/23.  5. Please schedule a follow up appointment with urology within 1 week of being discharged from the hospital

## 2023-06-06 NOTE — PROGRESS NOTE ADULT - PROVIDER SPECIALTY LIST ADULT
Nephrology
Rheumatology
Nephrology
Rheumatology
Nephrology
Nephrology
Rheumatology
Internal Medicine

## 2023-06-06 NOTE — PROGRESS NOTE ADULT - PROBLEM SELECTOR PROBLEM 7
Encounter for deep vein thrombosis prophylaxis

## 2023-06-06 NOTE — PROGRESS NOTE ADULT - PROBLEM SELECTOR PROBLEM 5
Oral thrush

## 2023-06-06 NOTE — PROGRESS NOTE ADULT - ASSESSMENT
47 yo male who initially presented to dermatology with a rash and work up revealed MÓNICA titer 1:1280 (speckled) - diagnosed with SLE and acute cutaneous lupus. urinalysis showed hematuria and 1.5 gms of proteinuria. 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 He was initiated on Cellcept, prednisone and Plaquenil but was non compliant     Presented in October 2022 to [facility] with worsening LIZETH- Kidney biopsy repeated October 2022- Lupus Nephritis Class 4- activity index 15/24, chronicity 3/12 ). Received obinutuzumab 10/21/22 and 11/1/2022 and restarted on Cellcept - now at 1500 bid. his creatinine did not really improve and stayed at 1.7-1.8 mg/dl.     5/2/2023- persistence of renal disease/ hematuria/ proteinuria/ elevated creatinine   Repeat kidney biopsy 5/2/23 on this admission showed proliferative LN class IV-G with 5 out of 8 non-globally sclerosed glomeruli with cellular and fibrocellular crescents; moderate tubulointerstitial inflammation, ~30% IFTA. Activity index 13/24 and chronicity index 7/12. He had a previous biopsy in Oct 2022, activity index was 15/24 and chronicity was 3/12 IFTA 15-20%. Mycophenolic acid level wnl indicating compliance.     Received iv solumedrol 1g x3 (5/4-5/6) then reduce to 1mg/kg IV solu-medrol 60mg/day starting on 5/7. Patient discharged on 5/8/23 on prednisone 60mg to be further tapered outpatient.   -discharged on bactrim and PPI while on high dose steroids  -first dose of Cytoxan given 5/5/23 with second dose on 5/18.   -Quant TB negative outpatient 2/2023; Hep B negative         now presenting with fluid overload/hematuria on this admission    - Please continue Bumex 2 mg PO BID  - Continue po Prednisone  - Would hold off further cytoxan as hematuria likely hemorrhagic cystitis.   - Usually follows with Dr. Hayde Shultz and unfortunately he is running out of options to treat his disease. Once renal function is better, Voclosporin/ Tacro can be used   - She may consider clinical trial ( wont be eligible for car-t as b cell depleted)    Any questions please contact fellow:  Courtney Rangel MD  Office- [phone]  After 5pm and on weekends please contact renal fellow on call

## 2023-06-08 ENCOUNTER — APPOINTMENT (OUTPATIENT)
Dept: NEPHROLOGY | Facility: CLINIC | Age: 48
End: 2023-06-08
Payer: COMMERCIAL

## 2023-06-08 VITALS
OXYGEN SATURATION: 97 % | DIASTOLIC BLOOD PRESSURE: 89 MMHG | HEART RATE: 80 BPM | BODY MASS INDEX: 22.68 KG/M2 | TEMPERATURE: 97.3 F | SYSTOLIC BLOOD PRESSURE: 150 MMHG | HEIGHT: 66 IN | WEIGHT: 141.09 LBS

## 2023-06-08 DIAGNOSIS — R60.0 LOCALIZED EDEMA: ICD-10-CM

## 2023-06-08 PROCEDURE — 99215 OFFICE O/P EST HI 40 MIN: CPT

## 2023-06-08 RX ORDER — FUROSEMIDE 20 MG/1
20 TABLET ORAL
Qty: 120 | Refills: 3 | Status: DISCONTINUED | COMMUNITY
Start: 2023-04-07 | End: 2023-06-08

## 2023-06-08 RX ORDER — LISINOPRIL 10 MG/1
10 TABLET ORAL DAILY
Qty: 30 | Refills: 3 | Status: DISCONTINUED | COMMUNITY
Start: 2022-11-11 | End: 2023-06-08

## 2023-06-08 NOTE — PHYSICAL EXAM
[General Appearance - Alert] : alert [General Appearance - In No Acute Distress] : in no acute distress [General Appearance - Well Nourished] : well nourished [General Appearance - Well Developed] : well developed [General Appearance - Well-Appearing] : healthy appearing [Hearing Threshold Finger Rub Not Comal] : hearing was normal [Examination Of The Oral Cavity] : the lips and gums were normal [Neck Appearance] : the appearance of the neck was normal [Neck Cervical Mass (___cm)] : no neck mass was observed [Jugular Venous Distention Increased] : there was no jugular-venous distention [Respiration, Rhythm And Depth] : normal respiratory rhythm and effort [Exaggerated Use Of Accessory Muscles For Inspiration] : no accessory muscle use [Auscultation Breath Sounds / Voice Sounds] : lungs were clear to auscultation bilaterally [Heart Rate And Rhythm] : heart rate was normal and rhythm regular [Heart Sounds] : normal S1 and S2 [Heart Sounds Gallop] : no gallops [Bowel Sounds] : normal bowel sounds [Abdomen Soft] : soft [Abdomen Tenderness] : non-tender [No CVA Tenderness] : no ~M costovertebral angle tenderness [No Spinal Tenderness] : no spinal tenderness [Abnormal Walk] : normal gait [Nail Clubbing] : no clubbing  or cyanosis of the fingernails [Musculoskeletal - Swelling] : no joint swelling seen [Skin Color & Pigmentation] : normal skin color and pigmentation [] : no rash [Skin Lesions] : no skin lesions [Oriented To Time, Place, And Person] : oriented to person, place, and time [Impaired Insight] : insight and judgment were intact [Affect] : the affect was normal [Mood] : the mood was normal [FreeTextEntry1] : 3+ edema

## 2023-06-08 NOTE — ASSESSMENT
[FreeTextEntry1] : 47 yo male with a history of lupus nephritis, h/o multiple immunosuppressives use, last kidney biopsy done on 5/2023 showed class 4 Lupus nephritis\par \par Kidney biopsy- diffuse global proliferative and sclerosing form, ISN/ RPS class IV-G (A/C) - see comment - Cellular and fibrocellular crescents in 5 out of 8 non-globally sclerosed glomeruli - Diffuse global endocapillary hypercellularity - NIH activity index score: 13/24 Summary of chronic changes: - Global glomerulosclerosis (25% of glomeruli) - Segmental glomerulosclerosis (25% of glomeruli) - Tubular atrophy and interstitial fibrosis (30% of the sampled cortex) - Mild arterial and arteriolar sclerosis - NIH chronicity index score: 7/12 \par \par Immunosuppression history- \par \par march 2022- kidney biopsy- class 3 LN- Cellcept - off and on - was non compliant \par october 2022- LIZETH, worse proteinuria. kidney biopsy showed class 4/crescents- Received obinutuzumab 10/21/22 and 11/1/2022 , Resumed full dose Cellcept - compliant \par may 2022- creatinine lee again, worse nephrotic syndrome. kidney biopsy- as above. received 2 doses of Cyclophosphamide- 500 mg iv 2 weeks apart along with MESNA. \par End of May- admitted with hematuria/ fluid overload and hypoalbuminemia- received iv steroids and diuretics \par \par on discharge, he had a significant response in his hypoalbuminemia- 6/6- albumin - 3.5 \par creatinine was 2.78 \par \par today he feels better\par leg swelling is way better \par still has facial swelling\par no blood in the urine\par \par - Reduce prednisone to 45 mg daily \par - Continue Mycophenolate 1500 bid \par - will discuss with Rheum about Cyclophosphamide - considering hemorrhagic cystitis despite fluids and mesna, would hold off on it. Urology evaluation for intrinsic bladder abnormalities. \par - if creatinine improves, will try CNI\par - Enrollment in clinical trial although doubt he will qualify with his renal function. \par - continue Bumex 2 mg po BID \par \par \par \par \par

## 2023-06-08 NOTE — HISTORY OF PRESENT ILLNESS
[FreeTextEntry1] : follow up Lupus Nephritis \par \par 49 yo male who initially presented to dermatology with a rash and work up revealed MÓNICA titer 1:1280 (speckled) - diagnosed with SLE and acute cutaneous lupus. urinalysis showed hematuria and 1.5 gms of proteinuria. 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 He was initiated on Cellcept, prednisone and Plaquenil but was non compliant \par \par Presented in October 2022 to [facility] with worsening LIZETH- Kidney biopsy repeated October 2022- Lupus Nephritis Class 4- activity index 15/24, chronicity 3/12 ). Received obinutuzumab 10/21/22 and 11/1/2022 and restarted on Cellcept - now at 1500 bid. his creatinine did not really improve and stayed at 1.7-1.8 mg/dl. \par \par 5/2/2023- persistence of renal disease/ hematuria/ proteinuria/ elevated creatinine \par Repeat kidney biopsy 5/2/23 on this admission showed proliferative LN class IV-G with 5 out of 8 non-globally sclerosed glomeruli with cellular and fibrocellular crescents; moderate tubulointerstitial inflammation, ~30% IFTA. Activity index 13/24 and chronicity index 7/12. He had a previous biopsy in Oct 2022, activity index was 15/24 and chronicity was 3/12 IFTA 15-20%. Mycophenolic acid level wnl indicating compliance. \par \par Received iv solumedrol 1g x3 (5/4-5/6) then reduce to 1mg/kg IV solu-medrol 60mg/day starting on 5/7. Patient discharged on 5/8/23 on prednisone 60mg to be further tapered outpatient. \par -discharged on bactrim and PPI while on high dose steroids\par -first dose of Cytoxan given 5/5/23 with second dose on 5/18. \par \par 4/27/23-5/8/2023- \par \par Patient called complaining of hematuria and was told to go to the hospital.  U/A showed large blood > 50 RBCs, mod Leukocyte esterase. urine culture was negative. CT was negative for kidney stones. The cause of hematuria was most likely due to hemorrhagic cystitis from Cytoxan use. \par \par he also had extensive pedal edema with an albumin of 2.3. was given solumedrol and received iv Bumex followed by oral Bumex. He diuresed significantly ( 161> 144 lbs today) and feels much better. his discharge albumin was 3.5 which was likely due to the steroids. \par \par overall today he feels okay. he is on prednisone 50 mg daily. \par \par \par -Quant TB negative outpatient 2/2023; Hep B negative \par \par this visit- he continues to complain of severe edema \par he has a lot of leg and facial swelling \par no changes in urination, no foamy urine, no blood in the urine \par taking Lasix 40 mg daily \par

## 2023-06-09 NOTE — CHART NOTE - NSCHARTNOTEFT_GEN_A_CORE
Post-Discharge Medication Review    Caregiver (son, Varinder Mack) contacted to offer medication counseling post-discharge. Medication reconciliation completed. Per caregiver, current medications include:    1. Bactrim 400 mg-80 mg oral tablet 1 tab(s) orally 3 times a week Please take on Mondays, Wednesdays and Fridays  2. bumetanide 2 mg oral tablet 1 tab(s) orally 2 times a day  3. cyanocobalamin 1000 mcg oral tablet 1 tab(s) orally once a day  4. ergocalciferol 1.25 mg (50,000 intl units) oral capsule 1 cap(s) orally every 7 days continue taking once a week  5. folic acid 1 mg oral tablet 1 tab(s) orally once a day  6. pantoprazole 40 mg oral delayed release tablet 1 tab(s) orally once a day (before a meal)  7. Plaquenil 200 mg oral tablet 1 tab(s) orally once a day  8. predniSONE 50 mg oral tablet 1 tab(s) orally once a day  9. sodium bicarbonate 650 mg oral tablet 2 tab(s) orally every 12 hours    Medication name, indication, dose, administration, side effects, and monitoring reviewed for new medications post-discharge with patient. Patient demonstrated understanding. Counseling offered for all medications. Patient’s preferred pharmacy ([facility] [id number]) confirmed in OMR.    Patient has follow-up appointment scheduled with providers post-discharge on 6/8 (Dr. Shultz, nephrology), 6/16 (urology), and 6/23 (Dr. Nilton Le, rheumatology). Encouraged follow up with provider and keep appointments.

## 2023-06-09 NOTE — CHART NOTE - NSCHARTNOTESELECT_GEN_ALL_CORE
ED HPI GENERAL MEDICAL PROBLEM





- General


Chief Complaint: Skin Complaint


Stated Complaint: ORAL ISSUES


Time Seen by Provider: 11/10/17 14:54


Source of Information: Reports: Patient, Family


History Limitations: Reports: No Limitations





- History of Present Illness


INITIAL COMMENTS - FREE TEXT/NARRATIVE: 





PEDS HISTORY AND PHYSICAL:





History of present illness:


Patient is a 3 year 9-month-old male who is brought to the emergency room by 

his mother with concerns that he has a or to the left corner of his mouth. 

Mother reports that she has noticed this sore to the corner of his left lower 

lip 5 days, reports it is improved  (smaller in size) over the last 2 days. 

Patient complains of it being itchy and somewhat painful. Mother was concerned 

it may be hand-foot-and-mouth or a cold sore. Mother is also concerned as the 

patient has had a runny nose.


Immunizations are up-to-date


Denies any abdominal pain, change in bowel or urinary habits, denies any fever 

or chills.


Eating and drinking appropriately.


No change in activity level per mom, as patient is playing and jumping around 

in the room.





Review of systems: 


As per history of present illness and below otherwise all systems reviewed and 

negative.





Past medical history: 


As per history of present illness and as reviewed below otherwise 

noncontributory.





Surgical history: 


As per history of present illness and as reviewed below otherwise 

noncontributory.





Social history: 


No reported history of drug or alcohol abuse.





Family history: 


As per history of present illness and as reviewed below otherwise 

noncontributory.





Physical exam:


Gen.: Nontoxic appearing 3 year 9-month-old male. Alert and oriented. 

Appropriate for age.


HEENT: Atraumatic, normocephalic, pupils reactive, negative for conjunctival 

pallor or scleral icterus, mucous membranes moist, throat clear, neck supple, 

nontender, trachea midline.  TMs normal bilaterally, no cervical adenopathy or 

nuchal rigidity.  


Lungs: Clear to auscultation, breath sounds equal bilaterally, chest nontender.


Heart: S1S2, regular rate and rhythm, no overt murmurs


Abdomen: Soft, nondistended, nontender. Negative for masses or 

hepatosplenomegaly. Normal abdominal bowel sounds.  


Pelvis: Stable nontender.


Genitourinary: Deferred.


Rectal: Deferred.


Skin: Small cluster blister to the left lower lip and the corner. Measuring 

approximately 0.2 cm in diameter


Extremities: Atraumatic, full range of motion without defects or deficits. 

Neurovascular unremarkable.


Neuro: Awake, alert, and age appropriate. Cranial nerves II through XII 

unremarkable. Cerebellum unremarkable. Motor and sensory unremarkable 

throughout. Exam nonfocal.


Skin:  Normal turgor, no overt rash or lesions





Diagnostics:


[]





Therapeutics:


[]





Impression: 


Cold sore





Plan:


1. The lesion on the left lower lip appears to be viral. Make sure you're 

performing good handwashing.


2. Encourage oral fluids to prevent dehydration. Tylenol or ibuprofen as needed 

for pain management.


3. Follow-up with your pediatrician in the next 1-2 days. Return to the ED as 

needed and as discussed.





Definitive disposition and diagnosis as appropriate pending reevaluation and 

review of above.





- Related Data


 Allergies











Allergy/AdvReac Type Severity Reaction Status Date / Time


 


No Known Allergies Allergy   Verified 11/10/17 15:06











Home Meds: 


 Home Meds





. [No Known Home Meds]  11/10/17 [History]











Past Medical History





- Past Health History


Medical/Surgical History: Denies Medical/Surgical History





Social & Family History





- Family History


Family Medical History: Noncontributory





- Tobacco Use


Second Hand Smoke Exposure: No





ED ROS GENERAL





- Review of Systems


Review Of Systems: ROS reveals no pertinent complaints other than HPI.





ED EXAM, SKIN/RASH


Exam: See Below (See dictation)





Course





- Vital Signs


Last Recorded V/S: 





 Last Vital Signs











Temp  36.2 C   11/10/17 15:04


 


Pulse  105   11/10/17 15:04


 


Resp  26   11/10/17 15:04


 


BP      


 


Pulse Ox  98   11/10/17 15:04














Departure





- Departure


Time of Disposition: 15:28


Disposition: Home, Self-Care 01


Clinical Impression: 


 Cold sore








- Discharge Information


Referrals: 


PCP,None [Primary Care Provider] - 


Additional Instructions: 


My general discharge


The following information is given to patients seen in the emergency department 

who are being discharged to home. This information is to outline your options 

for follow-up care. We provide all patients seen in our emergency department 

with a follow-up referral.





The need for follow-up, as well as the timing and circumstances, are variable 

depending upon the specifics of your emergency department visit.





If you don't have a primary care physician on staff, we will provide you with a 

referral. We always advise you to contact your personal physician following an 

emergency department visit to inform them of the circumstance of the visit and 

for follow-up with them and/or the need for any referrals to a consulting 

specialist.





The emergency department will also refer you to a specialist when appropriate. 

This referral assures that you have the opportunity for follow-up care with a 

specialist. All of these measure are taken in an effort to provide you with 

optimal care, which includes your follow-up.





Under all circumstances we always encourage you to contact your private 

physician who remains a resource for coordinating your care. When calling for 

follow-up care, please make the office aware that this follow-up is from your 

recent emergency room visit. If for any reason you are refused follow-up, 

please contact the [facility] Emergency 

Department at [phone] and asked to speak to the emergency department 

charge nurse.





[facility]


Primary Care


[address] [place] [id number]


Phone: [phone]


Fax: [phone]





1. The lesion on the left lower lip appears to be viral. Make sure you're 

performing good handwashing.


2. Encourage oral fluids to prevent dehydration. Tylenol or ibuprofen as needed 

for pain management.


3. Follow-up with your pediatrician in the next 1-2 days. Return to the ED as 

needed and as discussed.
Event Note
Urology/Event Note
Post-Discharge Note

## 2023-06-16 ENCOUNTER — APPOINTMENT (OUTPATIENT)
Dept: UROLOGY | Facility: CLINIC | Age: 48
End: 2023-06-16
Payer: COMMERCIAL

## 2023-06-16 VITALS
HEIGHT: 66 IN | DIASTOLIC BLOOD PRESSURE: 84 MMHG | SYSTOLIC BLOOD PRESSURE: 137 MMHG | TEMPERATURE: 98 F | HEART RATE: 81 BPM | BODY MASS INDEX: 22.66 KG/M2 | WEIGHT: 141 LBS

## 2023-06-16 DIAGNOSIS — M32.14 GLOMERULAR DISEASE IN SYSTEMIC LUPUS ERYTHEMATOSUS: ICD-10-CM

## 2023-06-16 PROCEDURE — 99204 OFFICE O/P NEW MOD 45 MIN: CPT

## 2023-06-21 ENCOUNTER — APPOINTMENT (OUTPATIENT)
Dept: RHEUMATOLOGY | Facility: CLINIC | Age: 48
End: 2023-06-21

## 2023-06-21 LAB — URINE CYTOLOGY: NORMAL

## 2023-06-23 ENCOUNTER — APPOINTMENT (OUTPATIENT)
Dept: RHEUMATOLOGY | Facility: CLINIC | Age: 48
End: 2023-06-23
Payer: COMMERCIAL

## 2023-06-23 VITALS — HEART RATE: 82 BPM | DIASTOLIC BLOOD PRESSURE: 80 MMHG | SYSTOLIC BLOOD PRESSURE: 150 MMHG

## 2023-06-23 VITALS
HEART RATE: 94 BPM | SYSTOLIC BLOOD PRESSURE: 161 MMHG | TEMPERATURE: 97.1 F | RESPIRATION RATE: 16 BRPM | WEIGHT: 140 LBS | DIASTOLIC BLOOD PRESSURE: 82 MMHG | BODY MASS INDEX: 22.5 KG/M2 | HEIGHT: 66 IN | OXYGEN SATURATION: 98 %

## 2023-06-23 PROCEDURE — 99214 OFFICE O/P EST MOD 30 MIN: CPT

## 2023-06-23 NOTE — PHYSICAL EXAM
[General Appearance - Alert] : alert [General Appearance - In No Acute Distress] : in no acute distress [Auscultation Breath Sounds / Voice Sounds] : lungs were clear to auscultation bilaterally [Heart Sounds] : normal S1 and S2 [Musculoskeletal - Swelling] : no joint swelling seen [] : no rash [FreeTextEntry1] : LE pitting edema  [Impaired Insight] : insight and judgment were intact

## 2023-06-23 NOTE — HISTORY OF PRESENT ILLNESS
[FreeTextEntry1] : at today's visit\par he reports feeling well overall, no SOB or hematuria\par LE edema slightly improved\par

## 2023-06-23 NOTE — ASSESSMENT
[FreeTextEntry1] : #SLE (diagnosis February 2022, lupus nephritis and cutaneous disease) +MÓNICA, Low C3/C4, DsDNA >1000, +Sm/RNP/SSA/SSB, +LA\par On hydroxychloroquine 400 mg daily\par \par #Acute cutaneous lupus, s.p skin biopsy with interface dermatitis- resolved \par \par #Lupus nephritis, class IV (repeat biopsy October 2022, activity index 15/24, chronicity 3/12 )\par -On MMF 2 g daily (issues with compliance/follow up)- increased to 3 g daily as of 1/12/23\par -Received obinutuzumab 10/21 and 11/1/22\par CD19<1 as of April 2023\par -Started CYC s.p 2 doses\par complicated by hematuria \par \par #Hematuria, post CYC\par \par \par #-MCTD (Raynaud's, +PM/Scl and RNP)\par TTE completed December 2022\par \par #High risk medication use\par quant TB and hepatitis panel - February 2023\par \par #Vaccination status:\par COVID-19 x2 , booster 1/22/22, flu received 2022\par PCV 20 given 2/3/23\par \par Plan:\par \par -Discussed with Dr Shultz\par will likley add CNI at this time\par -?resume MMF\par -no further CYC dosing\par -obtain monitoring blood work today\par -check urine studies today\par -after review of results will advise regarding prednisone dosing (currently on 45 mg daily)\par -continue with bactrim\par to check if needs to be renally adjusted vs switching to atovaquone\par -continue with bumex 2 mg bid \par to discuss with renal, he continues to have LE edema\par -scheduled for cystoscopy on 6/30\par -Continue with hydroxychloroquine 200 mg daily\par most recent eye exam December 2022 patient cleared to continue hydroxychloroquine\par Advised to follow-up as recommended\par -Needs baseline PFTs- advised patient to schedule\par \par will call with results and next steps\par \par \par

## 2023-06-26 PROBLEM — M32.14 LUPUS NEPHRITIS, ISN/RPS CLASS III: Status: ACTIVE | Noted: 2022-02-14

## 2023-06-26 NOTE — PHYSICAL EXAM
[General Appearance - Well Developed] : well developed [General Appearance - Well Nourished] : well nourished [Normal Appearance] : normal appearance [Well Groomed] : well groomed [General Appearance - In No Acute Distress] : no acute distress [FreeTextEntry1] : MARIA ELENA EDEMA [Respiration, Rhythm And Depth] : normal respiratory rhythm and effort [Exaggerated Use Of Accessory Muscles For Inspiration] : no accessory muscle use [Abdomen Soft] : soft [Abdomen Tenderness] : non-tender [Costovertebral Angle Tenderness] : no ~M costovertebral angle tenderness [Urethral Meatus] : meatus normal [Urinary Bladder Findings] : the bladder was normal on palpation [Scrotum] : the scrotum was normal [Testes Mass (___cm)] : there were no testicular masses [No Prostate Nodules] : no prostate nodules [Normal Station and Gait] : the gait and station were normal for the patient's age [] : no rash [No Focal Deficits] : no focal deficits [Oriented To Time, Place, And Person] : oriented to person, place, and time [Affect] : the affect was normal [Mood] : the mood was normal [Not Anxious] : not anxious [No Palpable Adenopathy] : no palpable adenopathy

## 2023-06-26 NOTE — HISTORY OF PRESENT ILLNESS
[FreeTextEntry1] :  cc hematuria \par 48 year old male with history of lupus nephritis, recent admission for LIZETH, now on Cytoxan infusions, also on daily prednisone/plaquenil, presenting with b/l LE swelling and hematuria.\par no dysuriA\par CT AND RENAL SONO - NL KIDNEYS \par NONSMOKER \par \par

## 2023-06-28 RX ORDER — HYDROXYCHLOROQUINE SULFATE 200 MG/1
200 TABLET, FILM COATED ORAL
Qty: 60 | Refills: 2 | Status: COMPLETED | COMMUNITY
Start: 2021-12-21 | End: 2023-06-28

## 2023-06-28 RX ORDER — SODIUM CHLORIDE 9 G/ML
0.9 INJECTION, SOLUTION INTRAVENOUS
Qty: 0 | Refills: 0 | Status: COMPLETED | COMMUNITY
Start: 2023-05-18 | End: 2023-06-28

## 2023-06-28 RX ORDER — GRANISETRON HYDROCHLORIDE 1 MG/ML
1 INJECTION, SOLUTION INTRAVENOUS
Qty: 0 | Refills: 0 | Status: COMPLETED | COMMUNITY
Start: 2023-05-18 | End: 2023-06-28

## 2023-06-28 RX ORDER — SODIUM CHLORIDE 9 G/ML
0.9 INJECTION, SOLUTION INTRAVENOUS
Qty: 0 | Refills: 0 | Status: COMPLETED | OUTPATIENT
Start: 2023-05-19 | End: 2023-06-28

## 2023-06-28 RX ORDER — MESNA 100 MG/ML
100 INJECTION, SOLUTION INTRAVENOUS
Qty: 0 | Refills: 0 | Status: COMPLETED | OUTPATIENT
Start: 2023-05-04 | End: 2023-06-28

## 2023-06-28 RX ORDER — MESNA 100 MG/ML
100 INJECTION, SOLUTION INTRAVENOUS
Qty: 0 | Refills: 0 | Status: COMPLETED | COMMUNITY
Start: 2023-05-18 | End: 2023-06-28

## 2023-06-28 RX ORDER — SODIUM CHLORIDE 9 G/ML
0.9 INJECTION, SOLUTION INTRAVENOUS
Qty: 0 | Refills: 0 | Status: DISCONTINUED | COMMUNITY
Start: 2023-05-18 | End: 2023-06-28

## 2023-06-28 RX ORDER — CYCLOPHOSPHAMIDE 500 MG/25ML
500 INJECTION, POWDER, FOR SOLUTION INTRAVENOUS; ORAL
Qty: 1 | Refills: 4 | Status: COMPLETED | OUTPATIENT
Start: 2023-05-19 | End: 2023-06-28

## 2023-06-28 RX ORDER — CYCLOPHOSPHAMIDE 500 MG/25ML
500 INJECTION, POWDER, FOR SOLUTION INTRAVENOUS; ORAL
Qty: 1 | Refills: 0 | Status: COMPLETED | OUTPATIENT
Start: 2023-05-04 | End: 2023-06-28

## 2023-06-28 RX ORDER — GRANISETRON HYDROCHLORIDE 1 MG/ML
1 INJECTION, SOLUTION INTRAVENOUS
Qty: 1 | Refills: 4 | Status: COMPLETED | OUTPATIENT
Start: 2023-05-19 | End: 2023-06-28

## 2023-06-28 RX ORDER — CYCLOPHOSPHAMIDE 500 MG/25ML
500 INJECTION, POWDER, FOR SOLUTION INTRAVENOUS; ORAL
Qty: 1 | Refills: 0 | Status: COMPLETED | OUTPATIENT
Start: 2023-05-19 | End: 2023-06-28

## 2023-06-28 RX ORDER — MESNA 100 MG/ML
100 INJECTION, SOLUTION INTRAVENOUS
Qty: 1 | Refills: 4 | Status: COMPLETED | OUTPATIENT
Start: 2023-05-19 | End: 2023-06-28

## 2023-06-28 RX ORDER — MESNA 100 MG/ML
100 INJECTION, SOLUTION INTRAVENOUS
Qty: 1 | Refills: 4 | Status: DISCONTINUED | OUTPATIENT
Start: 2023-05-19 | End: 2023-06-28

## 2023-06-28 RX ORDER — DIPHENHYDRAMINE HCL 25 MG/1
25 TABLET ORAL
Qty: 0 | Refills: 0 | Status: COMPLETED | COMMUNITY
Start: 2023-05-18 | End: 2023-06-28

## 2023-06-28 RX ORDER — ACETAMINOPHEN 500 MG/1
500 TABLET ORAL
Qty: 0 | Refills: 0 | Status: COMPLETED | COMMUNITY
Start: 2023-05-18 | End: 2023-06-28

## 2023-06-28 RX ORDER — GRANISETRON HYDROCHLORIDE 1 MG/ML
1 INJECTION, SOLUTION INTRAVENOUS
Qty: 0 | Refills: 0 | Status: COMPLETED | OUTPATIENT
Start: 2023-05-19 | End: 2023-06-28

## 2023-06-28 RX ORDER — CYCLOPHOSPHAMIDE 500 MG/25ML
500 INJECTION, POWDER, FOR SOLUTION INTRAVENOUS; ORAL
Qty: 1 | Refills: 0 | Status: COMPLETED | COMMUNITY
Start: 2023-05-18 | End: 2023-06-28

## 2023-06-28 RX ORDER — MESNA 100 MG/ML
100 INJECTION, SOLUTION INTRAVENOUS
Qty: 0 | Refills: 0 | Status: DISCONTINUED | COMMUNITY
Start: 2023-05-18 | End: 2023-06-28

## 2023-06-29 ENCOUNTER — NON-APPOINTMENT (OUTPATIENT)
Age: 48
End: 2023-06-29

## 2023-06-29 LAB
ALBUMIN SERPL ELPH-MCNC: 2.9 G/DL
ANION GAP SERPL CALC-SCNC: 12 MMOL/L
APPEARANCE: CLEAR
BACTERIA: NEGATIVE /HPF
BILIRUBIN URINE: NEGATIVE
BLOOD URINE: ABNORMAL
BUN SERPL-MCNC: 64 MG/DL
C3 SERPL-MCNC: 102 MG/DL
C4 SERPL-MCNC: 30 MG/DL
CALCIUM SERPL-MCNC: 8.3 MG/DL
CAST: 4 /LPF
CHLORIDE SERPL-SCNC: 99 MMOL/L
CO2 SERPL-SCNC: 28 MMOL/L
COLOR: YELLOW
CREAT SERPL-MCNC: 2.54 MG/DL
CREAT SPEC-SCNC: 65 MG/DL
CREAT/PROT UR: 9 RATIO
DSDNA AB SER-ACNC: <12 IU/ML
EGFR: 30 ML/MIN/1.73M2
EPITHELIAL CELLS: 3 /HPF
GLUCOSE QUALITATIVE U: NEGATIVE MG/DL
GLUCOSE SERPL-MCNC: 152 MG/DL
KETONES URINE: NEGATIVE MG/DL
LEUKOCYTE ESTERASE URINE: ABNORMAL
MICROSCOPIC-UA: NORMAL
NITRITE URINE: NEGATIVE
PH URINE: 6.5
PHOSPHATE SERPL-MCNC: 4.7 MG/DL
POTASSIUM SERPL-SCNC: 5.1 MMOL/L
PROT UR-MCNC: 588 MG/DL
PROTEIN URINE: >=1000 MG/DL
RED BLOOD CELLS URINE: 242 /HPF
SODIUM SERPL-SCNC: 139 MMOL/L
SPECIFIC GRAVITY URINE: 1.02
UROBILINOGEN URINE: 0.2 MG/DL
WHITE BLOOD CELLS URINE: 12 /HPF

## 2023-06-30 ENCOUNTER — APPOINTMENT (OUTPATIENT)
Dept: UROLOGY | Facility: CLINIC | Age: 48
End: 2023-06-30
Payer: COMMERCIAL

## 2023-06-30 VITALS
BODY MASS INDEX: 22.5 KG/M2 | OXYGEN SATURATION: 98 % | HEART RATE: 91 BPM | DIASTOLIC BLOOD PRESSURE: 79 MMHG | SYSTOLIC BLOOD PRESSURE: 127 MMHG | TEMPERATURE: 97.7 F | WEIGHT: 140 LBS | HEIGHT: 66 IN

## 2023-06-30 DIAGNOSIS — R31.0 GROSS HEMATURIA: ICD-10-CM

## 2023-06-30 PROCEDURE — 52000 CYSTOURETHROSCOPY: CPT

## 2023-07-12 ENCOUNTER — NON-APPOINTMENT (OUTPATIENT)
Age: 48
End: 2023-07-12

## 2023-07-17 ENCOUNTER — APPOINTMENT (OUTPATIENT)
Dept: NEPHROLOGY | Facility: CLINIC | Age: 48
End: 2023-07-17
Payer: COMMERCIAL

## 2023-07-17 VITALS
HEART RATE: 91 BPM | OXYGEN SATURATION: 97 % | WEIGHT: 147.71 LBS | BODY MASS INDEX: 23.84 KG/M2 | TEMPERATURE: 97.1 F | SYSTOLIC BLOOD PRESSURE: 157 MMHG | DIASTOLIC BLOOD PRESSURE: 92 MMHG

## 2023-07-17 DIAGNOSIS — E78.5 HYPERLIPIDEMIA, UNSPECIFIED: ICD-10-CM

## 2023-07-17 PROCEDURE — 99215 OFFICE O/P EST HI 40 MIN: CPT

## 2023-07-17 RX ORDER — ATORVASTATIN CALCIUM 20 MG/1
20 TABLET, FILM COATED ORAL
Qty: 30 | Refills: 5 | Status: ACTIVE | COMMUNITY
Start: 2023-07-17 | End: 1900-01-01

## 2023-07-18 LAB
ALBUMIN SERPL ELPH-MCNC: 2.9 G/DL
ALP BLD-CCNC: 57 U/L
ALT SERPL-CCNC: 17 U/L
ANION GAP SERPL CALC-SCNC: 10 MMOL/L
AST SERPL-CCNC: 20 U/L
BILIRUB SERPL-MCNC: <0.2 MG/DL
BUN SERPL-MCNC: 64 MG/DL
CALCIUM SERPL-MCNC: 9 MG/DL
CHLORIDE SERPL-SCNC: 102 MMOL/L
CO2 SERPL-SCNC: 28 MMOL/L
CREAT SERPL-MCNC: 2.46 MG/DL
CREAT SPEC-SCNC: 42 MG/DL
CREAT/PROT UR: 4.4 RATIO
EGFR: 32 ML/MIN/1.73M2
POTASSIUM SERPL-SCNC: 5.5 MMOL/L
PROT SERPL-MCNC: 5.1 G/DL
PROT UR-MCNC: 185 MG/DL
SODIUM SERPL-SCNC: 140 MMOL/L

## 2023-07-21 RX ORDER — PREDNISONE 20 MG/1
20 TABLET ORAL EVERY MORNING
Qty: 150 | Refills: 2 | Status: DISCONTINUED | COMMUNITY
Start: 2023-06-08 | End: 2023-07-21

## 2023-07-21 RX ORDER — PREDNISONE 50 MG/1
50 TABLET ORAL
Refills: 0 | Status: DISCONTINUED | COMMUNITY
End: 2023-07-21

## 2023-07-21 NOTE — HISTORY OF PRESENT ILLNESS
[FreeTextEntry1] : follow up Lupus Nephritis \par \par 49 yo male who initially presented to dermatology with a rash and work up revealed MÓNICA titer 1:1280 (speckled) - diagnosed with SLE and acute cutaneous lupus. urinalysis showed hematuria and 1.5 gms of proteinuria. 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 He was initiated on Cellcept, prednisone and Plaquenil but was non compliant \par \par Presented in October 2022 to [facility] with worsening LIZETH- Kidney biopsy repeated October 2022- Lupus Nephritis Class 4- activity index 15/24, chronicity 3/12 ). Received obinutuzumab 10/21/22 and 11/1/2022 and restarted on Cellcept - now at 1500 bid. his creatinine did not really improve and stayed at 1.7-1.8 mg/dl. \par \par 5/2/2023- persistence of renal disease/ hematuria/ proteinuria/ elevated creatinine \par Repeat kidney biopsy 5/2/23 on this admission showed proliferative LN class IV-G with 5 out of 8 non-globally sclerosed glomeruli with cellular and fibrocellular crescents; moderate tubulointerstitial inflammation, ~30% IFTA. Activity index 13/24 and chronicity index 7/12. He had a previous biopsy in Oct 2022, activity index was 15/24 and chronicity was 3/12 IFTA 15-20%. Mycophenolic acid level wnl indicating compliance. \par \par Received iv solumedrol 1g x3 (5/4-5/6) then reduce to 1mg/kg IV solu-medrol 60mg/day starting on 5/7. Patient discharged on 5/8/23 on prednisone 60mg to be further tapered outpatient. \par -discharged on bactrim and PPI while on high dose steroids\par -first dose of Cytoxan given 5/5/23 with second dose on 5/18. \par \par 4/27/23-5/8/2023- \par \par Patient called complaining of hematuria and was told to go to the hospital.  U/A showed large blood > 50 RBCs, mod Leukocyte esterase. urine culture was negative. CT was negative for kidney stones. The cause of hematuria was most likely due to hemorrhagic cystitis from Cytoxan use. \par \par he also had extensive pedal edema with an albumin of 2.3. was given solumedrol and received iv Bumex followed by oral Bumex. He diuresed significantly ( 161> 144 lbs today) and feels much better. his discharge albumin was 3.5 which was likely due to the steroids. \par \par overall today he feels okay. he is on prednisone 50 mg daily. \par \par \par -Quant TB negative outpatient 2/2023; Hep B negative \par \par 6//2023\par  he continues to complain of severe edema \par he has a lot of leg and facial swelling \par no changes in urination, no foamy urine, no blood in the urine \par taking Lasix 40 mg daily \par \par 7/17/2023- \par \par started on Tacrolimus since last visit - 2 mg BID \par only complaint is edema \par overall feels okay \par on prednisone 40 mg daily \par

## 2023-07-21 NOTE — PHYSICAL EXAM
[General Appearance - Alert] : alert [General Appearance - In No Acute Distress] : in no acute distress [General Appearance - Well Nourished] : well nourished [General Appearance - Well Developed] : well developed [General Appearance - Well-Appearing] : healthy appearing [Hearing Threshold Finger Rub Not [place]] : hearing was normal [Examination Of The Oral Cavity] : the lips and gums were normal [Neck Appearance] : the appearance of the neck was normal [Neck Cervical Mass (___cm)] : no neck mass was observed [Jugular Venous Distention Increased] : there was no jugular-venous distention [Respiration, Rhythm And Depth] : normal respiratory rhythm and effort [Exaggerated Use Of Accessory Muscles For Inspiration] : no accessory muscle use [Auscultation Breath Sounds / Voice Sounds] : lungs were clear to auscultation bilaterally [Heart Rate And Rhythm] : heart rate was normal and rhythm regular [Heart Sounds] : normal S1 and S2 [Heart Sounds Gallop] : no gallops [Abdomen Soft] : soft [Bowel Sounds] : normal bowel sounds [Abdomen Tenderness] : non-tender [No CVA Tenderness] : no ~M costovertebral angle tenderness [Abnormal Walk] : normal gait [No Spinal Tenderness] : no spinal tenderness [Nail Clubbing] : no clubbing  or cyanosis of the fingernails [Musculoskeletal - Swelling] : no joint swelling seen [Skin Color & Pigmentation] : normal skin color and pigmentation [] : no rash [Skin Lesions] : no skin lesions [Oriented To Time, Place, And Person] : oriented to person, place, and time [Impaired Insight] : insight and judgment were intact [Affect] : the affect was normal [Mood] : the mood was normal [FreeTextEntry1] : 3+ edema

## 2023-07-21 NOTE — ASSESSMENT
[FreeTextEntry1] : 49 yo male with a history of lupus nephritis, h/o multiple immunosuppressives use, last kidney biopsy done on 5/2023 showed class 4 Lupus nephritis\par \par Kidney biopsy- diffuse global proliferative and sclerosing form, ISN/ RPS class IV-G (A/C) - see comment - Cellular and fibrocellular crescents in 5 out of 8 non-globally sclerosed glomeruli - Diffuse global endocapillary hypercellularity - NIH activity index score: 13/24 Summary of chronic changes: - Global glomerulosclerosis (25% of glomeruli) - Segmental glomerulosclerosis (25% of glomeruli) - Tubular atrophy and interstitial fibrosis (30% of the sampled cortex) - Mild arterial and arteriolar sclerosis - NIH chronicity index score: 7/12 \par \par Immunosuppression history- \par \par march 2022- kidney biopsy- class 3 LN- Cellcept - off and on - was non compliant \par october 2022- LIZETH, worse proteinuria. kidney biopsy showed class 4/crescents- Received obinutuzumab 10/21/22 and 11/1/2022 , Resumed full dose Cellcept - compliant \par may 2022- creatinine lee again, worse nephrotic syndrome. kidney biopsy- as above. received 2 doses of Cyclophosphamide- 500 mg iv 2 weeks apart along with MESNA. \par End of May- admitted with hematuria/ fluid overload and hypoalbuminemia- received iv steroids and diuretics \par \par on discharge, he had a significant response in his hypoalbuminemia- 6/6- albumin - 3.5 \par creatinine was 2.78 \par \par - Reduce prednisone to 30 mg daily \par - ? not sure if Mycophenolate is helping \par - Considering hemorrhagic cystitis despite fluids and mesna, would hold off on it. Urology evaluation for intrinsic bladder abnormalities. \par - Increase Bumex to 4 mg oral TID \par - check labs in 2 weeks including am Tacrolimus level \par \par \par \par \par

## 2023-08-01 ENCOUNTER — APPOINTMENT (OUTPATIENT)
Dept: RHEUMATOLOGY | Facility: CLINIC | Age: 48
End: 2023-08-01
Payer: COMMERCIAL

## 2023-08-01 ENCOUNTER — LABORATORY RESULT (OUTPATIENT)
Age: 48
End: 2023-08-01

## 2023-08-01 VITALS
SYSTOLIC BLOOD PRESSURE: 162 MMHG | RESPIRATION RATE: 16 BRPM | OXYGEN SATURATION: 97 % | TEMPERATURE: 98.1 F | BODY MASS INDEX: 23.63 KG/M2 | HEART RATE: 94 BPM | WEIGHT: 147 LBS | DIASTOLIC BLOOD PRESSURE: 98 MMHG | HEIGHT: 66 IN

## 2023-08-01 PROCEDURE — 99215 OFFICE O/P EST HI 40 MIN: CPT

## 2023-08-01 NOTE — PHYSICAL EXAM
[General Appearance - Alert] : alert [General Appearance - In No Acute Distress] : in no acute distress [Respiration, Rhythm And Depth] : normal respiratory rhythm and effort [Musculoskeletal - Swelling] : no joint swelling seen [] : no rash [Impaired Insight] : insight and judgment were intact

## 2023-08-01 NOTE — ASSESSMENT
[FreeTextEntry1] :   #SLE (diagnosis February 2022, lupus nephritis and cutaneous disease) +MÓNICA, Low C3/C4, DsDNA >1000, +Sm/RNP/SSA/SSB, +LA  On hydroxychloroquine 400 mg daily    #Acute cutaneous lupus, s.p skin biopsy with interface dermatitis- resolved    #Lupus nephritis, class IV (repeat biopsy October 2022, activity index 15/24, chronicity 3/12 )  -On MMF 2 g daily (issues with compliance/follow up)- increased to 3 g daily as of 1/12/23  -Received Obinutuzumab 10/21 and 11/1/22  CD19<1 as of April 2023  -Refractory proteinuria, repeat renal biopsy 5/23: activity 13/24, chronicity 7/12, cellular crescents 5/8 Started CYC s.p 2 doses complicated by hematuria  #Hematuria, post CYC cystoscopy on 6/30 normal (reviewed today)  #-MCTD (Raynaud's, +PM/Scl and RNP)  TTE completed December 2022    #High risk medication use  quant TB and hepatitis panel - February 2023    #Vaccination status:  COVID-19 x2 , booster 1/22/22, flu received 2022  PCV 20 given 2/3/23    Plan:  -continue with tacrolimus -check tacro level today  -remain off MMF  -no further CYC dosing -obtain monitoring blood work today -check urine studies today -after review of results will advise regarding prednisone dosing (currently on 30 mg daily) -continue with bactrim -continue with bumex as per renal  -Continue with hydroxychloroquine 200 mg daily most recent eye exam December 2022 patient cleared to continue hydroxychloroquine Advised to follow-up as recommended -Needs baseline PFTs- advised patient to schedule   will call with results and next steps

## 2023-08-01 NOTE — HISTORY OF PRESENT ILLNESS
[FreeTextEntry1] : #Since last visit,  remains tacrolimus prednisone lowered by Dr Shultz to 30 mg daily he feels well overall, no new complaints

## 2023-08-15 ENCOUNTER — EMERGENCY (EMERGENCY)
Facility: HOSPITAL | Age: 48
LOS: 1 days | Discharge: HOME | End: 2023-08-15
Attending: EMERGENCY MEDICINE | Admitting: EMERGENCY MEDICINE
Payer: COMMERCIAL

## 2023-08-15 VITALS
SYSTOLIC BLOOD PRESSURE: 166 MMHG | OXYGEN SATURATION: 100 % | DIASTOLIC BLOOD PRESSURE: 103 MMHG | HEART RATE: 74 BPM | RESPIRATION RATE: 16 BRPM

## 2023-08-15 VITALS
SYSTOLIC BLOOD PRESSURE: 166 MMHG | TEMPERATURE: 98 F | DIASTOLIC BLOOD PRESSURE: 105 MMHG | HEART RATE: 78 BPM | RESPIRATION RATE: 16 BRPM | OXYGEN SATURATION: 98 %

## 2023-08-15 LAB
ALBUMIN SERPL ELPH-MCNC: 2.9 G/DL
ALBUMIN SERPL ELPH-MCNC: 3.1 G/DL — LOW (ref 3.3–5)
ALP BLD-CCNC: 63 U/L
ALP SERPL-CCNC: 55 U/L — SIGNIFICANT CHANGE UP (ref 40–120)
ALT FLD-CCNC: 17 U/L — SIGNIFICANT CHANGE UP (ref 4–41)
ALT SERPL-CCNC: 14 U/L
ANION GAP SERPL CALC-SCNC: 13 MMOL/L
ANION GAP SERPL CALC-SCNC: 9 MMOL/L — SIGNIFICANT CHANGE UP (ref 7–14)
APPEARANCE: CLEAR
AST SERPL-CCNC: 18 U/L
AST SERPL-CCNC: 19 U/L — SIGNIFICANT CHANGE UP (ref 4–40)
BACTERIA: NEGATIVE /HPF
BASOPHILS # BLD AUTO: 0.02 K/UL — SIGNIFICANT CHANGE UP (ref 0–0.2)
BASOPHILS NFR BLD AUTO: 0.2 % — SIGNIFICANT CHANGE UP (ref 0–2)
BILIRUB SERPL-MCNC: 0.2 MG/DL — SIGNIFICANT CHANGE UP (ref 0.2–1.2)
BILIRUB SERPL-MCNC: <0.2 MG/DL
BILIRUBIN URINE: NEGATIVE
BLOOD URINE: ABNORMAL
BUN SERPL-MCNC: 76 MG/DL — HIGH (ref 7–23)
BUN SERPL-MCNC: 81 MG/DL
C3 SERPL-MCNC: 115 MG/DL
C4 SERPL-MCNC: 32 MG/DL
CALCIUM SERPL-MCNC: 8.5 MG/DL
CALCIUM SERPL-MCNC: 8.6 MG/DL — SIGNIFICANT CHANGE UP (ref 8.4–10.5)
CAST: 11 /LPF
CHLORIDE SERPL-SCNC: 103 MMOL/L — SIGNIFICANT CHANGE UP (ref 98–107)
CHLORIDE SERPL-SCNC: 105 MMOL/L
CO2 SERPL-SCNC: 24 MMOL/L
CO2 SERPL-SCNC: 31 MMOL/L — SIGNIFICANT CHANGE UP (ref 22–31)
COLOR: YELLOW
CREAT SERPL-MCNC: 2.64 MG/DL
CREAT SERPL-MCNC: 2.81 MG/DL — HIGH (ref 0.5–1.3)
CREAT SPEC-SCNC: 54 MG/DL
CREAT/PROT UR: 7.6 RATIO
DSDNA AB SER-ACNC: <12 IU/ML
EGFR: 27 ML/MIN/1.73M2 — LOW
EGFR: 29 ML/MIN/1.73M2
EOSINOPHIL # BLD AUTO: 0.05 K/UL — SIGNIFICANT CHANGE UP (ref 0–0.5)
EOSINOPHIL NFR BLD AUTO: 0.5 % — SIGNIFICANT CHANGE UP (ref 0–6)
EPITHELIAL CELLS: 1 /HPF
GLUCOSE QUALITATIVE U: NEGATIVE MG/DL
GLUCOSE SERPL-MCNC: 96 MG/DL — SIGNIFICANT CHANGE UP (ref 70–99)
HCT VFR BLD CALC: 35.9 % — LOW (ref 39–50)
HGB BLD-MCNC: 11.9 G/DL — LOW (ref 13–17)
HYALINE CASTS: PRESENT
IANC: 6.57 K/UL — SIGNIFICANT CHANGE UP (ref 1.8–7.4)
IMM GRANULOCYTES NFR BLD AUTO: 0.5 % — SIGNIFICANT CHANGE UP (ref 0–0.9)
KETONES URINE: NEGATIVE MG/DL
LEUKOCYTE ESTERASE URINE: NEGATIVE
LIDOCAIN IGE QN: 30 U/L — SIGNIFICANT CHANGE UP (ref 7–60)
LYMPHOCYTES # BLD AUTO: 1.52 K/UL — SIGNIFICANT CHANGE UP (ref 1–3.3)
LYMPHOCYTES # BLD AUTO: 16.4 % — SIGNIFICANT CHANGE UP (ref 13–44)
MCHC RBC-ENTMCNC: 29.4 PG — SIGNIFICANT CHANGE UP (ref 27–34)
MCHC RBC-ENTMCNC: 33.1 GM/DL — SIGNIFICANT CHANGE UP (ref 32–36)
MCV RBC AUTO: 88.6 FL — SIGNIFICANT CHANGE UP (ref 80–100)
MICROSCOPIC-UA: NORMAL
MONOCYTES # BLD AUTO: 1.04 K/UL — HIGH (ref 0–0.9)
MONOCYTES NFR BLD AUTO: 11.2 % — SIGNIFICANT CHANGE UP (ref 2–14)
NEUTROPHILS # BLD AUTO: 6.57 K/UL — SIGNIFICANT CHANGE UP (ref 1.8–7.4)
NEUTROPHILS NFR BLD AUTO: 71.2 % — SIGNIFICANT CHANGE UP (ref 43–77)
NITRITE URINE: NEGATIVE
NRBC # BLD: 0 /100 WBCS — SIGNIFICANT CHANGE UP (ref 0–0)
NRBC # FLD: 0 K/UL — SIGNIFICANT CHANGE UP (ref 0–0)
PH URINE: 6
PLATELET # BLD AUTO: 262 K/UL — SIGNIFICANT CHANGE UP (ref 150–400)
POTASSIUM SERPL-MCNC: 3.9 MMOL/L — SIGNIFICANT CHANGE UP (ref 3.5–5.3)
POTASSIUM SERPL-SCNC: 3.9 MMOL/L — SIGNIFICANT CHANGE UP (ref 3.5–5.3)
POTASSIUM SERPL-SCNC: 4.9 MMOL/L
PROT SERPL-MCNC: 5.2 G/DL
PROT SERPL-MCNC: 5.9 G/DL — LOW (ref 6–8.3)
PROT UR-MCNC: 412 MG/DL
PROTEIN URINE: 300 MG/DL
RBC # BLD: 4.05 M/UL — LOW (ref 4.2–5.8)
RBC # FLD: 13.2 % — SIGNIFICANT CHANGE UP (ref 10.3–14.5)
RED BLOOD CELLS URINE: 162 /HPF
REVIEW: NORMAL
SODIUM SERPL-SCNC: 141 MMOL/L
SODIUM SERPL-SCNC: 143 MMOL/L — SIGNIFICANT CHANGE UP (ref 135–145)
SPECIFIC GRAVITY URINE: 1.01
TACROLIMUS SERPL-MCNC: 2.1 NG/ML
UROBILINOGEN URINE: 0.2 MG/DL
WBC # BLD: 9.25 K/UL — SIGNIFICANT CHANGE UP (ref 3.8–10.5)
WBC # FLD AUTO: 9.25 K/UL — SIGNIFICANT CHANGE UP (ref 3.8–10.5)
WHITE BLOOD CELLS URINE: 6 /HPF

## 2023-08-15 PROCEDURE — 70450 CT HEAD/BRAIN W/O DYE: CPT | Mod: 26,MF

## 2023-08-15 PROCEDURE — G1004: CPT

## 2023-08-15 PROCEDURE — 99284 EMERGENCY DEPT VISIT MOD MDM: CPT

## 2023-08-15 RX ORDER — SODIUM CHLORIDE 9 MG/ML
1000 INJECTION INTRAMUSCULAR; INTRAVENOUS; SUBCUTANEOUS ONCE
Refills: 0 | Status: COMPLETED | OUTPATIENT
Start: 2023-08-15 | End: 2023-08-15

## 2023-08-15 RX ORDER — ACETAMINOPHEN 500 MG
1000 TABLET ORAL ONCE
Refills: 0 | Status: COMPLETED | OUTPATIENT
Start: 2023-08-15 | End: 2023-08-15

## 2023-08-15 RX ORDER — MAGNESIUM SULFATE 500 MG/ML
2 VIAL (ML) INJECTION ONCE
Refills: 0 | Status: COMPLETED | OUTPATIENT
Start: 2023-08-15 | End: 2023-08-15

## 2023-08-15 RX ORDER — METOCLOPRAMIDE HCL 10 MG
10 TABLET ORAL ONCE
Refills: 0 | Status: COMPLETED | OUTPATIENT
Start: 2023-08-15 | End: 2023-08-15

## 2023-08-15 RX ADMIN — Medication 400 MILLIGRAM(S): at 11:51

## 2023-08-15 RX ADMIN — Medication 10 MILLIGRAM(S): at 10:45

## 2023-08-15 RX ADMIN — SODIUM CHLORIDE 1000 MILLILITER(S): 9 INJECTION INTRAMUSCULAR; INTRAVENOUS; SUBCUTANEOUS at 10:26

## 2023-08-15 NOTE — ED ADULT TRIAGE NOTE - CHIEF COMPLAINT QUOTE
Pt with hx of lupus nephritis, c/o multiple episodes of vomiting and a headache since last night. Denies abd pain, denied diarrhea.

## 2023-08-15 NOTE — ED PROVIDER NOTE - OBJECTIVE STATEMENT
47 yo M with pmhx lupus nephritis coming in with vomiting and h/a since around 8pm last night. He has had 8-9 episodes of NBNB vomiting and describes his h/a as a tight band around the front of his head. He denies fever, constipation, diarrhea, abd pain, CP, blurry vision, dysuria, recent travel or trauma.

## 2023-08-15 NOTE — ED PROVIDER NOTE - NSFOLLOWUPINSTRUCTIONS_ED_ALL_ED_FT
YOU WERE SEEN FOR HEADACHE, NAUSEA, AND VOMITING    YOU HAD LABS AND IMAGING DONE    YOU HAVE BEEN DIAGNOSED WITH MIGRAINE    REST AND PLENTY OF FLUIDS    FOLLOW UP WITH YOUR PRIMARY CARE PROVIDER WITHIN 1 WEEK.    RETURN TO THE EMERGENCY DEPARTMENT FOR VISION CHANGES, HEADACHE, INABILITY TO TOLERATE ORAL INTAKE, OR FOR ANY WORSENING SYMPTOMS.

## 2023-08-15 NOTE — ED PROVIDER NOTE - PROGRESS NOTE DETAILS
Pascual Colin MS4- Patient's headache has improved after receiving IV reglan and ofirmev. He currently rates it 0/10. CMP reveals no electrolyte abnormalities and lipase is not elevated. Patient is not c/o nausea. Pending CT scan results Dr. Desai: Pt states feeling better. Headache has resolved. Will PO challenge. Dr. Desai: Pt tolerated PO. Advised bland diet with plenty of fluids and f/u with PMD.

## 2023-08-15 NOTE — ED PROVIDER NOTE - ATTENDING CONTRIBUTION TO CARE
Pt was seen and evaluated by me. Pt is a 49 y/o male with PMHx of Lupus nephritis who presented to the ED for headache with nausea and vomiting X 1 day. Pt states starting last night having a bandlike headache and then having 8-9 episodes of vomiting with nausea. Pt denies any head trauma, vision changes, neck pain, back pain, fever, chills, SOB, chest pain, or abd pain.   VITALS: Vitals have been reviewed.  GEN APPEARANCE: Alert and cooperative, non-toxic appearing and in NAD  HEAD: Atraumatic, normocephalic.   EYES: PERRL, EOMI.   EARS: Gross hearing intact.   NOSE: No nasal discharge.   THROAT: MMM. Oral cavity and pharynx normal.   NECK: No nuchal rigidity  CV: RRR, S1S2, no c/r/m/g. No cyanosis or pallor.   LUNGS: CTAB. No wheezing. No rales. No rhonchi. No diminished breath sounds.   ABDOMEN: Soft, NTND. No guarding or rebound.   MSK/EXT: Spine appears normal, no spine point tenderness.   NEURO: Alert, follows commands. Speech normal. Sensation and motor normal x4 extremities.   SKIN: Normal color for race, warm, dry and intact. No evidence of rash.  49 y/o male with PMHx of Lupus nephritis who presented to the ED for headache with nausea and vomiting X 1 day  Concern for Migraine HA/Gastritis/Pancreatitis/Tension HA/Metabolic Derangement  Labs, CT, IVF, Antiemetics/Analgesia

## 2023-08-15 NOTE — ED PROVIDER NOTE - PHYSICAL EXAMINATION
GENERAL: Awake, alert, NAD  HEENT: NC/AT, moist mucous membranes, PERRL, EOMI  LUNGS: CTAB, no wheezes or crackles   CARDIAC: RRR, no m/r/g  ABDOMEN: Soft, , non tender, non distended, no rebound, no guarding  BACK: No midline spinal tenderness, no CVA tenderness  EXT: No edema, no calf tenderness, 2+ DP pulses bilaterally, no deformities.  NEURO: A&Ox3. Moving all extremities. No focal deficits  SKIN: Warm and dry. No rash.  PSYCH: Normal affect.

## 2023-08-15 NOTE — ED PROVIDER NOTE - CLINICAL SUMMARY MEDICAL DECISION MAKING FREE TEXT BOX
49 yo M with pmhx lupus nephritis coming in with NBNB vomiting and h/a since around 8pm last night. He is AOx3 NAD and has remained hemodynamically stable. Physical exam reveals no focal deficits on neuro exam and no abdominal TTP. Differential includes colitis, gastritis, gastroenteritis, gallbladder, pancreas, or appendicitis pathology. Low suspicion for cerebral edema or cranial mass effect at this time given normal neuro exam. Also on the differential is UTI/nephrolithiasis and migraine. Plan is to obtain CBC, CMP, VBG, lipase, hepatic labs, UA and culture. Administer IVF and tylenol + reglan. Dispo pending results

## 2023-08-15 NOTE — ED ADULT NURSE REASSESSMENT NOTE - NS ED NURSE REASSESS COMMENT FT1
Patient reports relief of pain after IV Tylenol. MD aware as per MD do not admin Mag Comfort and safety maintained. All current care needs met. Care plan continued Travis LUNA

## 2023-08-15 NOTE — ED PROVIDER NOTE - PATIENT PORTAL LINK FT
You can access the FollowMyHealth Patient Portal offered by [facility] by registering at the following website: [email or web address]. By joining [employer]’s FollowMyHealth portal, you will also be able to view your health information using other applications (apps) compatible with our system.

## 2023-08-15 NOTE — ED ADULT NURSE REASSESSMENT NOTE - NS ED NURSE REASSESS COMMENT FT1
IV placed. Labs drawn fluids infusing. Requesting pain meds-MD to be made aware  Comfort and safety maintained. All current care needs met. Care plan continued Travis LUNA

## 2023-08-15 NOTE — ED ADULT NURSE NOTE - OBJECTIVE STATEMENT
Patient received in stretcher. AOX4. Respirations even and unlabored. Neuro intact. Spontaneous movement of all extremities noted. Presents to ER c/o HA and vomiting since last night. Patient reports he was unable to sleep all night due to multiple episodes of vomiting reports 5 episodes of vomiting this AM. Reports HA and vomiting. reports Hensley started after multiple episodes of vomiting "behind the eyes HA" denies numbness/ tingling, sensitivity to light, fevers, chills, CP or SOB. Pending MD hicks . Comfort and safety maintained. All current care needs met. Care plan continued Travis LUNA

## 2023-08-16 ENCOUNTER — EMERGENCY (EMERGENCY)
Facility: HOSPITAL | Age: 48
LOS: 1 days | Discharge: HOME | End: 2023-08-16
Attending: EMERGENCY MEDICINE | Admitting: EMERGENCY MEDICINE
Payer: COMMERCIAL

## 2023-08-16 VITALS
HEART RATE: 94 BPM | RESPIRATION RATE: 16 BRPM | OXYGEN SATURATION: 98 % | SYSTOLIC BLOOD PRESSURE: 161 MMHG | TEMPERATURE: 98 F | DIASTOLIC BLOOD PRESSURE: 98 MMHG

## 2023-08-16 LAB
ALBUMIN SERPL ELPH-MCNC: 2.7 G/DL — LOW (ref 3.3–5)
ALP SERPL-CCNC: 58 U/L — SIGNIFICANT CHANGE UP (ref 40–120)
ALT FLD-CCNC: 14 U/L — SIGNIFICANT CHANGE UP (ref 4–41)
ANION GAP SERPL CALC-SCNC: 10 MMOL/L — SIGNIFICANT CHANGE UP (ref 7–14)
APPEARANCE UR: ABNORMAL
APTT BLD: 32.5 SEC — SIGNIFICANT CHANGE UP (ref 24.5–35.6)
AST SERPL-CCNC: 17 U/L — SIGNIFICANT CHANGE UP (ref 4–40)
BASOPHILS # BLD AUTO: 0.02 K/UL — SIGNIFICANT CHANGE UP (ref 0–0.2)
BASOPHILS NFR BLD AUTO: 0.2 % — SIGNIFICANT CHANGE UP (ref 0–2)
BILIRUB SERPL-MCNC: 0.3 MG/DL — SIGNIFICANT CHANGE UP (ref 0.2–1.2)
BILIRUB UR-MCNC: NEGATIVE — SIGNIFICANT CHANGE UP
BUN SERPL-MCNC: 51 MG/DL — HIGH (ref 7–23)
CALCIUM SERPL-MCNC: 8.4 MG/DL — SIGNIFICANT CHANGE UP (ref 8.4–10.5)
CHLORIDE SERPL-SCNC: 103 MMOL/L — SIGNIFICANT CHANGE UP (ref 98–107)
CO2 SERPL-SCNC: 26 MMOL/L — SIGNIFICANT CHANGE UP (ref 22–31)
COLOR SPEC: ABNORMAL
CREAT SERPL-MCNC: 2.77 MG/DL — HIGH (ref 0.5–1.3)
CRP SERPL-MCNC: 32.3 MG/L — HIGH
DIFF PNL FLD: ABNORMAL
EGFR: 27 ML/MIN/1.73M2 — LOW
EOSINOPHIL # BLD AUTO: 0.07 K/UL — SIGNIFICANT CHANGE UP (ref 0–0.5)
EOSINOPHIL NFR BLD AUTO: 0.7 % — SIGNIFICANT CHANGE UP (ref 0–6)
ERYTHROCYTE [SEDIMENTATION RATE] IN BLOOD: 68 MM/HR — HIGH (ref 1–15)
GLUCOSE SERPL-MCNC: 90 MG/DL — SIGNIFICANT CHANGE UP (ref 70–99)
GLUCOSE UR QL: NEGATIVE MG/DL — SIGNIFICANT CHANGE UP
HCT VFR BLD CALC: 38.8 % — LOW (ref 39–50)
HGB BLD-MCNC: 12.5 G/DL — LOW (ref 13–17)
IANC: 6.91 K/UL — SIGNIFICANT CHANGE UP (ref 1.8–7.4)
IMM GRANULOCYTES NFR BLD AUTO: 0.5 % — SIGNIFICANT CHANGE UP (ref 0–0.9)
INR BLD: 0.84 RATIO — SIGNIFICANT CHANGE UP (ref 0.85–1.18)
KETONES UR-MCNC: NEGATIVE MG/DL — SIGNIFICANT CHANGE UP
LEUKOCYTE ESTERASE UR-ACNC: ABNORMAL
LYMPHOCYTES # BLD AUTO: 1.28 K/UL — SIGNIFICANT CHANGE UP (ref 1–3.3)
LYMPHOCYTES # BLD AUTO: 13.6 % — SIGNIFICANT CHANGE UP (ref 13–44)
MCHC RBC-ENTMCNC: 29.1 PG — SIGNIFICANT CHANGE UP (ref 27–34)
MCHC RBC-ENTMCNC: 32.2 GM/DL — SIGNIFICANT CHANGE UP (ref 32–36)
MCV RBC AUTO: 90.2 FL — SIGNIFICANT CHANGE UP (ref 80–100)
MONOCYTES # BLD AUTO: 1.09 K/UL — HIGH (ref 0–0.9)
MONOCYTES NFR BLD AUTO: 11.6 % — SIGNIFICANT CHANGE UP (ref 2–14)
NEUTROPHILS # BLD AUTO: 6.91 K/UL — SIGNIFICANT CHANGE UP (ref 1.8–7.4)
NEUTROPHILS NFR BLD AUTO: 73.4 % — SIGNIFICANT CHANGE UP (ref 43–77)
NITRITE UR-MCNC: NEGATIVE — SIGNIFICANT CHANGE UP
NRBC # BLD: 0 /100 WBCS — SIGNIFICANT CHANGE UP (ref 0–0)
NRBC # FLD: 0 K/UL — SIGNIFICANT CHANGE UP (ref 0–0)
PH UR: 7.5 — SIGNIFICANT CHANGE UP (ref 5–8)
PLATELET # BLD AUTO: 243 K/UL — SIGNIFICANT CHANGE UP (ref 150–400)
POTASSIUM SERPL-MCNC: 4.3 MMOL/L — SIGNIFICANT CHANGE UP (ref 3.5–5.3)
POTASSIUM SERPL-SCNC: 4.3 MMOL/L — SIGNIFICANT CHANGE UP (ref 3.5–5.3)
PROT SERPL-MCNC: 5.8 G/DL — LOW (ref 6–8.3)
PROT UR-MCNC: >=1000 MG/DL
PROTHROM AB SERPL-ACNC: 9.5 SEC — SIGNIFICANT CHANGE UP (ref 9.5–13)
RBC # BLD: 4.3 M/UL — SIGNIFICANT CHANGE UP (ref 4.2–5.8)
RBC # FLD: 13.2 % — SIGNIFICANT CHANGE UP (ref 10.3–14.5)
SODIUM SERPL-SCNC: 139 MMOL/L — SIGNIFICANT CHANGE UP (ref 135–145)
SP GR SPEC: 1.02 — SIGNIFICANT CHANGE UP (ref 1–1.03)
UROBILINOGEN FLD QL: 0.2 MG/DL — SIGNIFICANT CHANGE UP (ref 0.2–1)
WBC # BLD: 9.42 K/UL — SIGNIFICANT CHANGE UP (ref 3.8–10.5)
WBC # FLD AUTO: 9.42 K/UL — SIGNIFICANT CHANGE UP (ref 3.8–10.5)

## 2023-08-16 PROCEDURE — 99223 1ST HOSP IP/OBS HIGH 75: CPT

## 2023-08-16 PROCEDURE — 99285 EMERGENCY DEPT VISIT HI MDM: CPT

## 2023-08-16 RX ORDER — METOCLOPRAMIDE HCL 10 MG
10 TABLET ORAL EVERY 6 HOURS
Refills: 0 | Status: DISCONTINUED | OUTPATIENT
Start: 2023-08-16 | End: 2023-08-20

## 2023-08-16 RX ORDER — HYDROXYCHLOROQUINE SULFATE 200 MG
200 TABLET ORAL DAILY
Refills: 0 | Status: DISCONTINUED | OUTPATIENT
Start: 2023-08-16 | End: 2023-08-20

## 2023-08-16 RX ORDER — MAGNESIUM SULFATE 500 MG/ML
2 VIAL (ML) INJECTION ONCE
Refills: 0 | Status: COMPLETED | OUTPATIENT
Start: 2023-08-16 | End: 2023-08-16

## 2023-08-16 RX ORDER — ACETAMINOPHEN 500 MG
1000 TABLET ORAL EVERY 6 HOURS
Refills: 0 | Status: DISCONTINUED | OUTPATIENT
Start: 2023-08-16 | End: 2023-08-20

## 2023-08-16 RX ORDER — METOCLOPRAMIDE HCL 10 MG
10 TABLET ORAL ONCE
Refills: 0 | Status: COMPLETED | OUTPATIENT
Start: 2023-08-16 | End: 2023-08-16

## 2023-08-16 RX ORDER — MAGNESIUM SULFATE 500 MG/ML
1 VIAL (ML) INJECTION EVERY 6 HOURS
Refills: 0 | Status: DISCONTINUED | OUTPATIENT
Start: 2023-08-16 | End: 2023-08-20

## 2023-08-16 RX ORDER — ACETAMINOPHEN 500 MG
1000 TABLET ORAL ONCE
Refills: 0 | Status: COMPLETED | OUTPATIENT
Start: 2023-08-16 | End: 2023-08-16

## 2023-08-16 RX ORDER — SODIUM CHLORIDE 9 MG/ML
1000 INJECTION INTRAMUSCULAR; INTRAVENOUS; SUBCUTANEOUS ONCE
Refills: 0 | Status: COMPLETED | OUTPATIENT
Start: 2023-08-16 | End: 2023-08-16

## 2023-08-16 RX ADMIN — SODIUM CHLORIDE 1000 MILLILITER(S): 9 INJECTION INTRAMUSCULAR; INTRAVENOUS; SUBCUTANEOUS at 15:05

## 2023-08-16 RX ADMIN — Medication 25 GRAM(S): at 18:22

## 2023-08-16 RX ADMIN — Medication 400 MILLIGRAM(S): at 16:01

## 2023-08-16 RX ADMIN — Medication 10 MILLIGRAM(S): at 15:05

## 2023-08-16 NOTE — ED PROVIDER NOTE - IV ALTEPLASE EXCL REL HIDDEN
Peer to peer scheduled for 07/19/19 at 11:30 am. Dr. Talavera will call Dr. Hussein directly on his office phone.   show

## 2023-08-16 NOTE — ED ADULT NURSE NOTE - OBJECTIVE STATEMENT
Patient received to intake bed 8 A&Ox4, ambulatory with past medical history lupus coming to the ED for complaints of headache and nausea vomiting x 2 days. Patient states to have been here yesterday for the same complaints and discharged and this morning at approximately 2am the symptoms started again. Denies dizziness, blurry vision, abdominal pain, diarrhea, chest pain, SOB at this time. RR equal and unlabored, abdomen soft, non-tender, non-distended. Neuro at bedside. +strength + sensation. No slurred speech, drift, asymmetry noted. 20G IV placed, labs drawn and sent. Medicated as ordered. Care plan continued. Comfort measures provided. Safety maintained. Awaiting lab results.

## 2023-08-16 NOTE — ED PROVIDER NOTE - PHYSICAL EXAMINATION
GEN: AAOx3, NAD     HEENT: NCAT, EOMI, PERRLA,  Neck Supple.    RESP: Good air entry, CTA B/L, no wheezes/rales/rhonchi  CVS: Regular rate and rhythm, S1 and S2 heard, no murmurs/rubs/gallops, Pulses +2, Cap refill <2s     Abd: BS+, abdomen NTND, soft to palpation  Extremity: No obvious skeletal deformity  SKIN: No Rashes/lesions, warm, dry     NEURO: Grossly intact, CN2-12 normal, normal finger-to-nose, no pronator drift, no ataxia, negative Kernig and Brudzinski's

## 2023-08-16 NOTE — ED CDU PROVIDER INITIAL DAY NOTE - ATTENDING APP SHARED VISIT CONTRIBUTION OF CARE
Physician Discharge Summary     Patient ID:  Lynn Fields  [id number]  18 y.o.  1982    Admit date: 7/11/2021    Discharge date: 7- Left against medical advice    Diagnosis:  1- Isopropyl (rubbing) alcohol use. Hx of frequent alcohol use, possibly binging. 2- HUMERA (acute kidney injury) due to #1, possibly oliguric (hx unclear)  3- Hematemesis, no apparent active bleeding so far despite history, stable Hb.  4- Obstructive sleep apnea, stable.     Patient admitted on:  Telemetry  Seizure precautions  Alcohol withdrawal preventive measures  NPO initally  Monitored Hb  PPi iv  IVF hydration, generous  IO monitor    Hospital course:   27-year-old white male history of alcohol abuse, LOWELL, presents with nausea vomiting and abdominal pain over the past 2 days.  States he drinks fairly heavily daily and yesterday did not have any alcohol so drank a half a bottle of rubbing alcohol.  Pain is epigastric and left lower quadrant.  No diarrhea, fever or urinary changes.  Also reports seeing some red and black blood in his emesis. On further questioning, he vomited pur blood for last 3 days w/ out reporting diarrhea or black stools. He states he had bled from stomach ulcers in the past but never had endoscopy done. He states he do not drink alcohol daily but not infrequently he does to deal with ''anxiety''. Patient admitted and treated as above. Pt decided to leave AMA as above. Pt is fully competent. PCP: Jacqui Mcgregor NP    Please send copy to primary physician. Time 5 minutes.     Signed:  Manuel Leach MD  7/18/2021  2:58 PM Patient improved but still with HA, seen by neuro, Nonfocal exam, heent nml heart s1s2, lungs clear, abd soft nontender, motor5/5, reflexes 2+  and equal

## 2023-08-16 NOTE — ED CDU PROVIDER INITIAL DAY NOTE - CLINICAL SUMMARY MEDICAL DECISION MAKING FREE TEXT BOX
48-year-old male with PMH of lupus nephritis on chronic steroids presenting with headache. This is his second ED visit for the same complaint. His neuro exam is non-focal. His CT head yesterday was unremarkable. Neurology consulted in the ED, recs appreciated. Pt is CDU for symptomatic care,  MRI studies and neuro to follow.

## 2023-08-16 NOTE — ED ADULT TRIAGE NOTE - CHIEF COMPLAINT QUOTE
Patient c/o headache and nausea x 2 days. Reports he was here for the same symptoms yesterday. Did not take any medication today for headache. Denies blurry vision, dizziness, fever, abdominal pain, vomiting, diarrhea, constipation. Phx lupus, lupus nephritis

## 2023-08-16 NOTE — ED ADULT NURSE NOTE - CCCP TRG CHIEF CMPLNT
Telemedicine Visit: The patient's condition can be safely assessed and treated via synchronous audio and visual telemedicine encounter.      Reason for Telemedicine Visit: Suspension of face to face services due to COVID-19    Originating Site (Patient Location): Patient's home    Distant Site (Provider Location): Provider Remote Setting- Home Office    Consent:  The patient/guardian has verbally consented to: the potential risks and benefits of telemedicine (video visit) versus in person care; bill my insurance or make self-payment for services provided; and responsibility for payment of non-covered services.     Mode of Communication:  Video Conference via Zoom    Call Started at: 9:30 AM  Call Ended at: 12:10 PM    As the provider I attest to compliance with applicable laws and regulations related to telemedicine.   headache

## 2023-08-16 NOTE — CONSULT NOTE ADULT - ASSESSMENT
Patient is a 47yo Rt handed M with pmh of Lupus nephritis, HTN presents to [facility] for headaches. Patient states the headaches started around 2 days ago.     Impression:   Bandlike headache with sharp sensations likely due to tension headache due to toxic metabolic etiology   Recommendations:   MR brain w/o given hx of lupus   Migrx management:    1st line -  reglan 10 q6h, magnesium sulfate IV 1g, tylenol1 1g( please give all at once)    2nd line: - solumedrol 250mg IV x1   3rd line: -, Depakote 500mg IV x1  Continue on home medications  U/a  urine toxicology   correction of electrolytes as needed   fluid hydration     Case to be seen with Neurology Attending, Dr. Chicas.    Patient is a 49yo Rt handed M with pmh of Lupus nephritis, HTN presents to [facility] for headaches. Patient states the headaches started around 2 days ago.     Impression:   Bandlike headache with sharp sensations likely due to tension headache due to toxic metabolic etiology   Recommendations:   MR brain w/o given hx of lupus   MRA head and neck w/o contrast   MRV head and neck w/o contrast   Migrx management:    1st line -  reglan 10 q6h, magnesium sulfate IV 1g, tylenol1 1g( please give all at once)    2nd line: - solumedrol 250mg IV x1   3rd line: -, Depakote 500mg IV x1  Continue on home medications  U/a  urine toxicology   correction of electrolytes as needed   fluid hydration     Case to be seen with Neurology Attending, Dr. Chicas.

## 2023-08-16 NOTE — ED PROVIDER NOTE - CLINICAL SUMMARY MEDICAL DECISION MAKING FREE TEXT BOX
In short, 48-year-old male with history of lupus nephritis, presenting with headache, nausea, and vomiting.  Vital signs normal.  On examination no focal neurologic deficits or meningeal signs.  Given this is patient's second presentation for similar symptoms, underlying lupus nephritis, and chronic immunosuppression with steroid use, will proceed with broad work-up including laboratory markers, neurology consultation, and MR studies looking for thrombosis and infection.  Low threshold for lumbar puncture for CSF analysis. - Presley Rousseau MD, PGY5

## 2023-08-16 NOTE — ED CDU PROVIDER INITIAL DAY NOTE - OBJECTIVE STATEMENT
Initial ED provider note: "Patient is a 48-year-old male, 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  Patient was in your state of health until yesterday, patient started to have bilateral frontal headache with nausea, and nonbilious nonbloody emesis.  He presented to the ED yesterday, with symptomatic improvement after migraine cocktail (Reglan, bolus, acetaminophen, and magnesium).  CT head yesterday was negative.  After going home, around 2 AM, patient started to have recurrent symptoms with persistent headache, nausea, and emesis.  Patient denies any fever, chest pain, shortness of breath, abdominal pain, diarrhea, constipation, dizziness, or visual changes.  Patient follows with rheumatology for his lupus nephritis.  Medications include ergocalciferol, sodium bicarbonate, prednisone, bumetanide, Plaquenil, cyanocobalamin, and folic acid.  Last dose medications were yesterday.  Denies other medical problems, no prior surgeries, no known allergies."    CDU note: Agree with above. 48-year-old male with PMH of lupus nephritis on chronic steroids presenting with headache. This is his second ED visit for the same complaint. His neuro exam is non-focal. His CT head yesterday was unremarkable. Neurology consulted in the ED, recs appreciated. Pt is CDU for symptomatic care,  MRI studies and neuro to follow.

## 2023-08-16 NOTE — ED PROVIDER NOTE - ATTENDING CONTRIBUTION TO CARE
DR. BLOCH, ATTENDING MD-  I performed a face to face bedside interview with patient regarding history of present illness, review of symptoms and past medical history. I completed an independent physical exam.  I have discussed patient's plan of care with the fellow.  WEll appearing NAD HEENT nml perrl, full eom, neck supple, neg jolt test, no adenopathy. heart s1s2, lungs clear, abd soft nontender, extrem no edema, neuro alert and oriented, x 3, cranial nerves intact, no cerebellar deficits, sensation and motor intact.

## 2023-08-16 NOTE — ED PROVIDER NOTE - OBJECTIVE STATEMENT
Patient is a 48-year-old male, 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  Patient was in your state of health until yesterday, patient started to have bilateral frontal headache with nausea, and nonbilious nonbloody emesis.  He presented to the ED yesterday, with symptomatic improvement after migraine cocktail (Reglan, bolus, acetaminophen, and magnesium).  CT head yesterday was negative.  After going home, around 2 AM, patient started to have recurrent symptoms with persistent headache, nausea, and emesis.  Patient denies any fever, chest pain, shortness of breath, abdominal pain, diarrhea, constipation, dizziness, or visual changes.  Patient follows with rheumatology for his lupus nephritis.  Medications include ergocalciferol, sodium bicarbonate, prednisone, bumetanide, Plaquenil, cyanocobalamin, and folic acid.  Last dose medications were yesterday.  Denies other medical problems, no prior surgeries, no known allergies.

## 2023-08-16 NOTE — ED ADULT NURSE NOTE - NSFALLUNIVINTERV_ED_ALL_ED
Bed/Stretcher in lowest position, wheels locked, appropriate side rails in place/Call bell, personal items and telephone in reach/Instruct patient to call for assistance before getting out of bed/chair/stretcher/Non-slip footwear applied when patient is off stretcher/[place] to call system/Physically safe environment - no spills, clutter or unnecessary equipment/Purposeful proactive rounding/Room/bathroom lighting operational, light cord in reach

## 2023-08-16 NOTE — CONSULT NOTE ADULT - SUBJECTIVE AND OBJECTIVE BOX
MRN-[id number]  Patient is a 48y old  Male who presents with a chief complaint of   HPI:  Patient is a 49yo Rt handed M with pmh of Lupus nephritis, HTN presents to [facility] for headaches. Patient states the headaches started around 2 days ago. Patient mentions the headache to be a bandlike pattern on the forehead and mentions it to be a sharp sensation. Patient states the pain is currently 6/10. Patient states sleeping helps with the headache, There is no photophobia and phonophobia.  Patient states the headache was worse yesterday and was treated in the ED with migraine cocktail and headache pain went down from 10/10 to 0/10 and was discharged home. Patient developed headache again at 0200am. The headache this AM was similar to yesterday's and patient did not take medications and came back to the ED. Patient mentions some nausea today. Patient states he does not have a hx of headaches. Patient had cT head w/o contrast which was negative.      PAST MEDICAL & SURGICAL HISTORY:  Lupus nephritis      Systemic lupus      No significant past surgical history        FAMILY HISTORY:  No pertinent family history in first degree relatives      Social Hx:  Nonsmoker, no drug or alcohol use    Home Medications:  Plaquenil 200 mg oral tablet: 1 tab(s) orally once a day (27 May 2023 17:28)    MEDICATIONS  (STANDING):  acetaminophen   IVPB .. 1000 milliGRAM(s) IV Intermittent Once  metoclopramide 10 milliGRAM(s) Oral Once  sodium chloride 0.9% Bolus 1000 milliLiter(s) IV Bolus once    MEDICATIONS  (PRN):    Allergies  No Known Allergies    Intolerances      REVIEW OF SYSTEMS  General: Denies fever and chills 	  Ophthalmologic: Denies blurred vision   Respiratory and Thorax:	denies sob   Cardiovascular:	Denies cP   Gastrointestinal:	N, V   Neurological:	headaches     ROS: Pertinent positives in HPI, all other ROS were reviewed and are negative.      Vital Signs Last 24 Hrs  T(C): 36.8 (16 Aug 2023 13:14), Max: 36.8 (16 Aug 2023 13:14)  T(F): 98.3 (16 Aug 2023 13:14), Max: 98.3 (16 Aug 2023 13:14)  HR: 94 (16 Aug 2023 13:14) (94 - 94)  BP: 161/98 (16 Aug 2023 13:14) (161/98 - 161/98)  BP(mean): --  RR: 16 (16 Aug 2023 13:14) (16 - 16)  SpO2: 98% (16 Aug 2023 13:14) (98% - 98%)    Parameters below as of 16 Aug 2023 13:14  Patient On (Oxygen Delivery Method): room air        GENERAL EXAM:  Constitutional: awake and alert. NAD  HEENT: PERRL EOMI  Neck: Supple  Musculoskeletal: no joint swelling/tenderness, no abnormal movements  Skin: no rashes    NEUROLOGICAL EXAM:  MS: AAOX3, fluent, attends b/l; recent and remote memory intact; normal attention, language and fund of knowledge.   CN: VFF, EOMI, PERRL,    V1-3 intact, no facial asymmetry, t/p midline, SCM/trap intact.  Motor: Strength: 5/5 4x.   Tone: normal. Bulk: normal.   DTR 2+ symm.   lantar flex b/l.   Sensation: intact  4x.   Coordination: intact 4x.     Labs:   cbc                      11.9   9.25  )-----------( 262      ( 15 Aug 2023 10:27 )             35.9     [id number]    143  |  103  |  76<H>  ----------------------------<  96  3.9   |  31  |  2.81<H>    Ca    8.6      15 Aug 2023 10:27    TPro  5.9<L>  /  Alb  3.1<L>  /  TBili  0.2  /  DBili  x   /  AST  19  /  ALT  17  /  AlkPhos  55  08-15    LFTsLIVER FUNCTIONS - ( 15 Aug 2023 10:27 )  Alb: 3.1 g/dL / Pro: 5.9 g/dL / ALK PHOS: 55 U/L / ALT: 17 U/L / AST: 19 U/L / GGT: x           UAUrinalysis Basic - ( 15 Aug 2023 10:27 )    Color: x / Appearance: x / SG: x / pH: x  Gluc: 96 mg/dL / Ketone: x  / Bili: x / Urobili: x   Blood: x / Protein: x / Nitrite: x   Leuk Esterase: x / RBC: x / WBC x   Sq Epi: x / Non Sq Epi: x / Bacteria: x      Radiology:  CT head w/o contrast:   IMPRESSION:  Unremarkable head CT.

## 2023-08-17 ENCOUNTER — NON-APPOINTMENT (OUTPATIENT)
Age: 48
End: 2023-08-17

## 2023-08-17 VITALS
DIASTOLIC BLOOD PRESSURE: 94 MMHG | RESPIRATION RATE: 18 BRPM | HEART RATE: 98 BPM | SYSTOLIC BLOOD PRESSURE: 148 MMHG | TEMPERATURE: 99 F | OXYGEN SATURATION: 99 %

## 2023-08-17 LAB
C3 SERPL-MCNC: 109 MG/DL — SIGNIFICANT CHANGE UP (ref 90–180)
C4 SERPL-MCNC: 34 MG/DL — SIGNIFICANT CHANGE UP (ref 10–40)
CREAT ?TM UR-MCNC: 96 MG/DL — SIGNIFICANT CHANGE UP
PROT ?TM UR-MCNC: 854 MG/DL — SIGNIFICANT CHANGE UP
PROT/CREAT UR-RTO: 9 RATIO — HIGH (ref 0–0.2)

## 2023-08-17 PROCEDURE — G1004: CPT

## 2023-08-17 PROCEDURE — 99285 EMERGENCY DEPT VISIT HI MDM: CPT | Mod: GC

## 2023-08-17 PROCEDURE — 70551 MRI BRAIN STEM W/O DYE: CPT | Mod: 26,ME

## 2023-08-17 PROCEDURE — 70544 MR ANGIOGRAPHY HEAD W/O DYE: CPT | Mod: 26,76,59

## 2023-08-17 PROCEDURE — 99239 HOSP IP/OBS DSCHRG MGMT >30: CPT

## 2023-08-17 PROCEDURE — 70547 MR ANGIOGRAPHY NECK W/O DYE: CPT | Mod: 26,76

## 2023-08-17 RX ORDER — LISINOPRIL 2.5 MG/1
2 TABLET ORAL
Qty: 60 | Refills: 0
Start: 2023-08-17 | End: 2023-09-15

## 2023-08-17 RX ADMIN — Medication 200 MILLIGRAM(S): at 12:55

## 2023-08-17 NOTE — ED CDU PROVIDER DISPOSITION NOTE - NSFOLLOWUPINSTRUCTIONS_ED_ALL_ED_FT
Follow-up with your primary care doctor and with your rheumatologist for a posthospital visit.  You can call [phone] to schedule an appointment with a neurologist if your headache continues.  Take Tylenol at home as directed for pain.  Return to the ER for worsening headache, nausea, vomiting, difficulty looking at light, or for any other concerns. Follow-up with your primary care doctor, your rheumatologist, and kidney doctor for a posthospital visit.  You can call [phone] to schedule an appointment with a neurologist if your headache continues.  Take Tylenol at home as directed for pain. START LISINOPRIL 5mg once a day (for blood pressure and kidney protection). Return to the ER for worsening headache, nausea, vomiting, difficulty looking at light, or for any other concerns.

## 2023-08-17 NOTE — ED CDU PROVIDER DISPOSITION NOTE - ATTENDING CONTRIBUTION TO CARE
Lateral I have made the decision to discharge this patient to the CDU as documented in my Provider note I have reviewed the note  written by the CDU Physician Assistant, on that visit day. I have supervised and participated as necessary in the performance of procedures indicated for patient management and was available at all phases of the patient´s visit when needed.    Patient cleared by Rheumatology/Neuro for d/c; MRAs/MRVs negative for occlusive or inflammatory dz  d/c with followup   RTED PRN

## 2023-08-17 NOTE — CONSULT NOTE ADULT - ASSESSMENT
This is a 48ym with 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 Rheumatology consulted for further evaluation.     #SLE, SLE nephritis   - Follows with Dr. 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 Was on MMF in the past (issues with adherence). Received Obinutuzumab 10/21 and 11/1/22. CD19<1 as of April 2023. Had refractory proteinuria, with repeat renal biopsy 5/23: activity 13/24, chronicity 7/12, cellular crescents 5/8. Started CYC s.p 2 doses, complicated by hematuria. Had cystoscopy on 6/30 which was normal. Most recently on tacrolimus as per Nephrology   - On prednisone 25 mg daily,  daily, and tacrolimus 2 mg BID (was supposed to be increased to 3 mg BID but has only been taking 2 BID)   - Evidence of active disease with hematuria, U/A showing 1825 RBC, 7 casts, large blood, >1000 protein; urine protein/Cr 9, C3 109, C4 32 --> labs similar to outpatient labs drawn 8/1 except for worsening U/A     #Headache   - 2 day history of bilateral frontal headache, along with vomiting  - No deficits on neuro exam  - Evaluated by Neuro   - Improved with migraine cocktail   - MR imaging negative     Recommendations:   -     INCOMPLETE PLEASE WAIT    Annabelle De Oliveira MD   Rheumatology Fellow   Available on TEAMS  This is a 48ym with 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 Rheumatology consulted for further evaluation.     #SLE, SLE nephritis   - Follows with Dr. 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 Was on MMF in the past (issues with adherence). Received Obinutuzumab 10/21 and 11/1/22. CD19<1 as of April 2023. Had refractory proteinuria, with repeat renal biopsy 5/23: activity 13/24, chronicity 7/12, cellular crescents 5/8. Started CYC s.p 2 doses, complicated by hematuria. Had cystoscopy on 6/30 which was normal. Most recently on tacrolimus as per Nephrology   - On prednisone 25 mg daily,  daily, and tacrolimus 2 mg BID (was supposed to be increased to 3 mg BID but has only been taking 2 BID)   - Pt noted to be hypertensive with BP 150s. 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 Rheumatology consulted for further evaluation.     #SLE, SLE nephritis   - Follows with Dr. 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 Was on MMF in the past (issues with adherence). Received Obinutuzumab 10/21 and 11/1/22. CD19<1 as of April 2023. Had refractory proteinuria, with repeat renal biopsy 5/23: activity 13/24, chronicity 7/12, cellular crescents 5/8. Started CYC s.p 2 doses, complicated by hematuria. Had cystoscopy on 6/30 which was normal. Most recently on tacrolimus as per Nephrology   - On prednisone 25 mg daily,  daily, and tacrolimus 2 mg BID (was supposed to be increased to 3 mg BID but has only been taking 2 BID)   - Pt noted to be hypertensive with -150s. Evidence of active disease with hematuria, U/A showing 1825 RBC, 7 casts, large blood, >1000 protein; urine protein/Cr 9, C3 109, C4 32 --> labs similar to outpatient labs drawn 8/1 except for worsening U/A     #HTN   - BP 140s-150s, no prior history or medications initiated     #Headache   - 2 day history of bilateral frontal headache, along with vomiting  - No deficits on neuro exam  - Evaluated by Neuro   - Improved with migraine cocktail   - MR imaging negative   - Headache now resolved     Recommendations:   - C/w home prednisone 25 mg daily and tacrolimus 2 mg BID   - Can initiate low dose lisinopril 5 mg daily for HTN and proteinuria   - Pt will need close outpatient follow up with Nephrology Dr. Shultz (has appt 9/8) and Dr. Nilton Le (has appt 9/11)   - In the setting of active disease, pt would likely benefit from up titration of tacrolimus, which can be discussed outpatient   - No contraindication to discharge from Rheumatology standpoint     Case discussed with Dr. 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 Rheumatology consulted for further evaluation.     #SLE, SLE nephritis   - Follows with Dr. 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 Was on MMF in the past (issues with adherence). Received Obinutuzumab 10/21 and 11/1/22. CD19<1 as of April 2023. Had refractory proteinuria, with repeat renal biopsy 5/23: activity 13/24, chronicity 7/12, cellular crescents 5/8. Started CYC s.p 2 doses, complicated by hematuria. Had cystoscopy on 6/30 which was normal. Most recently on tacrolimus as per Nephrology   - On prednisone 25 mg daily,  daily, and tacrolimus 2 mg BID (was supposed to be increased to 3 mg BID but has only been taking 2 BID)   - Pt noted to be hypertensive with -150s. Evidence of active disease with hematuria, U/A showing 1825 RBC, 7 casts, large blood, >1000 protein; urine protein/Cr 9, C3 109, C4 32 --> labs similar to outpatient labs drawn 8/1 except for worsening U/A     #HTN   - BP 140s-150s, no prior history or medications initiated     #Headache   - 2 day history of bilateral frontal headache, along with vomiting  - No deficits on neuro exam  - Evaluated by Neuro   - Improved with migraine cocktail   - MR imaging negative   - Headache now resolved     Recommendations:   - C/w home HCQ, prednisone 25 mg daily, and tacrolimus 2 mg BID   - Can initiate low dose lisinopril 5 mg daily for HTN and proteinuria   - Pt will need close outpatient follow up with Nephrology Dr. Shultz (has appt 9/8) and Dr. Nilton Le (has appt 9/11)   - In the setting of active disease, pt would likely benefit from up titration of tacrolimus, which can be discussed outpatient   - No contraindication to discharge from Rheumatology standpoint     Case discussed with Dr. 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 Rheumatology consulted for further evaluation.     #SLE, SLE nephritis   - Follows with Dr. 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 Was on MMF in the past (issues with adherence). Received Obinutuzumab 10/21 and 11/1/22. CD19<1 as of April 2023. Had refractory proteinuria, with repeat renal biopsy 5/23: activity 13/24, chronicity 7/12, cellular crescents 5/8. Started CYC s.p 2 doses, complicated by hematuria. Had cystoscopy on 6/30 which was normal. Most recently on tacrolimus as per Nephrology   - On prednisone 25 mg daily,  daily, and tacrolimus 2 mg BID (was supposed to be increased to 3 mg BID but has only been taking 2 BID)   - Pt noted to be hypertensive with -150s. Evidence of active disease with hematuria, U/A showing 1825 RBC, 7 casts, large blood, >1000 protein; urine protein/Cr 9, C3 109, C4 32 --> labs similar to outpatient labs drawn 8/1 except for worsening U/A     #HTN   - BP 140s-150s, no prior history or medications initiated     #Headache   - 2 day history of bilateral frontal headache, along with vomiting  - No deficits on neuro exam  - Evaluated by Neuro without clear etiology   - Improved with migraine cocktail   - MR imaging negative   - Headache now resolved     Recommendations:   - C/w home HCQ, prednisone 25 mg daily, and tacrolimus 2 mg BID   - Can initiate low dose lisinopril 5 mg daily for HTN and proteinuria   - Pt will need close outpatient follow up with Nephrology Dr. Shultz (has appt 9/8) and Dr. Nilton Le (has appt 9/11)   - In the setting of active disease, pt would likely benefit from up titration of tacrolimus, which can be discussed outpatient as would prefer to have HTN better controlled first if in fact driving his HA   - No contraindication to discharge from Rheumatology standpoint     Case discussed with Dr. Segovia and primary team     Annabelle De Oliveira MD   Rheumatology Fellow   Available on TEAMS

## 2023-08-17 NOTE — ED CDU PROVIDER SUBSEQUENT DAY NOTE - ATTENDING APP SHARED VISIT CONTRIBUTION OF CARE
The decision was made to admit this patient to the CDU as documented in my Provider note I have reviewed the note  written by the CDU Physician Assistant, on that visit day. I have supervised and participated as necessary in the performance of procedures indicated for patient management and was available at all phases of the patient´s visit when needed.    Please see the  provider note for the details of the decision to admit  Vital Signs Last 24 Hrs  T(F): 98.2 HR: 97 BP: 154/85 RR: 15 SpO2: 98% (17 Aug 2023 05:55)  PE unchanged at time of admission  Bizarre affect SLE  lupus nephritis with hematuria   Neuro multi MRIs give risk of CV event but all negative  w/o HA overnight event   rheum/ neuro to reassess/consult

## 2023-08-17 NOTE — CHART NOTE - NSCHARTNOTEFT_GEN_A_CORE
MR head/ MRA/MRV:   IMPRESSION:    1.  RIGHT CAROTID NECK CIRCULATION:Intact.    2.  LEFT CAROTID NECK CIRCULATION:    Intact.    3.  VERTEBRAL NECK CIRCULATION:   Intact    4.  ANTERIOR INTRACRANIAL CIRCULATION:     Intact.    5.  POSTERIOR INTRACRANIAL CIRCULATION:   Intact.    6.  BRAIN:    No evidence of acute infarction.    7. INTRACRANIAL DURAL SINUSES:   Patent dural sinuses. No evidence   intracranial dural sinus thrombosis.    8. NECK VEINS:   Unremarkable MR venogram of the principal neck veins. No   evidence of neck venous thrombosis.      Impression:   Bandlike headache with sharp sensations likely due to tension headache due to toxic metabolic etiology   Recommendations:   MR head and MRA/MRV: was negative for any acute abnormalites    For symptomatic relief 1st line -  reglan 10 q6h,  magnesium sulfate IV 1g, tylenol 1g IV                                      2nd line: - solumedrol 250mg IV x1                                     3rd line: -  Depakote 500mg IV x1  Correction of electrolytes as needed   Adeuquate fluid hydration   Can follow up at [address] [place] [address], [address], [place]. [address], [phone]   No further in patient Neurology workup.     Case discussed with Neurology attending, Dr. Chicas. MR head/ MRA/MRV:   IMPRESSION:    1.  RIGHT CAROTID NECK CIRCULATION:Intact.    2.  LEFT CAROTID NECK CIRCULATION:    Intact.    3.  VERTEBRAL NECK CIRCULATION:   Intact    4.  ANTERIOR INTRACRANIAL CIRCULATION:     Intact.    5.  POSTERIOR INTRACRANIAL CIRCULATION:   Intact.    6.  BRAIN:    No evidence of acute infarction.    7. INTRACRANIAL DURAL SINUSES:   Patent dural sinuses. No evidence   intracranial dural sinus thrombosis.    8. NECK VEINS:   Unremarkable MR venogram of the principal neck veins. No   evidence of neck venous thrombosis.      Impression:   Bandlike headache with sharp sensations likely due to tension headache due to toxic metabolic etiology   Recommendations:   MR head and MRA/MRV: was negative for any acute abnormalites    For symptomatic relief 1st line -  reglan 10 q6h,  magnesium sulfate IV 1g, tylenol 1g IV                                      2nd line: - solumedrol 250mg IV x1                                     3rd line: -  Depakote 500mg IV x1  Correction of electrolytes as needed   Adeuquate fluid hydration   Can follow up at [address] [place] [address], [address], [place]. [address], [phone]   No further in patient Neurology workup.     Case discussed with Neurology attending, Dr. Chicas.  No evidence of a secondary cause for headache on imaging.   Thank you

## 2023-08-17 NOTE — ED CDU PROVIDER DISPOSITION NOTE - PATIENT PORTAL LINK FT
You can access the FollowMyHealth Patient Portal offered by [facility] by registering at the following website: [email or web address]. By joining [employer]’s FollowMyHealth portal, you will also be able to view your health information using other applications (apps) compatible with our system.

## 2023-08-17 NOTE — CONSULT NOTE ADULT - SUBJECTIVE AND OBJECTIVE BOX
LIZ HOUSER  [id number]    HISTORY OF PRESENT ILLNESS: This is a 48ym with 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  Patient was in your state of health until yesterday, patient started to have bilateral frontal headache with nausea, and nonbilious nonbloody emesis.  He presented to the ED yesterday, with symptomatic improvement after migraine cocktail (Reglan, bolus, acetaminophen, and magnesium).  CT head yesterday was negative.  After going home, around 2 AM, patient started to have recurrent symptoms with persistent headache, nausea, and emesis.  Patient denies any fever, chest pain, shortness of breath, abdominal pain, diarrhea, constipation, dizziness, or visual changes.  Patient follows with rheumatology for his lupus nephritis.  Medications include ergocalciferol, sodium bicarbonate, prednisone, bumetanide, Plaquenil, cyanocobalamin, and folic acid.  Last dose medications were yesterday.  Denies other medical problems, no prior surgeries, no known allergies.    He follows with Dr. Nilton Le outpatient, last seen 2023. Has history of SLE (diagnosis 2022, lupus nephritis and cutaneous disease) +MÓNICA, Low C3/C4, DsDNA >1000, +Sm/RNP/SSA/SSB, +LA. On hydroxychloroquine 400 mg daily. Had acute cutaneous lupus, s/p skin biopsy with interface dermatitis- resolved. 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 Was on MMF in the past (issues with adherence). Received Obinutuzumab 10/21 and 22. CD19<1 as of 2023. Had refractory proteinuria, with repeat renal biopsy : activity , chronicity , cellular crescents . Started CYC s.p 2 doses, complicated by hematuria. Had cystoscopy on  which was normal. Most recently on tacrolimus as per Nephrology. Also on prednisone 20 mg daily (decreased from 30 with plan to do 20 mg daily for 2 weeks and then 15 mg daily for 2 weeks and finally 10 mg daily until his f/u appt), bumex, and  daily. Had tacrolimus increased to 3 mg BID.     Rheum ROS    Denies fevers/chills/weight loss/night sweats/alopecia/sinus disease/asthma history/oral ulcers/dysphagia/vision changes/Raynauds/VTE/miscarraiges/sicca symptoms/urinary changes/edema/SOB/joint pain/swelling/jaw claudication/vision changes/rash/photosensitivity/morning stiffness    Endorses fevers/chills/weight loss/night sweats/alopecia/sinus disease/asthma history/oral ulcers/dysphagia/vision changes/Raynauds/VTE/miscarraiges/sicca symptoms/urinary changes/edema/SOB/joint pain/swelling/jaw claudication/vision changes/rash/photosensitivity/morning stiffness      MEDICATIONS  (STANDING):  hydroxychloroquine 200 milliGRAM(s) Oral daily    MEDICATIONS  (PRN):  acetaminophen   IVPB .. 1000 milliGRAM(s) IV Intermittent every 6 hours PRN ***PROVIDER TO RN:  PLEASE GIVE TOGETHER WITH REGLAN AND MAGNESIUM FOR HEADACHE MANAGEMENT PER NEUROLOGY TEAMS' RECOMMENDATIONS***  magnesium sulfate  IVPB 1 Gram(s) IV Intermittent every 6 hours PRN HEADACHE  metoclopramide Injectable 10 milliGRAM(s) IV Push every 6 hours PRN headache      Allergies    No Known Allergies    Intolerances        PERTINENT MEDICATION HISTORY:    SOCIAL HISTORY:  OCCUPATION:  TRAVEL HISTORY:    FAMILY HISTORY:  No pertinent family history in first degree relatives        Vital Signs Last 24 Hrs  T(C): 36.8 (17 Aug 2023 05:55), Max: 37.6 (16 Aug 2023 17:38)  T(F): 98.2 (17 Aug 2023 05:55), Max: 99.6 (16 Aug 2023 17:38)  HR: 97 (17 Aug 2023 05:55) (88 - 97)  BP: 154/85 (17 Aug 2023 05:55) (149/93 - 161/98)  BP(mean): --  RR: 15 (17 Aug 2023 05:55) (15 - 16)  SpO2: 98% (17 Aug 2023 05:55) (98% - 100%)    Parameters below as of 17 Aug 2023 01:38  Patient On (Oxygen Delivery Method): room air        Physical Exam:  General: No apparent distress  HEENT: EOMI, MMM  CVS: +S1/S2, RRR, no murmurs/rubs/gallops  Resp: CTA b/l. No crackles/wheezing  GI: Soft, NT/ND +BS  MSK: no swelling/warmth/erythema of the joints of the UE/LE  Neuro: AAOx3  Skin: no visible rashes    LABS:                        12.5   9.42  )-----------( 243      ( 16 Aug 2023 15:00 )             38.8         139  |  103  |  51<H>  ----------------------------<  90  4.3   |  26  |  2.77<H>    Ca    8.4      16 Aug 2023 15:00    TPro  5.8<L>  /  Alb  2.7<L>  /  TBili  0.3  /  DBili  x   /  AST  17  /  ALT  14  /  AlkPhos  58      PT/INR - ( 16 Aug 2023 15:00 )   PT: 9.5 sec;   INR: 0.84 ratio         PTT - ( 16 Aug 2023 15:00 )  PTT:32.5 sec  Urinalysis Basic - ( 16 Aug 2023 15:00 )    Color: Red / Appearance: Cloudy / S.018 / pH: x  Gluc: 90 mg/dL / Ketone: Negative mg/dL  / Bili: Negative / Urobili: 0.2 mg/dL   Blood: x / Protein: >=1000 mg/dL / Nitrite: Negative   Leuk Esterase: Small / RBC: 1825 /HPF / WBC 18 /HPF   Sq Epi: x / Non Sq Epi: 1 /HPF / Bacteria: Negative /HPF            Rheumatology Work Up    Sedimentation Rate, Erythrocyte: 68 mm/hr *H* [1 - 15] (23 @ 15:00)  C-Reactive Protein, Serum: 32.3 mg/L *H* (23 @ 15:00)  Double Stranded DNA Antibody: 13 IU/mL [Method: EIA            Reference Ranges            Interpretation            <= 29    IU/mL     Negative            30 - 75  IU/mL     Borderline            > 75      IU/mL     Positive] (23 @ 06:15)  C3 Complement, Serum: 86 mg/dL *L* [90 - 180] (23 @ 06:15)            RADIOLOGY & ADDITIONAL STUDIES:     LIZ HOUSER  [id number]    HISTORY OF PRESENT ILLNESS: This is a 48ym with 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  Patient was in your state of health until yesterday, patient started to have bilateral frontal headache with nausea, and nonbilious nonbloody emesis.  He presented to the ED yesterday, with symptomatic improvement after migraine cocktail (Reglan, bolus, acetaminophen, and magnesium).  CT head yesterday was negative.  After going home, around 2 AM, patient started to have recurrent symptoms with persistent headache, nausea, and emesis.  Patient denies any fever, chest pain, shortness of breath, abdominal pain, diarrhea, constipation, dizziness, or visual changes.  Patient follows with rheumatology for his lupus nephritis.  Medications include ergocalciferol, sodium bicarbonate, prednisone, bumetanide, Plaquenil, cyanocobalamin, and folic acid.  Last dose medications were yesterday.  Denies other medical problems, no prior surgeries, no known allergies.    He follows with Dr. Nilton Le outpatient, last seen 2023. Has history of SLE (diagnosis 2022, lupus nephritis and cutaneous disease) +MÓNICA, Low C3/C4, DsDNA >1000, +Sm/RNP/SSA/SSB, +LA. On hydroxychloroquine 400 mg daily. Had acute cutaneous lupus, s/p skin biopsy with interface dermatitis- resolved. 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 Was on MMF in the past (issues with adherence). Received Obinutuzumab 10/21 and 22. CD19<1 as of 2023. Had refractory proteinuria, with repeat renal biopsy : activity , chronicity , cellular crescents . Started CYC s.p 2 doses, complicated by hematuria. Had cystoscopy on  which was normal. Most recently on tacrolimus as per Nephrology. Also on prednisone 25 mg daily (being tapered down), bumex, and  daily. Had tacrolimus increased to 3 mg BID (has only been taking 2 mg BID though).     [facility]  [facility] ([id number]) used. Pt reports 2 episodes of bilateral frontal headache along with vomiting over the last 2 days. The first episode occurred 3 days ago and lasted 15-16 hours with associated 6-7 episodes of vomiting. Pt came to the ED and had symptomatic improvement after a migraine cocktail (Reglan, IVF bolus, tylenol, and magnesium). CT head was negative. Then after going home, around 2 AM, patient started to have recurrent symptoms with persistent headache, nausea, and emesis.      Patient denies any fever, chest pain, shortness of breath, abdominal pain, diarrhea, constipation, dizziness, or visual changes. Denies ever having had this before. Denies neck stiffness. States that he feels much better now, only feels some heaviness in his head. Denies joint symptoms, rashes, or ulcers. Does report +hematuria.      Medications include ergocalciferol, sodium bicarbonate, prednisone, bumetanide, Plaquenil, cyanocobalamin, and folic acid.   Rheum ROS    MEDICATIONS  (STANDING):  hydroxychloroquine 200 milliGRAM(s) Oral daily    MEDICATIONS  (PRN):  acetaminophen   IVPB .. 1000 milliGRAM(s) IV Intermittent every 6 hours PRN ***PROVIDER TO RN:  PLEASE GIVE TOGETHER WITH REGLAN AND MAGNESIUM FOR HEADACHE MANAGEMENT PER NEUROLOGY TEAMS' RECOMMENDATIONS***  magnesium sulfate  IVPB 1 Gram(s) IV Intermittent every 6 hours PRN HEADACHE  metoclopramide Injectable 10 milliGRAM(s) IV Push every 6 hours PRN headache      Allergies    No Known Allergies    Intolerances        PERTINENT MEDICATION HISTORY: on tacrolimus 2 mg BID, prednisone 25 mg, and bumex 4 mg BID     SOCIAL HISTORY: lives alone, no smoking/alcohol/drug   OCCUPATION: works at [employer]   TRAVEL HISTORY: no recent travel     FAMILY HISTORY:  No pertinent family history in first degree relatives        Vital Signs Last 24 Hrs  T(C): 36.8 (17 Aug 2023 05:55), Max: 37.6 (16 Aug 2023 17:38)  T(F): 98.2 (17 Aug 2023 05:55), Max: 99.6 (16 Aug 2023 17:38)  HR: 97 (17 Aug 2023 05:55) (88 - 97)  BP: 154/85 (17 Aug 2023 05:55) (149/93 - 161/98)  BP(mean): --  RR: 15 (17 Aug 2023 05:55) (15 - 16)  SpO2: 98% (17 Aug 2023 05:55) (98% - 100%)    Parameters below as of 17 Aug 2023 01:38  Patient On (Oxygen Delivery Method): room air        Physical Exam:  General: No apparent distress, awake, comfortable   HEENT: EOMI, MMM. thrush   CVS: +S1/S2, RRR, no murmurs  Resp: CTA b/l. No crackles/wheezing  GI: Soft, NT/ND +BS  MSK: no swelling/warmth/erythema of the joints of the UE/LE. 1+ LE edema b/l   Neuro: AAOx3. No focal deficits. Strength 5/5 in UE and LEs. CNs III-XII intact. Finger to nose cerebellar test normal   Skin: faint erythema on forehead, cheeks, and upper back    LABS:                        12.5   9.42  )-----------( 243      ( 16 Aug 2023 15:00 )             38.8         139  |  103  |  51<H>  ----------------------------<  90  4.3   |  26  |  2.77<H>    Ca    8.4      16 Aug 2023 15:00    TPro  5.8<L>  /  Alb  2.7<L>  /  TBili  0.3  /  DBili  x   /  AST  17  /  ALT  14  /  AlkPhos  58      PT/INR - ( 16 Aug 2023 15:00 )   PT: 9.5 sec;   INR: 0.84 ratio         PTT - ( 16 Aug 2023 15:00 )  PTT:32.5 sec  Urinalysis Basic - ( 16 Aug 2023 15:00 )    Color: Red / Appearance: Cloudy / S.018 / pH: x  Gluc: 90 mg/dL / Ketone: Negative mg/dL  / Bili: Negative / Urobili: 0.2 mg/dL   Blood: x / Protein: >=1000 mg/dL / Nitrite: Negative   Leuk Esterase: Small / RBC: 1825 /HPF / WBC 18 /HPF   Sq Epi: x / Non Sq Epi: 1 /HPF / Bacteria: Negative /HPF    Protein/Creatinine Ratio Calculation: 9.0 Ratio (23 @ 11:32)         Rheumatology Work Up    Sedimentation Rate, Erythrocyte: 68 mm/hr *H* [1 - 15] (23 @ 15:00)  C-Reactive Protein, Serum: 32.3 mg/L *H* (23 @ 15:00)  Double Stranded DNA Antibody: 13 IU/mL [Method: EIA            Reference Ranges            Interpretation            <= 29    IU/mL     Negative            30 - 75  IU/mL     Borderline            > 75      IU/mL     Positive] (23 @ 06:15)  C3 Complement, Serum: 86 mg/dL *L* [90 - 180] (23 @ 06:15)            RADIOLOGY & ADDITIONAL STUDIES:  < from: MR Venogram Head No Cont (23 @ 04:36) >  IMPRESSION:    1.  RIGHT CAROTID NECK CIRCULATION:Intact.    2.  LEFT CAROTID NECK CIRCULATION:    Intact.    3.  VERTEBRAL NECK CIRCULATION:   Intact    4.  ANTERIOR INTRACRANIAL CIRCULATION:     Intact.    5.  POSTERIOR INTRACRANIAL CIRCULATION:   Intact.    6.  BRAIN:    No evidence of acute infarction.    7. INTRACRANIAL DURAL SINUSES:   Patent dural sinuses. No evidence   intracranial dural sinus thrombosis.    8. NECK VEINS:   Unremarkable MR venogram of the principal neck veins. No   evidence of neck venous thrombosis.    --- End of Report ---    < end of copied text >       LIZ HOUSER  [id number]    HISTORY OF PRESENT ILLNESS: This is a 48ym with 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  Patient was in your state of health until yesterday, patient started to have bilateral frontal headache with nausea, and nonbilious nonbloody emesis.  He presented to the ED yesterday, with symptomatic improvement after migraine cocktail (Reglan, bolus, acetaminophen, and magnesium).  CT head yesterday was negative.  After going home, around 2 AM, patient started to have recurrent symptoms with persistent headache, nausea, and emesis.  Patient denies any fever, chest pain, shortness of breath, abdominal pain, diarrhea, constipation, dizziness, or visual changes.  Patient follows with rheumatology for his lupus nephritis.  Medications include ergocalciferol, sodium bicarbonate, prednisone, bumetanide, Plaquenil, cyanocobalamin, and folic acid.  Last dose medications were yesterday.  Denies other medical problems, no prior surgeries, no known allergies.    He follows with Dr. Nilton Le outpatient, last seen 2023. Has history of SLE (diagnosis 2022, lupus nephritis and cutaneous disease) +MÓNICA, Low C3/C4, DsDNA >1000, +Sm/RNP/SSA/SSB, +LA. On hydroxychloroquine 400 mg daily. Had acute cutaneous lupus, s/p skin biopsy with interface dermatitis- resolved. 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 Was on MMF in the past (issues with adherence). Received Obinutuzumab 10/21 and 22. CD19<1 as of 2023. Had refractory proteinuria, with repeat renal biopsy : activity , chronicity , cellular crescents . Started CYC s.p 2 doses, complicated by hematuria. Had cystoscopy on  which was normal. Most recently on tacrolimus as per Nephrology. Also on prednisone 25 mg daily (being tapered down), bumex, and  daily. Had tacrolimus increased to 3 mg BID (has only been taking 2 mg BID though).     [facility]  [facility] ([id number]) used. Pt reports 2 episodes of bilateral frontal headache along with vomiting over the last 2 days. The first episode occurred 3 days ago and lasted 15-16 hours with associated 6-7 episodes of vomiting. Pt came to the ED and had symptomatic improvement after a migraine cocktail (Reglan, IVF bolus, tylenol, and magnesium). CT head was negative. Then after going home, around 2 AM, patient started to have recurrent symptoms with persistent headache, nausea, and emesis.      Patient denies any fever, chest pain, shortness of breath, abdominal pain, diarrhea, constipation, dizziness, or visual changes. Denies ever having had this before. Denies neck stiffness. States that he feels much better now, only feels some heaviness in his head. Denies joint symptoms, rashes, or ulcers. Does report +hematuria.      Medications include ergocalciferol, sodium bicarbonate, prednisone, bumetanide, Plaquenil, cyanocobalamin, and folic acid.     MEDICATIONS  (STANDING):  hydroxychloroquine 200 milliGRAM(s) Oral daily    MEDICATIONS  (PRN):  acetaminophen   IVPB .. 1000 milliGRAM(s) IV Intermittent every 6 hours PRN ***PROVIDER TO RN:  PLEASE GIVE TOGETHER WITH REGLAN AND MAGNESIUM FOR HEADACHE MANAGEMENT PER NEUROLOGY TEAMS' RECOMMENDATIONS***  magnesium sulfate  IVPB 1 Gram(s) IV Intermittent every 6 hours PRN HEADACHE  metoclopramide Injectable 10 milliGRAM(s) IV Push every 6 hours PRN headache      Allergies    No Known Allergies    Intolerances        PERTINENT MEDICATION HISTORY: on tacrolimus 2 mg BID, prednisone 25 mg, and bumex 4 mg BID     SOCIAL HISTORY: lives alone, no smoking/alcohol/drug   OCCUPATION: works at [employer]   TRAVEL HISTORY: no recent travel     FAMILY HISTORY:  No pertinent family history in first degree relatives        Vital Signs Last 24 Hrs  T(C): 36.8 (17 Aug 2023 05:55), Max: 37.6 (16 Aug 2023 17:38)  T(F): 98.2 (17 Aug 2023 05:55), Max: 99.6 (16 Aug 2023 17:38)  HR: 97 (17 Aug 2023 05:55) (88 - 97)  BP: 154/85 (17 Aug 2023 05:55) (149/93 - 161/98)  BP(mean): --  RR: 15 (17 Aug 2023 05:55) (15 - 16)  SpO2: 98% (17 Aug 2023 05:55) (98% - 100%)    Parameters below as of 17 Aug 2023 01:38  Patient On (Oxygen Delivery Method): room air        Physical Exam:  General: No apparent distress, awake, comfortable   HEENT: EOMI, MMM. thrush   CVS: +S1/S2, RRR, no murmurs  Resp: CTA b/l. No crackles/wheezing  GI: Soft, NT/ND +BS  MSK: no swelling/warmth/erythema of the joints of the UE/LE. 1+ LE edema b/l   Neuro: AAOx3. No focal deficits. Strength 5/5 in UE and LEs. CNs III-XII intact. Finger to nose cerebellar test normal   Skin: faint erythema on forehead, cheeks, and upper back    LABS:                        12.5   9.42  )-----------( 243      ( 16 Aug 2023 15:00 )             38.8         139  |  103  |  51<H>  ----------------------------<  90  4.3   |  26  |  2.77<H>    Ca    8.4      16 Aug 2023 15:00    TPro  5.8<L>  /  Alb  2.7<L>  /  TBili  0.3  /  DBili  x   /  AST  17  /  ALT  14  /  AlkPhos  58      PT/INR - ( 16 Aug 2023 15:00 )   PT: 9.5 sec;   INR: 0.84 ratio         PTT - ( 16 Aug 2023 15:00 )  PTT:32.5 sec  Urinalysis Basic - ( 16 Aug 2023 15:00 )    Color: Red / Appearance: Cloudy / S.018 / pH: x  Gluc: 90 mg/dL / Ketone: Negative mg/dL  / Bili: Negative / Urobili: 0.2 mg/dL   Blood: x / Protein: >=1000 mg/dL / Nitrite: Negative   Leuk Esterase: Small / RBC: 1825 /HPF / WBC 18 /HPF   Sq Epi: x / Non Sq Epi: 1 /HPF / Bacteria: Negative /HPF    Protein/Creatinine Ratio Calculation: 9.0 Ratio (23 @ 11:32)         Rheumatology Work Up    Sedimentation Rate, Erythrocyte: 68 mm/hr *H* [1 - 15] (23 @ 15:00)  C-Reactive Protein, Serum: 32.3 mg/L *H* (23 @ 15:00)  Double Stranded DNA Antibody: 13 IU/mL [Method: EIA            Reference Ranges            Interpretation            <= 29    IU/mL     Negative            30 - 75  IU/mL     Borderline            > 75      IU/mL     Positive] (23 @ 06:15)  C3 Complement, Serum: 86 mg/dL *L* [90 - 180] (23 @ 06:15)            RADIOLOGY & ADDITIONAL STUDIES:  < from: MR Venogram Head No Cont (23 @ 04:36) >  IMPRESSION:    1.  RIGHT CAROTID NECK CIRCULATION:Intact.    2.  LEFT CAROTID NECK CIRCULATION:    Intact.    3.  VERTEBRAL NECK CIRCULATION:   Intact    4.  ANTERIOR INTRACRANIAL CIRCULATION:     Intact.    5.  POSTERIOR INTRACRANIAL CIRCULATION:   Intact.    6.  BRAIN:    No evidence of acute infarction.    7. INTRACRANIAL DURAL SINUSES:   Patent dural sinuses. No evidence   intracranial dural sinus thrombosis.    8. NECK VEINS:   Unremarkable MR venogram of the principal neck veins. No   evidence of neck venous thrombosis.    --- End of Report ---    < end of copied text >       LIZ HOUSER  [id number]    HISTORY OF PRESENT ILLNESS: This is a 48ym with 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  Patient was in your state of health until yesterday, patient started to have bilateral frontal headache with nausea, and nonbilious nonbloody emesis.  He presented to the ED yesterday, with symptomatic improvement after migraine cocktail (Reglan, bolus, acetaminophen, and magnesium).  CT head yesterday was negative.  After going home, around 2 AM, patient started to have recurrent symptoms with persistent headache, nausea, and emesis.  Patient denies any fever, chest pain, shortness of breath, abdominal pain, diarrhea, constipation, dizziness, or visual changes.  Patient follows with rheumatology for his lupus nephritis.  Medications include ergocalciferol, sodium bicarbonate, prednisone, bumetanide, Plaquenil, cyanocobalamin, and folic acid.  Last dose medications were yesterday.  Denies other medical problems, no prior surgeries, no known allergies.    He follows with Dr. Nilton Le outpatient, last seen 2023. Has history of SLE (diagnosis 2022, lupus nephritis and cutaneous disease) +MÓNICA, Low C3/C4, DsDNA >1000, +Sm/RNP/SSA/SSB, +LA. On hydroxychloroquine 400 mg daily. Had acute cutaneous lupus, s/p skin biopsy with interface dermatitis- resolved. 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 Was on MMF in the past (issues with adherence). Received Obinutuzumab 10/21 and 22. CD19<1 as of 2023. Had refractory proteinuria, with repeat renal biopsy : activity , chronicity , cellular crescents . Started CYC s.p 2 doses, complicated by hematuria. Had cystoscopy on  which was normal. Most recently on tacrolimus as per Nephrology. Also on prednisone 25 mg daily (being tapered down), bumex, and  daily. Had tacrolimus increased to 3 mg BID (has only been taking 2 mg BID though).     [facility]  [facility] ([id number]) used. Pt reports 2 episodes of bilateral frontal headache along with vomiting over the last 2 days. The first episode occurred 3 days ago and lasted 15-16 hours with associated 6-7 episodes of vomiting. Pt came to the ED and had symptomatic improvement after a migraine cocktail (Reglan, IVF bolus, tylenol, and magnesium). CT head was negative. Then after going home, around 2 AM, patient started to have recurrent symptoms with persistent headache, nausea, and emesis.      Patient denies any fever, chest pain, shortness of breath, abdominal pain, diarrhea, constipation, dizziness, or visual changes. Denies ever having had this before. Denies neck stiffness. States that he feels much better now, only feels some heaviness in his head. Denies joint symptoms, rashes, or ulcers. Does report +hematuria.      Medications include ergocalciferol, sodium bicarbonate, prednisone, bumetanide, Plaquenil, cyanocobalamin, and folic acid.     MEDICATIONS  (STANDING):  hydroxychloroquine 200 milliGRAM(s) Oral daily    MEDICATIONS  (PRN):  acetaminophen   IVPB .. 1000 milliGRAM(s) IV Intermittent every 6 hours PRN ***PROVIDER TO RN:  PLEASE GIVE TOGETHER WITH REGLAN AND MAGNESIUM FOR HEADACHE MANAGEMENT PER NEUROLOGY TEAMS' RECOMMENDATIONS***  magnesium sulfate  IVPB 1 Gram(s) IV Intermittent every 6 hours PRN HEADACHE  metoclopramide Injectable 10 milliGRAM(s) IV Push every 6 hours PRN headache      Allergies    No Known Allergies    Intolerances        PERTINENT MEDICATION HISTORY: on tacrolimus 2 mg BID, prednisone 25 mg, and bumex 4 mg BID     SOCIAL HISTORY: lives alone, no smoking/alcohol/drug   OCCUPATION: works at [employer]   TRAVEL HISTORY: no recent travel     FAMILY HISTORY:  No pertinent family history in first degree relatives        Vital Signs Last 24 Hrs  T(C): 36.8 (17 Aug 2023 05:55), Max: 37.6 (16 Aug 2023 17:38)  T(F): 98.2 (17 Aug 2023 05:55), Max: 99.6 (16 Aug 2023 17:38)  HR: 97 (17 Aug 2023 05:55) (88 - 97)  BP: 154/85 (17 Aug 2023 05:55) (149/93 - 161/98)  BP(mean): --  RR: 15 (17 Aug 2023 05:55) (15 - 16)  SpO2: 98% (17 Aug 2023 05:55) (98% - 100%)    Parameters below as of 17 Aug 2023 01:38  Patient On (Oxygen Delivery Method): room air        Physical Exam:  General: No apparent distress, awake, comfortable   HEENT: EOMI, MMM  CVS: +S1/S2, RRR, no murmurs  Resp: CTA b/l. No crackles/wheezing  GI: Soft, NT/ND +BS  MSK: no swelling/warmth/erythema of the joints of the UE/LE. 1+ LE edema b/l   Neuro: AAOx3. No focal deficits. Strength 5/5 in UE and LEs. CNs III-XII intact. Finger to nose cerebellar test normal   Skin: faint erythema on forehead, cheeks, and upper back    LABS:                        12.5   9.42  )-----------( 243      ( 16 Aug 2023 15:00 )             38.8         139  |  103  |  51<H>  ----------------------------<  90  4.3   |  26  |  2.77<H>    Ca    8.4      16 Aug 2023 15:00    TPro  5.8<L>  /  Alb  2.7<L>  /  TBili  0.3  /  DBili  x   /  AST  17  /  ALT  14  /  AlkPhos  58      PT/INR - ( 16 Aug 2023 15:00 )   PT: 9.5 sec;   INR: 0.84 ratio         PTT - ( 16 Aug 2023 15:00 )  PTT:32.5 sec  Urinalysis Basic - ( 16 Aug 2023 15:00 )    Color: Red / Appearance: Cloudy / S.018 / pH: x  Gluc: 90 mg/dL / Ketone: Negative mg/dL  / Bili: Negative / Urobili: 0.2 mg/dL   Blood: x / Protein: >=1000 mg/dL / Nitrite: Negative   Leuk Esterase: Small / RBC: 1825 /HPF / WBC 18 /HPF   Sq Epi: x / Non Sq Epi: 1 /HPF / Bacteria: Negative /HPF    Protein/Creatinine Ratio Calculation: 9.0 Ratio (23 @ 11:32)         Rheumatology Work Up    Sedimentation Rate, Erythrocyte: 68 mm/hr *H* [1 - 15] (23 @ 15:00)  C-Reactive Protein, Serum: 32.3 mg/L *H* (23 @ 15:00)  Double Stranded DNA Antibody: 13 IU/mL [Method: EIA            Reference Ranges            Interpretation            <= 29    IU/mL     Negative            30 - 75  IU/mL     Borderline            > 75      IU/mL     Positive] (23 @ 06:15)  C3 Complement, Serum: 86 mg/dL *L* [90 - 180] (23 @ 06:15)            RADIOLOGY & ADDITIONAL STUDIES:  < from: MR Venogram Head No Cont (23 @ 04:36) >  IMPRESSION:    1.  RIGHT CAROTID NECK CIRCULATION:Intact.    2.  LEFT CAROTID NECK CIRCULATION:    Intact.    3.  VERTEBRAL NECK CIRCULATION:   Intact    4.  ANTERIOR INTRACRANIAL CIRCULATION:     Intact.    5.  POSTERIOR INTRACRANIAL CIRCULATION:   Intact.    6.  BRAIN:    No evidence of acute infarction.    7. INTRACRANIAL DURAL SINUSES:   Patent dural sinuses. No evidence   intracranial dural sinus thrombosis.    8. NECK VEINS:   Unremarkable MR venogram of the principal neck veins. No   evidence of neck venous thrombosis.    --- End of Report ---    < end of copied text >       LIZ HOUSER  [id number]    HISTORY OF PRESENT ILLNESS: This is a 48ym with 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  Patient was in usual state of health until yesterday, patient started to have bilateral frontal headache with nausea, and nonbilious nonbloody emesis.  He presented to the ED yesterday, with symptomatic improvement after migraine cocktail (Reglan, bolus, acetaminophen, and magnesium).  CT head yesterday was negative.  After going home, around 2 AM, patient started to have recurrent symptoms with persistent headache, nausea, and emesis.  Patient denies any fever, chest pain, shortness of breath, abdominal pain, diarrhea, constipation, dizziness, or visual changes.  Patient follows with rheumatology for his lupus nephritis.  Medications include ergocalciferol, sodium bicarbonate, prednisone, bumetanide, Plaquenil, cyanocobalamin, and folic acid.  Last dose of medications were yesterday.  Denies other medical problems, no prior surgeries, no known allergies.    He follows with Dr. Nilton Le outpatient, last seen 2023. Has history of SLE (diagnosis 2022, lupus nephritis and cutaneous disease) +MÓNICA, Low C3/C4, DsDNA >1000, +Sm/RNP/SSA/SSB, +LA. On hydroxychloroquine 400 mg daily. Had acute cutaneous lupus, s/p skin biopsy with interface dermatitis- resolved. 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 Was on MMF in the past (issues with adherence). Received Obinutuzumab 10/21 and 22. CD19<1 as of 2023. Had refractory proteinuria, with repeat renal biopsy : activity , chronicity , cellular crescents . Started CYC s.p 2 doses, complicated by hematuria. Had cystoscopy on  which was normal. Most recently on tacrolimus as per Nephrology. Also on prednisone 25 mg daily (being tapered down), bumex, and  daily. Had tacrolimus increased to 3 mg BID (has only been taking 2 mg BID though).     [facility]  [facility] ([id number]) used. Pt reports 2 episodes of bilateral frontal headache along with vomiting over the last 2 days. The first episode occurred 3 days ago and lasted 15-16 hours with associated 6-7 episodes of vomiting. Pt came to the ED and had symptomatic improvement after a migraine cocktail (Reglan, IVF bolus, tylenol, and magnesium). CT head was negative. Then after going home, around 2 AM, patient started to have recurrent symptoms with persistent headache, nausea, and emesis.      Patient denies any fever, chest pain, shortness of breath, abdominal pain, diarrhea, constipation, dizziness, or visual changes. Denies ever having had this before. Denies neck stiffness. States that he feels much better now, only feels some heaviness in his head. Denies joint symptoms, rashes, or ulcers. Does report +hematuria.      Medications include ergocalciferol, sodium bicarbonate, prednisone, bumetanide, Plaquenil, cyanocobalamin, and folic acid.     MEDICATIONS  (STANDING):  hydroxychloroquine 200 milliGRAM(s) Oral daily    MEDICATIONS  (PRN):  acetaminophen   IVPB .. 1000 milliGRAM(s) IV Intermittent every 6 hours PRN ***PROVIDER TO RN:  PLEASE GIVE TOGETHER WITH REGLAN AND MAGNESIUM FOR HEADACHE MANAGEMENT PER NEUROLOGY TEAMS' RECOMMENDATIONS***  magnesium sulfate  IVPB 1 Gram(s) IV Intermittent every 6 hours PRN HEADACHE  metoclopramide Injectable 10 milliGRAM(s) IV Push every 6 hours PRN headache      Allergies    No Known Allergies    Intolerances      PERTINENT MEDICATION HISTORY: on tacrolimus 2 mg BID, prednisone 25 mg, and bumex 4 mg BID     SOCIAL HISTORY: lives alone, no smoking/alcohol/drug   OCCUPATION: works at [employer]   TRAVEL HISTORY: no recent travel     FAMILY HISTORY:  No pertinent family history in first degree relatives    Vital Signs Last 24 Hrs  T(C): 36.8 (17 Aug 2023 05:55), Max: 37.6 (16 Aug 2023 17:38)  T(F): 98.2 (17 Aug 2023 05:55), Max: 99.6 (16 Aug 2023 17:38)  HR: 97 (17 Aug 2023 05:55) (88 - 97)  BP: 154/85 (17 Aug 2023 05:55) (149/93 - 161/98)  RR: 15 (17 Aug 2023 05:55) (15 - 16)  SpO2: 98% (17 Aug 2023 05:55) (98% - 100%)    Parameters below as of 17 Aug 2023 01:38  Patient On (Oxygen Delivery Method): room air    Physical Exam:  General: No apparent distress, awake, comfortable   HEENT: EOMI, MMM  CVS: +S1/S2, RRR, no murmurs  Resp: CTA b/l. No crackles/wheezing  GI: Soft, NT/ND +BS  MSK: no swelling/warmth/erythema of the joints of the UE/LE. 1+ LE edema b/l   Neuro: AAOx3. No focal deficits. Strength 5/5 in UE and LEs. CNs III-XII intact. Finger to nose cerebellar test normal   Skin: faint erythema on forehead, cheeks, and upper back    LABS:                        12.5   9.42  )-----------( 243      ( 16 Aug 2023 15:00 )             38.8         139  |  103  |  51<H>  ----------------------------<  90  4.3   |  26  |  2.77<H>    Ca    8.4      16 Aug 2023 15:00    TPro  5.8<L>  /  Alb  2.7<L>  /  TBili  0.3  /  DBili  x   /  AST  17  /  ALT  14  /  AlkPhos  58      PT/INR - ( 16 Aug 2023 15:00 )   PT: 9.5 sec;   INR: 0.84 ratio         PTT - ( 16 Aug 2023 15:00 )  PTT:32.5 sec  Urinalysis Basic - ( 16 Aug 2023 15:00 )    Color: Red / Appearance: Cloudy / S.018 / pH: x  Gluc: 90 mg/dL / Ketone: Negative mg/dL  / Bili: Negative / Urobili: 0.2 mg/dL   Blood: x / Protein: >=1000 mg/dL / Nitrite: Negative   Leuk Esterase: Small / RBC: 1825 /HPF / WBC 18 /HPF   Sq Epi: x / Non Sq Epi: 1 /HPF / Bacteria: Negative /HPF    Protein/Creatinine Ratio Calculation: 9.0 Ratio (23 @ 11:32)     Rheumatology Work Up    Sedimentation Rate, Erythrocyte: 68 mm/hr *H* [1 - 15] (23 @ 15:00)  C-Reactive Protein, Serum: 32.3 mg/L *H* (23 @ 15:00)  Double Stranded DNA Antibody: 13 IU/mL [Method: EIA            Reference Ranges            Interpretation            <= 29    IU/mL     Negative            30 - 75  IU/mL     Borderline            > 75      IU/mL     Positive] (23 @ 06:15)  C3 Complement, Serum: 86 mg/dL *L* [90 - 180] (23 @ 06:15)            RADIOLOGY & ADDITIONAL STUDIES:  < from: MR Venogram Head No Cont (23 @ 04:36) >  IMPRESSION:    1.  RIGHT CAROTID NECK CIRCULATION:Intact.    2.  LEFT CAROTID NECK CIRCULATION:    Intact.    3.  VERTEBRAL NECK CIRCULATION:   Intact    4.  ANTERIOR INTRACRANIAL CIRCULATION:     Intact.    5.  POSTERIOR INTRACRANIAL CIRCULATION:   Intact.    6.  BRAIN:    No evidence of acute infarction.    7. INTRACRANIAL DURAL SINUSES:   Patent dural sinuses. No evidence   intracranial dural sinus thrombosis.    8. NECK VEINS:   Unremarkable MR venogram of the principal neck veins. No   evidence of neck venous thrombosis.    --- End of Report ---    < end of copied text >

## 2023-08-17 NOTE — ED CDU PROVIDER SUBSEQUENT DAY NOTE - CLINICAL SUMMARY MEDICAL DECISION MAKING FREE TEXT BOX
MRI/MRA/MRV / additional recommendations as per Neurology team following pt, supportive care, general observation care / monitoring.

## 2023-08-17 NOTE — ED CDU PROVIDER SUBSEQUENT DAY NOTE - PHYSICAL EXAMINATION
CONSTITUTIONAL:  Well appearing, awake, alert, oriented to person, place, time/situation and in no apparent distress.  Pt. is objectively comfortable appearing and verbalizing in full, clear, effortless sentences.  ENMT: NC/AT.  Airway patent.  Nasal mucosa clear.  Moist mucous membranes.  Neck supple.  EYES:  Clear OU.  CARDIAC:  Normal rate, regular rhythm.  Heart sounds S1 S2.  No murmurs, gallops, or rubs.  RESPIRATORY:  Breath sounds clear and equal bilaterally.  No wheezes, no rales, no rhonchi.  GASTROINTESTINAL:  Abdomen soft, non-distended, non-tender.  No rebound, no guarding.  NEUROLOGICAL:  Alert and oriented to person/place/time/situation.  No focal deficits; no tremors noted.   MUSCULOSKELETAL:  Range of motion is not limited.  Gait stable.    SKIN:  Skin color unremarkable.  Skin warm, dry, and intact.    PSYCHIATRIC:  Alert and oriented to person/place/time/situation.  Pt calm, pleasant, cooperative.  No apparent risk to self or others.

## 2023-08-17 NOTE — ED CDU PROVIDER DISPOSITION NOTE - CLINICAL COURSE
49 yo male, PMH of lupus nephritis on chronic prednisone, presented to the ED 8/16/23 c/o headache.  Pt was seen in this same ED on 8/15/23 for the same complaint; head CT at that time was unremarkable. In the ED on the current visit, VSS, pt afebrile.  WBC 9.42, Hb 12.5; CMP: BUN 51, creatinine 2.77 (c/w priors in EMR); INR 0.98; ESR 68, CRP 32.3; UA: large blood, >1000 protein, small leukocytes, negative nitrite, negative bacteria.  Neurology was consulted in the ED, advised MRI/A/V; additional recommendations appreciated; pt was sent to CDU for continued care. In the interim, pt objectively noted to be resting comfortably; pt has been clinically stable; no issues thus far. Patient reassessed this morning, headache resolved.  MR studies have been negative. Cleared by neurology for outpatient follow-up.  Pending Rheumatology evaluation at this time. 49 yo male, PMH of lupus nephritis on chronic prednisone, presented to the ED 8/16/23 c/o headache.  Pt was seen in this same ED on 8/15/23 for the same complaint; head CT at that time was unremarkable. In the ED on the current visit, VSS, pt afebrile.  WBC 9.42, Hb 12.5; CMP: BUN 51, creatinine 2.77 (c/w priors in EMR); INR 0.98; ESR 68, CRP 32.3; UA: large blood, >1000 protein, small leukocytes, negative nitrite, negative bacteria.  Neurology was consulted in the ED, advised MRI/A/V; additional recommendations appreciated; pt was sent to CDU for continued care. In the interim, pt objectively noted to be resting comfortably; pt has been clinically stable; no issues thus far. Patient reassessed this morning, headache resolved.  MR studies have been negative. Cleared by neurology for outpatient follow-up.  Pt seen by rheumatology, recommend starting Lisinopril 5mg daily for HTN and renal protection. Pt ok w/ plan.

## 2023-08-17 NOTE — ED ADULT NURSE REASSESSMENT NOTE - NS ED NURSE REASSESS COMMENT FT1
Received report from CHERYL Way @1215pm. Pt resting in bed in non acute distress. Respirations even and unlabored. Pt offers no complaints. Pt medicated per md orders.

## 2023-08-17 NOTE — CONSULT NOTE ADULT - ATTENDING COMMENTS
History with .  He does not describe the character of the pain.     Exam  General: No acute distress, Awake, Alert.     Fundus: limited due to constant blinking and moving his eyes.     Cranial Nerves   II: VFF    III, IV, VI: PERRL, EOMI.     V: Facial sensation is normal B/L.     VII: Facial strength is normal B/L.   VIII: Gross hearing is intact.     IX, X: Palate is midline and elevates symmetrically.     XI: Trapezius normal strength.     XII: Tongue midline without atrophy or fasciculations.     Motor exam    Muscle tone - no evidence of rigidity or resistance in all 4 extremities.    No atrophy or fasciculations   Muscle Strength: arms and legs, proximal and distal flexors and extensors are normal     No UE drift.    Reflexes   All present, normal, and symmetrical.       Plantars right: mute.     Plantars left: mute.       Coordination   Finger to nose: Normal.    Heel to shin: Normal.       Gait   Normal including heels, toes, and tandem gait.      A/P  Mr. De Souza is a 49 yo man with new onset headache - no h/o headache.   H/O Lupus nephritis.   R/O secondary headache - I agree with work up and management as above.   D/W patient and ED providers.   Thank you.
Plan as above, cleared from rheum standpoint for discharge and has appropriate f/u already set up.

## 2023-08-17 NOTE — ED CDU PROVIDER SUBSEQUENT DAY NOTE - HISTORY
49 yo male, PMH of lupus nephritis on chronic prednisone, presented to the ED 8/16/23 c/o headache.  Pt was seen in this same ED on 8/15/23 for the same complaint; head CT at that time was unremarkable. In the ED on the current visit, VSS, pt afebrile.  WBC 9.42, Hb 12.5; CMP: BUN 51, creatinine 2.77 (c/w priors in EMR); INR 0.98; ESR 68, CRP 32.3; UA: large blood, >1000 protein, small leukocytes, negative nitrite, negative bacteria.  Neurology was consulted in the ED, advised MRI/A/V; additional recommendations appreciated; pt was sent to CDU for continued care.  In the interim, pt objectively noted to be resting comfortably; pt has been clinically stable; no issues thus far.

## 2023-08-18 LAB
CULTURE RESULTS: SIGNIFICANT CHANGE UP
SPECIMEN SOURCE: SIGNIFICANT CHANGE UP

## 2023-08-21 LAB — DSDNA AB SER-ACNC: <12 IU/ML — SIGNIFICANT CHANGE UP

## 2023-09-08 ENCOUNTER — APPOINTMENT (OUTPATIENT)
Dept: NEPHROLOGY | Facility: CLINIC | Age: 48
End: 2023-09-08

## 2023-09-11 ENCOUNTER — APPOINTMENT (OUTPATIENT)
Dept: RHEUMATOLOGY | Facility: CLINIC | Age: 48
End: 2023-09-11
Payer: COMMERCIAL

## 2023-09-11 VITALS
OXYGEN SATURATION: 98 % | SYSTOLIC BLOOD PRESSURE: 177 MMHG | DIASTOLIC BLOOD PRESSURE: 98 MMHG | BODY MASS INDEX: 22.5 KG/M2 | HEART RATE: 64 BPM | WEIGHT: 140 LBS | HEIGHT: 66 IN

## 2023-09-11 PROCEDURE — 99215 OFFICE O/P EST HI 40 MIN: CPT

## 2023-09-15 ENCOUNTER — APPOINTMENT (OUTPATIENT)
Dept: NEPHROLOGY | Facility: CLINIC | Age: 48
End: 2023-09-15
Payer: COMMERCIAL

## 2023-09-15 VITALS
SYSTOLIC BLOOD PRESSURE: 154 MMHG | HEART RATE: 72 BPM | BODY MASS INDEX: 22.85 KG/M2 | HEIGHT: 66 IN | DIASTOLIC BLOOD PRESSURE: 95 MMHG | TEMPERATURE: 97.7 F | WEIGHT: 142.2 LBS | OXYGEN SATURATION: 98 %

## 2023-09-15 LAB
ALBUMIN SERPL ELPH-MCNC: 3.6 G/DL
ALP BLD-CCNC: 65 U/L
ALT SERPL-CCNC: 14 U/L
ANION GAP SERPL CALC-SCNC: 11 MMOL/L
ANION GAP SERPL CALC-SCNC: 9 MMOL/L
APPEARANCE: CLEAR
AST SERPL-CCNC: 16 U/L
BACTERIA: NEGATIVE /HPF
BASOPHILS # BLD AUTO: 0.03 K/UL
BASOPHILS NFR BLD AUTO: 0.3 %
BILIRUB SERPL-MCNC: 0.2 MG/DL
BILIRUBIN URINE: NEGATIVE
BLOOD URINE: ABNORMAL
BUN SERPL-MCNC: 54 MG/DL
BUN SERPL-MCNC: 55 MG/DL
C3 SERPL-MCNC: 117 MG/DL
C4 SERPL-MCNC: 37 MG/DL
CALCIUM SERPL-MCNC: 9.2 MG/DL
CALCIUM SERPL-MCNC: 9.3 MG/DL
CAST: 2 /LPF
CD16+CD56+ CELLS # BLD: 239 CELLS/UL
CD16+CD56+ CELLS NFR BLD: 23 %
CD19 CELLS NFR BLD: 9 CELLS/UL
CD3 CELLS # BLD: 774 CELLS/UL
CD3 CELLS NFR BLD: 76 %
CD3+CD4+ CELLS # BLD: 234 CELLS/UL
CD3+CD4+ CELLS NFR BLD: 23 %
CD3+CD4+ CELLS/CD3+CD8+ CLL SPEC: 0.44 RATIO
CD3+CD8+ CELLS # SPEC: 530 CELLS/UL
CD3+CD8+ CELLS NFR BLD: 53 %
CELLS.CD3-CD19+/CELLS IN BLOOD: 1 %
CHLORIDE SERPL-SCNC: 105 MMOL/L
CHLORIDE SERPL-SCNC: 106 MMOL/L
CO2 SERPL-SCNC: 22 MMOL/L
CO2 SERPL-SCNC: 24 MMOL/L
COLOR: YELLOW
CREAT SERPL-MCNC: 3.01 MG/DL
CREAT SERPL-MCNC: 3.36 MG/DL
CREAT SPEC-SCNC: 114 MG/DL
CREAT/PROT UR: 3.1 RATIO
DSDNA AB SER-ACNC: 13 IU/ML
EGFR: 22 ML/MIN/1.73M2
EGFR: 25 ML/MIN/1.73M2
EOSINOPHIL # BLD AUTO: 0.01 K/UL
EOSINOPHIL NFR BLD AUTO: 0.1 %
EPITHELIAL CELLS: 0 /HPF
GLUCOSE QUALITATIVE U: NEGATIVE MG/DL
GLUCOSE SERPL-MCNC: 101 MG/DL
HCT VFR BLD CALC: 36 %
HGB BLD-MCNC: 11.5 G/DL
IMM GRANULOCYTES NFR BLD AUTO: 0.4 %
KETONES URINE: NEGATIVE MG/DL
LEUKOCYTE ESTERASE URINE: NEGATIVE
LYMPHOCYTES # BLD AUTO: 1.17 K/UL
LYMPHOCYTES NFR BLD AUTO: 10.6 %
MAN DIFF?: NORMAL
MCHC RBC-ENTMCNC: 29.9 PG
MCHC RBC-ENTMCNC: 31.9 GM/DL
MCV RBC AUTO: 93.8 FL
MICROSCOPIC-UA: NORMAL
MONOCYTES # BLD AUTO: 0.27 K/UL
MONOCYTES NFR BLD AUTO: 2.4 %
NEUTROPHILS # BLD AUTO: 9.54 K/UL
NEUTROPHILS NFR BLD AUTO: 86.2 %
NITRITE URINE: NEGATIVE
PH URINE: 6
PLATELET # BLD AUTO: 335 K/UL
POTASSIUM SERPL-SCNC: 5.2 MMOL/L
POTASSIUM SERPL-SCNC: 6.2 MMOL/L
PROT SERPL-MCNC: 6.2 G/DL
PROT UR-MCNC: 348 MG/DL
PROTEIN URINE: 300 MG/DL
RBC # BLD: 3.84 M/UL
RBC # FLD: 12.5 %
RED BLOOD CELLS URINE: 42 /HPF
SODIUM SERPL-SCNC: 137 MMOL/L
SODIUM SERPL-SCNC: 140 MMOL/L
SPECIFIC GRAVITY URINE: 1.01
UROBILINOGEN URINE: 0.2 MG/DL
WBC # FLD AUTO: 11.06 K/UL
WHITE BLOOD CELLS URINE: 6 /HPF

## 2023-09-15 PROCEDURE — 99215 OFFICE O/P EST HI 40 MIN: CPT

## 2023-10-09 ENCOUNTER — APPOINTMENT (OUTPATIENT)
Dept: RHEUMATOLOGY | Facility: CLINIC | Age: 48
End: 2023-10-09
Payer: COMMERCIAL

## 2023-10-09 VITALS
TEMPERATURE: 98.1 F | HEIGHT: 66 IN | OXYGEN SATURATION: 98 % | WEIGHT: 142 LBS | HEART RATE: 78 BPM | SYSTOLIC BLOOD PRESSURE: 167 MMHG | DIASTOLIC BLOOD PRESSURE: 109 MMHG | BODY MASS INDEX: 22.82 KG/M2 | RESPIRATION RATE: 16 BRPM

## 2023-10-09 DIAGNOSIS — M35.1 OTHER OVERLAP SYNDROMES: ICD-10-CM

## 2023-10-09 PROCEDURE — 99214 OFFICE O/P EST MOD 30 MIN: CPT

## 2023-10-10 LAB
ALBUMIN SERPL ELPH-MCNC: 3.5 G/DL
ALP BLD-CCNC: 71 U/L
ALT SERPL-CCNC: 6 U/L
ANION GAP SERPL CALC-SCNC: 9 MMOL/L
AST SERPL-CCNC: 13 U/L
BASOPHILS # BLD AUTO: 0.06 K/UL
BASOPHILS NFR BLD AUTO: 0.7 %
BILIRUB SERPL-MCNC: 0.2 MG/DL
BUN SERPL-MCNC: 52 MG/DL
C3 SERPL-MCNC: 104 MG/DL
C4 SERPL-MCNC: 33 MG/DL
CALCIUM SERPL-MCNC: 9.1 MG/DL
CHLORIDE SERPL-SCNC: 104 MMOL/L
CO2 SERPL-SCNC: 29 MMOL/L
CREAT SERPL-MCNC: 3.53 MG/DL
CREAT SPEC-SCNC: 203 MG/DL
CREAT/PROT UR: 2.2 RATIO
DSDNA AB SER-ACNC: 17 IU/ML
EGFR: 20 ML/MIN/1.73M2
EOSINOPHIL # BLD AUTO: 0.16 K/UL
EOSINOPHIL NFR BLD AUTO: 1.9 %
HCT VFR BLD CALC: 36.2 %
HGB BLD-MCNC: 11.2 G/DL
IMM GRANULOCYTES NFR BLD AUTO: 0.4 %
LYMPHOCYTES # BLD AUTO: 2.49 K/UL
LYMPHOCYTES NFR BLD AUTO: 29.5 %
MAN DIFF?: NORMAL
MCHC RBC-ENTMCNC: 28 PG
MCHC RBC-ENTMCNC: 30.9 GM/DL
MCV RBC AUTO: 90.5 FL
MONOCYTES # BLD AUTO: 1.07 K/UL
MONOCYTES NFR BLD AUTO: 12.7 %
NEUTROPHILS # BLD AUTO: 4.63 K/UL
NEUTROPHILS NFR BLD AUTO: 54.8 %
PLATELET # BLD AUTO: 384 K/UL
POTASSIUM SERPL-SCNC: 4.6 MMOL/L
PROT SERPL-MCNC: 6.8 G/DL
PROT UR-MCNC: 438 MG/DL
RBC # BLD: 4 M/UL
RBC # FLD: 12.4 %
SODIUM SERPL-SCNC: 142 MMOL/L
WBC # FLD AUTO: 8.44 K/UL

## 2023-10-30 ENCOUNTER — APPOINTMENT (OUTPATIENT)
Dept: NEPHROLOGY | Facility: CLINIC | Age: 48
End: 2023-10-30
Payer: COMMERCIAL

## 2023-10-30 VITALS
HEIGHT: 66 IN | TEMPERATURE: 98.2 F | OXYGEN SATURATION: 100 % | HEART RATE: 71 BPM | BODY MASS INDEX: 12.93 KG/M2 | SYSTOLIC BLOOD PRESSURE: 176 MMHG | DIASTOLIC BLOOD PRESSURE: 103 MMHG | WEIGHT: 80.47 LBS

## 2023-10-30 VITALS — OXYGEN SATURATION: 98 % | DIASTOLIC BLOOD PRESSURE: 103 MMHG | HEART RATE: 69 BPM | SYSTOLIC BLOOD PRESSURE: 161 MMHG

## 2023-10-30 PROCEDURE — 99214 OFFICE O/P EST MOD 30 MIN: CPT | Mod: GC

## 2023-11-10 LAB
ALBUMIN SERPL ELPH-MCNC: 3.4 G/DL
ALP BLD-CCNC: 74 U/L
ALT SERPL-CCNC: 6 U/L
ANION GAP SERPL CALC-SCNC: 10 MMOL/L
APPEARANCE: ABNORMAL
APPEARANCE: CLEAR
AST SERPL-CCNC: 16 U/L
BACTERIA: NEGATIVE /HPF
BACTERIA: NEGATIVE /HPF
BILIRUB SERPL-MCNC: 0.2 MG/DL
BILIRUBIN URINE: NEGATIVE
BILIRUBIN URINE: NEGATIVE
BLOOD URINE: ABNORMAL
BLOOD URINE: ABNORMAL
BUN SERPL-MCNC: 44 MG/DL
CALCIUM SERPL-MCNC: 8.8 MG/DL
CAST: 5 /LPF
CAST: 7 /LPF
CHLORIDE SERPL-SCNC: 107 MMOL/L
CO2 SERPL-SCNC: 27 MMOL/L
COLOR: ABNORMAL
COLOR: YELLOW
CREAT SERPL-MCNC: 2.85 MG/DL
CREAT SPEC-SCNC: 136 MG/DL
CREAT SPEC-SCNC: 57 MG/DL
CREAT/PROT UR: 15.7 RATIO
CREAT/PROT UR: 3.3 RATIO
EGFR: 26 ML/MIN/1.73M2
EPITHELIAL CELLS: 0 /HPF
EPITHELIAL CELLS: 2 /HPF
GLUCOSE QUALITATIVE U: NEGATIVE MG/DL
GLUCOSE QUALITATIVE U: NEGATIVE MG/DL
GLUCOSE SERPL-MCNC: 74 MG/DL
HYALINE CASTS: PRESENT
KETONES URINE: NEGATIVE MG/DL
KETONES URINE: NEGATIVE MG/DL
LEUKOCYTE ESTERASE URINE: NEGATIVE
LEUKOCYTE ESTERASE URINE: NEGATIVE
MICROSCOPIC-UA: NORMAL
MICROSCOPIC-UA: NORMAL
NITRITE URINE: NEGATIVE
NITRITE URINE: NEGATIVE
PH URINE: 6
PH URINE: 6.5
POTASSIUM SERPL-SCNC: 4.7 MMOL/L
PROT SERPL-MCNC: 6.5 G/DL
PROT UR-MCNC: 456 MG/DL
PROT UR-MCNC: 890 MG/DL
PROTEIN URINE: 300 MG/DL
PROTEIN URINE: >=1000 MG/DL
RED BLOOD CELLS URINE: 147 /HPF
RED BLOOD CELLS URINE: 41 /HPF
REVIEW: NORMAL
SODIUM SERPL-SCNC: 143 MMOL/L
SPECIFIC GRAVITY URINE: 1.02
SPECIFIC GRAVITY URINE: 1.02
TACROLIMUS SERPL-MCNC: 3.1 NG/ML
UROBILINOGEN URINE: 0.2 MG/DL
UROBILINOGEN URINE: 0.2 MG/DL
WHITE BLOOD CELLS URINE: 4 /HPF
WHITE BLOOD CELLS URINE: 8 /HPF

## 2023-11-17 ENCOUNTER — APPOINTMENT (OUTPATIENT)
Dept: RHEUMATOLOGY | Facility: CLINIC | Age: 48
End: 2023-11-17
Payer: COMMERCIAL

## 2023-11-17 VITALS
SYSTOLIC BLOOD PRESSURE: 180 MMHG | HEIGHT: 66 IN | WEIGHT: 140 LBS | DIASTOLIC BLOOD PRESSURE: 99 MMHG | OXYGEN SATURATION: 98 % | BODY MASS INDEX: 22.5 KG/M2 | HEART RATE: 69 BPM

## 2023-11-17 DIAGNOSIS — Z79.899 OTHER LONG TERM (CURRENT) DRUG THERAPY: ICD-10-CM

## 2023-11-17 PROCEDURE — 99214 OFFICE O/P EST MOD 30 MIN: CPT

## 2023-11-17 RX ORDER — PANTOPRAZOLE 20 MG/1
20 TABLET, DELAYED RELEASE ORAL DAILY
Qty: 30 | Refills: 3 | Status: COMPLETED | COMMUNITY
Start: 2023-06-23 | End: 2023-11-17

## 2023-11-20 LAB
ALBUMIN SERPL ELPH-MCNC: 3.6 G/DL
ALP BLD-CCNC: 86 U/L
ALT SERPL-CCNC: 8 U/L
ANION GAP SERPL CALC-SCNC: 11 MMOL/L
APPEARANCE: CLEAR
AST SERPL-CCNC: 17 U/L
BACTERIA: NEGATIVE /HPF
BASOPHILS # BLD AUTO: 0.06 K/UL
BASOPHILS NFR BLD AUTO: 0.7 %
BILIRUB SERPL-MCNC: <0.2 MG/DL
BILIRUBIN URINE: NEGATIVE
BLOOD URINE: ABNORMAL
BUN SERPL-MCNC: 48 MG/DL
C3 SERPL-MCNC: 122 MG/DL
C4 SERPL-MCNC: 34 MG/DL
CALCIUM SERPL-MCNC: 9.3 MG/DL
CAST: 4 /LPF
CD16+CD56+ CELLS # BLD: 659 CELLS/UL
CD16+CD56+ CELLS NFR BLD: 22 %
CD19 CELLS NFR BLD: 32 CELLS/UL
CD3 CELLS # BLD: 2215 CELLS/UL
CD3 CELLS NFR BLD: 76 %
CD3+CD4+ CELLS # BLD: 781 CELLS/UL
CD3+CD4+ CELLS NFR BLD: 27 %
CD3+CD4+ CELLS/CD3+CD8+ CLL SPEC: 0.57 RATIO
CD3+CD8+ CELLS # SPEC: 1367 CELLS/UL
CD3+CD8+ CELLS NFR BLD: 47 %
CELLS.CD3-CD19+/CELLS IN BLOOD: 1 %
CHLORIDE SERPL-SCNC: 107 MMOL/L
CO2 SERPL-SCNC: 24 MMOL/L
COLOR: YELLOW
CREAT SERPL-MCNC: 3.03 MG/DL
CREAT SPEC-SCNC: 91 MG/DL
CREAT/PROT UR: 3.3 RATIO
DEPRECATED KAPPA LC FREE/LAMBDA SER: 1.3 RATIO
DSDNA AB SER-ACNC: <12 IU/ML
EGFR: 25 ML/MIN/1.73M2
EOSINOPHIL # BLD AUTO: 0.25 K/UL
EOSINOPHIL NFR BLD AUTO: 2.9 %
EPITHELIAL CELLS: 0 /HPF
GLUCOSE QUALITATIVE U: NEGATIVE MG/DL
HCT VFR BLD CALC: 32.6 %
HGB BLD-MCNC: 10.4 G/DL
IGA SER QL IEP: 299 MG/DL
IGG SER QL IEP: 1485 MG/DL
IGM SER QL IEP: 43 MG/DL
IMM GRANULOCYTES NFR BLD AUTO: 0.2 %
KAPPA LC CSF-MCNC: 7.77 MG/DL
KAPPA LC SERPL-MCNC: 10.13 MG/DL
KETONES URINE: NEGATIVE MG/DL
LEUKOCYTE ESTERASE URINE: NEGATIVE
LYMPHOCYTES # BLD AUTO: 3.16 K/UL
LYMPHOCYTES NFR BLD AUTO: 36.6 %
MAN DIFF?: NORMAL
MCHC RBC-ENTMCNC: 28.3 PG
MCHC RBC-ENTMCNC: 31.9 GM/DL
MCV RBC AUTO: 88.6 FL
MICROSCOPIC-UA: NORMAL
MONOCYTES # BLD AUTO: 0.93 K/UL
MONOCYTES NFR BLD AUTO: 10.8 %
NEUTROPHILS # BLD AUTO: 4.22 K/UL
NEUTROPHILS NFR BLD AUTO: 48.8 %
NITRITE URINE: NEGATIVE
PH URINE: 6
PLATELET # BLD AUTO: 357 K/UL
POTASSIUM SERPL-SCNC: 4.7 MMOL/L
PROT SERPL-MCNC: 7 G/DL
PROT UR-MCNC: 303 MG/DL
PROTEIN URINE: 300 MG/DL
RBC # BLD: 3.68 M/UL
RBC # FLD: 12.5 %
RED BLOOD CELLS URINE: 27 /HPF
SODIUM SERPL-SCNC: 141 MMOL/L
SPECIFIC GRAVITY URINE: 1.01
UROBILINOGEN URINE: 0.2 MG/DL
WBC # FLD AUTO: 8.64 K/UL
WHITE BLOOD CELLS URINE: 3 /HPF

## 2023-12-13 RX ORDER — SODIUM BICARBONATE 650 MG/1
650 TABLET ORAL TWICE DAILY
Qty: 360 | Refills: 3 | Status: ACTIVE | COMMUNITY
Start: 1900-01-01 | End: 1900-01-01

## 2023-12-13 NOTE — PATIENT PROFILE ADULT - FALL HARM RISK - FALL HARM RISK
Discussed surgical consents and post op instructions, patient verbalized understanding. RTC in early February for planning   No indicators present

## 2023-12-21 ENCOUNTER — APPOINTMENT (OUTPATIENT)
Dept: NEPHROLOGY | Facility: CLINIC | Age: 48
End: 2023-12-21

## 2024-01-05 ENCOUNTER — APPOINTMENT (OUTPATIENT)
Dept: NEPHROLOGY | Facility: CLINIC | Age: 49
End: 2024-01-05
Payer: COMMERCIAL

## 2024-01-05 VITALS
DIASTOLIC BLOOD PRESSURE: 85 MMHG | HEART RATE: 74 BPM | HEIGHT: 65 IN | TEMPERATURE: 97.7 F | OXYGEN SATURATION: 99 % | BODY MASS INDEX: 22.41 KG/M2 | WEIGHT: 134.48 LBS | SYSTOLIC BLOOD PRESSURE: 144 MMHG

## 2024-01-05 DIAGNOSIS — M32.14 GLOMERULAR DISEASE IN SYSTEMIC LUPUS ERYTHEMATOSUS: ICD-10-CM

## 2024-01-05 PROCEDURE — 99215 OFFICE O/P EST HI 40 MIN: CPT | Mod: GC

## 2024-01-05 NOTE — HISTORY OF PRESENT ILLNESS
[FreeTextEntry1] : follow up Lupus Nephritis   47 yo male who initially presented to dermatology with a rash and work up revealed MÓNICA titer 1:1280 (speckled) - diagnosed with SLE and acute cutaneous lupus. urinalysis showed hematuria and 1.5 gms of proteinuria. 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 He was initiated on Cellcept, prednisone and Plaquenil but was non compliant   Presented in October 2022 to [facility] with worsening LIZETH- Kidney biopsy repeated October 2022- Lupus Nephritis Class 4- activity index 15/24, chronicity 3/12 ). Received obinutuzumab 10/21/22 and 11/1/2022 and restarted on Cellcept - now at 1500 bid. his creatinine did not really improve and stayed at 1.7-1.8 mg/dl.   5/2/2023- persistence of renal disease/ hematuria/ proteinuria/ elevated creatinine  Repeat kidney biopsy 5/2/23 on this admission showed proliferative LN class IV-G with 5 out of 8 non-globally sclerosed glomeruli with cellular and fibrocellular crescents; moderate tubulointerstitial inflammation, ~30% IFTA. Activity index 13/24 and chronicity index 7/12. He had a previous biopsy in Oct 2022, activity index was 15/24 and chronicity was 3/12 IFTA 15-20%. Mycophenolic acid level wnl indicating compliance.   Received iv solumedrol 1g x3 (5/4-5/6) then reduce to 1mg/kg IV solu-medrol 60mg/day starting on 5/7. Patient discharged on 5/8/23 on prednisone 60mg to be further tapered outpatient.  -discharged on bactrim and PPI while on high dose steroids -first dose of Cytoxan given 5/5/23 with second dose on 5/18.   4/27/23-5/8/2023-   Patient called complaining of hematuria and was told to go to the hospital.  U/A showed large blood > 50 RBCs, mod Leukocyte esterase. urine culture was negative. CT was negative for kidney stones. The cause of hematuria was most likely due to hemorrhagic cystitis from Cytoxan use.   he also had extensive pedal edema with an albumin of 2.3. was given solumedrol and received iv Bumex followed by oral Bumex. He diuresed significantly ( 161> 144 lbs today) and feels much better. his discharge albumin was 3.5 which was likely due to the steroids.   -Quant TB negative outpatient 2/2023; Hep B negative   6//2023  he continues to complain of severe edema  he has a lot of leg and facial swelling  no changes in urination, no foamy urine, no blood in the urine  taking Lasix 40 mg daily   7/17/2023-   started on Tacrolimus since last visit - 2 mg BID  only complaint is edema  overall feels okay  on prednisone 40 mg daily   9/15/2023  was in the ED for high BP  Currently on lisinopril 2.5 mg daily  K was 6.2> repeated 5.2  still has some leg swelling but improving  urinating okay   10/30: feeling overall well but BP has been high (160/90 range). He is working full time. Edema is much better. He saw rheum recently and was instructed to lower pred to 2.5mg OD for 2 weeks and then 2.5mg qOD. Off MMF completely.  1/5: complaining of some pain in his feet when he works and a rash on the face which is responding to topical steroid cream. Off prednisone and MMF. Still on tac 3mg BID. BP still on the high side. Last labs in Nov show ongoing proteinuria 3.3 and stable crt of 3. lupus markers stable

## 2024-01-05 NOTE — ASSESSMENT
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 kidney biopsy showed class 4/crescents- Received obinutuzumab 10/21/22 and 11/1/2022 , Resumed full dose Cellcept - compliant may 2022- creatinine lee again, worse nephrotic syndrome. kidney biopsy- as above. received 2 doses of Cyclophosphamide- 500 mg iv 2 weeks apart along with MESNA. End of May- admitted with hematuria/ fluid overload and hypoalbuminemia- received iv steroids and diuretics  on discharge, he had a significant response in his hypoalbuminemia- 6/6- albumin - 3.5 creatinine was 2.78  9/15/2023 - stop Lisinopril 2.5 daily for now - start Chlorthalidone 25 mg daily (for BP, K) - Wean prednisone to off. will d/w Rheum about stopping Bactrim as well - long discussion with patient with the help of an interpretor ([id number]) about dialysis/transplant.   10/30/2023:  -increase chlorthalidone to 25mg BID -Decrease prednisone to 2.5 mg daily > then 2.5 mg every other week for 2 weeks - off in 4 weeks -will reach out to rheum for need to continue bactrim  1/5/2024: -residual proteinuria likely due to chronicity. he would benefit from optimization of CKD care. -will decrease tacrolimus to 2mg BID and introduce farxiga 10mg OD -decrease chlorthalidone to 25mg OD -keep bumex on hold -labs today  f/u in 2  months

## 2024-01-05 NOTE — PHYSICAL EXAM
[General Appearance - Alert] : alert [General Appearance - In No Acute Distress] : in no acute distress [General Appearance - Well Nourished] : well nourished [General Appearance - Well Developed] : well developed [General Appearance - Well-Appearing] : healthy appearing [Hearing Threshold Finger Rub Not Ector] : hearing was normal [Examination Of The Oral Cavity] : the lips and gums were normal [Neck Appearance] : the appearance of the neck was normal [Neck Cervical Mass (___cm)] : no neck mass was observed [Jugular Venous Distention Increased] : there was no jugular-venous distention [Respiration, Rhythm And Depth] : normal respiratory rhythm and effort [Exaggerated Use Of Accessory Muscles For Inspiration] : no accessory muscle use [Auscultation Breath Sounds / Voice Sounds] : lungs were clear to auscultation bilaterally [Heart Rate And Rhythm] : heart rate was normal and rhythm regular [Heart Sounds] : normal S1 and S2 [Heart Sounds Gallop] : no gallops [Bowel Sounds] : normal bowel sounds [Abdomen Soft] : soft [Abdomen Tenderness] : non-tender [No CVA Tenderness] : no ~M costovertebral angle tenderness [No Spinal Tenderness] : no spinal tenderness [Abnormal Walk] : normal gait [Nail Clubbing] : no clubbing  or cyanosis of the fingernails [Musculoskeletal - Swelling] : no joint swelling seen [Skin Color & Pigmentation] : normal skin color and pigmentation [] : no rash [Skin Lesions] : no skin lesions [Oriented To Time, Place, And Person] : oriented to person, place, and time [Impaired Insight] : insight and judgment were intact [Affect] : the affect was normal [Mood] : the mood was normal [FreeTextEntry1] : 3+ edema

## 2024-01-05 NOTE — END OF VISIT
[] : Fellow [Time Spent: ___ minutes] : I have spent [unfilled] minutes of time on the encounter. [FreeTextEntry3] : I have seen and examined the patient with the fellow and made amendments to the note as needed

## 2024-02-05 ENCOUNTER — APPOINTMENT (OUTPATIENT)
Dept: RHEUMATOLOGY | Facility: CLINIC | Age: 49
End: 2024-02-05
Payer: COMMERCIAL

## 2024-02-05 VITALS
DIASTOLIC BLOOD PRESSURE: 99 MMHG | OXYGEN SATURATION: 98 % | SYSTOLIC BLOOD PRESSURE: 156 MMHG | RESPIRATION RATE: 16 BRPM | WEIGHT: 131 LBS | HEIGHT: 65 IN | TEMPERATURE: 97.1 F | HEART RATE: 73 BPM | BODY MASS INDEX: 21.83 KG/M2

## 2024-02-05 PROCEDURE — 99214 OFFICE O/P EST MOD 30 MIN: CPT

## 2024-02-05 NOTE — PHYSICAL EXAM
[General Appearance - Alert] : alert [General Appearance - In No Acute Distress] : in no acute distress [Auscultation Breath Sounds / Voice Sounds] : lungs were clear to auscultation bilaterally [Heart Sounds] : normal S1 and S2 [Musculoskeletal - Swelling] : no joint swelling seen [FreeTextEntry1] : no edema  [Impaired Insight] : insight and judgment were intact

## 2024-02-05 NOTE — HISTORY OF PRESENT ILLNESS
[FreeTextEntry1] : At today's visit Last seen November 2023 Saw renal Dr Shultz recently He reports feeling well overall has occasional mild pain in the arms and left ankle at the end of the day, works 12 hours shifts, no swelling no other complaints

## 2024-02-12 ENCOUNTER — NON-APPOINTMENT (OUTPATIENT)
Age: 49
End: 2024-02-12

## 2024-02-20 LAB
APPEARANCE: CLEAR
BACTERIA: NEGATIVE /HPF
BASOPHILS # BLD AUTO: 0.03 K/UL
BASOPHILS NFR BLD AUTO: 0.4 %
BILIRUBIN URINE: NEGATIVE
BLOOD URINE: ABNORMAL
C3 SERPL-MCNC: 110 MG/DL
C4 SERPL-MCNC: 29 MG/DL
CAST: 2 /LPF
CD16+CD56+ CELLS # BLD: 283 CELLS/UL
CD16+CD56+ CELLS NFR BLD: 11 %
CD19 CELLS NFR BLD: 313 CELLS/UL
CD3 CELLS # BLD: 2023 CELLS/UL
CD3 CELLS NFR BLD: 77 %
CD3+CD4+ CELLS # BLD: 796 CELLS/UL
CD3+CD4+ CELLS NFR BLD: 31 %
CD3+CD4+ CELLS/CD3+CD8+ CLL SPEC: 0.68 RATIO
CD3+CD8+ CELLS # SPEC: 1163 CELLS/UL
CD3+CD8+ CELLS NFR BLD: 45 %
CELLS.CD3-CD19+/CELLS IN BLOOD: 12 %
COLOR: YELLOW
CREAT SPEC-SCNC: 86 MG/DL
CREAT/PROT UR: 4.2 RATIO
DSDNA AB SER-ACNC: 15 IU/ML
EOSINOPHIL # BLD AUTO: 0.17 K/UL
EOSINOPHIL NFR BLD AUTO: 2.4 %
EPITHELIAL CELLS: 0 /HPF
GLUCOSE QUALITATIVE U: 500 MG/DL
HBV CORE IGG+IGM SER QL: NONREACTIVE
HBV SURFACE AG SER QL: NONREACTIVE
HCT VFR BLD CALC: 30.7 %
HCV AB SER QL: NONREACTIVE
HCV S/CO RATIO: 0.1 S/CO
HGB BLD-MCNC: 9.8 G/DL
IMM GRANULOCYTES NFR BLD AUTO: 0.3 %
KETONES URINE: NEGATIVE MG/DL
LEUKOCYTE ESTERASE URINE: NEGATIVE
LYMPHOCYTES # BLD AUTO: 2.6 K/UL
LYMPHOCYTES NFR BLD AUTO: 36.5 %
M TB IFN-G BLD-IMP: NEGATIVE
MAN DIFF?: NORMAL
MCHC RBC-ENTMCNC: 27.3 PG
MCHC RBC-ENTMCNC: 31.9 GM/DL
MCV RBC AUTO: 85.5 FL
MICROSCOPIC-UA: NORMAL
MONOCYTES # BLD AUTO: 0.6 K/UL
MONOCYTES NFR BLD AUTO: 8.4 %
NEUTROPHILS # BLD AUTO: 3.71 K/UL
NEUTROPHILS NFR BLD AUTO: 52 %
NITRITE URINE: NEGATIVE
PH URINE: 6.5
PLATELET # BLD AUTO: 353 K/UL
PROT UR-MCNC: 364 MG/DL
PROTEIN URINE: 300 MG/DL
QUANTIFERON TB PLUS MITOGEN MINUS NIL: >10 IU/ML
QUANTIFERON TB PLUS NIL: 0.03 IU/ML
QUANTIFERON TB PLUS TB1 MINUS NIL: 0 IU/ML
QUANTIFERON TB PLUS TB2 MINUS NIL: -0.01 IU/ML
RBC # BLD: 3.59 M/UL
RBC # FLD: 12.8 %
RED BLOOD CELLS URINE: 7 /HPF
SPECIFIC GRAVITY URINE: 1.02
UROBILINOGEN URINE: 0.2 MG/DL
WBC # FLD AUTO: 7.13 K/UL
WHITE BLOOD CELLS URINE: 1 /HPF

## 2024-03-18 ENCOUNTER — APPOINTMENT (OUTPATIENT)
Dept: NEPHROLOGY | Facility: CLINIC | Age: 49
End: 2024-03-18
Payer: COMMERCIAL

## 2024-03-18 VITALS
SYSTOLIC BLOOD PRESSURE: 138 MMHG | HEIGHT: 65 IN | OXYGEN SATURATION: 100 % | BODY MASS INDEX: 21.67 KG/M2 | DIASTOLIC BLOOD PRESSURE: 89 MMHG | TEMPERATURE: 97.9 F | HEART RATE: 67 BPM | WEIGHT: 130.07 LBS

## 2024-03-18 PROCEDURE — G2211 COMPLEX E/M VISIT ADD ON: CPT

## 2024-03-18 PROCEDURE — 99215 OFFICE O/P EST HI 40 MIN: CPT

## 2024-03-18 RX ORDER — DAPAGLIFLOZIN 10 MG/1
10 TABLET, FILM COATED ORAL DAILY
Qty: 30 | Refills: 3 | Status: DISCONTINUED | COMMUNITY
Start: 2024-01-05 | End: 2024-03-18

## 2024-03-18 RX ORDER — HYDROXYCHLOROQUINE SULFATE 200 MG/1
200 TABLET, FILM COATED ORAL
Qty: 90 | Refills: 0 | Status: DISCONTINUED | COMMUNITY
Start: 2022-07-12 | End: 2024-03-18

## 2024-03-18 RX ORDER — MYCOPHENOLATE MOFETIL 500 MG/1
500 TABLET ORAL
Refills: 0 | Status: DISCONTINUED | COMMUNITY
End: 2024-03-18

## 2024-03-18 RX ORDER — SODIUM ZIRCONIUM CYCLOSILICATE 10 G/10G
10 POWDER, FOR SUSPENSION ORAL DAILY
Qty: 2 | Refills: 2 | Status: DISCONTINUED | COMMUNITY
Start: 2023-09-15 | End: 2024-03-18

## 2024-03-18 RX ORDER — BUMETANIDE 2 MG/1
2 TABLET ORAL EVERY 8 HOURS
Qty: 180 | Refills: 3 | Status: DISCONTINUED | COMMUNITY
End: 2024-03-18

## 2024-03-18 RX ORDER — PREDNISONE 10 MG/1
10 TABLET ORAL
Qty: 60 | Refills: 0 | Status: DISCONTINUED | COMMUNITY
End: 2024-03-18

## 2024-03-18 RX ORDER — HYDROXYCHLOROQUINE SULFATE 200 MG/1
200 TABLET, FILM COATED ORAL
Refills: 0 | Status: DISCONTINUED | COMMUNITY
End: 2024-03-18

## 2024-03-18 RX ORDER — LISINOPRIL 2.5 MG/1
2.5 TABLET ORAL
Refills: 0 | Status: DISCONTINUED | COMMUNITY
End: 2024-03-18

## 2024-03-18 RX ORDER — PANTOPRAZOLE SODIUM 40 MG/1
40 GRANULE, DELAYED RELEASE ORAL
Refills: 0 | Status: DISCONTINUED | COMMUNITY
End: 2024-03-18

## 2024-03-18 RX ORDER — SULFAMETHOXAZOLE AND TRIMETHOPRIM 400; 80 MG/1; MG/1
400-80 TABLET ORAL
Qty: 60 | Refills: 2 | Status: DISCONTINUED | COMMUNITY
Start: 2022-10-19 | End: 2024-03-18

## 2024-03-18 RX ORDER — PREDNISONE 2.5 MG/1
2.5 TABLET ORAL DAILY
Qty: 60 | Refills: 3 | Status: DISCONTINUED | COMMUNITY
Start: 2023-10-30 | End: 2024-03-18

## 2024-03-20 NOTE — PHYSICAL EXAM
[General Appearance - Alert] : alert [General Appearance - In No Acute Distress] : in no acute distress [General Appearance - Well Nourished] : well nourished [General Appearance - Well Developed] : well developed [General Appearance - Well-Appearing] : healthy appearing [Hearing Threshold Finger Rub Not [place]] : hearing was normal [Neck Appearance] : the appearance of the neck was normal [Examination Of The Oral Cavity] : the lips and gums were normal [Neck Cervical Mass (___cm)] : no neck mass was observed [Jugular Venous Distention Increased] : there was no jugular-venous distention [Exaggerated Use Of Accessory Muscles For Inspiration] : no accessory muscle use [Respiration, Rhythm And Depth] : normal respiratory rhythm and effort [Auscultation Breath Sounds / Voice Sounds] : lungs were clear to auscultation bilaterally [Heart Rate And Rhythm] : heart rate was normal and rhythm regular [Heart Sounds] : normal S1 and S2 [Heart Sounds Gallop] : no gallops [Edema] : there was no peripheral edema [Bowel Sounds] : normal bowel sounds [Abdomen Soft] : soft [Abdomen Tenderness] : non-tender [No CVA Tenderness] : no ~M costovertebral angle tenderness [No Spinal Tenderness] : no spinal tenderness [Abnormal Walk] : normal gait [Musculoskeletal - Swelling] : no joint swelling seen [Nail Clubbing] : no clubbing  or cyanosis of the fingernails [Skin Color & Pigmentation] : normal skin color and pigmentation [] : no rash [Skin Lesions] : no skin lesions [Oriented To Time, Place, And Person] : oriented to person, place, and time [Impaired Insight] : insight and judgment were intact [Affect] : the affect was normal [Mood] : the mood was normal

## 2024-03-20 NOTE — HISTORY OF PRESENT ILLNESS
[FreeTextEntry1] : follow up Lupus Nephritis   50 yo male who initially presented to dermatology with a rash and work up revealed MÓNICA titer 1:1280 (speckled) - diagnosed with SLE and acute cutaneous lupus. urinalysis showed hematuria and 1.5 gms of proteinuria. 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 He was initiated on Cellcept, prednisone and Plaquenil but was non compliant   Presented in October 2022 to [facility] with worsening LIZETH- Kidney biopsy repeated October 2022- Lupus Nephritis Class 4- activity index 15/24, chronicity 3/12 ). Received obinutuzumab 10/21/22 and 11/1/2022 and restarted on Cellcept - now at 1500 bid. his creatinine did not really improve and stayed at 1.7-1.8 mg/dl.   5/2/2023- persistence of renal disease/ hematuria/ proteinuria/ elevated creatinine  Repeat kidney biopsy 5/2/23 on this admission showed proliferative LN class IV-G with 5 out of 8 non-globally sclerosed glomeruli with cellular and fibrocellular crescents; moderate tubulointerstitial inflammation, ~30% IFTA. Activity index 13/24 and chronicity index 7/12. He had a previous biopsy in Oct 2022, activity index was 15/24 and chronicity was 3/12 IFTA 15-20%. Mycophenolic acid level wnl indicating compliance.   Received iv solumedrol 1g x3 (5/4-5/6) then reduce to 1mg/kg IV solu-medrol 60mg/day starting on 5/7. Patient discharged on 5/8/23 on prednisone 60mg to be further tapered outpatient.  -discharged on bactrim and PPI while on high dose steroids -first dose of Cytoxan given 5/5/23 with second dose on 5/18.   4/27/23-5/8/2023-   Patient called complaining of hematuria and was told to go to the hospital.  U/A showed large blood > 50 RBCs, mod Leukocyte esterase. urine culture was negative. CT was negative for kidney stones. The cause of hematuria was most likely due to hemorrhagic cystitis from Cytoxan use.   he also had extensive pedal edema with an albumin of 2.3. was given solumedrol and received iv Bumex followed by oral Bumex. He diuresed significantly ( 161> 144 lbs today) and feels much better. his discharge albumin was 3.5 which was likely due to the steroids.   -Quant TB negative outpatient 2/2023; Hep B negative   started on Tacrolimus in 7/2023   9/15/2023  was in the ED for high BP  Currently on lisinopril 2.5 mg daily  K was 6.2> repeated 5.2  still has some leg swelling but improving  urinating okay   10/30: feeling overall well but BP has been high (160/90 range). He is working full time. Edema is much better. He saw rheum recently and was instructed to lower pred to 2.5mg OD for 2 weeks and then 2.5mg qOD. Off MMF completely.  1/5: complaining of some pain in his feet when he works and a rash on the face which is responding to topical steroid cream. Off prednisone and MMF. Still on tac 3mg BID. BP still on the high side. Last labs in Nov show ongoing proteinuria 3.3 and stable crt of 3. lupus markers stable  3/20/2024   overall feels well  after initiating Jardiance, proteinuria has improved to 2.4 gms/gm  GFR is 27 ml/min  urinating okay

## 2024-03-20 NOTE — ASSESSMENT
[FreeTextEntry1] : 50 yo male with a history of lupus nephritis, h/o multiple immunosuppressives use, last kidney biopsy done on 5/2023 showed class 4 Lupus nephritis  Kidney biopsy- diffuse global proliferative and sclerosing form, ISN/ RPS class IV-G (A/C) - see comment - Cellular and fibrocellular crescents in 5 out of 8 non-globally sclerosed glomeruli - Diffuse global endocapillary hypercellularity - NIH activity index score: 13/24 Summary of chronic changes: - Global glomerulosclerosis (25% of glomeruli) - Segmental glomerulosclerosis (25% of glomeruli) - Tubular atrophy and interstitial fibrosis (30% of the sampled cortex) - Mild arterial and arteriolar sclerosis - NIH chronicity index score: 7/12  Immunosuppression history- march 2022- kidney biopsy- class 3 LN- Cellcept - off and on - was non compliant october 2022- LIZETH, worse proteinuria. kidney biopsy showed class 4/crescents- Received obinutuzumab 10/21/22 and 11/1/2022 , Resumed full dose Cellcept - compliant may 2022- creatinine lee again, worse nephrotic syndrome. kidney biopsy- as above. received 2 doses of Cyclophosphamide- 500 mg iv 2 weeks apart along with MESNA. End of May- admitted with hematuria/ fluid overload and hypoalbuminemia- received iv steroids and diuretics  on discharge, he had a significant response in his hypoalbuminemia- 6/6- albumin - 3.5 creatinine was 2.78  9/15/2023 - stop Lisinopril 2.5 daily for now - start Chlorthalidone 25 mg daily (for BP, K) - Wean prednisone to off. will d/w Rheum about stopping Bactrim as well - long discussion with patient with the help of an interpretor ([id number]) about dialysis/transplant.  10/30/2023: -increase chlorthalidone to 25mg BID -Decrease prednisone to 2.5 mg daily > then 2.5 mg every other week for 2 weeks - off in 4 weeks -will reach out to rheum for need to continue bactrim  1/5/2024: -residual proteinuria likely due to chronicity. he would benefit from optimization of CKD care. -will decrease tacrolimus to 2mg BID and introduce farxiga 10mg OD -decrease chlorthalidone to 25mg OD -keep bumex on hold -labs today  3/20/2024  proteinuria improved  creatinine improved  will start Lisinopril 5 mg daily c3/c4 okay discussed about transplant - states wife maybe a donor. i encouraged him to make an appointment with the transplant team

## 2024-04-02 ENCOUNTER — APPOINTMENT (OUTPATIENT)
Dept: DERMATOLOGY | Facility: CLINIC | Age: 49
End: 2024-04-02
Payer: COMMERCIAL

## 2024-04-02 DIAGNOSIS — L93.1 SUBACUTE CUTANEOUS LUPUS ERYTHEMATOSUS: ICD-10-CM

## 2024-04-02 PROCEDURE — 99214 OFFICE O/P EST MOD 30 MIN: CPT

## 2024-04-02 RX ORDER — TRIAMCINOLONE ACETONIDE 1 MG/G
0.1 OINTMENT TOPICAL
Qty: 1 | Refills: 1 | Status: ACTIVE | COMMUNITY
Start: 2022-10-11 | End: 1900-01-01

## 2024-04-15 ENCOUNTER — APPOINTMENT (OUTPATIENT)
Dept: RHEUMATOLOGY | Facility: CLINIC | Age: 49
End: 2024-04-15
Payer: COMMERCIAL

## 2024-04-15 VITALS
DIASTOLIC BLOOD PRESSURE: 88 MMHG | OXYGEN SATURATION: 98 % | SYSTOLIC BLOOD PRESSURE: 147 MMHG | WEIGHT: 127 LBS | RESPIRATION RATE: 16 BRPM | HEIGHT: 65 IN | HEART RATE: 74 BPM | BODY MASS INDEX: 21.16 KG/M2

## 2024-04-15 PROCEDURE — 99214 OFFICE O/P EST MOD 30 MIN: CPT

## 2024-04-15 PROCEDURE — G2211 COMPLEX E/M VISIT ADD ON: CPT

## 2024-04-15 NOTE — HISTORY OF PRESENT ILLNESS
[FreeTextEntry1] : At today's visit Last seen Feb 2024 Saw renal Dr Shultz recently He reports feeling well overall no complaints

## 2024-04-15 NOTE — ASSESSMENT
[FreeTextEntry1] : #SLE (diagnosis February 2022, lupus nephritis and cutaneous disease) +MÓNICA, Low C3/C4, DsDNA >1000, +Sm/RNP/SSA/SSB, +LA On hydroxychloroquine 200 mg daily  #Acute cutaneous lupus, s.p skin biopsy with interface dermatitis- resolved  #Lupus nephritis, class IV (repeat biopsy October 2022, activity index 15/24, chronicity 3/12 )  -MMF 2 g daily (issues with compliance/follow up)- increased to 3 g daily as of 1/12/23 -Received Obinutuzumab 10/21 and 11/1/22- CD19 1 as of November 2023 -Refractory proteinuria, repeat renal biopsy 5/23: activity 13/24, chronicity 7/12, cellular crescents 5/8 Started CYC s.p 2 doses complicated by hematuria -started on tacrolimus   #Hematuria, post CYC cystoscopy on 6/30 normal  #-MCTD (Raynaud's, +PM/Scl and RNP)  TTE completed December 2022   #High risk medication use quant TB and hepatitis panel - February 2024  #Vaccination status:  COVID-19 x2 , booster 1/22/22, flu received PCV 20 given 2/3/23    Plan:  -continue with tacrolimus 2 mg bid per renal -off MMF, no further CYC dosing -prednisone tapered off, bactrim discontinued -started farxiga 10mg OD and lisinopril resumed  -obtain monitoring blood work today -obtain urine studies -continue with diuretics as per renal -Continue with hydroxychloroquine 200 mg daily most recent eye exam last month as per patient/, patient cleared to continue hydroxychloroquine will request the report  -Needs baseline PFTs- advised patient to schedule - fup with Dr Shultz started discussion regarding transplant, HD...    RTO 2-3 months.

## 2024-04-15 NOTE — PHYSICAL EXAM
[General Appearance - Alert] : alert [General Appearance - In No Acute Distress] : in no acute distress [Impaired Insight] : insight and judgment were intact [Auscultation Breath Sounds / Voice Sounds] : lungs were clear to auscultation bilaterally [Heart Sounds] : normal S1 and S2 [Musculoskeletal - Swelling] : no joint swelling seen [] : no rash

## 2024-04-16 LAB
ALBUMIN SERPL ELPH-MCNC: 4 G/DL
ALP BLD-CCNC: 108 U/L
ALT SERPL-CCNC: 8 U/L
ANION GAP SERPL CALC-SCNC: 12 MMOL/L
APPEARANCE: CLEAR
AST SERPL-CCNC: 18 U/L
BACTERIA: NEGATIVE /HPF
BILIRUB SERPL-MCNC: 0.2 MG/DL
BILIRUBIN URINE: NEGATIVE
BLOOD URINE: ABNORMAL
BUN SERPL-MCNC: 49 MG/DL
CALCIUM SERPL-MCNC: 8.8 MG/DL
CAST: 1 /LPF
CHLORIDE SERPL-SCNC: 103 MMOL/L
CO2 SERPL-SCNC: 24 MMOL/L
COLOR: YELLOW
CREAT SERPL-MCNC: 2.8 MG/DL
CREAT SPEC-SCNC: 95 MG/DL
CREAT/PROT UR: 3.3 RATIO
EGFR: 27 ML/MIN/1.73M2
EPITHELIAL CELLS: 0 /HPF
GLUCOSE QUALITATIVE U: 500 MG/DL
KETONES URINE: NEGATIVE MG/DL
LEUKOCYTE ESTERASE URINE: NEGATIVE
MICROSCOPIC-UA: NORMAL
NITRITE URINE: NEGATIVE
PH URINE: 6
POTASSIUM SERPL-SCNC: 3.9 MMOL/L
PROT SERPL-MCNC: 7.5 G/DL
PROT UR-MCNC: 309 MG/DL
PROTEIN URINE: 300 MG/DL
RED BLOOD CELLS URINE: 8 /HPF
SODIUM SERPL-SCNC: 139 MMOL/L
SPECIFIC GRAVITY URINE: 1.02
UROBILINOGEN URINE: 0.2 MG/DL
WHITE BLOOD CELLS URINE: 1 /HPF

## 2024-05-20 ENCOUNTER — APPOINTMENT (OUTPATIENT)
Dept: NEPHROLOGY | Facility: CLINIC | Age: 49
End: 2024-05-20
Payer: COMMERCIAL

## 2024-05-20 VITALS
HEIGHT: 65 IN | OXYGEN SATURATION: 98 % | HEART RATE: 74 BPM | WEIGHT: 127.87 LBS | TEMPERATURE: 98 F | SYSTOLIC BLOOD PRESSURE: 138 MMHG | BODY MASS INDEX: 21.3 KG/M2 | DIASTOLIC BLOOD PRESSURE: 94 MMHG

## 2024-05-20 DIAGNOSIS — E87.5 HYPERKALEMIA: ICD-10-CM

## 2024-05-20 DIAGNOSIS — N18.4 CHRONIC KIDNEY DISEASE, STAGE 4 (SEVERE): ICD-10-CM

## 2024-05-20 PROCEDURE — G2211 COMPLEX E/M VISIT ADD ON: CPT

## 2024-05-20 PROCEDURE — 99215 OFFICE O/P EST HI 40 MIN: CPT

## 2024-05-20 RX ORDER — CHLORTHALIDONE 25 MG/1
25 TABLET ORAL DAILY
Qty: 90 | Refills: 3 | Status: ACTIVE | COMMUNITY
Start: 2023-09-15 | End: 1900-01-01

## 2024-05-20 RX ORDER — EMPAGLIFLOZIN 25 MG/1
25 TABLET, FILM COATED ORAL DAILY
Qty: 90 | Refills: 3 | Status: ACTIVE | COMMUNITY
Start: 2024-01-12 | End: 1900-01-01

## 2024-05-23 PROBLEM — E87.5 HYPERKALEMIA: Status: ACTIVE | Noted: 2023-09-11

## 2024-05-23 PROBLEM — N18.4 CKD (CHRONIC KIDNEY DISEASE) STAGE 4, GFR 15-29 ML/MIN: Status: ACTIVE | Noted: 2024-05-23

## 2024-05-23 NOTE — ASSESSMENT
[FreeTextEntry1] : 50 yo male with a history of lupus nephritis, h/o multiple immunosuppressives use, last kidney biopsy done on 5/2023 showed class 4 Lupus nephritis  Kidney biopsy- diffuse global proliferative and sclerosing form, ISN/ RPS class IV-G (A/C) - see comment - Cellular and fibrocellular crescents in 5 out of 8 non-globally sclerosed glomeruli - Diffuse global endocapillary hypercellularity - NIH activity index score: 13/24 Summary of chronic changes: - Global glomerulosclerosis (25% of glomeruli) - Segmental glomerulosclerosis (25% of glomeruli) - Tubular atrophy and interstitial fibrosis (30% of the sampled cortex) - Mild arterial and arteriolar sclerosis - NIH chronicity index score: 7/12  Immunosuppression history- march 2022- kidney biopsy- class 3 LN- Cellcept - off and on - was non compliant october 2022- LIZETH, worse proteinuria. kidney biopsy showed class 4/crescents- Received obinutuzumab 10/21/22 and 11/1/2022 , Resumed full dose Cellcept - compliant may 2022- creatinine lee again, worse nephrotic syndrome. kidney biopsy- as above. received 2 doses of Cyclophosphamide- 500 mg iv 2 weeks apart along with MESNA. End of May- admitted with hematuria/ fluid overload and hypoalbuminemia- received iv steroids and diuretics  on discharge, he had a significant response in his hypoalbuminemia- 6/6- albumin - 3.5 creatinine was 2.78  9/15/2023 - stop Lisinopril 2.5 daily for now - start Chlorthalidone 25 mg daily (for BP, K) - Wean prednisone to off. will d/w Rheum about stopping Bactrim as well - long discussion with patient with the help of an interpretor ([id number]) about dialysis/transplant.  10/30/2023: -increase chlorthalidone to 25mg BID -Decrease prednisone to 2.5 mg daily > then 2.5 mg every other week for 2 weeks - off in 4 weeks -will reach out to rheum for need to continue bactrim  1/5/2024: -residual proteinuria likely due to chronicity. he would benefit from optimization of CKD care. -will decrease tacrolimus to 2mg BID and introduce farxiga 10mg OD -decrease chlorthalidone to 25mg OD -keep bumex on hold -labs today  3/20/2024  proteinuria improved  creatinine improved  will start Lisinopril 5 mg daily c3/c4 okay discussed about transplant - states wife maybe a donor. i encouraged him to make an appointment with the transplant team   5/20  GFR is now 30  still with decrease Tacrolimus to 1 mg bid  would like to maximize Lisinopril to 10> 20 mg daily  discuss with rheum before doing the above

## 2024-05-23 NOTE — HISTORY OF PRESENT ILLNESS
[FreeTextEntry1] : follow up Lupus Nephritis   50 yo male who initially presented to dermatology with a rash and work up revealed MÓNICA titer 1:1280 (speckled) - diagnosed with SLE and acute cutaneous lupus. urinalysis showed hematuria and 1.5 gms of proteinuria. 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 He was initiated on Cellcept, prednisone and Plaquenil but was non compliant   Presented in October 2022 to [facility] with worsening LIZETH- Kidney biopsy repeated October 2022- Lupus Nephritis Class 4- activity index 15/24, chronicity 3/12 ). Received obinutuzumab 10/21/22 and 11/1/2022 and restarted on Cellcept - now at 1500 bid. his creatinine did not really improve and stayed at 1.7-1.8 mg/dl.   5/2/2023- persistence of renal disease/ hematuria/ proteinuria/ elevated creatinine  Repeat kidney biopsy 5/2/23 on this admission showed proliferative LN class IV-G with 5 out of 8 non-globally sclerosed glomeruli with cellular and fibrocellular crescents; moderate tubulointerstitial inflammation, ~30% IFTA. Activity index 13/24 and chronicity index 7/12. He had a previous biopsy in Oct 2022, activity index was 15/24 and chronicity was 3/12 IFTA 15-20%. Mycophenolic acid level wnl indicating compliance.   Received iv solumedrol 1g x3 (5/4-5/6) then reduce to 1mg/kg IV solu-medrol 60mg/day starting on 5/7. Patient discharged on 5/8/23 on prednisone 60mg to be further tapered outpatient.  -discharged on bactrim and PPI while on high dose steroids -first dose of Cytoxan given 5/5/23 with second dose on 5/18.   4/27/23-5/8/2023-   Patient called complaining of hematuria and was told to go to the hospital.  U/A showed large blood > 50 RBCs, mod Leukocyte esterase. urine culture was negative. CT was negative for kidney stones. The cause of hematuria was most likely due to hemorrhagic cystitis from Cytoxan use.   he also had extensive pedal edema with an albumin of 2.3. was given solumedrol and received iv Bumex followed by oral Bumex. He diuresed significantly ( 161> 144 lbs today) and feels much better. his discharge albumin was 3.5 which was likely due to the steroids.   -Quant TB negative outpatient 2/2023; Hep B negative   started on Tacrolimus in 7/2023   9/15/2023  was in the ED for high BP  Currently on lisinopril 2.5 mg daily  K was 6.2> repeated 5.2  still has some leg swelling but improving  urinating okay   10/30: feeling overall well but BP has been high (160/90 range). He is working full time. Edema is much better. He saw rheum recently and was instructed to lower pred to 2.5mg OD for 2 weeks and then 2.5mg qOD. Off MMF completely.  1/5: complaining of some pain in his feet when he works and a rash on the face which is responding to topical steroid cream. Off prednisone and MMF. Still on tac 3mg BID. BP still on the high side. Last labs in Nov show ongoing proteinuria 3.3 and stable crt of 3. lupus markers stable  3/20/2024  overall feels well  after initiating Jardiance, proteinuria has improved to 2.4 gms/gm  GFR is 27 ml/min  urinating okay   5/20/2024  Feels good  no leg swelling  denies any complaints compliant with medications

## 2024-05-23 NOTE — PHYSICAL EXAM
[General Appearance - Alert] : alert [General Appearance - In No Acute Distress] : in no acute distress [General Appearance - Well Nourished] : well nourished [General Appearance - Well Developed] : well developed [General Appearance - Well-Appearing] : healthy appearing [Hearing Threshold Finger Rub Not [place]] : hearing was normal [Examination Of The Oral Cavity] : the lips and gums were normal [Neck Appearance] : the appearance of the neck was normal [Neck Cervical Mass (___cm)] : no neck mass was observed [Jugular Venous Distention Increased] : there was no jugular-venous distention [Respiration, Rhythm And Depth] : normal respiratory rhythm and effort [Exaggerated Use Of Accessory Muscles For Inspiration] : no accessory muscle use [Auscultation Breath Sounds / Voice Sounds] : lungs were clear to auscultation bilaterally [Heart Rate And Rhythm] : heart rate was normal and rhythm regular [Heart Sounds] : normal S1 and S2 [Heart Sounds Gallop] : no gallops [Edema] : there was no peripheral edema [Bowel Sounds] : normal bowel sounds [Abdomen Soft] : soft [Abdomen Tenderness] : non-tender [No CVA Tenderness] : no ~M costovertebral angle tenderness [No Spinal Tenderness] : no spinal tenderness [Abnormal Walk] : normal gait [Nail Clubbing] : no clubbing  or cyanosis of the fingernails [Musculoskeletal - Swelling] : no joint swelling seen [Skin Color & Pigmentation] : normal skin color and pigmentation [] : no rash [Skin Lesions] : no skin lesions [Oriented To Time, Place, And Person] : oriented to person, place, and time [Impaired Insight] : insight and judgment were intact [Affect] : the affect was normal [Mood] : the mood was normal

## 2024-06-03 ENCOUNTER — APPOINTMENT (OUTPATIENT)
Dept: RHEUMATOLOGY | Facility: CLINIC | Age: 49
End: 2024-06-03
Payer: COMMERCIAL

## 2024-06-03 VITALS
BODY MASS INDEX: 20.83 KG/M2 | OXYGEN SATURATION: 98 % | HEART RATE: 67 BPM | RESPIRATION RATE: 16 BRPM | SYSTOLIC BLOOD PRESSURE: 142 MMHG | HEIGHT: 65 IN | DIASTOLIC BLOOD PRESSURE: 91 MMHG | WEIGHT: 125 LBS

## 2024-06-03 DIAGNOSIS — M32.9 SYSTEMIC LUPUS ERYTHEMATOSUS, UNSPECIFIED: ICD-10-CM

## 2024-06-03 DIAGNOSIS — M32.14 GLOMERULAR DISEASE IN SYSTEMIC LUPUS ERYTHEMATOSUS: ICD-10-CM

## 2024-06-03 DIAGNOSIS — Z79.899 OTHER LONG TERM (CURRENT) DRUG THERAPY: ICD-10-CM

## 2024-06-03 PROCEDURE — 99214 OFFICE O/P EST MOD 30 MIN: CPT

## 2024-06-03 PROCEDURE — G2211 COMPLEX E/M VISIT ADD ON: CPT

## 2024-06-03 RX ORDER — LISINOPRIL 10 MG/1
10 TABLET ORAL DAILY
Qty: 1 | Refills: 2 | Status: ACTIVE | COMMUNITY
Start: 2024-03-20 | End: 1900-01-01

## 2024-06-03 RX ORDER — HYDROXYCHLOROQUINE SULFATE 200 MG/1
200 TABLET, FILM COATED ORAL
Qty: 90 | Refills: 0 | Status: ACTIVE | COMMUNITY
Start: 2024-06-03 | End: 1900-01-01

## 2024-06-03 NOTE — HISTORY OF PRESENT ILLNESS
[FreeTextEntry1] : At today's visit Last seen April 2024 Saw renal Dr Shultz recently He reports feeling well overall no complaints

## 2024-06-03 NOTE — PHYSICAL EXAM
[General Appearance - Alert] : alert [General Appearance - In No Acute Distress] : in no acute distress [Auscultation Breath Sounds / Voice Sounds] : lungs were clear to auscultation bilaterally [Musculoskeletal - Swelling] : no joint swelling seen [Impaired Insight] : insight and judgment were intact

## 2024-06-03 NOTE — ASSESSMENT
[FreeTextEntry1] : #SLE (diagnosis February 2022, lupus nephritis and cutaneous disease) +MÓNICA, Low C3/C4, DsDNA >1000, +Sm/RNP/SSA/SSB, +LA On hydroxychloroquine 200 mg daily  #Acute cutaneous lupus, s.p skin biopsy with interface dermatitis- resolved  #Lupus nephritis, class IV (repeat biopsy October 2022, activity index 15/24, chronicity 3/12 )  -MMF 2 g daily (issues with compliance/follow up)- increased to 3 g daily as of 1/12/23 -Received Obinutuzumab 10/21 and 11/1/22- CD19 1 as of November 2023 -Refractory proteinuria, repeat renal biopsy 5/23: activity 13/24, chronicity 7/12, cellular crescents 5/8 Started CYC s.p 2 doses complicated by hematuria -started on tacrolimus  #Hematuria, post CYC cystoscopy on 6/30 normal  #-MCTD (Raynaud's, +PM/Scl and RNP)  TTE completed December 2022   #High risk medication use quant TB and hepatitis panel - February 2024  #Vaccination status:  COVID-19 x2 , booster 1/22/22, flu received PCV 20 given 2/3/23    Plan: -discussed with renal Dr Shultz, kidney function stable and persistent proteinuria likely reflective of damage -agree with lowering tacrolimus 1 mg bid per renal -off MMF, no further CYC dosing -prednisone tapered off, bactrim discontinued -started farxiga 10mg OD and lisinopril resumed -obtain monitoring blood work today -obtain urine studies -continue with diuretics as per renal -Continue with hydroxychloroquine 200 mg daily last eye exam July 2023, patient cleared to continue hydroxychloroquine and to follow up in 6 months- overdue  -Needs baseline PFTs- advised patient to schedule - fup with Dr Shultz started discussion regarding transplant, HD...    RTO 2-3 months.

## 2024-06-03 NOTE — DATA REVIEWED
[FreeTextEntry1] : Labs and chart notes reviewed today with patient stable kidney function persistent proteinuria

## 2024-06-04 LAB
25(OH)D3 SERPL-MCNC: 49.7 NG/ML
ALBUMIN SERPL ELPH-MCNC: 3.7 G/DL
ALBUMIN SERPL ELPH-MCNC: 3.7 G/DL
ALBUMIN SERPL ELPH-MCNC: 4 G/DL
ALP BLD-CCNC: 103 U/L
ALP BLD-CCNC: 109 U/L
ALP BLD-CCNC: 78 U/L
ALT SERPL-CCNC: 10 U/L
ALT SERPL-CCNC: 5 U/L
ALT SERPL-CCNC: 6 U/L
ANION GAP SERPL CALC-SCNC: 11 MMOL/L
ANION GAP SERPL CALC-SCNC: 12 MMOL/L
ANION GAP SERPL CALC-SCNC: 16 MMOL/L
APPEARANCE: CLEAR
AST SERPL-CCNC: 16 U/L
AST SERPL-CCNC: 17 U/L
AST SERPL-CCNC: 20 U/L
BACTERIA: NEGATIVE /HPF
BASOPHILS # BLD AUTO: 0.03 K/UL
BASOPHILS # BLD AUTO: 0.04 K/UL
BASOPHILS NFR BLD AUTO: 0.4 %
BASOPHILS NFR BLD AUTO: 0.5 %
BILIRUB SERPL-MCNC: 0.2 MG/DL
BILIRUBIN URINE: NEGATIVE
BLOOD URINE: ABNORMAL
BUN SERPL-MCNC: 39 MG/DL
BUN SERPL-MCNC: 41 MG/DL
BUN SERPL-MCNC: 53 MG/DL
C3 SERPL-MCNC: 129 MG/DL
C3 SERPL-MCNC: 98 MG/DL
C4 SERPL-MCNC: 24 MG/DL
C4 SERPL-MCNC: 28 MG/DL
CALCIUM SERPL-MCNC: 8.8 MG/DL
CALCIUM SERPL-MCNC: 8.8 MG/DL
CALCIUM SERPL-MCNC: 8.9 MG/DL
CALCIUM SERPL-MCNC: 8.9 MG/DL
CAST: 2 /LPF
CAST: 5 /LPF
CAST: 5 /LPF
CHLORIDE SERPL-SCNC: 102 MMOL/L
CHLORIDE SERPL-SCNC: 103 MMOL/L
CHLORIDE SERPL-SCNC: 105 MMOL/L
CO2 SERPL-SCNC: 21 MMOL/L
CO2 SERPL-SCNC: 25 MMOL/L
CO2 SERPL-SCNC: 25 MMOL/L
COLOR: YELLOW
CREAT SERPL-MCNC: 2.45 MG/DL
CREAT SERPL-MCNC: 2.8 MG/DL
CREAT SERPL-MCNC: 3.2 MG/DL
CREAT SPEC-SCNC: 113 MG/DL
CREAT SPEC-SCNC: 117 MG/DL
CREAT SPEC-SCNC: 118 MG/DL
CREAT/PROT UR: 1.9 RATIO
CREAT/PROT UR: 2.4 RATIO
CREAT/PROT UR: 3.5 RATIO
DSDNA AB SER-ACNC: 17 IU/ML
EGFR: 23 ML/MIN/1.73M2
EGFR: 27 ML/MIN/1.73M2
EGFR: 31 ML/MIN/1.73M2
EOSINOPHIL # BLD AUTO: 0.14 K/UL
EOSINOPHIL # BLD AUTO: 0.23 K/UL
EOSINOPHIL NFR BLD AUTO: 1.8 %
EOSINOPHIL NFR BLD AUTO: 2.6 %
EPITHELIAL CELLS: 0 /HPF
EPITHELIAL CELLS: 0 /HPF
EPITHELIAL CELLS: 1 /HPF
GLUCOSE QUALITATIVE U: 500 MG/DL
GLUCOSE QUALITATIVE U: 500 MG/DL
GLUCOSE QUALITATIVE U: NEGATIVE MG/DL
GLUCOSE SERPL-MCNC: 77 MG/DL
GLUCOSE SERPL-MCNC: 80 MG/DL
GLUCOSE SERPL-MCNC: 87 MG/DL
HCT VFR BLD CALC: 33.7 %
HCT VFR BLD CALC: 35.4 %
HGB BLD-MCNC: 10.4 G/DL
HGB BLD-MCNC: 11.6 G/DL
IMM GRANULOCYTES NFR BLD AUTO: 0.2 %
IMM GRANULOCYTES NFR BLD AUTO: 0.4 %
KETONES URINE: NEGATIVE MG/DL
LEUKOCYTE ESTERASE URINE: NEGATIVE
LYMPHOCYTES # BLD AUTO: 2.96 K/UL
LYMPHOCYTES # BLD AUTO: 3.18 K/UL
LYMPHOCYTES NFR BLD AUTO: 36.6 %
LYMPHOCYTES NFR BLD AUTO: 38.1 %
MAN DIFF?: NORMAL
MAN DIFF?: NORMAL
MCHC RBC-ENTMCNC: 26.7 PG
MCHC RBC-ENTMCNC: 27.6 PG
MCHC RBC-ENTMCNC: 30.9 GM/DL
MCHC RBC-ENTMCNC: 32.8 GM/DL
MCV RBC AUTO: 84.1 FL
MCV RBC AUTO: 86.6 FL
MICROSCOPIC-UA: NORMAL
MONOCYTES # BLD AUTO: 0.74 K/UL
MONOCYTES # BLD AUTO: 0.81 K/UL
MONOCYTES NFR BLD AUTO: 9.3 %
MONOCYTES NFR BLD AUTO: 9.5 %
NEUTROPHILS # BLD AUTO: 3.86 K/UL
NEUTROPHILS # BLD AUTO: 4.4 K/UL
NEUTROPHILS NFR BLD AUTO: 49.8 %
NEUTROPHILS NFR BLD AUTO: 50.8 %
NITRITE URINE: NEGATIVE
PARATHYROID HORMONE INTACT: 63 PG/ML
PH URINE: 6
PH URINE: 6
PH URINE: 6.5
PHOSPHATE SERPL-MCNC: 5 MG/DL
PLATELET # BLD AUTO: 273 K/UL
PLATELET # BLD AUTO: 312 K/UL
POTASSIUM SERPL-SCNC: 4.1 MMOL/L
POTASSIUM SERPL-SCNC: 4.2 MMOL/L
POTASSIUM SERPL-SCNC: 4.3 MMOL/L
PROT SERPL-MCNC: 6.9 G/DL
PROT SERPL-MCNC: 7.5 G/DL
PROT SERPL-MCNC: 7.7 G/DL
PROT UR-MCNC: 213 MG/DL
PROT UR-MCNC: 279 MG/DL
PROT UR-MCNC: 406 MG/DL
PROTEIN URINE: 300 MG/DL
RBC # BLD: 3.89 M/UL
RBC # BLD: 4.21 M/UL
RBC # FLD: 13.2 %
RBC # FLD: 13.5 %
RED BLOOD CELLS URINE: 11 /HPF
RED BLOOD CELLS URINE: 6 /HPF
RED BLOOD CELLS URINE: 9 /HPF
SODIUM SERPL-SCNC: 139 MMOL/L
SODIUM SERPL-SCNC: 139 MMOL/L
SODIUM SERPL-SCNC: 141 MMOL/L
SPECIFIC GRAVITY URINE: 1.01
SPECIFIC GRAVITY URINE: 1.02
SPECIFIC GRAVITY URINE: 1.02
TACROLIMUS SERPL-MCNC: 5 NG/ML
UROBILINOGEN URINE: 0.2 MG/DL
WBC # FLD AUTO: 7.76 K/UL
WBC # FLD AUTO: 8.68 K/UL
WHITE BLOOD CELLS URINE: 1 /HPF

## 2024-06-05 LAB — DSDNA AB SER-ACNC: 9 IU/ML

## 2024-06-21 RX ORDER — TACROLIMUS 1 MG/1
1 CAPSULE ORAL
Qty: 60 | Refills: 0 | Status: ACTIVE | COMMUNITY
Start: 2023-06-28 | End: 1900-01-01

## 2024-08-23 ENCOUNTER — APPOINTMENT (OUTPATIENT)
Dept: NEPHROLOGY | Facility: CLINIC | Age: 49
End: 2024-08-23
Payer: COMMERCIAL

## 2024-08-23 VITALS
DIASTOLIC BLOOD PRESSURE: 85 MMHG | HEIGHT: 65 IN | SYSTOLIC BLOOD PRESSURE: 139 MMHG | WEIGHT: 129.63 LBS | OXYGEN SATURATION: 100 % | TEMPERATURE: 97.5 F | HEART RATE: 66 BPM | BODY MASS INDEX: 21.6 KG/M2

## 2024-08-23 DIAGNOSIS — N18.4 CHRONIC KIDNEY DISEASE, STAGE 4 (SEVERE): ICD-10-CM

## 2024-08-23 DIAGNOSIS — E87.5 HYPERKALEMIA: ICD-10-CM

## 2024-08-23 DIAGNOSIS — M32.14 GLOMERULAR DISEASE IN SYSTEMIC LUPUS ERYTHEMATOSUS: ICD-10-CM

## 2024-08-23 DIAGNOSIS — Z79.899 OTHER LONG TERM (CURRENT) DRUG THERAPY: ICD-10-CM

## 2024-08-23 LAB
ALBUMIN SERPL ELPH-MCNC: 3.7 G/DL
ALP BLD-CCNC: 99 U/L
ALT SERPL-CCNC: 12 U/L
ANION GAP SERPL CALC-SCNC: 12 MMOL/L
AST SERPL-CCNC: 21 U/L
BILIRUB SERPL-MCNC: 0.2 MG/DL
BUN SERPL-MCNC: 34 MG/DL
CALCIUM SERPL-MCNC: 8.9 MG/DL
CHLORIDE SERPL-SCNC: 102 MMOL/L
CO2 SERPL-SCNC: 25 MMOL/L
CREAT SERPL-MCNC: 2.3 MG/DL
CREAT SPEC-SCNC: 75 MG/DL
CREAT/PROT UR: 3 RATIO
EGFR: 34 ML/MIN/1.73M2
GLUCOSE SERPL-MCNC: 89 MG/DL
POTASSIUM SERPL-SCNC: 3.8 MMOL/L
PROT SERPL-MCNC: 7.3 G/DL
PROT UR-MCNC: 221 MG/DL
SODIUM SERPL-SCNC: 140 MMOL/L
TACROLIMUS SERPL-MCNC: <2 NG/ML

## 2024-08-23 PROCEDURE — 99215 OFFICE O/P EST HI 40 MIN: CPT | Mod: GC

## 2024-08-23 PROCEDURE — G2211 COMPLEX E/M VISIT ADD ON: CPT

## 2024-08-23 RX ORDER — DAPAGLIFLOZIN 10 MG/1
10 TABLET, FILM COATED ORAL DAILY
Qty: 90 | Refills: 0 | Status: ACTIVE | COMMUNITY
Start: 2024-08-23 | End: 1900-01-01

## 2024-08-23 NOTE — END OF VISIT
[Time Spent: ___ minutes] : I have spent [unfilled] minutes of time on the encounter which excludes teaching and separately reported services. [] : Fellow [FreeTextEntry3] :  I have seen and examined the patient with the fellow and made amendments to the note as needed.

## 2024-08-23 NOTE — ASSESSMENT
[FreeTextEntry1] : He is a 48 yo male with a history of lupus nephritis, with recurrent lupus nephritis flare and h/o multiple rounds of immunosuppressives therapy, last kidney biopsy done on 5/2023 showed class 4 Lupus nephritis, now returns to clinic for follow up.  5/2023 - Kidney biopsy - diffuse global proliferative and sclerosing form, ISN/ RPS class IV-G (A/C) - see comment - Cellular and fibrocellular crescents in 5 out of 8 non-globally sclerosed glomeruli - Diffuse global endocapillary hypercellularity - NIH activity index score: 13/24 Summary of chronic changes: - Global glomerulosclerosis (25% of glomeruli) - Segmental glomerulosclerosis (25% of glomeruli) - Tubular atrophy and interstitial fibrosis (30% of the sampled cortex) - Mild arterial and arteriolar sclerosis - NIH chronicity index score: 7/12  Immunosuppression history: March 2022 - Kidney biopsy- class 3 LN- Cellcept - off and on - was non-compliant October 2022 - LIZETH, worse proteinuria. kidney biopsy showed class 4/crescents- Received Obinutuzumab 10/21/22 November 2022 - Resumed full dose Cellcept - compliant May 2023 - creatinine lee again, worse nephrotic syndrome. kidney biopsy with class V disease as above. Received 2 doses of Cyclophosphamide - 500 mg iv 2 weeks apart along with MESNA. End of May 2022 - admitted with hematuria/ fluid overload and hypoalbuminemia - received iv steroids and diuretics on discharge, he had a significant response in his hypoalbuminemia - 6/6- albumin - 3.5, creatinine was 2.78.  9/15/2023 - stop Lisinopril 2.5 daily for now - start Chlorthalidone 25 mg daily (for BP, K) - Wean prednisone to off. will d/w Rheum about stopping Bactrim as well - long discussion with patient with the help of an interpretor ([id number]) about dialysis/transplant.  10/30/2023: -increase chlorthalidone to 25mg BID -Decrease prednisone to 2.5 mg daily > then 2.5 mg every other week for 2 weeks - off in 4 weeks -will reach out to rheum for need to continue bactrim  1/5/2024: -residual proteinuria likely due to chronicity. he would benefit from optimization of CKD care. -will decrease tacrolimus to 2mg BID and introduce farxiga 10mg OD -decrease chlorthalidone to 25mg OD -keep bumex on hold -labs today  3/20/2024 Proteinuria improved  Creatinine improved  Will start Lisinopril 5 mg daily C3/C4 okay Discussed about transplant - states wife maybe a donor. I encouraged him to make an appointment with the transplant team   5/20/2024: GFR is now 30  Still with decrease Tacrolimus to 1 mg bid  Would like to maximize Lisinopril to 10> 20 mg daily  Discuss with rheum before doing the above   August 2024: 1. Lupus nephritis:  Patient with recurrent LN flare and heavy burden of immunosuppressive therapy. Last flare of LN in 5/2023, treated with solumedrol, Cytoxan --> prednisone, complicated by hemorrhagic cystitis. Cr is now stable in ~ 2.4 (eGFR ~ 31 ml/min) and persistent proteinuria in the past several months with uPCR ~ 3.3 g/g. Lupus activity markers have improved with dsDNA 9 and normal C3 and C4. It is uncertain whether repeat kidney biopsy or further IS would be favorable given chronicity on last biopsy and h/o hemorrhagic cystitis with last treatment. Will repeat labs to guide decision making. - Check CMP, uPCR, UA - Check dsDNA, C3, C4 - Check tac level  - Continue with lisinopril 10 mg daily and SGLT2i (he was told insurance will no longer cover Jardiance - will order farxiga)  2. HTN: His BP control is at goal with home -130s on lisinopril 10 mg daily and chlorthalidone 25 mg daily. Clinically appears euvolemic.  - Continue with lisinopril and chlorthalidone  - Advised to continue to monitor home BP  - Advised low sodium diet   3. CKD: He has stage 3a CKD based on recent labs, due to lupus nephritis. Without uremic symptoms and in euvolemic state and with acceptable BP control. - Knows to avoid NSAIDs - Previously discussed about KRT - Will check CKD labs at next visit  Folow up in 3 months

## 2024-08-23 NOTE — HISTORY OF PRESENT ILLNESS
[FreeTextEntry1] : Mr. Silva presents to clinic for follow up of Lupus Nephritis. His last visit was in 5/2024.  He is a 49 murphy old male who initially presented to dermatology with a rash and work up revealed MÓNICA titer 1:1280 (speckled) - diagnosed with SLE and acute cutaneous lupus. Urinalysis showed hematuria and 1.5 gms of proteinuria. kidney biopsy done on 2/7/22 showed focal proliferative lupus nephritis (class 3) with less than 5% IFTA. He was initiated on Cellcept, prednisone and Plaquenil but was non-compliant.   Presented in October 2022 to [facility] with worsening LIZETH- Kidney biopsy repeated October 2022- Lupus Nephritis Class 4- activity index 15/24, chronicity 3/12). Received Obinutuzumab 10/21/22 and 11/1/2022 and restarted on Cellcept - now at 1500 bid. His creatinine did not really improve and stayed at 1.7-1.8 mg/dl.   5/2/2023: Persistence of renal disease/ hematuria/ proteinuria/ elevated creatinine  Repeat kidney biopsy 5/2/23 on this admission showed proliferative LN class IV-G with 5 out of 8 non-globally sclerosed glomeruli with cellular and fibrocellular crescents; moderate tubulointerstitial inflammation, ~30% IFTA. Activity index 13/24 and chronicity index 7/12. He had a previous biopsy in Oct 2022, activity index was 15/24 and chronicity was 3/12 IFTA 15-20%. Mycophenolic acid level wnl indicating compliance. Received iv solumedrol 1g x3 (5/4-5/6) then reduce to 1mg/kg IV solu-medrol 60mg/day starting on 5/7. Patient discharged on 5/8/23 on prednisone 60mg to be further tapered outpatient. He was discharged on Bactrim and PPI while on high dose steroids. First dose of Cytoxan given 5/5/23 with second dose on 5/18.   4/27/23-5/8/2023: Patient called complaining of hematuria and was told to go to the hospital.  U/A showed large blood > 50 RBCs, mod Leukocyte esterase. urine culture was negative. CT was negative for kidney stones. The cause of hematuria was most likely due to hemorrhagic cystitis from Cytoxan use. He also had extensive pedal edema with an albumin of 2.3. was given solumedrol and received iv Bumex followed by oral Bumex. He diuresed significantly (161> 144 lbs today) and feels much better. his discharge albumin was 3.5 which was likely due to the steroids. Quant TB negative outpatient 2/2023; Hep B negative. - Started on Tacrolimus in 7/2023   9/15/2023: Was in the ED for high BP  Currently on lisinopril 2.5 mg daily  K was 6.2> repeated 5.2  still has some leg swelling but improving  urinating okay   10/30: feeling overall well but BP has been high (160/90 range). He is working full time. Edema is much better. He saw rheum recently and was instructed to lower pred to 2.5mg OD for 2 weeks and then 2.5mg qOD. Off MMF completely.  1/5: complaining of some pain in his feet when he works and a rash on the face which is responding to topical steroid cream. Off prednisone and MMF. Still on tac 3mg BID. BP still on the high side. Last labs in Nov show ongoing proteinuria 3.3 and stable crt of 3. lupus markers stable  3/20/2024  overall feels well  after initiating Jardiance, proteinuria has improved to 2.4 gms/gm  GFR is 27 ml/min  urinating okay   5/20/2024  Feels good  no leg swelling  denies any complaints compliant with medications   August 2024: Since last visit, he has been doing well - denies acute symptoms or hospitalizations  Denies f/c, dyspnea, chest pain, orthopnea, edema, abd pain, n/v/d, hematuria, dysuria, rash or arthralgia He notes some foamy urine Home /80s Reports compliance with medications - TAC 2 g daily, Plaquenil, Jardiance, lisinopril 10 mg daily, chlorthalidone 25 mg daily, atorvastatin  Recent labs - 6/2024 Cr 2.45., eGFR 31 ml/min (2.8 in 3/2024 and 4/2024) dsDNA 9 (17 in 3/2024) C3 and Cr - normal   5/2024 uPCR 3.5 g/g (3.3 in 4/2024, 2.4 3/2024)

## 2024-08-23 NOTE — REVIEW OF SYSTEMS
[Fever] : no fever [Chills] : no chills [Feeling Tired] : not feeling tired [Eye Pain] : no eye pain [Nosebleeds] : no nosebleeds [Nasal Discharge] : no nasal discharge [Sore Throat] : no sore throat [Heart Rate Is Slow] : the heart rate was not slow [Heart Rate Is Fast] : the heart rate was not fast [Chest Pain] : no chest pain [Palpitations] : no palpitations [Lower Ext Edema] : no extremity edema [Shortness Of Breath] : no shortness of breath [Wheezing] : no wheezing [Cough] : no cough [SOB on Exertion] : no shortness of breath during exertion [Abdominal Pain] : no abdominal pain [Vomiting] : no vomiting [Constipation] : no constipation [Diarrhea] : no diarrhea [Heartburn] : no heartburn [Melena] : no melena [Dysuria] : no dysuria [Nocturia] : no nocturia [Joint Swelling] : no joint swelling [Limb Swelling] : no limb swelling [Skin Lesions] : no skin lesions [Confused] : no confusion [Dizziness] : no dizziness [Muscle Weakness] : no muscle weakness [Easy Bleeding] : no tendency for easy bleeding [Easy Bruising] : no tendency for easy bruising [FreeTextEntry8] : some foamy urine

## 2024-08-25 LAB — DSDNA AB SER-ACNC: 9 IU/ML

## 2024-08-26 LAB — C3 SERPL-MCNC: 118 MG/DL

## 2024-08-27 LAB
APPEARANCE: CLEAR
BACTERIA: NEGATIVE /HPF
BILIRUBIN URINE: NEGATIVE
BLOOD URINE: ABNORMAL
C4 SERPL-MCNC: 23 MG/DL
CAST: 1 /LPF
COLOR: YELLOW
EPITHELIAL CELLS: 0 /HPF
GLUCOSE QUALITATIVE U: NEGATIVE MG/DL
KETONES URINE: NEGATIVE MG/DL
LEUKOCYTE ESTERASE URINE: NEGATIVE
MICROSCOPIC-UA: NORMAL
NITRITE URINE: NEGATIVE
PH URINE: 7
PROTEIN URINE: 300 MG/DL
RED BLOOD CELLS URINE: 2 /HPF
SPECIFIC GRAVITY URINE: 1.01
UROBILINOGEN URINE: 0.2 MG/DL
WHITE BLOOD CELLS URINE: 1 /HPF

## 2024-10-25 ENCOUNTER — APPOINTMENT (OUTPATIENT)
Dept: DERMATOLOGY | Facility: CLINIC | Age: 49
End: 2024-10-25
Payer: COMMERCIAL

## 2024-10-25 VITALS — BODY MASS INDEX: 21.66 KG/M2 | WEIGHT: 130 LBS | HEIGHT: 65 IN

## 2024-10-25 DIAGNOSIS — L93.1 SUBACUTE CUTANEOUS LUPUS ERYTHEMATOSUS: ICD-10-CM

## 2024-10-25 DIAGNOSIS — L85.3 XEROSIS CUTIS: ICD-10-CM

## 2024-10-25 PROCEDURE — 99214 OFFICE O/P EST MOD 30 MIN: CPT

## 2024-11-07 ENCOUNTER — NON-APPOINTMENT (OUTPATIENT)
Age: 49
End: 2024-11-07

## 2024-11-07 ENCOUNTER — APPOINTMENT (OUTPATIENT)
Dept: RHEUMATOLOGY | Facility: CLINIC | Age: 49
End: 2024-11-07
Payer: COMMERCIAL

## 2024-11-07 VITALS
DIASTOLIC BLOOD PRESSURE: 81 MMHG | SYSTOLIC BLOOD PRESSURE: 112 MMHG | WEIGHT: 129 LBS | BODY MASS INDEX: 21.49 KG/M2 | HEIGHT: 65 IN | RESPIRATION RATE: 16 BRPM | TEMPERATURE: 98.1 F | HEART RATE: 90 BPM | OXYGEN SATURATION: 98 %

## 2024-11-07 DIAGNOSIS — M32.9 SYSTEMIC LUPUS ERYTHEMATOSUS, UNSPECIFIED: ICD-10-CM

## 2024-11-07 DIAGNOSIS — M35.1 OTHER OVERLAP SYNDROMES: ICD-10-CM

## 2024-11-07 DIAGNOSIS — M32.14 GLOMERULAR DISEASE IN SYSTEMIC LUPUS ERYTHEMATOSUS: ICD-10-CM

## 2024-11-07 PROCEDURE — G2211 COMPLEX E/M VISIT ADD ON: CPT

## 2024-11-07 PROCEDURE — 99214 OFFICE O/P EST MOD 30 MIN: CPT

## 2024-11-08 DIAGNOSIS — Z79.899 OTHER LONG TERM (CURRENT) DRUG THERAPY: ICD-10-CM

## 2024-11-08 LAB
ALBUMIN SERPL ELPH-MCNC: 3.9 G/DL
ALP BLD-CCNC: 91 U/L
ALT SERPL-CCNC: 11 U/L
ANION GAP SERPL CALC-SCNC: 10 MMOL/L
APPEARANCE: CLEAR
AST SERPL-CCNC: 19 U/L
BACTERIA: NEGATIVE /HPF
BASOPHILS # BLD AUTO: 0.06 K/UL
BASOPHILS NFR BLD AUTO: 0.7 %
BILIRUB SERPL-MCNC: 0.3 MG/DL
BILIRUBIN URINE: NEGATIVE
BLOOD URINE: ABNORMAL
BUN SERPL-MCNC: 45 MG/DL
C3 SERPL-MCNC: 110 MG/DL
C4 SERPL-MCNC: 21 MG/DL
CALCIUM SERPL-MCNC: 8.9 MG/DL
CAST: 0 /LPF
CHLORIDE SERPL-SCNC: 100 MMOL/L
CO2 SERPL-SCNC: 27 MMOL/L
COLOR: YELLOW
CREAT SERPL-MCNC: 2.52 MG/DL
CREAT SPEC-SCNC: 51 MG/DL
CREAT/PROT UR: 2.8 RATIO
DSDNA AB SER-ACNC: 6 IU/ML
EGFR: 30 ML/MIN/1.73M2
EOSINOPHIL # BLD AUTO: 0.27 K/UL
EOSINOPHIL NFR BLD AUTO: 3.4 %
EPITHELIAL CELLS: 0 /HPF
GLUCOSE QUALITATIVE U: 500 MG/DL
HCT VFR BLD CALC: 36.3 %
HGB BLD-MCNC: 11.7 G/DL
IMM GRANULOCYTES NFR BLD AUTO: 0.2 %
KETONES URINE: NEGATIVE MG/DL
LEUKOCYTE ESTERASE URINE: NEGATIVE
LYMPHOCYTES # BLD AUTO: 2.35 K/UL
LYMPHOCYTES NFR BLD AUTO: 29.3 %
MAN DIFF?: NORMAL
MCHC RBC-ENTMCNC: 28.3 PG
MCHC RBC-ENTMCNC: 32.2 G/DL
MCV RBC AUTO: 87.7 FL
MICROSCOPIC-UA: NORMAL
MONOCYTES # BLD AUTO: 1.01 K/UL
MONOCYTES NFR BLD AUTO: 12.6 %
NEUTROPHILS # BLD AUTO: 4.31 K/UL
NEUTROPHILS NFR BLD AUTO: 53.8 %
NITRITE URINE: NEGATIVE
PH URINE: 6
PLATELET # BLD AUTO: 246 K/UL
POTASSIUM SERPL-SCNC: 3.8 MMOL/L
PROT SERPL-MCNC: 7.7 G/DL
PROT UR-MCNC: 145 MG/DL
PROTEIN URINE: 300 MG/DL
RBC # BLD: 4.14 M/UL
RBC # FLD: 13.2 %
RED BLOOD CELLS URINE: 2 /HPF
REVIEW: NORMAL
SODIUM SERPL-SCNC: 137 MMOL/L
SPECIFIC GRAVITY URINE: 1.01
UROBILINOGEN URINE: 0.2 MG/DL
WBC # FLD AUTO: 8.02 K/UL
WHITE BLOOD CELLS URINE: 0 /HPF

## 2024-11-11 ENCOUNTER — APPOINTMENT (OUTPATIENT)
Dept: NEPHROLOGY | Facility: CLINIC | Age: 49
End: 2024-11-11
Payer: COMMERCIAL

## 2024-11-11 VITALS
BODY MASS INDEX: 21.85 KG/M2 | TEMPERATURE: 97.6 F | SYSTOLIC BLOOD PRESSURE: 126 MMHG | HEIGHT: 65 IN | OXYGEN SATURATION: 98 % | WEIGHT: 131.17 LBS | DIASTOLIC BLOOD PRESSURE: 84 MMHG | HEART RATE: 69 BPM

## 2024-11-11 DIAGNOSIS — M32.14 GLOMERULAR DISEASE IN SYSTEMIC LUPUS ERYTHEMATOSUS: ICD-10-CM

## 2024-11-11 DIAGNOSIS — N18.4 CHRONIC KIDNEY DISEASE, STAGE 4 (SEVERE): ICD-10-CM

## 2024-11-11 DIAGNOSIS — E87.5 HYPERKALEMIA: ICD-10-CM

## 2024-11-11 DIAGNOSIS — Z79.899 OTHER LONG TERM (CURRENT) DRUG THERAPY: ICD-10-CM

## 2024-11-11 LAB
CREAT SPEC-SCNC: 117 MG/DL
CREAT/PROT UR: 2.4 RATIO
PROT UR-MCNC: 276 MG/DL

## 2024-11-11 PROCEDURE — G2211 COMPLEX E/M VISIT ADD ON: CPT

## 2024-11-11 PROCEDURE — 99215 OFFICE O/P EST HI 40 MIN: CPT | Mod: GC

## 2024-11-11 RX ORDER — LISINOPRIL 20 MG/1
20 TABLET ORAL
Qty: 90 | Refills: 3 | Status: ACTIVE | COMMUNITY
Start: 2024-11-11 | End: 1900-01-01

## 2024-11-11 RX ORDER — TACROLIMUS 0.5 MG/1
0.5 CAPSULE ORAL TWICE DAILY
Qty: 28 | Refills: 0 | Status: ACTIVE | COMMUNITY
Start: 2024-11-11 | End: 1900-01-01

## 2024-11-12 LAB
APPEARANCE: CLEAR
BACTERIA: NEGATIVE /HPF
BILIRUBIN URINE: NEGATIVE
BLOOD URINE: ABNORMAL
CAST: 1 /LPF
COLOR: YELLOW
EPITHELIAL CELLS: 0 /HPF
GLUCOSE QUALITATIVE U: 500 MG/DL
KETONES URINE: NEGATIVE MG/DL
LEUKOCYTE ESTERASE URINE: NEGATIVE
MICROSCOPIC-UA: NORMAL
NITRITE URINE: NEGATIVE
PH URINE: 6
PROTEIN URINE: 300 MG/DL
RED BLOOD CELLS URINE: 4 /HPF
SPECIFIC GRAVITY URINE: 1.02
UROBILINOGEN URINE: 0.2 MG/DL
WHITE BLOOD CELLS URINE: 0 /HPF

## 2024-11-12 NOTE — ED ADULT NURSE NOTE - BREATHING, MLM
Thank you for allowing us to care for you at [facility] today. Thank you for your patience.     You have been seen and evaluated after a dog bite that resulted in a laceration.    We reviewed the results from your visit in the emergency department.  Please read the instructions provided  If given prescriptions, take as instructed.   Please return to the emergency department or to your primary care doctor in 7-10 days to have your sutures/staples removed.    Return to the emergency department earlier if you develop fevers, if you see pus coming from the wound, or if you develop increasing redness around the wound as these can be signs of wound infection.      To prevent darkening of the scar, try to avoid direct sunlight exposure to the area. You can also apply Mederma which is designed to help minimize scarring    Remember, you care process does not end after your visit today. Please follow-up with your doctor within 1-2 days for a follow-up check to ensure you are  improving, to see if you need any further evaluation/testing, or to evaluate for any alternate diagnoses.    Please return to the emergency department for any fevers, if you see pus coming form the wound, increasing redness, discoloration, increasing pain, wound  open, numbness, tingling or weakness, new or worsening symptoms as discussed as these could be signs of more serious medical illness.    We hope you feel better soon!    Chema por permitirnos cuidarlo carroly en [facility]. Chema por tu paciencia.     Usted ha sido atendido y evaluado después de du mordedura de sarah que resultó en du laceración.     Revisamos los resultados de olivares visita al departamento de emergencias.   Lyly las instrucciones proporcionadas.   Si le recetan medicamentos, tómelos según las instrucciones.    Regrese al departamento de emergencias o a olivares médico de atención primaria en 7 a 10 días para que le retiren las  suturas/grapas.     Regrese al departamento de emergencias antes si tiene fiebre, si ve pus saliendo de la herida o si presenta un enrojecimiento creciente alrededor de la herida, ya que estos pueden ser signos de infección de la herida.      Para evitar que la cicatriz se oscurezca, trate de evitar la exposición directa a la blas solar en el área. También puedes aplicar Mederma, que está diseñado para ayudar a minimizar las cicatrices.    Recuerde, olivares proceso de atención no termina después de olivares visita de violet. Comuníquese con olivares médico dentro de 1 a 2 días para realizar un control de seguimiento y asegurarse de que está mejorando, para luis fernando si necesita más evaluaciones/pruebas o para evaluar cualquier diagnóstico alternativo.    Regrese al departamento de emergencias si tiene fiebre, si ve pus saliendo de la herida, aumento del enrojecimiento, decoloración, aumento del dolor, herida que se abre, entumecimiento, hormigueo o debilidad, síntomas nuevos o que empeoran, jose se mencionó, ya que estos podrían ser signos de más enfermedad médica grave.    ¡Esperamos que te sientas mejor pronto!   Spontaneous, unlabored and symmetrical

## 2024-12-23 ENCOUNTER — APPOINTMENT (OUTPATIENT)
Dept: NEPHROLOGY | Facility: CLINIC | Age: 49
End: 2024-12-23
Payer: COMMERCIAL

## 2024-12-23 VITALS
BODY MASS INDEX: 22.07 KG/M2 | HEIGHT: 65 IN | HEART RATE: 66 BPM | DIASTOLIC BLOOD PRESSURE: 99 MMHG | OXYGEN SATURATION: 99 % | WEIGHT: 132.48 LBS | TEMPERATURE: 98.3 F | SYSTOLIC BLOOD PRESSURE: 160 MMHG

## 2024-12-23 DIAGNOSIS — N18.4 CHRONIC KIDNEY DISEASE, STAGE 4 (SEVERE): ICD-10-CM

## 2024-12-23 DIAGNOSIS — Z79.899 OTHER LONG TERM (CURRENT) DRUG THERAPY: ICD-10-CM

## 2024-12-23 DIAGNOSIS — E87.5 HYPERKALEMIA: ICD-10-CM

## 2024-12-23 DIAGNOSIS — M32.14 GLOMERULAR DISEASE IN SYSTEMIC LUPUS ERYTHEMATOSUS: ICD-10-CM

## 2024-12-23 LAB
25(OH)D3 SERPL-MCNC: 27.2 NG/ML
ALBUMIN SERPL ELPH-MCNC: 3.6 G/DL
ANION GAP SERPL CALC-SCNC: 13 MMOL/L
APPEARANCE: CLEAR
BACTERIA: NEGATIVE /HPF
BASOPHILS # BLD AUTO: 0.06 K/UL
BASOPHILS NFR BLD AUTO: 0.8 %
BILIRUBIN URINE: NEGATIVE
BLOOD URINE: ABNORMAL
BUN SERPL-MCNC: 42 MG/DL
CALCIUM SERPL-MCNC: 9.2 MG/DL
CAST: 2 /LPF
CHLORIDE SERPL-SCNC: 100 MMOL/L
CO2 SERPL-SCNC: 28 MMOL/L
COLOR: YELLOW
CREAT SERPL-MCNC: 2.42 MG/DL
EGFR: 32 ML/MIN/1.73M2
EOSINOPHIL # BLD AUTO: 0.23 K/UL
EOSINOPHIL NFR BLD AUTO: 3.1 %
EPITHELIAL CELLS: 0 /HPF
FERRITIN SERPL-MCNC: 130 NG/ML
GLUCOSE QUALITATIVE U: 500 MG/DL
GLUCOSE SERPL-MCNC: 90 MG/DL
HCT VFR BLD CALC: 38.9 %
HGB BLD-MCNC: 12.7 G/DL
IMM GRANULOCYTES NFR BLD AUTO: 0.3 %
IRON SATN MFR SERPL: 24 %
IRON SERPL-MCNC: 64 UG/DL
KETONES URINE: NEGATIVE MG/DL
LEUKOCYTE ESTERASE URINE: NEGATIVE
LYMPHOCYTES # BLD AUTO: 1.83 K/UL
LYMPHOCYTES NFR BLD AUTO: 24.3 %
MAN DIFF?: NORMAL
MCHC RBC-ENTMCNC: 28.1 PG
MCHC RBC-ENTMCNC: 32.6 G/DL
MCV RBC AUTO: 86.1 FL
MICROSCOPIC-UA: NORMAL
MONOCYTES # BLD AUTO: 0.74 K/UL
MONOCYTES NFR BLD AUTO: 9.8 %
NEUTROPHILS # BLD AUTO: 4.65 K/UL
NEUTROPHILS NFR BLD AUTO: 61.7 %
NITRITE URINE: NEGATIVE
PH URINE: 6.5
PHOSPHATE SERPL-MCNC: 4 MG/DL
PLATELET # BLD AUTO: 264 K/UL
POTASSIUM SERPL-SCNC: 3.5 MMOL/L
PROTEIN URINE: 300 MG/DL
RBC # BLD: 4.52 M/UL
RBC # FLD: 13 %
RED BLOOD CELLS URINE: 9 /HPF
SODIUM SERPL-SCNC: 140 MMOL/L
SPECIFIC GRAVITY URINE: 1.02
TIBC SERPL-MCNC: 262 UG/DL
UIBC SERPL-MCNC: 198 UG/DL
UROBILINOGEN URINE: 0.2 MG/DL
WBC # FLD AUTO: 7.53 K/UL
WHITE BLOOD CELLS URINE: 0 /HPF

## 2024-12-23 PROCEDURE — 99215 OFFICE O/P EST HI 40 MIN: CPT

## 2024-12-23 PROCEDURE — G2211 COMPLEX E/M VISIT ADD ON: CPT

## 2024-12-23 RX ORDER — CHLORTHALIDONE 25 MG/1
25 TABLET ORAL DAILY
Qty: 30 | Refills: 4 | Status: ACTIVE | COMMUNITY

## 2024-12-23 RX ORDER — EMPAGLIFLOZIN 25 MG/1
25 TABLET, FILM COATED ORAL DAILY
Qty: 30 | Refills: 3 | Status: ACTIVE | COMMUNITY
Start: 2024-12-23 | End: 1900-01-01

## 2024-12-23 RX ORDER — EMPAGLIFLOZIN 25 MG/1
25 TABLET, FILM COATED ORAL DAILY
Qty: 30 | Refills: 3 | Status: ACTIVE | COMMUNITY

## 2024-12-24 LAB
C3 SERPL-MCNC: 122 MG/DL
C4 SERPL-MCNC: 26 MG/DL
CREAT SPEC-SCNC: 88 MG/DL
CREAT/PROT UR: 4.1 RATIO
DSDNA AB SER-ACNC: 7 IU/ML
PROT UR-MCNC: 361 MG/DL

## 2025-03-10 ENCOUNTER — APPOINTMENT (OUTPATIENT)
Dept: RHEUMATOLOGY | Facility: CLINIC | Age: 50
End: 2025-03-10
Payer: COMMERCIAL

## 2025-03-10 VITALS
DIASTOLIC BLOOD PRESSURE: 98 MMHG | WEIGHT: 131 LBS | SYSTOLIC BLOOD PRESSURE: 152 MMHG | HEART RATE: 67 BPM | OXYGEN SATURATION: 97 % | BODY MASS INDEX: 21.83 KG/M2 | HEIGHT: 65 IN

## 2025-03-10 DIAGNOSIS — M32.9 SYSTEMIC LUPUS ERYTHEMATOSUS, UNSPECIFIED: ICD-10-CM

## 2025-03-10 DIAGNOSIS — Z79.899 OTHER LONG TERM (CURRENT) DRUG THERAPY: ICD-10-CM

## 2025-03-10 DIAGNOSIS — M32.14 GLOMERULAR DISEASE IN SYSTEMIC LUPUS ERYTHEMATOSUS: ICD-10-CM

## 2025-03-10 LAB
BASOPHILS # BLD AUTO: 0.05 K/UL
BASOPHILS NFR BLD AUTO: 0.8 %
EOSINOPHIL # BLD AUTO: 0.22 K/UL
EOSINOPHIL NFR BLD AUTO: 3.3 %
HCT VFR BLD CALC: 37.1 %
HGB BLD-MCNC: 12.2 G/DL
IMM GRANULOCYTES NFR BLD AUTO: 0.2 %
LYMPHOCYTES # BLD AUTO: 1.81 K/UL
LYMPHOCYTES NFR BLD AUTO: 27.5 %
MAN DIFF?: NORMAL
MCHC RBC-ENTMCNC: 28.5 PG
MCHC RBC-ENTMCNC: 32.9 G/DL
MCV RBC AUTO: 86.7 FL
MONOCYTES # BLD AUTO: 0.64 K/UL
MONOCYTES NFR BLD AUTO: 9.7 %
NEUTROPHILS # BLD AUTO: 3.84 K/UL
NEUTROPHILS NFR BLD AUTO: 58.5 %
PLATELET # BLD AUTO: 300 K/UL
RBC # BLD: 4.28 M/UL
RBC # FLD: 12.8 %
WBC # FLD AUTO: 6.57 K/UL

## 2025-03-10 PROCEDURE — G2211 COMPLEX E/M VISIT ADD ON: CPT | Mod: NC

## 2025-03-10 PROCEDURE — 99214 OFFICE O/P EST MOD 30 MIN: CPT

## 2025-03-11 LAB
ALBUMIN SERPL ELPH-MCNC: 4 G/DL
ALP BLD-CCNC: 82 U/L
ALT SERPL-CCNC: 11 U/L
ANION GAP SERPL CALC-SCNC: 13 MMOL/L
APPEARANCE: CLEAR
AST SERPL-CCNC: 22 U/L
BACTERIA: NEGATIVE /HPF
BILIRUB SERPL-MCNC: 0.2 MG/DL
BILIRUBIN URINE: NEGATIVE
BLOOD URINE: ABNORMAL
BUN SERPL-MCNC: 42 MG/DL
C3 SERPL-MCNC: 123 MG/DL
C4 SERPL-MCNC: 24 MG/DL
CALCIUM SERPL-MCNC: 9.4 MG/DL
CAST: 0 /LPF
CHLORIDE SERPL-SCNC: 103 MMOL/L
CO2 SERPL-SCNC: 26 MMOL/L
COLOR: YELLOW
CREAT SERPL-MCNC: 2.64 MG/DL
CREAT SPEC-SCNC: 82 MG/DL
CREAT/PROT UR: 2.6 RATIO
DSDNA AB SER-ACNC: 8 IU/ML
EGFRCR SERPLBLD CKD-EPI 2021: 29 ML/MIN/1.73M2
EPITHELIAL CELLS: 0 /HPF
GLUCOSE QUALITATIVE U: 500 MG/DL
KETONES URINE: NEGATIVE MG/DL
LEUKOCYTE ESTERASE URINE: NEGATIVE
MICROSCOPIC-UA: NORMAL
NITRITE URINE: NEGATIVE
PH URINE: 6
POTASSIUM SERPL-SCNC: 4.1 MMOL/L
PROT SERPL-MCNC: 7.7 G/DL
PROT UR-MCNC: 215 MG/DL
PROTEIN URINE: 300 MG/DL
RED BLOOD CELLS URINE: 5 /HPF
SODIUM SERPL-SCNC: 142 MMOL/L
SPECIFIC GRAVITY URINE: 1.02
UROBILINOGEN URINE: 0.2 MG/DL
WHITE BLOOD CELLS URINE: 0 /HPF

## 2025-03-14 ENCOUNTER — NON-APPOINTMENT (OUTPATIENT)
Age: 50
End: 2025-03-14

## 2025-03-21 ENCOUNTER — NON-APPOINTMENT (OUTPATIENT)
Age: 50
End: 2025-03-21

## 2025-03-21 ENCOUNTER — APPOINTMENT (OUTPATIENT)
Dept: NEPHROLOGY | Facility: CLINIC | Age: 50
End: 2025-03-21
Payer: COMMERCIAL

## 2025-03-21 VITALS
DIASTOLIC BLOOD PRESSURE: 96 MMHG | BODY MASS INDEX: 22.33 KG/M2 | HEART RATE: 67 BPM | OXYGEN SATURATION: 94 % | TEMPERATURE: 98.1 F | SYSTOLIC BLOOD PRESSURE: 148 MMHG | HEIGHT: 65 IN | WEIGHT: 134 LBS

## 2025-03-21 DIAGNOSIS — I10 ESSENTIAL (PRIMARY) HYPERTENSION: ICD-10-CM

## 2025-03-21 DIAGNOSIS — N18.4 CHRONIC KIDNEY DISEASE, STAGE 4 (SEVERE): ICD-10-CM

## 2025-03-21 DIAGNOSIS — Z79.899 OTHER LONG TERM (CURRENT) DRUG THERAPY: ICD-10-CM

## 2025-03-21 DIAGNOSIS — J30.2 OTHER SEASONAL ALLERGIC RHINITIS: ICD-10-CM

## 2025-03-21 DIAGNOSIS — M32.14 GLOMERULAR DISEASE IN SYSTEMIC LUPUS ERYTHEMATOSUS: ICD-10-CM

## 2025-03-21 PROCEDURE — G2211 COMPLEX E/M VISIT ADD ON: CPT | Mod: NC

## 2025-03-21 PROCEDURE — 99215 OFFICE O/P EST HI 40 MIN: CPT | Mod: GC

## 2025-03-21 RX ORDER — TELMISARTAN 80 MG/1
80 TABLET ORAL
Qty: 90 | Refills: 3 | Status: ACTIVE | COMMUNITY
Start: 2025-03-21 | End: 1900-01-01

## 2025-03-21 RX ORDER — FEXOFENADINE HCL 180 MG/1
180 TABLET, FILM COATED ORAL DAILY
Qty: 30 | Refills: 0 | Status: ACTIVE | COMMUNITY
Start: 2025-03-21 | End: 1900-01-01

## 2025-04-02 ENCOUNTER — APPOINTMENT (OUTPATIENT)
Dept: CARDIOLOGY | Facility: CLINIC | Age: 50
End: 2025-04-02
Payer: COMMERCIAL

## 2025-04-02 PROCEDURE — 93306 TTE W/DOPPLER COMPLETE: CPT

## 2025-04-25 ENCOUNTER — APPOINTMENT (OUTPATIENT)
Dept: DERMATOLOGY | Facility: CLINIC | Age: 50
End: 2025-04-25
Payer: COMMERCIAL

## 2025-04-25 DIAGNOSIS — L85.3 XEROSIS CUTIS: ICD-10-CM

## 2025-04-25 DIAGNOSIS — M32.9 SYSTEMIC LUPUS ERYTHEMATOSUS, UNSPECIFIED: ICD-10-CM

## 2025-04-25 PROCEDURE — 99214 OFFICE O/P EST MOD 30 MIN: CPT

## 2025-06-30 ENCOUNTER — APPOINTMENT (OUTPATIENT)
Dept: NEPHROLOGY | Facility: CLINIC | Age: 50
End: 2025-06-30
Payer: COMMERCIAL

## 2025-06-30 VITALS
WEIGHT: 137 LBS | BODY MASS INDEX: 22.8 KG/M2 | HEART RATE: 65 BPM | OXYGEN SATURATION: 99 % | DIASTOLIC BLOOD PRESSURE: 87 MMHG | SYSTOLIC BLOOD PRESSURE: 124 MMHG

## 2025-06-30 LAB
APPEARANCE: CLEAR
BACTERIA: NEGATIVE /HPF
BILIRUBIN URINE: NEGATIVE
BLOOD URINE: ABNORMAL
CAST: 1 /LPF
COLOR: YELLOW
CREAT SPEC-SCNC: 43 MG/DL
CREAT/PROT UR: 1.8 RATIO
EPITHELIAL CELLS: 0 /HPF
GLUCOSE QUALITATIVE U: 500 MG/DL
KETONES URINE: NEGATIVE MG/DL
LEUKOCYTE ESTERASE URINE: NEGATIVE
MICROSCOPIC-UA: NORMAL
NITRITE URINE: NEGATIVE
PH URINE: 6
PROT UR-MCNC: 76 MG/DL
PROTEIN URINE: 100 MG/DL
RED BLOOD CELLS URINE: 0 /HPF
SPECIFIC GRAVITY URINE: 1.01
UROBILINOGEN URINE: 0.2 MG/DL
WHITE BLOOD CELLS URINE: 0 /HPF

## 2025-06-30 PROCEDURE — G2211 COMPLEX E/M VISIT ADD ON: CPT | Mod: NC

## 2025-06-30 PROCEDURE — 99215 OFFICE O/P EST HI 40 MIN: CPT

## 2025-07-01 LAB
25(OH)D3 SERPL-MCNC: 30.1 NG/ML
ALBUMIN SERPL ELPH-MCNC: 4 G/DL
ANION GAP SERPL CALC-SCNC: 17 MMOL/L
BUN SERPL-MCNC: 35 MG/DL
C3 SERPL-MCNC: 116 MG/DL
C4 SERPL-MCNC: 24 MG/DL
CALCIUM SERPL-MCNC: 9.4 MG/DL
CHLORIDE SERPL-SCNC: 99 MMOL/L
CO2 SERPL-SCNC: 21 MMOL/L
CREAT SERPL-MCNC: 2.31 MG/DL
DSDNA AB SER-ACNC: 6 IU/ML
EGFRCR SERPLBLD CKD-EPI 2021: 34 ML/MIN/1.73M2
FERRITIN SERPL-MCNC: 160 NG/ML
GLUCOSE SERPL-MCNC: 56 MG/DL
IRON SATN MFR SERPL: 26 %
IRON SERPL-MCNC: 85 UG/DL
PHOSPHATE SERPL-MCNC: 3.8 MG/DL
POTASSIUM SERPL-SCNC: 4.1 MMOL/L
SODIUM SERPL-SCNC: 138 MMOL/L
TIBC SERPL-MCNC: 324 UG/DL
UIBC SERPL-MCNC: 239 UG/DL

## 2025-09-08 ENCOUNTER — APPOINTMENT (OUTPATIENT)
Dept: RHEUMATOLOGY | Facility: CLINIC | Age: 50
End: 2025-09-08
Payer: COMMERCIAL

## 2025-09-08 VITALS
HEIGHT: 65 IN | DIASTOLIC BLOOD PRESSURE: 91 MMHG | BODY MASS INDEX: 22.82 KG/M2 | OXYGEN SATURATION: 99 % | SYSTOLIC BLOOD PRESSURE: 139 MMHG | HEART RATE: 68 BPM | WEIGHT: 137 LBS

## 2025-09-08 DIAGNOSIS — M32.14 GLOMERULAR DISEASE IN SYSTEMIC LUPUS ERYTHEMATOSUS: ICD-10-CM

## 2025-09-08 DIAGNOSIS — Z79.899 OTHER LONG TERM (CURRENT) DRUG THERAPY: ICD-10-CM

## 2025-09-08 DIAGNOSIS — N18.4 CHRONIC KIDNEY DISEASE, STAGE 4 (SEVERE): ICD-10-CM

## 2025-09-08 DIAGNOSIS — M32.9 SYSTEMIC LUPUS ERYTHEMATOSUS, UNSPECIFIED: ICD-10-CM

## 2025-09-08 PROCEDURE — 99214 OFFICE O/P EST MOD 30 MIN: CPT

## 2025-09-08 PROCEDURE — G2211 COMPLEX E/M VISIT ADD ON: CPT | Mod: NC

## 2025-09-09 LAB
ALBUMIN SERPL ELPH-MCNC: 4.5 G/DL
ALP BLD-CCNC: 79 U/L
ALT SERPL-CCNC: 22 U/L
ANION GAP SERPL CALC-SCNC: 13 MMOL/L
AST SERPL-CCNC: 33 U/L
BASOPHILS # BLD AUTO: 0.04 K/UL
BASOPHILS NFR BLD AUTO: 0.6 %
BILIRUB SERPL-MCNC: 0.2 MG/DL
BUN SERPL-MCNC: 40 MG/DL
C3 SERPL-MCNC: 123 MG/DL
C4 SERPL-MCNC: 28 MG/DL
CALCIUM SERPL-MCNC: 9.7 MG/DL
CHLORIDE SERPL-SCNC: 100 MMOL/L
CO2 SERPL-SCNC: 26 MMOL/L
CREAT SERPL-MCNC: 2.28 MG/DL
CREAT SPEC-SCNC: 58 MG/DL
CREAT/PROT UR: 1.9 RATIO
DSDNA AB SER-ACNC: 7 IU/ML
EGFRCR SERPLBLD CKD-EPI 2021: 34 ML/MIN/1.73M2
EOSINOPHIL # BLD AUTO: 0.22 K/UL
EOSINOPHIL NFR BLD AUTO: 3.1 %
HCT VFR BLD CALC: 40.6 %
HGB BLD-MCNC: 13.1 G/DL
IMM GRANULOCYTES NFR BLD AUTO: 0.3 %
LYMPHOCYTES # BLD AUTO: 1.93 K/UL
LYMPHOCYTES NFR BLD AUTO: 27.1 %
MAN DIFF?: NORMAL
MCHC RBC-ENTMCNC: 28.8 PG
MCHC RBC-ENTMCNC: 32.3 G/DL
MCV RBC AUTO: 89.2 FL
MONOCYTES # BLD AUTO: 0.69 K/UL
MONOCYTES NFR BLD AUTO: 9.7 %
NEUTROPHILS # BLD AUTO: 4.21 K/UL
NEUTROPHILS NFR BLD AUTO: 59.2 %
PLATELET # BLD AUTO: 249 K/UL
POTASSIUM SERPL-SCNC: 3.7 MMOL/L
PROT SERPL-MCNC: 8.1 G/DL
PROT UR-MCNC: 113 MG/DL
RBC # BLD: 4.55 M/UL
RBC # FLD: 13.5 %
SODIUM SERPL-SCNC: 139 MMOL/L
WBC # FLD AUTO: 7.11 K/UL

## 2025-09-16 ENCOUNTER — NON-APPOINTMENT (OUTPATIENT)
Age: 50
End: 2025-09-16